# Patient Record
Sex: FEMALE | Race: WHITE | NOT HISPANIC OR LATINO | Employment: UNEMPLOYED | URBAN - METROPOLITAN AREA
[De-identification: names, ages, dates, MRNs, and addresses within clinical notes are randomized per-mention and may not be internally consistent; named-entity substitution may affect disease eponyms.]

---

## 2018-02-27 ENCOUNTER — OFFICE VISIT (OUTPATIENT)
Dept: CARDIOLOGY | Facility: CLINIC | Age: 23
End: 2018-02-27

## 2018-02-27 VITALS
SYSTOLIC BLOOD PRESSURE: 110 MMHG | HEIGHT: 66 IN | DIASTOLIC BLOOD PRESSURE: 74 MMHG | WEIGHT: 174 LBS | BODY MASS INDEX: 27.97 KG/M2 | HEART RATE: 81 BPM

## 2018-02-27 DIAGNOSIS — R07.2 PRECORDIAL PAIN: Primary | ICD-10-CM

## 2018-02-27 PROCEDURE — 99204 OFFICE O/P NEW MOD 45 MIN: CPT | Performed by: INTERNAL MEDICINE

## 2018-02-27 PROCEDURE — 93000 ELECTROCARDIOGRAM COMPLETE: CPT | Performed by: INTERNAL MEDICINE

## 2018-02-27 NOTE — PROGRESS NOTES
Date of Office Visit: 2018  Encounter Provider: Payam Rausch MD  Place of Service: [facility] CARDIOLOGY  Patient Name: Liliam Estrada  :1995    Chief complaint: Chest pain.    History of Present Illness:    Dear Dr. Borrero:    I had the pleasure of seeing your patient in cardiology office on 2018.  As you well   know, she is a very pleasant 22 year-old white female with a past medical history   significant for GERD, irritable bowel syndrome, and gastroparesis who presents for   evaluation of chest discomfort.  The patient states that she has had chest discomfort   intermittently for several years.  She has been seen in the ER previously for this, and   was told that it was esophageal spasm.  The pain typically would resolve with a GI   cocktail.  However, she states that she had 2 episodes of chest discomfort within the   last several months which were different.  The worst of these occurred around the   Thanksgiving holiday, and she described this as a grabbing and twisting pain in the   left upper chest which was severe.  She states that it did hurt if she moved or took a   deep breath.  Entire episode lasted for several hours.  She had a similar but less   intense episode while in college approximately one month prior to this.  She has only   mild shortness of breath with activity, but states that this has not changed significantly.    She has not had chest discomfort with activity.  She does have multiple GI issues as   noted above, including gastroparesis which is not related to diabetes.  Her EKG from   today shows sinus arrhythmia, but is otherwise normal.  She has no history of sudden   cardiac death in her family.  Her only other complaint is that she has had some   lightheaded episodes, and her systolic blood pressure has been low at times in the   90s.  She has had no syncope.    Past Medical History:   Diagnosis Date   • Bronchitis    • Chest pain  "   • Gastroparesis    • GERD (gastroesophageal reflux disease)    • IBS (irritable bowel syndrome)        Past Surgical History:   Procedure Laterality Date   • UPPER GASTROINTESTINAL ENDOSCOPY         Current Outpatient Prescriptions on File Prior to Visit   Medication Sig Dispense Refill   • ALOE VERA EX Apply  topically.     • Ginger, Zingiber officinalis, (GINGER ROOT PO) Take  by mouth.     • omeprazole (priLOSEC) 20 MG capsule Take 40 mg by mouth 2 (Two) Times a Day.     • ondansetron (ZOFRAN) 4 MG tablet Take 4 mg by mouth Every 8 (Eight) Hours As Needed for Nausea or Vomiting.       No current facility-administered medications on file prior to visit.      Allergies as of 02/27/2018   • (No Known Allergies)     Social History     Social History   • Marital status: Single     Spouse name: N/A   • Number of children: N/A   • Years of education: N/A     Occupational History   • Not on file.     Social History Main Topics   • Smoking status: Never Smoker   • Smokeless tobacco: Never Used   • Alcohol use No   • Drug use: No   • Sexual activity: Not on file     Other Topics Concern   • Not on file     Social History Narrative     Family History   Problem Relation Age of Onset   • Hypertension Mother    • Diabetes Mother    • Diabetes Father    • Cancer Paternal Grandmother    • Heart disease Paternal Grandfather    • Stroke Paternal Grandfather    • Diabetes Paternal Grandfather    • Cancer Paternal Grandfather    • Coronary artery disease Maternal Grandmother      MGM with 4 vessel CABG and multiple stents   • Coronary artery disease Maternal Uncle      Maternal uncle with MI       Review of Systems   Cardiovascular: Positive for chest pain.   All other systems reviewed and are negative.    Objective:     Vitals:    02/27/18 0938   BP: 110/74   BP Location: Right arm   Patient Position: Sitting   Pulse: 81   Weight: 78.9 kg (174 lb)   Height: 167.6 cm (66\")     Body mass index is 28.08 kg/(m^2).    Physical Exam "   Constitutional: She is oriented to person, place, and time. She appears well-developed and well-nourished.   HENT:   Head: Normocephalic and atraumatic.   Eyes: Conjunctivae are normal.   Neck: Neck supple.   Cardiovascular: Normal rate and regular rhythm.  Exam reveals no gallop and no friction rub.    No murmur heard.  Pulmonary/Chest: Effort normal and breath sounds normal.   Abdominal: Soft. There is no tenderness.   Musculoskeletal: She exhibits no edema.   Neurological: She is alert and oriented to person, place, and time.   Skin: Skin is warm.   Psychiatric: She has a normal mood and affect. Her behavior is normal.     Lab Review:     ECG 12 Lead  Date/Time: 2/27/2018 9:47 AM  Performed by: VICTORINA SEN  Authorized by: VICTORINA SEN   Comparison: compared with previous ECG from 9/11/2017  Similar to previous ECG  Rhythm: sinus rhythm  Rate: normal  BPM: 81  Clinical impression: normal ECG          Assessment:       Diagnosis Plan   1. Precordial pain  Adult Transthoracic Echo Complete W/ Cont if Necessary Per Protocol    Treadmill Stress Test     Plan:       As noted, the patient has had chest discomfort intermittently for several years which has   been attributed to esophageal spasm.  However, she had a different type of chest   discomfort around Thanksgiving which was a gripping and twisting sensation in her left   upper chest, which worsened with movement and inspiration.  She has not had   exertional chest discomfort.  I feel that this is very likely not cardiac in nature.  However,   she has not had a cardiac work-up in the past.  I suspect that this may be more from her   GI issues.  For now, I am going to proceed with checking an exercise treadmill stress   test to ensure that she does not have EKG changes or pain with exertion.  This will also   evaluate her exercise capacity.  I will also check an echocardiogram to ensure that she   does not have any structural heart disease such as  significant hypertrophy or right   ventricular enlargement.  With regards to her blood pressure running in the 90s, I   advised her to stay very well-hydrated and to increase her salt intake.  Again, she has   not had syncopal episodes from this.  Further plans will be made pending the results   of the above testing.

## 2018-03-14 ENCOUNTER — HOSPITAL ENCOUNTER (OUTPATIENT)
Dept: CARDIOLOGY | Facility: HOSPITAL | Age: 23
Discharge: HOME | End: 2018-03-14
Attending: INTERNAL MEDICINE | Admitting: INTERNAL MEDICINE

## 2018-03-14 ENCOUNTER — HOSPITAL ENCOUNTER (OUTPATIENT)
Dept: CARDIOLOGY | Facility: HOSPITAL | Age: 23
Discharge: HOME | End: 2018-03-14
Attending: INTERNAL MEDICINE

## 2018-03-14 VITALS — SYSTOLIC BLOOD PRESSURE: 108 MMHG | BODY MASS INDEX: 27.97 KG/M2 | HEART RATE: 73 BPM | WEIGHT: 174 LBS | HEIGHT: 66 IN

## 2018-03-14 DIAGNOSIS — R07.2 PRECORDIAL PAIN: ICD-10-CM

## 2018-03-14 LAB
ASCENDING AORTA: 2.2 CM
BH CV ECHO MEAS - ACS: 1.9 CM
BH CV ECHO MEAS - AO MAX PG (FULL): 1.9 MMHG
BH CV ECHO MEAS - AO MAX PG: 6.1 MMHG
BH CV ECHO MEAS - AO MEAN PG (FULL): 1.2 MMHG
BH CV ECHO MEAS - AO MEAN PG: 3.7 MMHG
BH CV ECHO MEAS - AO ROOT AREA (BSA CORRECTED): 1.3
BH CV ECHO MEAS - AO ROOT AREA: 4.7 CM^2
BH CV ECHO MEAS - AO ROOT DIAM: 2.4 CM
BH CV ECHO MEAS - AO V2 MAX: 123.4 CM/SEC
BH CV ECHO MEAS - AO V2 MEAN: 91.7 CM/SEC
BH CV ECHO MEAS - AO V2 VTI: 26.5 CM
BH CV ECHO MEAS - AVA(I,A): 2.1 CM^2
BH CV ECHO MEAS - AVA(I,D): 2.1 CM^2
BH CV ECHO MEAS - AVA(V,A): 2 CM^2
BH CV ECHO MEAS - AVA(V,D): 2 CM^2
BH CV ECHO MEAS - BSA(HAYCOCK): 1.9 M^2
BH CV ECHO MEAS - BSA: 1.9 M^2
BH CV ECHO MEAS - BZI_BMI: 28.1 KILOGRAMS/M^2
BH CV ECHO MEAS - BZI_METRIC_HEIGHT: 167.6 CM
BH CV ECHO MEAS - BZI_METRIC_WEIGHT: 78.9 KG
BH CV ECHO MEAS - CONTRAST EF (2CH): 62.3 ML/M^2
BH CV ECHO MEAS - CONTRAST EF 4CH: 61.7 ML/M^2
BH CV ECHO MEAS - EDV(MOD-SP2): 53 ML
BH CV ECHO MEAS - EDV(MOD-SP4): 60 ML
BH CV ECHO MEAS - EDV(TEICH): 123.3 ML
BH CV ECHO MEAS - EF(CUBED): 68.6 %
BH CV ECHO MEAS - EF(MOD-SP2): 62.3 %
BH CV ECHO MEAS - EF(MOD-SP4): 61.7 %
BH CV ECHO MEAS - EF(TEICH): 59.9 %
BH CV ECHO MEAS - ESV(MOD-SP2): 20 ML
BH CV ECHO MEAS - ESV(MOD-SP4): 23 ML
BH CV ECHO MEAS - ESV(TEICH): 49.5 ML
BH CV ECHO MEAS - FS: 32 %
BH CV ECHO MEAS - IVS/LVPW: 0.88
BH CV ECHO MEAS - IVSD: 0.77 CM
BH CV ECHO MEAS - LAT PEAK E' VEL: 21 CM/SEC
BH CV ECHO MEAS - LV DIASTOLIC VOL/BSA (35-75): 31.8 ML/M^2
BH CV ECHO MEAS - LV MASS(C)D: 143.5 GRAMS
BH CV ECHO MEAS - LV MASS(C)DI: 76.1 GRAMS/M^2
BH CV ECHO MEAS - LV MAX PG: 4.2 MMHG
BH CV ECHO MEAS - LV MEAN PG: 2.5 MMHG
BH CV ECHO MEAS - LV SYSTOLIC VOL/BSA (12-30): 12.2 ML/M^2
BH CV ECHO MEAS - LV V1 MAX: 102.3 CM/SEC
BH CV ECHO MEAS - LV V1 MEAN: 74.6 CM/SEC
BH CV ECHO MEAS - LV V1 VTI: 23.4 CM
BH CV ECHO MEAS - LVIDD: 5.1 CM
BH CV ECHO MEAS - LVIDS: 3.5 CM
BH CV ECHO MEAS - LVLD AP2: 7 CM
BH CV ECHO MEAS - LVLD AP4: 7 CM
BH CV ECHO MEAS - LVLS AP2: 6.1 CM
BH CV ECHO MEAS - LVLS AP4: 5.6 CM
BH CV ECHO MEAS - LVOT AREA (M): 2.3 CM^2
BH CV ECHO MEAS - LVOT AREA: 2.4 CM^2
BH CV ECHO MEAS - LVOT DIAM: 1.7 CM
BH CV ECHO MEAS - LVPWD: 0.87 CM
BH CV ECHO MEAS - MED PEAK E' VEL: 12 CM/SEC
BH CV ECHO MEAS - MV A DUR: 0.12 SEC
BH CV ECHO MEAS - MV A MAX VEL: 45.9 CM/SEC
BH CV ECHO MEAS - MV DEC SLOPE: 576.5 CM/SEC^2
BH CV ECHO MEAS - MV DEC TIME: 0.15 SEC
BH CV ECHO MEAS - MV E MAX VEL: 89.2 CM/SEC
BH CV ECHO MEAS - MV E/A: 1.9
BH CV ECHO MEAS - MV MAX PG: 3.9 MMHG
BH CV ECHO MEAS - MV MEAN PG: 1.4 MMHG
BH CV ECHO MEAS - MV P1/2T MAX VEL: 90.8 CM/SEC
BH CV ECHO MEAS - MV P1/2T: 46.1 MSEC
BH CV ECHO MEAS - MV V2 MAX: 99 CM/SEC
BH CV ECHO MEAS - MV V2 MEAN: 54 CM/SEC
BH CV ECHO MEAS - MV V2 VTI: 27 CM
BH CV ECHO MEAS - MVA P1/2T LCG: 2.4 CM^2
BH CV ECHO MEAS - MVA(P1/2T): 4.8 CM^2
BH CV ECHO MEAS - MVA(VTI): 2.1 CM^2
BH CV ECHO MEAS - PA MAX PG (FULL): 2.9 MMHG
BH CV ECHO MEAS - PA MAX PG: 5 MMHG
BH CV ECHO MEAS - PA V2 MAX: 112.1 CM/SEC
BH CV ECHO MEAS - PULM A REVS DUR: 0.11 SEC
BH CV ECHO MEAS - PULM A REVS VEL: 30.4 CM/SEC
BH CV ECHO MEAS - PULM DIAS VEL: 62.4 CM/SEC
BH CV ECHO MEAS - PULM S/D: 0.76
BH CV ECHO MEAS - PULM SYS VEL: 47.2 CM/SEC
BH CV ECHO MEAS - PVA(V,A): 2.5 CM^2
BH CV ECHO MEAS - PVA(V,D): 2.5 CM^2
BH CV ECHO MEAS - QP/QS: 1.1
BH CV ECHO MEAS - RV MAX PG: 2.1 MMHG
BH CV ECHO MEAS - RV MEAN PG: 1.2 MMHG
BH CV ECHO MEAS - RV V1 MAX: 72.7 CM/SEC
BH CV ECHO MEAS - RV V1 MEAN: 51.9 CM/SEC
BH CV ECHO MEAS - RV V1 VTI: 15.5 CM
BH CV ECHO MEAS - RVOT AREA: 3.8 CM^2
BH CV ECHO MEAS - RVOT DIAM: 2.2 CM
BH CV ECHO MEAS - SI(AO): 65.4 ML/M^2
BH CV ECHO MEAS - SI(CUBED): 48 ML/M^2
BH CV ECHO MEAS - SI(LVOT): 29.6 ML/M^2
BH CV ECHO MEAS - SI(MOD-SP2): 17.5 ML/M^2
BH CV ECHO MEAS - SI(MOD-SP4): 19.6 ML/M^2
BH CV ECHO MEAS - SI(TEICH): 39.1 ML/M^2
BH CV ECHO MEAS - SV(AO): 123.4 ML
BH CV ECHO MEAS - SV(CUBED): 90.5 ML
BH CV ECHO MEAS - SV(LVOT): 55.8 ML
BH CV ECHO MEAS - SV(MOD-SP2): 33 ML
BH CV ECHO MEAS - SV(MOD-SP4): 37 ML
BH CV ECHO MEAS - SV(RVOT): 59 ML
BH CV ECHO MEAS - SV(TEICH): 73.8 ML
BH CV ECHO MEAS - TAPSE (>1.6): 2.1 CM2
BH CV STRESS BP STAGE 1: NORMAL
BH CV STRESS BP STAGE 2: NORMAL
BH CV STRESS BP STAGE 3: NORMAL
BH CV STRESS DURATION MIN STAGE 1: 3
BH CV STRESS DURATION MIN STAGE 2: 3
BH CV STRESS DURATION MIN STAGE 3: 3
BH CV STRESS DURATION SEC STAGE 1: 0
BH CV STRESS DURATION SEC STAGE 2: 0
BH CV STRESS DURATION SEC STAGE 3: 0
BH CV STRESS GRADE STAGE 1: 10
BH CV STRESS GRADE STAGE 2: 12
BH CV STRESS GRADE STAGE 3: 14
BH CV STRESS HR STAGE 1: 143
BH CV STRESS HR STAGE 2: 152
BH CV STRESS HR STAGE 3: 176
BH CV STRESS METS STAGE 1: 5
BH CV STRESS METS STAGE 2: 7.5
BH CV STRESS METS STAGE 3: 10
BH CV STRESS PROTOCOL 1: NORMAL
BH CV STRESS RECOVERY BP: NORMAL MMHG
BH CV STRESS RECOVERY HR: 99 BPM
BH CV STRESS SPEED STAGE 1: 1.7
BH CV STRESS SPEED STAGE 2: 2.5
BH CV STRESS SPEED STAGE 3: 3.4
BH CV STRESS STAGE 1: 1
BH CV STRESS STAGE 2: 2
BH CV STRESS STAGE 3: 3
BH CV XLRA - RV BASE: 2.5 CM
BH CV XLRA - TDI S': 11 CM/SEC
E/E' RATIO: 6
LEFT ATRIUM VOLUME INDEX: 17 ML/M2
LEFT ATRIUM VOLUME: 32 CM3
MAXIMAL PREDICTED HEART RATE: 198 BPM
MAXIMAL PREDICTED HEART RATE: 198 BPM
PERCENT MAX PREDICTED HR: 88.89 %
SINUS: 2.3 CM
STJ: 2.4 CM
STRESS BASELINE BP: NORMAL MMHG
STRESS BASELINE HR: 88 BPM
STRESS PERCENT HR: 105 %
STRESS POST ESTIMATED WORKLOAD: 10 METS
STRESS POST EXERCISE DUR MIN: 9 MIN
STRESS POST EXERCISE DUR SEC: 0 SEC
STRESS POST PEAK BP: NORMAL MMHG
STRESS POST PEAK HR: 176 BPM
STRESS TARGET HR: 168 BPM
STRESS TARGET HR: 168 BPM

## 2018-03-14 PROCEDURE — 93018 CV STRESS TEST I&R ONLY: CPT | Performed by: INTERNAL MEDICINE

## 2018-03-14 PROCEDURE — 93016 CV STRESS TEST SUPVJ ONLY: CPT | Performed by: INTERNAL MEDICINE

## 2018-03-14 PROCEDURE — 93306 TTE W/DOPPLER COMPLETE: CPT

## 2018-03-14 PROCEDURE — 93017 CV STRESS TEST TRACING ONLY: CPT

## 2018-03-14 PROCEDURE — 93306 TTE W/DOPPLER COMPLETE: CPT | Performed by: INTERNAL MEDICINE

## 2018-08-27 RX ORDER — MIDODRINE HYDROCHLORIDE 2.5 MG/1
2.5 TABLET ORAL EVERY 12 HOURS
Qty: 60 TABLET | Refills: 11 | Status: SHIPPED | OUTPATIENT
Start: 2018-08-27 | End: 2018-10-26

## 2018-10-26 ENCOUNTER — OFFICE VISIT (OUTPATIENT)
Dept: OBSTETRICS AND GYNECOLOGY | Facility: CLINIC | Age: 23
End: 2018-10-26

## 2018-10-26 VITALS
BODY MASS INDEX: 27.37 KG/M2 | WEIGHT: 169.6 LBS | DIASTOLIC BLOOD PRESSURE: 81 MMHG | SYSTOLIC BLOOD PRESSURE: 124 MMHG | HEART RATE: 101 BPM

## 2018-10-26 DIAGNOSIS — Z71.85 COUNSELING FOR HPV (HUMAN PAPILLOMAVIRUS) VACCINATION: ICD-10-CM

## 2018-10-26 DIAGNOSIS — Z12.4 PAP SMEAR FOR CERVICAL CANCER SCREENING: ICD-10-CM

## 2018-10-26 DIAGNOSIS — Z11.3 SCREEN FOR STD (SEXUALLY TRANSMITTED DISEASE): ICD-10-CM

## 2018-10-26 DIAGNOSIS — Z01.419 ENCOUNTER FOR GYNECOLOGICAL EXAMINATION: Primary | ICD-10-CM

## 2018-10-26 DIAGNOSIS — N94.6 DYSMENORRHEA: ICD-10-CM

## 2018-10-26 PROCEDURE — 99385 PREV VISIT NEW AGE 18-39: CPT | Performed by: OBSTETRICS & GYNECOLOGY

## 2018-10-26 RX ORDER — AMITRIPTYLINE HYDROCHLORIDE 25 MG/1
25 TABLET, FILM COATED ORAL
COMMUNITY
End: 2019-11-08

## 2018-10-26 RX ORDER — MEDROXYPROGESTERONE ACETATE 150 MG/ML
150 INJECTION, SUSPENSION INTRAMUSCULAR
Qty: 1 ML | Refills: 3 | Status: SHIPPED | OUTPATIENT
Start: 2018-10-26 | End: 2022-05-08 | Stop reason: HOSPADM

## 2018-10-26 NOTE — PROGRESS NOTES
Subjective   Liliam Estrada is a 23 y.o. female here for annual exam.   Pt is wanting to talk about periods and will need pap smear    History of Present Illness   Patient is a 23-year-old that presents for annual gynecological exam.  Patient has never had a pelvic exam.  Patient also complains of very heavy and painful periods.  She has tried oral contraceptive pills in the past and states that she had severe GI upset.  Patient reports passing clots with her periods and states that she tries to take ibuprofen for her cramping but it causes more GI upset.  Patient states that she has regular cycles for most months but will occasionally skip a month.  Patient is in a same-sex relationship.    The following portions of the patient's history were reviewed and updated as appropriate: allergies, current medications, past family history, past medical history, past social history, past surgical history and problem list.    Review of Systems   Genitourinary: Positive for menstrual problem.   All other systems reviewed and are negative.      Objective   Physical Exam  Physical Exam   Constitutional: She appears well-developed and well-nourished.   Cardiovascular: Normal rate and regular rhythm.    Pulmonary/Chest: Effort normal and breath sounds normal. Right breast exhibits no inverted nipple, no mass, no nipple discharge, no skin change and no tenderness. Left breast exhibits no inverted nipple, no mass, no nipple discharge, no skin change and no tenderness.   Abdominal: Soft. She exhibits no distension. There is no tenderness.   Genitourinary: Vagina normal and uterus normal. There is no rash, tenderness, lesion or injury on the right labia. There is no rash, tenderness, lesion or injury on the left labia. Cervix exhibits no motion tenderness, no discharge and no friability. Right adnexum displays no mass, no tenderness and no fullness. Left adnexum displays no mass, no tenderness and no fullness.   Neurological: She is  alert.   Psychiatric: She has a normal mood and affect.   Vitals reviewed.      Assessment/Plan   Liliam was seen today for gynecologic exam.    Diagnoses and all orders for this visit:    Encounter for gynecological examination    Screen for STD (sexually transmitted disease)  -     RPR, Rfx Qn RPR / Confirm TP  -     Hepatitis B Surface Antigen  -     Hepatitis C Antibody  -     HIV-1 / O / 2 Ag / Antibody 4th Generation    Pap smear for cervical cancer screening  -     IGP,CtNgTv,rfx Aptima HPV ASCU    Dysmenorrhea  -     medroxyPROGESTERone (DEPO-PROVERA) 150 MG/ML injection; Inject 1 mL into the appropriate muscle as directed by prescriber Every 3 (Three) Months.    Counseling for HPV (human papillomavirus) vaccination    Patient was counseled on need for Gardasil vaccine.  She received the first injection several years ago.  She may return for the second and third injection if she desires.  Patient was also counseled on management options for her period including Depo-Provera or IUD to help possibly achieve amenorrhea.  She would like to try Depo-Provera.  She was counseled on side effects and bleeding profile.  Patient plans to return next Friday to receive Depo-Provera injection and Gardasil #2.

## 2018-10-27 LAB
HBV SURFACE AG SERPL QL IA: NEGATIVE
HCV AB S/CO SERPL IA: <0.1 S/CO RATIO (ref 0–0.9)
HIV 1+2 AB+HIV1 P24 AG SERPL QL IA: NON REACTIVE
RPR SER QL: NON REACTIVE

## 2018-10-29 ENCOUNTER — TELEPHONE (OUTPATIENT)
Dept: OBSTETRICS AND GYNECOLOGY | Facility: CLINIC | Age: 23
End: 2018-10-29

## 2018-10-29 NOTE — TELEPHONE ENCOUNTER
----- Message from Rosey Mulligan MD sent at 10/29/2018  8:49 AM EDT -----  Let patient know her STD labwork is negative

## 2018-10-30 LAB
C TRACH RRNA CVX QL NAA+PROBE: NEGATIVE
CONV .: NORMAL
CYTOLOGIST CVX/VAG CYTO: NORMAL
CYTOLOGY CVX/VAG DOC THIN PREP: NORMAL
DX ICD CODE: NORMAL
HIV 1 & 2 AB SER-IMP: NORMAL
Lab: NORMAL
N GONORRHOEA RRNA CVX QL NAA+PROBE: NEGATIVE
OTHER STN SPEC: NORMAL
PATH REPORT.FINAL DX SPEC: NORMAL
STAT OF ADQ CVX/VAG CYTO-IMP: NORMAL
T VAGINALIS RRNA SPEC QL NAA+PROBE: NEGATIVE

## 2019-05-13 ENCOUNTER — HOSPITAL ENCOUNTER (EMERGENCY)
Facility: HOSPITAL | Age: 24
Discharge: HOME | End: 2019-05-13
Attending: EMERGENCY MEDICINE | Admitting: EMERGENCY MEDICINE

## 2019-05-13 ENCOUNTER — APPOINTMENT (OUTPATIENT)
Dept: CT IMAGING | Facility: HOSPITAL | Age: 24
End: 2019-05-13

## 2019-05-13 VITALS
OXYGEN SATURATION: 98 % | HEIGHT: 64 IN | HEART RATE: 75 BPM | DIASTOLIC BLOOD PRESSURE: 71 MMHG | TEMPERATURE: 98.7 F | RESPIRATION RATE: 16 BRPM | WEIGHT: 178 LBS | SYSTOLIC BLOOD PRESSURE: 103 MMHG | BODY MASS INDEX: 30.39 KG/M2

## 2019-05-13 DIAGNOSIS — R10.30 LOWER ABDOMINAL PAIN: Primary | ICD-10-CM

## 2019-05-13 LAB
ALBUMIN SERPL-MCNC: 4.3 G/DL (ref 3.5–5.2)
ALBUMIN/GLOB SERPL: 1.4 G/DL
ALP SERPL-CCNC: 50 U/L (ref 39–117)
ALT SERPL W P-5'-P-CCNC: 17 U/L (ref 1–33)
ANION GAP SERPL CALCULATED.3IONS-SCNC: 11.3 MMOL/L
AST SERPL-CCNC: 15 U/L (ref 1–32)
BACTERIA UR QL AUTO: ABNORMAL /HPF
BASOPHILS # BLD AUTO: 0.02 10*3/MM3 (ref 0–0.2)
BASOPHILS NFR BLD AUTO: 0.4 % (ref 0–1.5)
BILIRUB SERPL-MCNC: 0.7 MG/DL (ref 0.2–1.2)
BILIRUB UR QL STRIP: NEGATIVE
BUN BLD-MCNC: 8 MG/DL (ref 6–20)
BUN/CREAT SERPL: 9.8 (ref 7–25)
CALCIUM SPEC-SCNC: 8.9 MG/DL (ref 8.6–10.5)
CHLORIDE SERPL-SCNC: 103 MMOL/L (ref 98–107)
CLARITY UR: CLEAR
CO2 SERPL-SCNC: 22.7 MMOL/L (ref 22–29)
COLOR UR: YELLOW
CREAT BLD-MCNC: 0.82 MG/DL (ref 0.57–1)
DEPRECATED RDW RBC AUTO: 43.4 FL (ref 37–54)
EOSINOPHIL # BLD AUTO: 0.02 10*3/MM3 (ref 0–0.4)
EOSINOPHIL NFR BLD AUTO: 0.4 % (ref 0.3–6.2)
ERYTHROCYTE [DISTWIDTH] IN BLOOD BY AUTOMATED COUNT: 13.4 % (ref 12.3–15.4)
GFR SERPL CREATININE-BSD FRML MDRD: 86 ML/MIN/1.73
GLOBULIN UR ELPH-MCNC: 3 GM/DL
GLUCOSE BLD-MCNC: 86 MG/DL (ref 65–99)
GLUCOSE UR STRIP-MCNC: NEGATIVE MG/DL
HCG SERPL QL: NEGATIVE
HCT VFR BLD AUTO: 44.6 % (ref 34–46.6)
HGB BLD-MCNC: 13.9 G/DL (ref 12–15.9)
HGB UR QL STRIP.AUTO: NEGATIVE
HOLD SPECIMEN: NORMAL
HOLD SPECIMEN: NORMAL
HYALINE CASTS UR QL AUTO: ABNORMAL /LPF
IMM GRANULOCYTES # BLD AUTO: 0.01 10*3/MM3 (ref 0–0.05)
IMM GRANULOCYTES NFR BLD AUTO: 0.2 % (ref 0–0.5)
KETONES UR QL STRIP: NEGATIVE
LEUKOCYTE ESTERASE UR QL STRIP.AUTO: ABNORMAL
LIPASE SERPL-CCNC: 20 U/L (ref 13–60)
LYMPHOCYTES # BLD AUTO: 2 10*3/MM3 (ref 0.7–3.1)
LYMPHOCYTES NFR BLD AUTO: 35.3 % (ref 19.6–45.3)
MCH RBC QN AUTO: 27.7 PG (ref 26.6–33)
MCHC RBC AUTO-ENTMCNC: 31.2 G/DL (ref 31.5–35.7)
MCV RBC AUTO: 89 FL (ref 79–97)
MONOCYTES # BLD AUTO: 0.38 10*3/MM3 (ref 0.1–0.9)
MONOCYTES NFR BLD AUTO: 6.7 % (ref 5–12)
NEUTROPHILS # BLD AUTO: 3.24 10*3/MM3 (ref 1.7–7)
NEUTROPHILS NFR BLD AUTO: 57 % (ref 42.7–76)
NITRITE UR QL STRIP: NEGATIVE
NRBC BLD AUTO-RTO: 0 /100 WBC (ref 0–0.2)
PH UR STRIP.AUTO: 6.5 [PH] (ref 5–8)
PLATELET # BLD AUTO: 273 10*3/MM3 (ref 140–450)
PMV BLD AUTO: 11.1 FL (ref 6–12)
POTASSIUM BLD-SCNC: 3.9 MMOL/L (ref 3.5–5.2)
PROT SERPL-MCNC: 7.3 G/DL (ref 6–8.5)
PROT UR QL STRIP: NEGATIVE
RBC # BLD AUTO: 5.01 10*6/MM3 (ref 3.77–5.28)
RBC # UR: ABNORMAL /HPF
REF LAB TEST METHOD: ABNORMAL
SODIUM BLD-SCNC: 137 MMOL/L (ref 136–145)
SP GR UR STRIP: 1.03 (ref 1–1.03)
SQUAMOUS #/AREA URNS HPF: ABNORMAL /HPF
UROBILINOGEN UR QL STRIP: ABNORMAL
WBC NRBC COR # BLD: 5.67 10*3/MM3 (ref 3.4–10.8)
WBC UR QL AUTO: ABNORMAL /HPF
WHOLE BLOOD HOLD SPECIMEN: NORMAL
WHOLE BLOOD HOLD SPECIMEN: NORMAL

## 2019-05-13 PROCEDURE — 99284 EMERGENCY DEPT VISIT MOD MDM: CPT

## 2019-05-13 PROCEDURE — 96374 THER/PROPH/DIAG INJ IV PUSH: CPT

## 2019-05-13 PROCEDURE — 84703 CHORIONIC GONADOTROPIN ASSAY: CPT | Performed by: EMERGENCY MEDICINE

## 2019-05-13 PROCEDURE — 85025 COMPLETE CBC W/AUTO DIFF WBC: CPT | Performed by: EMERGENCY MEDICINE

## 2019-05-13 PROCEDURE — 25010000002 ONDANSETRON PER 1 MG: Performed by: EMERGENCY MEDICINE

## 2019-05-13 PROCEDURE — 83690 ASSAY OF LIPASE: CPT | Performed by: EMERGENCY MEDICINE

## 2019-05-13 PROCEDURE — 96375 TX/PRO/DX INJ NEW DRUG ADDON: CPT

## 2019-05-13 PROCEDURE — 25010000002 IOPAMIDOL 61 % SOLUTION: Performed by: EMERGENCY MEDICINE

## 2019-05-13 PROCEDURE — 81001 URINALYSIS AUTO W/SCOPE: CPT | Performed by: EMERGENCY MEDICINE

## 2019-05-13 PROCEDURE — 74177 CT ABD & PELVIS W/CONTRAST: CPT

## 2019-05-13 PROCEDURE — 25010000002 MORPHINE PER 10 MG: Performed by: EMERGENCY MEDICINE

## 2019-05-13 PROCEDURE — 80053 COMPREHEN METABOLIC PANEL: CPT | Performed by: EMERGENCY MEDICINE

## 2019-05-13 RX ORDER — SODIUM CHLORIDE 0.9 % (FLUSH) 0.9 %
10 SYRINGE (ML) INJECTION AS NEEDED
Status: DISCONTINUED | OUTPATIENT
Start: 2019-05-13 | End: 2019-05-13 | Stop reason: HOSPADM

## 2019-05-13 RX ORDER — ONDANSETRON 2 MG/ML
4 INJECTION INTRAMUSCULAR; INTRAVENOUS ONCE
Status: COMPLETED | OUTPATIENT
Start: 2019-05-13 | End: 2019-05-13

## 2019-05-13 RX ORDER — DICYCLOMINE HYDROCHLORIDE 10 MG/1
10 CAPSULE ORAL 4 TIMES DAILY PRN
Qty: 20 CAPSULE | Refills: 0 | Status: SHIPPED | OUTPATIENT
Start: 2019-05-13 | End: 2019-11-08

## 2019-05-13 RX ORDER — MORPHINE SULFATE 2 MG/ML
4 INJECTION, SOLUTION INTRAMUSCULAR; INTRAVENOUS ONCE
Status: COMPLETED | OUTPATIENT
Start: 2019-05-13 | End: 2019-05-13

## 2019-05-13 RX ADMIN — IOPAMIDOL 85 ML: 612 INJECTION, SOLUTION INTRAVENOUS at 14:13

## 2019-05-13 RX ADMIN — ONDANSETRON HYDROCHLORIDE 4 MG: 2 SOLUTION INTRAMUSCULAR; INTRAVENOUS at 14:40

## 2019-05-13 RX ADMIN — MORPHINE SULFATE 4 MG: 2 INJECTION, SOLUTION INTRAMUSCULAR; INTRAVENOUS at 14:40

## 2019-05-13 NOTE — ED NOTES
Pt c/o sudden onset lower abdomen pain while she was sleeping this am. Pt also with nausea.      Zaina Silvestre, RN  05/13/19 5769

## 2019-05-13 NOTE — ED PROVIDER NOTES
" EMERGENCY DEPARTMENT ENCOUNTER    CHIEF COMPLAINT  Chief Complaint: Abdominal pain  History given by: Pt  History limited by: Nothing  Room Number: 09/09  PMD: Jason Borrero MD      HPI:  Pt is a 24 y.o. female who presents complaining of lower abdominal pain starting earlier this morning. She states her pain was initially mild, and she was able to return to sleep, but was later awoken with \"bursting\" severe abdominal pain. Pt also c/o nausea and chills. Pt denies V/D or fever. Pt reports she recently had an US which \"may have shown an ovarian cyst, but they weren't sure\" approximately 2 weeks ago. Pt notes she is currently being treated for endometriosis and is being tested for Crohn's disease. She states she has irregular menstrual periods.     Duration:  A few horus  Onset: gradual  Timing: constant  Location: lower abdomen  Radiation: none stated  Quality: \"bursting\"  Intensity/Severity: severe  Progression: worsened  Associated Symptoms: nausea, chills  Aggravating Factors: none  Alleviating Factors: none  Previous Episodes: Pt reports a hx of endometriosis  Treatment before arrival: Pt states she had an US 2 weeks ago which \"may have shown an ovarian cyst, but they weren't sure\"    PAST MEDICAL HISTORY  Active Ambulatory Problems     Diagnosis Date Noted   • No Active Ambulatory Problems     Resolved Ambulatory Problems     Diagnosis Date Noted   • No Resolved Ambulatory Problems     Past Medical History:   Diagnosis Date   • Bronchitis    • Chest pain    • Gastroparesis    • GERD (gastroesophageal reflux disease)    • IBS (irritable bowel syndrome)        PAST SURGICAL HISTORY  Past Surgical History:   Procedure Laterality Date   • COLONOSCOPY     • UPPER GASTROINTESTINAL ENDOSCOPY         FAMILY HISTORY  Family History   Problem Relation Age of Onset   • Hypertension Mother    • Diabetes Mother    • Diabetes Father    • Cancer Paternal Grandmother    • Heart disease Paternal Grandfather    • Stroke " Paternal Grandfather    • Diabetes Paternal Grandfather    • Cancer Paternal Grandfather    • Coronary artery disease Maternal Grandmother         MGM with 4 vessel CABG and multiple stents   • Coronary artery disease Maternal Uncle         Maternal uncle with MI   • Breast cancer Other        SOCIAL HISTORY  Social History     Socioeconomic History   • Marital status: Single     Spouse name: Not on file   • Number of children: Not on file   • Years of education: Not on file   • Highest education level: Not on file   Tobacco Use   • Smoking status: Never Smoker   • Smokeless tobacco: Never Used   Substance and Sexual Activity   • Alcohol use: No   • Drug use: No   • Sexual activity: Yes     Partners: Male     Birth control/protection: None       ALLERGIES  Patient has no known allergies.    REVIEW OF SYSTEMS  Review of Systems   Constitutional: Positive for chills. Negative for fever.   HENT: Negative for sore throat.    Eyes: Negative.    Respiratory: Negative for cough and shortness of breath.    Cardiovascular: Negative for chest pain.   Gastrointestinal: Positive for abdominal pain and nausea. Negative for diarrhea and vomiting.   Genitourinary: Negative for dysuria.   Musculoskeletal: Negative for neck pain.   Skin: Negative for rash.   Neurological: Negative for weakness, numbness and headaches.   Hematological: Negative.    Psychiatric/Behavioral: Negative.    All other systems reviewed and are negative.      PHYSICAL EXAM  ED Triage Vitals [05/13/19 1227]   Temp Heart Rate Resp BP SpO2   98.7 °F (37.1 °C) 108 16 130/80 98 %      Temp src Heart Rate Source Patient Position BP Location FiO2 (%)   -- Monitor -- -- --       Physical Exam   Constitutional: She is oriented to person, place, and time. She appears distressed (mild).   HENT:   Head: Normocephalic and atraumatic.   Eyes: EOM are normal. Pupils are equal, round, and reactive to light.   Neck: Normal range of motion. Neck supple.   Cardiovascular:  Normal rate, regular rhythm and normal heart sounds.   Pulmonary/Chest: Effort normal and breath sounds normal. No respiratory distress.   Abdominal: Soft. She exhibits no mass. There is tenderness (bilateral lower quadrants). There is no rebound and no guarding.   Musculoskeletal: Normal range of motion. She exhibits no edema.   Neurological: She is alert and oriented to person, place, and time. She has normal sensation and normal strength.   Skin: Skin is warm and dry. No rash noted.   Psychiatric: Mood and affect normal.   Nursing note and vitals reviewed.      LAB RESULTS  Lab Results (last 24 hours)     Procedure Component Value Units Date/Time    CBC & Differential [[id number]] Collected:  05/13/19 1236    Specimen:  Blood Updated:  05/13/19 1354    Narrative:       The following orders were created for panel order CBC & Differential.  Procedure                               Abnormality         Status                     ---------                               -----------         ------                     CBC Auto Differential[[id number]]        Abnormal            Final result                 Please view results for these tests on the individual orders.    Comprehensive Metabolic Panel [[id number]] Collected:  05/13/19 1236    Specimen:  Blood Updated:  05/13/19 1334     Glucose 86 mg/dL      BUN 8 mg/dL      Creatinine 0.82 mg/dL      Sodium 137 mmol/L      Potassium 3.9 mmol/L      Chloride 103 mmol/L      CO2 22.7 mmol/L      Calcium 8.9 mg/dL      Total Protein 7.3 g/dL      Albumin 4.30 g/dL      ALT (SGPT) 17 U/L      AST (SGOT) 15 U/L      Alkaline Phosphatase 50 U/L      Total Bilirubin 0.7 mg/dL      eGFR Non African Amer 86 mL/min/1.73      Globulin 3.0 gm/dL      A/G Ratio 1.4 g/dL      BUN/Creatinine Ratio 9.8     Anion Gap 11.3 mmol/L     Narrative:       GFR Normal >60  Chronic Kidney Disease <60  Kidney Failure <15    Lipase [[id number]]  (Normal) Collected:  05/13/19 1236    Specimen:  Blood  Updated:  05/13/19 1334     Lipase 20 U/L     hCG, Serum, Qualitative [[id number]]  (Normal) Collected:  05/13/19 1236    Specimen:  Blood Updated:  05/13/19 1327     HCG Qualitative Negative    CBC Auto Differential [[id number]]  (Abnormal) Collected:  05/13/19 1236    Specimen:  Blood Updated:  05/13/19 1354     WBC 5.67 10*3/mm3      RBC 5.01 10*6/mm3      Hemoglobin 13.9 g/dL      Hematocrit 44.6 %      MCV 89.0 fL      MCH 27.7 pg      MCHC 31.2 g/dL      RDW 13.4 %      RDW-SD 43.4 fl      MPV 11.1 fL      Platelets 273 10*3/mm3      Neutrophil % 57.0 %      Lymphocyte % 35.3 %      Monocyte % 6.7 %      Eosinophil % 0.4 %      Basophil % 0.4 %      Immature Grans % 0.2 %      Neutrophils, Absolute 3.24 10*3/mm3      Lymphocytes, Absolute 2.00 10*3/mm3      Monocytes, Absolute 0.38 10*3/mm3      Eosinophils, Absolute 0.02 10*3/mm3      Basophils, Absolute 0.02 10*3/mm3      Immature Grans, Absolute 0.01 10*3/mm3      nRBC 0.0 /100 WBC     Urinalysis With Microscopic If Indicated (No Culture) - Urine, Clean Catch [[id number]]  (Abnormal) Collected:  05/13/19 1352    Specimen:  Urine, Clean Catch Updated:  05/13/19 1411     Color, UA Yellow     Appearance, UA Clear     pH, UA 6.5     Specific Gravity, UA 1.026     Glucose, UA Negative     Ketones, UA Negative     Bilirubin, UA Negative     Blood, UA Negative     Protein, UA Negative     Leuk Esterase, UA Trace     Nitrite, UA Negative     Urobilinogen, UA 0.2 E.U./dL    Urinalysis, Microscopic Only - Urine, Clean Catch [[id number]] Collected:  05/13/19 1352    Specimen:  Urine, Clean Catch Updated:  05/13/19 1405          I ordered the above labs and reviewed the results    RADIOLOGY  CT Abdomen Pelvis With Contrast   Preliminary Result   No acute process identified.               Radiation dose reduction techniques were utilized, including automated   exposure control and exposure modulation based on body size.                   I ordered the above noted  radiological studies. Interpreted by radiologist. Discussed with radiologist (Dr. Gomez). Reviewed by me in PACS.       PROCEDURES  Procedures      PROGRESS AND CONSULTS       1317 - Lab work and CT abd/pelvis ordered for further evaluation.     1427 - Zofran and morphine ordered.     1502 - Rechecked pt. Pt is resting comfortably in NAD. Informed pt of the results of her lab work which was unremarkable, and her CT abd/pelvis which did not show signs of an ovarian cyst. D/w pt the plan to discharge home with a follow up with OB and GI. Pt understands and agrees with plan. All questions answered.     MEDICAL DECISION MAKING  Results were reviewed/discussed with the patient and they were also made aware of online access. Pt also made aware that some labs, such as cultures, will not be resulted during ER visit and follow up with PMD is necessary.     MDM  Number of Diagnoses or Management Options     Amount and/or Complexity of Data Reviewed  Clinical lab tests: ordered and reviewed (WBC - 5.67)  Tests in the radiology section of CPT®: ordered and reviewed (CT abd/pelvis - negative acute)  Discussion of test results with the performing providers: yes (Dr. Gomez)  Decide to obtain previous medical records or to obtain history from someone other than the patient: yes  Review and summarize past medical records: yes (Reviewed pt's US Ob results from 4/21/19 which showed a midline anechoic structure with echogenic ring, and they could not rule out an ovarian cyst)  Independent visualization of images, tracings, or specimens: yes           DIAGNOSIS  Final diagnoses:   Lower abdominal pain       DISPOSITION  DISCHARGE    Patient discharged in stable condition.    Reviewed implications of results, diagnosis, meds, responsibility to follow up, warning signs and symptoms of possible worsening, potential complications and reasons to return to ER.    Patient/Family voiced understanding of above instructions.    Discussed  plan for discharge, as there is no emergent indication for admission. Patient referred to primary care provider for BP management due to today's BP. Pt/family is agreeable and understands need for follow up and repeat testing.  Pt is aware that discharge does not mean that nothing is wrong but it indicates no emergency is present that requires admission and they must continue care with follow-up as given below or physician of their choice.     FOLLOW-UP  Jason Borrero MD  [address] [facility] [id number] [phone]    Schedule an appointment as soon as possible for a visit   As needed    Papo Joya MD  [address]  [place] [facility] [address]  [phone]    Schedule an appointment as soon as possible for a visit       Sarah Marcano MD  [address] [facility] [address]  [facility] [id number] [phone]    Schedule an appointment as soon as possible for a visit            Medication List      New Prescriptions    dicyclomine 10 MG capsule  Commonly known as:  BENTYL  Take 1 capsule by mouth 4 (Four) Times a Day As Needed (pain).              Latest Documented Vital Signs:  As of 3:16 PM  BP- 109/80 HR- 108 Temp- 98.7 °F (37.1 °C) O2 sat- 94%    --  Documentation assistance provided by fazal Galvez for Dr. Mderano.  Information recorded by the scribe was done at my direction and has been verified and validated by me.     Joni Galvez  05/13/19 1517       Adriano Medrano MD  05/13/19 4724

## 2019-11-04 ENCOUNTER — TELEPHONE (OUTPATIENT)
Dept: CARDIOLOGY | Facility: CLINIC | Age: 24
End: 2019-11-04

## 2019-11-04 NOTE — TELEPHONE ENCOUNTER
Patient called in requesting an apt for issues with hypotension.  Has not been seen in over a year, scheduled with Cha on Friday for further evaluation.  I have asked her to bring in her bp headings to her apt.  Thanks  Rosey Quiroga RN  Triage nurse

## 2019-11-08 ENCOUNTER — OFFICE VISIT (OUTPATIENT)
Dept: CARDIOLOGY | Facility: CLINIC | Age: 24
End: 2019-11-08

## 2019-11-08 VITALS
HEIGHT: 64 IN | BODY MASS INDEX: 30.9 KG/M2 | HEART RATE: 116 BPM | DIASTOLIC BLOOD PRESSURE: 72 MMHG | SYSTOLIC BLOOD PRESSURE: 104 MMHG | OXYGEN SATURATION: 97 % | WEIGHT: 181 LBS

## 2019-11-08 DIAGNOSIS — R00.0 TACHYCARDIA: Primary | ICD-10-CM

## 2019-11-08 DIAGNOSIS — I95.89 CHRONIC HYPOTENSION: ICD-10-CM

## 2019-11-08 DIAGNOSIS — R42 LIGHTHEADEDNESS: ICD-10-CM

## 2019-11-08 PROCEDURE — 93000 ELECTROCARDIOGRAM COMPLETE: CPT | Performed by: NURSE PRACTITIONER

## 2019-11-08 PROCEDURE — 99215 OFFICE O/P EST HI 40 MIN: CPT | Performed by: NURSE PRACTITIONER

## 2019-11-08 RX ORDER — TRIAMCINOLONE ACETONIDE 0.1 %
1 PASTE (GRAM) DENTAL 2 TIMES DAILY
COMMUNITY
Start: 2018-05-15 | End: 2020-10-15

## 2019-11-08 RX ORDER — IMIPRAMINE HCL 25 MG
TABLET ORAL
COMMUNITY
Start: 2019-10-26 | End: 2019-12-05

## 2019-11-08 RX ORDER — MIDODRINE HYDROCHLORIDE 2.5 MG/1
2.5 TABLET ORAL 3 TIMES DAILY
Qty: 90 TABLET | Refills: 11 | Status: SHIPPED | OUTPATIENT
Start: 2019-11-08 | End: 2020-02-03

## 2019-11-08 RX ORDER — PROMETHAZINE HYDROCHLORIDE 25 MG/1
25 TABLET ORAL
COMMUNITY
Start: 2019-03-28 | End: 2020-03-27

## 2019-11-08 NOTE — PROGRESS NOTES
[facility] CARDIOLOGY  [address]  [facility] [address]  Phone: [phone]      Patient Name: Liliam Lorenz  :1995  Age: 24 y.o.  Primary Cardiologist: Brian Rausch MD  Encounter Provider:  LALITO Hernandez      Chief Complaint:   Chief Complaint   Patient presents with   • Follow-up     hypotentsion         HPI  Liliam Lorenz is a 24 y.o. female who presents today for routine evaluation and evaluation of hypotension.     Pt has a  history significant for hypotension.    Patient reports that she last saw Dr. Rausch approximately 1 year ago where she was having symptomatic episodes of orthostasis.  She reports that he started her on Midodrin, but reports that she ultimately stopped this medication as it did not improve her symptoms.  She states that when her systolic blood pressure drops below 110 she becomes very symptomatic.  She reports that she has near syncopal episodes when her systolic drops to the 90s.  She also states that her heart rate has been elevated for the past few weeks.  She reports associated symptoms of chest pain, shortness of breath, lightheadedness, increased fatigue.  Patient was recently taken off of amitriptyline and placed on imipramine by her gastroenterologist.  She states that this is supposed to help with anxiety/depression as well as her gastrointestinal problems.    The following portions of the patient's history were reviewed and updated as appropriate: allergies, current medications, past family history, past medical history, past social history, past surgical history and problem list.      Review of Systems   Constitution: Positive for malaise/fatigue.   Cardiovascular: Positive for dyspnea on exertion. Negative for chest pain and leg swelling.   Respiratory: Positive for shortness of breath.    Endocrine: Positive for cold intolerance, heat intolerance and polyuria.   Musculoskeletal: Positive for joint pain  "and myalgias.   Gastrointestinal: Positive for abdominal pain, diarrhea and nausea.   Neurological: Positive for dizziness, headaches and light-headedness.   Psychiatric/Behavioral: The patient is nervous/anxious.    All other systems reviewed and are negative.      OBJECTIVE:     Vitals:    11/08/19 1047   BP: 104/72   BP Location: Right arm   Patient Position: Sitting   Pulse: 116   SpO2: 97%   Weight: 82.1 kg (181 lb)   Height: 162.6 cm (64\")     Body mass index is 31.07 kg/m².    Physical Exam   Constitutional: She is oriented to person, place, and time. Vital signs are normal. She appears well-developed and well-nourished. She is active.   Eyes: Conjunctivae are normal.   Neck: Carotid bruit is not present.   Cardiovascular: Regular rhythm and normal heart sounds. Tachycardia present.   Pulmonary/Chest: Breath sounds normal.   Abdominal: Normal appearance.   Musculoskeletal:   No cyanosis, clubbing, edema  Normal ROM   Neurological: She is alert and oriented to person, place, and time. GCS eye subscore is 4. GCS verbal subscore is 5. GCS motor subscore is 6.   Skin: Skin is warm, dry and intact.   Psychiatric: She has a normal mood and affect. Her speech is normal and behavior is normal. Judgment and thought content normal. Cognition and memory are normal.         ECG 12 Lead  Date/Time: 11/8/2019 10:59 AM  Performed by: Chloe Lauren APRN  Authorized by: Chloe Lauren APRN   Comparison: compared with previous ECG from 2/27/2018  Rhythm: sinus tachycardia  Rate: tachycardic  BPM: 131  Conduction: conduction normal  ST Segments: ST segments normal  T Waves: T waves normal  QRS axis: normal    Clinical impression: abnormal EKG            Cardiac Procedures:  1. Echocardiogram 3/14/2018.  EF 61%.  Normal LV cavity size and wall thickness.  All LV wall segments contract normally.  Diastolic function normal.  2. Treadmill stress test 3/14/2018.  No ECG evidence of myocardial ischemia.        ASSESSMENT: "      Diagnosis Plan   1. Tachycardia     2. Chronic hypotension     3. Lightheadedness           PLAN OF CARE:     Patient with symptomatic episodes of hypotension as well as tachycardia.  Patient symptomology is concerning for possible POTS.  Patient was also recently started on an antidepressant/antianxiety medication, imipramine, which is noted to have adverse reaction of hypotension as well as tachycardia.  I have advised the patient that she should discuss weaning off of this medication with the provider that prescribed it.  She states that she will discuss this with her gastroenterologist who is the prescribing physician.  She also notes that she did have all of the above-mentioned symptoms prior to starting this medication but does feel that they have been exacerbated with the addition of this medication.  I have recommended that we restart Midodrin at 2.5 mg 3 times daily.  I informed the patient that this can be uptitrated if she does not feel it is helping her symptoms, but she will need to communicate this to our office.  I will plan to follow-up with the patient in 2 weeks.      Face to face counseling was given to patient for the following topics: instructions for management, risk factor reductions, prognosis, risks and benefits of treatment options and importance of treatment compliance . Total time of the encounter was 50 minutes and at least 50% of that time was spent counseling.           Discharge Medications           Accurate as of 11/8/19  3:48 PM. If you have any questions, ask your nurse or doctor.               Continue These Medications      Instructions Start Date   ALOE VERA EX   Apply externally      SARMAD ROOT PO   Oral      imipramine 25 MG tablet  Commonly known as:  TOFRANIL   No dose, route, or frequency recorded.      medroxyPROGESTERone 150 MG/ML injection  Commonly known as:  DEPO-PROVERA   150 mg, Intramuscular, Every 3 Months      omeprazole 20 MG capsule  Commonly known as:   priLOSEC   40 mg, Oral, 2 Times Daily      ondansetron 4 MG tablet  Commonly known as:  ZOFRAN   4 mg, Oral, Every 8 Hours PRN      promethazine 25 MG tablet  Commonly known as:  PHENERGAN   25 mg, Oral      triamcinolone 0.1 % paste  Commonly known as:  KENALOG   1 application, Mouth/Throat      VITAMIN D-1000 MAX ST 25 MCG (1000 UT) tablet  Generic drug:  Cholecalciferol   1,000 Units, Oral, Daily         Stop These Medications    amitriptyline 25 MG tablet  Commonly known as:  ELAVIL  Stopped by:  LALITO Hernandez     dicyclomine 10 MG capsule  Commonly known as:  BENTYL  Stopped by:  LALITO Hernandez            Thank you for allowing me to participate in the care of your patient,         LALITO Hernandez  [facility]  11/08/19  3:48 PM    **Farrukh Disclaimer:**  Much of this encounter note is an electronic transcription/translation of spoken language to printed text. The electronic translation of spoken language may permit erroneous, or at times, nonsensical words or phrases to be inadvertently transcribed. Although I have reviewed the note for such errors, some may still exist.

## 2019-11-13 ENCOUNTER — TELEPHONE (OUTPATIENT)
Dept: CARDIOLOGY | Facility: CLINIC | Age: 24
End: 2019-11-13

## 2019-11-13 NOTE — TELEPHONE ENCOUNTER
Have her keep a blood pressure log.  She can take midodrine 2–3 times daily.  If her blood pressure is high then I would recommend holding the dose.  I am okay with a couple of high readings as long as she is feeling better.  Have her bring blood pressure diary at her follow-up appointments that we can try to determine her dosing.

## 2019-11-13 NOTE — TELEPHONE ENCOUNTER
The patient called and states she has been taking the midodrine and still laying down right after taking it and that she has had a few high BP reading last night it was 148/101.   She isn't sure what she should do.    Pt # [phone]    Thank you  Stefanie

## 2019-11-21 ENCOUNTER — OFFICE VISIT (OUTPATIENT)
Dept: CARDIOLOGY | Facility: CLINIC | Age: 24
End: 2019-11-21

## 2019-11-21 VITALS
OXYGEN SATURATION: 98 % | SYSTOLIC BLOOD PRESSURE: 122 MMHG | HEART RATE: 100 BPM | HEIGHT: 64 IN | WEIGHT: 180 LBS | DIASTOLIC BLOOD PRESSURE: 76 MMHG | BODY MASS INDEX: 30.73 KG/M2

## 2019-11-21 DIAGNOSIS — R00.0 TACHYCARDIA: ICD-10-CM

## 2019-11-21 DIAGNOSIS — I95.89 CHRONIC HYPOTENSION: Primary | ICD-10-CM

## 2019-11-21 DIAGNOSIS — R42 LIGHTHEADEDNESS: ICD-10-CM

## 2019-11-21 PROCEDURE — 99214 OFFICE O/P EST MOD 30 MIN: CPT | Performed by: NURSE PRACTITIONER

## 2019-11-21 NOTE — PROGRESS NOTES
[facility] CARDIOLOGY  [address]  [facility] [address]  Phone: [phone]      Patient Name: Liliam Lorenz  :1995  Age: 24 y.o.  Primary Cardiologist: Brian Rausch MD  Encounter Provider:  LALITO Hernandez      Chief Complaint:   Chief Complaint   Patient presents with   • Follow-up     2 week         HPI  Liliam Lorenz is a 24 y.o. female who presents today for routine evaluation and evaluation of hypotension.     Pt has a  history significant for hypotension.    Patient was last seen by me on 2019 where she was having tachycardia and orthostatic hypotension.  At that time Midodrin was restarted.  We also recommended that patient discuss stopping imipramine as it had side effects of tachycardia and orthostatic hypotension.  Patient then called the office on  stating that she was having episodes of high blood pressure.  I recommended that she keep a blood pressure diary and if her blood pressure was high to hold her Midodrin dose.    Patient reports that she is still having some symptoms.  She states that she is consistently taking Midodrine twice daily.  BP has been as high as 149/101 and as low 102/70.  She feels more symptomatic with low readings compared to higher readings.  She also still continues to complain of tachycardia.  She states that HR has been as high as 210 and averaging in the 140s.  It does elevate with any physical activity.  She has stopped Imipramine and has not noted a change in symptoms.  She still struggles with any exertion.  She has been prescribed Zoloft, but has not started this.  She has associated symptoms of chest pain, shortness of breath, lightheadedness, fatigue.    The following portions of the patient's history were reviewed and updated as appropriate: allergies, current medications, past family history, past medical history, past social history, past surgical history and problem list.      Review of  "Systems   Constitution: Positive for decreased appetite and malaise/fatigue.   Cardiovascular: Negative for chest pain and leg swelling.   Respiratory: Positive for shortness of breath.    Endocrine: Positive for cold intolerance, heat intolerance and polyuria.   Musculoskeletal: Positive for joint pain, joint swelling and myalgias.   Gastrointestinal: Positive for abdominal pain, diarrhea and nausea.   Neurological: Positive for excessive daytime sleepiness, dizziness, headaches, light-headedness and paresthesias.   Psychiatric/Behavioral: Positive for depression. The patient is nervous/anxious.    All other systems reviewed and are negative.      OBJECTIVE:     Vitals:    11/21/19 1020   BP: 122/76   BP Location: Right arm   Patient Position: Sitting   Pulse: 100   SpO2: 98%   Weight: 81.6 kg (180 lb)   Height: 162.6 cm (64\")     Body mass index is 30.9 kg/m².    Physical Exam   Constitutional: She is oriented to person, place, and time. Vital signs are normal. She appears well-developed and well-nourished. She is active.   Eyes: Conjunctivae are normal.   Neck: Carotid bruit is not present.   Cardiovascular: Regular rhythm and normal heart sounds. Tachycardia present.   Pulmonary/Chest: Breath sounds normal.   Abdominal: Normal appearance.   Musculoskeletal:   No cyanosis, clubbing, edema  Normal ROM   Neurological: She is alert and oriented to person, place, and time. GCS eye subscore is 4. GCS verbal subscore is 5. GCS motor subscore is 6.   Skin: Skin is warm, dry and intact.   Psychiatric: She has a normal mood and affect. Her speech is normal and behavior is normal. Judgment and thought content normal. Cognition and memory are normal.       Procedures    Cardiac Procedures:  1. Echocardiogram 3/14/2018.  EF 61%.  Normal LV cavity size and wall thickness.  All LV wall segments contract normally.  Diastolic function normal.  2. Treadmill stress test 3/14/2018.  No ECG evidence of myocardial ischemia.      "   ASSESSMENT:      Diagnosis Plan   1. Chronic hypotension  Tilt Table   2. Lightheadedness     3. Tachycardia  Tilt Table         PLAN OF CARE:     Patient's blood pressure has been higher since starting Midodrin.  She continues to have problems with tachycardia and as noted above has heart rate as high as 210.  Typically with exertion heart rate goes up to the 140s.  I do have high suspicion that patient has POTS.  She is most symptomatic when her heart rate is elevated and his systolic blood pressure is less than 110.  She has not had tilt table test to try to confirm diagnosis of POTS.  I discussed this with Dr. Rausch who does recommend proceeding with tilt table test.  After tilt table testing plan to add metoprolol tartrate 12.5 mg 2-3 times daily.  Also discussed with patient the possibility of maybe needing to uptitrate Midodrin secondary to potential for low blood pressure with addition of beta-blocker.  Patient verbalized understanding.  We will follow-up after tilt table testing.          Discharge Medications           Accurate as of 11/21/19 12:16 PM. If you have any questions, ask your nurse or doctor.               Continue These Medications      Instructions Start Date   ALOE VERA EX   Apply externally      SARMAD ROOT PO   Oral      imipramine 25 MG tablet  Commonly known as:  TOFRANIL   No dose, route, or frequency recorded.      medroxyPROGESTERone 150 MG/ML injection  Commonly known as:  DEPO-PROVERA   150 mg, Intramuscular, Every 3 Months      midodrine 2.5 MG tablet  Commonly known as:  PROAMATINE   2.5 mg, Oral, 3 Times Daily      omeprazole 20 MG capsule  Commonly known as:  priLOSEC   40 mg, Oral, 2 Times Daily      ondansetron 4 MG tablet  Commonly known as:  ZOFRAN   4 mg, Oral, Every 8 Hours PRN      promethazine 25 MG tablet  Commonly known as:  PHENERGAN   25 mg, Oral      sertraline 50 MG tablet  Commonly known as:  ZOLOFT   50 mg, Oral, Daily      triamcinolone 0.1 %  paste  Commonly known as:  KENALOG   1 application, Mouth/Throat      VITAMIN D-1000 MAX ST 25 MCG (1000 UT) tablet  Generic drug:  Cholecalciferol   1,000 Units, Oral, Daily             Thank you for allowing me to participate in the care of your patient,         LALITO Hernandez  [facility]  11/21/19  12:16 PM    **Farrukh Disclaimer:**  Much of this encounter note is an electronic transcription/translation of spoken language to printed text. The electronic translation of spoken language may permit erroneous, or at times, nonsensical words or phrases to be inadvertently transcribed. Although I have reviewed the note for such errors, some may still exist.

## 2019-12-05 ENCOUNTER — HOSPITAL ENCOUNTER (OUTPATIENT)
Dept: CARDIOLOGY | Facility: HOSPITAL | Age: 24
Discharge: HOME | End: 2019-12-05
Admitting: INTERNAL MEDICINE

## 2019-12-05 VITALS
HEART RATE: 88 BPM | SYSTOLIC BLOOD PRESSURE: 128 MMHG | BODY MASS INDEX: 31.24 KG/M2 | RESPIRATION RATE: 18 BRPM | DIASTOLIC BLOOD PRESSURE: 76 MMHG | TEMPERATURE: 99.1 F | HEIGHT: 64 IN | WEIGHT: 183 LBS | OXYGEN SATURATION: 100 %

## 2019-12-05 DIAGNOSIS — I95.89 CHRONIC HYPOTENSION: ICD-10-CM

## 2019-12-05 DIAGNOSIS — R00.0 TACHYCARDIA: ICD-10-CM

## 2019-12-05 PROCEDURE — 93660 TILT TABLE EVALUATION: CPT | Performed by: INTERNAL MEDICINE

## 2019-12-05 PROCEDURE — 93660 TILT TABLE EVALUATION: CPT

## 2019-12-05 RX ORDER — SODIUM CHLORIDE 0.9 % (FLUSH) 0.9 %
3 SYRINGE (ML) INJECTION EVERY 12 HOURS SCHEDULED
Status: DISCONTINUED | OUTPATIENT
Start: 2019-12-05 | End: 2019-12-05 | Stop reason: HOSPADM

## 2019-12-05 RX ORDER — PROCHLORPERAZINE MALEATE 10 MG
10 TABLET ORAL EVERY 6 HOURS PRN
COMMUNITY

## 2019-12-05 RX ORDER — SODIUM CHLORIDE 0.9 % (FLUSH) 0.9 %
10 SYRINGE (ML) INJECTION AS NEEDED
Status: DISCONTINUED | OUTPATIENT
Start: 2019-12-05 | End: 2019-12-05 | Stop reason: HOSPADM

## 2019-12-05 RX ORDER — LIDOCAINE HYDROCHLORIDE 10 MG/ML
0.1 INJECTION, SOLUTION EPIDURAL; INFILTRATION; INTRACAUDAL; PERINEURAL ONCE AS NEEDED
Status: DISCONTINUED | OUTPATIENT
Start: 2019-12-05 | End: 2019-12-05 | Stop reason: HOSPADM

## 2019-12-05 NOTE — PERIOPERATIVE NURSING NOTE
DR. ORDONEZ AT BEDSIDE AND DISCUSSED PATIENT PROCEDURE WITH HER AND HER SPOUSE.MD DID ENCOURAGE PT TO INCREASE FLUIDS AND INCREASE SALT INTAKE.

## 2019-12-06 NOTE — PROGRESS NOTES
Called and spoke with patient and informed her of tilt table test.  We will proceed with metoprolol tartrate 12.5 mg 2-3 times daily depending upon patient's heart rate.  She was made aware that she may need to increase Midodrin dosing.  She will make a follow-up appointment with Dr. Rausch in 1 month for reevaluation.

## 2020-01-10 ENCOUNTER — HOSPITAL ENCOUNTER (EMERGENCY)
Facility: HOSPITAL | Age: 25
Discharge: HOME | End: 2020-01-11
Attending: EMERGENCY MEDICINE | Admitting: EMERGENCY MEDICINE

## 2020-01-10 DIAGNOSIS — K58.9 IRRITABLE BOWEL SYNDROME, UNSPECIFIED TYPE: ICD-10-CM

## 2020-01-10 DIAGNOSIS — R10.11 RUQ ABDOMINAL PAIN: Primary | ICD-10-CM

## 2020-01-10 PROCEDURE — 99284 EMERGENCY DEPT VISIT MOD MDM: CPT

## 2020-01-10 RX ORDER — SODIUM CHLORIDE 0.9 % (FLUSH) 0.9 %
10 SYRINGE (ML) INJECTION AS NEEDED
Status: DISCONTINUED | OUTPATIENT
Start: 2020-01-10 | End: 2020-01-11 | Stop reason: HOSPADM

## 2020-01-11 ENCOUNTER — APPOINTMENT (OUTPATIENT)
Dept: ULTRASOUND IMAGING | Facility: HOSPITAL | Age: 25
End: 2020-01-11

## 2020-01-11 VITALS
OXYGEN SATURATION: 99 % | DIASTOLIC BLOOD PRESSURE: 75 MMHG | WEIGHT: 195.7 LBS | TEMPERATURE: 98.7 F | HEIGHT: 64 IN | BODY MASS INDEX: 33.41 KG/M2 | SYSTOLIC BLOOD PRESSURE: 117 MMHG | HEART RATE: 68 BPM | RESPIRATION RATE: 18 BRPM

## 2020-01-11 LAB
ALBUMIN SERPL-MCNC: 5 G/DL (ref 3.5–5.2)
ALBUMIN/GLOB SERPL: 1.5 G/DL
ALP SERPL-CCNC: 71 U/L (ref 39–117)
ALT SERPL W P-5'-P-CCNC: 29 U/L (ref 1–33)
ANION GAP SERPL CALCULATED.3IONS-SCNC: 16.6 MMOL/L (ref 5–15)
AST SERPL-CCNC: 29 U/L (ref 1–32)
BACTERIA UR QL AUTO: NORMAL /HPF
BASOPHILS # BLD AUTO: 0.05 10*3/MM3 (ref 0–0.2)
BASOPHILS NFR BLD AUTO: 0.7 % (ref 0–1.5)
BILIRUB SERPL-MCNC: 0.6 MG/DL (ref 0.2–1.2)
BILIRUB UR QL STRIP: NEGATIVE
BUN BLD-MCNC: 10 MG/DL (ref 6–20)
BUN/CREAT SERPL: 11.9 (ref 7–25)
CALCIUM SPEC-SCNC: 9.5 MG/DL (ref 8.6–10.5)
CHLORIDE SERPL-SCNC: 104 MMOL/L (ref 98–107)
CLARITY UR: CLEAR
CO2 SERPL-SCNC: 20.4 MMOL/L (ref 22–29)
COLOR UR: YELLOW
CREAT BLD-MCNC: 0.84 MG/DL (ref 0.57–1)
DEPRECATED RDW RBC AUTO: 38.1 FL (ref 37–54)
EOSINOPHIL # BLD AUTO: 0.06 10*3/MM3 (ref 0–0.4)
EOSINOPHIL NFR BLD AUTO: 0.8 % (ref 0.3–6.2)
ERYTHROCYTE [DISTWIDTH] IN BLOOD BY AUTOMATED COUNT: 12.2 % (ref 12.3–15.4)
GFR SERPL CREATININE-BSD FRML MDRD: 83 ML/MIN/1.73
GLOBULIN UR ELPH-MCNC: 3.3 GM/DL
GLUCOSE BLD-MCNC: 84 MG/DL (ref 65–99)
GLUCOSE UR STRIP-MCNC: NEGATIVE MG/DL
HCG SERPL QL: NEGATIVE
HCT VFR BLD AUTO: 44.8 % (ref 34–46.6)
HGB BLD-MCNC: 15.2 G/DL (ref 12–15.9)
HGB UR QL STRIP.AUTO: NEGATIVE
HYALINE CASTS UR QL AUTO: NORMAL /LPF
IMM GRANULOCYTES # BLD AUTO: 0.01 10*3/MM3 (ref 0–0.05)
IMM GRANULOCYTES NFR BLD AUTO: 0.1 % (ref 0–0.5)
KETONES UR QL STRIP: NEGATIVE
LEUKOCYTE ESTERASE UR QL STRIP.AUTO: ABNORMAL
LIPASE SERPL-CCNC: 44 U/L (ref 13–60)
LYMPHOCYTES # BLD AUTO: 3.43 10*3/MM3 (ref 0.7–3.1)
LYMPHOCYTES NFR BLD AUTO: 46.5 % (ref 19.6–45.3)
MCH RBC QN AUTO: 29.3 PG (ref 26.6–33)
MCHC RBC AUTO-ENTMCNC: 33.9 G/DL (ref 31.5–35.7)
MCV RBC AUTO: 86.5 FL (ref 79–97)
MONOCYTES # BLD AUTO: 0.42 10*3/MM3 (ref 0.1–0.9)
MONOCYTES NFR BLD AUTO: 5.7 % (ref 5–12)
NEUTROPHILS # BLD AUTO: 3.4 10*3/MM3 (ref 1.7–7)
NEUTROPHILS NFR BLD AUTO: 46.2 % (ref 42.7–76)
NITRITE UR QL STRIP: NEGATIVE
NRBC BLD AUTO-RTO: 0 /100 WBC (ref 0–0.2)
PH UR STRIP.AUTO: 6 [PH] (ref 5–8)
PLATELET # BLD AUTO: 238 10*3/MM3 (ref 140–450)
PMV BLD AUTO: 11.2 FL (ref 6–12)
POTASSIUM BLD-SCNC: 4.1 MMOL/L (ref 3.5–5.2)
PROT SERPL-MCNC: 8.3 G/DL (ref 6–8.5)
PROT UR QL STRIP: NEGATIVE
RBC # BLD AUTO: 5.18 10*6/MM3 (ref 3.77–5.28)
RBC # UR: NORMAL /HPF
REF LAB TEST METHOD: NORMAL
SODIUM BLD-SCNC: 141 MMOL/L (ref 136–145)
SP GR UR STRIP: >=1.03 (ref 1–1.03)
SQUAMOUS #/AREA URNS HPF: NORMAL /HPF
UROBILINOGEN UR QL STRIP: ABNORMAL
WBC NRBC COR # BLD: 7.37 10*3/MM3 (ref 3.4–10.8)
WBC UR QL AUTO: NORMAL /HPF

## 2020-01-11 PROCEDURE — 83690 ASSAY OF LIPASE: CPT | Performed by: EMERGENCY MEDICINE

## 2020-01-11 PROCEDURE — 25010000002 MORPHINE PER 10 MG: Performed by: EMERGENCY MEDICINE

## 2020-01-11 PROCEDURE — 84703 CHORIONIC GONADOTROPIN ASSAY: CPT | Performed by: EMERGENCY MEDICINE

## 2020-01-11 PROCEDURE — 96374 THER/PROPH/DIAG INJ IV PUSH: CPT

## 2020-01-11 PROCEDURE — 76705 ECHO EXAM OF ABDOMEN: CPT

## 2020-01-11 PROCEDURE — 80053 COMPREHEN METABOLIC PANEL: CPT | Performed by: EMERGENCY MEDICINE

## 2020-01-11 PROCEDURE — 25010000002 ONDANSETRON PER 1 MG: Performed by: EMERGENCY MEDICINE

## 2020-01-11 PROCEDURE — 85025 COMPLETE CBC W/AUTO DIFF WBC: CPT | Performed by: EMERGENCY MEDICINE

## 2020-01-11 PROCEDURE — 81001 URINALYSIS AUTO W/SCOPE: CPT | Performed by: EMERGENCY MEDICINE

## 2020-01-11 PROCEDURE — 96375 TX/PRO/DX INJ NEW DRUG ADDON: CPT

## 2020-01-11 RX ORDER — MORPHINE SULFATE 2 MG/ML
4 INJECTION, SOLUTION INTRAMUSCULAR; INTRAVENOUS ONCE
Status: COMPLETED | OUTPATIENT
Start: 2020-01-11 | End: 2020-01-11

## 2020-01-11 RX ORDER — ONDANSETRON 2 MG/ML
4 INJECTION INTRAMUSCULAR; INTRAVENOUS ONCE
Status: COMPLETED | OUTPATIENT
Start: 2020-01-11 | End: 2020-01-11

## 2020-01-11 RX ORDER — HYDROCODONE BITARTRATE AND ACETAMINOPHEN 5; 325 MG/1; MG/1
1 TABLET ORAL EVERY 4 HOURS PRN
Qty: 18 TABLET | Refills: 0 | Status: SHIPPED | OUTPATIENT
Start: 2020-01-11 | End: 2020-01-22 | Stop reason: ALTCHOICE

## 2020-01-11 RX ORDER — ONDANSETRON 8 MG/1
8 TABLET, ORALLY DISINTEGRATING ORAL EVERY 8 HOURS PRN
Qty: 12 TABLET | Refills: 0 | Status: ON HOLD | OUTPATIENT
Start: 2020-01-11 | End: 2023-03-02

## 2020-01-11 RX ADMIN — MORPHINE SULFATE 4 MG: 2 INJECTION, SOLUTION INTRAMUSCULAR; INTRAVENOUS at 02:24

## 2020-01-11 RX ADMIN — ONDANSETRON 4 MG: 2 INJECTION INTRAMUSCULAR; INTRAVENOUS at 02:24

## 2020-01-11 RX ADMIN — SODIUM CHLORIDE 1000 ML: 9 INJECTION, SOLUTION INTRAVENOUS at 02:24

## 2020-01-11 NOTE — ED TRIAGE NOTES
Pt complains of nausea and right upper quadrant abdominal pain that started a few hours ago. Pt reports she had a low grade fever yesterday around 100 and experienced diarrhea all day. Pt reports the pain radiates to her right lower quadrant.    Pt has a history of POTS disease.

## 2020-01-11 NOTE — ED NOTES
Attempted IV access, unsuccessful. Called NICOLE Landry to give IV with ultrasound guidance.      Coco Valente RN  01/11/20 0018

## 2020-01-11 NOTE — ED PROVIDER NOTES
EMERGENCY DEPARTMENT ENCOUNTER    Room Number:  13/13  Date of encounter:  1/11/2020  PCP: Jason Borrero MD  Historian: patient, family      HPI:  Chief Complaint: RUQ abdominal pain  A complete HPI/ROS/PMH/PSH/SH/FH are unobtainable due to: nothing    Context: Liliam Lorenz is a 24 y.o. female with a history of IBS who presents to the ED c/o RUQ abdominal pain that started about 4 hours ago after she ate chicken and fries for dinner. Patient has been having episodic non-bloody diarrhea all yesterday, none today. She is nauseous but denies vomiting. She reports a fever of 100F. She states her abdominal pain today is different from her typical IBS flares. She states her GI doctor has been 'talking about taking her gallbladder out even though her tests are normal.' Patient denies black stool, vomiting, chest pain, SOA and all other complaints at this time. Patient denies possibility of pregnancy.     Medical Record Review:  Patient was seen in the ED in May 2019 for abdominal pain.        PAST MEDICAL HISTORY  Active Ambulatory Problems     Diagnosis Date Noted   • No Active Ambulatory Problems     Resolved Ambulatory Problems     Diagnosis Date Noted   • No Resolved Ambulatory Problems     Past Medical History:   Diagnosis Date   • Bronchitis    • Chest pain    • Eczema    • Gastroparesis    • GERD (gastroesophageal reflux disease)    • IBS (irritable bowel syndrome)          PAST SURGICAL HISTORY  Past Surgical History:   Procedure Laterality Date   • COLONOSCOPY     • UPPER GASTROINTESTINAL ENDOSCOPY           FAMILY HISTORY  Family History   Problem Relation Age of Onset   • Hypertension Mother    • Diabetes Mother    • Diabetes Father    • Cancer Paternal Grandmother    • Heart disease Paternal Grandfather    • Stroke Paternal Grandfather    • Diabetes Paternal Grandfather    • Cancer Paternal Grandfather    • Coronary artery disease Maternal Grandmother         MGM with 4 vessel CABG and multiple stents   •  Coronary artery disease Maternal Uncle         Maternal uncle with MI   • Breast cancer Other          SOCIAL HISTORY  Social History     Socioeconomic History   • Marital status:      Spouse name: Not on file   • Number of children: Not on file   • Years of education: Not on file   • Highest education level: Not on file   Tobacco Use   • Smoking status: Never Smoker   • Smokeless tobacco: Never Used   Substance and Sexual Activity   • Alcohol use: No   • Drug use: No   • Sexual activity: Yes     Partners: Male     Birth control/protection: None         ALLERGIES  Patient has no known allergies.        REVIEW OF SYSTEMS  Review of Systems   Constitutional: Positive for fever.   HENT: Negative for sore throat.    Eyes: Negative.    Respiratory: Negative for cough and shortness of breath.    Cardiovascular: Negative for chest pain.   Gastrointestinal: Positive for abdominal pain (RUQ), diarrhea and nausea. Negative for vomiting.   Genitourinary: Negative for dysuria.   Musculoskeletal: Negative for neck pain.   Skin: Negative for rash.   Allergic/Immunologic: Negative.    Neurological: Negative for weakness, numbness and headaches.   Hematological: Negative.    Psychiatric/Behavioral: Negative.    All other systems reviewed and are negative.       All other ROS negative except as documented in HPI      PHYSICAL EXAM    I have reviewed the triage vital signs and nursing notes.    ED Triage Vitals [01/10/20 2352]   Temp Heart Rate Resp BP SpO2   98.7 °F (37.1 °C) (!) 124 18 -- 99 %       GENERAL: awake, alert, not distressed  HENT: nares patent, normocephalic, atraumatic  EYES: no scleral icterus, PERRL EOMI  CV: regular rhythm, regular rate, no murmur  RESPIRATORY: normal effort, CTAB, no distress  ABDOMEN: soft, RUQ abdominal tenderness without guarding or rebound  MUSCULOSKELETAL: no deformity, no edema  NEURO: alert, oriented, moves all extremities, follows commands  SKIN: warm, dry        LAB RESULTS  Recent  Results (from the past 24 hour(s))   Comprehensive Metabolic Panel    Collection Time: 01/11/20  2:13 AM   Result Value Ref Range    Glucose 84 65 - 99 mg/dL    BUN 10 6 - 20 mg/dL    Creatinine 0.84 0.57 - 1.00 mg/dL    Sodium 141 136 - 145 mmol/L    Potassium 4.1 3.5 - 5.2 mmol/L    Chloride 104 98 - 107 mmol/L    CO2 20.4 (L) 22.0 - 29.0 mmol/L    Calcium 9.5 8.6 - 10.5 mg/dL    Total Protein 8.3 6.0 - 8.5 g/dL    Albumin 5.00 3.50 - 5.20 g/dL    ALT (SGPT) 29 1 - 33 U/L    AST (SGOT) 29 1 - 32 U/L    Alkaline Phosphatase 71 39 - 117 U/L    Total Bilirubin 0.6 0.2 - 1.2 mg/dL    eGFR Non African Amer 83 >60 mL/min/1.73    Globulin 3.3 gm/dL    A/G Ratio 1.5 g/dL    BUN/Creatinine Ratio 11.9 7.0 - 25.0    Anion Gap 16.6 (H) 5.0 - 15.0 mmol/L   Lipase    Collection Time: 01/11/20  2:13 AM   Result Value Ref Range    Lipase 44 13 - 60 U/L   hCG, Serum, Qualitative    Collection Time: 01/11/20  2:13 AM   Result Value Ref Range    HCG Qualitative Negative Negative   CBC Auto Differential    Collection Time: 01/11/20  2:13 AM   Result Value Ref Range    WBC 7.37 3.40 - 10.80 10*3/mm3    RBC 5.18 3.77 - 5.28 10*6/mm3    Hemoglobin 15.2 12.0 - 15.9 g/dL    Hematocrit 44.8 34.0 - 46.6 %    MCV 86.5 79.0 - 97.0 fL    MCH 29.3 26.6 - 33.0 pg    MCHC 33.9 31.5 - 35.7 g/dL    RDW 12.2 (L) 12.3 - 15.4 %    RDW-SD 38.1 37.0 - 54.0 fl    MPV 11.2 6.0 - 12.0 fL    Platelets 238 140 - 450 10*3/mm3    Neutrophil % 46.2 42.7 - 76.0 %    Lymphocyte % 46.5 (H) 19.6 - 45.3 %    Monocyte % 5.7 5.0 - 12.0 %    Eosinophil % 0.8 0.3 - 6.2 %    Basophil % 0.7 0.0 - 1.5 %    Immature Grans % 0.1 0.0 - 0.5 %    Neutrophils, Absolute 3.40 1.70 - 7.00 10*3/mm3    Lymphocytes, Absolute 3.43 (H) 0.70 - 3.10 10*3/mm3    Monocytes, Absolute 0.42 0.10 - 0.90 10*3/mm3    Eosinophils, Absolute 0.06 0.00 - 0.40 10*3/mm3    Basophils, Absolute 0.05 0.00 - 0.20 10*3/mm3    Immature Grans, Absolute 0.01 0.00 - 0.05 10*3/mm3    nRBC 0.0 0.0 - 0.2 /100 WBC    Urinalysis With Microscopic If Indicated (No Culture) - Urine, Clean Catch    Collection Time: 01/11/20  2:24 AM   Result Value Ref Range    Color, UA Yellow Yellow, Straw    Appearance, UA Clear Clear    pH, UA 6.0 5.0 - 8.0    Specific Gravity, UA >=1.030 1.005 - 1.030    Glucose, UA Negative Negative    Ketones, UA Negative Negative    Bilirubin, UA Negative Negative    Blood, UA Negative Negative    Protein, UA Negative Negative    Leuk Esterase, UA Trace (A) Negative    Nitrite, UA Negative Negative    Urobilinogen, UA 1.0 E.U./dL 0.2 - 1.0 E.U./dL   Urinalysis, Microscopic Only - Urine, Clean Catch    Collection Time: 01/11/20  2:24 AM   Result Value Ref Range    RBC, UA None Seen None Seen, 0-2 /HPF    WBC, UA 0-2 None Seen, 0-2 /HPF    Bacteria, UA None Seen None Seen /HPF    Squamous Epithelial Cells, UA 0-2 None Seen, 0-2 /HPF    Hyaline Casts, UA None Seen None Seen /LPF    Methodology Manual Light Microscopy        Ordered the above labs and independently reviewed the results.        RADIOLOGY  Us Gallbladder    Result Date: 1/11/2020  GALLBLADDER/LIMITED ABDOMEN ULTRASOUND  CLINICAL HISTORY: ruq pain  FINDINGS: Longitudinal and transverse images of the gallbladder were obtained. There is no evidence of cholelithiasis or wall thickening. The encompassed hepatic parenchyma is unremarkable without focal mass or biliary dilatation. Limited views of the right kidney are unremarkable.        1. Unremarkable gallbladder ultrasound.  This report was finalized on 1/11/2020 3:18 AM by Ruperto Vance M.D.        I ordered the above noted radiological studies. Independently reviewed by me and discussed with radiologist.  See dictation for official radiology interpretation.      PROCEDURES    Procedures      MEDICATIONS GIVEN IN ER    Medications   sodium chloride 0.9 % flush 10 mL (has no administration in time range)   sodium chloride 0.9 % bolus 1,000 mL (0 mL Intravenous Stopped 1/11/20 3374)   morphine  injection 4 mg (4 mg Intravenous Given 1/11/20 0224)   ondansetron (ZOFRAN) injection 4 mg (4 mg Intravenous Given 1/11/20 0224)         PROGRESS, DATA ANALYSIS, CONSULTS, AND MEDICAL DECISION MAKING    All labs have been independently reviewed by me.  All radiology studies have been reviewed by me and discussed with radiologist dictating report.   EKG's independently reviewed by me.  Discussion below represents my analysis of pertinent findings related to patient's condition, differential diagnosis, treatment plan and final disposition.         --  2359. Labs ordered.     0014. Ordered morphine and Zofran for abdominal pain. Ordered US gallbladder.     0145. Per technician, US gallbladder is unremarkable.     0331. Rechecked the patient. Vitals stable. Pain has nearly resolved with morphine. Informed patient of unremarkable labs and US gallbladder. Discussed plan to discharge with medications for pain and nausea management. Pt understands and agrees with the plan, all questions answered.      --  AS OF 5:20 AM VITALS:    BP - 117/75  HR - 68  TEMP - 98.7 °F (37.1 °C) (Tympanic)  02 SATS - 99%        DIAGNOSIS  Final diagnoses:   RUQ abdominal pain   Irritable bowel syndrome, unspecified type         DISPOSITION  DISCHARGE    Patient discharged in stable condition.    Reviewed implications of results, diagnosis, meds, responsibility to follow up, warning signs and symptoms of possible worsening, potential complications and reasons to return to ER.    Patient/Family voiced understanding of above instructions.    Discussed plan for discharge, as there is no emergent indication for admission. Patient referred to primary care provider for BP management due to today's BP. Pt/family is agreeable and understands need for follow up and repeat testing.  Pt is aware that discharge does not mean that nothing is wrong but it indicates no emergency is present that requires admission and they must continue care with follow-up as  given below or physician of their choice.     FOLLOW-UP  Papo Joya MD  [address]   [address]  [facility] [id number] [phone]    Schedule an appointment as soon as possible for a visit in 1 day      Jason Borrero MD  [address] [facility] [id number] [phone]    Call   As needed, If symptoms worsen         Medication List      New Prescriptions    HYDROcodone-acetaminophen 5-325 MG per tablet  Commonly known as:  NORCO  Take 1 tablet by mouth Every 4 (Four) Hours As Needed for Moderate Pain .     ondansetron ODT 8 MG disintegrating tablet  Commonly known as:  ZOFRAN ODT  Take 1 tablet by mouth Every 8 (Eight) Hours As Needed for Nausea or   Vomiting.        --  Documentation assistance provided by fazal Robertson for Dr. Armando Borrero MD.  Information recorded by the scribe was done at my direction and has been verified and validated by me.     Farzana Robertson  01/11/20 3771       Armando Borrero MD  01/11/20 3718

## 2020-01-22 ENCOUNTER — OFFICE VISIT (OUTPATIENT)
Dept: CARDIOLOGY | Facility: CLINIC | Age: 25
End: 2020-01-22

## 2020-01-22 VITALS
SYSTOLIC BLOOD PRESSURE: 126 MMHG | DIASTOLIC BLOOD PRESSURE: 72 MMHG | BODY MASS INDEX: 32.61 KG/M2 | WEIGHT: 191 LBS | HEIGHT: 64 IN | HEART RATE: 74 BPM | OXYGEN SATURATION: 97 %

## 2020-01-22 DIAGNOSIS — G90.A POTS (POSTURAL ORTHOSTATIC TACHYCARDIA SYNDROME): Primary | ICD-10-CM

## 2020-01-22 PROCEDURE — 93000 ELECTROCARDIOGRAM COMPLETE: CPT | Performed by: INTERNAL MEDICINE

## 2020-01-22 PROCEDURE — 99214 OFFICE O/P EST MOD 30 MIN: CPT | Performed by: INTERNAL MEDICINE

## 2020-01-22 RX ORDER — METOCLOPRAMIDE 10 MG/1
10 TABLET ORAL DAILY
COMMUNITY
End: 2020-05-22

## 2020-02-03 ENCOUNTER — TELEPHONE (OUTPATIENT)
Dept: CARDIOLOGY | Facility: CLINIC | Age: 25
End: 2020-02-03

## 2020-02-03 RX ORDER — MIDODRINE HYDROCHLORIDE 10 MG/1
10 TABLET ORAL 3 TIMES DAILY
Qty: 90 TABLET | Refills: 6 | Status: SHIPPED | OUTPATIENT
Start: 2020-02-03 | End: 2020-11-16

## 2020-02-03 NOTE — TELEPHONE ENCOUNTER
Called and spoke with her.  I am going to increase her midodrine to 10 mg tid.  I sent in the prescription.  Thanks     GM

## 2020-02-03 NOTE — TELEPHONE ENCOUNTER
"Pt called with an update since you increased her Midodrine to 5 mg TID.  She states her BP is still running in the 90's and she still \"feels bad\"  She c/o SOA, palp and chest pain.    # [phone]/[id number]  "

## 2020-02-27 NOTE — PROGRESS NOTES
Date of Office Visit: 2020  Encounter Provider: Payam Rausch MD  Place of Service: [facility] CARDIOLOGY  Patient Name: Liliam Lorenz  :1995    Chief complaint: Follow-up for postural orthostatic tachycardia syndrome.    History of Present Illness:    I again had the pleasure of seeing the patient in cardiology office on 2020.  She is a very pleasant 24 year-old with a history of POTS, GERD, irritable bowel syndrome, gastroparesis who presents for follow-up.  I initially saw the patient in the office on 2018.  She had been having chest discomfort intermittently for several years.  She had been evaluated in the emergency department, and it was felt that this might represent esophageal spasm.  However, she did undergo cardiac testing with an exercise treadmill stress test and echocardiogram on 3/14/2018.  The treadmill stress test showed no EKG changes, and she was able to exercise for 9 minutes (although she did have chest discomfort).  The echocardiogram showed an ejection fraction of 62% and no significant pathology.  She later continued to have issues with near syncope and tachycardia.  A tilt table test was performed on 2019 which confirmed the diagnosis of POTS.  Typically, she met all criteria when tilted backwards (her heart rate went up significantly, and her blood pressure dropped).  She has been treated with beta-blockers and midodrine.    The patient presents today for follow-up.  She does consume a fair amount of salt, and she tries to consume at least 60 to 80 ounces of water per day.  However, her gastroparesis sometimes limits her fluid intake as it makes her nauseated when consuming significant amounts at a time.  She is still feeling lightheaded, especially when standing.  She is currently taking the midodrine at 2.5 mg 3 times per day, and the metoprolol at 12.5 mg 3 times a day.    Past Medical History:   Diagnosis Date   •  Bronchitis    • Chest pain    • Chronic hypotension    • Eczema    • Gastroparesis    • GERD (gastroesophageal reflux disease)    • IBS (irritable bowel syndrome)    • Lightheadedness    • POTS (postural orthostatic tachycardia syndrome)    • Tachycardia        Past Surgical History:   Procedure Laterality Date   • COLONOSCOPY     • UPPER GASTROINTESTINAL ENDOSCOPY         Current Outpatient Medications on File Prior to Visit   Medication Sig Dispense Refill   • ALOE VERA EX Apply  topically.     • Cholecalciferol (VITAMIN D-1000 MAX ST) 25 MCG (1000 UT) tablet Take 1,000 Units by mouth Daily.     • Ginger, Zingiber officinalis, (GINGER ROOT PO) Take  by mouth.     • medroxyPROGESTERone (DEPO-PROVERA) 150 MG/ML injection Inject 1 mL into the appropriate muscle as directed by prescriber Every 3 (Three) Months. 1 mL 3   • metoclopramide (REGLAN) 10 MG tablet Take 10 mg by mouth Daily.     • metoprolol tartrate (LOPRESSOR) 25 MG tablet Take 0.5 tablets by mouth 3 (Three) Times a Day. 60 tablet 11   • omeprazole (priLOSEC) 20 MG capsule Take 40 mg by mouth 2 (Two) Times a Day.     • ondansetron (ZOFRAN) 4 MG tablet Take 4 mg by mouth Every 8 (Eight) Hours As Needed for Nausea or Vomiting.     • ondansetron ODT (ZOFRAN ODT) 8 MG disintegrating tablet Take 1 tablet by mouth Every 8 (Eight) Hours As Needed for Nausea or Vomiting. 12 tablet 0   • prochlorperazine (COMPAZINE) 10 MG tablet Take 10 mg by mouth Every 6 (Six) Hours As Needed for Nausea or Vomiting.     • promethazine (PHENERGAN) 25 MG tablet Take 25 mg by mouth.     • sertraline (ZOLOFT) 50 MG tablet Take 50 mg by mouth Daily.     • triamcinolone (KENALOG) 0.1 % paste Apply 1 application to the mouth or throat.     • Vortioxetine HBr (TRINTELLIX) 5 MG tablet Take 5 mg by mouth Daily With Breakfast.       No current facility-administered medications on file prior to visit.      Allergies as of 01/22/2020   • (No Known Allergies)     Social History  "    Socioeconomic History   • Marital status:      Spouse name: Not on file   • Number of children: Not on file   • Years of education: Not on file   • Highest education level: Not on file   Tobacco Use   • Smoking status: Never Smoker   • Smokeless tobacco: Never Used   Substance and Sexual Activity   • Alcohol use: No   • Drug use: No   • Sexual activity: Yes     Partners: Male     Birth control/protection: None     Family History   Problem Relation Age of Onset   • Hypertension Mother    • Diabetes Mother    • Diabetes Father    • Cancer Paternal Grandmother    • Heart disease Paternal Grandfather    • Stroke Paternal Grandfather    • Diabetes Paternal Grandfather    • Cancer Paternal Grandfather    • Coronary artery disease Maternal Grandmother         MGM with 4 vessel CABG and multiple stents   • Coronary artery disease Maternal Uncle         Maternal uncle with MI   • Breast cancer Other        Review of Systems   Constitution: Positive for malaise/fatigue and weight gain.   HENT: Positive for sore throat.    Respiratory: Positive for shortness of breath.    Endocrine: Positive for cold intolerance and heat intolerance.   Musculoskeletal: Positive for joint pain and joint swelling.   Gastrointestinal: Positive for abdominal pain, diarrhea and nausea.   Neurological: Positive for excessive daytime sleepiness, dizziness, headaches, light-headedness and paresthesias.   All other systems reviewed and are negative.     Objective:     Vitals:    01/22/20 1158   BP: 126/72   BP Location: Left arm   Patient Position: Sitting   Pulse: 74   SpO2: 97%   Weight: 86.6 kg (191 lb)   Height: 162.6 cm (64\")     Body mass index is 32.79 kg/m².    Physical Exam   Constitutional: She is oriented to person, place, and time. She appears well-developed and well-nourished.   HENT:   Head: Normocephalic and atraumatic.   Eyes: Conjunctivae are normal.   Neck: Neck supple.   Cardiovascular: Normal rate and regular rhythm. Exam " reveals no gallop and no friction rub.   No murmur heard.  Pulmonary/Chest: Effort normal and breath sounds normal.   Abdominal: Soft. There is no tenderness.   Musculoskeletal: She exhibits no edema.   Neurological: She is alert and oriented to person, place, and time.   Skin: Skin is warm.   Psychiatric: She has a normal mood and affect. Her behavior is normal.     Lab Review:     ECG 12 Lead  Date/Time: 1/22/2020 12:00 PM  Performed by: Payam Rausch MD  Authorized by: Payam Rausch MD   Comparison: compared with previous ECG from 11/8/2019  Comparison to previous ECG: Compared to the previous EKG, the rate is now lower  Rhythm: sinus rhythm  Rate: normal  BPM: 85  Other findings: T wave abnormality    Clinical impression: abnormal EKG          Cardiac Procedures:  1.  Exercise treadmill stress test on 3/14/2018: The patient exercised for 9 minutes.  She did have chest pain.  There were no EKG changes.  2.  Echocardiogram on 3/14/2018: The ejection fraction was 62%.  Diastolic function was normal.  3.  Tilt table test on 12/5/2019: Positive for postural orthostasis and postural tachycardia (suggestive of POTS).    Assessment:       Diagnosis Plan   1. POTS (postural orthostatic tachycardia syndrome)       Plan:       Unfortunately, she continues to have issues with lightheadedness and near syncope.  She does have confirmed static hypotension and tachycardia on the tilt table test.  She also has a number of other issues, including gastroparesis, which is not related to diabetes.  At this point, she is going to continue with her salt intake and her water intake.  Again, her gastroparesis sometimes limits her consumption of water, although she tries to drink 60 to 80 ounces of water per day.  I am going to increase her midodrine to 5 mg 3 times a day at this point.  She is going to watch her heart rate and blood pressure for a week, and then we will decide on her beta-blocker dose.  She currently is  taking metoprolol 12.5 mg 3 days a week.  We can always try other beta-blockers if metoprolol is not working well.  I did advise her that this is difficult to treat at times, although it will be a step-by-step process.  I will see her back in the office within the next several months.

## 2020-05-20 NOTE — PROGRESS NOTES
[facility] CARDIOLOGY  [address] [place] [address]  [facility] [address]  Phone: [phone]      Patient Name: Liliam Lorenz  :1995  Age: 25 y.o.  Primary Cardiologist: Brian Rausch MD  Encounter Provider:  LALITO Hernandez      Chief Complaint:   Chief Complaint   Patient presents with   • Follow-up     video         HPI  Liliam Lorenz is a 25 y.o. female who presents today via video visit secondary to COVID pandemic. Patient presents today for reevaluation of POTS.     Pt has a  history significant for POTS.    Since last evaluation patient reports that her BP has been more controlled, but her HR is still high.  She reports that she is having significant problems with her gastroparesis and is currently on a liquid diet.  She notes that when she has been in the hospital she has noticed that her HR and BP are more controlled when she receives IVFs.  She also adds that she knows other people with POTS that get weekly IVF infusions to control HR and BP.  She states that her HR has been as high as the 190s and as low as the 50s, but averages in the 120s-130s.  She has not been scheduled at [place] for evaluation of POTS.  Her main complaints today are generalized weakness, abdominal pain from gastroparesis.  She states that every time she eats solid foods she has vomiting and can only tolerate liquids.  She has an appointment with Dr. Escobar with [facility] for evaluation of possible gastric stimulator.        The following portions of the patient's history were reviewed and updated as appropriate: allergies, current medications, past family history, past medical history, past social history, past surgical history and problem list.      Review of Systems   Constitution: Positive for decreased appetite, malaise/fatigue and weight loss.   Cardiovascular: Negative for chest pain and leg swelling.   Respiratory: Negative for shortness of breath.    Gastrointestinal:  "Positive for abdominal pain, nausea and vomiting.   Neurological: Negative for light-headedness.   All other systems reviewed and are negative.      OBJECTIVE:     Vitals:    05/22/20 1108   Weight: 83 kg (183 lb)   Height: 162.6 cm (64\")     Body mass index is 31.41 kg/m².    Physical Exam   Constitutional: She is oriented to person, place, and time. She appears well-developed.   HENT:   Head: Normocephalic and atraumatic.   Eyes: Conjunctivae are normal.   Pulmonary/Chest: No respiratory distress.   Neurological: She is alert and oriented to person, place, and time. GCS eye subscore is 4. GCS verbal subscore is 5. GCS motor subscore is 6.   Psychiatric: She has a normal mood and affect. Her speech is normal and behavior is normal. Judgment and thought content normal. Cognition and memory are normal.         Procedures       Cardiac Procedures:  1. Echocardiogram 3/14/2018.  EF 61%.  Normal LV cavity size and wall thickness.  All LV wall segments contract normally.  Diastolic function normal.  2. Treadmill stress test 3/14/2018.  No ECG evidence of myocardial ischemia.  3. Tilt table test 12/5/2019.  Positive       ASSESSMENT:      Diagnosis Plan   1. POTS (postural orthostatic tachycardia syndrome)     2. Chronic hypotension     3. Tachycardia     4. Gastroparesis           PLAN OF CARE:     Patient reports that her BP has been controlled in the 120s with current regimen of metoprolol tartrate 12.5mg TID and midodrine 10mg TID.  She does state that her HR has been high, averaging in the 130s.      Patient adds that she has had siginificant problems with her gastroparesis.  She states that she cannot tolerate solid foods and is on a liquid diet, she states that when she advances her diet she has vomiting.  She has an appointment with Dr. Escobar for evaluation of gastric stimulator.  She notes that when she has been hospitalized for gastroparesis and receives IVFs that her HR and BP stabilize, she is questioning if " weekly IVFs would be beneficial.  I do agree that if her gastroparesis flares and that if she is only on a liquid diet that she would probably benefit from IVFs, but I think her GI physician should be the one prescribing.  I will discuss with Dr. Rausch for further recommendations.      This patient has consented to a telehealth visit via video. I spent  32 minutes in total including but not limited to the direct conversation with this patient. All vitals recorded within this visit are reported by the patient.         Discharge Medications           Accurate as of May 22, 2020 11:24 AM. If you have any questions, ask your nurse or doctor.               Continue These Medications      Instructions Start Date   ALOE VERA EX   Apply externally      erythromycin base 250 MG tablet  Commonly known as:  E-MYCIN   TK 1 T PO Q 8 H TAT      SARMAD ROOT PO   Oral      medroxyPROGESTERone 150 MG/ML injection  Commonly known as:  Depo-Provera   150 mg, Intramuscular, Every 3 Months      metoclopramide 10 MG tablet  Commonly known as:  REGLAN   10 mg, Oral, Daily      metoprolol tartrate 25 MG tablet  Commonly known as:  LOPRESSOR   12.5 mg, Oral, 3 Times Daily      midodrine 10 MG tablet  Commonly known as:  PROAMATINE   10 mg, Oral, 3 Times Daily      omeprazole 20 MG capsule  Commonly known as:  priLOSEC   40 mg, Oral, 2 Times Daily      ondansetron 4 MG tablet  Commonly known as:  ZOFRAN   4 mg, Oral, Every 8 Hours PRN      ondansetron ODT 8 MG disintegrating tablet  Commonly known as:  Zofran ODT   8 mg, Oral, Every 8 Hours PRN      prochlorperazine 10 MG tablet  Commonly known as:  COMPAZINE   10 mg, Oral, Every 6 Hours PRN      sertraline 50 MG tablet  Commonly known as:  ZOLOFT   50 mg, Oral, Daily      triamcinolone 0.1 % paste  Commonly known as:  KENALOG   1 application, Mouth/Throat      Trintellix 5 MG tablet  Generic drug:  Vortioxetine HBr   5 mg, Oral, Daily With Breakfast      Vitamin D-1000 Max St 25 MCG  (1000 UT) tablet  Generic drug:  Cholecalciferol   1,000 Units, Oral, Daily             Thank you for allowing me to participate in the care of your patient,         LALITO Hernandez  [facility]  05/22/20  3:17 PM      **Farrukh Disclaimer:**  Much of this encounter note is an electronic transcription/translation of spoken language to printed text. The electronic translation of spoken language may permit erroneous, or at times, nonsensical words or phrases to be inadvertently transcribed. Although I have reviewed the note for such errors, some may still exist.

## 2020-05-22 ENCOUNTER — TELEMEDICINE (OUTPATIENT)
Dept: CARDIOLOGY | Facility: CLINIC | Age: 25
End: 2020-05-22

## 2020-05-22 VITALS — WEIGHT: 183 LBS | BODY MASS INDEX: 31.24 KG/M2 | HEIGHT: 64 IN

## 2020-05-22 DIAGNOSIS — K31.84 GASTROPARESIS: ICD-10-CM

## 2020-05-22 DIAGNOSIS — I95.89 CHRONIC HYPOTENSION: ICD-10-CM

## 2020-05-22 DIAGNOSIS — G90.A POTS (POSTURAL ORTHOSTATIC TACHYCARDIA SYNDROME): Primary | ICD-10-CM

## 2020-05-22 DIAGNOSIS — R00.0 TACHYCARDIA: ICD-10-CM

## 2020-05-22 PROCEDURE — 99214 OFFICE O/P EST MOD 30 MIN: CPT | Performed by: NURSE PRACTITIONER

## 2020-05-22 RX ORDER — ERYTHROMYCIN 250 MG/1
TABLET, COATED ORAL
COMMUNITY
Start: 2020-04-24 | End: 2021-09-21

## 2020-06-05 ENCOUNTER — HOSPITAL ENCOUNTER (OUTPATIENT)
Dept: CARDIOLOGY | Facility: HOSPITAL | Age: 25
Discharge: HOME | End: 2020-06-05
Admitting: NURSE PRACTITIONER

## 2020-06-05 VITALS
DIASTOLIC BLOOD PRESSURE: 79 MMHG | TEMPERATURE: 99 F | WEIGHT: 181 LBS | HEIGHT: 64 IN | OXYGEN SATURATION: 98 % | SYSTOLIC BLOOD PRESSURE: 118 MMHG | BODY MASS INDEX: 30.9 KG/M2 | HEART RATE: 111 BPM

## 2020-06-05 DIAGNOSIS — E86.0 DEHYDRATION: Primary | ICD-10-CM

## 2020-06-05 PROCEDURE — 94760 N-INVAS EAR/PLS OXIMETRY 1: CPT

## 2020-06-05 PROCEDURE — 36415 COLL VENOUS BLD VENIPUNCTURE: CPT

## 2020-06-05 PROCEDURE — 96374 THER/PROPH/DIAG INJ IV PUSH: CPT

## 2020-06-05 RX ORDER — SODIUM CHLORIDE 9 MG/ML
1000 INJECTION, SOLUTION INTRAVENOUS ONCE
Status: DISCONTINUED | OUTPATIENT
Start: 2020-06-05 | End: 2020-06-06 | Stop reason: HOSPADM

## 2020-06-05 NOTE — PROGRESS NOTES
1225  Patient arrive for hydration,  She was not on our schedule.  Contacted NP Cha Lauren for orders and orders given to administer 1 liter IV fluids.  No other orders at this time.  Patient will follow up with gastric MD regarding any further orders.  BECKA Odom RN.

## 2020-07-21 ENCOUNTER — TELEPHONE (OUTPATIENT)
Dept: CARDIOLOGY | Facility: CLINIC | Age: 25
End: 2020-07-21

## 2020-07-21 NOTE — TELEPHONE ENCOUNTER
I called and spoke with her on the phone.  She has really increased her salt intake already, and she has actually been getting IV fluids at home which was approved through her gastroenterologist.  However, she only has 2 more weeks of this.  She does state that after the IV fluids she feels much better her blood pressure is better.  She has had a difficult time drinking a significant amount of water in the past because of her gastroparesis.  For now, I would like to try to avoid any other medications if possible.  I am going to leave her on the midodrine 10 mg 3 times daily and the metoprolol 12.5 mg 3 times daily.  She is going to try to drink some Gatorade and really increase her fluid intake over the next several days.  I have asked her to call me on 7/23/2020 to update me as I am on vacation afterwards.  The patient was in agreement with this plan.    TED

## 2020-07-21 NOTE — TELEPHONE ENCOUNTER
Pt called c/o low BP the past 2 weeks.  She states it is averaging 90-70's/50's.  She c/o of dizziness(veritgo), faitgue, chest pain and SOA.  She is takes Midodirine 10 mg TID and   Metoprolol Tart 25 mg 1/2 tablet TID.    Can you please call to triage?  Thanks/[facility]    # [phone]

## 2020-07-29 ENCOUNTER — HOSPITAL ENCOUNTER (EMERGENCY)
Facility: HOSPITAL | Age: 25
Discharge: LEFT BEFORE DISCHARGE | End: 2020-07-29

## 2020-07-29 VITALS
DIASTOLIC BLOOD PRESSURE: 97 MMHG | BODY MASS INDEX: 31.07 KG/M2 | RESPIRATION RATE: 20 BRPM | HEART RATE: 112 BPM | SYSTOLIC BLOOD PRESSURE: 128 MMHG | HEIGHT: 64 IN | OXYGEN SATURATION: 100 % | TEMPERATURE: 98.5 F

## 2020-08-28 ENCOUNTER — OFFICE VISIT (OUTPATIENT)
Dept: CARDIOLOGY | Facility: CLINIC | Age: 25
End: 2020-08-28

## 2020-08-28 VITALS
OXYGEN SATURATION: 97 % | HEART RATE: 74 BPM | DIASTOLIC BLOOD PRESSURE: 74 MMHG | BODY MASS INDEX: 31.05 KG/M2 | SYSTOLIC BLOOD PRESSURE: 118 MMHG | HEIGHT: 64 IN

## 2020-08-28 DIAGNOSIS — G90.A POTS (POSTURAL ORTHOSTATIC TACHYCARDIA SYNDROME): Primary | ICD-10-CM

## 2020-08-28 DIAGNOSIS — I95.9 HYPOTENSION, UNSPECIFIED HYPOTENSION TYPE: ICD-10-CM

## 2020-08-28 DIAGNOSIS — G90.1 DYSAUTONOMIA (HCC): ICD-10-CM

## 2020-08-28 PROCEDURE — 99215 OFFICE O/P EST HI 40 MIN: CPT | Performed by: INTERNAL MEDICINE

## 2020-08-28 RX ORDER — TIZANIDINE 4 MG/1
TABLET ORAL
COMMUNITY
Start: 2020-06-05 | End: 2022-01-19 | Stop reason: SDDI

## 2020-08-28 RX ORDER — LOPERAMIDE HYDROCHLORIDE 2 MG/1
2 TABLET ORAL 4 TIMES DAILY PRN
COMMUNITY
End: 2022-05-08 | Stop reason: HOSPADM

## 2020-08-28 RX ORDER — MONTELUKAST SODIUM 10 MG/1
10 TABLET ORAL NIGHTLY
COMMUNITY
Start: 2020-08-24

## 2020-08-28 RX ORDER — GABAPENTIN 100 MG/1
100 CAPSULE ORAL 3 TIMES DAILY
COMMUNITY
Start: 2020-08-10

## 2020-08-28 RX ORDER — DOXEPIN HYDROCHLORIDE 25 MG/1
CAPSULE ORAL
COMMUNITY
Start: 2020-06-05 | End: 2022-01-19

## 2020-08-28 RX ORDER — MECLIZINE HYDROCHLORIDE 25 MG/1
25 TABLET ORAL 3 TIMES DAILY PRN
COMMUNITY
End: 2022-05-08 | Stop reason: HOSPADM

## 2020-08-28 RX ORDER — ALBUTEROL SULFATE 90 UG/1
2 AEROSOL, METERED RESPIRATORY (INHALATION) EVERY 4 HOURS PRN
COMMUNITY

## 2020-08-29 NOTE — PROGRESS NOTES
Date of Office Visit:  2020  Encounter Provider: Payam Rausch MD  Place of Service: [facility] CARDIOLOGY  Patient Name: Liliam Lorenz  :1995    Chief complaint: Follow-up for severe postural orthostatic tachycardia syndrome, dysautonomia.     History of Present Illness:    I again had the pleasure of seeing the patient in cardiology office on 2020.  She is a very pleasant 25 year-old with a history of severe POTS, GERD, irritable bowel syndrome, gastroparesis who presents for follow-up.  I initially saw the patient in the office on 2018.  She had been having chest discomfort intermittently for several years.  She had been evaluated in the emergency department, and it was felt that this might represent esophageal spasm.  However, she did undergo cardiac testing with an exercise treadmill stress test and echocardiogram on 3/14/2018.  The treadmill stress test showed no EKG changes, and she was able to exercise for 9 minutes (although she did have chest discomfort).  The echocardiogram showed an ejection fraction of 62% and no significant pathology.  She later continued to have issues with near syncope and tachycardia.  A tilt table test was performed on 2019 which confirmed the diagnosis of POTS.  Typically, she met all criteria when tilted backwards (her heart rate went up significantly, and her blood pressure dropped).  She has been treated with beta-blockers and midodrine.  She also has evidence of dysautonomia and severe gastroparesis.    The patient presents today for follow-up with multiple issues recently.  She has been having issues with significant hypotension, with her diastolic readings going into the 40s frequently.  She gets very lethargic and lightheaded when this occurs.  She has also noted that recently her blood pressure is actually going down when lying backwards and going up with standing at times.  In general, her blood pressure has  been running low despite the midodrine.  She is actually getting IV fluids once a week currently.  It seems like her dysautonomia is worsening in general.  She has had severe issues with her gastroparesis, and is getting a Dolan catheter at [facility] next week from her gastroenterologist (Dr. White).  She is also going to be changed to IV fluids every other day, and she is eventually going to need a gastric stimulator at some point.  She does tell me that the metoprolol helps with the tachycardia, although her heart rate still goes up at times.      Past Medical History:   Diagnosis Date   • Bronchitis    • Chest pain    • Chronic hypotension    • Eczema    • Gastroparesis    • GERD (gastroesophageal reflux disease)    • IBS (irritable bowel syndrome)    • Lightheadedness    • POTS (postural orthostatic tachycardia syndrome)    • Tachycardia        Past Surgical History:   Procedure Laterality Date   • COLONOSCOPY     • UPPER GASTROINTESTINAL ENDOSCOPY         Current Outpatient Medications on File Prior to Visit   Medication Sig Dispense Refill   • albuterol sulfate  (90 Base) MCG/ACT inhaler Inhale 2 puffs Every 4 (Four) Hours As Needed for Wheezing.     • ALOE VERA EX Apply  topically.     • Cholecalciferol (VITAMIN D-1000 MAX ST) 25 MCG (1000 UT) tablet Take 1,000 Units by mouth Daily.     • doxepin (SINEquan) 25 MG capsule TK 1 TO 2 CS PO Q NIGHT     • erythromycin base (E-MYCIN) 250 MG tablet TK 1 T PO Q 8 H TAT     • gabapentin (NEURONTIN) 100 MG capsule      • Ginger, Zingiber officinalis, (GINGER ROOT PO) Take  by mouth.     • loperamide (IMODIUM A-D) 2 MG tablet Take 2 mg by mouth.     • meclizine (ANTIVERT) 25 MG tablet Take 25 mg by mouth.     • medroxyPROGESTERone (DEPO-PROVERA) 150 MG/ML injection Inject 1 mL into the appropriate muscle as directed by prescriber Every 3 (Three) Months. 1 mL 3   • metoprolol tartrate (LOPRESSOR) 25 MG tablet Take 0.5 tablets by mouth 3 (Three) Times a Day. 60  tablet 11   • midodrine (PROAMATINE) 10 MG tablet Take 1 tablet by mouth 3 (Three) Times a Day. 90 tablet 6   • Mometasone Furoate (Asmanex HFA) 100 MCG/ACT aerosol INAHLE 2 PUFFS INTO THE LUNGS BY MOUTH 2 TIMES PER DAY IN THE MORNING AND EVENING     • montelukast (SINGULAIR) 10 MG tablet      • omeprazole (priLOSEC) 20 MG capsule Take 40 mg by mouth 2 (Two) Times a Day.     • ondansetron (ZOFRAN) 4 MG tablet Take 4 mg by mouth Every 8 (Eight) Hours As Needed for Nausea or Vomiting.     • ondansetron ODT (ZOFRAN ODT) 8 MG disintegrating tablet Take 1 tablet by mouth Every 8 (Eight) Hours As Needed for Nausea or Vomiting. 12 tablet 0   • prochlorperazine (COMPAZINE) 10 MG tablet Take 10 mg by mouth Every 6 (Six) Hours As Needed for Nausea or Vomiting.     • tiZANidine (ZANAFLEX) 4 MG tablet TAKE 1 TABLET BY MOUTH EVERY 8 HOURS FOR UP TO 14 DAYS AS NEEDED FOR MUSCLE SPASM     • triamcinolone (KENALOG) 0.1 % paste Apply 1 application to the mouth or throat.     • Vortioxetine HBr (TRINTELLIX) 5 MG tablet Take 5 mg by mouth Daily With Breakfast.       No current facility-administered medications on file prior to visit.      Allergies as of 08/28/2020   • (No Known Allergies)     Social History     Socioeconomic History   • Marital status:      Spouse name: Not on file   • Number of children: Not on file   • Years of education: Not on file   • Highest education level: Not on file   Tobacco Use   • Smoking status: Never Smoker   • Smokeless tobacco: Never Used   Substance and Sexual Activity   • Alcohol use: No   • Drug use: No   • Sexual activity: Yes     Partners: Male     Birth control/protection: None     Family History   Problem Relation Age of Onset   • Hypertension Mother    • Diabetes Mother    • Diabetes Father    • Cancer Paternal Grandmother    • Heart disease Paternal Grandfather    • Stroke Paternal Grandfather    • Diabetes Paternal Grandfather    • Cancer Paternal Grandfather    • Coronary artery  "disease Maternal Grandmother         MGM with 4 vessel CABG and multiple stents   • Coronary artery disease Maternal Uncle         Maternal uncle with MI   • Breast cancer Other        Review of Systems   Constitution: Positive for malaise/fatigue.   Cardiovascular: Positive for chest pain and dyspnea on exertion.   Respiratory: Positive for shortness of breath.    Endocrine: Positive for cold intolerance and heat intolerance.   Musculoskeletal: Positive for joint pain and joint swelling.   Gastrointestinal: Positive for abdominal pain, diarrhea and nausea.   Neurological: Positive for excessive daytime sleepiness, dizziness, headaches, light-headedness and paresthesias.   All other systems reviewed and are negative.     Objective:     Vitals:    08/28/20 1453   BP: 118/74   Pulse: 74   SpO2: 97%   Height: 162.6 cm (64.02\")     Body mass index is 31.05 kg/m².    Physical Exam   Constitutional: She is oriented to person, place, and time. She appears well-developed and well-nourished.   HENT:   Head: Normocephalic and atraumatic.   Eyes: Conjunctivae are normal.   Neck: Neck supple.   Cardiovascular: Normal rate and regular rhythm. Exam reveals no gallop and no friction rub.   No murmur heard.  Pulmonary/Chest: Effort normal and breath sounds normal.   Abdominal: Soft. There is no tenderness.   Musculoskeletal: She exhibits no edema.   Neurological: She is alert and oriented to person, place, and time.   Skin: Skin is warm.   Psychiatric: She has a normal mood and affect. Her behavior is normal.     Lab Review:   Procedures  Cardiac Procedures:  1.  Exercise treadmill stress test on 3/14/2018: The patient exercised for 9 minutes.  She did have chest pain.  There were no EKG changes.  2.  Echocardiogram on 3/14/2018: The ejection fraction was 62%.  Diastolic function was normal.  3.  Tilt table test on 12/5/2019: Positive for postural orthostasis and postural tachycardia (suggestive of POTS).    Assessment:       " Diagnosis Plan   1. POTS (postural orthostatic tachycardia syndrome)     2. Hypotension, unspecified hypotension type     3. Dysautonomia (CMS/HCC)       Plan:      She has really been having a difficult time recently.  She is frequently lightheaded with hypotension.  She is also now showing signs of her blood pressure going down when lying recumbent, and going up with standing.  This is a clear-cut indication that her dysautonomic symptoms are worsening.  She is going to be getting a Dolan catheter at [facility] next week, and her IV fluids will be changed from once a week to every other day.  I think this is going to be very beneficial for her as the IV fluids typically help for several days after getting them.  She is also going to need a gastric stimulator for her severe gastroparesis, and Dr. [facility] at [facility] is managing this.    I made several recommendations today.  I feel that she is very young to have all of the issues that she is having.  This is very complicated, and I would like to send her to the [facility] autonomic clinic.  She has agreed to this, and I am going to get this set up for her.  I really do not want to place her on fludrocortisone as she is only 25 years old and she would need this long-term with corticosteroid side effects.  She is already maxed out on midodrine 10 mg 3 times a day.  The metoprolol 12.5 mg 3 times a day does help with the tachycardic symptoms, although it also lowers her blood pressure.  I have samples of Northera, and I gave her 3 months of 100 mg 3 times a day.  I am going to have her wait until after her IV fluids increase unless her symptoms worsen in the next week or 2.  If increasing her fluids does not help, I will have her start the Northera at 100 mg 3 times a day in addition to the midodrine and the metoprolol.  I discussed all this in detail with her, and she is in agreement with the plan.     I spent 45 minutes in the care of the patient.  Greater  than 50% of this time was spent in face-to-face counseling with the patient regarding her dysautonomia, hypotension, tachycardia, and future management.

## 2020-09-22 DIAGNOSIS — G90.1 DYSAUTONOMIA (HCC): Primary | ICD-10-CM

## 2020-10-05 ENCOUNTER — TELEPHONE (OUTPATIENT)
Dept: CARDIOLOGY | Facility: CLINIC | Age: 25
End: 2020-10-05

## 2020-10-05 DIAGNOSIS — G90.A POTS (POSTURAL ORTHOSTATIC TACHYCARDIA SYNDROME): Primary | ICD-10-CM

## 2020-10-05 NOTE — TELEPHONE ENCOUNTER
----- Message from Liliam Lorenz sent at 10/4/2020  2:06 PM EDT -----  Regarding: Non-Urgent Medical Question  Contact: [phone]  Sachin Rausch. I am the closest to passing out I’ve ever been. My pre syncope got so bad my vision went black and I couldn’t hear well. I’m now so dizzy and shakey I can’t hold a fork or see well. My heart rate is normal and my bp is normal so I don’t know what’s going on and I wanted to know if you had any ideas.     Thank you   Liliam Lorenz.

## 2020-10-05 NOTE — TELEPHONE ENCOUNTER
Called and discussed with patient.  She reports that she did restart the Northera yesterday and notes improvement of her symptoms and she is not experiencing any adverse effects.  I did inform her of the recommendation of Holter monitor.  She is agreeable.Cha, can you please call and schedule patient to come in for a nonurgent Holter placement.

## 2020-10-12 LAB
BASOPHILS # BLD AUTO: 0.04 10*3/MM3 (ref 0–0.2)
BASOPHILS NFR BLD AUTO: 0.7 % (ref 0–1.5)
DEPRECATED RDW RBC AUTO: 36.8 FL (ref 37–54)
EOSINOPHIL # BLD AUTO: 0.06 10*3/MM3 (ref 0–0.4)
EOSINOPHIL NFR BLD AUTO: 1.1 % (ref 0.3–6.2)
ERYTHROCYTE [DISTWIDTH] IN BLOOD BY AUTOMATED COUNT: 12.1 % (ref 12.3–15.4)
HCT VFR BLD AUTO: 43.6 % (ref 34–46.6)
HGB BLD-MCNC: 14.3 G/DL (ref 12–15.9)
IMM GRANULOCYTES # BLD AUTO: 0.01 10*3/MM3 (ref 0–0.05)
IMM GRANULOCYTES NFR BLD AUTO: 0.2 % (ref 0–0.5)
INR PPP: 1.02 (ref 0.9–1.1)
LYMPHOCYTES # BLD AUTO: 1.38 10*3/MM3 (ref 0.7–3.1)
LYMPHOCYTES NFR BLD AUTO: 24.9 % (ref 19.6–45.3)
MCH RBC QN AUTO: 27.7 PG (ref 26.6–33)
MCHC RBC AUTO-ENTMCNC: 32.8 G/DL (ref 31.5–35.7)
MCV RBC AUTO: 84.5 FL (ref 79–97)
MONOCYTES # BLD AUTO: 0.33 10*3/MM3 (ref 0.1–0.9)
MONOCYTES NFR BLD AUTO: 5.9 % (ref 5–12)
NEUTROPHILS NFR BLD AUTO: 3.73 10*3/MM3 (ref 1.7–7)
NEUTROPHILS NFR BLD AUTO: 67.2 % (ref 42.7–76)
NRBC BLD AUTO-RTO: 0 /100 WBC (ref 0–0.2)
PLATELET # BLD AUTO: 286 10*3/MM3 (ref 140–450)
PMV BLD AUTO: 11.5 FL (ref 6–12)
PROTHROMBIN TIME: 13.3 SECONDS (ref 11.7–14.2)
RBC # BLD AUTO: 5.16 10*6/MM3 (ref 3.77–5.28)
WBC # BLD AUTO: 5.55 10*3/MM3 (ref 3.4–10.8)

## 2020-10-12 PROCEDURE — 85610 PROTHROMBIN TIME: CPT | Performed by: PHYSICIAN ASSISTANT

## 2020-10-12 PROCEDURE — 85025 COMPLETE CBC W/AUTO DIFF WBC: CPT | Performed by: PHYSICIAN ASSISTANT

## 2020-10-12 PROCEDURE — 99283 EMERGENCY DEPT VISIT LOW MDM: CPT

## 2020-10-13 ENCOUNTER — APPOINTMENT (OUTPATIENT)
Dept: GENERAL RADIOLOGY | Facility: HOSPITAL | Age: 25
End: 2020-10-13

## 2020-10-13 ENCOUNTER — HOSPITAL ENCOUNTER (EMERGENCY)
Facility: HOSPITAL | Age: 25
Discharge: HOME | End: 2020-10-13
Attending: EMERGENCY MEDICINE | Admitting: EMERGENCY MEDICINE

## 2020-10-13 VITALS
HEART RATE: 85 BPM | WEIGHT: 181 LBS | TEMPERATURE: 98 F | OXYGEN SATURATION: 96 % | HEIGHT: 64 IN | DIASTOLIC BLOOD PRESSURE: 76 MMHG | BODY MASS INDEX: 30.9 KG/M2 | RESPIRATION RATE: 16 BRPM | SYSTOLIC BLOOD PRESSURE: 118 MMHG

## 2020-10-13 DIAGNOSIS — T82.518A VASCULAR CATHETER DYSFUNCTION, INITIAL ENCOUNTER (HCC): Primary | ICD-10-CM

## 2020-10-13 PROCEDURE — 71045 X-RAY EXAM CHEST 1 VIEW: CPT

## 2020-10-13 NOTE — ED PROVIDER NOTES
EMERGENCY DEPARTMENT ENCOUNTER    Room Number:  06/06  Date seen:  10/13/2020  Time seen: 05:58 EDT  PCP: Jason Borrero MD  Historian: patient      HPI:  Chief Complaint: Dolan complication    A complete HPI/ROS/PMH/PSH/SH/FH are unobtainable due to: none    Context: Liliam Lorenz is a 25 y.o. female who presents to the ED for evaluation of bleeding from her Dolan catheter area.  It started this afternoon and bled temporarily and then stopped.  She is never had that happen before.  Nothing in particular that she knows of brought it on or made it better.  It was moderate in severity.  She has significant GI motility problems and has the Dolan catheter so she could start receiving IVIG infusions, had her first infusion today.  Also received IV fluids.  The home health nurse also removed her anchoring sutures from her catheter insertion procedure 7 to 8 weeks ago.  The bleeding started after that.  Additionally, she states there is not usually blood in the catheter tubing and today there is and is concerned her as well.  She has a little bit of mild pain around the catheter insertion site that started with this as well.  She denies any fevers chills or other complaints.        PAST MEDICAL HISTORY  Active Ambulatory Problems     Diagnosis Date Noted   • No Active Ambulatory Problems     Resolved Ambulatory Problems     Diagnosis Date Noted   • No Resolved Ambulatory Problems     Past Medical History:   Diagnosis Date   • Bronchitis    • Chest pain    • Chronic hypotension    • Eczema    • Gastroparesis    • GERD (gastroesophageal reflux disease)    • IBS (irritable bowel syndrome)    • Lightheadedness    • POTS (postural orthostatic tachycardia syndrome)    • Tachycardia          PAST SURGICAL HISTORY  Past Surgical History:   Procedure Laterality Date   • COLONOSCOPY     • UPPER GASTROINTESTINAL ENDOSCOPY           FAMILY HISTORY  Family History   Problem Relation Age of Onset   • Hypertension Mother    •  Diabetes Mother    • Diabetes Father    • Cancer Paternal Grandmother    • Heart disease Paternal Grandfather    • Stroke Paternal Grandfather    • Diabetes Paternal Grandfather    • Cancer Paternal Grandfather    • Coronary artery disease Maternal Grandmother         MGM with 4 vessel CABG and multiple stents   • Coronary artery disease Maternal Uncle         Maternal uncle with MI   • Breast cancer Other          SOCIAL HISTORY  Social History     Socioeconomic History   • Marital status:      Spouse name: Not on file   • Number of children: Not on file   • Years of education: Not on file   • Highest education level: Not on file   Tobacco Use   • Smoking status: Never Smoker   • Smokeless tobacco: Never Used   Substance and Sexual Activity   • Alcohol use: No   • Drug use: No   • Sexual activity: Yes     Partners: Male     Birth control/protection: None         ALLERGIES  Patient has no known allergies.        REVIEW OF SYSTEMS  Review of Systems     All systems reviewed and negative except for those discussed in HPI.       PHYSICAL EXAM  ED Triage Vitals   Temp Heart Rate Resp BP SpO2   10/12/20 2100 10/12/20 2100 10/12/20 2100 10/12/20 2132 10/12/20 2100   98 °F (36.7 °C) 112 17 119/79 96 %      Temp src Heart Rate Source Patient Position BP Location FiO2 (%)   10/12/20 2100 -- 10/12/20 2132 10/12/20 2132 --   Tympanic  Lying Right arm          GENERAL: not distressed  HENT: atraumatic  EYES: no scleral icterus  CV: regular rhythm, regular rate  RESPIRATORY: normal effort CTA B.  Upper chest wall has Dolan catheter insertion site.  There is some dried blood on the bandage.  There is no significant visible hematoma, no active bleeding, no erythema or discharge.  Catheter tubing appears clean dry and intact.  ABDOMEN: soft, nontender  MUSCULOSKELETAL: no deformity  NEURO: alert, moves all extremities, follows commands  SKIN: warm, dry    Vital signs and nursing notes reviewed.          LAB RESULTS  Recent  Results (from the past 24 hour(s))   Protime-INR    Collection Time: 10/12/20 11:11 PM    Specimen: Blood   Result Value Ref Range    Protime 13.3 11.7 - 14.2 Seconds    INR 1.02 0.90 - 1.10   CBC Auto Differential    Collection Time: 10/12/20 11:11 PM    Specimen: Blood   Result Value Ref Range    WBC 5.55 3.40 - 10.80 10*3/mm3    RBC 5.16 3.77 - 5.28 10*6/mm3    Hemoglobin 14.3 12.0 - 15.9 g/dL    Hematocrit 43.6 34.0 - 46.6 %    MCV 84.5 79.0 - 97.0 fL    MCH 27.7 26.6 - 33.0 pg    MCHC 32.8 31.5 - 35.7 g/dL    RDW 12.1 (L) 12.3 - 15.4 %    RDW-SD 36.8 (L) 37.0 - 54.0 fl    MPV 11.5 6.0 - 12.0 fL    Platelets 286 140 - 450 10*3/mm3    Neutrophil % 67.2 42.7 - 76.0 %    Lymphocyte % 24.9 19.6 - 45.3 %    Monocyte % 5.9 5.0 - 12.0 %    Eosinophil % 1.1 0.3 - 6.2 %    Basophil % 0.7 0.0 - 1.5 %    Immature Grans % 0.2 0.0 - 0.5 %    Neutrophils, Absolute 3.73 1.70 - 7.00 10*3/mm3    Lymphocytes, Absolute 1.38 0.70 - 3.10 10*3/mm3    Monocytes, Absolute 0.33 0.10 - 0.90 10*3/mm3    Eosinophils, Absolute 0.06 0.00 - 0.40 10*3/mm3    Basophils, Absolute 0.04 0.00 - 0.20 10*3/mm3    Immature Grans, Absolute 0.01 0.00 - 0.05 10*3/mm3    nRBC 0.0 0.0 - 0.2 /100 WBC       Ordered the above labs and independently reviewed the results.        RADIOLOGY  XR Chest 1 View   Final Result   FINDINGS AND IMPRESSION:   Right central venous catheter terminates over the brachiocephalic   confluence.       There is no pulmonary consolidation. No pneumothorax or pleural effusion   is seen. Heart is normal in size.       This report was finalized on 10/13/2020 4:33 AM by Dr. Good York M.D.              I ordered the above noted radiological studies. Reviewed by me and discussed with radiologist.  See dictation for official radiology interpretation.    PROCEDURES  Procedures        MEDICATIONS GIVEN IN ER  Medications - No data to display          PROGRESS AND CONSULTS    DDX includes but not limited to hematoma, soft tissue  injury from suture removal, catheter injury from suture removal, catheter dislodgment    ED Course as of Oct 13 0558   Tue Oct 13, 2020   0429 RN flushed Dolan catheter without difficulty.    [KA]   0500 I reassessed the patient, she is resting comfortably.  Labs are unremarkable, chest x-ray demonstrates catheter tip in good positioning.  It flushes well.  I suspect that removal of the sutures today because a small hematoma which caused the insertion site bleeding.  Though there is some blood in the tubing it flushes well.  I have encouraged her to discuss with her vascular doctor if she has additional concerns, she can call them tomorrow.  She is agreeable with the plan is stable for discharge.    [KA]   0502 WBC: 5.55 [KA]   0502 Hemoglobin: 14.3 [KA]   0502 My interpretation of the chest x-ray is no acute infiltrate, central venous catheter appears in good position   INR: 1.02 [KA]      ED Course User Index  [KA] Meena Lind PA        Reviewed pt's history and workup with Dr. Borrero who agrees with the plan of care      Patient was placed in face mask in first look. Patient was wearing facemask each time I entered the room and throughout our encounter. I wore protective equipment throughout this patient encounter including a face mask, eye shield and gloves. Hand hygiene was performed before donning protective equipment and after removal when leaving the room.        DIAGNOSIS  Final diagnoses:   Vascular catheter dysfunction, initial encounter (CMS/[facility])               Latest Documented Vital Signs:  As of 05:58 EDT  BP- 118/76 HR- 85 Temp- 98 °F (36.7 °C) (Tympanic) O2 sat- 96%       Meena Lind PA  10/13/20 0601

## 2020-10-13 NOTE — ED TRIAGE NOTES
Pt's addison began bleeding approx 40 minutes ago, had it placed approx 6 weeks ago for fluids and IVIG. No active bleeding noted at this time. Pt arrives aaox4, abc's intact, appears anxious, skin WNL, Pt placed in mask at triage. This RN also wearing a mask.

## 2020-10-13 NOTE — ED NOTES
Pt verbalized understanding to f/u with her primary and to return to the ER if symptoms are worse.      Radha Shabazz RN  10/13/20 0518

## 2020-10-14 ENCOUNTER — APPOINTMENT (OUTPATIENT)
Dept: GENERAL RADIOLOGY | Facility: HOSPITAL | Age: 25
End: 2020-10-14

## 2020-10-14 ENCOUNTER — HOSPITAL ENCOUNTER (EMERGENCY)
Facility: HOSPITAL | Age: 25
Discharge: HOME | End: 2020-10-14
Attending: EMERGENCY MEDICINE | Admitting: EMERGENCY MEDICINE

## 2020-10-14 ENCOUNTER — PREP FOR SURGERY (OUTPATIENT)
Dept: OTHER | Facility: HOSPITAL | Age: 25
End: 2020-10-14

## 2020-10-14 VITALS
DIASTOLIC BLOOD PRESSURE: 82 MMHG | HEIGHT: 64 IN | SYSTOLIC BLOOD PRESSURE: 120 MMHG | BODY MASS INDEX: 30.73 KG/M2 | OXYGEN SATURATION: 99 % | HEART RATE: 90 BPM | TEMPERATURE: 98.9 F | WEIGHT: 180 LBS | RESPIRATION RATE: 16 BRPM

## 2020-10-14 DIAGNOSIS — T82.9XXA CENTRAL LINE COMPLICATION, INITIAL ENCOUNTER: Primary | ICD-10-CM

## 2020-10-14 DIAGNOSIS — I87.8 POOR VENOUS ACCESS: Primary | ICD-10-CM

## 2020-10-14 LAB

## 2020-10-14 PROCEDURE — 71045 X-RAY EXAM CHEST 1 VIEW: CPT

## 2020-10-14 PROCEDURE — 0202U NFCT DS 22 TRGT SARS-COV-2: CPT | Performed by: EMERGENCY MEDICINE

## 2020-10-14 PROCEDURE — 99283 EMERGENCY DEPT VISIT LOW MDM: CPT

## 2020-10-14 PROCEDURE — C9803 HOPD COVID-19 SPEC COLLECT: HCPCS | Performed by: EMERGENCY MEDICINE

## 2020-10-14 RX ORDER — ACETAMINOPHEN 500 MG
1000 TABLET ORAL ONCE
Status: COMPLETED | OUTPATIENT
Start: 2020-10-14 | End: 2020-10-14

## 2020-10-14 RX ORDER — CEFAZOLIN SODIUM 2 G/100ML
2 INJECTION, SOLUTION INTRAVENOUS ONCE
Status: CANCELLED | OUTPATIENT
Start: 2020-10-16 | End: 2020-10-14

## 2020-10-14 RX ADMIN — ACETAMINOPHEN 1000 MG: 500 TABLET, FILM COATED ORAL at 15:24

## 2020-10-14 NOTE — ED TRIAGE NOTES
Patient presents to er via private vehicle from home.  Patient was infusing fluids through her right central line approximately one hour ago and noticed leakage from site.  Patient was placed in face mask during first look triage.  Patient was wearing a face mask throughout encounter.  I wore personal protective equipment throughout the encounter.  Hand hygiene was performed before and after patient encounter.

## 2020-10-14 NOTE — ED NOTES
Pt states she noticed leaking from her central line. Pt was seen on Monday in ER due to bleeding and leaking coming from site - pt states it stopped and she was able to give herself fluids yesterday. Pt states she noticed leaking today. Pt c/o migraine but denies any other pain.     IV therapy came to look at central line and confirmed leakage.     Patient in mask. This RN in appropriate PPE - including mask, goggles, and gloves during all of patient care.        Coco Valente, RN  10/14/20 3311

## 2020-10-14 NOTE — DISCHARGE INSTRUCTIONS
Call Dr. Lane's office tomorrow.  Tell them you were seen in the emergency department and that Dr. Lane is going to replace your Dolan catheter on 10/16.  Do not use your catheter.  Leave dressing in place.

## 2020-10-14 NOTE — ED PROVIDER NOTES
I have supervised the care provided by the midlevel provider.    We have discussed this patient's history, physical exam, and treatment plan.   I have reviewed the note and have personally examined the patient and agree with the plan of care.  See attached attending note.  My personal findings are below:    Patient complains of leaking from her Dolan catheter.  Patient states a catheter was placed approximately 8 weeks ago.  She gets weekly IVIG infusions through it.  She also gives herself a liter of fluids and IV Zofran daily.  She last had IVIG 2 days ago.  Today when she was infusing fluids, she noticed that the dressing and her shirt were wet.  She states that fluid was coming from the insertion site of the catheter.    On exam: Awake and alert.  Heart is regular rate and rhythm.  Lungs are clear bilaterally.  There is a catheter in place in the right upper chest with a dressing in place.  There are no signs of infection.    Plan is to obtain a chest x-ray to verify placement.    1725: Case discussed with Dr. Wells of vascular surgery.  He states that this is not a vascular surgery issue.  He recommends that I call general surgery.    1750: Case discussed with Dr. Lane.  He states he can admit the patient overnight and replace her line tomorrow afternoon.  Alternatively, the patient can be discharged and he can replace it 2 days from now.    1755: Shared decision-making was discussed with the patient.  She prefers to go home.  Respiratory panel will be obtained prior to discharge.  A new dressing will be placed over her catheter.  Patient was advised not to use her catheter.  She was instructed to call Dr. Lane's office tomorrow in order to find out what time she needs to come to the hospital on Friday.     Juarez Gallardo MD  10/16/20 [id number]

## 2020-10-14 NOTE — ED PROVIDER NOTES
EMERGENCY DEPARTMENT ENCOUNTER    Room Number:  08/08  Date of encounter:  10/14/2020  PCP: Jason Borrero MD  Historian: Patient      PPE    Patient was placed in face mask in first look. Patient was wearing facemask when I entered the room and throughout our encounter. I wore full protective equipment throughout this patient encounter including a face mask, and gloves. Hand hygiene was performed before donning protective equipment and after removal when leaving the room.        HPI:  Chief Complaint: Vascular Access problem  A complete HPI/ROS/PMH/PSH/SH/FH are unobtainable due to: Nothing    Context: Liliam Lorenz is a 25 y.o. female who arrives to the ED via private vehicle from home.  Patient presents with c/o leaking around her Dolan catheter insertion site.  Patient states that she has this for IVIG, IV fluids infusion and it was placed approximately 8 weeks ago at [facility] by their interventional radiologist.  She states today she was getting her fluid and she noticed that her shirt was wet.  She states that she stopped her infusion and flushed the line and noticed that fluid was coming out at the insertion site.  She states she did call her GI doctor which is Dr. Escobar and was instructed to go to the ER.  Patient states that she was here a day ago for bleeding around the insertion site, states that she had just had her sutures removed per her home health nurse.        PAST MEDICAL HISTORY  Active Ambulatory Problems     Diagnosis Date Noted   • No Active Ambulatory Problems     Resolved Ambulatory Problems     Diagnosis Date Noted   • No Resolved Ambulatory Problems     Past Medical History:   Diagnosis Date   • Bronchitis    • Chest pain    • Chronic hypotension    • Eczema    • Gastroparesis    • GERD (gastroesophageal reflux disease)    • IBS (irritable bowel syndrome)    • Lightheadedness    • POTS (postural orthostatic tachycardia syndrome)    • Tachycardia          PAST SURGICAL  HISTORY  Past Surgical History:   Procedure Laterality Date   • COLONOSCOPY     • UPPER GASTROINTESTINAL ENDOSCOPY           FAMILY HISTORY  Family History   Problem Relation Age of Onset   • Hypertension Mother    • Diabetes Mother    • Diabetes Father    • Cancer Paternal Grandmother    • Heart disease Paternal Grandfather    • Stroke Paternal Grandfather    • Diabetes Paternal Grandfather    • Cancer Paternal Grandfather    • Coronary artery disease Maternal Grandmother         MGM with 4 vessel CABG and multiple stents   • Coronary artery disease Maternal Uncle         Maternal uncle with MI   • Breast cancer Other          SOCIAL HISTORY  Social History     Socioeconomic History   • Marital status:      Spouse name: Not on file   • Number of children: Not on file   • Years of education: Not on file   • Highest education level: Not on file   Tobacco Use   • Smoking status: Never Smoker   • Smokeless tobacco: Never Used   Substance and Sexual Activity   • Alcohol use: No   • Drug use: No   • Sexual activity: Yes     Partners: Male     Birth control/protection: None         ALLERGIES  Patient has no known allergies.        REVIEW OF SYSTEMS  Review of Systems     All systems reviewed and negative except for those discussed in HPI.        PHYSICAL EXAM    ED Triage Vitals   Temp Heart Rate Resp BP SpO2   10/14/20 1316 10/14/20 1316 10/14/20 1316 10/14/20 1344 10/14/20 1316   98.9 °F (37.2 °C) 106 16 126/80 98 %       Physical Exam  GENERAL: Well appearing, non-toxic appearing, not distressed  HENT: normocephalic, atraumatic  EYES: no scleral icterus, PERRL  CV: regular rhythm, regular rate, no murmur  Right chest wall there is a Dolan catheter in place.  There does appear to be leaking around the insertion site.  There is no swelling, hematoma or tenderness around the insertion site.  RESPIRATORY: normal effort, CTAB  ABDOMEN: soft   MUSCULOSKELETAL: no deformity  NEURO: alert, moves all extremities,  follows commands, mental status normal/baseline  SKIN: warm, dry, no rash   Psych: Appropriate mood and affect  Nursing notes and vital signs reviewed      LAB RESULTS  No results found for this or any previous visit (from the past 24 hour(s)).    Ordered the above labs and independently reviewed the results.      RADIOLOGY  No Radiology Exams Resulted Within Past 24 Hours    I ordered the above noted radiological studies and viewed the images on the PACS system.           MEDICAL RECORD REVIEW  Patient seen here on 10/13 for bleeding around her Dolan catheter site.      PROCEDURES    Procedures        DIFFERENTIAL DIAGNOSIS  Differential diagnosis include but are not limited to the following: Dolan catheter dislodgment, leaking Dolan catheter      PROGRESS, DATA ANALYSIS, CONSULTS, AND MEDICAL DECISION MAKING        ED Course as of Oct 16 1014   Wed Oct 14, 2020   1533 I discussed with patient GI Dr. Escobar regarding my findings here today.  She would like for the catheter to be replaced if it is necessary.  She states that if patient has to wait for outpatient appointment it would be 1 to 2 weeks.  This is something that patient receives daily infusions with.    [MS]   1542 Reviewed pt's history and workup with Dr. Gallardo.  After a bedside evaluation, they agree with the plan of care.  Care transferred at this time to Dr. Gallardo.  I have notified the charge nurse regarding having someone come and evaluate patient's Dolan catheter to determine if it is a fixable problem or if the catheter needs to be removed and replaced.          [MS]      ED Course User Index  [MS] Kera Smallwood, APRN      Diagnosis Plan   1. Central line complication, initial encounter             MEDICATIONS GIVEN IN ED    Medications   acetaminophen (TYLENOL) tablet 1,000 mg (1,000 mg Oral Given 10/14/20 1524)           COURSE & MEDICAL DECISION MAKING  Any/All labs and Any/All Imaging studies that were ordered were  reviewed and are noted above.  Results were reviewed/discussed with the patient and they were also made aware of online assess.   Pt also made aware that some labs, such as cultures, will not be resulted during ER visit and follow up with PMD is necessary.        Kera Smallwood, APRN  10/16/20 1015

## 2020-10-15 ENCOUNTER — TELEPHONE (OUTPATIENT)
Dept: SURGERY | Facility: CLINIC | Age: 25
End: 2020-10-15

## 2020-10-15 PROBLEM — I87.8 POOR VENOUS ACCESS: Status: ACTIVE | Noted: 2020-10-15

## 2020-10-15 RX ORDER — PROMETHAZINE HYDROCHLORIDE 12.5 MG/1
TABLET ORAL
COMMUNITY
Start: 2020-09-02 | End: 2022-05-08 | Stop reason: HOSPADM

## 2020-10-15 RX ORDER — TRIAMCINOLONE ACETONIDE 1 MG/G
1 CREAM TOPICAL 2 TIMES DAILY
COMMUNITY
End: 2022-05-08 | Stop reason: HOSPADM

## 2020-10-15 RX ORDER — PROMETHAZINE HYDROCHLORIDE 25 MG/1
TABLET ORAL
COMMUNITY
Start: 2020-06-26 | End: 2022-05-08 | Stop reason: HOSPADM

## 2020-10-15 RX ORDER — DIAPER,BRIEF,INFANT-TODD,DISP
1 EACH MISCELLANEOUS 2 TIMES DAILY
COMMUNITY
End: 2022-01-19 | Stop reason: SINTOL

## 2020-10-15 RX ORDER — PHENAZOPYRIDINE HYDROCHLORIDE 100 MG/1
100 TABLET, FILM COATED ORAL 3 TIMES DAILY PRN
COMMUNITY
Start: 2020-09-20

## 2020-10-15 RX ORDER — DROXIDOPA 100 MG/1
100 CAPSULE ORAL 3 TIMES DAILY
COMMUNITY
End: 2020-11-13 | Stop reason: SDUPTHER

## 2020-10-15 RX ORDER — DILTIAZEM HCL
POWDER (GRAM) MISCELLANEOUS 2 TIMES DAILY PRN
COMMUNITY
End: 2022-05-08 | Stop reason: HOSPADM

## 2020-10-15 RX ORDER — METRONIDAZOLE 7.5 MG/G
1 LOTION TOPICAL EVERY 12 HOURS SCHEDULED
COMMUNITY
End: 2022-05-08 | Stop reason: HOSPADM

## 2020-10-16 ENCOUNTER — APPOINTMENT (OUTPATIENT)
Dept: GENERAL RADIOLOGY | Facility: HOSPITAL | Age: 25
End: 2020-10-16

## 2020-10-16 ENCOUNTER — ANESTHESIA (OUTPATIENT)
Dept: PERIOP | Facility: HOSPITAL | Age: 25
End: 2020-10-16

## 2020-10-16 ENCOUNTER — HOSPITAL ENCOUNTER (OUTPATIENT)
Facility: HOSPITAL | Age: 25
Setting detail: HOSPITAL OUTPATIENT SURGERY
Discharge: HOME | End: 2020-10-16
Attending: SURGERY | Admitting: SURGERY

## 2020-10-16 ENCOUNTER — ANESTHESIA EVENT (OUTPATIENT)
Dept: PERIOP | Facility: HOSPITAL | Age: 25
End: 2020-10-16

## 2020-10-16 VITALS
DIASTOLIC BLOOD PRESSURE: 74 MMHG | RESPIRATION RATE: 16 BRPM | HEART RATE: 114 BPM | BODY MASS INDEX: 31.96 KG/M2 | WEIGHT: 191.8 LBS | SYSTOLIC BLOOD PRESSURE: 112 MMHG | TEMPERATURE: 98.4 F | OXYGEN SATURATION: 96 % | HEIGHT: 65 IN

## 2020-10-16 DIAGNOSIS — I87.8 POOR VENOUS ACCESS: ICD-10-CM

## 2020-10-16 LAB
ANION GAP SERPL CALCULATED.3IONS-SCNC: 8.4 MMOL/L (ref 5–15)
B-HCG UR QL: NEGATIVE
BUN SERPL-MCNC: 9 MG/DL (ref 6–20)
BUN/CREAT SERPL: 11.5 (ref 7–25)
CALCIUM SPEC-SCNC: 9.6 MG/DL (ref 8.6–10.5)
CHLORIDE SERPL-SCNC: 106 MMOL/L (ref 98–107)
CO2 SERPL-SCNC: 23.6 MMOL/L (ref 22–29)
CREAT SERPL-MCNC: 0.78 MG/DL (ref 0.57–1)
GFR SERPL CREATININE-BSD FRML MDRD: 90 ML/MIN/1.73
GLUCOSE SERPL-MCNC: 87 MG/DL (ref 65–99)
INTERNAL NEGATIVE CONTROL: NEGATIVE
INTERNAL POSITIVE CONTROL: POSITIVE
Lab: NORMAL
POTASSIUM SERPL-SCNC: 3.7 MMOL/L (ref 3.5–5.2)
SODIUM SERPL-SCNC: 138 MMOL/L (ref 136–145)

## 2020-10-16 PROCEDURE — 25010000002 MIDAZOLAM PER 1 MG: Performed by: NURSE ANESTHETIST, CERTIFIED REGISTERED

## 2020-10-16 PROCEDURE — 77001 FLUOROGUIDE FOR VEIN DEVICE: CPT | Performed by: SURGERY

## 2020-10-16 PROCEDURE — 25010000002 ONDANSETRON PER 1 MG: Performed by: NURSE ANESTHETIST, CERTIFIED REGISTERED

## 2020-10-16 PROCEDURE — 81025 URINE PREGNANCY TEST: CPT | Performed by: ANESTHESIOLOGY

## 2020-10-16 PROCEDURE — 36558 INSERT TUNNELED CV CATH: CPT | Performed by: SURGERY

## 2020-10-16 PROCEDURE — 93005 ELECTROCARDIOGRAM TRACING: CPT | Performed by: SURGERY

## 2020-10-16 PROCEDURE — 93010 ELECTROCARDIOGRAM REPORT: CPT | Performed by: INTERNAL MEDICINE

## 2020-10-16 PROCEDURE — 25010000003 CEFAZOLIN IN DEXTROSE 2-4 GM/100ML-% SOLUTION: Performed by: SURGERY

## 2020-10-16 PROCEDURE — 25010000002 FENTANYL CITRATE (PF) 100 MCG/2ML SOLUTION: Performed by: NURSE ANESTHETIST, CERTIFIED REGISTERED

## 2020-10-16 PROCEDURE — 25010000002 DEXAMETHASONE PER 1 MG: Performed by: NURSE ANESTHETIST, CERTIFIED REGISTERED

## 2020-10-16 PROCEDURE — C1750 CATH, HEMODIALYSIS,LONG-TERM: HCPCS | Performed by: SURGERY

## 2020-10-16 PROCEDURE — 25010000002 MIDAZOLAM PER 1 MG: Performed by: ANESTHESIOLOGY

## 2020-10-16 PROCEDURE — 76000 FLUOROSCOPY <1 HR PHYS/QHP: CPT

## 2020-10-16 PROCEDURE — 25010000002 DIPHENHYDRAMINE PER 50 MG: Performed by: NURSE ANESTHETIST, CERTIFIED REGISTERED

## 2020-10-16 PROCEDURE — 25010000002 HEPARIN (PORCINE) PER 1000 UNITS: Performed by: SURGERY

## 2020-10-16 PROCEDURE — 80048 BASIC METABOLIC PNL TOTAL CA: CPT | Performed by: SURGERY

## 2020-10-16 PROCEDURE — S0260 H&P FOR SURGERY: HCPCS | Performed by: SURGERY

## 2020-10-16 PROCEDURE — 76937 US GUIDE VASCULAR ACCESS: CPT | Performed by: SURGERY

## 2020-10-16 DEVICE — LEONARD 10 FR DUAL-LUMEN CV CATHETER, WITH SURECUFF TISSUE INGROWTH CUFF,  54CM TIP-TO-CUFF LENGTH
Type: IMPLANTABLE DEVICE | Site: CHEST | Status: NON-FUNCTIONAL
Brand: LEONARD
Removed: 2023-01-13

## 2020-10-16 RX ORDER — PROMETHAZINE HYDROCHLORIDE 25 MG/1
25 SUPPOSITORY RECTAL ONCE AS NEEDED
Status: DISCONTINUED | OUTPATIENT
Start: 2020-10-16 | End: 2020-10-16 | Stop reason: HOSPADM

## 2020-10-16 RX ORDER — FLUMAZENIL 0.1 MG/ML
0.2 INJECTION INTRAVENOUS AS NEEDED
Status: DISCONTINUED | OUTPATIENT
Start: 2020-10-16 | End: 2020-10-16 | Stop reason: HOSPADM

## 2020-10-16 RX ORDER — BUPIVACAINE HYDROCHLORIDE AND EPINEPHRINE 5; 5 MG/ML; UG/ML
INJECTION, SOLUTION PERINEURAL AS NEEDED
Status: DISCONTINUED | OUTPATIENT
Start: 2020-10-16 | End: 2020-10-16 | Stop reason: HOSPADM

## 2020-10-16 RX ORDER — FAMOTIDINE 10 MG/ML
20 INJECTION, SOLUTION INTRAVENOUS ONCE
Status: DISCONTINUED | OUTPATIENT
Start: 2020-10-16 | End: 2020-10-16 | Stop reason: HOSPADM

## 2020-10-16 RX ORDER — MIDAZOLAM HYDROCHLORIDE 1 MG/ML
INJECTION INTRAMUSCULAR; INTRAVENOUS AS NEEDED
Status: DISCONTINUED | OUTPATIENT
Start: 2020-10-16 | End: 2020-10-16 | Stop reason: SURG

## 2020-10-16 RX ORDER — SODIUM CHLORIDE 0.9 % (FLUSH) 0.9 %
3-10 SYRINGE (ML) INJECTION AS NEEDED
Status: DISCONTINUED | OUTPATIENT
Start: 2020-10-16 | End: 2020-10-16 | Stop reason: HOSPADM

## 2020-10-16 RX ORDER — NALOXONE HCL 0.4 MG/ML
0.2 VIAL (ML) INJECTION AS NEEDED
Status: DISCONTINUED | OUTPATIENT
Start: 2020-10-16 | End: 2020-10-16 | Stop reason: HOSPADM

## 2020-10-16 RX ORDER — LABETALOL HYDROCHLORIDE 5 MG/ML
5 INJECTION, SOLUTION INTRAVENOUS
Status: DISCONTINUED | OUTPATIENT
Start: 2020-10-16 | End: 2020-10-16 | Stop reason: HOSPADM

## 2020-10-16 RX ORDER — OXYCODONE AND ACETAMINOPHEN 7.5; 325 MG/1; MG/1
1 TABLET ORAL ONCE AS NEEDED
Status: DISCONTINUED | OUTPATIENT
Start: 2020-10-16 | End: 2020-10-16 | Stop reason: HOSPADM

## 2020-10-16 RX ORDER — SODIUM CHLORIDE, SODIUM LACTATE, POTASSIUM CHLORIDE, CALCIUM CHLORIDE 600; 310; 30; 20 MG/100ML; MG/100ML; MG/100ML; MG/100ML
9 INJECTION, SOLUTION INTRAVENOUS CONTINUOUS
Status: DISCONTINUED | OUTPATIENT
Start: 2020-10-16 | End: 2020-10-16 | Stop reason: HOSPADM

## 2020-10-16 RX ORDER — DIPHENHYDRAMINE HYDROCHLORIDE 50 MG/ML
INJECTION INTRAMUSCULAR; INTRAVENOUS AS NEEDED
Status: DISCONTINUED | OUTPATIENT
Start: 2020-10-16 | End: 2020-10-16 | Stop reason: SURG

## 2020-10-16 RX ORDER — HYDROCODONE BITARTRATE AND ACETAMINOPHEN 7.5; 325 MG/1; MG/1
1 TABLET ORAL ONCE AS NEEDED
Status: DISCONTINUED | OUTPATIENT
Start: 2020-10-16 | End: 2020-10-16 | Stop reason: HOSPADM

## 2020-10-16 RX ORDER — FENTANYL CITRATE 50 UG/ML
INJECTION, SOLUTION INTRAMUSCULAR; INTRAVENOUS AS NEEDED
Status: DISCONTINUED | OUTPATIENT
Start: 2020-10-16 | End: 2020-10-16 | Stop reason: SURG

## 2020-10-16 RX ORDER — LABETALOL HYDROCHLORIDE 5 MG/ML
10 INJECTION, SOLUTION INTRAVENOUS EVERY 6 HOURS PRN
Status: DISCONTINUED | OUTPATIENT
Start: 2020-10-16 | End: 2020-10-16 | Stop reason: HOSPADM

## 2020-10-16 RX ORDER — ONDANSETRON 2 MG/ML
4 INJECTION INTRAMUSCULAR; INTRAVENOUS ONCE AS NEEDED
Status: DISCONTINUED | OUTPATIENT
Start: 2020-10-16 | End: 2020-10-16 | Stop reason: HOSPADM

## 2020-10-16 RX ORDER — DIPHENHYDRAMINE HCL 25 MG
25 CAPSULE ORAL
Status: DISCONTINUED | OUTPATIENT
Start: 2020-10-16 | End: 2020-10-16 | Stop reason: HOSPADM

## 2020-10-16 RX ORDER — DEXAMETHASONE SODIUM PHOSPHATE 4 MG/ML
INJECTION, SOLUTION INTRA-ARTICULAR; INTRALESIONAL; INTRAMUSCULAR; INTRAVENOUS; SOFT TISSUE AS NEEDED
Status: DISCONTINUED | OUTPATIENT
Start: 2020-10-16 | End: 2020-10-16 | Stop reason: SURG

## 2020-10-16 RX ORDER — CEFAZOLIN SODIUM 2 G/100ML
2 INJECTION, SOLUTION INTRAVENOUS ONCE
Status: COMPLETED | OUTPATIENT
Start: 2020-10-16 | End: 2020-10-16

## 2020-10-16 RX ORDER — PROMETHAZINE HYDROCHLORIDE 12.5 MG/1
25 TABLET ORAL ONCE AS NEEDED
Status: DISCONTINUED | OUTPATIENT
Start: 2020-10-16 | End: 2020-10-16 | Stop reason: HOSPADM

## 2020-10-16 RX ORDER — GLYCOPYRROLATE 0.2 MG/ML
INJECTION INTRAMUSCULAR; INTRAVENOUS AS NEEDED
Status: DISCONTINUED | OUTPATIENT
Start: 2020-10-16 | End: 2020-10-16 | Stop reason: SURG

## 2020-10-16 RX ORDER — MIDAZOLAM HYDROCHLORIDE 1 MG/ML
1 INJECTION INTRAMUSCULAR; INTRAVENOUS
Status: DISCONTINUED | OUTPATIENT
Start: 2020-10-16 | End: 2020-10-16 | Stop reason: HOSPADM

## 2020-10-16 RX ORDER — LIDOCAINE HYDROCHLORIDE 10 MG/ML
0.5 INJECTION, SOLUTION EPIDURAL; INFILTRATION; INTRACAUDAL; PERINEURAL ONCE AS NEEDED
Status: DISCONTINUED | OUTPATIENT
Start: 2020-10-16 | End: 2020-10-16 | Stop reason: HOSPADM

## 2020-10-16 RX ORDER — DIPHENHYDRAMINE HYDROCHLORIDE 50 MG/ML
12.5 INJECTION INTRAMUSCULAR; INTRAVENOUS
Status: DISCONTINUED | OUTPATIENT
Start: 2020-10-16 | End: 2020-10-16 | Stop reason: HOSPADM

## 2020-10-16 RX ORDER — ACETAMINOPHEN 325 MG/1
650 TABLET ORAL ONCE AS NEEDED
Status: COMPLETED | OUTPATIENT
Start: 2020-10-16 | End: 2020-10-16

## 2020-10-16 RX ORDER — SODIUM CHLORIDE 0.9 % (FLUSH) 0.9 %
3 SYRINGE (ML) INJECTION EVERY 12 HOURS SCHEDULED
Status: DISCONTINUED | OUTPATIENT
Start: 2020-10-16 | End: 2020-10-16 | Stop reason: HOSPADM

## 2020-10-16 RX ORDER — EPHEDRINE SULFATE 50 MG/ML
5 INJECTION, SOLUTION INTRAVENOUS ONCE AS NEEDED
Status: DISCONTINUED | OUTPATIENT
Start: 2020-10-16 | End: 2020-10-16 | Stop reason: HOSPADM

## 2020-10-16 RX ORDER — HYDROMORPHONE HYDROCHLORIDE 1 MG/ML
0.5 INJECTION, SOLUTION INTRAMUSCULAR; INTRAVENOUS; SUBCUTANEOUS
Status: DISCONTINUED | OUTPATIENT
Start: 2020-10-16 | End: 2020-10-16 | Stop reason: HOSPADM

## 2020-10-16 RX ORDER — FENTANYL CITRATE 50 UG/ML
50 INJECTION, SOLUTION INTRAMUSCULAR; INTRAVENOUS
Status: DISCONTINUED | OUTPATIENT
Start: 2020-10-16 | End: 2020-10-16 | Stop reason: HOSPADM

## 2020-10-16 RX ORDER — ONDANSETRON 2 MG/ML
INJECTION INTRAMUSCULAR; INTRAVENOUS AS NEEDED
Status: DISCONTINUED | OUTPATIENT
Start: 2020-10-16 | End: 2020-10-16 | Stop reason: SURG

## 2020-10-16 RX ADMIN — ACETAMINOPHEN 650 MG: 325 TABLET, FILM COATED ORAL at 14:04

## 2020-10-16 RX ADMIN — GLYCOPYRROLATE 0.1 MG: 0.2 INJECTION INTRAMUSCULAR; INTRAVENOUS at 12:08

## 2020-10-16 RX ADMIN — FENTANYL CITRATE 25 MCG: 50 INJECTION INTRAMUSCULAR; INTRAVENOUS at 12:37

## 2020-10-16 RX ADMIN — DIPHENHYDRAMINE HYDROCHLORIDE 25 MG: 50 INJECTION INTRAMUSCULAR; INTRAVENOUS at 12:18

## 2020-10-16 RX ADMIN — DEXAMETHASONE SODIUM PHOSPHATE 8 MG: 4 INJECTION INTRA-ARTICULAR; INTRALESIONAL; INTRAMUSCULAR; INTRAVENOUS; SOFT TISSUE at 12:16

## 2020-10-16 RX ADMIN — FENTANYL CITRATE 25 MCG: 50 INJECTION INTRAMUSCULAR; INTRAVENOUS at 12:41

## 2020-10-16 RX ADMIN — MIDAZOLAM 2 MG: 1 INJECTION INTRAMUSCULAR; INTRAVENOUS at 12:10

## 2020-10-16 RX ADMIN — MIDAZOLAM 1 MG: 1 INJECTION INTRAMUSCULAR; INTRAVENOUS at 12:38

## 2020-10-16 RX ADMIN — MIDAZOLAM 0.5 MG: 1 INJECTION INTRAMUSCULAR; INTRAVENOUS at 12:27

## 2020-10-16 RX ADMIN — SODIUM CHLORIDE, POTASSIUM CHLORIDE, SODIUM LACTATE AND CALCIUM CHLORIDE 9 ML/HR: 600; 310; 30; 20 INJECTION, SOLUTION INTRAVENOUS at 11:00

## 2020-10-16 RX ADMIN — MIDAZOLAM 0.5 MG: 1 INJECTION INTRAMUSCULAR; INTRAVENOUS at 12:34

## 2020-10-16 RX ADMIN — CEFAZOLIN SODIUM 2 G: 2 INJECTION, SOLUTION INTRAVENOUS at 12:07

## 2020-10-16 RX ADMIN — ONDANSETRON HYDROCHLORIDE 4 MG: 2 SOLUTION INTRAMUSCULAR; INTRAVENOUS at 12:37

## 2020-10-16 RX ADMIN — FENTANYL CITRATE 50 MCG: 50 INJECTION INTRAMUSCULAR; INTRAVENOUS at 12:57

## 2020-10-16 RX ADMIN — MIDAZOLAM 1 MG: 1 INJECTION INTRAMUSCULAR; INTRAVENOUS at 11:07

## 2020-10-16 NOTE — ANESTHESIA PREPROCEDURE EVALUATION
Anesthesia Evaluation     Patient summary reviewed and Nursing notes reviewed   NPO Solid Status: > 8 hours  NPO Liquid Status: > 4 hours           Airway   Mallampati: II  Neck ROM: full  No difficulty expected  Dental - normal exam     Pulmonary     breath sounds clear to auscultation  Cardiovascular     Rhythm: regular    (+) dysrhythmias Tachycardia,     ROS comment: Chronic hypotension    Neuro/Psych  GI/Hepatic/Renal/Endo    (+)  GERD,      ROS Comment: gastroparesis    Musculoskeletal     Abdominal   (+) obese,    Substance History      OB/GYN          Other          Other Comment: Desi                  Anesthesia Plan    ASA 3     MAC     intravenous induction     Anesthetic plan, all risks, benefits, and alternatives have been provided, discussed and informed consent has been obtained with: patient.

## 2020-10-16 NOTE — H&P
CHIEF COMPLAINT:    Malfunctioning central venous catheter    HISTORY OF PRESENT ILLNESS:    Liliam Lorenz is a 25 y.o. female who has a central venous access line in the right internal jugular vein placed 8 weeks ago at [facility] in the interventional radiology department.  The line is used every day for IV fluids.  It is used weekly for IVIG infusions directed by Dr. Escobar-her gastroenterologist at [facility].  She has been to the emergency department at [facility] twice this week because the catheter has not been working properly.  When injections are given the fluid leaks around the catheter entrance site at the skin.  There is no obvious crack in the device involving the exterior portion.  There is currently no bleeding.  There is mild pain at the skin insertion site.  There is no erythema.  She has not been febrile.  Her next IVIG infusion is scheduled Monday, 10/19/2020.  She is here to have the catheter replaced.    Past Medical History:   Diagnosis Date   • Bronchitis    • Chest pain    • Chronic hypotension    • Eczema    • Phoebe-Danlos syndrome    • Gastroparesis    • GERD (gastroesophageal reflux disease)    • IBS (irritable bowel syndrome)    • Lightheadedness    • POTS (postural orthostatic tachycardia syndrome)    • Rectal fissure    • Rosacea    • Tachycardia        Past Surgical History:   Procedure Laterality Date   • COLONOSCOPY     • MEDIPORT INSERTION, DOUBLE  09/01/2020    [facility]    • UPPER GASTROINTESTINAL ENDOSCOPY           Current Facility-Administered Medications:   •  bupivacaine-EPINEPHrine (MARCAINE w/EPI) injection, , , PRN, Pa Lane MD, 30 mL at 10/16/20 1111  •  ceFAZolin in dextrose (ANCEF) IVPB solution 2 g, 2 g, Intravenous, Once, Pa Lane MD  •  famotidine (PEPCID) injection 20 mg, 20 mg, Intravenous, Once, Segundo King MD  •  fentaNYL citrate (PF) (SUBLIMAZE) injection 50 mcg, 50 mcg, Intravenous, Q10 Min PRN,  Segundo King MD  •  lactated ringers infusion, 9 mL/hr, Intravenous, Continuous, Segundo King MD, Last Rate: 9 mL/hr at 10/16/20 1100, 9 mL/hr at 10/16/20 1100  •  lidocaine PF 1% (XYLOCAINE) injection 0.5 mL, 0.5 mL, Injection, Once PRN, Segundo King MD  •  midazolam (VERSED) injection 1 mg, 1 mg, Intravenous, Q10 Min PRN, Segundo King MD, 1 mg at 10/16/20 1107  •  sodium chloride 0.9 % flush 3 mL, 3 mL, Intravenous, Q12H, Segundo King MD  •  sodium chloride 0.9 % flush 3-10 mL, 3-10 mL, Intravenous, PRN, Segundo King MD  •  sodium chloride 100 mL with heparin (porcine) 1,000 Units mixture, , , PRN, Pa Lane MD, 100 mL at 10/16/20 1111    No Known Allergies    Family History   Problem Relation Age of Onset   • Hypertension Mother    • Diabetes Mother    • Diabetes Father    • Cancer Paternal Grandmother    • Heart disease Paternal Grandfather    • Stroke Paternal Grandfather    • Diabetes Paternal Grandfather    • Cancer Paternal Grandfather    • Coronary artery disease Maternal Grandmother         MGM with 4 vessel CABG and multiple stents   • Coronary artery disease Maternal Uncle         Maternal uncle with MI   • Breast cancer Other    • Malig Hyperthermia Neg Hx        Social History     Socioeconomic History   • Marital status:      Spouse name: Not on file   • Number of children: Not on file   • Years of education: Not on file   • Highest education level: Not on file   Tobacco Use   • Smoking status: Never Smoker   • Smokeless tobacco: Never Used   Substance and Sexual Activity   • Alcohol use: No   • Drug use: No   • Sexual activity: Yes     Partners: Male     Birth control/protection: None       Review of Systems   Constitutional: Negative for fever.   HENT: Negative for trouble swallowing.    Eyes: Positive for visual disturbance (She wears corrective lenses for visual acuity).   Respiratory: Negative for cough and shortness of  "breath.    Cardiovascular: Negative for chest pain.   Gastrointestinal: Positive for nausea. Negative for abdominal distention.   Genitourinary: Negative for dysuria.       Objective     /88 (BP Location: Left arm, Patient Position: Lying)   Pulse 83   Temp 98.9 °F (37.2 °C) (Oral)   Resp 16   Ht 163.8 cm (64.5\")   Wt 87 kg (191 lb 12.8 oz)   SpO2 97%   BMI 32.41 kg/m²     Physical Exam  Vitals signs reviewed.   Constitutional:       General: She is not in acute distress.     Appearance: She is well-developed. She is not diaphoretic.   HENT:      Head: Normocephalic and atraumatic.   Eyes:      General: No scleral icterus.     Conjunctiva/sclera: Conjunctivae normal.   Neck:      Musculoskeletal: Neck supple.      Trachea: No tracheal deviation.      Comments: No swelling.  No hematoma.  Cardiovascular:      Rate and Rhythm: Normal rate and regular rhythm.      Heart sounds: Normal heart sounds. No murmur.      Comments: There is a dual-lumen central venous access device in the right chest.  There is no erythema around the catheter exit site.  Pulmonary:      Effort: Pulmonary effort is normal. No respiratory distress.      Breath sounds: Normal breath sounds. No wheezing or rales.   Abdominal:      General: Bowel sounds are normal. There is no distension.      Palpations: Abdomen is soft.   Musculoskeletal:         General: No deformity.   Skin:     General: Skin is warm and dry.   Neurological:      Mental Status: She is alert and oriented to person, place, and time.   Psychiatric:         Behavior: Behavior normal.         DIAGNOSTIC DATA:    Urine pregnancy test negative  Nasopharyngeal PCR panel (including COVID-19) negative  Chest x-ray performed on 10/14/2020 shows a central venous catheter adequately placed in the right internal jugular vein with the tip located in the SVC.    ASSESSMENT:    Gastroparesis- undergoing therapy with IVIG  Requires central venous access for daily use  POTS " syndrome    PLAN:    I suspect the leakage as a consequence of a fibrous sheath that has formed around the catheter.  It is possible that there is a break in the catheter in the subcutaneous tunnel between the insertion site and the skin and the right internal jugular vein, but I suspect this to be less likely.  I recommended removal of the existing catheter and replacement with a new Dolan catheter.

## 2020-10-16 NOTE — OP NOTE
PREOPERATIVE DIAGNOSIS:   Nonfunctioning right internal jugular vein PICC line  Gastroparesis requiring IV antiemetics, supplemental IV fluids, and treatment with IVIG    POSTOPERATIVE DIAGNOSIS: Nonfunctioning right internal jugular vein PICC line  Gastroparesis requiring IV antiemetics, supplemental IV fluids, and treatment with IVIG    PROCEDURE:   Left internal jugular vein Dolan catheter placement with ultrasound guidance and fluoroscopic guidance.    SURGEON: Pa Maria M.D.  ANESTHESIA: MAC plus 30 mL 0.5% bupivacaine.  BLOOD LOSS: 20 mL.    DESCRIPTION: The patient is positioned supine on the operating table. The arms are tucked at her sides. A shoulder roll was placed beneath the scapula to hyperextend the neck and clavicle. Perioperative antibiotics were administered. Lower extremity compression garments are in place. A surgical pause was performed. The skin over both sides of the chest and neck area was prepped with ChloraPrep. Sterile drapes were arranged.   The intended puncture site over the left internal jugular vein was anesthetized with 0.5% bupivacaine. A total of 30 mL was ultimately used for the case. A spinal needle was used to percutaneously enter the left internal jugular vein vein under ultrasound guidance. It was entered easily. A guidewire was easily passed. The needle was removed.  The recorder on the ultrasound cart would not function properly, so a photo of the the ultrasound image could not be obtained. Fluoroscopy was used to position the guidewire properly.     An incision was made on the left anterior chest wall.  The catheter was tunneled from the incision to the internal jugular vein guidewire exit site.  A dilator and peel-away sheath were passed over the guidewire. The guidewire and dilator were removed. Fluoroscopy was used to accurately trim the catheter to the appropriate length. The catheter was passed through the peel-away sheath and the sheath was removed.  Fluoroscopy was used to confirm that the catheter tip was correctly positioned at the junction between superior vena cava and right atrium. The contour of the catheter is smooth. The catheter flushes easily and has good blood return.     The catheter was secured to the anterior chest wall with a 2-0 nylon stitch. The skin incision at the neck was closed with 4-0 Monocryl and Steri-Strips.  A Biopatch and sterile Tegaderm dressing were placed over the catheter exit site. A postoperative chest x-ray was ordered for the recovery room.

## 2020-10-16 NOTE — ANESTHESIA POSTPROCEDURE EVALUATION
"Patient: Liliam Lorenz    Procedure Summary     Date: 10/16/20 Room / Location: [facility] OR  / [facility] MAIN OR    Anesthesia Start: 1207 Anesthesia Stop: 1342    Procedure: INSERTION VENOUS ACCESS DEVICE UNDER FLURO (Left Chest) Diagnosis:       Poor venous access      (Poor venous access [I87.8])    Surgeon: Pa Lane MD Provider: Tin George MD    Anesthesia Type: MAC ASA Status: 3          Anesthesia Type: MAC    Vitals  Vitals Value Taken Time   BP 98/73 10/16/20 1420   Temp 36.9 °C (98.4 °F) 10/16/20 1344   Pulse 114 10/16/20 1350   Resp 16 10/16/20 1350   SpO2 94 % 10/16/20 1425   Vitals shown include unvalidated device data.        Post Anesthesia Care and Evaluation    Patient location during evaluation: bedside  Patient participation: complete - patient participated  Level of consciousness: awake and alert  Pain management: adequate  Airway patency: patent  Anesthetic complications: No anesthetic complications  PONV Status: NA  Cardiovascular status: acceptable and hemodynamically stable  Respiratory status: acceptable  Hydration status: acceptable    Comments: /87   Pulse 114   Temp 36.9 °C (98.4 °F) (Oral)   Resp 16   Ht 163.8 cm (64.5\")   Wt 87 kg (191 lb 12.8 oz)   SpO2 97%   BMI 32.41 kg/m²         "

## 2020-11-06 ENCOUNTER — OFFICE VISIT (OUTPATIENT)
Dept: SURGERY | Facility: CLINIC | Age: 25
End: 2020-11-06

## 2020-11-06 DIAGNOSIS — I87.8 POOR VENOUS ACCESS: Primary | ICD-10-CM

## 2020-11-06 PROCEDURE — 99024 POSTOP FOLLOW-UP VISIT: CPT | Performed by: SURGERY

## 2020-11-06 NOTE — PROGRESS NOTES
CHIEF COMPLAINT:    Follow-up from IV access    HISTORY OF PRESENT ILLNESS:    Liliam Lorenz is a 25 y.o. female who underwent placement of a left IJ Dolan catheter on 10/16/2020.  It has been used several times since placement, but it has been temperamental with regard to blood withdrawal for frequent laboratory assessment.  She is arranged to have a contrast study to confirm correct positioning.  She presents today to have the retention suture removed.    EXAM:    Alert. Oriented.  Lungs: Clear.  Heart: Regular.  I removed the retention suture from the Dolan catheter under sterile technique.  Extremities: Warm.  No edema    ASSESSMENT:    Gastroparesis- undergoing therapy with IVIG  Requires central venous access for daily use  POTS syndrome    PLAN:    Follow-up as needed.    She will report back to me if the catheter needs to be revised based on the upcoming fluoroscopic study.

## 2020-11-12 ENCOUNTER — PATIENT MESSAGE (OUTPATIENT)
Dept: CARDIOLOGY | Facility: CLINIC | Age: 25
End: 2020-11-12

## 2020-11-12 NOTE — TELEPHONE ENCOUNTER
Gm covering the hospital this week. Can you please address?  Thanks  Rosey Quiroga RN  Triage nurse

## 2020-11-13 ENCOUNTER — TELEPHONE (OUTPATIENT)
Dept: CARDIOLOGY | Facility: CLINIC | Age: 25
End: 2020-11-13

## 2020-11-13 DIAGNOSIS — G90.A POTS (POSTURAL ORTHOSTATIC TACHYCARDIA SYNDROME): Primary | ICD-10-CM

## 2020-11-13 DIAGNOSIS — G90.1 DYSAUTONOMIA (HCC): ICD-10-CM

## 2020-11-13 RX ORDER — DROXIDOPA 100 MG/1
100 CAPSULE ORAL 3 TIMES DAILY
Qty: 90 CAPSULE | Refills: 4 | Status: SHIPPED | OUTPATIENT
Start: 2020-11-13 | End: 2021-10-26 | Stop reason: HOSPADM

## 2020-11-13 NOTE — TELEPHONE ENCOUNTER
Spoke with Dr. Rausch regarding [facility] refill on this patient.  Okay to continue at current dose.  Refill sent.  Patient to keep March follow-up as scheduled or call sooner for issues or concerns.

## 2020-11-16 RX ORDER — MIDODRINE HYDROCHLORIDE 10 MG/1
TABLET ORAL
Qty: 90 TABLET | Refills: 6 | Status: SHIPPED | OUTPATIENT
Start: 2020-11-16 | End: 2021-10-21

## 2021-02-12 ENCOUNTER — TELEPHONE (OUTPATIENT)
Dept: CARDIOLOGY | Facility: CLINIC | Age: 26
End: 2021-02-12

## 2021-02-12 NOTE — TELEPHONE ENCOUNTER
I have sent in per pt ast papers and I have not heard back.   I have tried several Pa's and keeps getting deined.   I have copy of her asst form with me at [place]  Office I will refax them again.   Thank for getting her samples   Thanks Singh CHAPA

## 2021-02-12 NOTE — TELEPHONE ENCOUNTER
S/W pt re: needing Northera 100 mg Samples. Pt stated she's been wondering what the update is on the pt assist. I advised I will place samples in front office for . Pt verbalized appreciation.    Singh,  Do you have an update on the Northera pt assist yet??    BENJAMÍN Velez

## 2021-02-12 NOTE — TELEPHONE ENCOUNTER
----- Message from Liliam Lorenz sent at 2/8/2021 10:07 AM EST -----  Regarding: Prescription Question  Contact: [phone]  Hi Dr. Rausch! I’m messaging to see how it’s going getting [facility] set up? I’m running low on meds and don’t know what to do!

## 2021-03-25 ENCOUNTER — OFFICE VISIT (OUTPATIENT)
Dept: CARDIOLOGY | Facility: CLINIC | Age: 26
End: 2021-03-25

## 2021-03-25 VITALS
SYSTOLIC BLOOD PRESSURE: 110 MMHG | BODY MASS INDEX: 35.82 KG/M2 | OXYGEN SATURATION: 98 % | RESPIRATION RATE: 18 BRPM | WEIGHT: 215 LBS | HEART RATE: 89 BPM | HEIGHT: 65 IN | DIASTOLIC BLOOD PRESSURE: 70 MMHG

## 2021-03-25 DIAGNOSIS — G90.1 DYSAUTONOMIA (HCC): ICD-10-CM

## 2021-03-25 DIAGNOSIS — Q79.62 EHLERS-DANLOS SYNDROME TYPE III: ICD-10-CM

## 2021-03-25 DIAGNOSIS — G90.A POTS (POSTURAL ORTHOSTATIC TACHYCARDIA SYNDROME): Primary | ICD-10-CM

## 2021-03-25 PROCEDURE — 99214 OFFICE O/P EST MOD 30 MIN: CPT | Performed by: INTERNAL MEDICINE

## 2021-04-16 ENCOUNTER — BULK ORDERING (OUTPATIENT)
Dept: CASE MANAGEMENT | Facility: OTHER | Age: 26
End: 2021-04-16

## 2021-04-16 DIAGNOSIS — Z23 IMMUNIZATION DUE: ICD-10-CM

## 2021-04-20 ENCOUNTER — TELEPHONE (OUTPATIENT)
Dept: CARDIOLOGY | Facility: CLINIC | Age: 26
End: 2021-04-20

## 2021-04-20 NOTE — TELEPHONE ENCOUNTER
----- Message from Amber Johnson sent at 4/20/2021  1:45 PM EDT -----  Singh Shah.  Yue Holt RN, at [facility] called wanting a surgery clearance on this patient.  I didn't see anything in epic and also didn't notice it mentioned in the last visit note.  Yue's phone number is [phone] and her fax is [phone].  Thanks.

## 2021-04-20 NOTE — PROGRESS NOTES
Date of Office Visit:  3/25/2021  Encounter Provider: Payam Rausch MD  Place of Service: [facility] CARDIOLOGY  Patient Name: Liliam Lorenz  :1995    Chief complaint: Follow-up for severe postural orthostatic tachycardia syndrome, dysautonomia, type III Phoebe-Danlos Syndrome.    History of Present Illness:    I again had the pleasure of seeing the patient in cardiology office on 3/25/2021.  She is a very pleasant 25 year-old with a history of severe POTS, GERD, irritable bowel syndrome, gastroparesis who presents for follow-up.  I initially saw the patient in the office on 2018.  She had been having chest discomfort intermittently for several years.  She had been evaluated in the emergency department, and it was felt that this might represent esophageal spasm.  However, she did undergo cardiac testing with an exercise treadmill stress test and echocardiogram on 3/14/2018.  The treadmill stress test showed no EKG changes, and she was able to exercise for 9 minutes (although she did have chest discomfort).  The echocardiogram showed an ejection fraction of 62% and no significant pathology.  She later continued to have issues with near syncope and tachycardia.  A tilt table test was performed on 2019 which confirmed the diagnosis of POTS.  Typically, she met all criteria when tilted backwards (her heart rate went up significantly, and her blood pressure dropped).  She has been treated with beta-blockers and midodrine.  She also has evidence of severe dysautonomia and severe gastroparesis.  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    The patient presents today for follow-up.  Since I last saw her, she was diagnosed with type III Phoebe-Danlos syndrome, autoimmune atrophic gastritis, interstitial cystitis, and  fibromyalgia.  She is still obviously feeling ill from all of her multiple issues.  She does tell me that the Northera has helped with the postural symptoms, although she still gets these frequently.  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  She has also been approved for a gastric stimulator in the future.  She has gained about 23 pounds, and is obviously frustrated from this.  She still has episodes of tachycardia and hypotension, although they have been reasonably controlled.      Past Medical History:   Diagnosis Date   • Bronchitis    • Chest pain    • Chronic hypotension    • Eczema    • Phoebe-Danlos syndrome    • Gastroparesis    • GERD (gastroesophageal reflux disease)    • IBS (irritable bowel syndrome)    • Lightheadedness    • POTS (postural orthostatic tachycardia syndrome)    • Rectal fissure    • Rosacea    • Tachycardia        Past Surgical History:   Procedure Laterality Date   • COLONOSCOPY     • MEDIPORT INSERTION, DOUBLE  09/01/2020    [facility]    • UPPER GASTROINTESTINAL ENDOSCOPY     • VENOUS ACCESS DEVICE (PORT) INSERTION Left 10/16/2020    Procedure: INSERTION VENOUS ACCESS DEVICE UNDER FLURO;  Surgeon: Pa Lane MD;  Location: [facility];  Service: General;  Laterality: Left;       Current Outpatient Medications on File Prior to Visit   Medication Sig Dispense Refill   • albuterol sulfate  (90 Base) MCG/ACT inhaler Inhale 2 puffs Every 4 (Four) Hours As Needed for Wheezing.     • Cholecalciferol (VITAMIN D-1000 MAX ST) 25 MCG (1000 UT) tablet Take 1,000 Units by mouth Daily.     • dilTIAZem powder Apply  topically to the appropriate area as directed 2 (Two) Times a Day As Needed (RECTAL CREAM).     • doxepin (SINEquan) 25 MG capsule TK 1 TO 2 CS PO Q NIGHT     • Droxidopa 100 MG capsule Take 1 capsule by mouth 3 (Three) Times a Day. 90 capsule 4   • gabapentin (NEURONTIN) 100 MG capsule Take 100 mg by mouth  Daily.     • Ginger, Zingiber officinalis, (GINGER ROOT PO) Take  by mouth.     • hydrocortisone 0.5 % cream Apply  topically to the appropriate area as directed 2 (Two) Times a Day.     • immune globulin, human, (GAMMAGARD S/D) 10 g infusion Infuse  into a venous catheter 1 (One) Time Per Week.     • loperamide (IMODIUM A-D) 2 MG tablet Take 2 mg by mouth 4 (Four) Times a Day As Needed.     • meclizine (ANTIVERT) 25 MG tablet Take 25 mg by mouth.     • medroxyPROGESTERone (DEPO-PROVERA) 150 MG/ML injection Inject 1 mL into the appropriate muscle as directed by prescriber Every 3 (Three) Months. 1 mL 3   • metoprolol tartrate (LOPRESSOR) 25 MG tablet TAKE 1/2 TABLET BY MOUTH THREE TIMES DAILY 60 tablet 11   • metroNIDAZOLE (FLAGYL) 0.75 % lotion lotion Apply 1 application topically to the appropriate area as directed Every 12 (Twelve) Hours.     • midodrine (PROAMATINE) 10 MG tablet TAKE 1 TABLET BY MOUTH THREE TIMES DAILY 90 tablet 6   • Mirabegron ER (Myrbetriq) 25 MG tablet sustained-release 24 hour 24 hr tablet Take 25 mg by mouth Daily.     • Mometasone Furoate (Asmanex HFA) 100 MCG/ACT aerosol INAHLE 2 PUFFS INTO THE LUNGS BY MOUTH 2 TIMES PER DAY IN THE MORNING AND EVENING     • montelukast (SINGULAIR) 10 MG tablet      • omeprazole (priLOSEC) 20 MG capsule Take 40 mg by mouth 2 (Two) Times a Day.     • ondansetron (ZOFRAN) 4 MG tablet Take 4 mg by mouth Every 8 (Eight) Hours As Needed for Nausea or Vomiting.     • Ondansetron HCl (ZOFRAN IV) Infuse 8 mg into a venous catheter Every 8 (Eight) Hours As Needed.     • ondansetron ODT (ZOFRAN ODT) 8 MG disintegrating tablet Take 1 tablet by mouth Every 8 (Eight) Hours As Needed for Nausea or Vomiting. 12 tablet 0   • phenazopyridine (PYRIDIUM) 100 MG tablet Take 100 mg by mouth 3 (Three) Times a Day As Needed.     • prochlorperazine (COMPAZINE) 10 MG tablet Take 10 mg by mouth Every 6 (Six) Hours As Needed for Nausea or Vomiting.     • promethazine (PHENERGAN)  12.5 MG tablet TK 1 T PO Q 6 H FOR UP TO 7 DAYS PRF NAUSEA     • promethazine (PHENERGAN) 25 MG tablet TK 1 T PO  Q 6 H PRN NAUSEA     • promethazine (PHENERGAN) Infuse 25 mg into a venous catheter Every 6 (Six) Hours.     • tiZANidine (ZANAFLEX) 4 MG tablet TAKE 1 TABLET BY MOUTH EVERY 8 HOURS FOR UP TO 14 DAYS AS NEEDED FOR MUSCLE SPASM     • triamcinolone (KENALOG) 0.1 % cream Apply 1 application topically to the appropriate area as directed 2 (Two) Times a Day.     • Vortioxetine HBr (TRINTELLIX) 5 MG tablet Take 5 mg by mouth Daily With Breakfast.     • erythromycin base (E-MYCIN) 250 MG tablet TK 1 T PO Q 8 H TAT       No current facility-administered medications on file prior to visit.     Allergies as of 03/25/2021   • (No Known Allergies)     Social History     Socioeconomic History   • Marital status:      Spouse name: Not on file   • Number of children: Not on file   • Years of education: Not on file   • Highest education level: Not on file   Tobacco Use   • Smoking status: Never Smoker   • Smokeless tobacco: Never Used   Substance and Sexual Activity   • Alcohol use: No   • Drug use: No   • Sexual activity: Yes     Partners: Male     Birth control/protection: None     Family History   Problem Relation Age of Onset   • Hypertension Mother    • Diabetes Mother    • Diabetes Father    • Cancer Paternal Grandmother    • Heart disease Paternal Grandfather    • Stroke Paternal Grandfather    • Diabetes Paternal Grandfather    • Cancer Paternal Grandfather    • Coronary artery disease Maternal Grandmother         MGM with 4 vessel CABG and multiple stents   • Coronary artery disease Maternal Uncle         Maternal uncle with MI   • Breast cancer Other    • Malig Hyperthermia Neg Hx        Review of Systems   Constitutional: Positive for malaise/fatigue and weight gain.   Cardiovascular: Positive for chest pain and dyspnea on exertion.   Respiratory: Positive for shortness of breath.    Endocrine:  "Positive for cold intolerance and heat intolerance.   Musculoskeletal: Positive for joint pain and joint swelling.   Gastrointestinal: Positive for abdominal pain, diarrhea and nausea.   Neurological: Positive for excessive daytime sleepiness, dizziness, headaches, light-headedness and paresthesias.   All other systems reviewed and are negative.     Objective:     Vitals:    03/25/21 1400   BP: 110/70   Pulse: 89   Resp: 18   SpO2: 98%   Weight: 97.5 kg (215 lb)   Height: 165.1 cm (65\")     Body mass index is 35.78 kg/m².    Physical Exam  Constitutional:       Appearance: She is well-developed.   HENT:      Head: Normocephalic and atraumatic.   Eyes:      Conjunctiva/sclera: Conjunctivae normal.   Cardiovascular:      Rate and Rhythm: Normal rate and regular rhythm.      Heart sounds: No murmur heard.   No friction rub. No gallop.    Pulmonary:      Effort: Pulmonary effort is normal.      Breath sounds: Normal breath sounds.   Abdominal:      Palpations: Abdomen is soft.      Tenderness: There is no abdominal tenderness.   Musculoskeletal:      Cervical back: Neck supple.   Skin:     General: Skin is warm.   Neurological:      Mental Status: She is alert and oriented to person, place, and time.   Psychiatric:         Behavior: Behavior normal.       Lab Review:   Procedures  Cardiac Procedures:  1.  Exercise treadmill stress test on 3/14/2018: The patient exercised for 9 minutes.  She did have chest pain.  There were no EKG changes.  2.  Echocardiogram on 3/14/2018: The ejection fraction was 62%.  Diastolic function was normal.  3.  Tilt table test on 12/5/2019: Positive for postural orthostasis and postural tachycardia (suggestive of POTS).    Assessment:       Diagnosis Plan   1. POTS (postural orthostatic tachycardia syndrome)     2. Dysautonomia (CMS/HCC)     3. Phoebe-Danlos syndrome type III       Plan:      Again, she has had multiple recent diagnoses.  These include Phoebe-Danlos type III, autoimmune " atrophic gastritis, interstitial cystitis, and fibromyalgia.  She is now getting IV fluids daily, and this has helped significantly.  She is also on IVIG for autoimmune gastric dysmotility syndrome.  She has been approved for gastric stimulator placement in the future.  She has gained 23 pounds, and obviously she is concerned about this.  She is going to try to get back on a weight loss regimen, although this is difficult with her symptoms.    She is frequently lightheaded with hypotension, and she still does have intermittent tachycardia.  However, her regimen has been keeping this under reasonable control with some breakthroughs.  She is currently on Northera 100 mg 3 times a day, metoprolol 12.5 mg 3 times a day, and midodrine 10 mg 3 times a day.  She is now getting IV fluid infusions every day, which has helped her significantly.  I have tried to hold off on placing her on fludrocortisone as she would have long-term corticosteroid side effects potentially.  I am going to check on her referral to [place] to the dysautonomia clinic, which was placed at her last visit.  I will see her back in the office in 6 months unless other issues arise.     I spent 35 minutes in the care of the patient.  Greater than 50% of this time was spent in face-to-face counseling with the patient regarding her dysautonomia, hypotension, tachycardia, and future management.

## 2021-04-21 NOTE — TELEPHONE ENCOUNTER
I called spoke with Yue Holt RN.   Pt is having procedure tomorrow for a EGD/ implantation of a temporary gastric stimulator.   I let her we were unaware of procedure and I will address this with Dr Rausch and call them back or send over clearance letter.   Singh CHAPA

## 2021-04-21 NOTE — TELEPHONE ENCOUNTER
I verbally addressed with Dr Rausch. He states she is cleared low risk for her procedure and they can proceed with procedure.   I will fax clearance letter to Yue Holt RN  Copy of letter and fax confirmation will be scanned into pt's chart.   Singh CHAPA

## 2021-05-05 ENCOUNTER — HOSPITAL ENCOUNTER (INPATIENT)
Facility: HOSPITAL | Age: 26
LOS: 1 days | Discharge: HOME | End: 2021-05-07
Attending: EMERGENCY MEDICINE | Admitting: HOSPITALIST

## 2021-05-05 ENCOUNTER — APPOINTMENT (OUTPATIENT)
Dept: GENERAL RADIOLOGY | Facility: HOSPITAL | Age: 26
End: 2021-05-05

## 2021-05-05 DIAGNOSIS — R00.0 TACHYCARDIA: ICD-10-CM

## 2021-05-05 DIAGNOSIS — G90.A POTS (POSTURAL ORTHOSTATIC TACHYCARDIA SYNDROME): Primary | ICD-10-CM

## 2021-05-05 DIAGNOSIS — R55 NEAR SYNCOPE: ICD-10-CM

## 2021-05-05 DIAGNOSIS — R09.02 HYPOXEMIA: ICD-10-CM

## 2021-05-05 DIAGNOSIS — R07.9 CHEST PAIN, UNSPECIFIED TYPE: ICD-10-CM

## 2021-05-05 LAB
ALBUMIN SERPL-MCNC: 3.7 G/DL (ref 3.5–5.2)
ALBUMIN/GLOB SERPL: 0.8 G/DL
ALP SERPL-CCNC: 76 U/L (ref 39–117)
ALT SERPL W P-5'-P-CCNC: 65 U/L (ref 1–33)
ANION GAP SERPL CALCULATED.3IONS-SCNC: 11.6 MMOL/L (ref 5–15)
AST SERPL-CCNC: 45 U/L (ref 1–32)
BASOPHILS # BLD AUTO: 0.04 10*3/MM3 (ref 0–0.2)
BASOPHILS NFR BLD AUTO: 0.6 % (ref 0–1.5)
BILIRUB SERPL-MCNC: 0.3 MG/DL (ref 0–1.2)
BUN SERPL-MCNC: 7 MG/DL (ref 6–20)
BUN/CREAT SERPL: 8.2 (ref 7–25)
CALCIUM SPEC-SCNC: 7.9 MG/DL (ref 8.6–10.5)
CHLORIDE SERPL-SCNC: 106 MMOL/L (ref 98–107)
CO2 SERPL-SCNC: 19.4 MMOL/L (ref 22–29)
CREAT SERPL-MCNC: 0.85 MG/DL (ref 0.57–1)
D-LACTATE SERPL-SCNC: 1.2 MMOL/L (ref 0.5–2)
DEPRECATED RDW RBC AUTO: 42.3 FL (ref 37–54)
EOSINOPHIL # BLD AUTO: 0.11 10*3/MM3 (ref 0–0.4)
EOSINOPHIL NFR BLD AUTO: 1.7 % (ref 0.3–6.2)
ERYTHROCYTE [DISTWIDTH] IN BLOOD BY AUTOMATED COUNT: 15 % (ref 12.3–15.4)
GFR SERPL CREATININE-BSD FRML MDRD: 81 ML/MIN/1.73
GLOBULIN UR ELPH-MCNC: 4.8 GM/DL
GLUCOSE BLDC GLUCOMTR-MCNC: 86 MG/DL (ref 70–130)
GLUCOSE SERPL-MCNC: 90 MG/DL (ref 65–99)
HCT VFR BLD AUTO: 32.6 % (ref 34–46.6)
HGB BLD-MCNC: 10.6 G/DL (ref 12–15.9)
HOLD SPECIMEN: NORMAL
HOLD SPECIMEN: NORMAL
IMM GRANULOCYTES # BLD AUTO: 0.02 10*3/MM3 (ref 0–0.05)
IMM GRANULOCYTES NFR BLD AUTO: 0.3 % (ref 0–0.5)
LYMPHOCYTES # BLD AUTO: 2.52 10*3/MM3 (ref 0.7–3.1)
LYMPHOCYTES NFR BLD AUTO: 39.7 % (ref 19.6–45.3)
MAGNESIUM SERPL-MCNC: 1.8 MG/DL (ref 1.6–2.6)
MCH RBC QN AUTO: 25.5 PG (ref 26.6–33)
MCHC RBC AUTO-ENTMCNC: 32.5 G/DL (ref 31.5–35.7)
MCV RBC AUTO: 78.4 FL (ref 79–97)
MONOCYTES # BLD AUTO: 0.52 10*3/MM3 (ref 0.1–0.9)
MONOCYTES NFR BLD AUTO: 8.2 % (ref 5–12)
NEUTROPHILS NFR BLD AUTO: 3.13 10*3/MM3 (ref 1.7–7)
NEUTROPHILS NFR BLD AUTO: 49.5 % (ref 42.7–76)
NRBC BLD AUTO-RTO: 0 /100 WBC (ref 0–0.2)
PLATELET # BLD AUTO: 277 10*3/MM3 (ref 140–450)
PMV BLD AUTO: 10.1 FL (ref 6–12)
POTASSIUM SERPL-SCNC: 3.4 MMOL/L (ref 3.5–5.2)
PROCALCITONIN SERPL-MCNC: 0.07 NG/ML (ref 0–0.25)
PROT SERPL-MCNC: 8.5 G/DL (ref 6–8.5)
RBC # BLD AUTO: 4.16 10*6/MM3 (ref 3.77–5.28)
SODIUM SERPL-SCNC: 137 MMOL/L (ref 136–145)
T4 FREE SERPL-MCNC: 0.98 NG/DL (ref 0.93–1.7)
TROPONIN T SERPL-MCNC: <0.01 NG/ML (ref 0–0.03)
TSH SERPL DL<=0.05 MIU/L-ACNC: 3.31 UIU/ML (ref 0.27–4.2)
WBC # BLD AUTO: 6.34 10*3/MM3 (ref 3.4–10.8)
WHOLE BLOOD HOLD SPECIMEN: NORMAL
WHOLE BLOOD HOLD SPECIMEN: NORMAL

## 2021-05-05 PROCEDURE — 83605 ASSAY OF LACTIC ACID: CPT | Performed by: PHYSICIAN ASSISTANT

## 2021-05-05 PROCEDURE — 83735 ASSAY OF MAGNESIUM: CPT

## 2021-05-05 PROCEDURE — 80053 COMPREHEN METABOLIC PANEL: CPT

## 2021-05-05 PROCEDURE — 71045 X-RAY EXAM CHEST 1 VIEW: CPT

## 2021-05-05 PROCEDURE — 93005 ELECTROCARDIOGRAM TRACING: CPT

## 2021-05-05 PROCEDURE — 99285 EMERGENCY DEPT VISIT HI MDM: CPT

## 2021-05-05 PROCEDURE — 85025 COMPLETE CBC W/AUTO DIFF WBC: CPT

## 2021-05-05 PROCEDURE — 0202U NFCT DS 22 TRGT SARS-COV-2: CPT | Performed by: PHYSICIAN ASSISTANT

## 2021-05-05 PROCEDURE — 84443 ASSAY THYROID STIM HORMONE: CPT | Performed by: PHYSICIAN ASSISTANT

## 2021-05-05 PROCEDURE — 84439 ASSAY OF FREE THYROXINE: CPT | Performed by: PHYSICIAN ASSISTANT

## 2021-05-05 PROCEDURE — 93010 ELECTROCARDIOGRAM REPORT: CPT | Performed by: INTERNAL MEDICINE

## 2021-05-05 PROCEDURE — 82962 GLUCOSE BLOOD TEST: CPT

## 2021-05-05 PROCEDURE — 84484 ASSAY OF TROPONIN QUANT: CPT

## 2021-05-05 PROCEDURE — 84145 PROCALCITONIN (PCT): CPT | Performed by: PHYSICIAN ASSISTANT

## 2021-05-05 RX ORDER — SODIUM CHLORIDE 0.9 % (FLUSH) 0.9 %
10 SYRINGE (ML) INJECTION AS NEEDED
Status: DISCONTINUED | OUTPATIENT
Start: 2021-05-05 | End: 2021-05-07

## 2021-05-05 RX ADMIN — SODIUM CHLORIDE, POTASSIUM CHLORIDE, SODIUM LACTATE AND CALCIUM CHLORIDE 1000 ML: 600; 310; 30; 20 INJECTION, SOLUTION INTRAVENOUS at 22:50

## 2021-05-06 ENCOUNTER — APPOINTMENT (OUTPATIENT)
Dept: CT IMAGING | Facility: HOSPITAL | Age: 26
End: 2021-05-06

## 2021-05-06 ENCOUNTER — APPOINTMENT (OUTPATIENT)
Dept: GENERAL RADIOLOGY | Facility: HOSPITAL | Age: 26
End: 2021-05-06

## 2021-05-06 PROBLEM — G90.A POTS (POSTURAL ORTHOSTATIC TACHYCARDIA SYNDROME): Status: ACTIVE | Noted: 2021-05-06

## 2021-05-06 LAB
ALBUMIN SERPL-MCNC: 3.6 G/DL (ref 3.5–5.2)
ALBUMIN/GLOB SERPL: 0.8 G/DL
ALP SERPL-CCNC: 72 U/L (ref 39–117)
ALT SERPL W P-5'-P-CCNC: 57 U/L (ref 1–33)
ANION GAP SERPL CALCULATED.3IONS-SCNC: 11.2 MMOL/L (ref 5–15)
AST SERPL-CCNC: 35 U/L (ref 1–32)
B PARAPERT DNA SPEC QL NAA+PROBE: NOT DETECTED
B PERT DNA SPEC QL NAA+PROBE: NOT DETECTED
BASOPHILS # BLD AUTO: 0.03 10*3/MM3 (ref 0–0.2)
BASOPHILS NFR BLD AUTO: 0.5 % (ref 0–1.5)
BILIRUB SERPL-MCNC: 0.4 MG/DL (ref 0–1.2)
BUN SERPL-MCNC: 5 MG/DL (ref 6–20)
BUN/CREAT SERPL: 6.7 (ref 7–25)
C PNEUM DNA NPH QL NAA+NON-PROBE: NOT DETECTED
CALCIUM SPEC-SCNC: 8.3 MG/DL (ref 8.6–10.5)
CHLORIDE SERPL-SCNC: 106 MMOL/L (ref 98–107)
CO2 SERPL-SCNC: 18.8 MMOL/L (ref 22–29)
CREAT SERPL-MCNC: 0.75 MG/DL (ref 0.57–1)
DEPRECATED RDW RBC AUTO: 40.8 FL (ref 37–54)
EOSINOPHIL # BLD AUTO: 0.08 10*3/MM3 (ref 0–0.4)
EOSINOPHIL NFR BLD AUTO: 1.4 % (ref 0.3–6.2)
ERYTHROCYTE [DISTWIDTH] IN BLOOD BY AUTOMATED COUNT: 14.7 % (ref 12.3–15.4)
FERRITIN SERPL-MCNC: 10.2 NG/ML (ref 13–150)
FLUAV SUBTYP SPEC NAA+PROBE: NOT DETECTED
FLUBV RNA ISLT QL NAA+PROBE: NOT DETECTED
GFR SERPL CREATININE-BSD FRML MDRD: 93 ML/MIN/1.73
GLOBULIN UR ELPH-MCNC: 4.5 GM/DL
GLUCOSE SERPL-MCNC: 90 MG/DL (ref 65–99)
HADV DNA SPEC NAA+PROBE: NOT DETECTED
HCOV 229E RNA SPEC QL NAA+PROBE: NOT DETECTED
HCOV HKU1 RNA SPEC QL NAA+PROBE: NOT DETECTED
HCOV NL63 RNA SPEC QL NAA+PROBE: NOT DETECTED
HCOV OC43 RNA SPEC QL NAA+PROBE: NOT DETECTED
HCT VFR BLD AUTO: 30.5 % (ref 34–46.6)
HGB BLD-MCNC: 10 G/DL (ref 12–15.9)
HMPV RNA NPH QL NAA+NON-PROBE: NOT DETECTED
HPIV1 RNA SPEC QL NAA+PROBE: NOT DETECTED
HPIV2 RNA SPEC QL NAA+PROBE: NOT DETECTED
HPIV3 RNA NPH QL NAA+PROBE: NOT DETECTED
HPIV4 P GENE NPH QL NAA+PROBE: NOT DETECTED
IMM GRANULOCYTES # BLD AUTO: 0.02 10*3/MM3 (ref 0–0.05)
IMM GRANULOCYTES NFR BLD AUTO: 0.4 % (ref 0–0.5)
IRON 24H UR-MRATE: 30 MCG/DL (ref 37–145)
IRON SATN MFR SERPL: 8 % (ref 20–50)
LYMPHOCYTES # BLD AUTO: 1.91 10*3/MM3 (ref 0.7–3.1)
LYMPHOCYTES NFR BLD AUTO: 34.5 % (ref 19.6–45.3)
M PNEUMO IGG SER IA-ACNC: NOT DETECTED
MCH RBC QN AUTO: 25.4 PG (ref 26.6–33)
MCHC RBC AUTO-ENTMCNC: 32.8 G/DL (ref 31.5–35.7)
MCV RBC AUTO: 77.6 FL (ref 79–97)
MONOCYTES # BLD AUTO: 0.45 10*3/MM3 (ref 0.1–0.9)
MONOCYTES NFR BLD AUTO: 8.1 % (ref 5–12)
NEUTROPHILS NFR BLD AUTO: 3.05 10*3/MM3 (ref 1.7–7)
NEUTROPHILS NFR BLD AUTO: 55.1 % (ref 42.7–76)
NRBC BLD AUTO-RTO: 0 /100 WBC (ref 0–0.2)
PLATELET # BLD AUTO: 248 10*3/MM3 (ref 140–450)
PMV BLD AUTO: 10.5 FL (ref 6–12)
POTASSIUM SERPL-SCNC: 3.4 MMOL/L (ref 3.5–5.2)
PROT SERPL-MCNC: 8.1 G/DL (ref 6–8.5)
QT INTERVAL: 279 MS
QT INTERVAL: 342 MS
RBC # BLD AUTO: 3.93 10*6/MM3 (ref 3.77–5.28)
RHINOVIRUS RNA SPEC NAA+PROBE: NOT DETECTED
RSV RNA NPH QL NAA+NON-PROBE: NOT DETECTED
SARS-COV-2 RNA NPH QL NAA+NON-PROBE: NOT DETECTED
SODIUM SERPL-SCNC: 136 MMOL/L (ref 136–145)
TIBC SERPL-MCNC: 383 MCG/DL (ref 298–536)
TRANSFERRIN SERPL-MCNC: 257 MG/DL (ref 200–360)
WBC # BLD AUTO: 5.54 10*3/MM3 (ref 3.4–10.8)

## 2021-05-06 PROCEDURE — 94799 UNLISTED PULMONARY SVC/PX: CPT

## 2021-05-06 PROCEDURE — 85025 COMPLETE CBC W/AUTO DIFF WBC: CPT | Performed by: HOSPITALIST

## 2021-05-06 PROCEDURE — 93005 ELECTROCARDIOGRAM TRACING: CPT | Performed by: HOSPITALIST

## 2021-05-06 PROCEDURE — 99221 1ST HOSP IP/OBS SF/LOW 40: CPT | Performed by: INTERNAL MEDICINE

## 2021-05-06 PROCEDURE — 25010000002 PROMETHAZINE PER 50 MG: Performed by: HOSPITALIST

## 2021-05-06 PROCEDURE — 84466 ASSAY OF TRANSFERRIN: CPT | Performed by: INTERNAL MEDICINE

## 2021-05-06 PROCEDURE — 83540 ASSAY OF IRON: CPT | Performed by: INTERNAL MEDICINE

## 2021-05-06 PROCEDURE — 94640 AIRWAY INHALATION TREATMENT: CPT

## 2021-05-06 PROCEDURE — 25010000002 ONDANSETRON PER 1 MG: Performed by: PHYSICIAN ASSISTANT

## 2021-05-06 PROCEDURE — 71045 X-RAY EXAM CHEST 1 VIEW: CPT

## 2021-05-06 PROCEDURE — 25010000002 MAGNESIUM SULFATE 2 GM/50ML SOLUTION: Performed by: INTERNAL MEDICINE

## 2021-05-06 PROCEDURE — 80053 COMPREHEN METABOLIC PANEL: CPT | Performed by: HOSPITALIST

## 2021-05-06 PROCEDURE — 25810000003 SODIUM CHLORIDE 0.9 % WITH KCL 20 MEQ 20-0.9 MEQ/L-% SOLUTION: Performed by: HOSPITALIST

## 2021-05-06 PROCEDURE — 25010000002 PROCHLORPERAZINE 10 MG/2ML SOLUTION: Performed by: HOSPITALIST

## 2021-05-06 PROCEDURE — 71275 CT ANGIOGRAPHY CHEST: CPT

## 2021-05-06 PROCEDURE — 93010 ELECTROCARDIOGRAM REPORT: CPT | Performed by: INTERNAL MEDICINE

## 2021-05-06 PROCEDURE — 0 IOPAMIDOL PER 1 ML: Performed by: EMERGENCY MEDICINE

## 2021-05-06 PROCEDURE — 82728 ASSAY OF FERRITIN: CPT | Performed by: INTERNAL MEDICINE

## 2021-05-06 RX ORDER — PANTOPRAZOLE SODIUM 40 MG/1
40 TABLET, DELAYED RELEASE ORAL
Status: DISCONTINUED | OUTPATIENT
Start: 2021-05-06 | End: 2021-05-07 | Stop reason: HOSPADM

## 2021-05-06 RX ORDER — DOXEPIN HYDROCHLORIDE 25 MG/1
25 CAPSULE ORAL NIGHTLY
Status: DISCONTINUED | OUTPATIENT
Start: 2021-05-06 | End: 2021-05-07 | Stop reason: HOSPADM

## 2021-05-06 RX ORDER — PANTOPRAZOLE SODIUM 40 MG/1
40 TABLET, DELAYED RELEASE ORAL EVERY MORNING
Status: DISCONTINUED | OUTPATIENT
Start: 2021-05-07 | End: 2021-05-06

## 2021-05-06 RX ORDER — ONDANSETRON 2 MG/ML
4 INJECTION INTRAMUSCULAR; INTRAVENOUS EVERY 6 HOURS PRN
Status: DISCONTINUED | OUTPATIENT
Start: 2021-05-06 | End: 2021-05-07

## 2021-05-06 RX ORDER — PROCHLORPERAZINE EDISYLATE 5 MG/ML
10 INJECTION INTRAMUSCULAR; INTRAVENOUS EVERY 6 HOURS PRN
Status: DISCONTINUED | OUTPATIENT
Start: 2021-05-06 | End: 2021-05-07

## 2021-05-06 RX ORDER — MELATONIN
1000 DAILY
Status: DISCONTINUED | OUTPATIENT
Start: 2021-05-06 | End: 2021-05-07 | Stop reason: HOSPADM

## 2021-05-06 RX ORDER — MAGNESIUM SULFATE HEPTAHYDRATE 40 MG/ML
2 INJECTION, SOLUTION INTRAVENOUS ONCE
Status: DISCONTINUED | OUTPATIENT
Start: 2021-05-06 | End: 2021-05-06

## 2021-05-06 RX ORDER — MEDROXYPROGESTERONE ACETATE 150 MG/ML
150 INJECTION, SUSPENSION INTRAMUSCULAR
Status: DISCONTINUED | OUTPATIENT
Start: 2021-05-06 | End: 2021-05-06

## 2021-05-06 RX ORDER — GABAPENTIN 100 MG/1
100 CAPSULE ORAL DAILY
Status: DISCONTINUED | OUTPATIENT
Start: 2021-05-06 | End: 2021-05-07 | Stop reason: HOSPADM

## 2021-05-06 RX ORDER — POTASSIUM CHLORIDE 1.5 G/1.77G
40 POWDER, FOR SOLUTION ORAL AS NEEDED
Status: DISCONTINUED | OUTPATIENT
Start: 2021-05-06 | End: 2021-05-06

## 2021-05-06 RX ORDER — ONDANSETRON 2 MG/ML
4 INJECTION INTRAMUSCULAR; INTRAVENOUS ONCE
Status: DISCONTINUED | OUTPATIENT
Start: 2021-05-06 | End: 2021-05-06

## 2021-05-06 RX ORDER — BUDESONIDE 0.5 MG/2ML
0.25 INHALANT ORAL
Status: DISCONTINUED | OUTPATIENT
Start: 2021-05-06 | End: 2021-05-07 | Stop reason: HOSPADM

## 2021-05-06 RX ORDER — SODIUM CHLORIDE AND POTASSIUM CHLORIDE 150; 900 MG/100ML; MG/100ML
75 INJECTION, SOLUTION INTRAVENOUS CONTINUOUS
Status: DISCONTINUED | OUTPATIENT
Start: 2021-05-06 | End: 2021-05-07

## 2021-05-06 RX ORDER — ONDANSETRON 2 MG/ML
4 INJECTION INTRAMUSCULAR; INTRAVENOUS ONCE
Status: COMPLETED | OUTPATIENT
Start: 2021-05-06 | End: 2021-05-06

## 2021-05-06 RX ORDER — MIDODRINE HYDROCHLORIDE 5 MG/1
10 TABLET ORAL
Status: DISCONTINUED | OUTPATIENT
Start: 2021-05-06 | End: 2021-05-07 | Stop reason: HOSPADM

## 2021-05-06 RX ORDER — SODIUM CHLORIDE 9 MG/ML
75 INJECTION, SOLUTION INTRAVENOUS CONTINUOUS
Status: DISCONTINUED | OUTPATIENT
Start: 2021-05-06 | End: 2021-05-06

## 2021-05-06 RX ORDER — POTASSIUM CHLORIDE 750 MG/1
40 TABLET, FILM COATED, EXTENDED RELEASE ORAL AS NEEDED
Status: DISCONTINUED | OUTPATIENT
Start: 2021-05-06 | End: 2021-05-06

## 2021-05-06 RX ORDER — MONTELUKAST SODIUM 10 MG/1
10 TABLET ORAL DAILY
Status: DISCONTINUED | OUTPATIENT
Start: 2021-05-06 | End: 2021-05-07 | Stop reason: HOSPADM

## 2021-05-06 RX ORDER — OXYBUTYNIN CHLORIDE 5 MG/1
5 TABLET, EXTENDED RELEASE ORAL DAILY
Status: DISCONTINUED | OUTPATIENT
Start: 2021-05-06 | End: 2021-05-06

## 2021-05-06 RX ORDER — ALBUTEROL SULFATE 90 UG/1
2 AEROSOL, METERED RESPIRATORY (INHALATION) EVERY 4 HOURS PRN
Status: DISCONTINUED | OUTPATIENT
Start: 2021-05-06 | End: 2021-05-07

## 2021-05-06 RX ORDER — TIZANIDINE 4 MG/1
4 TABLET ORAL EVERY 6 HOURS PRN
Status: DISCONTINUED | OUTPATIENT
Start: 2021-05-06 | End: 2021-05-07

## 2021-05-06 RX ORDER — MAGNESIUM SULFATE HEPTAHYDRATE 40 MG/ML
2 INJECTION, SOLUTION INTRAVENOUS ONCE
Status: COMPLETED | OUTPATIENT
Start: 2021-05-06 | End: 2021-05-06

## 2021-05-06 RX ADMIN — PROCHLORPERAZINE EDISYLATE 10 MG: 5 INJECTION INTRAMUSCULAR; INTRAVENOUS at 05:55

## 2021-05-06 RX ADMIN — GABAPENTIN 100 MG: 100 CAPSULE ORAL at 15:04

## 2021-05-06 RX ADMIN — BUDESONIDE 0.25 MG: 0.5 INHALANT ORAL at 14:42

## 2021-05-06 RX ADMIN — IOPAMIDOL 100 ML: 755 INJECTION, SOLUTION INTRAVENOUS at 00:55

## 2021-05-06 RX ADMIN — PROMETHAZINE HYDROCHLORIDE 25 MG: 25 INJECTION INTRAMUSCULAR; INTRAVENOUS at 10:23

## 2021-05-06 RX ADMIN — POTASSIUM CHLORIDE AND SODIUM CHLORIDE 75 ML/HR: 900; 150 INJECTION, SOLUTION INTRAVENOUS at 15:03

## 2021-05-06 RX ADMIN — SODIUM CHLORIDE 75 ML/HR: 9 INJECTION, SOLUTION INTRAVENOUS at 05:56

## 2021-05-06 RX ADMIN — MAGNESIUM SULFATE HEPTAHYDRATE 2 G: 2 INJECTION, SOLUTION INTRAVENOUS at 12:13

## 2021-05-06 RX ADMIN — POTASSIUM CHLORIDE 40 MEQ: 750 TABLET, EXTENDED RELEASE ORAL at 12:12

## 2021-05-06 RX ADMIN — Medication 1000 UNITS: at 15:04

## 2021-05-06 RX ADMIN — MONTELUKAST SODIUM 10 MG: 10 TABLET, FILM COATED ORAL at 15:04

## 2021-05-06 RX ADMIN — PROMETHAZINE HYDROCHLORIDE 25 MG: 25 INJECTION INTRAMUSCULAR; INTRAVENOUS at 20:41

## 2021-05-06 RX ADMIN — METOPROLOL TARTRATE 12.5 MG: 25 TABLET, FILM COATED ORAL at 17:15

## 2021-05-06 RX ADMIN — PANTOPRAZOLE SODIUM 40 MG: 40 TABLET, DELAYED RELEASE ORAL at 17:17

## 2021-05-06 RX ADMIN — DOXEPIN HYDROCHLORIDE 25 MG: 25 CAPSULE ORAL at 22:33

## 2021-05-06 RX ADMIN — MIDODRINE HYDROCHLORIDE 10 MG: 5 TABLET ORAL at 17:15

## 2021-05-06 RX ADMIN — ONDANSETRON 4 MG: 2 INJECTION INTRAMUSCULAR; INTRAVENOUS at 00:27

## 2021-05-07 VITALS
SYSTOLIC BLOOD PRESSURE: 106 MMHG | HEIGHT: 65 IN | HEART RATE: 85 BPM | DIASTOLIC BLOOD PRESSURE: 83 MMHG | WEIGHT: 227.2 LBS | RESPIRATION RATE: 18 BRPM | OXYGEN SATURATION: 97 % | BODY MASS INDEX: 37.85 KG/M2 | TEMPERATURE: 97.7 F

## 2021-05-07 LAB
ALBUMIN SERPL-MCNC: 3.6 G/DL (ref 3.5–5.2)
ALBUMIN/GLOB SERPL: 0.8 G/DL
ALP SERPL-CCNC: 71 U/L (ref 39–117)
ALT SERPL W P-5'-P-CCNC: 42 U/L (ref 1–33)
ANION GAP SERPL CALCULATED.3IONS-SCNC: 10.8 MMOL/L (ref 5–15)
AST SERPL-CCNC: 29 U/L (ref 1–32)
BASOPHILS # BLD AUTO: 0.04 10*3/MM3 (ref 0–0.2)
BASOPHILS NFR BLD AUTO: 0.7 % (ref 0–1.5)
BILIRUB SERPL-MCNC: 0.7 MG/DL (ref 0–1.2)
BUN SERPL-MCNC: 8 MG/DL (ref 6–20)
BUN/CREAT SERPL: 9.8 (ref 7–25)
CALCIUM SPEC-SCNC: 8.7 MG/DL (ref 8.6–10.5)
CHLORIDE SERPL-SCNC: 108 MMOL/L (ref 98–107)
CHOLEST SERPL-MCNC: 202 MG/DL (ref 0–200)
CO2 SERPL-SCNC: 18.2 MMOL/L (ref 22–29)
CREAT SERPL-MCNC: 0.82 MG/DL (ref 0.57–1)
DEPRECATED RDW RBC AUTO: 44.9 FL (ref 37–54)
EOSINOPHIL # BLD AUTO: 0.08 10*3/MM3 (ref 0–0.4)
EOSINOPHIL NFR BLD AUTO: 1.4 % (ref 0.3–6.2)
ERYTHROCYTE [DISTWIDTH] IN BLOOD BY AUTOMATED COUNT: 15.3 % (ref 12.3–15.4)
GFR SERPL CREATININE-BSD FRML MDRD: 84 ML/MIN/1.73
GLOBULIN UR ELPH-MCNC: 4.6 GM/DL
GLUCOSE SERPL-MCNC: 94 MG/DL (ref 65–99)
HBA1C MFR BLD: 4.9 % (ref 4.8–5.6)
HCT VFR BLD AUTO: 33.8 % (ref 34–46.6)
HDLC SERPL-MCNC: 40 MG/DL (ref 40–60)
HGB BLD-MCNC: 10.8 G/DL (ref 12–15.9)
IMM GRANULOCYTES # BLD AUTO: 0.01 10*3/MM3 (ref 0–0.05)
IMM GRANULOCYTES NFR BLD AUTO: 0.2 % (ref 0–0.5)
LDLC SERPL CALC-MCNC: 145 MG/DL (ref 0–100)
LDLC/HDLC SERPL: 3.59 {RATIO}
LYMPHOCYTES # BLD AUTO: 2.05 10*3/MM3 (ref 0.7–3.1)
LYMPHOCYTES NFR BLD AUTO: 36.9 % (ref 19.6–45.3)
MCH RBC QN AUTO: 25.9 PG (ref 26.6–33)
MCHC RBC AUTO-ENTMCNC: 32 G/DL (ref 31.5–35.7)
MCV RBC AUTO: 81.1 FL (ref 79–97)
MONOCYTES # BLD AUTO: 0.5 10*3/MM3 (ref 0.1–0.9)
MONOCYTES NFR BLD AUTO: 9 % (ref 5–12)
NEUTROPHILS NFR BLD AUTO: 2.88 10*3/MM3 (ref 1.7–7)
NEUTROPHILS NFR BLD AUTO: 51.8 % (ref 42.7–76)
NRBC BLD AUTO-RTO: 0 /100 WBC (ref 0–0.2)
NT-PROBNP SERPL-MCNC: 13.4 PG/ML (ref 0–450)
PLATELET # BLD AUTO: 254 10*3/MM3 (ref 140–450)
PMV BLD AUTO: 10.5 FL (ref 6–12)
POTASSIUM SERPL-SCNC: 4.1 MMOL/L (ref 3.5–5.2)
PROT SERPL-MCNC: 8.2 G/DL (ref 6–8.5)
RBC # BLD AUTO: 4.17 10*6/MM3 (ref 3.77–5.28)
SODIUM SERPL-SCNC: 137 MMOL/L (ref 136–145)
TRIGL SERPL-MCNC: 92 MG/DL (ref 0–150)
TSH SERPL DL<=0.05 MIU/L-ACNC: 3.49 UIU/ML (ref 0.27–4.2)
VLDLC SERPL-MCNC: 17 MG/DL (ref 5–40)
WBC # BLD AUTO: 5.56 10*3/MM3 (ref 3.4–10.8)

## 2021-05-07 PROCEDURE — 80053 COMPREHEN METABOLIC PANEL: CPT | Performed by: HOSPITALIST

## 2021-05-07 PROCEDURE — 99232 SBSQ HOSP IP/OBS MODERATE 35: CPT | Performed by: INTERNAL MEDICINE

## 2021-05-07 PROCEDURE — 85025 COMPLETE CBC W/AUTO DIFF WBC: CPT | Performed by: HOSPITALIST

## 2021-05-07 PROCEDURE — 25010000002 IRON SUCROSE PER 1 MG: Performed by: HOSPITALIST

## 2021-05-07 PROCEDURE — 94799 UNLISTED PULMONARY SVC/PX: CPT

## 2021-05-07 PROCEDURE — 80061 LIPID PANEL: CPT | Performed by: HOSPITALIST

## 2021-05-07 PROCEDURE — 97165 OT EVAL LOW COMPLEX 30 MIN: CPT

## 2021-05-07 PROCEDURE — 83880 ASSAY OF NATRIURETIC PEPTIDE: CPT | Performed by: HOSPITALIST

## 2021-05-07 PROCEDURE — 84443 ASSAY THYROID STIM HORMONE: CPT | Performed by: HOSPITALIST

## 2021-05-07 PROCEDURE — 25010000002 PROMETHAZINE PER 50 MG: Performed by: HOSPITALIST

## 2021-05-07 PROCEDURE — 63710000001 DIPHENHYDRAMINE PER 50 MG: Performed by: HOSPITALIST

## 2021-05-07 PROCEDURE — 25810000003 SODIUM CHLORIDE 0.9 % WITH KCL 20 MEQ 20-0.9 MEQ/L-% SOLUTION: Performed by: HOSPITALIST

## 2021-05-07 PROCEDURE — 83036 HEMOGLOBIN GLYCOSYLATED A1C: CPT | Performed by: HOSPITALIST

## 2021-05-07 RX ORDER — ACETAMINOPHEN 325 MG/1
650 TABLET ORAL ONCE
Status: COMPLETED | OUTPATIENT
Start: 2021-05-07 | End: 2021-05-07

## 2021-05-07 RX ORDER — DIPHENHYDRAMINE HCL 50 MG
50 CAPSULE ORAL ONCE
Status: COMPLETED | OUTPATIENT
Start: 2021-05-07 | End: 2021-05-07

## 2021-05-07 RX ADMIN — GABAPENTIN 100 MG: 100 CAPSULE ORAL at 10:51

## 2021-05-07 RX ADMIN — PROMETHAZINE HYDROCHLORIDE 25 MG: 25 INJECTION INTRAMUSCULAR; INTRAVENOUS at 03:38

## 2021-05-07 RX ADMIN — DIPHENHYDRAMINE HYDROCHLORIDE 50 MG: 50 CAPSULE ORAL at 14:45

## 2021-05-07 RX ADMIN — METOPROLOL TARTRATE 12.5 MG: 25 TABLET, FILM COATED ORAL at 10:54

## 2021-05-07 RX ADMIN — MONTELUKAST SODIUM 10 MG: 10 TABLET, FILM COATED ORAL at 10:51

## 2021-05-07 RX ADMIN — PANTOPRAZOLE SODIUM 40 MG: 40 TABLET, DELAYED RELEASE ORAL at 06:52

## 2021-05-07 RX ADMIN — MIDODRINE HYDROCHLORIDE 10 MG: 5 TABLET ORAL at 10:54

## 2021-05-07 RX ADMIN — Medication 1000 UNITS: at 10:51

## 2021-05-07 RX ADMIN — BUDESONIDE 0.25 MG: 0.5 INHALANT ORAL at 07:38

## 2021-05-07 RX ADMIN — MIDODRINE HYDROCHLORIDE 10 MG: 5 TABLET ORAL at 06:49

## 2021-05-07 RX ADMIN — IRON SUCROSE 100 MG: 20 INJECTION, SOLUTION INTRAVENOUS at 15:30

## 2021-05-07 RX ADMIN — METOPROLOL TARTRATE 12.5 MG: 25 TABLET, FILM COATED ORAL at 17:15

## 2021-05-07 RX ADMIN — MIDODRINE HYDROCHLORIDE 10 MG: 5 TABLET ORAL at 17:15

## 2021-05-07 RX ADMIN — PANTOPRAZOLE SODIUM 40 MG: 40 TABLET, DELAYED RELEASE ORAL at 17:15

## 2021-05-07 RX ADMIN — ACETAMINOPHEN 650 MG: 325 TABLET ORAL at 14:45

## 2021-05-07 RX ADMIN — POTASSIUM CHLORIDE AND SODIUM CHLORIDE 75 ML/HR: 900; 150 INJECTION, SOLUTION INTRAVENOUS at 04:53

## 2021-05-07 RX ADMIN — METOPROLOL TARTRATE 12.5 MG: 25 TABLET, FILM COATED ORAL at 06:51

## 2021-05-10 NOTE — PAYOR COMM NOTE
"Liliam Da Silva (26 y.o. Female)     PLEASE SEE ATTACHED DC SUMMARY    REF#[id number]    THANK YOU    MARLON HAMMONDS LPN CCP    Date of Birth Social Security Number Address Home Phone MRN    [id number]  [address] [place] [id number] [phone] [id number]    Yazidi Marital Status          Congregational        Admission Date Admission Type Admitting Provider Attending Provider Department, Room/Bed    5/5/21 Emergency Caleb Naik MD  [address], [address]    Discharge Date Discharge Disposition Discharge Destination        5/7/2021 Home or Self Care              Attending Provider: (none)   Allergies: Eggs Or Egg-derived Products, Pepcid [Famotidine], Scopolamine    Isolation: None   Infection: None   Code Status: Prior    Ht: 163.8 cm (64.5\")   Wt: 103 kg (227 lb 3.2 oz)    Admission Cmt: None   Principal Problem: None                Active Insurance as of 5/5/2021     Primary Coverage     Payor Plan Insurance Group Employer/Plan Group    [employer]HE[employer] BLUE CROSS AN[employer]M BLUE CROSS BLUE SHIELD PPO [id number]     Payor Plan Address Payor Plan Phone Number Payor Plan Fax Number Effective Dates    [address] [phone]  4/1/2019 - None Entered    [place] [address]       Subscriber Name Subscriber Birth Date Member ID       AZEB DA SILVA 1995 [id number]                 Emergency Contacts      (Rel.) Home Phone Work Phone Mobile Phone    AZEB DA SILVA (Spouse) -- -- [phone]               Discharge Summary      Caleb Naik MD at 05/07/21 1401        Discharge summary    Date of admission 5/5/2021  Date of discharge 5/7/2021    Final diagnosis  Near syncope with history of postural orthostatic tachycardic syndrome  Severe gastroparesis  Mild hypoxemia resolved  Hypokalemia  Chronic hypotension  Phoebe-Danlos syndrome  Irritable bowel syndrome  Polypharmacy  Gastroesophageal reflux disease    Discharge medications    Current Facility-Administered Medications:   •  " acetaminophen (TYLENOL) tablet 650 mg, 650 mg, Oral, Once **AND** diphenhydrAMINE (BENADRYL) capsule 50 mg, 50 mg, Oral, Once **AND** iron sucrose (VENOFER) 100 mg in sodium chloride 0.9 % 100 mL IVPB, 100 mg, Intravenous, Once, Caleb Naik MD  •  budesonide (PULMICORT) nebulizer solution 0.25 mg, 0.25 mg, Nebulization, BID - RT, Caleb Naik MD, 0.25 mg at 05/07/21 0738  •  cholecalciferol (VITAMIN D3) tablet 1,000 Units, 1,000 Units, Oral, Daily, Caleb Naik MD, 1,000 Units at 05/07/21 1051  •  doxepin (SINEquan) capsule 25 mg, 25 mg, Oral, Nightly, Caleb Naik MD, 25 mg at 05/06/21 2233  •  gabapentin (NEURONTIN) capsule 100 mg, 100 mg, Oral, Daily, Caleb Naik MD, 100 mg at 05/07/21 1051  •  metoprolol tartrate (LOPRESSOR) tablet 12.5 mg, 12.5 mg, Oral, TID AC, Caleb Naik MD, 12.5 mg at 05/07/21 1054  •  midodrine (PROAMATINE) tablet 10 mg, 10 mg, Oral, TID ACHeena Aftab, MD, 10 mg at 05/07/21 1054  •  montelukast (SINGULAIR) tablet 10 mg, 10 mg, Oral, Daily, Caleb Naik MD, 10 mg at 05/07/21 1051  •  pantoprazole (PROTONIX) EC tablet 40 mg, 40 mg, Oral, BID AC, Caleb Naik MD, 40 mg at 05/07/21 0652     Consults obtained  Cardiology    Procedures  None    Hospital course  26-year old white female with history of postural orthostatic tachycardic syndrome and severe gastroparesis also have Phoebe-Danlos syndrome irritable bowel syndrome gastroesophageal disease and polypharmacy admitted to emergency room with near syncopal episode.  Patient work-up in ER revealed tachycardia and mild hypoxemia and low potassium admit for management.  Patient admitted and received fluids supplement oxygen and potassium replaced and cardiology consult obtained.  Patient home medication continued and also found to be iron deficient and received IV Venofer and start feeling better.  Patient has no dizziness lightheadedness or near syncopal episode.  Patient potassium 4.1 oxygen saturation 98% on room air tolerating  diet and clear for discharge.    Discharge diet regular    Activity as tolerated    Medication as above    Follow with primary doctor in 1 week and follow-up with cardiology per the instruction and take medication as directed    ANN GONZALEZ MD    Electronically signed by Ann Gonzalez MD at 05/07/21 6811

## 2021-05-10 NOTE — CASE MANAGEMENT/SOCIAL WORK
Case Management Discharge Note      Final Note: home via significant other with no discharge needs    Provided Post Acute Provider List?: N/A  N/A Provider List Comment: The patient was not provided with a HH/SNF list nor a print out of the HH/nursing home compare list from Medicare.gov as she currently denies any d/c needs  Provided Post Acute Provider Quality & Resource List?: N/A  N/A Quality & Resource List Comment: The patient was not provided with a HH/SNF list nor a print out of the HH/nursing home compare list from Medicare.gov as she currently denies any d/c needs    Selected Continued Care - Discharged on 5/7/2021 Admission date: 5/5/2021 - Discharge disposition: Home or Self Care    Destination    No services have been selected for the patient.              Durable Medical Equipment    No services have been selected for the patient.              Dialysis/Infusion    No services have been selected for the patient.              Home Medical Care    No services have been selected for the patient.              Therapy    No services have been selected for the patient.              Community Resources    No services have been selected for the patient.                  Transportation Services  Private: Car    Final Discharge Disposition Code: 01 - home or self-care

## 2021-05-24 DIAGNOSIS — R09.02 HYPOXIA: ICD-10-CM

## 2021-05-24 DIAGNOSIS — Q79.62 EHLERS-DANLOS SYNDROME TYPE III: Primary | ICD-10-CM

## 2021-09-08 ENCOUNTER — APPOINTMENT (OUTPATIENT)
Dept: GENERAL RADIOLOGY | Facility: HOSPITAL | Age: 26
End: 2021-09-08

## 2021-09-08 ENCOUNTER — APPOINTMENT (OUTPATIENT)
Dept: CT IMAGING | Facility: HOSPITAL | Age: 26
End: 2021-09-08

## 2021-09-08 ENCOUNTER — HOSPITAL ENCOUNTER (INPATIENT)
Facility: HOSPITAL | Age: 26
LOS: 3 days | Discharge: HOME | End: 2021-09-11
Attending: EMERGENCY MEDICINE | Admitting: HOSPITALIST

## 2021-09-08 DIAGNOSIS — G90.A POTS (POSTURAL ORTHOSTATIC TACHYCARDIA SYNDROME): ICD-10-CM

## 2021-09-08 DIAGNOSIS — I26.99 BILATERAL PULMONARY EMBOLISM (HCC): ICD-10-CM

## 2021-09-08 DIAGNOSIS — K31.84 GASTROPARESIS: ICD-10-CM

## 2021-09-08 DIAGNOSIS — R00.0 SINUS TACHYCARDIA: Primary | ICD-10-CM

## 2021-09-08 DIAGNOSIS — R00.0 TACHYCARDIA: Primary | ICD-10-CM

## 2021-09-08 LAB
ALBUMIN SERPL-MCNC: 4 G/DL (ref 3.5–5.2)
ALBUMIN/GLOB SERPL: 1.1 G/DL
ALP SERPL-CCNC: 58 U/L (ref 39–117)
ALT SERPL W P-5'-P-CCNC: 28 U/L (ref 1–33)
ANION GAP SERPL CALCULATED.3IONS-SCNC: 10.3 MMOL/L (ref 5–15)
APTT PPP: 28.5 SECONDS (ref 22.7–35.4)
AST SERPL-CCNC: 24 U/L (ref 1–32)
B PARAPERT DNA SPEC QL NAA+PROBE: NOT DETECTED
B PERT DNA SPEC QL NAA+PROBE: NOT DETECTED
BACTERIA UR QL AUTO: ABNORMAL /HPF
BASOPHILS # BLD AUTO: 0.03 10*3/MM3 (ref 0–0.2)
BASOPHILS # BLD AUTO: 0.05 10*3/MM3 (ref 0–0.2)
BASOPHILS NFR BLD AUTO: 0.4 % (ref 0–1.5)
BASOPHILS NFR BLD AUTO: 0.9 % (ref 0–1.5)
BILIRUB SERPL-MCNC: 0.4 MG/DL (ref 0–1.2)
BILIRUB UR QL STRIP: NEGATIVE
BUN SERPL-MCNC: 8 MG/DL (ref 6–20)
BUN/CREAT SERPL: 8.9 (ref 7–25)
C PNEUM DNA NPH QL NAA+NON-PROBE: NOT DETECTED
CALCIUM SPEC-SCNC: 8.7 MG/DL (ref 8.6–10.5)
CHLORIDE SERPL-SCNC: 109 MMOL/L (ref 98–107)
CLARITY UR: CLEAR
CO2 SERPL-SCNC: 18.7 MMOL/L (ref 22–29)
COLOR UR: YELLOW
CREAT SERPL-MCNC: 0.9 MG/DL (ref 0.57–1)
D DIMER PPP FEU-MCNC: 1.06 MCGFEU/ML (ref 0–0.49)
D DIMER PPP FEU-MCNC: 1.11 MCGFEU/ML (ref 0–0.49)
DEPRECATED RDW RBC AUTO: 43.7 FL (ref 37–54)
DEPRECATED RDW RBC AUTO: 46.6 FL (ref 37–54)
EOSINOPHIL # BLD AUTO: 0.13 10*3/MM3 (ref 0–0.4)
EOSINOPHIL # BLD AUTO: 0.15 10*3/MM3 (ref 0–0.4)
EOSINOPHIL NFR BLD AUTO: 1.9 % (ref 0.3–6.2)
EOSINOPHIL NFR BLD AUTO: 2.7 % (ref 0.3–6.2)
ERYTHROCYTE [DISTWIDTH] IN BLOOD BY AUTOMATED COUNT: 15.9 % (ref 12.3–15.4)
ERYTHROCYTE [DISTWIDTH] IN BLOOD BY AUTOMATED COUNT: 16.3 % (ref 12.3–15.4)
FLUAV SUBTYP SPEC NAA+PROBE: NOT DETECTED
FLUBV RNA ISLT QL NAA+PROBE: NOT DETECTED
GFR SERPL CREATININE-BSD FRML MDRD: 76 ML/MIN/1.73
GLOBULIN UR ELPH-MCNC: 3.5 GM/DL
GLUCOSE SERPL-MCNC: 112 MG/DL (ref 65–99)
GLUCOSE UR STRIP-MCNC: NEGATIVE MG/DL
HADV DNA SPEC NAA+PROBE: NOT DETECTED
HCG SERPL QL: NEGATIVE
HCOV 229E RNA SPEC QL NAA+PROBE: NOT DETECTED
HCOV HKU1 RNA SPEC QL NAA+PROBE: NOT DETECTED
HCOV NL63 RNA SPEC QL NAA+PROBE: NOT DETECTED
HCOV OC43 RNA SPEC QL NAA+PROBE: NOT DETECTED
HCT VFR BLD AUTO: 32.7 % (ref 34–46.6)
HCT VFR BLD AUTO: 35.5 % (ref 34–46.6)
HGB BLD-MCNC: 10.5 G/DL (ref 12–15.9)
HGB BLD-MCNC: 10.7 G/DL (ref 12–15.9)
HGB UR QL STRIP.AUTO: NEGATIVE
HMPV RNA NPH QL NAA+NON-PROBE: NOT DETECTED
HOLD SPECIMEN: NORMAL
HOLD SPECIMEN: NORMAL
HPIV1 RNA SPEC QL NAA+PROBE: NOT DETECTED
HPIV2 RNA SPEC QL NAA+PROBE: NOT DETECTED
HPIV3 RNA NPH QL NAA+PROBE: NOT DETECTED
HPIV4 P GENE NPH QL NAA+PROBE: NOT DETECTED
HYALINE CASTS UR QL AUTO: ABNORMAL /LPF
IMM GRANULOCYTES # BLD AUTO: 0.02 10*3/MM3 (ref 0–0.05)
IMM GRANULOCYTES # BLD AUTO: 0.02 10*3/MM3 (ref 0–0.05)
IMM GRANULOCYTES NFR BLD AUTO: 0.3 % (ref 0–0.5)
IMM GRANULOCYTES NFR BLD AUTO: 0.4 % (ref 0–0.5)
INR PPP: 1.04 (ref 0.9–1.1)
INR PPP: 1.08 (ref 0.9–1.1)
KETONES UR QL STRIP: NEGATIVE
LEUKOCYTE ESTERASE UR QL STRIP.AUTO: ABNORMAL
LYMPHOCYTES # BLD AUTO: 1.5 10*3/MM3 (ref 0.7–3.1)
LYMPHOCYTES # BLD AUTO: 2.22 10*3/MM3 (ref 0.7–3.1)
LYMPHOCYTES NFR BLD AUTO: 26.7 % (ref 19.6–45.3)
LYMPHOCYTES NFR BLD AUTO: 32 % (ref 19.6–45.3)
M PNEUMO IGG SER IA-ACNC: NOT DETECTED
MAGNESIUM SERPL-MCNC: 1.8 MG/DL (ref 1.6–2.6)
MCH RBC QN AUTO: 23.9 PG (ref 26.6–33)
MCH RBC QN AUTO: 24.5 PG (ref 26.6–33)
MCHC RBC AUTO-ENTMCNC: 30.1 G/DL (ref 31.5–35.7)
MCHC RBC AUTO-ENTMCNC: 32.1 G/DL (ref 31.5–35.7)
MCV RBC AUTO: 76.2 FL (ref 79–97)
MCV RBC AUTO: 79.2 FL (ref 79–97)
MONOCYTES # BLD AUTO: 0.38 10*3/MM3 (ref 0.1–0.9)
MONOCYTES # BLD AUTO: 0.51 10*3/MM3 (ref 0.1–0.9)
MONOCYTES NFR BLD AUTO: 6.8 % (ref 5–12)
MONOCYTES NFR BLD AUTO: 7.4 % (ref 5–12)
NEUTROPHILS NFR BLD AUTO: 3.51 10*3/MM3 (ref 1.7–7)
NEUTROPHILS NFR BLD AUTO: 4.02 10*3/MM3 (ref 1.7–7)
NEUTROPHILS NFR BLD AUTO: 58 % (ref 42.7–76)
NEUTROPHILS NFR BLD AUTO: 62.5 % (ref 42.7–76)
NITRITE UR QL STRIP: NEGATIVE
NRBC BLD AUTO-RTO: 0 /100 WBC (ref 0–0.2)
NRBC BLD AUTO-RTO: 0 /100 WBC (ref 0–0.2)
NT-PROBNP SERPL-MCNC: 8 PG/ML (ref 0–450)
PH UR STRIP.AUTO: 6.5 [PH] (ref 5–8)
PLATELET # BLD AUTO: 262 10*3/MM3 (ref 140–450)
PLATELET # BLD AUTO: 309 10*3/MM3 (ref 140–450)
PMV BLD AUTO: 10.4 FL (ref 6–12)
PMV BLD AUTO: 10.8 FL (ref 6–12)
POTASSIUM SERPL-SCNC: 3.8 MMOL/L (ref 3.5–5.2)
PROT SERPL-MCNC: 7.5 G/DL (ref 6–8.5)
PROT UR QL STRIP: NEGATIVE
PROTHROMBIN TIME: 13.4 SECONDS (ref 11.7–14.2)
PROTHROMBIN TIME: 13.8 SECONDS (ref 11.7–14.2)
QT INTERVAL: 298 MS
RBC # BLD AUTO: 4.29 10*6/MM3 (ref 3.77–5.28)
RBC # BLD AUTO: 4.48 10*6/MM3 (ref 3.77–5.28)
RBC # UR: ABNORMAL /HPF
REF LAB TEST METHOD: ABNORMAL
RHINOVIRUS RNA SPEC NAA+PROBE: NOT DETECTED
RSV RNA NPH QL NAA+NON-PROBE: NOT DETECTED
SARS-COV-2 RNA NPH QL NAA+NON-PROBE: NOT DETECTED
SODIUM SERPL-SCNC: 138 MMOL/L (ref 136–145)
SP GR UR STRIP: 1.01 (ref 1–1.03)
SQUAMOUS #/AREA URNS HPF: ABNORMAL /HPF
T4 FREE SERPL-MCNC: 1.15 NG/DL (ref 0.93–1.7)
TROPONIN T SERPL-MCNC: <0.01 NG/ML (ref 0–0.03)
TROPONIN T SERPL-MCNC: <0.01 NG/ML (ref 0–0.03)
TSH SERPL DL<=0.05 MIU/L-ACNC: 2.73 UIU/ML (ref 0.27–4.2)
UROBILINOGEN UR QL STRIP: ABNORMAL
WBC # BLD AUTO: 5.61 10*3/MM3 (ref 3.4–10.8)
WBC # BLD AUTO: 6.93 10*3/MM3 (ref 3.4–10.8)
WBC UR QL AUTO: ABNORMAL /HPF
WHOLE BLOOD HOLD SPECIMEN: NORMAL
WHOLE BLOOD HOLD SPECIMEN: NORMAL

## 2021-09-08 PROCEDURE — 84703 CHORIONIC GONADOTROPIN ASSAY: CPT | Performed by: EMERGENCY MEDICINE

## 2021-09-08 PROCEDURE — 25010000002 ONDANSETRON PER 1 MG: Performed by: HOSPITALIST

## 2021-09-08 PROCEDURE — 25010000002 ONDANSETRON PER 1 MG: Performed by: EMERGENCY MEDICINE

## 2021-09-08 PROCEDURE — 85025 COMPLETE CBC W/AUTO DIFF WBC: CPT | Performed by: EMERGENCY MEDICINE

## 2021-09-08 PROCEDURE — 25010000002 HEPARIN (PORCINE) PER 1000 UNITS: Performed by: EMERGENCY MEDICINE

## 2021-09-08 PROCEDURE — 85610 PROTHROMBIN TIME: CPT | Performed by: EMERGENCY MEDICINE

## 2021-09-08 PROCEDURE — 80053 COMPREHEN METABOLIC PANEL: CPT | Performed by: EMERGENCY MEDICINE

## 2021-09-08 PROCEDURE — 99285 EMERGENCY DEPT VISIT HI MDM: CPT

## 2021-09-08 PROCEDURE — 71275 CT ANGIOGRAPHY CHEST: CPT

## 2021-09-08 PROCEDURE — 0202U NFCT DS 22 TRGT SARS-COV-2: CPT | Performed by: EMERGENCY MEDICINE

## 2021-09-08 PROCEDURE — 93005 ELECTROCARDIOGRAM TRACING: CPT | Performed by: EMERGENCY MEDICINE

## 2021-09-08 PROCEDURE — 84439 ASSAY OF FREE THYROXINE: CPT | Performed by: EMERGENCY MEDICINE

## 2021-09-08 PROCEDURE — 85379 FIBRIN DEGRADATION QUANT: CPT | Performed by: EMERGENCY MEDICINE

## 2021-09-08 PROCEDURE — 84484 ASSAY OF TROPONIN QUANT: CPT | Performed by: EMERGENCY MEDICINE

## 2021-09-08 PROCEDURE — 83735 ASSAY OF MAGNESIUM: CPT | Performed by: EMERGENCY MEDICINE

## 2021-09-08 PROCEDURE — 93010 ELECTROCARDIOGRAM REPORT: CPT | Performed by: INTERNAL MEDICINE

## 2021-09-08 PROCEDURE — 71045 X-RAY EXAM CHEST 1 VIEW: CPT

## 2021-09-08 PROCEDURE — 81001 URINALYSIS AUTO W/SCOPE: CPT | Performed by: EMERGENCY MEDICINE

## 2021-09-08 PROCEDURE — 85730 THROMBOPLASTIN TIME PARTIAL: CPT | Performed by: EMERGENCY MEDICINE

## 2021-09-08 PROCEDURE — 0 IOPAMIDOL PER 1 ML: Performed by: EMERGENCY MEDICINE

## 2021-09-08 PROCEDURE — 83880 ASSAY OF NATRIURETIC PEPTIDE: CPT | Performed by: EMERGENCY MEDICINE

## 2021-09-08 PROCEDURE — 84443 ASSAY THYROID STIM HORMONE: CPT | Performed by: EMERGENCY MEDICINE

## 2021-09-08 RX ORDER — HEPARIN SODIUM 10000 [USP'U]/100ML
13.3 INJECTION, SOLUTION INTRAVENOUS
Status: DISCONTINUED | OUTPATIENT
Start: 2021-09-08 | End: 2021-09-10

## 2021-09-08 RX ORDER — RABEPRAZOLE SODIUM 20 MG/1
20 TABLET, DELAYED RELEASE ORAL DAILY
COMMUNITY
End: 2021-09-11 | Stop reason: HOSPADM

## 2021-09-08 RX ORDER — PANTOPRAZOLE SODIUM 40 MG/1
40 TABLET, DELAYED RELEASE ORAL ONCE
Status: COMPLETED | OUTPATIENT
Start: 2021-09-09 | End: 2021-09-09

## 2021-09-08 RX ORDER — PANTOPRAZOLE SODIUM 40 MG/1
40 TABLET, DELAYED RELEASE ORAL
Status: DISCONTINUED | OUTPATIENT
Start: 2021-09-09 | End: 2021-09-11 | Stop reason: HOSPADM

## 2021-09-08 RX ORDER — ONDANSETRON 2 MG/ML
8 INJECTION INTRAMUSCULAR; INTRAVENOUS EVERY 8 HOURS PRN
Status: DISCONTINUED | OUTPATIENT
Start: 2021-09-08 | End: 2021-09-11

## 2021-09-08 RX ORDER — HEPARIN SODIUM 5000 [USP'U]/ML
80 INJECTION, SOLUTION INTRAVENOUS; SUBCUTANEOUS ONCE
Status: COMPLETED | OUTPATIENT
Start: 2021-09-08 | End: 2021-09-08

## 2021-09-08 RX ORDER — METOPROLOL TARTRATE 50 MG/1
50 TABLET, FILM COATED ORAL ONCE
Status: COMPLETED | OUTPATIENT
Start: 2021-09-08 | End: 2021-09-08

## 2021-09-08 RX ORDER — ONDANSETRON 2 MG/ML
4 INJECTION INTRAMUSCULAR; INTRAVENOUS ONCE
Status: COMPLETED | OUTPATIENT
Start: 2021-09-08 | End: 2021-09-08

## 2021-09-08 RX ORDER — HEPARIN SODIUM 5000 [USP'U]/ML
40-80 INJECTION, SOLUTION INTRAVENOUS; SUBCUTANEOUS EVERY 6 HOURS PRN
Status: DISCONTINUED | OUTPATIENT
Start: 2021-09-08 | End: 2021-09-10

## 2021-09-08 RX ADMIN — METOROPROLOL TARTRATE 5 MG: 5 INJECTION, SOLUTION INTRAVENOUS at 10:20

## 2021-09-08 RX ADMIN — HEPARIN SODIUM 9000 UNITS: 5000 INJECTION INTRAVENOUS; SUBCUTANEOUS at 17:41

## 2021-09-08 RX ADMIN — ONDANSETRON 8 MG: 2 INJECTION INTRAMUSCULAR; INTRAVENOUS at 23:19

## 2021-09-08 RX ADMIN — METOPROLOL TARTRATE 50 MG: 50 TABLET, FILM COATED ORAL at 10:21

## 2021-09-08 RX ADMIN — ONDANSETRON 4 MG: 2 INJECTION INTRAMUSCULAR; INTRAVENOUS at 10:59

## 2021-09-08 RX ADMIN — SODIUM CHLORIDE 1000 ML: 9 INJECTION, SOLUTION INTRAVENOUS at 10:18

## 2021-09-08 RX ADMIN — IOPAMIDOL 95 ML: 755 INJECTION, SOLUTION INTRAVENOUS at 15:57

## 2021-09-08 RX ADMIN — HEPARIN SODIUM 13.3 UNITS/KG/HR: 10000 INJECTION, SOLUTION INTRAVENOUS at 17:53

## 2021-09-08 NOTE — ED NOTES
Pt to ED from home, has hx of pots, reports HR has been 130s to 160s since Sunday, has been vaccinated, no known covid exposures. Taking metoprolol at home, last taken at dinner last night, has port to L chest. Home health RN came today. Pt reports SOA with high HR. Reports pain to L anterior chest, 800 IVF pta. Denies worse n/v today, states low grade fever   at home 100.1   CP intermittent.     Pt wearing face mask for the duration while in ER today. This RN wore capr, gown and gloves while providing care.        Helen Chowdary, RN  09/08/21 8005       Helen Chowdary RN  09/08/21 3293

## 2021-09-08 NOTE — DISCHARGE INSTRUCTIONS
Continue medications as prescribed. Call Dr. Rausch for follow-up and to obtain your monitor. Return to the emergency room if worse.

## 2021-09-08 NOTE — ED PROVIDER NOTES
EMERGENCY DEPARTMENT ENCOUNTER    Room Number:  2204/1  Date of encounter:  9/10/2021  PCP: Jason Borrero MD  Historian: Patient      HPI:  Chief Complaint: Palpitations and shortness of breath    Context: Liliam Lorenz is a 26 y.o. female who presents to the ED c/o constant palpitations for the past 3 days.  Patient has a complicated past medical history that includes gastroparesis and pots syndrome.  The patient is followed by Dr. Rausch from cardiology.  The patient states that her resting heart rate has been between 120 and 140 for the past 3 days consistently and she describes some mild chest discomfort along with this along with dizziness and uneasiness.  The patient's home health nurse came today and noted her heart rate to be 140 and called 911 to have the patient transferred to the emergency room.  The patient states she has not had her oral metoprolol since last night.  Currently the patient complains of mild chest discomfort, palpitations and dizziness.  She states that she does have mild shortness of breath but that she always feels this way when her heart rate is elevated.  She states she has had both of her Covid vaccines and that she has not had any fevers, chills, cough, loss of taste, loss of smell or known COVID-19 exposures.      PAST MEDICAL HISTORY  Active Ambulatory Problems     Diagnosis Date Noted   • Poor venous access 10/15/2020   • POTS (postural orthostatic tachycardia syndrome) 05/06/2021   • Postural orthostatic tachycardia syndrome 06/12/2020     Resolved Ambulatory Problems     Diagnosis Date Noted   • No Resolved Ambulatory Problems     Past Medical History:   Diagnosis Date   • Bronchitis    • Chest pain    • Chronic hypotension    • Eczema    • Phoebe-Danlos syndrome    • Gastroparesis    • GERD (gastroesophageal reflux disease)    • IBS (irritable bowel syndrome)    • Lightheadedness    • Rectal fissure    • Rosacea    • Tachycardia          PAST SURGICAL HISTORY  Past  Surgical History:   Procedure Laterality Date   • COLONOSCOPY     • MEDIPORT INSERTION, DOUBLE  09/01/2020    [facility]    • UPPER GASTROINTESTINAL ENDOSCOPY     • VENOUS ACCESS DEVICE (PORT) INSERTION Left 10/16/2020    Procedure: INSERTION VENOUS ACCESS DEVICE UNDER FLURO;  Surgeon: Pa Lane MD;  Location: [facility];  Service: General;  Laterality: Left;         FAMILY HISTORY  Family History   Problem Relation Age of Onset   • Hypertension Mother    • Diabetes Mother    • Diabetes Father    • Heart disease Paternal Grandfather    • Stroke Paternal Grandfather    • Diabetes Paternal Grandfather    • Cancer Paternal Grandfather    • Coronary artery disease Maternal Grandmother         MGM with 4 vessel CABG and multiple stents   • Cancer Maternal Grandmother    • Coronary artery disease Maternal Uncle         Maternal uncle with MI   • Breast cancer Other    • Malig Hyperthermia Neg Hx          SOCIAL HISTORY  Social History     Socioeconomic History   • Marital status:      Spouse name: Not on file   • Number of children: Not on file   • Years of education: Not on file   • Highest education level: Not on file   Tobacco Use   • Smoking status: Never Smoker   • Smokeless tobacco: Never Used   Vaping Use   • Vaping Use: Never used   Substance and Sexual Activity   • Alcohol use: No   • Drug use: No   • Sexual activity: Yes     Partners: Female     Birth control/protection: None         ALLERGIES  Eggs or egg-derived products, Pepcid [famotidine], and Scopolamine        REVIEW OF SYSTEMS  Review of Systems     The patient denies headache, neck pain, abdominal pain, vomiting, diarrhea, lower extremity pain or focal neuro deficit  All systems reviewed and negative except for those discussed in HPI.     PHYSICAL EXAM    I have reviewed the triage vital signs and nursing notes.    ED Triage Vitals   Temp Heart Rate Resp BP SpO2   09/08/21 0918 09/08/21 0918 09/08/21 0918 09/08/21 0918  09/08/21 0918   98.4 °F (36.9 °C) (!) 148 20 (!) 161/101 97 %      Temp src Heart Rate Source Patient Position BP Location FiO2 (%)   09/08/21 0918 09/08/21 0948 -- -- --   Temporal Monitor          GENERAL: 26-year-old well developed, well nourished in mild distress  HENT: NCAT, neck supple, trachea midline  EYES: no scleral icterus, PERRL, normal conjunctiva  CV: regular rhythm but tachycardic to 130s, no murmur: No chest tenderness  RESPIRATORY: unlabored effort, CTAB  ABDOMEN: soft, non-tender, non-distended, bowel sounds present  MUSCULOSKELETAL: no gross deformity, no pedal edema, no calf tenderness  NEURO: alert,  sensory and motor function of extremities intact, speech clear, mental status normal  SKIN: warm, dry, no rash  PSYCH:  Appropriate mood and affect      PPE  Patient was placed in face mask in first look. Patient was wearing facemask when I entered the room and throughout our encounter. I wore full protective equipment throughout this patient encounter including a N95 face mask, eye shield, gown and gloves. Hand hygiene was performed before donning protective equipment and after removal when leaving the room.    Vital signs and nursing notes reviewed.      LAB RESULTS  Recent Results (from the past 24 hour(s))   CBC Auto Differential    Collection Time: 09/09/21  8:57 AM    Specimen: Blood   Result Value Ref Range    WBC 6.31 3.40 - 10.80 10*3/mm3    RBC 4.77 3.77 - 5.28 10*6/mm3    Hemoglobin 11.6 (L) 12.0 - 15.9 g/dL    Hematocrit 36.2 34.0 - 46.6 %    MCV 75.9 (L) 79.0 - 97.0 fL    MCH 24.3 (L) 26.6 - 33.0 pg    MCHC 32.0 31.5 - 35.7 g/dL    RDW 16.4 (H) 12.3 - 15.4 %    RDW-SD 44.8 37.0 - 54.0 fl    MPV 10.2 6.0 - 12.0 fL    Platelets 330 140 - 450 10*3/mm3    Neutrophil % 67.8 42.7 - 76.0 %    Lymphocyte % 23.9 19.6 - 45.3 %    Monocyte % 5.9 5.0 - 12.0 %    Eosinophil % 1.3 0.3 - 6.2 %    Basophil % 0.8 0.0 - 1.5 %    Immature Grans % 0.3 0.0 - 0.5 %    Neutrophils, Absolute 4.28 1.70 - 7.00  10*3/mm3    Lymphocytes, Absolute 1.51 0.70 - 3.10 10*3/mm3    Monocytes, Absolute 0.37 0.10 - 0.90 10*3/mm3    Eosinophils, Absolute 0.08 0.00 - 0.40 10*3/mm3    Basophils, Absolute 0.05 0.00 - 0.20 10*3/mm3    Immature Grans, Absolute 0.02 0.00 - 0.05 10*3/mm3    nRBC 0.0 0.0 - 0.2 /100 WBC   aPTT    Collection Time: 09/09/21  8:57 AM    Specimen: Blood   Result Value Ref Range    PTT 81.4 (H) 22.7 - 35.4 seconds   Duplex Venous Lower Extremity - Bilateral CAR    Collection Time: 09/09/21 11:50 AM   Result Value Ref Range    Target HR (85%) 165 bpm    Max. Pred. HR (100%) 194 bpm    Right Common Femoral Spont Y     Right Common Femoral Phasic Y     Right Common Femoral Augment Y     Right Common Femoral Competent Y     Right Common Femoral Compress C     Right Saphenofemoral Junction Compress C     Right Profunda Femoral Compress C     Right Proximal Femoral Compress C     Right Mid Femoral Spont Y     Right Mid Femoral Phasic Y     Right Mid Femoral Augment Y     Right Mid Femoral Competent Y     Right Mid Femoral Compress C     Right Distal Femoral Compress C     Right Popliteal Spont Y     Right Popliteal Phasic Y     Right Popliteal Augment Y     Right Popliteal Competent Y     Right Popliteal Compress C     Right Posterior Tibial Compress C     Right Peroneal Compress C     Right GastronemiusSoleal Compress C     Right Greater Saph AK Compress C     Right Greater Saph BK Compress C     Right Lesser Saph Compress C     Left Common Femoral Spont Y     Left Common Femoral Phasic Y     Left Common Femoral Augment Y     Left Common Femoral Competent Y     Left Common Femoral Compress C     Left Saphenofemoral Junction Compress C     Left Profunda Femoral Compress C     Left Proximal Femoral Compress C     Left Mid Femoral Spont Y     Left Mid Femoral Phasic Y     Left Mid Femoral Augment Y     Left Mid Femoral Competent Y     Left Mid Femoral Compress C     Left Distal Femoral Compress C     Left Popliteal Spont Y      Left Popliteal Phasic Y     Left Popliteal Augment Y     Left Popliteal Competent Y     Left Popliteal Compress C     Left Posterior Tibial Compress C     Left Peroneal Compress C     Left GastronemiusSoleal Compress C     Left Greater Saph AK Compress C     Left Greater Saph BK Compress C     Left Lesser Saph Compress C    CBC Auto Differential    Collection Time: 09/10/21  4:24 AM    Specimen: Blood   Result Value Ref Range    WBC 7.85 3.40 - 10.80 10*3/mm3    RBC 4.46 3.77 - 5.28 10*6/mm3    Hemoglobin 11.0 (L) 12.0 - 15.9 g/dL    Hematocrit 35.1 34.0 - 46.6 %    MCV 78.7 (L) 79.0 - 97.0 fL    MCH 24.7 (L) 26.6 - 33.0 pg    MCHC 31.3 (L) 31.5 - 35.7 g/dL    RDW 16.2 (H) 12.3 - 15.4 %    RDW-SD 46.4 37.0 - 54.0 fl    MPV 10.5 6.0 - 12.0 fL    Platelets 290 140 - 450 10*3/mm3    Neutrophil % 47.3 42.7 - 76.0 %    Lymphocyte % 40.9 19.6 - 45.3 %    Monocyte % 7.4 5.0 - 12.0 %    Eosinophil % 3.1 0.3 - 6.2 %    Basophil % 1.0 0.0 - 1.5 %    Immature Grans % 0.3 0.0 - 0.5 %    Neutrophils, Absolute 3.72 1.70 - 7.00 10*3/mm3    Lymphocytes, Absolute 3.21 (H) 0.70 - 3.10 10*3/mm3    Monocytes, Absolute 0.58 0.10 - 0.90 10*3/mm3    Eosinophils, Absolute 0.24 0.00 - 0.40 10*3/mm3    Basophils, Absolute 0.08 0.00 - 0.20 10*3/mm3    Immature Grans, Absolute 0.02 0.00 - 0.05 10*3/mm3    nRBC 0.0 0.0 - 0.2 /100 WBC   aPTT    Collection Time: 09/10/21  4:25 AM    Specimen: Blood   Result Value Ref Range    .5 (H) 22.7 - 35.4 seconds   Comprehensive Metabolic Panel    Collection Time: 09/10/21  4:25 AM    Specimen: Blood   Result Value Ref Range    Glucose 88 65 - 99 mg/dL    BUN 9 6 - 20 mg/dL    Creatinine 0.89 0.57 - 1.00 mg/dL    Sodium 140 136 - 145 mmol/L    Potassium 3.8 3.5 - 5.2 mmol/L    Chloride 107 98 - 107 mmol/L    CO2 20.6 (L) 22.0 - 29.0 mmol/L    Calcium 8.9 8.6 - 10.5 mg/dL    Total Protein 7.2 6.0 - 8.5 g/dL    Albumin 3.80 3.50 - 5.20 g/dL    ALT (SGPT) 21 1 - 33 U/L    AST (SGOT) 19 1 - 32 U/L     Alkaline Phosphatase 55 39 - 117 U/L    Total Bilirubin 0.6 0.0 - 1.2 mg/dL    eGFR Non African Amer 77 >60 mL/min/1.73    Globulin 3.4 gm/dL    A/G Ratio 1.1 g/dL    BUN/Creatinine Ratio 10.1 7.0 - 25.0    Anion Gap 12.4 5.0 - 15.0 mmol/L   TSH    Collection Time: 09/10/21  4:25 AM    Specimen: Blood   Result Value Ref Range    TSH 4.930 (H) 0.270 - 4.200 uIU/mL   Lipid Panel    Collection Time: 09/10/21  4:25 AM    Specimen: Blood   Result Value Ref Range    Total Cholesterol 212 (H) 0 - 200 mg/dL    Triglycerides 140 0 - 150 mg/dL    HDL Cholesterol 38 (L) 40 - 60 mg/dL    LDL Cholesterol  149 (H) 0 - 100 mg/dL    VLDL Cholesterol 25 5 - 40 mg/dL    LDL/HDL Ratio 3.84    Hemoglobin A1c    Collection Time: 09/10/21  4:25 AM    Specimen: Blood   Result Value Ref Range    Hemoglobin A1C 4.90 4.80 - 5.60 %       Ordered the above labs and independently reviewed the results.        RADIOLOGY  Duplex Venous Lower Extremity - Bilateral CAR    Result Date: 9/9/2021  · Normal bilateral lower extremity venous duplex scan.        I ordered the above noted radiological studies. Independently reviewed by me and discussed with radiologist.  See dictation above for official radiology interpretation.      PROCEDURES    Procedures    EKG    EKG time: 1002  Rhythm/Rate: Sinus tachycardia at 132  No Acute Ischemia  Non-Specific ST-T changes    Similar compared to prior on 5/6/2021 except now the rate has increased from 118-132    Interpreted Contemporaneously by me.  Independently viewed by me      MEDICATIONS GIVEN IN ER    Medications   heparin 68256 units/250 mL (100 units/mL) in 0.45 % NaCl infusion (11.3 Units/kg/hr × 113 kg Intravenous New Bag 9/9/21 1256)   heparin (porcine) 5000 UNIT/ML injection 4,500-9,000 Units (has no administration in time range)   ondansetron (ZOFRAN) injection 8 mg (8 mg Intravenous Given 9/9/21 0098)   pantoprazole (PROTONIX) EC tablet 40 mg (40 mg Oral Given 9/10/21 3087)   metoprolol tartrate  (LOPRESSOR) tablet 25 mg (25 mg Oral Given 9/9/21 2010)   midodrine (PROAMATINE) tablet 10 mg (10 mg Oral Given 9/10/21 0634)   albuterol sulfate HFA (PROVENTIL HFA;VENTOLIN HFA;PROAIR HFA) inhaler 2 puff (has no administration in time range)   cholecalciferol (VITAMIN D3) tablet 1,000 Units (1,000 Units Oral Given 9/9/21 1548)   gabapentin (NEURONTIN) capsule 100 mg (100 mg Oral Given 9/9/21 1548)   montelukast (SINGULAIR) tablet 10 mg (10 mg Oral Given 9/9/21 1549)   promethazine (PHENERGAN) 12.5 mg in sodium chloride 0.9 % 50 mL (12.5 mg Intravenous New Bag 9/10/21 0134)   sodium chloride 0.9 % bolus 1,000 mL (0 mL Intravenous Stopped 9/8/21 1449)   metoprolol tartrate (LOPRESSOR) tablet 50 mg (50 mg Oral Given 9/8/21 1021)   metoprolol tartrate (LOPRESSOR) injection 5 mg (5 mg Intravenous Given 9/8/21 1020)   ondansetron (ZOFRAN) injection 4 mg (4 mg Intravenous Given 9/8/21 1059)   iopamidol (ISOVUE-370) 76 % injection 100 mL (95 mL Intravenous Given by Other 9/8/21 1557)   heparin (porcine) 5000 UNIT/ML injection 9,000 Units (9,000 Units Intravenous Given 9/8/21 1741)   promethazine (PHENERGAN) 25 mg in sodium chloride 0.9 % 50 mL (25 mg Intravenous New Bag 9/9/21 1207)   pantoprazole (PROTONIX) EC tablet 40 mg (40 mg Oral Given 9/9/21 0007)   heparin (porcine) 5000 UNIT/ML injection 9,000 Units (9,000 Units Intravenous Given 9/9/21 1549)         PROGRESS, DATA ANALYSIS, CONSULTS, AND MEDICAL DECISION MAKING    All labs have been independently reviewed by me.  All radiology studies have been reviewed by me and discussed with radiologist dictating report.   EKG's independently reviewed by me.  Discussion below represents my analysis of pertinent findings related to patient's condition, differential diagnosis, treatment plan and final disposition.      ED Course as of Sep 10 0843   Wed Sep 08, 2021   0959 I performed a carotid massage on the right which briefly slow the patient's heart rate down into the 120s  and appeared she was in a sinus tachycardia.  I will treat the patient with both p.o. and IV Lopressor and IV fluids while obtaining EKG, chest x-ray, Covid swab and lab work for further evaluation.    [GP]   1226 Patient's heart rate is down to 98 and she feels better.  Her Covid test is negative.  We are awaiting the patient's CT angiogram of the chest.    [GP]   1429 We still have been unable to obtain appropriate access for CT angiogram of the chest.  Currently awaiting IV therapy to attempt with the ultrasound.  Apparently her PICC line is not a power port and cannot be used for an angiogram of the chest.    [GP]   1515 The patient finally has an IV that is compatible with CTA of the chest. Currently her heart rate is 100. She states that Dr. Rausch has messaged her back and states that he will order a heart monitor for her.    [GP]   1531 I discussed the case with my partner Dr. Mendoza who will follow up with the patient's CTA chest.  He is aware that the CTA chest is negative the patient will be stable for discharge home and outpatient follow-up with Dr. Rausch.  I have discussed the above information with the patient and she understands and agrees with the plan.    [GP]   1642 CT angiogram of the chest was independently viewed by me and discussed with Dr. Waite (radiology)-there are very small bilateral pulmonary emboli.  RV to LV ratio is less than 1.  Dr. Waite is questioning if the patient's port is thrombosed or stenotic in the area of the SVC.  Patient will need to be admitted for further evaluation and work-up.    [WC]   1651 Case discussed with Dr. Naik ([place] [facility])-he agrees to move the patient to telemetry bed.    [WC]      ED Course User Index  [GP] Luis Felipe Israel MD  [WC] Juarez Mendoza MD           The differential diagnosis includes but is not limited to pneumonia, congestive heart failure, pulmonary embolism, pleural effusion, acute coronary syndrome,  chronic obstructive pulmonary disease exacerbation, or pneumothorax.        AS OF 08:43 EDT VITALS:    BP - 108/67  HR - 95  TEMP - 97.9 °F (36.6 °C) (Oral)  02 SATS - 97%        DIAGNOSIS  Final diagnoses:   Sinus tachycardia   POTS (postural orthostatic tachycardia syndrome)   Gastroparesis   Bilateral pulmonary embolism (CMS/HCC)         DISPOSITION  ADMISSION    Discussed treatment plan and reason for admission with pt/family and admitting physician.  Pt/family voiced understanding of the plan for admission for further testing/treatment as needed.        EMR Dragon/Transcription disclaimer:   Much of this encounter note is an electronic transcription/translation of spoken language to printed text.        Luis Felipe Israel MD  09/10/21 6200

## 2021-09-08 NOTE — ED TRIAGE NOTES
"Patient to er from home with c/o fast heart rate and soa that started on Sunday that is getting worse.\" patient transported to er per ems. Patient has mask on in triage along with staff.   "

## 2021-09-09 ENCOUNTER — APPOINTMENT (OUTPATIENT)
Dept: CARDIOLOGY | Facility: HOSPITAL | Age: 26
End: 2021-09-09

## 2021-09-09 PROBLEM — Q79.60 EHLERS-DANLOS SYNDROME: Status: ACTIVE | Noted: 2021-03-12

## 2021-09-09 PROBLEM — K31.84 GASTROPARESIS: Status: ACTIVE | Noted: 2017-08-22

## 2021-09-09 PROBLEM — G90.A POSTURAL ORTHOSTATIC TACHYCARDIA SYNDROME: Status: ACTIVE | Noted: 2020-06-12

## 2021-09-09 PROBLEM — I26.94 MULTIPLE SUBSEGMENTAL PULMONARY EMBOLI WITHOUT ACUTE COR PULMONALE (HCC): Status: ACTIVE | Noted: 2021-09-09

## 2021-09-09 PROBLEM — R11.2 NAUSEA AND VOMITING: Status: ACTIVE | Noted: 2017-08-22

## 2021-09-09 PROBLEM — M35.9 AUTOIMMUNE DISEASE (HCC): Status: ACTIVE | Noted: 2020-09-06

## 2021-09-09 LAB
APTT PPP: 81.4 SECONDS (ref 22.7–35.4)
APTT PPP: >200 SECONDS (ref 22.7–35.4)
BASOPHILS # BLD AUTO: 0.05 10*3/MM3 (ref 0–0.2)
BASOPHILS # BLD AUTO: 0.06 10*3/MM3 (ref 0–0.2)
BASOPHILS NFR BLD AUTO: 0.7 % (ref 0–1.5)
BASOPHILS NFR BLD AUTO: 0.8 % (ref 0–1.5)
BH CV LOWER VASCULAR LEFT COMMON FEMORAL AUGMENT: NORMAL
BH CV LOWER VASCULAR LEFT COMMON FEMORAL COMPETENT: NORMAL
BH CV LOWER VASCULAR LEFT COMMON FEMORAL COMPRESS: NORMAL
BH CV LOWER VASCULAR LEFT COMMON FEMORAL PHASIC: NORMAL
BH CV LOWER VASCULAR LEFT COMMON FEMORAL SPONT: NORMAL
BH CV LOWER VASCULAR LEFT DISTAL FEMORAL COMPRESS: NORMAL
BH CV LOWER VASCULAR LEFT GASTRONEMIUS COMPRESS: NORMAL
BH CV LOWER VASCULAR LEFT GREATER SAPH AK COMPRESS: NORMAL
BH CV LOWER VASCULAR LEFT GREATER SAPH BK COMPRESS: NORMAL
BH CV LOWER VASCULAR LEFT LESSER SAPH COMPRESS: NORMAL
BH CV LOWER VASCULAR LEFT MID FEMORAL AUGMENT: NORMAL
BH CV LOWER VASCULAR LEFT MID FEMORAL COMPETENT: NORMAL
BH CV LOWER VASCULAR LEFT MID FEMORAL COMPRESS: NORMAL
BH CV LOWER VASCULAR LEFT MID FEMORAL PHASIC: NORMAL
BH CV LOWER VASCULAR LEFT MID FEMORAL SPONT: NORMAL
BH CV LOWER VASCULAR LEFT PERONEAL COMPRESS: NORMAL
BH CV LOWER VASCULAR LEFT POPLITEAL AUGMENT: NORMAL
BH CV LOWER VASCULAR LEFT POPLITEAL COMPETENT: NORMAL
BH CV LOWER VASCULAR LEFT POPLITEAL COMPRESS: NORMAL
BH CV LOWER VASCULAR LEFT POPLITEAL PHASIC: NORMAL
BH CV LOWER VASCULAR LEFT POPLITEAL SPONT: NORMAL
BH CV LOWER VASCULAR LEFT POSTERIOR TIBIAL COMPRESS: NORMAL
BH CV LOWER VASCULAR LEFT PROFUNDA FEMORAL COMPRESS: NORMAL
BH CV LOWER VASCULAR LEFT PROXIMAL FEMORAL COMPRESS: NORMAL
BH CV LOWER VASCULAR LEFT SAPHENOFEMORAL JUNCTION COMPRESS: NORMAL
BH CV LOWER VASCULAR RIGHT COMMON FEMORAL AUGMENT: NORMAL
BH CV LOWER VASCULAR RIGHT COMMON FEMORAL COMPETENT: NORMAL
BH CV LOWER VASCULAR RIGHT COMMON FEMORAL COMPRESS: NORMAL
BH CV LOWER VASCULAR RIGHT COMMON FEMORAL PHASIC: NORMAL
BH CV LOWER VASCULAR RIGHT COMMON FEMORAL SPONT: NORMAL
BH CV LOWER VASCULAR RIGHT DISTAL FEMORAL COMPRESS: NORMAL
BH CV LOWER VASCULAR RIGHT GASTRONEMIUS COMPRESS: NORMAL
BH CV LOWER VASCULAR RIGHT GREATER SAPH AK COMPRESS: NORMAL
BH CV LOWER VASCULAR RIGHT GREATER SAPH BK COMPRESS: NORMAL
BH CV LOWER VASCULAR RIGHT LESSER SAPH COMPRESS: NORMAL
BH CV LOWER VASCULAR RIGHT MID FEMORAL AUGMENT: NORMAL
BH CV LOWER VASCULAR RIGHT MID FEMORAL COMPETENT: NORMAL
BH CV LOWER VASCULAR RIGHT MID FEMORAL COMPRESS: NORMAL
BH CV LOWER VASCULAR RIGHT MID FEMORAL PHASIC: NORMAL
BH CV LOWER VASCULAR RIGHT MID FEMORAL SPONT: NORMAL
BH CV LOWER VASCULAR RIGHT PERONEAL COMPRESS: NORMAL
BH CV LOWER VASCULAR RIGHT POPLITEAL AUGMENT: NORMAL
BH CV LOWER VASCULAR RIGHT POPLITEAL COMPETENT: NORMAL
BH CV LOWER VASCULAR RIGHT POPLITEAL COMPRESS: NORMAL
BH CV LOWER VASCULAR RIGHT POPLITEAL PHASIC: NORMAL
BH CV LOWER VASCULAR RIGHT POPLITEAL SPONT: NORMAL
BH CV LOWER VASCULAR RIGHT POSTERIOR TIBIAL COMPRESS: NORMAL
BH CV LOWER VASCULAR RIGHT PROFUNDA FEMORAL COMPRESS: NORMAL
BH CV LOWER VASCULAR RIGHT PROXIMAL FEMORAL COMPRESS: NORMAL
BH CV LOWER VASCULAR RIGHT SAPHENOFEMORAL JUNCTION COMPRESS: NORMAL
DEPRECATED RDW RBC AUTO: 44.8 FL (ref 37–54)
DEPRECATED RDW RBC AUTO: 46.4 FL (ref 37–54)
EOSINOPHIL # BLD AUTO: 0.08 10*3/MM3 (ref 0–0.4)
EOSINOPHIL # BLD AUTO: 0.21 10*3/MM3 (ref 0–0.4)
EOSINOPHIL NFR BLD AUTO: 1.3 % (ref 0.3–6.2)
EOSINOPHIL NFR BLD AUTO: 2.6 % (ref 0.3–6.2)
ERYTHROCYTE [DISTWIDTH] IN BLOOD BY AUTOMATED COUNT: 16.3 % (ref 12.3–15.4)
ERYTHROCYTE [DISTWIDTH] IN BLOOD BY AUTOMATED COUNT: 16.4 % (ref 12.3–15.4)
HCT VFR BLD AUTO: 34.8 % (ref 34–46.6)
HCT VFR BLD AUTO: 36.2 % (ref 34–46.6)
HGB BLD-MCNC: 11 G/DL (ref 12–15.9)
HGB BLD-MCNC: 11.6 G/DL (ref 12–15.9)
IMM GRANULOCYTES # BLD AUTO: 0.02 10*3/MM3 (ref 0–0.05)
IMM GRANULOCYTES # BLD AUTO: 0.02 10*3/MM3 (ref 0–0.05)
IMM GRANULOCYTES NFR BLD AUTO: 0.2 % (ref 0–0.5)
IMM GRANULOCYTES NFR BLD AUTO: 0.3 % (ref 0–0.5)
LYMPHOCYTES # BLD AUTO: 1.51 10*3/MM3 (ref 0.7–3.1)
LYMPHOCYTES # BLD AUTO: 3.14 10*3/MM3 (ref 0.7–3.1)
LYMPHOCYTES NFR BLD AUTO: 23.9 % (ref 19.6–45.3)
LYMPHOCYTES NFR BLD AUTO: 38.2 % (ref 19.6–45.3)
MAXIMAL PREDICTED HEART RATE: 194 BPM
MCH RBC QN AUTO: 24.3 PG (ref 26.6–33)
MCH RBC QN AUTO: 24.6 PG (ref 26.6–33)
MCHC RBC AUTO-ENTMCNC: 31.6 G/DL (ref 31.5–35.7)
MCHC RBC AUTO-ENTMCNC: 32 G/DL (ref 31.5–35.7)
MCV RBC AUTO: 75.9 FL (ref 79–97)
MCV RBC AUTO: 77.9 FL (ref 79–97)
MONOCYTES # BLD AUTO: 0.37 10*3/MM3 (ref 0.1–0.9)
MONOCYTES # BLD AUTO: 0.58 10*3/MM3 (ref 0.1–0.9)
MONOCYTES NFR BLD AUTO: 5.9 % (ref 5–12)
MONOCYTES NFR BLD AUTO: 7.1 % (ref 5–12)
NEUTROPHILS NFR BLD AUTO: 4.21 10*3/MM3 (ref 1.7–7)
NEUTROPHILS NFR BLD AUTO: 4.28 10*3/MM3 (ref 1.7–7)
NEUTROPHILS NFR BLD AUTO: 51.2 % (ref 42.7–76)
NEUTROPHILS NFR BLD AUTO: 67.8 % (ref 42.7–76)
NRBC BLD AUTO-RTO: 0 /100 WBC (ref 0–0.2)
NRBC BLD AUTO-RTO: 0 /100 WBC (ref 0–0.2)
PLATELET # BLD AUTO: 276 10*3/MM3 (ref 140–450)
PLATELET # BLD AUTO: 330 10*3/MM3 (ref 140–450)
PMV BLD AUTO: 10.2 FL (ref 6–12)
PMV BLD AUTO: 10.4 FL (ref 6–12)
RBC # BLD AUTO: 4.47 10*6/MM3 (ref 3.77–5.28)
RBC # BLD AUTO: 4.77 10*6/MM3 (ref 3.77–5.28)
STRESS TARGET HR: 165 BPM
WBC # BLD AUTO: 6.31 10*3/MM3 (ref 3.4–10.8)
WBC # BLD AUTO: 8.22 10*3/MM3 (ref 3.4–10.8)

## 2021-09-09 PROCEDURE — 85025 COMPLETE CBC W/AUTO DIFF WBC: CPT | Performed by: EMERGENCY MEDICINE

## 2021-09-09 PROCEDURE — 25010000002 ONDANSETRON PER 1 MG: Performed by: HOSPITALIST

## 2021-09-09 PROCEDURE — 25010000002 HEPARIN (PORCINE) 25000-0.45 UT/250ML-% SOLUTION: Performed by: EMERGENCY MEDICINE

## 2021-09-09 PROCEDURE — 99254 IP/OBS CNSLTJ NEW/EST MOD 60: CPT | Performed by: INTERNAL MEDICINE

## 2021-09-09 PROCEDURE — 25010000002 HEPARIN (PORCINE) PER 1000 UNITS: Performed by: HOSPITALIST

## 2021-09-09 PROCEDURE — 93970 EXTREMITY STUDY: CPT

## 2021-09-09 PROCEDURE — 25010000002 PROMETHAZINE PER 50 MG: Performed by: HOSPITALIST

## 2021-09-09 PROCEDURE — 85730 THROMBOPLASTIN TIME PARTIAL: CPT | Performed by: HOSPITALIST

## 2021-09-09 RX ORDER — MIDODRINE HYDROCHLORIDE 5 MG/1
10 TABLET ORAL
Status: DISCONTINUED | OUTPATIENT
Start: 2021-09-09 | End: 2021-09-11 | Stop reason: HOSPADM

## 2021-09-09 RX ORDER — PANTOPRAZOLE SODIUM 40 MG/1
40 TABLET, DELAYED RELEASE ORAL ONCE
Status: DISCONTINUED | OUTPATIENT
Start: 2021-09-09 | End: 2021-09-09

## 2021-09-09 RX ORDER — ALBUTEROL SULFATE 90 UG/1
2 AEROSOL, METERED RESPIRATORY (INHALATION) EVERY 4 HOURS PRN
Status: DISCONTINUED | OUTPATIENT
Start: 2021-09-09 | End: 2021-09-11

## 2021-09-09 RX ORDER — MELATONIN
1000 DAILY
Status: DISCONTINUED | OUTPATIENT
Start: 2021-09-09 | End: 2021-09-11 | Stop reason: HOSPADM

## 2021-09-09 RX ORDER — GABAPENTIN 100 MG/1
100 CAPSULE ORAL DAILY
Status: DISCONTINUED | OUTPATIENT
Start: 2021-09-09 | End: 2021-09-11 | Stop reason: HOSPADM

## 2021-09-09 RX ORDER — HEPARIN SODIUM 5000 [USP'U]/ML
80 INJECTION, SOLUTION INTRAVENOUS; SUBCUTANEOUS ONCE
Status: COMPLETED | OUTPATIENT
Start: 2021-09-09 | End: 2021-09-09

## 2021-09-09 RX ORDER — MONTELUKAST SODIUM 10 MG/1
10 TABLET ORAL DAILY
Status: DISCONTINUED | OUTPATIENT
Start: 2021-09-09 | End: 2021-09-11 | Stop reason: HOSPADM

## 2021-09-09 RX ADMIN — HEPARIN SODIUM 11.3 UNITS/KG/HR: 10000 INJECTION, SOLUTION INTRAVENOUS at 12:56

## 2021-09-09 RX ADMIN — ONDANSETRON 8 MG: 2 INJECTION INTRAMUSCULAR; INTRAVENOUS at 17:18

## 2021-09-09 RX ADMIN — METOPROLOL TARTRATE 25 MG: 25 TABLET, FILM COATED ORAL at 09:05

## 2021-09-09 RX ADMIN — PROMETHAZINE HYDROCHLORIDE 25 MG: 25 INJECTION INTRAMUSCULAR; INTRAVENOUS at 12:07

## 2021-09-09 RX ADMIN — HEPARIN SODIUM 9000 UNITS: 5000 INJECTION INTRAVENOUS; SUBCUTANEOUS at 15:49

## 2021-09-09 RX ADMIN — PROMETHAZINE HYDROCHLORIDE 25 MG: 25 INJECTION INTRAMUSCULAR; INTRAVENOUS at 02:48

## 2021-09-09 RX ADMIN — PROMETHAZINE HYDROCHLORIDE 25 MG: 25 INJECTION INTRAMUSCULAR; INTRAVENOUS at 09:05

## 2021-09-09 RX ADMIN — PANTOPRAZOLE SODIUM 40 MG: 40 TABLET, DELAYED RELEASE ORAL at 06:02

## 2021-09-09 RX ADMIN — MIDODRINE HYDROCHLORIDE 10 MG: 5 TABLET ORAL at 17:18

## 2021-09-09 RX ADMIN — PROMETHAZINE HYDROCHLORIDE 25 MG: 25 INJECTION INTRAMUSCULAR; INTRAVENOUS at 00:07

## 2021-09-09 RX ADMIN — PROMETHAZINE HYDROCHLORIDE 25 MG: 25 INJECTION INTRAMUSCULAR; INTRAVENOUS at 06:02

## 2021-09-09 RX ADMIN — MIDODRINE HYDROCHLORIDE 10 MG: 5 TABLET ORAL at 12:07

## 2021-09-09 RX ADMIN — PANTOPRAZOLE SODIUM 40 MG: 40 TABLET, DELAYED RELEASE ORAL at 00:07

## 2021-09-09 RX ADMIN — MONTELUKAST SODIUM 10 MG: 10 TABLET, FILM COATED ORAL at 15:49

## 2021-09-09 RX ADMIN — GABAPENTIN 100 MG: 100 CAPSULE ORAL at 15:48

## 2021-09-09 RX ADMIN — ONDANSETRON 8 MG: 2 INJECTION INTRAMUSCULAR; INTRAVENOUS at 09:12

## 2021-09-09 RX ADMIN — METOPROLOL TARTRATE 25 MG: 25 TABLET, FILM COATED ORAL at 20:10

## 2021-09-09 RX ADMIN — Medication 1000 UNITS: at 15:48

## 2021-09-09 NOTE — CONSULTS
Patient Name: Liliam Lorenz  :1995  26 y.o.    Date of Admission: 2021  Date of Consultation:  21  Encounter Provider: Juvencio Kemp III, MD  Place of Service: [facility] CARDIOLOGY  Referring Provider: Caleb Naik MD  Patient Care Team:  Jason Borrero MD as PCP - General (Family Medicine)      Chief complaint: palpitations and shortness of breath    History of Present Illness: Liliam Lorenz is a 26 year old pt of Dr. Rausch with a history of gastroparesis and pots syndrome, GERD, IBS, type III Phoebe-Danlos syndrome, autoimmune atrophic gastritis resulting in vitamin B12 deficiency and anemia, interstitial cystitis and fibromyalgia.    Patient presented with complaint of tachycardia, shortness of breath, hypoxia, and worsening nausea.  She was found to have bilateral pulmonary emboli on chest CTA.  She has been started on heparin.    Patient has not yet had her home meds resumed.  She has not had her metoprolol tartrate since yesterday morning.  On arrival her heart rate was 110 and she was lying in bed resting.  When she sat up it increased about 120.    She is her main complaint is just been recurrent nausea lately.  She is that her POTS lately has also been getting worse.  No chest pain or chest discomfort.  She has not passed out any time.  No chills or fevers.  She has had both of her Covid vaccinations.  No loss of taste or smell.  She was tested for Covid in the emergency room and tested negative.  In 2018 pt presented with complaints of chest discomfort for several years and was referred for a treadmill stress test and an ECHO. Treadmill stress showed no EKG changes and she was able to exercise for 9 minutes. An ECHO showed  EF 62% and no significant pathology. She was finally diagnosed with POTS after a tilt table test was performed on 2019 with all criteria being met when tilted backwards her heart rate increased and blood pressure  decreased. She is maintained on beta-blockers and midodrine. She has been on Northera with improvement in symptoms and getting IV fluids daily along with IVIG for autoimmune gastric dysmotility and has been approved for a gastric stimulator in the future.     Pt presented on 5/5/21 to ER with complaints of shortness of breath, tachycardia and near syncope. Pt had an IVIG infusion earlier in the day and felt fine after, but then in the evening she bent over to  pick something up and suddenly felt short of breath, her heart rate increase and everything went black like she was going to pass out. EMS was  Called and whrn they arrived she was notes to have a HR of 180s and O2 sats in 80s. Her EKG in ER showed ST rate of 108. CXR negative, CTA negative for PE. We were consulted for elevated HR.Pt complained of nausea which improved with compazine. Pt reported some chest pain. B/P stable with systolics in the 140's. Her home metoprolol, midodrine and droxidopa were continued.       Stress Test 3/14/18    ·  The patient reported chest pain during the stress test.  No ECG evidence of myocardial ischemia.Positive clinical evidence of myocardial ischemia.        ECHO 3/14/18    · Left ventricular systolic function is normal. Calculated EF = 61.7%. Estimated EF was in disagreement with the calculated EF. Normal left ventricular cavity size and wall thickness noted. All left ventricular wall segments contract normally. Left ventricular diastolic function is normal.           Past Medical History:   Diagnosis Date   • Bronchitis    • Chest pain    • Chronic hypotension    • Eczema    • Phoebe-Danlos syndrome    • Gastroparesis    • GERD (gastroesophageal reflux disease)    • IBS (irritable bowel syndrome)    • Lightheadedness    • POTS (postural orthostatic tachycardia syndrome)    • Rectal fissure    • Rosacea    • Tachycardia        Past Surgical History:   Procedure Laterality Date   • COLONOSCOPY     • [facility]    [facility] GASTROINTESTINAL ENDOSCOPY     • VENOUS ACCESS DEVICE (PORT) INSERTION Left 10/16/2020    Procedure: INSERTION VENOUS ACCESS DEVICE UNDER FLURO;  Surgeon: Pa Lane MD;  Location: [facility] MAIN OR;  Service: General;  Laterality: Left;         Prior to Admission medications    Medication Sig Start Date End Date Taking? Authorizing Provider   Cholecalciferol (VITAMIN D-1000 MAX ST) 25 MCG (1000 UT) tablet Take 1,000 Units by mouth Daily.   Yes Jason Davis MD   dilTIAZem powder Apply  topically to the appropriate area as directed 2 (Two) Times a Day As Needed (RECTAL CREAM).   Yes Jason Davis MD   Droxidopa 100 MG capsule Take 1 capsule by mouth 3 (Three) Times a Day. 11/13/20  Yes Hilary Dumont DNP, APRN   gabapentin (NEURONTIN) 100 MG capsule Take 100 mg by mouth Daily. 8/10/20  Yes Jason Davis MD   Galcanezumab-gnlm (EMGALITY SC) Inject  under the skin into the appropriate area as directed Every 30 (Thirty) Days.   Yes Jason Davis MD   Ginger, Zingiber officinalis, (GINGER ROOT PO) Take  by mouth.   Yes Jason Davis MD   hydrocortisone 0.5 % cream Apply  topically to the appropriate area as directed 2 (Two) Times a Day.   Yes Jason Davis MD   immune globulin, human, (GAMMAGARD S/D) 10 g infusion Infuse  into a venous catheter 1 (One) Time Per Week.   Yes Jason Davis MD   loperamide (IMODIUM A-D) 2 MG tablet Take 2 mg by mouth 4 (Four) Times a Day As Needed.   Yes Jason Davis MD   meclizine (ANTIVERT) 25 MG tablet Take 25 mg by mouth.   Yes Jason Davis MD   metoprolol tartrate (LOPRESSOR) 25 MG tablet TAKE 1/2 TABLET BY MOUTH THREE TIMES DAILY 12/30/20  Yes Chloe Lauren APRN   metroNIDAZOLE (FLAGYL) 0.75 % lotion lotion Apply 1 application topically to the appropriate area as directed Every 12 (Twelve) Hours.   Yes Jason Davis MD   midodrine  (PROAMATINE) 10 MG tablet TAKE 1 TABLET BY MOUTH THREE TIMES DAILY 11/16/20  Yes Payam Rausch MD   Mirabegron ER (Myrbetriq) 25 MG tablet sustained-release 24 hour 24 hr tablet Take 25 mg by mouth Daily.   Yes Jason Davis MD   Mometasone Furoate (Asmanex HFA) 100 MCG/ACT aerosol INAHLE 2 PUFFS INTO THE LUNGS BY MOUTH 2 TIMES PER DAY IN THE MORNING AND EVENING 5/19/20  Yes Jason Davis MD   montelukast (SINGULAIR) 10 MG tablet  8/24/20  Yes Jason Davis MD   omeprazole (priLOSEC) 20 MG capsule Take 40 mg by mouth 2 (Two) Times a Day.   Yes Jason Davis MD   ondansetron (ZOFRAN) 4 MG tablet Take 4 mg by mouth Every 8 (Eight) Hours As Needed for Nausea or Vomiting.   Yes Jason Davis MD   Ondansetron HCl (ZOFRAN IV) Infuse 8 mg into a venous catheter Every 8 (Eight) Hours As Needed.   Yes Jason Davis MD   ondansetron ODT (ZOFRAN ODT) 8 MG disintegrating tablet Take 1 tablet by mouth Every 8 (Eight) Hours As Needed for Nausea or Vomiting. 1/11/20  Yes Armando Borrero MD   phenazopyridine (PYRIDIUM) 100 MG tablet Take 100 mg by mouth 3 (Three) Times a Day As Needed. 9/20/20  Yes Jason Davis MD   prochlorperazine (COMPAZINE) 10 MG tablet Take 10 mg by mouth Every 6 (Six) Hours As Needed for Nausea or Vomiting.   Yes Jason Davis MD   promethazine (PHENERGAN) 12.5 MG tablet TK 1 T PO Q 6 H FOR UP TO 7 DAYS PRF NAUSEA 9/2/20  Yes Jason Davis MD   promethazine (PHENERGAN) 25 MG tablet TK 1 T PO  Q 6 H PRN NAUSEA 6/26/20  Yes Jason Davis MD   promethazine (PHENERGAN) Infuse 25 mg into a venous catheter Every 3 (Three) Hours.   Yes Jason Davis MD   RABEprazole (ACIPHEX) 20 MG EC tablet Take 20 mg by mouth Daily.   Yes Jason Davis MD   tiZANidine (ZANAFLEX) 4 MG tablet TAKE 1 TABLET BY MOUTH EVERY 8 HOURS FOR UP TO 14 DAYS AS NEEDED FOR MUSCLE SPASM 6/5/20  Yes Provider, MD Jason   triamcinolone  (KENALOG) 0.1 % cream Apply 1 application topically to the appropriate area as directed 2 (Two) Times a Day.   Yes Jason Davis MD   Vortioxetine HBr (TRINTELLIX) 5 MG tablet Take 5 mg by mouth Daily With Breakfast.   Yes Jason Davis MD   albuterol sulfate  (90 Base) MCG/ACT inhaler Inhale 2 puffs Every 4 (Four) Hours As Needed for Wheezing.    Jason Davis MD   doxepin (SINEquan) 25 MG capsule TK 1 TO 2 CS PO Q NIGHT 6/5/20   Jason Davis MD   erythromycin base (E-MYCIN) 250 MG tablet TK 1 T PO Q 8 H TAT 4/24/20   Jason Davis MD   medroxyPROGESTERone (DEPO-PROVERA) 150 MG/ML injection Inject 1 mL into the appropriate muscle as directed by prescriber Every 3 (Three) Months. 10/26/18   Rosey Mulligan MD       Allergies   Allergen Reactions   • Eggs Or Egg-Derived Products GI Intolerance     Rash & vomiting   • Pepcid [Famotidine] Rash and GI Intolerance   • Scopolamine Rash and GI Intolerance       Social History     Socioeconomic History   • Marital status:      Spouse name: Not on file   • Number of children: Not on file   • Years of education: Not on file   • Highest education level: Not on file   Tobacco Use   • Smoking status: Never Smoker   • Smokeless tobacco: Never Used   Vaping Use   • Vaping Use: Never used   Substance and Sexual Activity   • Alcohol use: No   • Drug use: No   • Sexual activity: Yes     Partners: Female     Birth control/protection: None       Family History   Problem Relation Age of Onset   • Hypertension Mother    • Diabetes Mother    • Diabetes Father    • Heart disease Paternal Grandfather    • Stroke Paternal Grandfather    • Diabetes Paternal Grandfather    • Cancer Paternal Grandfather    • Coronary artery disease Maternal Grandmother         MGM with 4 vessel CABG and multiple stents   • Cancer Maternal Grandmother    • Coronary artery disease Maternal Uncle         Maternal uncle with MI   • Breast cancer Other    •  Malig Hyperthermia Neg Hx        REVIEW OF SYSTEMS:   All systems reviewed.  Pertinent positives identified in HPI.  All other systems are negative.      Objective:     Vitals:    09/08/21 2039 09/08/21 2300 09/09/21 0300 09/09/21 0722   BP: 125/96 102/72 132/99 125/76   BP Location: Right arm Right arm Right arm Right arm   Patient Position: Lying Lying Lying Lying   Pulse: 111 108 113 (!) 121   Resp: 18 18 18 18   Temp: 97.9 °F (36.6 °C) 97.7 °F (36.5 °C) 98 °F (36.7 °C) 98 °F (36.7 °C)   TempSrc: Oral Oral Oral Oral   SpO2: 95% 91% 91% 96%   Weight: 118 kg (260 lb 2.3 oz)        Body mass index is 43.96 kg/m².    Physical Exam:  General Appearance:    Alert, cooperative, in no acute distress   Head:    Normocephalic, without obvious abnormality, atraumatic   Eyes:            Lids and lashes normal, conjunctivae and sclerae normal, no   icterus, no pallor, corneas clear, PERRLA   Ears:    Ears appear intact with no abnormalities noted   Throat:   No oral lesions, no thrush, oral mucosa moist   Neck:   No adenopathy, supple, trachea midline, no thyromegaly, no   carotid bruit, no JVD   Back:     No kyphosis present, no scoliosis present, no skin lesions, erythema or scars, no tenderness to percussion or palpation, range of motion normal   Lungs:     Clear to auscultation,respirations regular, even and unlabored    Heart:    Regular rhythm and increased rate, normal S1 and S2, no murmur, no gallop, no rub, no click   Chest Wall:    No abnormalities observed   Abdomen:     Normal bowel sounds, no masses, no organomegaly, soft        non-tender, non-distended, no guarding, no rebound  tenderness   Extremities:   Moves all extremities well, no edema, no cyanosis, no redness   Pulses:   Pulses palpable and equal bilaterally. Normal radial, carotid, femoral, dorsalis pedis and posterior tibial pulses bilaterally. Normal abdominal aorta   Skin:  Psychiatric:   No bleeding, bruising or rash    Alert and oriented x 3,  normal mood and affect         Lab Review:     Results from last 7 days   Lab Units 09/08/21  0947   SODIUM mmol/L 138   POTASSIUM mmol/L 3.8   CHLORIDE mmol/L 109*   CO2 mmol/L 18.7*   BUN mg/dL 8   CREATININE mg/dL 0.90   CALCIUM mg/dL 8.7   BILIRUBIN mg/dL 0.4   ALK PHOS U/L 58   ALT (SGPT) U/L 28   AST (SGOT) U/L 24   GLUCOSE mg/dL 112*     Results from last 7 days   Lab Units 09/08/21  1155 09/08/21  0947   TROPONIN T ng/mL <0.010 <0.010     Results from last 7 days   Lab Units 09/09/21  0052   WBC 10*3/mm3 8.22   HEMOGLOBIN g/dL 11.0*   HEMATOCRIT % 34.8   PLATELETS 10*3/mm3 276     Results from last 7 days   Lab Units 09/09/21  0052 09/08/21  1657 09/08/21  0947   INR   --  1.08 1.04   APTT seconds >200.0* 28.5  --      Results from last 7 days   Lab Units 09/08/21  0947   MAGNESIUM mg/dL 1.8                                         I personally viewed and interpreted the patient's EKG/Telemetry data.      Current Facility-Administered Medications:   •  heparin (porcine) 5000 UNIT/ML injection 4,500-9,000 Units, 40-80 Units/kg, Intravenous, Q6H PRN, Juarez Mendoza MD  •  heparin 56001 units/250 mL (100 units/mL) in 0.45 % NaCl infusion, 13.3 Units/kg/hr, Intravenous, Titrated, Juarez Mendoza MD, Last Rate: 11.63 mL/hr at 09/09/21 0245, 10.3 Units/kg/hr at 09/09/21 0245  •  ondansetron (ZOFRAN) injection 8 mg, 8 mg, Intravenous, Q8H PRN, Caleb Naik MD, 8 mg at 09/08/21 2319  •  pantoprazole (PROTONIX) EC tablet 40 mg, 40 mg, Oral, Q AM, Caleb Naik MD, 40 mg at 09/09/21 0602  •  promethazine (PHENERGAN) 25 mg in sodium chloride 0.9 % 50 mL, 25 mg, Intravenous, Q3H, Caleb Naik MD, 25 mg at 09/09/21 0602    Assessment and Plan:       Active Hospital Problems    Diagnosis  POA   • **Multiple subsegmental pulmonary emboli without acute cor pulmonale (CMS/HCC) [I26.94]  Yes   • Sinus tachycardia [R00.0]  Yes   • POTS (postural orthostatic tachycardia syndrome) [I49.8]  Yes   • Phoebe-Danlos syndrome  [Q79.60]  Not Applicable   • Autoimmune disease (CMS/HCC) [M35.9]  Yes   • Gastroparesis [K31.84]  Yes   • Nausea and vomiting [R11.2]  Yes      Resolved Hospital Problems   No resolved problems to display.     1.  Acute pulmonary emboli, on heparin, evaluation per primary team.  I will order lower extremity venous Dopplers.  2.  POTS syndrome-I will resume her midodrine on and her metoprolol  3.  Sinus tachycardia-follow with resumption of her metoprolol, she has not had any for over 24 hours  4.  Phoebe-Danlos syndrome  5.  Nausea and vomiting-she says this is her biggest issue right now, she does have a history of gastroparesis and has been followed by GI  6.  CT scan reports larger left pericardial collaterals from the left subclavian vein along the left pericardium extending caudally into the IVC.  I am not sure the importance of this finding, will defer to primary team    Juvencio Kemp III, MD  09/09/21  08:41 EDT

## 2021-09-09 NOTE — H&P
History and physical    Primary care physician  Dr. Jason Borrero    Chief complaint  Shortness of breath    History of present illness  26-year-old white female who is well-known to our service with history of orthostatic chronic hypotension severe gastroparesis Phoebe-Danlos syndrome IBS and gastroesophageal reflux disease presented to [facility] emergency room with shortness of breath for last 4 days.  Patient denies any chest pain fever cough abdominal pain nausea vomiting diarrhea.  Patient also stated that she noticed her heart rate is in 130s to 160.  Patient did miss some of her Lopressor.  Patient evaluated in ER found to have bilateral pulmonary embolism admit for management.     PAST MEDICAL HISTORY  • Bronchitis     • Chest pain     • Chronic hypotension     • Eczema     • Phoebe-Danlos syndrome     • Gastroparesis     • GERD (gastroesophageal reflux disease)     • IBS (irritable bowel syndrome)     • Lightheadedness     • Rectal fissure     • Rosacea     • Tachycardia        PAST SURGICAL HISTORY              Procedure Laterality Date   • COLONOSCOPY       • MEDIPORT INSERTION, DOUBLE   09/01/2020     [facility]    • UPPER GASTROINTESTINAL ENDOSCOPY       • VENOUS ACCESS DEVICE (PORT) INSERTION Left 10/16/2020     Procedure: INSERTION VENOUS ACCESS DEVICE UNDER FLURO;  Surgeon: Pa Lane MD;  Location: [facility];  Service: General;  Laterality: Left;         FAMILY HISTORY           Problem Relation Age of Onset   • Hypertension Mother     • Diabetes Mother     • Diabetes Father     • Cancer Paternal Grandmother     • Heart disease Paternal Grandfather     • Stroke Paternal Grandfather     • Diabetes Paternal Grandfather     • Cancer Paternal Grandfather     • Coronary artery disease Maternal Grandmother           MGM with 4 vessel CABG and multiple stents   • Coronary artery disease Maternal Uncle           Maternal uncle with MI   • Breast cancer Other     • Malig  Hyperthermia Neg Hx        SOCIAL HISTORY                 Socioeconomic History   • Marital status:        Spouse name: Not on file   • Number of children: Not on file   • Years of education: Not on file   • Highest education level: Not on file   Tobacco Use   • Smoking status: Never Smoker   • Smokeless tobacco: Never Used   Substance and Sexual Activity   • Alcohol use: No   • Drug use: No   • Sexual activity: Yes       Partners: Male       Birth control/protection: None         ALLERGIES  Eggs or egg-derived products, Pepcid [famotidine], and Scopolamine  Home medications reviewed     REVIEW OF SYSTEMS  All systems reviewed and negative except for those discussed in HPI.      PHYSICAL EXAM   Blood pressure 125/76, pulse (!) 121, temperature 98 °F (36.7 °C), temperature source Oral, resp. rate 18, weight 118 kg (260 lb 2.3 oz), SpO2 96 %.    GENERAL: 26-year-old well developed, well nourished in mild distress  HENT: NCAT, neck supple, trachea midline  EYES: no scleral icterus, PERRL, normal conjunctiva  CV: regular rhythm but tachycardic to 130s, no murmur: No chest tenderness  RESPIRATORY: unlabored effort, CTAB  ABDOMEN: soft, non-tender, non-distended, bowel sounds present  MUSCULOSKELETAL: no gross deformity, no pedal edema, no calf tenderness  NEURO: alert,  sensory and motor function of extremities intact, speech clear, mental status normal  SKIN: warm, dry, no rash  PSYCH:  Appropriate mood and affect     LAB RESULTS  Lab Results (last 24 hours)     Procedure Component Value Units Date/Time    aPTT [[id number]]  (Abnormal) Collected: 09/09/21 0857    Specimen: Blood Updated: 09/09/21 0946     PTT 81.4 seconds     CBC & Differential [[id number]]  (Abnormal) Collected: 09/09/21 0857    Specimen: Blood Updated: 09/09/21 0935    Narrative:      The following orders were created for panel order CBC & Differential.  Procedure                               Abnormality         Status                      ---------                               -----------         ------                     CBC Auto Differential[[id number]]        Abnormal            Final result                 Please view results for these tests on the individual orders.    CBC Auto Differential [[id number]]  (Abnormal) Collected: 09/09/21 0857    Specimen: Blood Updated: 09/09/21 0935     WBC 6.31 10*3/mm3      RBC 4.77 10*6/mm3      Hemoglobin 11.6 g/dL      Hematocrit 36.2 %      MCV 75.9 fL      MCH 24.3 pg      MCHC 32.0 g/dL      RDW 16.4 %      RDW-SD 44.8 fl      MPV 10.2 fL      Platelets 330 10*3/mm3      Neutrophil % 67.8 %      Lymphocyte % 23.9 %      Monocyte % 5.9 %      Eosinophil % 1.3 %      Basophil % 0.8 %      Immature Grans % 0.3 %      Neutrophils, Absolute 4.28 10*3/mm3      Lymphocytes, Absolute 1.51 10*3/mm3      Monocytes, Absolute 0.37 10*3/mm3      Eosinophils, Absolute 0.08 10*3/mm3      Basophils, Absolute 0.05 10*3/mm3      Immature Grans, Absolute 0.02 10*3/mm3      nRBC 0.0 /100 WBC     aPTT [[id number]]  (Abnormal) Collected: 09/09/21 0052    Specimen: Blood Updated: 09/09/21 0142     PTT >200.0 seconds     CBC & Differential [[id number]]  (Abnormal) Collected: 09/09/21 0052    Specimen: Blood Updated: 09/09/21 0130    Narrative:      The following orders were created for panel order CBC & Differential.  Procedure                               Abnormality         Status                     ---------                               -----------         ------                     CBC Auto Differential[[id number]]        Abnormal            Final result                 Please view results for these tests on the individual orders.    CBC Auto Differential [[id number]]  (Abnormal) Collected: 09/09/21 0052    Specimen: Blood Updated: 09/09/21 0130     WBC 8.22 10*3/mm3      RBC 4.47 10*6/mm3      Hemoglobin 11.0 g/dL      Hematocrit 34.8 %      MCV 77.9 fL      MCH 24.6 pg      MCHC 31.6 g/dL      RDW 16.3 %      RDW-SD 46.4  fl      MPV 10.4 fL      Platelets 276 10*3/mm3      Neutrophil % 51.2 %      Lymphocyte % 38.2 %      Monocyte % 7.1 %      Eosinophil % 2.6 %      Basophil % 0.7 %      Immature Grans % 0.2 %      Neutrophils, Absolute 4.21 10*3/mm3      Lymphocytes, Absolute 3.14 10*3/mm3      Monocytes, Absolute 0.58 10*3/mm3      Eosinophils, Absolute 0.21 10*3/mm3      Basophils, Absolute 0.06 10*3/mm3      Immature Grans, Absolute 0.02 10*3/mm3      nRBC 0.0 /100 WBC     aPTT [[id number]]  (Normal) Collected: 09/08/21 1657    Specimen: Blood from Arm, Left Updated: 09/08/21 1734     PTT 28.5 seconds     Protime-INR [[id number]]  (Normal) Collected: 09/08/21 1657    Specimen: Blood from Arm, Left Updated: 09/08/21 1734     Protime 13.8 Seconds      INR 1.08    CBC & Differential [[id number]]  (Abnormal) Collected: 09/08/21 1657    Specimen: Blood from Arm, Left Updated: 09/08/21 1731    Narrative:      The following orders were created for panel order CBC & Differential.  Procedure                               Abnormality         Status                     ---------                               -----------         ------                     CBC Auto Differential[[id number]]        Abnormal            Final result                 Please view results for these tests on the individual orders.    CBC Auto Differential [[id number]]  (Abnormal) Collected: 09/08/21 1657    Specimen: Blood from Arm, Left Updated: 09/08/21 1731     WBC 6.93 10*3/mm3      RBC 4.48 10*6/mm3      Hemoglobin 10.7 g/dL      Hematocrit 35.5 %      MCV 79.2 fL      MCH 23.9 pg      MCHC 30.1 g/dL      RDW 16.3 %      RDW-SD 46.6 fl      MPV 10.4 fL      Platelets 309 10*3/mm3      Neutrophil % 58.0 %      Lymphocyte % 32.0 %      Monocyte % 7.4 %      Eosinophil % 1.9 %      Basophil % 0.4 %      Immature Grans % 0.3 %      Neutrophils, Absolute 4.02 10*3/mm3      Lymphocytes, Absolute 2.22 10*3/mm3      Monocytes, Absolute 0.51 10*3/mm3      Eosinophils,  Absolute 0.13 10*3/mm3      Basophils, Absolute 0.03 10*3/mm3      Immature Grans, Absolute 0.02 10*3/mm3      nRBC 0.0 /100 WBC         Imaging Results (Last 24 Hours)     Procedure Component Value Units Date/Time    CT Angiogram Chest [[id number]] Collected: 09/08/21 1720     Updated: 09/08/21 1720    Narrative:      CT ANGIOGRAM OF THE CHEST. MULTIPLE CORONAL, SAGITTAL, AND 3-D  RECONSTRUCTIONS.     HISTORY: 26-year-old female with chest pain and shortness of breath.  Pots syndrome. Mediport for IV fluids.     TECHNIQUE: Radiation dose reduction techniques were utilized, including  automated exposure control and exposure modulation based on body size.   CT angiogram of the chest was performed following the administration of  IV contrast. Left upper extremity injection. Multiple coronal, sagittal,  and 3-D reconstruction images were obtained. Compared with previous  chest CTA 05/06/2021.     FINDINGS: There are larger collaterals from the left subclavian vein  along the left pericardium extending caudally into the IVC. There is  partial opacification of the brachiocephalic vein and a small portion of  the SVC is opacified. There are very small bilateral pulmonary  thromboemboli. RV to LV ratio is less than 1. The lungs are clear except  for a very small peripheral airspace opacity at the posterior aspect of  the left lower lobe, image 82. There are no pleural or pericardial  effusions. There is no lymphadenopathy within the chest.       Impression:      1. Very small bilateral pulmonary thromboemboli. RV to LV ratio is less  than 1. The tiny left lower lobe airspace opacity may represent sequela  of the pulmonary thromboemboli.  2. Larger left pericardial collaterals, now draining into the IVC. There  is only partial opacification of the brachiocephalic vein and SVC.     Discussed with Dr. Mendoza.     CHECK CHECK .              ECG 12 Lead  Component   Ref Range & Units 9/8/21 1002 5/6/21 0432   QT Interval   ms  298  279         HEART RATE= 132  bpm  RR Interval= 452  ms  FL Interval= 136  ms  P Horizontal Axis= 1  deg  P Front Axis= 40  deg  QRSD Interval= 75  ms  QT Interval= 298  ms  QRS Axis= 15  deg  T Wave Axis= -12  deg  - BORDERLINE ECG -  Sinus tachycardia  Borderline T abnormalities, anterior leads  When compared with ECG of 06-May-2021 4:32:48,  No significant change             Current Facility-Administered Medications:   •  albuterol sulfate HFA (PROVENTIL HFA;VENTOLIN HFA;PROAIR HFA) inhaler 2 puff, 2 puff, Inhalation, Q4H PRN, Caleb Naik MD  •  cholecalciferol (VITAMIN D3) tablet 1,000 Units, 1,000 Units, Oral, Daily, Caleb Naik MD  •  gabapentin (NEURONTIN) capsule 100 mg, 100 mg, Oral, Daily, Caleb Naik MD  •  heparin (porcine) 5000 UNIT/ML injection 4,500-9,000 Units, 40-80 Units/kg, Intravenous, Q6H PRN, Juarez Mendoza MD  •  heparin (porcine) 5000 UNIT/ML injection 9,000 Units, 80 Units/kg, Intravenous, Once, Caleb Naik MD  •  heparin 09463 units/250 mL (100 units/mL) in 0.45 % NaCl infusion, 13.3 Units/kg/hr, Intravenous, Titrated, Juarez Mendoza MD, Last Rate: 11.63 mL/hr at 09/09/21 0245, 10.3 Units/kg/hr at 09/09/21 0245  •  metoprolol tartrate (LOPRESSOR) tablet 25 mg, 25 mg, Oral, Q12H, Juvencio Kemp III, MD, 25 mg at 09/09/21 0905  •  midodrine (PROAMATINE) tablet 10 mg, 10 mg, Oral, TID AC, Juvencio Kemp III, MD, 10 mg at 09/09/21 1207  •  montelukast (SINGULAIR) tablet 10 mg, 10 mg, Oral, Daily, Caleb Naik MD  •  ondansetron (ZOFRAN) injection 8 mg, 8 mg, Intravenous, Q8H PRN, Caleb Naik MD, 8 mg at 09/09/21 0912  •  pantoprazole (PROTONIX) EC tablet 40 mg, 40 mg, Oral, Q AM, Caleb Naik MD, 40 mg at 09/09/21 0602     ASSESSMENT  Acute bilateral small pulmonary embolism   Chronic orthostatic hypotension  POTS syndrome  Severe gastroparesis  Sinus tachycardia  Phoebe-Danlos syndrome  Irritable bowel syndrome  Gastroesophageal reflux disease    PLAN  Admit  Supplement  oxygen  Heparin  Continue home medications  Stress ulcer DVT prophylaxis  Check lower extremity Doppler study   Cardiology consult  Supportive care  Patient is full code  Discussed with nursing staff  Follow closely and further recommendation current hospital course    ANN GONZALEZ MD

## 2021-09-09 NOTE — CONSULTS
Name: Liliam Lorenz ADMIT: 2021   : 1995  PCP: Jason Borrero MD    MRN: [id number] LOS: 1 days   AGE/SEX: 26 y.o. female  ROOM: [address] [place] [address]    Patient Care Team:  Jason Borrero MD as PCP - General (Family Medicine)  Chief Complaint   Patient presents with   • Shortness of Breath   • Palpitations     CC: Abnormal CT scan of the chest    Liliam Lorenz is a 26-year-old woman with an exceptionally complex past medical history given her young age, who is seen for evaluation of abnormal brachiocephalic veins on a CT angiogram of the chest.  The patient has pots syndrome, autoimmune dysmotility, irritable bowel syndrome, Phoebe-Danlos type III and fibromyalgia.  For about 2 years she has been maintained on weekly immunoglobulin infusions, first through what must of been right subclavian Dolan catheter and subsequently through a left jugular Dolan.  She reports good function of the current catheter.  About 2 weeks ago she began to experience worsening tachycardia, even while laying flat.  She has a pulse oximeter at home, and has observed transient hypoxemia.  Over the last several days she has had resting heart rates of 130-160, even while supine, with SaO2 levels in the 80s.  No chest pain or pressure.  No lower extremity symptoms.  The patient was brought to the hospital and a CT angiogram performed, positive for small bilateral pulmonary emboli with no increase in the RV:LV ratio.  She has been initiated on heparin.  There is no previous history of venous thrombosis.  No recent lower extremity symptoms.  No history of facial or upper extremity swelling, though she feels as if she is retaining fluid everywhere.  She is morbidly obese with a BMI of 44, and estimates a 40-50 pound weight gain over the last 1 year.  Because of her POTS syndrome, she spends a fair amount of time supine in bed.  She is ambulatory with a walker.  She has undergone Depo-Provera injection and no longer has  menstrual periods.  No other history of abnormal bleeding.    Review of Systems   Constitutional: Negative.    Respiratory: Positive for shortness of breath. Negative for apnea, cough, choking, chest tightness, wheezing and stridor.         Measured hypoxemia with pulse oximeter   Cardiovascular: Positive for palpitations. Negative for chest pain and leg swelling.   Genitourinary: Negative.    Hematological: Negative.    All other systems reviewed and are negative.    Past Medical History:   Diagnosis Date   • Bronchitis    • Chest pain    • Chronic hypotension    • Eczema    • Phoebe-Danlos syndrome    • Gastroparesis    • GERD (gastroesophageal reflux disease)    • IBS (irritable bowel syndrome)    • Lightheadedness    • POTS (postural orthostatic tachycardia syndrome)    • Rectal fissure    • Rosacea    • Tachycardia      Past Surgical History:   Procedure Laterality Date   • COLONOSCOPY     • [facility]  [facility]    • UPPER GASTROINTESTINAL ENDOSCOPY     • VENOUS ACCESS DEVICE (PORT) INSERTION Left 10/16/2020    Procedure: INSERTION VENOUS ACCESS DEVICE UNDER FLURO;  Surgeon: Pa Lane MD;  Location: [facility];  Service: General;  Laterality: Left;     Family History   Problem Relation Age of Onset   • Hypertension Mother    • Diabetes Mother    • Diabetes Father    • Heart disease Paternal Grandfather    • Stroke Paternal Grandfather    • Diabetes Paternal Grandfather    • Cancer Paternal Grandfather    • Coronary artery disease Maternal Grandmother         MGM with 4 vessel CABG and multiple stents   • Cancer Maternal Grandmother    • Coronary artery disease Maternal Uncle         Maternal uncle with MI   • Breast cancer Other    • Malig Hyperthermia Neg Hx      Social History     Tobacco Use   • Smoking status: Never Smoker   • Smokeless tobacco: Never Used   Vaping Use   • Vaping Use: Never used   Substance Use Topics   • Alcohol use: No   • Drug use: No      Medications Prior to Admission   Medication Sig Dispense Refill Last Dose   • Cholecalciferol (VITAMIN D-1000 MAX ST) 25 MCG (1000 UT) tablet Take 1,000 Units by mouth Daily.   9/7/2021 at Unknown time   • dilTIAZem powder Apply  topically to the appropriate area as directed 2 (Two) Times a Day As Needed (RECTAL CREAM).      • Droxidopa 100 MG capsule Take 1 capsule by mouth 3 (Three) Times a Day. 90 capsule 4 9/8/2021 at Unknown time   • gabapentin (NEURONTIN) 100 MG capsule Take 100 mg by mouth Daily.   9/8/2021 at Unknown time   • Galcanezumab-gnlm (EMGALITY SC) Inject  under the skin into the appropriate area as directed Every 30 (Thirty) Days.   Past Month at Unknown time   • Ginger, Zingiber officinalis, (GINGER ROOT PO) Take  by mouth.   9/7/2021 at Unknown time   • hydrocortisone 0.5 % cream Apply  topically to the appropriate area as directed 2 (Two) Times a Day.   Past Month at Unknown time   • immune globulin, human, (GAMMAGARD S/D) 10 g infusion Infuse  into a venous catheter 1 (One) Time Per Week.   9/1/2021   • loperamide (IMODIUM A-D) 2 MG tablet Take 2 mg by mouth 4 (Four) Times a Day As Needed.      • meclizine (ANTIVERT) 25 MG tablet Take 25 mg by mouth.   9/7/2021 at Unknown time   • metoprolol tartrate (LOPRESSOR) 25 MG tablet TAKE 1/2 TABLET BY MOUTH THREE TIMES DAILY 60 tablet 11 9/8/2021 at Unknown time   • metroNIDAZOLE (FLAGYL) 0.75 % lotion lotion Apply 1 application topically to the appropriate area as directed Every 12 (Twelve) Hours.      • midodrine (PROAMATINE) 10 MG tablet TAKE 1 TABLET BY MOUTH THREE TIMES DAILY 90 tablet 6 9/8/2021 at Unknown time   • Mirabegron ER (Myrbetriq) 25 MG tablet sustained-release 24 hour 24 hr tablet Take 25 mg by mouth Daily.      • Mometasone Furoate (Asmanex HFA) 100 MCG/ACT aerosol INAHLE 2 PUFFS INTO THE LUNGS BY MOUTH 2 TIMES PER DAY IN THE MORNING AND EVENING      • montelukast (SINGULAIR) 10 MG tablet    9/7/2021 at Unknown time   • omeprazole  (priLOSEC) 20 MG capsule Take 40 mg by mouth 2 (Two) Times a Day.   9/8/2021 at Unknown time   • ondansetron (ZOFRAN) 4 MG tablet Take 4 mg by mouth Every 8 (Eight) Hours As Needed for Nausea or Vomiting.   Past Week at Unknown time   • Ondansetron HCl (ZOFRAN IV) Infuse 8 mg into a venous catheter Every 8 (Eight) Hours As Needed.      • ondansetron ODT (ZOFRAN ODT) 8 MG disintegrating tablet Take 1 tablet by mouth Every 8 (Eight) Hours As Needed for Nausea or Vomiting. 12 tablet 0    • phenazopyridine (PYRIDIUM) 100 MG tablet Take 100 mg by mouth 3 (Three) Times a Day As Needed.      • prochlorperazine (COMPAZINE) 10 MG tablet Take 10 mg by mouth Every 6 (Six) Hours As Needed for Nausea or Vomiting.      • promethazine (PHENERGAN) 12.5 MG tablet TK 1 T PO Q 6 H FOR UP TO 7 DAYS PRF NAUSEA      • promethazine (PHENERGAN) 25 MG tablet TK 1 T PO  Q 6 H PRN NAUSEA      • promethazine (PHENERGAN) Infuse 25 mg into a venous catheter Every 3 (Three) Hours.      • RABEprazole (ACIPHEX) 20 MG EC tablet Take 20 mg by mouth Daily.      • tiZANidine (ZANAFLEX) 4 MG tablet TAKE 1 TABLET BY MOUTH EVERY 8 HOURS FOR UP TO 14 DAYS AS NEEDED FOR MUSCLE SPASM      • triamcinolone (KENALOG) 0.1 % cream Apply 1 application topically to the appropriate area as directed 2 (Two) Times a Day.      • Vortioxetine HBr (TRINTELLIX) 5 MG tablet Take 5 mg by mouth Daily With Breakfast.   9/8/2021 at Unknown time   • albuterol sulfate  (90 Base) MCG/ACT inhaler Inhale 2 puffs Every 4 (Four) Hours As Needed for Wheezing.      • doxepin (SINEquan) 25 MG capsule TK 1 TO 2 CS PO Q NIGHT      • erythromycin base (E-MYCIN) 250 MG tablet TK 1 T PO Q 8 H TAT      • medroxyPROGESTERone (DEPO-PROVERA) 150 MG/ML injection Inject 1 mL into the appropriate muscle as directed by prescriber Every 3 (Three) Months. 1 mL 3 More than a month at Unknown time     cholecalciferol, 1,000 Units, Oral, Daily  gabapentin, 100 mg, Oral, Daily  metoprolol tartrate,  25 mg, Oral, Q12H  midodrine, 10 mg, Oral, TID AC  montelukast, 10 mg, Oral, Daily  pantoprazole, 40 mg, Oral, Q AM      heparin (porcine), 13.3 Units/kg/hr, Last Rate: 11.3 Units/kg/hr (09/09/21 1256)      •  albuterol sulfate HFA  •  heparin (porcine)  •  ondansetron  Eggs or egg-derived products, Pepcid [famotidine], and Scopolamine    Physical Exam:  Physical Exam  Constitutional:       General: She is not in acute distress.     Appearance: She is obese. She is not ill-appearing, toxic-appearing or diaphoretic.   HENT:      Head: Normocephalic and atraumatic.      Right Ear: External ear normal.      Left Ear: External ear normal.      Nose:      Comments: Patient wearing mask     Mouth/Throat:      Comments: Patient wearing mask  Eyes:      General: No scleral icterus.     Extraocular Movements: Extraocular movements intact.      Conjunctiva/sclera: Conjunctivae normal.      Pupils: Pupils are equal, round, and reactive to light.   Cardiovascular:      Rate and Rhythm: Regular rhythm. Tachycardia present.      Comments: No subcutaneous venous prominence about the head, neck and chest.  No engorgement of external jugular veins.  Has tunneled central venous catheter in left chest, palpable toward internal jugular vein.  Limbs are large, in keeping with body habitus, but there is no upper extremity edema.  Palpable brachial and radial artery pulses bilaterally.  Left antecubital IV site  Pulmonary:      Effort: Pulmonary effort is normal. No respiratory distress.      Breath sounds: Normal breath sounds. No stridor. No wheezing or rales.   Abdominal:      Comments: Obese, round   Musculoskeletal:         General: Normal range of motion.      Cervical back: Normal range of motion and neck supple. No tenderness.   Skin:     General: Skin is warm and dry.      Capillary Refill: Capillary refill takes less than 2 seconds.      Coloration: Skin is not jaundiced or pale.      Findings: No bruising, erythema, lesion or  rash.   Neurological:      General: No focal deficit present.      Mental Status: She is alert and oriented to person, place, and time.      Cranial Nerves: No cranial nerve deficit.   Psychiatric:         Mood and Affect: Mood normal.         Behavior: Behavior normal.         Thought Content: Thought content normal.         Judgment: Judgment normal.     Vital Signs and Labs:  Vital Signs Patient Vitals for the past 24 hrs:   BP Temp Temp src Pulse Resp SpO2 Weight   09/09/21 1604 110/72 98.1 °F (36.7 °C) Oral 111 18 96 % --   09/09/21 1223 123/98 98.1 °F (36.7 °C) Oral 112 18 -- --   09/09/21 0722 125/76 98 °F (36.7 °C) Oral (!) 121 18 96 % --   09/09/21 0300 132/99 98 °F (36.7 °C) Oral 113 18 91 % --   09/08/21 2300 102/72 97.7 °F (36.5 °C) Oral 108 18 91 % --   09/08/21 2039 125/96 97.9 °F (36.6 °C) Oral 111 18 95 % 118 kg (260 lb 2.3 oz)   09/08/21 1931 119/87 -- -- 98 -- 94 % --   09/08/21 1901 117/87 -- -- 97 -- 97 % --     BMI:  Body mass index is 43.96 kg/m².    CBC    Results from last 7 days   Lab Units 09/09/21  0857 09/09/21  0052 09/08/21  1657 09/08/21  0947   WBC 10*3/mm3 6.31 8.22 6.93 5.61   HEMOGLOBIN g/dL 11.6* 11.0* 10.7* 10.5*   PLATELETS 10*3/mm3 330 276 309 262     BMP   Results from last 7 days   Lab Units 09/08/21  0947   SODIUM mmol/L 138   POTASSIUM mmol/L 3.8   CHLORIDE mmol/L 109*   CO2 mmol/L 18.7*   BUN mg/dL 8   CREATININE mg/dL 0.90   GLUCOSE mg/dL 112*   MAGNESIUM mg/dL 1.8     Cr Clearance Estimated Creatinine Clearance: 120.7 mL/min (by C-G formula based on SCr of 0.9 mg/dL).  Coag   Results from last 7 days   Lab Units 09/09/21  0857 09/09/21  0052 09/08/21  1657 09/08/21  0947   INR   --   --  1.08 1.04   APTT seconds 81.4* >200.0* 28.5  --      ABG      Radiology(recent) XR Chest 1 View    Result Date: 9/8/2021  FINDINGS AND IMPRESSION: Presumed left internal jugular catheter terminates in the brachycephalic confluence, as before.  Evaluation is suboptimal due to patient body  habitus. No pulmonary consolidation, pneumothorax or pleural effusion is seen. Cardiac silhouette is within normal limits for size.  This report was finalized on 9/8/2021 11:24 AM by Dr. Good York M.D.      CT Angiogram Chest    Result Date: 9/8/2021  1. Very small bilateral pulmonary thromboemboli. RV to LV ratio is less than 1. The tiny left lower lobe airspace opacity may represent sequela of the pulmonary thromboemboli. 2. Larger left pericardial collaterals, now draining into the IVC. There is only partial opacification of the brachiocephalic vein and SVC.  Discussed with Dr. Mendoza.  CHECK CHECK .      CT angiogram of the chest with PE protocol obtained yesterday is personally reviewed, and compared to a previous exam from May of this year.  The tunneled left jugular central venous catheter remains in good position, with superior vena cava stenosis which may be worse.  Even on the original study in May, there was prominent opacification of pericardial and left hilar collaterals.    Bilateral lower extremity venous duplex scan performed earlier today was normal.    Active Hospital Problems    Diagnosis  POA   • **Multiple subsegmental pulmonary emboli without acute cor pulmonale (CMS/HCC) [I26.94]  Yes   • Sinus tachycardia [R00.0]  Yes   • POTS (postural orthostatic tachycardia syndrome) [I49.8]  Yes   • 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     Problem Points:  3:  Patient has a new problem, with no additional work-up planned (max of 1)  Total problem points:3    Data Points:  1:  I have reviewed or order clinical lab test  1:  I have reviewed or order radiology test (except heart catheterization or echo)  2:  I have personally and independently review of image, tracing, or specimen  Total data points:4 or  more    Risk Points:  High:  Chronic illness with severe exacerbation or progression    MDM requires 2/3 (Problem points, Data points and Risk)  MDM Prob point Data point Risk   SF 1 1 Minimal   Low 2 2 Low   Mod 3 3 Moderate   High 4 4 High     Code requires 3/3 (MDM, History and Exam)  Code MDM History Exam Time   42785 SF/Low Detailed Detailed 30   24534 Mod Comprehensive Comprehensive 50   58036 High Comprehensive Comprehensive 70     Detailed history:  4 elements HPI or status of 3 chronic problems; 2-9 system ROS  Comprehensive:  4 elements HPI or status of 3 chronic problems;  10 system ROS    Detailed Exam:  12 findings from any organ system  Comprehensive Exam:  2 findings from each of 9 systems.   60007    Assessment/Plan       Multiple subsegmental pulmonary emboli without acute cor pulmonale (CMS/HCC)    POTS (postural orthostatic tachycardia syndrome)    Sinus tachycardia    Autoimmune disease (CMS/HCC)    Phoebe-Danlos syndrome    Nausea and vomiting    Gastroparesis    26 y.o. female with complex past medical history admitted to the hospital with tachycardia, shortness of breath and hypoxemia, in the context of pots syndrome.  CT angiogram demonstrated bilateral pulmonary emboli.  There is no prior history of VTE and she is not presently having any lower extremity symptoms.  There is no contraindication to anticoagulation and she has been initiated on an unfractionated heparin drip, with likely plans for conversion to a DOAC.  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  She has collateralization through pericardial and left hilar collaterals, extending down to the abdomen.  She does not have any symptoms of superior vena cava syndrome, and her current left IJ tunneled catheter is performing normally.  Anticoagulation is indicated for the above PE, but not for central vein sclerosis.  I do not recommend any specific treatment, and  specifically her catheter does not need to be removed..  It appears as if she needs her central catheter for weekly immunoglobulin infusions.  She has no lower extremity symptoms and lower extremity venous duplex scan is negative for DVT.  Have nothing else to add.  Will see again this hospitalization on request.    Personal protective equipment used for this patient encounter:  Patient wearing surgical mask [x]    Provider wearing a surgical mask [x]    Gloves []    Eye protection []    Face Shield []    Gown []    N 95 respirator or CAPR/PAPR []   Duration of interaction about 10 minutes    I discussed the patients findings and my recommendations with patient and family.    Peter Lane MD  09/09/21  18:34 EDT    Please call my office with any question: [phone]

## 2021-09-09 NOTE — PROGRESS NOTES
"I reviewed the results of the CT scan with radiology.  Specifically, I reviewed their finding that \"IMPRESSION:  1. Very small bilateral pulmonary thromboemboli. RV to LV ratio is less  than 1. The tiny left lower lobe airspace opacity may represent sequela  of the pulmonary thromboemboli.  2. Larger left pericardial collaterals, now draining into the IVC. There  is only partial opacification of the brachiocephalic vein and SVC.\"  Was present.    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  Patient previously had a right sided Mediport now has a left-sided Mediport.  We have performed a lower extremity venous Doppler that does not demonstrate any evidence of a lower extremity venous thrombus that could explain her bilateral pulmonary emboli.    I will place consult for vascular surgery to see the patient to assist us in evaluating this further.       "

## 2021-09-09 NOTE — CASE MANAGEMENT/SOCIAL WORK
Discharge Planning Assessment  Psychiatric     Patient Name: Liliam Lorenz  MRN: [id number]  Today's Date: 9/9/2021    Admit Date: 9/8/2021    Discharge Needs Assessment     Row Name 09/09/21 0428       Living Environment    Lives With  spouse    Name(s) of Who Lives With Patient  wife, Deedee Lorenz    Current Living Arrangements  home/apartment/condo    Primary Care Provided by  self    Provides Primary Care For  no one    Family Caregiver if Needed  none    Quality of Family Relationships  helpful;involved;supportive    Able to Return to Prior Arrangements  yes       Resource/Environmental Concerns    Transportation Concerns  car, none       Transition Planning    Patient/Family Anticipates Transition to  home with family    Patient/Family Anticipated Services at Transition  none    Transportation Anticipated  family or friend will provide       Discharge Needs Assessment    Readmission Within the Last 30 Days  no previous admission in last 30 days    Equipment Currently Used at Home  walker, rolling;cane, straight;other (see comments) IV pole and transport chair    Concerns to be Addressed  no discharge needs identified    Anticipated Changes Related to Illness  none        Discharge Plan     Row Name 09/09/21 0954       Plan    Plan  plans to return home and continue services with [facility]- CCP will follow for needs    Patient/Family in Agreement with Plan  yes    Plan Comments  Spoke with patient at bedside.  Facesheet, PCP and pharmacy verified.  Patient lives in a house with her wife, Deedee Lorenz ( [phone]).  Patient states that she uses a cane in the house for ambulation and has a walker and transport chair for when she is out in the community.  She is able to drive, but her spouse normally takes her to apppointments because she has frequent episodes of dizziness.  She is current with [facility].  She does her own daily IV fluids and anti-emetics.  She has a nurse come out once a week  to change the dressing on her line and administer IVIG.  Patient currently on Heparin gtt.  Advised that CCP will follow up to see which anticoagulant is ordered at dc. At this time, patient plans to return home with spouse and [facility]. Meena Leroy RN        Continued Care and Services - Admitted Since 9/8/2021    Coordination has not been started for this encounter.         Demographic Summary     Row Name 09/09/21 9667       General Information    Admission Type  inpatient    Arrived From  emergency department    Referral Source  admission list    Reason for Consult  discharge planning    Preferred Language  English        Functional Status     Row Name 09/09/21 6724       Functional Status    Usual Activity Tolerance  good    Current Activity Tolerance  good       Functional Status, IADL    Medications  independent    Meal Preparation  independent    Housekeeping  independent    Laundry  independent    Shopping  independent       Mental Status    General Appearance WDL  WDL       Mental Status Summary    Recent Changes in Mental Status/Cognitive Functioning  no changes        Psychosocial    No documentation.       Abuse/Neglect    No documentation.       Legal    No documentation.       Substance Abuse    No documentation.       Patient Forms    No documentation.           Meena Leroy RN

## 2021-09-09 NOTE — PLAN OF CARE
Goal Outcome Evaluation:  Plan of Care Reviewed With: patient        Progress: no change  Outcome Summary: From ER with c/o palpitations x3 days, admitted for sinus tach, POTS, bilat PE, gatroparesis, NSR to Sinus tach with HR  - will increase into 120-140's with movement, Heparin gtt at 11.3 - next PTT 0845, IV phenergan Q3 & IV Zofran Q8 scheduled, placed on 2L @ 0600 for c/o soa, wife Fernando at bedside, unable to get blood return from [facility]

## 2021-09-10 LAB
ALBUMIN SERPL-MCNC: 3.8 G/DL (ref 3.5–5.2)
ALBUMIN/GLOB SERPL: 1.1 G/DL
ALP SERPL-CCNC: 55 U/L (ref 39–117)
ALT SERPL W P-5'-P-CCNC: 21 U/L (ref 1–33)
ANION GAP SERPL CALCULATED.3IONS-SCNC: 12.4 MMOL/L (ref 5–15)
APTT PPP: 104.5 SECONDS (ref 22.7–35.4)
APTT PPP: 131.5 SECONDS (ref 22.7–35.4)
AST SERPL-CCNC: 19 U/L (ref 1–32)
BASOPHILS # BLD AUTO: 0.08 10*3/MM3 (ref 0–0.2)
BASOPHILS NFR BLD AUTO: 1 % (ref 0–1.5)
BILIRUB SERPL-MCNC: 0.6 MG/DL (ref 0–1.2)
BUN SERPL-MCNC: 9 MG/DL (ref 6–20)
BUN/CREAT SERPL: 10.1 (ref 7–25)
CALCIUM SPEC-SCNC: 8.9 MG/DL (ref 8.6–10.5)
CHLORIDE SERPL-SCNC: 107 MMOL/L (ref 98–107)
CHOLEST SERPL-MCNC: 212 MG/DL (ref 0–200)
CO2 SERPL-SCNC: 20.6 MMOL/L (ref 22–29)
CREAT SERPL-MCNC: 0.89 MG/DL (ref 0.57–1)
DEPRECATED RDW RBC AUTO: 46.4 FL (ref 37–54)
EOSINOPHIL # BLD AUTO: 0.24 10*3/MM3 (ref 0–0.4)
EOSINOPHIL NFR BLD AUTO: 3.1 % (ref 0.3–6.2)
ERYTHROCYTE [DISTWIDTH] IN BLOOD BY AUTOMATED COUNT: 16.2 % (ref 12.3–15.4)
GFR SERPL CREATININE-BSD FRML MDRD: 77 ML/MIN/1.73
GLOBULIN UR ELPH-MCNC: 3.4 GM/DL
GLUCOSE SERPL-MCNC: 88 MG/DL (ref 65–99)
HBA1C MFR BLD: 4.9 % (ref 4.8–5.6)
HCT VFR BLD AUTO: 35.1 % (ref 34–46.6)
HDLC SERPL-MCNC: 38 MG/DL (ref 40–60)
HGB BLD-MCNC: 11 G/DL (ref 12–15.9)
IMM GRANULOCYTES # BLD AUTO: 0.02 10*3/MM3 (ref 0–0.05)
IMM GRANULOCYTES NFR BLD AUTO: 0.3 % (ref 0–0.5)
LDLC SERPL CALC-MCNC: 149 MG/DL (ref 0–100)
LDLC/HDLC SERPL: 3.84 {RATIO}
LYMPHOCYTES # BLD AUTO: 3.21 10*3/MM3 (ref 0.7–3.1)
LYMPHOCYTES NFR BLD AUTO: 40.9 % (ref 19.6–45.3)
MCH RBC QN AUTO: 24.7 PG (ref 26.6–33)
MCHC RBC AUTO-ENTMCNC: 31.3 G/DL (ref 31.5–35.7)
MCV RBC AUTO: 78.7 FL (ref 79–97)
MONOCYTES # BLD AUTO: 0.58 10*3/MM3 (ref 0.1–0.9)
MONOCYTES NFR BLD AUTO: 7.4 % (ref 5–12)
NEUTROPHILS NFR BLD AUTO: 3.72 10*3/MM3 (ref 1.7–7)
NEUTROPHILS NFR BLD AUTO: 47.3 % (ref 42.7–76)
NRBC BLD AUTO-RTO: 0 /100 WBC (ref 0–0.2)
PLATELET # BLD AUTO: 290 10*3/MM3 (ref 140–450)
PMV BLD AUTO: 10.5 FL (ref 6–12)
POTASSIUM SERPL-SCNC: 3.8 MMOL/L (ref 3.5–5.2)
PROT SERPL-MCNC: 7.2 G/DL (ref 6–8.5)
RBC # BLD AUTO: 4.46 10*6/MM3 (ref 3.77–5.28)
SODIUM SERPL-SCNC: 140 MMOL/L (ref 136–145)
TRIGL SERPL-MCNC: 140 MG/DL (ref 0–150)
TSH SERPL DL<=0.05 MIU/L-ACNC: 4.93 UIU/ML (ref 0.27–4.2)
VLDLC SERPL-MCNC: 25 MG/DL (ref 5–40)
WBC # BLD AUTO: 7.85 10*3/MM3 (ref 3.4–10.8)

## 2021-09-10 PROCEDURE — 85730 THROMBOPLASTIN TIME PARTIAL: CPT | Performed by: EMERGENCY MEDICINE

## 2021-09-10 PROCEDURE — 84443 ASSAY THYROID STIM HORMONE: CPT | Performed by: HOSPITALIST

## 2021-09-10 PROCEDURE — 25010000002 PROMETHAZINE PER 50 MG: Performed by: HOSPITALIST

## 2021-09-10 PROCEDURE — 99232 SBSQ HOSP IP/OBS MODERATE 35: CPT | Performed by: INTERNAL MEDICINE

## 2021-09-10 PROCEDURE — 80061 LIPID PANEL: CPT | Performed by: HOSPITALIST

## 2021-09-10 PROCEDURE — 36415 COLL VENOUS BLD VENIPUNCTURE: CPT | Performed by: EMERGENCY MEDICINE

## 2021-09-10 PROCEDURE — 85025 COMPLETE CBC W/AUTO DIFF WBC: CPT | Performed by: EMERGENCY MEDICINE

## 2021-09-10 PROCEDURE — 85730 THROMBOPLASTIN TIME PARTIAL: CPT | Performed by: HOSPITALIST

## 2021-09-10 PROCEDURE — 80053 COMPREHEN METABOLIC PANEL: CPT | Performed by: HOSPITALIST

## 2021-09-10 PROCEDURE — 25010000002 HEPARIN (PORCINE) 25000-0.45 UT/250ML-% SOLUTION: Performed by: EMERGENCY MEDICINE

## 2021-09-10 PROCEDURE — 83036 HEMOGLOBIN GLYCOSYLATED A1C: CPT | Performed by: HOSPITALIST

## 2021-09-10 RX ORDER — SODIUM CHLORIDE 0.9 % (FLUSH) 0.9 %
20 SYRINGE (ML) INJECTION AS NEEDED
Status: DISCONTINUED | OUTPATIENT
Start: 2021-09-10 | End: 2021-09-10

## 2021-09-10 RX ORDER — SODIUM CHLORIDE 0.9 % (FLUSH) 0.9 %
10 SYRINGE (ML) INJECTION EVERY 12 HOURS SCHEDULED
Status: DISCONTINUED | OUTPATIENT
Start: 2021-09-10 | End: 2021-09-10

## 2021-09-10 RX ORDER — HEPARIN SODIUM (PORCINE) LOCK FLUSH IV SOLN 100 UNIT/ML 100 UNIT/ML
3 SOLUTION INTRAVENOUS DAILY PRN
Status: DISCONTINUED | OUTPATIENT
Start: 2021-09-10 | End: 2021-09-10

## 2021-09-10 RX ORDER — SODIUM CHLORIDE 0.9 % (FLUSH) 0.9 %
10 SYRINGE (ML) INJECTION AS NEEDED
Status: DISCONTINUED | OUTPATIENT
Start: 2021-09-10 | End: 2021-09-10

## 2021-09-10 RX ORDER — ATORVASTATIN CALCIUM 20 MG/1
40 TABLET, FILM COATED ORAL NIGHTLY
Status: DISCONTINUED | OUTPATIENT
Start: 2021-09-10 | End: 2021-09-11 | Stop reason: HOSPADM

## 2021-09-10 RX ADMIN — PROMETHAZINE HYDROCHLORIDE 12.5 MG: 25 INJECTION INTRAMUSCULAR; INTRAVENOUS at 01:34

## 2021-09-10 RX ADMIN — PANTOPRAZOLE SODIUM 40 MG: 40 TABLET, DELAYED RELEASE ORAL at 06:35

## 2021-09-10 RX ADMIN — ATORVASTATIN CALCIUM 40 MG: 20 TABLET, FILM COATED ORAL at 21:28

## 2021-09-10 RX ADMIN — PROMETHAZINE HYDROCHLORIDE 12.5 MG: 25 INJECTION INTRAMUSCULAR; INTRAVENOUS at 15:53

## 2021-09-10 RX ADMIN — APIXABAN 10 MG: 5 TABLET, FILM COATED ORAL at 21:28

## 2021-09-10 RX ADMIN — METOPROLOL TARTRATE 25 MG: 25 TABLET, FILM COATED ORAL at 09:15

## 2021-09-10 RX ADMIN — PROMETHAZINE HYDROCHLORIDE 12.5 MG: 25 INJECTION INTRAMUSCULAR; INTRAVENOUS at 11:55

## 2021-09-10 RX ADMIN — Medication 1000 UNITS: at 09:15

## 2021-09-10 RX ADMIN — METOPROLOL TARTRATE 25 MG: 25 TABLET, FILM COATED ORAL at 21:28

## 2021-09-10 RX ADMIN — MIDODRINE HYDROCHLORIDE 10 MG: 5 TABLET ORAL at 11:55

## 2021-09-10 RX ADMIN — MIDODRINE HYDROCHLORIDE 10 MG: 5 TABLET ORAL at 06:34

## 2021-09-10 RX ADMIN — MONTELUKAST SODIUM 10 MG: 10 TABLET, FILM COATED ORAL at 09:15

## 2021-09-10 RX ADMIN — PROMETHAZINE HYDROCHLORIDE 12.5 MG: 25 INJECTION INTRAMUSCULAR; INTRAVENOUS at 21:24

## 2021-09-10 RX ADMIN — MIDODRINE HYDROCHLORIDE 10 MG: 5 TABLET ORAL at 15:53

## 2021-09-10 RX ADMIN — GABAPENTIN 100 MG: 100 CAPSULE ORAL at 09:15

## 2021-09-10 RX ADMIN — HEPARIN SODIUM 8.3 UNITS/KG/HR: 10000 INJECTION, SOLUTION INTRAVENOUS at 14:53

## 2021-09-10 NOTE — PLAN OF CARE
Goal Outcome Evaluation:  Plan of Care Reviewed With: patient        Progress: improving  Outcome Summary: Pt. remains ST on the monitor. Weaned O2, now on RA. All other VSS. c/o nausea without vomiting. Medication given per MAR. Heparin d/c and Eliquis started. No c/o pain. WCTM.

## 2021-09-10 NOTE — NURSING NOTE
"Spoke with Dr. Moe Wells seeking clarification of central line termination.   X-ray 09/08/21 demonstrates \"Presumed left internal jugular catheter terminates in the brachycephalic [place]\" while x-ray 05/06/2021 demonstrates \"left internal jugular vein tunneled catheter which is probably within the superior vena cava.\"   Upon CON assessment of line, white lumen flushes freely and with  blood return. Per Dr. Wells, may use CVC for medication administration and may also input line care orders per protocol. Discussed with primary RN.     Adonis Chi, LIZN, RN, CON   Clinical Outcomes-CVR, CVU, CVI, VTU   (c): [phone]  Office: [phone]      "

## 2021-09-10 NOTE — PROGRESS NOTES
Patient Name: Liliam Lorenz  :1995  26 y.o.      Patient Care Team:  Jason Borrero MD as PCP - General (Family Medicine)    Chief Complaint: Pulmonary emboli     Interval History: Heart rate well controlled overnight       Objective   Vital Signs  Temp:  [97.9 °F (36.6 °C)-98.2 °F (36.8 °C)] 97.9 °F (36.6 °C)  Heart Rate:  [] 103  Resp:  [18] 18  BP: (108-123)/(65-98) 113/80    Intake/Output Summary (Last 24 hours) at 9/10/2021 0947  Last data filed at 2021 1215  Gross per 24 hour   Intake 500 ml   Output --   Net 500 ml     Flowsheet Rows      First Filed Value   Admission Height  --   Admission Weight  113 kg (249 lb 9 oz) Documented at 2021 1648          Physical Exam:   General Appearance:   Comfortable, in no acute distress   Lungs:     Clear to auscultation.  Normal respiratory effort and rate.      Heart:    Regular rhythm and normal rate, normal S1 and S2, no murmurs, gallops or rubs.     Chest Wall:    No abnormalities observed   Abdomen:     Soft, nontender, positive bowel sounds.     Extremities:   no cyanosis, clubbing or edema.  No marked joint deformities.         Results Review:    Results from last 7 days   Lab Units 09/10/21  0425   SODIUM mmol/L 140   POTASSIUM mmol/L 3.8   CHLORIDE mmol/L 107   CO2 mmol/L 20.6*   BUN mg/dL 9   CREATININE mg/dL 0.89   GLUCOSE mg/dL 88   CALCIUM mg/dL 8.9     Results from last 7 days   Lab Units 21  1155 21  0947   TROPONIN T ng/mL <0.010 <0.010     Results from last 7 days   Lab Units 09/10/21  0424   WBC 10*3/mm3 7.85   HEMOGLOBIN g/dL 11.0*   HEMATOCRIT % 35.1   PLATELETS 10*3/mm3 290     Results from last 7 days   Lab Units 09/10/21  0425 21  0857 21  0052 21  1657 21  1657 21  0947   INR   --   --   --   --  1.08 1.04   APTT seconds 131.5* 81.4* >200.0*   < > 28.5  --     < > = values in this interval not displayed.     Results from last 7 days   Lab Units 21  0947   MAGNESIUM  mg/dL 1.8     Results from last 7 days   Lab Units 09/10/21  0425   CHOLESTEROL mg/dL 212*   TRIGLYCERIDES mg/dL 140   HDL CHOL mg/dL 38*   LDL CHOL mg/dL 149*               Medication Review:   cholecalciferol, 1,000 Units, Oral, Daily  gabapentin, 100 mg, Oral, Daily  metoprolol tartrate, 25 mg, Oral, Q12H  midodrine, 10 mg, Oral, TID AC  montelukast, 10 mg, Oral, Daily  pantoprazole, 40 mg, Oral, Q AM         heparin (porcine), 13.3 Units/kg/hr, Last Rate: 11.3 Units/kg/hr (09/09/21 1256)        Assessment/Plan     Active Hospital Problems    Diagnosis  POA   • **Multiple subsegmental pulmonary emboli without acute cor pulmonale (CMS/HCC) [I26.94]  Yes   • Sinus tachycardia [R00.0]  Yes   • POTS (postural orthostatic tachycardia syndrome) [I49.8]  Yes   • 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     1.  Acute pulmonary emboli, on heparin, evaluation per primary team.    Lower extremity Dopplers normal.  2.  POTS syndrome-back on midodrine and metoprolol,  3.  Sinus tachycardia-we resumed her metoprolol yesterday morning and heart rate has been back to normal since.  4.  Phoebe-Danlos syndrome  5.  Nausea and vomiting-she says this is her biggest issue right now, she does have a history of gastroparesis and has been followed by GI  6.    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    Stable cardiac status, no active cardiac issues, we will sign off, please call if we can help in any way.    Juvencio Kemp III, MD  [facility]  09/10/21  09:47 EDT

## 2021-09-10 NOTE — PROGRESS NOTES
Daily progress note    Chief complaint  Doing better this morning  No increase shortness of breath  Denies any chest pain palpitation  Still with nausea but no vomiting    History of present illness  26-year-old white female who is well-known to our service with history of orthostatic chronic hypotension severe gastroparesis Phoebe-Danlos syndrome IBS and gastroesophageal reflux disease presented to [facility] emergency room with shortness of breath for last 4 days.  Patient denies any chest pain fever cough abdominal pain nausea vomiting diarrhea.  Patient also stated that she noticed her heart rate is in 130s to 160.  Patient did miss some of her Lopressor.  Patient evaluated in ER found to have bilateral pulmonary embolism admit for management.      REVIEW OF SYSTEMS  All systems reviewed and negative except for those discussed in HPI.      PHYSICAL EXAM   Blood pressure 100/60, pulse 90, temperature 97.3 °F (36.3 °C), temperature source Oral, resp. rate 18, weight 118 kg (260 lb 2.3 oz), SpO2 94 %.    GENERAL: 26-year-old well developed, well nourished in mild distress  HENT: NCAT, neck supple, trachea midline  EYES: no scleral icterus, PERRL, normal conjunctiva  CV: regular rhythm but tachycardic to 130s, no murmur: No chest tenderness  RESPIRATORY: unlabored effort, CTAB  ABDOMEN: soft, non-tender, non-distended, bowel sounds present  MUSCULOSKELETAL: no gross deformity, no pedal edema, no calf tenderness  NEURO: alert,  sensory and motor function of extremities intact, speech clear, mental status normal  SKIN: warm, dry, no rash  PSYCH:  Appropriate mood and affect     LAB RESULTS  Lab Results (last 24 hours)     Procedure Component Value Units Date/Time    aPTT [[id number]]  (Abnormal) Collected: 09/10/21 1008    Specimen: Blood from Arm, Left Updated: 09/10/21 1039     .5 seconds     aPTT [[id number]]  (Abnormal) Collected: 09/10/21 0425    Specimen: Blood Updated: 09/10/21 0618     PTT  131.5 seconds     TSH [[id number]]  (Abnormal) Collected: 09/10/21 0425    Specimen: Blood Updated: 09/10/21 0601     TSH 4.930 uIU/mL     Comprehensive Metabolic Panel [[id number]]  (Abnormal) Collected: 09/10/21 0425    Specimen: Blood Updated: 09/10/21 0552     Glucose 88 mg/dL      BUN 9 mg/dL      Creatinine 0.89 mg/dL      Sodium 140 mmol/L      Potassium 3.8 mmol/L      Chloride 107 mmol/L      CO2 20.6 mmol/L      Calcium 8.9 mg/dL      Total Protein 7.2 g/dL      Albumin 3.80 g/dL      ALT (SGPT) 21 U/L      AST (SGOT) 19 U/L      Alkaline Phosphatase 55 U/L      Total Bilirubin 0.6 mg/dL      eGFR Non African Amer 77 mL/min/1.73      Globulin 3.4 gm/dL      A/G Ratio 1.1 g/dL      BUN/Creatinine Ratio 10.1     Anion Gap 12.4 mmol/L     Narrative:      GFR Normal >60  Chronic Kidney Disease <60  Kidney Failure <15      Lipid Panel [[id number]]  (Abnormal) Collected: 09/10/21 0425    Specimen: Blood Updated: 09/10/21 0552     Total Cholesterol 212 mg/dL      Triglycerides 140 mg/dL      HDL Cholesterol 38 mg/dL      LDL Cholesterol  149 mg/dL      VLDL Cholesterol 25 mg/dL      LDL/HDL Ratio 3.84    Narrative:      Cholesterol Reference Ranges  (U.S. Department of Health and Human Services ATP III Classifications)    Desirable          <200 mg/dL  Borderline High    200-239 mg/dL  High Risk          >240 mg/dL      Triglyceride Reference Ranges  (U.S. Department of Health and Human Services ATP III Classifications)    Normal           <150 mg/dL  Borderline High  150-199 mg/dL  High             200-499 mg/dL  Very High        >500 mg/dL    HDL Reference Ranges  (U.S. Department of Health and Human Services ATP III Classifcations)    Low     <40 mg/dl (major risk factor for CHD)  High    >60 mg/dl ('negative' risk factor for CHD)        LDL Reference Ranges  (U.S. Department of Health and Human Services ATP III Classifcations)    Optimal          <100 mg/dL  Near Optimal     100-129 mg/dL  Borderline High   130-159 mg/dL  High             160-189 mg/dL  Very High        >189 mg/dL    Hemoglobin A1c [[id number]]  (Normal) Collected: 09/10/21 0425    Specimen: Blood Updated: 09/10/21 0546     Hemoglobin A1C 4.90 %     Narrative:      Hemoglobin A1C Ranges:    Increased Risk for Diabetes  5.7% to 6.4%  Diabetes                     >= 6.5%  Diabetic Goal                < 7.0%    CBC & Differential [[id number]]  (Abnormal) Collected: 09/10/21 0424    Specimen: Blood Updated: 09/10/21 0523    Narrative:      The following orders were created for panel order CBC & Differential.  Procedure                               Abnormality         Status                     ---------                               -----------         ------                     CBC Auto Differential[[id number]]        Abnormal            Final result                 Please view results for these tests on the individual orders.    CBC Auto Differential [[id number]]  (Abnormal) Collected: 09/10/21 0424    Specimen: Blood Updated: 09/10/21 0523     WBC 7.85 10*3/mm3      RBC 4.46 10*6/mm3      Hemoglobin 11.0 g/dL      Hematocrit 35.1 %      MCV 78.7 fL      MCH 24.7 pg      MCHC 31.3 g/dL      RDW 16.2 %      RDW-SD 46.4 fl      MPV 10.5 fL      Platelets 290 10*3/mm3      Neutrophil % 47.3 %      Lymphocyte % 40.9 %      Monocyte % 7.4 %      Eosinophil % 3.1 %      Basophil % 1.0 %      Immature Grans % 0.3 %      Neutrophils, Absolute 3.72 10*3/mm3      Lymphocytes, Absolute 3.21 10*3/mm3      Monocytes, Absolute 0.58 10*3/mm3      Eosinophils, Absolute 0.24 10*3/mm3      Basophils, Absolute 0.08 10*3/mm3      Immature Grans, Absolute 0.02 10*3/mm3      nRBC 0.0 /100 WBC         Imaging Results (Last 24 Hours)     Procedure Component Value Units Date/Time    CT Angiogram Chest [[id number]] Collected: 09/08/21 1720     Updated: 09/10/21 0659    Narrative:      CT ANGIOGRAM OF THE CHEST. MULTIPLE CORONAL, SAGITTAL, AND 3-D  RECONSTRUCTIONS.     HISTORY:  26-year-old female with chest pain and shortness of breath.  Pots syndrome. Mediport for IV fluids.     TECHNIQUE: Radiation dose reduction techniques were utilized, including  automated exposure control and exposure modulation based on body size.   CT angiogram of the chest was performed following the administration of  IV contrast. Left upper extremity injection. Multiple coronal, sagittal,  and 3-D reconstruction images were obtained. Compared with previous  chest CTA 05/06/2021.     FINDINGS: There are larger collaterals from the left subclavian vein  along the left pericardium extending caudally into the IVC. There is  partial opacification of the brachiocephalic vein and a small portion of  the SVC is opacified. There are very small bilateral pulmonary  thromboemboli. RV to LV ratio is less than 1. The lungs are clear except  for a very small peripheral airspace opacity at the posterior aspect of  the left lower lobe, image 82. There are no pleural or pericardial  effusions. There is no lymphadenopathy within the chest.       Impression:      1. Very small bilateral pulmonary thromboemboli. RV to LV ratio is less  than 1. The tiny left lower lobe airspace opacity may represent sequela  of the pulmonary thromboemboli.  2. Larger left pericardial collaterals, now draining into the IVC. There  is only partial opacification of the brachiocephalic vein and SVC.     Discussed with Dr. Mendoza.     This report was finalized on 9/10/2021 6:55 AM by Dr. Mary Waite M.D.              ECG 12 Lead  Component   Ref Range & Units 9/8/21 1002 5/6/21 0432   QT Interval   ms 298  279         HEART RATE= 132  bpm  RR Interval= 452  ms  MS Interval= 136  ms  P Horizontal Axis= 1  deg  P Front Axis= 40  deg  QRSD Interval= 75  ms  QT Interval= 298  ms  QRS Axis= 15  deg  T Wave Axis= -12  deg  - BORDERLINE ECG -  Sinus tachycardia  Borderline T abnormalities, anterior leads  When compared with ECG of 06-May-2021 4:32:48,  No  significant change             Current Facility-Administered Medications:   •  albuterol sulfate HFA (PROVENTIL HFA;VENTOLIN HFA;PROAIR HFA) inhaler 2 puff, 2 puff, Inhalation, Q4H PRN, Caleb Naik MD  •  cholecalciferol (VITAMIN D3) tablet 1,000 Units, 1,000 Units, Oral, Daily, Caleb Naik MD, 1,000 Units at 09/10/21 0915  •  gabapentin (NEURONTIN) capsule 100 mg, 100 mg, Oral, Daily, Caleb Naik MD, 100 mg at 09/10/21 0915  •  heparin (porcine) 5000 UNIT/ML injection 4,500-9,000 Units, 40-80 Units/kg, Intravenous, Q6H PRN, Juarez Mendoza MD  •  heparin 59933 units/250 mL (100 units/mL) in 0.45 % NaCl infusion, 13.3 Units/kg/hr, Intravenous, Titrated, Juarez Mendoza MD, Last Rate: 9.37 mL/hr at 09/10/21 1453, 8.3 Units/kg/hr at 09/10/21 1453  •  heparin injection 300 Units, 3 mL, Intravenous, Daily PRN, Russell Rosa MD  •  metoprolol tartrate (LOPRESSOR) tablet 25 mg, 25 mg, Oral, Q12H, Juvencio Kemp III, MD, 25 mg at 09/10/21 0915  •  midodrine (PROAMATINE) tablet 10 mg, 10 mg, Oral, TID AC, Juvencio Kemp III, MD, 10 mg at 09/10/21 1155  •  montelukast (SINGULAIR) tablet 10 mg, 10 mg, Oral, Daily, Caleb Naik MD, 10 mg at 09/10/21 0915  •  ondansetron (ZOFRAN) injection 8 mg, 8 mg, Intravenous, Q8H PRN, Caleb Naik MD, 8 mg at 09/09/21 1718  •  pantoprazole (PROTONIX) EC tablet 40 mg, 40 mg, Oral, Q AM, Caleb Naik MD, 40 mg at 09/10/21 0635  •  promethazine (PHENERGAN) 12.5 mg in sodium chloride 0.9 % 50 mL, 12.5 mg, Intravenous, Q4H PRN, Caleb Naik MD, 12.5 mg at 09/10/21 1155  •  sodium chloride 0.9 % flush 10 mL, 10 mL, Intravenous, Q12H, Russell Rosa MD  •  sodium chloride 0.9 % flush 10 mL, 10 mL, Intravenous, Q12H, Russell Rosa MD  •  sodium chloride 0.9 % flush 10 mL, 10 mL, Intravenous, PRN, Russell Rosa MD  •  sodium chloride 0.9 % flush 20 mL, 20 mL, Intravenous, PRN, Russell Rosa MD     ASSESSMENT  Acute bilateral small pulmonary  embolism   Chronic orthostatic hypotension  POTS syndrome  Severe gastroparesis  Sinus tachycardia  Phoebe-Danlos syndrome  Irritable bowel syndrome  Gastroesophageal reflux disease    PLAN  CPM  Supplement oxygen  Anticoagulation  Continue home medications  Stress ulcer DVT prophylaxis  Cardiology and vascular surgery consult appreciated  Supportive care  Discussed with nursing staff  Discharge planning    ANN GONZALEZ MD

## 2021-09-11 VITALS
RESPIRATION RATE: 18 BRPM | BODY MASS INDEX: 43.96 KG/M2 | DIASTOLIC BLOOD PRESSURE: 75 MMHG | SYSTOLIC BLOOD PRESSURE: 119 MMHG | HEART RATE: 88 BPM | WEIGHT: 260.14 LBS | TEMPERATURE: 97.9 F | OXYGEN SATURATION: 94 %

## 2021-09-11 LAB
ANION GAP SERPL CALCULATED.3IONS-SCNC: 11.4 MMOL/L (ref 5–15)
BASOPHILS # BLD AUTO: 0.05 10*3/MM3 (ref 0–0.2)
BASOPHILS NFR BLD AUTO: 0.8 % (ref 0–1.5)
BUN SERPL-MCNC: 12 MG/DL (ref 6–20)
BUN/CREAT SERPL: 16.7 (ref 7–25)
CALCIUM SPEC-SCNC: 8.9 MG/DL (ref 8.6–10.5)
CHLORIDE SERPL-SCNC: 107 MMOL/L (ref 98–107)
CO2 SERPL-SCNC: 18.6 MMOL/L (ref 22–29)
CREAT SERPL-MCNC: 0.72 MG/DL (ref 0.57–1)
DEPRECATED RDW RBC AUTO: 46.1 FL (ref 37–54)
EOSINOPHIL # BLD AUTO: 0.23 10*3/MM3 (ref 0–0.4)
EOSINOPHIL NFR BLD AUTO: 3.6 % (ref 0.3–6.2)
ERYTHROCYTE [DISTWIDTH] IN BLOOD BY AUTOMATED COUNT: 16 % (ref 12.3–15.4)
GFR SERPL CREATININE-BSD FRML MDRD: 98 ML/MIN/1.73
GLUCOSE SERPL-MCNC: 91 MG/DL (ref 65–99)
HCT VFR BLD AUTO: 33.5 % (ref 34–46.6)
HGB BLD-MCNC: 10.3 G/DL (ref 12–15.9)
IMM GRANULOCYTES # BLD AUTO: 0.02 10*3/MM3 (ref 0–0.05)
IMM GRANULOCYTES NFR BLD AUTO: 0.3 % (ref 0–0.5)
LYMPHOCYTES # BLD AUTO: 2.25 10*3/MM3 (ref 0.7–3.1)
LYMPHOCYTES NFR BLD AUTO: 34.9 % (ref 19.6–45.3)
MCH RBC QN AUTO: 24.3 PG (ref 26.6–33)
MCHC RBC AUTO-ENTMCNC: 30.7 G/DL (ref 31.5–35.7)
MCV RBC AUTO: 79.2 FL (ref 79–97)
MONOCYTES # BLD AUTO: 0.49 10*3/MM3 (ref 0.1–0.9)
MONOCYTES NFR BLD AUTO: 7.6 % (ref 5–12)
NEUTROPHILS NFR BLD AUTO: 3.4 10*3/MM3 (ref 1.7–7)
NEUTROPHILS NFR BLD AUTO: 52.8 % (ref 42.7–76)
NRBC BLD AUTO-RTO: 0 /100 WBC (ref 0–0.2)
PLATELET # BLD AUTO: 281 10*3/MM3 (ref 140–450)
PMV BLD AUTO: 10.4 FL (ref 6–12)
POTASSIUM SERPL-SCNC: 3.5 MMOL/L (ref 3.5–5.2)
RBC # BLD AUTO: 4.23 10*6/MM3 (ref 3.77–5.28)
SODIUM SERPL-SCNC: 137 MMOL/L (ref 136–145)
WBC # BLD AUTO: 6.44 10*3/MM3 (ref 3.4–10.8)

## 2021-09-11 PROCEDURE — 80048 BASIC METABOLIC PNL TOTAL CA: CPT | Performed by: HOSPITALIST

## 2021-09-11 PROCEDURE — 25010000002 ONDANSETRON PER 1 MG: Performed by: HOSPITALIST

## 2021-09-11 PROCEDURE — 25010000002 PROMETHAZINE PER 50 MG: Performed by: HOSPITALIST

## 2021-09-11 PROCEDURE — 85025 COMPLETE CBC W/AUTO DIFF WBC: CPT | Performed by: EMERGENCY MEDICINE

## 2021-09-11 RX ORDER — ATORVASTATIN CALCIUM 40 MG/1
40 TABLET, FILM COATED ORAL NIGHTLY
Qty: 30 TABLET | Refills: 1 | Status: SHIPPED | OUTPATIENT
Start: 2021-09-11 | End: 2021-10-21

## 2021-09-11 RX ADMIN — METOPROLOL TARTRATE 25 MG: 25 TABLET, FILM COATED ORAL at 08:52

## 2021-09-11 RX ADMIN — APIXABAN 10 MG: 5 TABLET, FILM COATED ORAL at 08:52

## 2021-09-11 RX ADMIN — PROMETHAZINE HYDROCHLORIDE 12.5 MG: 25 INJECTION INTRAMUSCULAR; INTRAVENOUS at 04:29

## 2021-09-11 RX ADMIN — Medication 1000 UNITS: at 08:52

## 2021-09-11 RX ADMIN — ONDANSETRON 8 MG: 2 INJECTION INTRAMUSCULAR; INTRAVENOUS at 08:57

## 2021-09-11 RX ADMIN — MIDODRINE HYDROCHLORIDE 10 MG: 5 TABLET ORAL at 06:39

## 2021-09-11 RX ADMIN — GABAPENTIN 100 MG: 100 CAPSULE ORAL at 08:52

## 2021-09-11 RX ADMIN — MONTELUKAST SODIUM 10 MG: 10 TABLET, FILM COATED ORAL at 08:56

## 2021-09-11 RX ADMIN — PANTOPRAZOLE SODIUM 40 MG: 40 TABLET, DELAYED RELEASE ORAL at 06:39

## 2021-09-11 NOTE — PLAN OF CARE
Goal Outcome Evaluation:  Plan of Care Reviewed With: patient           Outcome Summary: NSR to ST on monitor, all other vss. On room air. C/o nausea, treated per MAR. No other c/o pain. WCTM.

## 2021-09-11 NOTE — DISCHARGE SUMMARY
Discharge summary    Date of admission 9/8/2021  Date of discharge 9/11/2021    Final diagnosis  Acute bilateral small pulmonary embolism   Chronic orthostatic hypotension  POTS syndrome  Severe gastroparesis  Sinus tachycardia  Phoebe-Danlos syndrome  Irritable bowel syndrome  Gastroesophageal reflux disease    Discharge medications    Current Facility-Administered Medications:   •  apixaban (ELIQUIS) tablet 10 mg, 10 mg, Oral, Q12H, 10 mg at 09/11/21 0852 **FOLLOWED BY** [START ON 9/17/2021] apixaban (ELIQUIS) tablet 5 mg, 5 mg, Oral, Q12H, Caleb Naik MD  •  atorvastatin (LIPITOR) tablet 40 mg, 40 mg, Oral, Nightly, Caleb Naik MD, 40 mg at 09/10/21 2128  •  cholecalciferol (VITAMIN D3) tablet 1,000 Units, 1,000 Units, Oral, Daily, Caleb Naik MD, 1,000 Units at 09/11/21 0852  •  gabapentin (NEURONTIN) capsule 100 mg, 100 mg, Oral, Daily, Caleb Naik MD, 100 mg at 09/11/21 0852  •  metoprolol tartrate (LOPRESSOR) tablet 25 mg, 25 mg, Oral, Q12H, Juvencio Kemp III, MD, 25 mg at 09/11/21 0852  •  midodrine (PROAMATINE) tablet 10 mg, 10 mg, Oral, TID AC, Juvencio Kemp III, MD, 10 mg at 09/11/21 0639  •  montelukast (SINGULAIR) tablet 10 mg, 10 mg, Oral, Daily, Caleb Naik MD, 10 mg at 09/11/21 0856  •  pantoprazole (PROTONIX) EC tablet 40 mg, 40 mg, Oral, Q AM, Caleb Naik MD, 40 mg at 09/11/21 0639     Consults obtained  Cardiology  Vascular surgery    Procedures  Lower extremity Doppler showed no DVT    Hospital course  26-year white female with history of POTS and severe gastroparesis admitted through emergency room shortness of breath.  Patient work-up in ER revealed acute bilateral small pulmonary embolism admit for management.  Patient admitted started on heparin and check for lower external Doppler study which showed no evidence of DVT.  Patient oxygen saturation 94 to 98% on room air.  Patient followed by cardiology and also vascular surgery consult obtained to check on her port.  Vascular  surgery recommend no further intervention as it is less likely that potential source of pulmonary Balsam.  Patient heparin changed to Eliquis and she is doing much better medically stable and cleared for discharge.  Patient will stay on Eliquis for at least 3 to 6 months.  Patient will continue all of her home medications.    Discharge diet regular    Activity as tolerated    Medication as above    Follow-up with primary doctor in 1 week and follow-up with cardiology per their instruction and take medication as directed    ANN GONZALEZ MD

## 2021-09-11 NOTE — PROGRESS NOTES
Daily progress note    Chief complaint  Doing better  No new complaints  Denies any chest pain shortness of breath  Wants to go home  Family at bedside    History of present illness  26-year-old white female who is well-known to our service with history of orthostatic chronic hypotension severe gastroparesis Phoebe-Danlos syndrome IBS and gastroesophageal reflux disease presented to [facility] [place] emergency room with shortness of breath for last 4 days.  Patient denies any chest pain fever cough abdominal pain nausea vomiting diarrhea.  Patient also stated that she noticed her heart rate is in 130s to 160.  Patient did miss some of her Lopressor.  Patient evaluated in ER found to have bilateral pulmonary embolism admit for management.      REVIEW OF SYSTEMS  Unremarkable     PHYSICAL EXAM   Blood pressure 119/75, pulse 88, temperature 97.9 °F (36.6 °C), temperature source Oral, resp. rate 18, weight 118 kg (260 lb 2.3 oz), SpO2 94 %.    GENERAL: 26-year-old well developed, well nourished in mild distress  HENT: NCAT, neck supple, trachea midline  EYES: no scleral icterus, PERRL, normal conjunctiva  CV: regular rhythm but tachycardic to 130s, no murmur: No chest tenderness  RESPIRATORY: unlabored effort, CTAB  ABDOMEN: soft, non-tender, non-distended, bowel sounds present  MUSCULOSKELETAL: no gross deformity, no pedal edema, no calf tenderness  NEURO: alert,  sensory and motor function of extremities intact, speech clear, mental status normal  SKIN: warm, dry, no rash  PSYCH:  Appropriate mood and affect     LAB RESULTS  Lab Results (last 24 hours)     Procedure Component Value Units Date/Time    Basic Metabolic Panel [[id number]]  (Abnormal) Collected: 09/11/21 0341    Specimen: Blood Updated: 09/11/21 0450     Glucose 91 mg/dL      BUN 12 mg/dL      Creatinine 0.72 mg/dL      Sodium 137 mmol/L      Potassium 3.5 mmol/L      Chloride 107 mmol/L      CO2 18.6 mmol/L      Calcium 8.9 mg/dL      eGFR Non African  Amer 98 mL/min/1.73      BUN/Creatinine Ratio 16.7     Anion Gap 11.4 mmol/L     Narrative:      GFR Normal >60  Chronic Kidney Disease <60  Kidney Failure <15      CBC & Differential [[id number]]  (Abnormal) Collected: 09/11/21 0341    Specimen: Blood Updated: 09/11/21 0429    Narrative:      The following orders were created for panel order CBC & Differential.  Procedure                               Abnormality         Status                     ---------                               -----------         ------                     CBC Auto Differential[[id number]]        Abnormal            Final result                 Please view results for these tests on the individual orders.    CBC Auto Differential [[id number]]  (Abnormal) Collected: 09/11/21 0341    Specimen: Blood Updated: 09/11/21 0429     WBC 6.44 10*3/mm3      RBC 4.23 10*6/mm3      Hemoglobin 10.3 g/dL      Hematocrit 33.5 %      MCV 79.2 fL      MCH 24.3 pg      MCHC 30.7 g/dL      RDW 16.0 %      RDW-SD 46.1 fl      MPV 10.4 fL      Platelets 281 10*3/mm3      Neutrophil % 52.8 %      Lymphocyte % 34.9 %      Monocyte % 7.6 %      Eosinophil % 3.6 %      Basophil % 0.8 %      Immature Grans % 0.3 %      Neutrophils, Absolute 3.40 10*3/mm3      Lymphocytes, Absolute 2.25 10*3/mm3      Monocytes, Absolute 0.49 10*3/mm3      Eosinophils, Absolute 0.23 10*3/mm3      Basophils, Absolute 0.05 10*3/mm3      Immature Grans, Absolute 0.02 10*3/mm3      nRBC 0.0 /100 WBC         Imaging Results (Last 24 Hours)     ** No results found for the last 24 hours. **           ECG 12 Lead  Component   Ref Range & Units 9/8/21 1002 5/6/21 0432   QT Interval   ms 298  279         HEART RATE= 132  bpm  RR Interval= 452  ms  IN Interval= 136  ms  P Horizontal Axis= 1  deg  P Front Axis= 40  deg  QRSD Interval= 75  ms  QT Interval= 298  ms  QRS Axis= 15  deg  T Wave Axis= -12  deg  - BORDERLINE ECG -  Sinus tachycardia  Borderline T abnormalities, anterior leads  When  compared with ECG of 06-May-2021 4:32:48,  No significant change             Current Facility-Administered Medications:   •  albuterol sulfate HFA (PROVENTIL HFA;VENTOLIN HFA;PROAIR HFA) inhaler 2 puff, 2 puff, Inhalation, Q4H PRN, Ann Naik MD  •  apixaban (ELIQUIS) tablet 10 mg, 10 mg, Oral, Q12H, 10 mg at 09/11/21 0852 **FOLLOWED BY** [START ON 9/17/2021] apixaban (ELIQUIS) tablet 5 mg, 5 mg, Oral, Q12H, Ann Naik MD  •  atorvastatin (LIPITOR) tablet 40 mg, 40 mg, Oral, Nightly, Ann Naik MD, 40 mg at 09/10/21 2128  •  cholecalciferol (VITAMIN D3) tablet 1,000 Units, 1,000 Units, Oral, Daily, Ann Naik MD, 1,000 Units at 09/11/21 0852  •  gabapentin (NEURONTIN) capsule 100 mg, 100 mg, Oral, Daily, Ann Naik MD, 100 mg at 09/11/21 0852  •  metoprolol tartrate (LOPRESSOR) tablet 25 mg, 25 mg, Oral, Q12H, Juvencio Kemp III, MD, 25 mg at 09/11/21 0852  •  midodrine (PROAMATINE) tablet 10 mg, 10 mg, Oral, TID AC, Juvencio Kemp III, MD, 10 mg at 09/11/21 0639  •  montelukast (SINGULAIR) tablet 10 mg, 10 mg, Oral, Daily, Ann Naik MD, 10 mg at 09/11/21 0856  •  ondansetron (ZOFRAN) injection 8 mg, 8 mg, Intravenous, Q8H PRN, Ann Naik MD, 8 mg at 09/11/21 0857  •  pantoprazole (PROTONIX) EC tablet 40 mg, 40 mg, Oral, Q AM, Ann Naik MD, 40 mg at 09/11/21 0639  •  promethazine (PHENERGAN) 12.5 mg in sodium chloride 0.9 % 50 mL, 12.5 mg, Intravenous, Q4H PRN, Ann Naik MD, 12.5 mg at 09/11/21 0429     ASSESSMENT  Acute bilateral small pulmonary embolism   Chronic orthostatic hypotension  POTS syndrome  Severe gastroparesis  Sinus tachycardia  Phoebe-Danlos syndrome  Irritable bowel syndrome  Gastroesophageal reflux disease    PLAN  Discharge home  Discharge summary dictated    ANN NAIK MD

## 2021-09-12 ENCOUNTER — READMISSION MANAGEMENT (OUTPATIENT)
Dept: CALL CENTER | Facility: HOSPITAL | Age: 26
End: 2021-09-12

## 2021-09-12 NOTE — OUTREACH NOTE
Prep Survey      Responses   Muslim facility patient discharged from?  [place]   Is LACE score < 7 ?  No   Emergency Room discharge w/ pulse ox?  No   Eligibility  Readm Mgmt   Discharge diagnosis  Multiple subsegmental pulmonary emboli without acute cor pulmonale   Does the patient have one of the following disease processes/diagnoses(primary or secondary)?  Other   Does the patient have Home health ordered?  No   Is there a DME ordered?  No   Comments regarding appointments  Appts needed   Medication alerts for this patient  Eliquis   General alerts for this patient  POTS, Phoebe-Danlos syndrome   Prep survey completed?  Yes          Irasema Briones RN

## 2021-09-13 NOTE — PAYOR COMM NOTE
"Liliam Da Silva (26 y.o. Female)     PLEASE SEE ATTACHED DC SUMMARY    REF#[id number]    THANK YOU    MARLON HAMMONDS LPN CCP    Date of Birth Social Security Number Address Home Phone MRN    1995  [address] [place] [id number] [phone] [id number]    Holiness Marital Status          Bahai        Admission Date Admission Type Admitting Provider Attending Provider Department, Room/Bed    9/8/21 Emergency Caleb Naik MD  [address], 2204/1    Discharge Date Discharge Disposition Discharge Destination        9/11/2021 Home or Self Care              Attending Provider: (none)   Allergies: Eggs Or Egg-derived Products, Pepcid [Famotidine], Scopolamine    Isolation: None   Infection: None   Code Status: Prior    Ht: 163.8 cm (64.5\")   Wt: 118 kg (260 lb 2.3 oz)    Admission Cmt: None   Principal Problem: Multiple subsegmental pulmonary emboli without acute cor pulmonale (CMS/HCC) [I26.94]                 Active Insurance as of 9/8/2021     Primary Coverage     Payor Plan Insurance Group Employer/Plan Group    [employer]HEM BLUE CROSS AN[employer]M BLUE CROSS BLUE [employer]IELD PPO [id number]     Payor Plan Address Payor Plan Phone Number Payor Plan Fax Number Effective Dates    [address] [phone]  4/1/2019 - None Entered    [facility] [address]       Subscriber Name Subscriber Birth Date Member ID       AZEB DA SILVA 1995 [id number]                 Emergency Contacts      (Rel.) Home Phone Work Phone Mobile Phone    AZEB DA SILVA (Spouse) -- -- [phone]               Discharge Summary      Caleb Naik MD at 09/11/21 1325        Discharge summary    Date of admission 9/8/2021  Date of discharge 9/11/2021    Final diagnosis  Acute bilateral small pulmonary embolism   Chronic orthostatic hypotension  POTS syndrome  Severe gastroparesis  Sinus tachycardia  Phoebe-Danlos syndrome  Irritable bowel syndrome  Gastroesophageal reflux disease    Discharge " medications    Current Facility-Administered Medications:   •  apixaban (ELIQUIS) tablet 10 mg, 10 mg, Oral, Q12H, 10 mg at 09/11/21 0852 **FOLLOWED BY** [START ON 9/17/2021] apixaban (ELIQUIS) tablet 5 mg, 5 mg, Oral, Q12H, Caleb Naik MD  •  atorvastatin (LIPITOR) tablet 40 mg, 40 mg, Oral, Nightly, Caleb Naik MD, 40 mg at 09/10/21 2128  •  cholecalciferol (VITAMIN D3) tablet 1,000 Units, 1,000 Units, Oral, Daily, Caleb Naik MD, 1,000 Units at 09/11/21 0852  •  gabapentin (NEURONTIN) capsule 100 mg, 100 mg, Oral, Daily, Caleb Naik MD, 100 mg at 09/11/21 0852  •  metoprolol tartrate (LOPRESSOR) tablet 25 mg, 25 mg, Oral, Q12H, Juvencio Kemp III, MD, 25 mg at 09/11/21 0852  •  midodrine (PROAMATINE) tablet 10 mg, 10 mg, Oral, TID AC, Juvencio Kemp III, MD, 10 mg at 09/11/21 0639  •  montelukast (SINGULAIR) tablet 10 mg, 10 mg, Oral, Daily, Caleb Naik MD, 10 mg at 09/11/21 0856  •  pantoprazole (PROTONIX) EC tablet 40 mg, 40 mg, Oral, Q AM, Caleb Naik MD, 40 mg at 09/11/21 0639     Consults obtained  Cardiology  Vascular surgery    Procedures  Lower extremity Doppler showed no DVT    Hospital course  26-year white female with history of POTS and severe gastroparesis admitted through emergency room shortness of breath.  Patient work-up in ER revealed acute bilateral small pulmonary embolism admit for management.  Patient admitted started on heparin and check for lower external Doppler study which showed no evidence of DVT.  Patient oxygen saturation 94 to 98% on room air.  Patient followed by cardiology and also vascular surgery consult obtained to check on her port.  Vascular surgery recommend no further intervention as it is less likely that potential source of pulmonary Balsam.  Patient heparin changed to Eliquis and she is doing much better medically stable and cleared for discharge.  Patient will stay on Eliquis for at least 3 to 6 months.  Patient will continue all of her home  medications.    Discharge diet regular    Activity as tolerated    Medication as above    Follow-up with primary doctor in 1 week and follow-up with cardiology per their instruction and take medication as directed    ANN GONZALEZ MD    Electronically signed by Ann Gonzalez MD at 09/11/21 9543

## 2021-09-15 ENCOUNTER — READMISSION MANAGEMENT (OUTPATIENT)
Dept: CALL CENTER | Facility: HOSPITAL | Age: 26
End: 2021-09-15

## 2021-09-15 NOTE — OUTREACH NOTE
Medical Week 1 Survey      Responses   [facility] operated by [facility] patient discharged from?  [facility]   Does the patient have one of the following disease processes/diagnoses(primary or secondary)?  Other   Week 1 attempt successful?  Yes   Call start time  1516   Call end time  1521   Discharge diagnosis  Multiple subsegmental pulmonary emboli without acute cor pulmonale   Is patient permission given to speak with other caregiver?  No   Meds reviewed with patient/caregiver?  Yes   Does the patient have all medications ordered at discharge?  Yes [patient states that she had to get the eliquis from cardiology office. Was unable to get filled at [facility] due to insurance issues. ]   Is the patient taking all medications as directed (includes completed medication regime)?  Yes   Does the patient have a primary care provider?   Yes   Does the patient have an appointment with their PCP within 7 days of discharge?  Yes   Comments regarding PCP  Follow up with Jason Borrero MD   Has the patient kept scheduled appointments due by today?  N/A   Comments  Patient reports that she has f/u in place with PCP and cardiology.    Has home health visited the patient within 72 hours of discharge?  N/A   Psychosocial issues?  No   Comments  Patient monitoring home O2 sat and HR with pulse ox. She reports readings since discharge have been good.    Did the patient receive a copy of their discharge instructions?  Yes   Nursing interventions  Reviewed instructions with patient   What is the patient's perception of their health status since discharge?  Improving   Is the patient/caregiver able to teach back signs and symptoms related to disease process for when to call PCP?  Yes   Is the patient/caregiver able to teach back signs and symptoms related to disease process for when to call 911?  Yes   Is the patient/caregiver able to teach back the hierarchy of who to call/visit for symptoms/problems? PCP, Specialist, Home health nurse, Urgent  [facility], ED, 911  Yes   If the patient is a current smoker, are they able to teach back resources for cessation?  Not a smoker   Week 1 call completed?  Yes          Tata Barber RN

## 2021-09-17 ENCOUNTER — HOSPITAL ENCOUNTER (EMERGENCY)
Facility: HOSPITAL | Age: 26
Discharge: HOME | End: 2021-09-17
Attending: EMERGENCY MEDICINE | Admitting: EMERGENCY MEDICINE

## 2021-09-17 ENCOUNTER — APPOINTMENT (OUTPATIENT)
Dept: GENERAL RADIOLOGY | Facility: HOSPITAL | Age: 26
End: 2021-09-17

## 2021-09-17 VITALS
HEART RATE: 98 BPM | DIASTOLIC BLOOD PRESSURE: 92 MMHG | BODY MASS INDEX: 40.15 KG/M2 | HEIGHT: 65 IN | RESPIRATION RATE: 16 BRPM | WEIGHT: 241 LBS | OXYGEN SATURATION: 96 % | SYSTOLIC BLOOD PRESSURE: 144 MMHG | TEMPERATURE: 98.2 F

## 2021-09-17 DIAGNOSIS — T82.9XXA COMPLICATION OF CENTRAL VENOUS CATHETER, UNSPECIFIED COMPLICATION, INITIAL ENCOUNTER: ICD-10-CM

## 2021-09-17 DIAGNOSIS — T82.7XXA INFECTION OF HEMODIALYSIS TUNNELED CATHETER, INITIAL ENCOUNTER (HCC): Primary | ICD-10-CM

## 2021-09-17 LAB
ALBUMIN SERPL-MCNC: 4.1 G/DL (ref 3.5–5.2)
ALBUMIN/GLOB SERPL: 0.9 G/DL
ALP SERPL-CCNC: 62 U/L (ref 39–117)
ALT SERPL W P-5'-P-CCNC: 23 U/L (ref 1–33)
ANION GAP SERPL CALCULATED.3IONS-SCNC: 12.5 MMOL/L (ref 5–15)
AST SERPL-CCNC: 25 U/L (ref 1–32)
BACTERIA UR QL AUTO: NORMAL /HPF
BASOPHILS # BLD AUTO: 0.07 10*3/MM3 (ref 0–0.2)
BASOPHILS NFR BLD AUTO: 1 % (ref 0–1.5)
BILIRUB SERPL-MCNC: 0.5 MG/DL (ref 0–1.2)
BILIRUB UR QL STRIP: NEGATIVE
BUN SERPL-MCNC: 6 MG/DL (ref 6–20)
BUN/CREAT SERPL: 7.3 (ref 7–25)
CALCIUM SPEC-SCNC: 9.4 MG/DL (ref 8.6–10.5)
CHLORIDE SERPL-SCNC: 103 MMOL/L (ref 98–107)
CLARITY UR: CLEAR
CO2 SERPL-SCNC: 20.5 MMOL/L (ref 22–29)
COLOR UR: YELLOW
CREAT SERPL-MCNC: 0.82 MG/DL (ref 0.57–1)
CRP SERPL-MCNC: <0.3 MG/DL (ref 0–0.5)
D-LACTATE SERPL-SCNC: 1.5 MMOL/L (ref 0.5–2)
DEPRECATED RDW RBC AUTO: 43.1 FL (ref 37–54)
EOSINOPHIL # BLD AUTO: 0.31 10*3/MM3 (ref 0–0.4)
EOSINOPHIL NFR BLD AUTO: 4.4 % (ref 0.3–6.2)
ERYTHROCYTE [DISTWIDTH] IN BLOOD BY AUTOMATED COUNT: 16 % (ref 12.3–15.4)
ERYTHROCYTE [SEDIMENTATION RATE] IN BLOOD: 76 MM/HR (ref 0–20)
GFR SERPL CREATININE-BSD FRML MDRD: 84 ML/MIN/1.73
GLOBULIN UR ELPH-MCNC: 4.4 GM/DL
GLUCOSE SERPL-MCNC: 96 MG/DL (ref 65–99)
GLUCOSE UR STRIP-MCNC: NEGATIVE MG/DL
HCT VFR BLD AUTO: 35.7 % (ref 34–46.6)
HGB BLD-MCNC: 11.4 G/DL (ref 12–15.9)
HGB UR QL STRIP.AUTO: NEGATIVE
HYALINE CASTS UR QL AUTO: NORMAL /LPF
IMM GRANULOCYTES # BLD AUTO: 0.02 10*3/MM3 (ref 0–0.05)
IMM GRANULOCYTES NFR BLD AUTO: 0.3 % (ref 0–0.5)
KETONES UR QL STRIP: NEGATIVE
LEUKOCYTE ESTERASE UR QL STRIP.AUTO: ABNORMAL
LYMPHOCYTES # BLD AUTO: 1.77 10*3/MM3 (ref 0.7–3.1)
LYMPHOCYTES NFR BLD AUTO: 25.3 % (ref 19.6–45.3)
MCH RBC QN AUTO: 24.1 PG (ref 26.6–33)
MCHC RBC AUTO-ENTMCNC: 31.9 G/DL (ref 31.5–35.7)
MCV RBC AUTO: 75.5 FL (ref 79–97)
MONOCYTES # BLD AUTO: 0.48 10*3/MM3 (ref 0.1–0.9)
MONOCYTES NFR BLD AUTO: 6.9 % (ref 5–12)
NEUTROPHILS NFR BLD AUTO: 4.34 10*3/MM3 (ref 1.7–7)
NEUTROPHILS NFR BLD AUTO: 62.1 % (ref 42.7–76)
NITRITE UR QL STRIP: NEGATIVE
NRBC BLD AUTO-RTO: 0 /100 WBC (ref 0–0.2)
PH UR STRIP.AUTO: 7.5 [PH] (ref 5–8)
PLATELET # BLD AUTO: 390 10*3/MM3 (ref 140–450)
PMV BLD AUTO: 10.8 FL (ref 6–12)
POTASSIUM SERPL-SCNC: 4.4 MMOL/L (ref 3.5–5.2)
PROT SERPL-MCNC: 8.5 G/DL (ref 6–8.5)
PROT UR QL STRIP: NEGATIVE
QT INTERVAL: 350 MS
RBC # BLD AUTO: 4.73 10*6/MM3 (ref 3.77–5.28)
RBC # UR: NORMAL /HPF
REF LAB TEST METHOD: NORMAL
SARS-COV-2 RNA PNL SPEC NAA+PROBE: NOT DETECTED
SODIUM SERPL-SCNC: 136 MMOL/L (ref 136–145)
SP GR UR STRIP: 1.01 (ref 1–1.03)
SQUAMOUS #/AREA URNS HPF: NORMAL /HPF
UROBILINOGEN UR QL STRIP: ABNORMAL
WBC # BLD AUTO: 6.99 10*3/MM3 (ref 3.4–10.8)
WBC UR QL AUTO: NORMAL /HPF

## 2021-09-17 PROCEDURE — 80053 COMPREHEN METABOLIC PANEL: CPT | Performed by: PHYSICIAN ASSISTANT

## 2021-09-17 PROCEDURE — 86140 C-REACTIVE PROTEIN: CPT | Performed by: PHYSICIAN ASSISTANT

## 2021-09-17 PROCEDURE — 99283 EMERGENCY DEPT VISIT LOW MDM: CPT

## 2021-09-17 PROCEDURE — 71045 X-RAY EXAM CHEST 1 VIEW: CPT

## 2021-09-17 PROCEDURE — 93010 ELECTROCARDIOGRAM REPORT: CPT | Performed by: INTERNAL MEDICINE

## 2021-09-17 PROCEDURE — 81001 URINALYSIS AUTO W/SCOPE: CPT | Performed by: PHYSICIAN ASSISTANT

## 2021-09-17 PROCEDURE — 83605 ASSAY OF LACTIC ACID: CPT | Performed by: PHYSICIAN ASSISTANT

## 2021-09-17 PROCEDURE — 99284 EMERGENCY DEPT VISIT MOD MDM: CPT

## 2021-09-17 PROCEDURE — 87635 SARS-COV-2 COVID-19 AMP PRB: CPT | Performed by: PHYSICIAN ASSISTANT

## 2021-09-17 PROCEDURE — 87040 BLOOD CULTURE FOR BACTERIA: CPT | Performed by: PHYSICIAN ASSISTANT

## 2021-09-17 PROCEDURE — 96374 THER/PROPH/DIAG INJ IV PUSH: CPT

## 2021-09-17 PROCEDURE — 85652 RBC SED RATE AUTOMATED: CPT | Performed by: PHYSICIAN ASSISTANT

## 2021-09-17 PROCEDURE — 85025 COMPLETE CBC W/AUTO DIFF WBC: CPT | Performed by: PHYSICIAN ASSISTANT

## 2021-09-17 PROCEDURE — 93005 ELECTROCARDIOGRAM TRACING: CPT | Performed by: PHYSICIAN ASSISTANT

## 2021-09-17 PROCEDURE — 25010000002 ONDANSETRON PER 1 MG: Performed by: PHYSICIAN ASSISTANT

## 2021-09-17 RX ORDER — ONDANSETRON 2 MG/ML
4 INJECTION INTRAMUSCULAR; INTRAVENOUS ONCE
Status: COMPLETED | OUTPATIENT
Start: 2021-09-17 | End: 2021-09-17

## 2021-09-17 RX ORDER — SULFAMETHOXAZOLE AND TRIMETHOPRIM 800; 160 MG/1; MG/1
1 TABLET ORAL 2 TIMES DAILY
Qty: 14 TABLET | Refills: 0 | Status: SHIPPED | OUTPATIENT
Start: 2021-09-17 | End: 2021-09-21 | Stop reason: SDUPTHER

## 2021-09-17 RX ADMIN — ONDANSETRON 4 MG: 2 INJECTION INTRAMUSCULAR; INTRAVENOUS at 11:54

## 2021-09-17 RX ADMIN — SODIUM CHLORIDE 1000 ML: 9 INJECTION, SOLUTION INTRAVENOUS at 11:44

## 2021-09-17 NOTE — ED PROVIDER NOTES
EMERGENCY DEPARTMENT ENCOUNTER    Room Number: 02/02  Date seen: 9/17/2021  Time seen: 10:35 EDT  PCP: Jason Borrero MD    Spoken Language:  English  Language interpretation services not needed     CHIEF COMPLAINT: concern for infected IJ tunneled catheter    HPI: Liliam Lorenz is a 26 y.o. female with PMH of autoimmune gastroparesis, Phoebe-Danlos syndrome and POTS presenting to the ED for evaluation with concern for an infected IJ tunneled catheter. The history is being obtained by the patient and by review of the medical chart.  The patient states that she gets weekly IVIG as well as IV fluids and antiemetics through her tunneled catheter.  This catheter was placed on 11/16/2020 by Dr. Lane.  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  The patient was also recently diagnosed with bilateral pulmonary emboli.  She presents to the emergency department today stating that she has noticed redness around the tunneled catheter insertion site which started 3 days ago.  She denies fever, chills, nausea or vomiting.  She states that when her home health nurse changed her dressing overlying the catheter earlier today, they noted a small amount of yellow drainage and instructed the patient to come to the emergency department for evaluation.  She denies headache, chest pain, shortness of breath, abdominal pain or additional complaints.    MEDICAL RECORD REVIEW:  Reviewed in [employer].     PAST MEDICAL HISTORY  Past Medical History:   Diagnosis Date   • Bronchitis    • Chest pain    • Chronic hypotension    • Eczema    • Phoebe-Danlos syndrome    • Gastroparesis    • GERD (gastroesophageal reflux disease)    • IBS (irritable bowel syndrome)    • Lightheadedness    • POTS (postural orthostatic tachycardia syndrome)    • Rectal fissure    • Rosacea    • Tachycardia        PAST SURGICAL HISTORY  Past Surgical History:   Procedure Laterality Date    • COLONOSCOPY     • MEDIPORT INSERTION, DOUBLE  09/01/2020    [facility] DOWNTOW    • UPPER GASTROINTESTINAL ENDOSCOPY     • VENOUS ACCESS DEVICE (PORT) INSERTION Left 10/16/2020    Procedure: INSERTION VENOUS ACCESS DEVICE UNDER FLURO;  Surgeon: Pa Lane MD;  Location: [facility];  Service: General;  Laterality: Left;       FAMILY HISTORY  Family History   Problem Relation Age of Onset   • Hypertension Mother    • Diabetes Mother    • Diabetes Father    • Heart disease Paternal Grandfather    • Stroke Paternal Grandfather    • Diabetes Paternal Grandfather    • Cancer Paternal Grandfather    • Coronary artery disease Maternal Grandmother         MGM with 4 vessel CABG and multiple stents   • Cancer Maternal Grandmother    • Coronary artery disease Maternal Uncle         Maternal uncle with MI   • Breast cancer Other    • Malig Hyperthermia Neg Hx        SOCIAL HISTORY  Social History     Socioeconomic History   • Marital status:      Spouse name: Not on file   • Number of children: Not on file   • Years of education: Not on file   • Highest education level: Not on file   Tobacco Use   • Smoking status: Never Smoker   • Smokeless tobacco: Never Used   Vaping Use   • Vaping Use: Never used   Substance and Sexual Activity   • Alcohol use: No   • Drug use: No   • Sexual activity: Yes     Partners: Female     Birth control/protection: None       CURRENT MEDICATIONS  Prior to Admission medications    Medication Sig Start Date End Date Taking? Authorizing Provider   albuterol sulfate  (90 Base) MCG/ACT inhaler Inhale 2 puffs Every 4 (Four) Hours As Needed for Wheezing.    Provider, MD Jason   apixaban (ELIQUIS) 5 MG tablet tablet Take 1 tablet by mouth Every 12 (Twelve) Hours. 9/13/21   Payam Rausch MD   atorvastatin (LIPITOR) 40 MG tablet Take 1 tablet by mouth Every Night for 40 days. 9/11/21 10/21/21  Caleb Naik MD   Cholecalciferol (VITAMIN D-1000 MAX ST) 25 MCG (1000 UT)  tablet Take 1,000 Units by mouth Daily.    Jason Davis MD   dilTIAZem powder Apply  topically to the appropriate area as directed 2 (Two) Times a Day As Needed (RECTAL CREAM).    Jason Davis MD   doxepin (SINEquan) 25 MG capsule TK 1 TO 2 CS PO Q NIGHT 6/5/20   Jason Davis MD   Droxidopa 100 MG capsule Take 1 capsule by mouth 3 (Three) Times a Day. 11/13/20   Hilary Dumont, DNP, APRN   erythromycin base (E-MYCIN) 250 MG tablet TK 1 T PO Q 8 H TAT 4/24/20   Jason Davis MD   gabapentin (NEURONTIN) 100 MG capsule Take 100 mg by mouth Daily. 8/10/20   Jason Davis MD   Galcanezumab-gnlm (EMGALITY SC) Inject  under the skin into the appropriate area as directed Every 30 (Thirty) Days.    Jason Davis MD   Ginger, Zingiber officinalis, (GINGER ROOT PO) Take  by mouth.    Jason Davis MD   hydrocortisone 0.5 % cream Apply  topically to the appropriate area as directed 2 (Two) Times a Day.    Jason Davis MD   immune globulin, human, (GAMMAGARD S/D) 10 g infusion Infuse  into a venous catheter 1 (One) Time Per Week.    Jason Davis MD   loperamide (IMODIUM A-D) 2 MG tablet Take 2 mg by mouth 4 (Four) Times a Day As Needed.    Jason Davis MD   meclizine (ANTIVERT) 25 MG tablet Take 25 mg by mouth.    Jason Davis MD   medroxyPROGESTERone (DEPO-PROVERA) 150 MG/ML injection Inject 1 mL into the appropriate muscle as directed by prescriber Every 3 (Three) Months. 10/26/18   Rosey Mulligan MD   metoprolol tartrate (LOPRESSOR) 25 MG tablet Take 1 tablet by mouth Every 12 (Twelve) Hours for 30 days. 9/11/21 10/11/21  Caleb Naik MD   metroNIDAZOLE (FLAGYL) 0.75 % lotion lotion Apply 1 application topically to the appropriate area as directed Every 12 (Twelve) Hours.    Jason Davis MD   midodrine (PROAMATINE) 10 MG tablet TAKE 1 TABLET BY MOUTH THREE TIMES DAILY 11/16/20   Payam Rausch MD Mirabegron  ER (Myrbetriq) 25 MG tablet sustained-release 24 hour 24 hr tablet Take 25 mg by mouth Daily.    Jason Davis MD   Mometasone Furoate (Asmanex HFA) 100 MCG/ACT aerosol INAHLE 2 PUFFS INTO THE LUNGS BY MOUTH 2 TIMES PER DAY IN THE MORNING AND EVENING 5/19/20   Jason Davis MD   montelukast (SINGULAIR) 10 MG tablet  8/24/20   Jason Davis MD   omeprazole (priLOSEC) 20 MG capsule Take 40 mg by mouth 2 (Two) Times a Day.    Jason Davis MD   ondansetron (ZOFRAN) 4 MG tablet Take 4 mg by mouth Every 8 (Eight) Hours As Needed for Nausea or Vomiting.    Jason Davis MD   Ondansetron HCl (ZOFRAN IV) Infuse 8 mg into a venous catheter Every 8 (Eight) Hours As Needed.    Jason Davis MD   ondansetron ODT (ZOFRAN ODT) 8 MG disintegrating tablet Take 1 tablet by mouth Every 8 (Eight) Hours As Needed for Nausea or Vomiting. 1/11/20   Armando Borrero MD   phenazopyridine (PYRIDIUM) 100 MG tablet Take 100 mg by mouth 3 (Three) Times a Day As Needed. 9/20/20   Jason Davis MD   prochlorperazine (COMPAZINE) 10 MG tablet Take 10 mg by mouth Every 6 (Six) Hours As Needed for Nausea or Vomiting.    Jason Davis MD   promethazine (PHENERGAN) 12.5 MG tablet TK 1 T PO Q 6 H FOR UP TO 7 DAYS PRF NAUSEA 9/2/20   Jason Davis MD   promethazine (PHENERGAN) 25 MG tablet TK 1 T PO  Q 6 H PRN NAUSEA 6/26/20   Jason Davis MD   promethazine (PHENERGAN) Infuse 25 mg into a venous catheter Every 3 (Three) Hours.    Jason Davis MD   tiZANidine (ZANAFLEX) 4 MG tablet TAKE 1 TABLET BY MOUTH EVERY 8 HOURS FOR UP TO 14 DAYS AS NEEDED FOR MUSCLE SPASM 6/5/20   Jason Davis MD   triamcinolone (KENALOG) 0.1 % cream Apply 1 application topically to the appropriate area as directed 2 (Two) Times a Day.    Jason Davis MD   Vortioxetine HBr (TRINTELLIX) 5 MG tablet Take 5 mg by mouth Daily With Breakfast.    Provider, MD Jason        ALLERGIES  Eggs or egg-derived products, Pepcid [famotidine], and Scopolamine    REVIEW OF SYSTEMS  All systems reviewed and negative except for those discussed in HPI.     PHYSICAL EXAM  ED Triage Vitals [09/17/21 0935]   Temp Heart Rate Resp BP SpO2   98.2 °F (36.8 °C) (!) 129 18 (!) 133/108 98 %      Temp src Heart Rate Source Patient Position BP Location FiO2 (%)   Tympanic Monitor Sitting Right arm --       Physical Exam  Constitutional:       Appearance: Normal appearance.   HENT:      Head: Normocephalic and atraumatic.      Mouth/Throat:      Mouth: Mucous membranes are moist.   Eyes:      Extraocular Movements: Extraocular movements intact.      Pupils: Pupils are equal, round, and reactive to light.   Cardiovascular:      Rate and Rhythm: Normal rate and regular rhythm.   Pulmonary:      Effort: Pulmonary effort is normal.      Breath sounds: Normal breath sounds.   Abdominal:      General: There is no distension.      Palpations: Abdomen is soft.      Tenderness: There is no abdominal tenderness.   Musculoskeletal:      Cervical back: Normal range of motion.   Skin:     General: Skin is warm and dry.      Comments: IJ tunneled catheter noted in the left upper chest.  There is no minimal surrounding erythema.  It is slightly tender to palpation.   Neurological:      General: No focal deficit present.      Mental Status: She is alert and oriented to person, place, and time.   Psychiatric:         Mood and Affect: Mood normal.         Behavior: Behavior normal.         Thought Content: Thought content normal.         Judgment: Judgment normal.         PROCEDURES  Procedures  None    LABS  Recent Results (from the past 24 hour(s))   ECG 12 Lead    Collection Time: 09/17/21 11:13 AM   Result Value Ref Range    QT Interval 350 ms   COVID-19,[facility] EJNIFER IN-HOUSE CEPHEID/BETI NP SWAB IN TRANSPORT MEDIA 8-12 HR TAT - Swab, Nasopharynx    Collection Time: 09/17/21 11:26 AM    Specimen: Nasopharynx; Swab   Result  Value Ref Range    COVID19 Not Detected Not Detected - Ref. Range   Comprehensive Metabolic Panel    Collection Time: 09/17/21 11:38 AM    Specimen: Blood   Result Value Ref Range    Glucose 96 65 - 99 mg/dL    BUN 6 6 - 20 mg/dL    Creatinine 0.82 0.57 - 1.00 mg/dL    Sodium 136 136 - 145 mmol/L    Potassium 4.4 3.5 - 5.2 mmol/L    Chloride 103 98 - 107 mmol/L    CO2 20.5 (L) 22.0 - 29.0 mmol/L    Calcium 9.4 8.6 - 10.5 mg/dL    Total Protein 8.5 6.0 - 8.5 g/dL    Albumin 4.10 3.50 - 5.20 g/dL    ALT (SGPT) 23 1 - 33 U/L    AST (SGOT) 25 1 - 32 U/L    Alkaline Phosphatase 62 39 - 117 U/L    Total Bilirubin 0.5 0.0 - 1.2 mg/dL    eGFR Non African Amer 84 >60 mL/min/1.73    Globulin 4.4 gm/dL    A/G Ratio 0.9 g/dL    BUN/Creatinine Ratio 7.3 7.0 - 25.0    Anion Gap 12.5 5.0 - 15.0 mmol/L   Lactic Acid, Plasma    Collection Time: 09/17/21 11:38 AM    Specimen: Blood   Result Value Ref Range    Lactate 1.5 0.5 - 2.0 mmol/L   C-reactive Protein    Collection Time: 09/17/21 11:38 AM    Specimen: Blood   Result Value Ref Range    C-Reactive Protein <0.30 0.00 - 0.50 mg/dL   Sedimentation Rate    Collection Time: 09/17/21 11:38 AM    Specimen: Blood   Result Value Ref Range    Sed Rate 76 (H) 0 - 20 mm/hr   CBC Auto Differential    Collection Time: 09/17/21 11:38 AM    Specimen: Blood   Result Value Ref Range    WBC 6.99 3.40 - 10.80 10*3/mm3    RBC 4.73 3.77 - 5.28 10*6/mm3    Hemoglobin 11.4 (L) 12.0 - 15.9 g/dL    Hematocrit 35.7 34.0 - 46.6 %    MCV 75.5 (L) 79.0 - 97.0 fL    MCH 24.1 (L) 26.6 - 33.0 pg    MCHC 31.9 31.5 - 35.7 g/dL    RDW 16.0 (H) 12.3 - 15.4 %    RDW-SD 43.1 37.0 - 54.0 fl    MPV 10.8 6.0 - 12.0 fL    Platelets 390 140 - 450 10*3/mm3    Neutrophil % 62.1 42.7 - 76.0 %    Lymphocyte % 25.3 19.6 - 45.3 %    Monocyte % 6.9 5.0 - 12.0 %    Eosinophil % 4.4 0.3 - 6.2 %    Basophil % 1.0 0.0 - 1.5 %    Immature Grans % 0.3 0.0 - 0.5 %    Neutrophils, Absolute 4.34 1.70 - 7.00 10*3/mm3    Lymphocytes,  Absolute 1.77 0.70 - 3.10 10*3/mm3    Monocytes, Absolute 0.48 0.10 - 0.90 10*3/mm3    Eosinophils, Absolute 0.31 0.00 - 0.40 10*3/mm3    Basophils, Absolute 0.07 0.00 - 0.20 10*3/mm3    Immature Grans, Absolute 0.02 0.00 - 0.05 10*3/mm3    nRBC 0.0 0.0 - 0.2 /100 WBC   Urinalysis With Microscopic If Indicated (No Culture) - Urine, Clean Catch    Collection Time: 09/17/21  1:15 PM    Specimen: Urine, Clean Catch   Result Value Ref Range    Color, UA Yellow Yellow, Straw    Appearance, UA Clear Clear    pH, UA 7.5 5.0 - 8.0    Specific Gravity, UA 1.011 1.005 - 1.030    Glucose, UA Negative Negative    Ketones, UA Negative Negative    Bilirubin, UA Negative Negative    Blood, UA Negative Negative    Protein, UA Negative Negative    Leuk Esterase, UA Trace (A) Negative    Nitrite, UA Negative Negative    Urobilinogen, UA 0.2 E.U./dL 0.2 - 1.0 E.U./dL   Urinalysis, Microscopic Only - Urine, Clean Catch    Collection Time: 09/17/21  1:15 PM    Specimen: Urine, Clean Catch   Result Value Ref Range    RBC, UA None Seen None Seen, 0-2 /HPF    WBC, UA None Seen None Seen, 0-2 /HPF    Bacteria, UA None Seen None Seen /HPF    Squamous Epithelial Cells, UA 0-2 None Seen, 0-2 /HPF    Hyaline Casts, UA None Seen None Seen /LPF    Methodology Manual Light Microscopy        RADIOLOGY  XR Chest 1 View   Final Result          MEDICATIONS GIVEN IN THE ER  Medications   sodium chloride 0.9 % bolus 1,000 mL (1,000 mL Intravenous New Bag 9/17/21 1144)   ondansetron (ZOFRAN) injection 4 mg (4 mg Intravenous Given 9/17/21 1154)       MEDICAL DECISION MAKING, CONSULTS AND PROGRESS NOTES  All labs and all radiology studies were have been viewed and interpreted by me.   Discussion below represents my analysis of pertinent findings related to patient's condition, differential diagnosis, treatment plan and final disposition.    Differential diagnosis includes but is not limited to:  -Infected tunneled catheter  -Contact dermatitis secondary to  dressings/tegaderm    PPE: The patient was placed in a face mask in first look. Patient was wearing facemask when I entered the room and throughout our encounter. I wore full protective equipment throughout this patient encounter including a face mask, eye protection and gloves. Hand hygiene was performed before donning protective equipment and after removal when leaving the room.    AS OF 16:03 EDT VITALS:    BP - 123/84  HR - 97  TEMP - 98.2 °F (36.8 °C) (Tympanic)  O2 SATS - 96%    ED Course as of Sep 17 1603   Fri Sep 17, 2021   1428 Received a return call from vascular surgery.  They recommend that we consult general surgery, as Dr. Lane is the surgeon that placed the patient's IJ tunneled catheter.  Consult placed.    [AR]   1529 Still awaiting return phone call from general surgery.    [AR]   1602 I discussed the patient's overall care with Dr. Ramos, on-call for general surgery.  He agrees with current work-up in the ED.  He recommends discharging the patient home with Bactrim.  He has asked that the patient call their office either today or Monday to make an appointment to be seen on Tuesday by Dr. Lane on when he is in the office.    [AR]      ED Course User Index  [AR] Estefanía Fletcher PA     The patient's history, physical exam, and lab findings were discussed with the physician, who also performed a face to face history and physical exam.  I discussed all results and noted any abnormalities with patient.  Discussed absoute need to recheck abnormalities with their family physician.  I answered any of the patient's questions.  Discussed plan for discharge, as there is no emergent indication for admission.  Pt is agreeable and understands need for follow up and repeat testing.  Pt is aware that discharge does not mean that nothing is wrong but it indicates no emergency is present and they must continue care with their family physician.  Pt is discharged with instructions to follow up with  primary care doctor to have their blood pressure rechecked.     DIAGNOSIS   Diagnosis Plan   1. Infection of hemodialysis tunneled catheter, initial encounter (CMS/[facility])     2. Complication of central venous catheter, unspecified complication, initial encounter         DISPOSITION  ED Disposition     ED Disposition Condition Comment    Discharge Stable           FOLLOW UP  Pa Lane MD  [address]  [facility] [address]  [facility] [id number] [phone]      Call to make an appointment to be seen on Tuesday      RX  Medications   sodium chloride 0.9 % bolus 1,000 mL (1,000 mL Intravenous New Bag 9/17/21 1144)   ondansetron (ZOFRAN) injection 4 mg (4 mg Intravenous Given 9/17/21 1154)          Medication List      New Prescriptions    sulfamethoxazole-trimethoprim 800-160 MG per tablet  Commonly known as: Bactrim DS  Take 1 tablet by mouth 2 (Two) Times a Day.           Where to Get Your Medications      These medications were sent to [id number] - [place], [place] - [address] AT SEC OF [facility] [address] & [facility] [phone] [facility] [phone]   [address], [facility] [address]    Phone: [phone]   · sulfamethoxazole-trimethoprim 800-160 MG per tablet       Provider Attestation:  I personally reviewed the past medical history, past surgical history, social history, family history, current medications and allergies as they appear in the chart. I reviewed the patient's history, physical, lab/imaging results and overall care with Dr. Love who is in agreement with the patient's treatment plan.    EMR Dragon/Transcription disclaimer:  Some of this encounter note is an electronic transcription/translation of spoken language to printed text. The electronic translation of spoken language may permit erroneous, or at times, nonsensical words or phrases to be inadvertently transcribed; Although I have reviewed the note for such errors, some may still exist.    Provider note  signed by:         Estefanía Fletcher PA  09/17/21 6592

## 2021-09-17 NOTE — ED PROVIDER NOTES
Pt presents to the ED c/o  possible infected central line.  Patient states she was discharged from the hospital Saturday for a pulmonary embolism.  She has a chronic central line for IVIG infusions weekly.  She has a complicated history of autoimmune gastroparesis, EDS and POTS syndrome.  She started developing discomfort to the site yesterday and home health saw her line today.  They noted a small amount of yellow discharge with erythema so they sent patient to the hospital for further evaluation and treatment.  Patient denies fever or chills.     On exam,   Her heart is regular rate and rhythm without murmur.  Lungs clear to auscultation bilaterally.  Patient has a left subclavian single-lumen central venous catheter in place.  There is a dressing in place.  There is a mild amount of erythema and it is minimally tender to palpation.  Her abdomen is normoactive bowel sounds, soft, nontender nondistended.     Plan: I agree with plan of checking blood work and consulting vascular surgery.    EKG          EKG time: 1113  Rhythm/Rate: Normal sinus rhythm, rate 91  P waves and ID: normal  QRS, axis: normal   ST and T waves: normal     Interpreted Contemporaneously by me, independently viewed  Improved compared to prior 09/08/21    Patient was placed in face mask in first look. Patient was wearing facemask when I entered the room and throughout our encounter. I wore full protective equipment throughout this patient encounter including a N95 face mask, eye shield, gown and gloves. Hand hygiene was performed before donning protective equipment and after removal when leaving the room.       Attestation:  The APRIL and I have discussed this patient's history, physical exam, and treatment plan.  I have reviewed the documentation and personally had a face to face interaction with the patient. I affirm the documentation and agree with the treatment and plan.  The attached note describes my personal findings.            Ivan  Amador LUI MD  09/17/21 7036       Amador Love MD  09/17/21 0787

## 2021-09-17 NOTE — DISCHARGE INSTRUCTIONS
Although you are being discharged from the ED today, I encourage you to return for worsening symptoms. Things can, and do, change such that treatment at home with medication may not be adequate. Specifically I recommend returning for chest pain or discomfort, difficulty breathing, persistent vomiting or difficulty holding down liquids or medications, fever > 102.0 F, increasing redness around her catheter site, nausea/vomiting or any other worsening or alarming symptoms.     Rest. Drink plenty of fluids.  Follow up with Dr. Lane for further evaluation and management.  Follow up with primary care provider for further management and to have blood pressure rechecked.  Take your medications as prescribed.

## 2021-09-17 NOTE — ED NOTES
Patient was wearing a face mask throughout our encounter.  I wore a N95 and Face Shield throughout the encounter.  Hand hygiene was performed before and after patient encounter.     Pt states that her home health nurse noted yellow drainage from Central line and now the site is red, warm, and painful to palpate.  Pt is concerned that the site is infected.     Luis Fry RN  09/17/21 1672

## 2021-09-20 ENCOUNTER — TELEPHONE (OUTPATIENT)
Dept: CARDIOLOGY | Facility: CLINIC | Age: 26
End: 2021-09-20

## 2021-09-20 NOTE — TELEPHONE ENCOUNTER
I received [facility] paper. Once they are complete I will let pt know.   Before they are faxed Pt will need to sign a medical records release that will state is OK to release her medical information to her wife's company. Pt must be ok to be willing to give her medical records for her wife's [facility] paper and company.   Singh CHAPA

## 2021-09-20 NOTE — TELEPHONE ENCOUNTER
----- Message from Payam Rausch MD sent at 9/20/2021  3:51 PM EDT -----  Regarding: FW: Non-Urgent Medical Question  Contact: [phone]  Singh,    Would you mind taking care of this?  Rhiannon Torres  ----- Message -----  From: Liliam Lorenz  Sent: 9/20/2021   9:41 AM EDT  To: Payam Rausch MD  Subject: RE: Non-Urgent Medical Question                  Hi Dr. Rausch. Would you rather fax the [facility] paper work over or have my wife and I pick it up? Whichever is easier for you is fine!

## 2021-09-21 ENCOUNTER — OFFICE VISIT (OUTPATIENT)
Dept: SURGERY | Facility: CLINIC | Age: 26
End: 2021-09-21

## 2021-09-21 VITALS — HEIGHT: 65 IN | BODY MASS INDEX: 39.65 KG/M2 | WEIGHT: 238 LBS

## 2021-09-21 DIAGNOSIS — K31.84 GASTROPARESIS: Primary | ICD-10-CM

## 2021-09-21 PROCEDURE — 99214 OFFICE O/P EST MOD 30 MIN: CPT | Performed by: SURGERY

## 2021-09-21 RX ORDER — SULFAMETHOXAZOLE AND TRIMETHOPRIM 800; 160 MG/1; MG/1
1 TABLET ORAL 2 TIMES DAILY
Qty: 14 TABLET | Refills: 0 | Status: SHIPPED | OUTPATIENT
Start: 2021-09-21 | End: 2021-10-05

## 2021-09-21 NOTE — TELEPHONE ENCOUNTER
09/21/2021  [facility]  Papers are complete.   I will contact pt to see if she would like me to fax them or  here at office.   Liliam will need to sign medical records sheet stating she is ok with her medical records released to [employer] work place.   Copy of [place] will be scanned into Liliam's chart.   Singh CHAPA

## 2021-09-21 NOTE — PROGRESS NOTES
CHIEF COMPLAINT:    Follow-up from IV access    HISTORY OF PRESENT ILLNESS:    Liliam Lorenz is a 26 y.o. female who underwent placement of a left internal jugular vein Dolan catheter on 10/16/2020. She was sent to the emergency room last week by her visiting nurse because of erythema at the access site and yellowish drainage from around the catheter. She was afebrile. Laboratory assessment was normal. Chest x-ray looks okay. Cultures were drawn. The catheter site is not tender. It flushes easily. She was started on Bactrim and scheduled for follow-up with me. She uses the catheter every 3 hours for injectable medications and IV fluids.    On 9/8/2021 she was admitted to the hospital with acute bilateral pulmonary emboli. She was started on Eliquis. A CT angiogram of the chest showed left-sided pericardial collaterals draining into the IVC.    Past Medical History:   Diagnosis Date   • Bronchitis    • Chest pain    • Chronic hypotension    • Eczema    • Phoebe-Danlos syndrome    • Gastroparesis    • GERD (gastroesophageal reflux disease)    • IBS (irritable bowel syndrome)    • Lightheadedness    • POTS (postural orthostatic tachycardia syndrome)    • Rectal fissure    • Rosacea    • Tachycardia        Past Surgical History:   Procedure Laterality Date   • COLONOSCOPY     • MEDIPORT INSERTION, DOUBLE  09/01/2020    [facility]    • UPPER GASTROINTESTINAL ENDOSCOPY     • VENOUS ACCESS DEVICE (PORT) INSERTION Left 10/16/2020    Procedure: INSERTION VENOUS ACCESS DEVICE UNDER FLURO;  Surgeon: Pa Lane MD;  Location: [facility];  Service: General;  Laterality: Left;         Current Outpatient Medications:   •  albuterol sulfate  (90 Base) MCG/ACT inhaler, Inhale 2 puffs Every 4 (Four) Hours As Needed for Wheezing., Disp: , Rfl:   •  atorvastatin (LIPITOR) 40 MG tablet, Take 1 tablet by mouth Every Night for 40 days., Disp: 30 tablet, Rfl: 1  •  Cholecalciferol (VITAMIN D-1000 MAX ST) 25 MCG  (1000 UT) tablet, Take 1,000 Units by mouth Daily., Disp: , Rfl:   •  dilTIAZem powder, Apply  topically to the appropriate area as directed 2 (Two) Times a Day As Needed (RECTAL CREAM)., Disp: , Rfl:   •  doxepin (SINEquan) 25 MG capsule, TK 1 TO 2 CS PO Q NIGHT, Disp: , Rfl:   •  Droxidopa 100 MG capsule, Take 1 capsule by mouth 3 (Three) Times a Day., Disp: 90 capsule, Rfl: 4  •  gabapentin (NEURONTIN) 100 MG capsule, Take 100 mg by mouth Daily., Disp: , Rfl:   •  Galcanezumab-gnlm (EMGALITY SC), Inject  under the skin into the appropriate area as directed Every 30 (Thirty) Days., Disp: , Rfl:   •  Ginger, Zingiber officinalis, (GINGER ROOT PO), Take 1 tablet by mouth., Disp: , Rfl:   •  hydrocortisone 0.5 % cream, Apply 1 application topically to the appropriate area as directed 2 (Two) Times a Day., Disp: , Rfl:   •  immune globulin, human, (GAMMAGARD S/D) 10 g infusion, Infuse  into a venous catheter 1 (One) Time Per Week., Disp: , Rfl:   •  loperamide (IMODIUM A-D) 2 MG tablet, Take 2 mg by mouth 4 (Four) Times a Day As Needed., Disp: , Rfl:   •  meclizine (ANTIVERT) 25 MG tablet, Take 25 mg by mouth 3 (Three) Times a Day As Needed., Disp: , Rfl:   •  medroxyPROGESTERone (DEPO-PROVERA) 150 MG/ML injection, Inject 1 mL into the appropriate muscle as directed by prescriber Every 3 (Three) Months., Disp: 1 mL, Rfl: 3  •  metoprolol tartrate (LOPRESSOR) 25 MG tablet, Take 1 tablet by mouth Every 12 (Twelve) Hours for 30 days., Disp: 60 tablet, Rfl: 0  •  metroNIDAZOLE (FLAGYL) 0.75 % lotion lotion, Apply 1 application topically to the appropriate area as directed Every 12 (Twelve) Hours., Disp: , Rfl:   •  midodrine (PROAMATINE) 10 MG tablet, TAKE 1 TABLET BY MOUTH THREE TIMES DAILY, Disp: 90 tablet, Rfl: 6  •  Mirabegron ER (Myrbetriq) 25 MG tablet sustained-release 24 hour 24 hr tablet, Take 25 mg by mouth Daily., Disp: , Rfl:   •  Mometasone Furoate (Asmanex HFA) 100 MCG/ACT aerosol, INAHLE 2 PUFFS INTO THE  LUNGS BY MOUTH 2 TIMES PER DAY IN THE MORNING AND EVENING, Disp: , Rfl:   •  montelukast (SINGULAIR) 10 MG tablet, Take 10 mg by mouth Every Night., Disp: , Rfl:   •  omeprazole (priLOSEC) 20 MG capsule, Take 40 mg by mouth 2 (Two) Times a Day., Disp: , Rfl:   •  ondansetron (ZOFRAN) 4 MG tablet, Take 4 mg by mouth Every 8 (Eight) Hours As Needed for Nausea or Vomiting., Disp: , Rfl:   •  Ondansetron HCl (ZOFRAN IV), Infuse 8 mg into a venous catheter Every 8 (Eight) Hours As Needed., Disp: , Rfl:   •  ondansetron ODT (ZOFRAN ODT) 8 MG disintegrating tablet, Take 1 tablet by mouth Every 8 (Eight) Hours As Needed for Nausea or Vomiting., Disp: 12 tablet, Rfl: 0  •  phenazopyridine (PYRIDIUM) 100 MG tablet, Take 100 mg by mouth 3 (Three) Times a Day As Needed., Disp: , Rfl:   •  prochlorperazine (COMPAZINE) 10 MG tablet, Take 10 mg by mouth Every 6 (Six) Hours As Needed for Nausea or Vomiting., Disp: , Rfl:   •  promethazine (PHENERGAN) 12.5 MG tablet, TK 1 T PO Q 6 H FOR UP TO 7 DAYS PRF NAUSEA, Disp: , Rfl:   •  promethazine (PHENERGAN) 25 MG tablet, TK 1 T PO  Q 6 H PRN NAUSEA, Disp: , Rfl:   •  promethazine (PHENERGAN), Infuse 25 mg into a venous catheter Every 3 (Three) Hours., Disp: , Rfl:   •  Rivaroxaban (XARELTO STARTER PACK PO), Take 15 mg by mouth 2 (two) times a day., Disp: , Rfl:   •  sulfamethoxazole-trimethoprim (Bactrim DS) 800-160 MG per tablet, Take 1 tablet by mouth 2 (Two) Times a Day., Disp: 14 tablet, Rfl: 0  •  tiZANidine (ZANAFLEX) 4 MG tablet, TAKE 1 TABLET BY MOUTH EVERY 8 HOURS FOR UP TO 14 DAYS AS NEEDED FOR MUSCLE SPASM, Disp: , Rfl:   •  triamcinolone (KENALOG) 0.1 % cream, Apply 1 application topically to the appropriate area as directed 2 (Two) Times a Day., Disp: , Rfl:   •  Vortioxetine HBr (TRINTELLIX) 5 MG tablet, Take 5 mg by mouth Daily With Breakfast., Disp: , Rfl:   •  apixaban (ELIQUIS) 5 MG tablet tablet, Take 1 tablet by mouth Every 12 (Twelve) Hours., Disp: 60 tablet, Rfl:  "11    Allergies   Allergen Reactions   • Eggs Or Egg-Derived Products GI Intolerance     Rash & vomiting   • Pepcid [Famotidine] Rash and GI Intolerance   • Scopolamine Rash and GI Intolerance       Family History   Problem Relation Age of Onset   • Hypertension Mother    • Diabetes Mother    • Diabetes Father    • Heart disease Paternal Grandfather    • Stroke Paternal Grandfather    • Diabetes Paternal Grandfather    • Cancer Paternal Grandfather    • Coronary artery disease Maternal Grandmother         MGM with 4 vessel CABG and multiple stents   • Cancer Maternal Grandmother    • Coronary artery disease Maternal Uncle         Maternal uncle with MI   • Breast cancer Other    • Malig Hyperthermia Neg Hx        Social History     Socioeconomic History   • Marital status:      Spouse name: Not on file   • Number of children: Not on file   • Years of education: Not on file   • Highest education level: Not on file   Tobacco Use   • Smoking status: Never Smoker   • Smokeless tobacco: Never Used   Vaping Use   • Vaping Use: Never used   Substance and Sexual Activity   • Alcohol use: No   • Drug use: No   • Sexual activity: Yes     Partners: Female     Birth control/protection: None       Review of Systems   HENT: Positive for dental problem.    Neurological: Positive for dizziness, weakness, numbness and headaches.   Psychiatric/Behavioral: The patient is nervous/anxious.    All other systems reviewed and are negative.      Objective     Ht 163.8 cm (64.5\")   Wt 108 kg (238 lb)   LMP  (LMP Unknown)   BMI 40.22 kg/m²     Physical Exam  Vitals reviewed.   Constitutional:       General: She is not in acute distress.     Appearance: She is well-developed. She is not diaphoretic.   HENT:      Head: Normocephalic and atraumatic.      Mouth/Throat:      Mouth: Mucous membranes are dry.   Eyes:      General: No scleral icterus.     Conjunctiva/sclera: Conjunctivae normal.   Neck:      Trachea: No tracheal deviation. "   Cardiovascular:      Rate and Rhythm: Normal rate and regular rhythm.      Heart sounds: Normal heart sounds. No murmur heard.        Comments: There is a left internal jugular vein Dolan catheter. I removed the dressing. The skin entry site looks okay. There is no tenderness or drainage.  Pulmonary:      Effort: Pulmonary effort is normal. No respiratory distress.      Breath sounds: Normal breath sounds. No wheezing or rales.   Abdominal:      Palpations: Abdomen is soft.   Musculoskeletal:         General: No deformity.      Cervical back: Neck supple.   Skin:     General: Skin is warm and dry.   Neurological:      Mental Status: She is alert and oriented to person, place, and time.      Comments: She was lightheaded during the exam and needed to lie down. She ambulates with a rolling walker.   Psychiatric:         Mood and Affect: Mood normal.         Behavior: Behavior normal.         DIAGNOSTIC DATA:    Lab assessment from recent emergency room visit on 9/17/2021:  WBC 6.99, Hgb 11.4, platelets 390.  Lactate 1.5.  C-reactive protein less than 0.30.  Creatinine 0.82.  AlkP 62, ALT 23, AST 25, T bili 0.5.  Blood cultures showing no growth x3 days    I reviewed the images and the report of a CT scan of the chest performed on 9/8/2021. There is a left internal jugular vein central venous catheter. There are small bilateral pulmonary thromboemboli. There are left pericardial collaterals draining into the IVC. The SVC and brachiocephalic veins are partially opacified.    ASSESSMENT:    Gastroparesis- undergoing therapy with IVIG  Requires central venous access for daily use  POTS syndrome  Possible central venous line infection    PLAN:    Cultures drawn in the emergency room are negative. CRP and white blood cell count were normal. I do not think catheter needs to be urgently replaced. I am going to continue Bactrim for 2 weeks. I am going to obtain a duplex of the bilateral neck and extremity veins to identify  alternate options for venous access if the left internal jugular Dolan catheter eventually needs to be removed.

## 2021-09-22 LAB
BACTERIA SPEC AEROBE CULT: NORMAL
BACTERIA SPEC AEROBE CULT: NORMAL

## 2021-09-23 ENCOUNTER — READMISSION MANAGEMENT (OUTPATIENT)
Dept: CALL CENTER | Facility: HOSPITAL | Age: 26
End: 2021-09-23

## 2021-09-23 NOTE — OUTREACH NOTE
Medical Week 2 Survey      Responses   [facility] patient discharged from?  [place]   Does the patient have one of the following disease processes/diagnoses(primary or secondary)?  Other   Week 2 attempt successful?  Yes   Call start time  1319   Discharge diagnosis  Multiple subsegmental pulmonary emboli without acute cor pulmonale   Call end time  1323   Meds reviewed with patient/caregiver?  Yes   Is the patient having any side effects they believe may be caused by any medication additions or changes?  No   Does the patient have all medications ordered at discharge?  Yes   Is the patient taking all medications as directed (includes completed medication regime)?  Yes   Medication comments  has Eliquis samples from cardiologist   Does the patient have a primary care provider?   Yes   Does the patient have an appointment with their PCP within 7 days of discharge?  Yes   Has the patient kept scheduled appointments due by today?  N/A [sees cardiologist today, 9/23/21]   Has home health visited the patient within 72 hours of discharge?  N/A   Psychosocial issues?  No   Did the patient receive a copy of their discharge instructions?  Yes   Nursing interventions  Reviewed instructions with patient   What is the patient's perception of their health status since discharge?  Improving   Is the patient/caregiver able to teach back signs and symptoms related to disease process for when to call PCP?  Yes   Is the patient/caregiver able to teach back signs and symptoms related to disease process for when to call 911?  Yes   Is the patient/caregiver able to teach back the hierarchy of who to call/visit for symptoms/problems? PCP, Specialist, Home health nurse, Urgent Care, ED, 911  Yes   Additional teach back comments  denies SOB, states is at nl baseline with VS   Week 2 Call Completed?  Yes          Mariya Fernandez RN

## 2021-09-27 ENCOUNTER — HOSPITAL ENCOUNTER (OUTPATIENT)
Dept: CARDIOLOGY | Facility: HOSPITAL | Age: 26
Discharge: HOME | End: 2021-09-27
Admitting: SURGERY

## 2021-09-27 DIAGNOSIS — K31.84 GASTROPARESIS: ICD-10-CM

## 2021-09-27 LAB
BH CV UPPER VENOUS LEFT AXILLARY AUGMENT: NORMAL
BH CV UPPER VENOUS LEFT AXILLARY COMPETENT: NORMAL
BH CV UPPER VENOUS LEFT AXILLARY COMPRESS: NORMAL
BH CV UPPER VENOUS LEFT AXILLARY PHASIC: NORMAL
BH CV UPPER VENOUS LEFT AXILLARY SPONT: NORMAL
BH CV UPPER VENOUS LEFT INTERNAL JUGULAR AUGMENT: NORMAL
BH CV UPPER VENOUS LEFT INTERNAL JUGULAR COMPETENT: NORMAL
BH CV UPPER VENOUS LEFT INTERNAL JUGULAR COMPRESS: NORMAL
BH CV UPPER VENOUS LEFT INTERNAL JUGULAR PHASIC: NORMAL
BH CV UPPER VENOUS LEFT INTERNAL JUGULAR SPONT: NORMAL
BH CV UPPER VENOUS LEFT SUBCLAVIAN AUGMENT: NORMAL
BH CV UPPER VENOUS LEFT SUBCLAVIAN COMPETENT: NORMAL
BH CV UPPER VENOUS LEFT SUBCLAVIAN COMPRESS: NORMAL
BH CV UPPER VENOUS LEFT SUBCLAVIAN PHASIC: NORMAL
BH CV UPPER VENOUS LEFT SUBCLAVIAN SPONT: NORMAL
BH CV UPPER VENOUS RIGHT AXILLARY AUGMENT: NORMAL
BH CV UPPER VENOUS RIGHT AXILLARY COMPETENT: NORMAL
BH CV UPPER VENOUS RIGHT AXILLARY COMPRESS: NORMAL
BH CV UPPER VENOUS RIGHT AXILLARY PHASIC: NORMAL
BH CV UPPER VENOUS RIGHT AXILLARY SPONT: NORMAL
BH CV UPPER VENOUS RIGHT INTERNAL JUGULAR AUGMENT: NORMAL
BH CV UPPER VENOUS RIGHT INTERNAL JUGULAR COMPETENT: NORMAL
BH CV UPPER VENOUS RIGHT INTERNAL JUGULAR COMPRESS: NORMAL
BH CV UPPER VENOUS RIGHT INTERNAL JUGULAR PHASIC: NORMAL
BH CV UPPER VENOUS RIGHT INTERNAL JUGULAR SPONT: NORMAL
BH CV UPPER VENOUS RIGHT SUBCLAVIAN AUGMENT: NORMAL
BH CV UPPER VENOUS RIGHT SUBCLAVIAN COMPETENT: NORMAL
BH CV UPPER VENOUS RIGHT SUBCLAVIAN COMPRESS: NORMAL
BH CV UPPER VENOUS RIGHT SUBCLAVIAN PHASIC: NORMAL
BH CV UPPER VENOUS RIGHT SUBCLAVIAN SPONT: NORMAL

## 2021-09-27 PROCEDURE — 93970 EXTREMITY STUDY: CPT

## 2021-09-29 ENCOUNTER — READMISSION MANAGEMENT (OUTPATIENT)
Dept: CALL CENTER | Facility: HOSPITAL | Age: 26
End: 2021-09-29

## 2021-09-29 NOTE — OUTREACH NOTE
Medical Week 3 Survey      Responses   [facility] patient discharged from?  [place]   Does the patient have one of the following disease processes/diagnoses(primary or secondary)?  Other   Week 3 attempt successful?  No   Unsuccessful attempts  Attempt 1          Maria A Layton RN

## 2021-09-30 ENCOUNTER — READMISSION MANAGEMENT (OUTPATIENT)
Dept: CALL CENTER | Facility: HOSPITAL | Age: 26
End: 2021-09-30

## 2021-09-30 NOTE — OUTREACH NOTE
Medical Week 3 Survey      Responses   [facility] patient discharged from?  [place]   Does the patient have one of the following disease processes/diagnoses(primary or secondary)?  Other   Week 3 attempt successful?  Yes   Call start time  1555   Call end time  1559   Discharge diagnosis  Multiple subsegmental pulmonary emboli without acute cor pulmonale   Meds reviewed with patient/caregiver?  Yes   Is the patient taking all medications as directed (includes completed medication regime)?  Yes   Has the patient kept scheduled appointments due by today?  Yes   What is the patient's perception of their health status since discharge?  Improving   Week 3 Call Completed?  Yes   Graduated  Yes   Is the patient interested in additional calls from an ambulatory ?  NOTE:  applies to high risk patients requiring additional follow-up.  No   Did the patient feel the follow up calls were helpful during their recovery period?  Yes   Was the number of calls appropriate?  Yes   Graduated/Revoked comments  Doing much better.  Denies problems or questions at this time.          Grace Ramos, RN

## 2021-10-05 ENCOUNTER — OFFICE VISIT (OUTPATIENT)
Dept: SURGERY | Facility: CLINIC | Age: 26
End: 2021-10-05

## 2021-10-05 VITALS — BODY MASS INDEX: 39.99 KG/M2 | WEIGHT: 240 LBS | HEIGHT: 65 IN

## 2021-10-05 DIAGNOSIS — I87.8 POOR VENOUS ACCESS: ICD-10-CM

## 2021-10-05 DIAGNOSIS — K31.84 GASTROPARESIS: Primary | ICD-10-CM

## 2021-10-05 PROCEDURE — 99212 OFFICE O/P EST SF 10 MIN: CPT | Performed by: SURGERY

## 2021-10-05 RX ORDER — LEVOTHYROXINE SODIUM 0.05 MG/1
50 TABLET ORAL DAILY
COMMUNITY
Start: 2021-09-23

## 2021-10-05 RX ORDER — HYDROXYZINE PAMOATE 25 MG/1
25-50 CAPSULE ORAL 3 TIMES DAILY PRN
COMMUNITY
Start: 2021-09-23 | End: 2023-03-02

## 2021-10-05 NOTE — PROGRESS NOTES
CHIEF COMPLAINT:    Follow-up from IV access    HISTORY OF PRESENT ILLNESS:    Liliam Lorenz is a 26 y.o. female who underwent placement of a left internal jugular vein Dolan catheter on 10/16/2020. She was sent to the emergency room last week by her visiting nurse because of erythema at the access site and yellowish drainage from around the catheter. She was afebrile. Laboratory assessment was normal. Chest x-ray looks okay. Cultures were drawn. The catheter site is not tender. It flushes easily. She was started on Bactrim and scheduled for follow-up with me. She uses the catheter every 3 hours for injectable medications and IV fluids.    On 9/8/2021 she was admitted to the hospital with acute bilateral pulmonary emboli. She was started on Eliquis. A CT angiogram of the chest showed left-sided pericardial collaterals draining into the IVC.    Since her last office visit I sent her for a duplex ultrasonography of the arm and neck veins.  Bilateral jugular, subclavian, and axillary veins are patent.  No DVTs are noted.    EXAM:    Chest: There is a Dolan catheter located in the left upper chest.  There are no prominent venous collaterals.  There is no erythema at the entrance site.  It is nontender.  Heart: Regular.  No murmur.  Abdomen soft not distended.  Extremities warm.  No upper extremity edema.    DIAGNOSTIC DATA:    I reviewed the report of a venous duplex scan of the bilateral central neck and arm veins performed on 9/27/2021.  The bilateral jugular, subclavian, and axillary veins are patent.  No DVTs are noted.    ASSESSMENT:    Gastroparesis- undergoing therapy with IVIG  Requires central venous access for daily use  POTS syndrome  Possible central venous line infection    PLAN:    The Dolan catheter has been functioning without further problems.  She will continue like this for now.  Fortunately duplex scan shows multiple usable target veins if she has future trouble.

## 2021-10-08 ENCOUNTER — TRANSCRIBE ORDERS (OUTPATIENT)
Dept: ADMINISTRATIVE | Facility: HOSPITAL | Age: 26
End: 2021-10-08

## 2021-10-08 DIAGNOSIS — I27.20 PULMONARY HTN (HCC): Primary | ICD-10-CM

## 2021-10-18 ENCOUNTER — HOSPITAL ENCOUNTER (OUTPATIENT)
Dept: CARDIOLOGY | Facility: HOSPITAL | Age: 26
Discharge: HOME | End: 2021-10-18
Admitting: INTERNAL MEDICINE

## 2021-10-18 VITALS
BODY MASS INDEX: 39.99 KG/M2 | HEART RATE: 101 BPM | WEIGHT: 240 LBS | DIASTOLIC BLOOD PRESSURE: 98 MMHG | HEIGHT: 65 IN | SYSTOLIC BLOOD PRESSURE: 130 MMHG

## 2021-10-18 DIAGNOSIS — I27.20 PULMONARY HTN (HCC): ICD-10-CM

## 2021-10-18 PROCEDURE — 93306 TTE W/DOPPLER COMPLETE: CPT

## 2021-10-18 PROCEDURE — 93306 TTE W/DOPPLER COMPLETE: CPT | Performed by: INTERNAL MEDICINE

## 2021-10-19 LAB
AORTIC ARCH: 2.3 CM
BH CV ECHO MEAS - ACS: 1.8 CM
BH CV ECHO MEAS - AO MAX PG (FULL): 2.4 MMHG
BH CV ECHO MEAS - AO MAX PG: 5.6 MMHG
BH CV ECHO MEAS - AO MEAN PG (FULL): 1 MMHG
BH CV ECHO MEAS - AO MEAN PG: 3 MMHG
BH CV ECHO MEAS - AO ROOT AREA (BSA CORRECTED): 1.3
BH CV ECHO MEAS - AO ROOT AREA: 5.7 CM^2
BH CV ECHO MEAS - AO ROOT DIAM: 2.7 CM
BH CV ECHO MEAS - AO V2 MAX: 118.7 CM/SEC
BH CV ECHO MEAS - AO V2 MEAN: 81.2 CM/SEC
BH CV ECHO MEAS - AO V2 VTI: 16.9 CM
BH CV ECHO MEAS - AVA(I,A): 2.4 CM^2
BH CV ECHO MEAS - AVA(I,D): 2.4 CM^2
BH CV ECHO MEAS - AVA(V,A): 2.3 CM^2
BH CV ECHO MEAS - AVA(V,D): 2.3 CM^2
BH CV ECHO MEAS - BSA(HAYCOCK): 2.3 M^2
BH CV ECHO MEAS - BSA: 2.1 M^2
BH CV ECHO MEAS - BZI_BMI: 39.9 KILOGRAMS/M^2
BH CV ECHO MEAS - BZI_METRIC_HEIGHT: 165.1 CM
BH CV ECHO MEAS - BZI_METRIC_WEIGHT: 108.9 KG
BH CV ECHO MEAS - EDV(MOD-SP2): 57 ML
BH CV ECHO MEAS - EDV(MOD-SP4): 27 ML
BH CV ECHO MEAS - EDV(TEICH): 80.9 ML
BH CV ECHO MEAS - EF(CUBED): 62.5 %
BH CV ECHO MEAS - EF(MOD-BP): 59.8 %
BH CV ECHO MEAS - EF(MOD-SP2): 64.9 %
BH CV ECHO MEAS - EF(MOD-SP4): 55.6 %
BH CV ECHO MEAS - EF(TEICH): 54.4 %
BH CV ECHO MEAS - ESV(MOD-SP2): 20 ML
BH CV ECHO MEAS - ESV(MOD-SP4): 12 ML
BH CV ECHO MEAS - ESV(TEICH): 36.9 ML
BH CV ECHO MEAS - FS: 27.9 %
BH CV ECHO MEAS - IVS/LVPW: 1.1
BH CV ECHO MEAS - IVSD: 1.1 CM
BH CV ECHO MEAS - LAT PEAK E' VEL: 13.7 CM/SEC
BH CV ECHO MEAS - LV DIASTOLIC VOL/BSA (35-75): 12.6 ML/M^2
BH CV ECHO MEAS - LV MASS(C)D: 154.8 GRAMS
BH CV ECHO MEAS - LV MASS(C)DI: 72.4 GRAMS/M^2
BH CV ECHO MEAS - LV MAX PG: 3.2 MMHG
BH CV ECHO MEAS - LV MEAN PG: 1.9 MMHG
BH CV ECHO MEAS - LV SYSTOLIC VOL/BSA (12-30): 5.6 ML/M^2
BH CV ECHO MEAS - LV V1 MAX: 89.5 CM/SEC
BH CV ECHO MEAS - LV V1 MEAN: 66.2 CM/SEC
BH CV ECHO MEAS - LV V1 VTI: 13.6 CM
BH CV ECHO MEAS - LVIDD: 4.3 CM
BH CV ECHO MEAS - LVIDS: 3.1 CM
BH CV ECHO MEAS - LVLD AP2: 7.4 CM
BH CV ECHO MEAS - LVLD AP4: 7.4 CM
BH CV ECHO MEAS - LVLS AP2: 6.1 CM
BH CV ECHO MEAS - LVLS AP4: 6.2 CM
BH CV ECHO MEAS - LVOT AREA (M): 3.1 CM^2
BH CV ECHO MEAS - LVOT AREA: 3 CM^2
BH CV ECHO MEAS - LVOT DIAM: 2 CM
BH CV ECHO MEAS - LVPWD: 1 CM
BH CV ECHO MEAS - MED PEAK E' VEL: 12.9 CM/SEC
BH CV ECHO MEAS - MV A DUR: 0.07 SEC
BH CV ECHO MEAS - MV A MAX VEL: 61.3 CM/SEC
BH CV ECHO MEAS - MV DEC SLOPE: 500.1 CM/SEC^2
BH CV ECHO MEAS - MV DEC TIME: 0.16 SEC
BH CV ECHO MEAS - MV E MAX VEL: 73.7 CM/SEC
BH CV ECHO MEAS - MV E/A: 1.2
BH CV ECHO MEAS - MV MAX PG: 1.9 MMHG
BH CV ECHO MEAS - MV MEAN PG: 1.1 MMHG
BH CV ECHO MEAS - MV P1/2T MAX VEL: 65.9 CM/SEC
BH CV ECHO MEAS - MV P1/2T: 38.6 MSEC
BH CV ECHO MEAS - MV V2 MAX: 69.8 CM/SEC
BH CV ECHO MEAS - MV V2 MEAN: 49.8 CM/SEC
BH CV ECHO MEAS - MV V2 VTI: 11.7 CM
BH CV ECHO MEAS - MVA P1/2T LCG: 3.3 CM^2
BH CV ECHO MEAS - MVA(P1/2T): 5.7 CM^2
BH CV ECHO MEAS - MVA(VTI): 3.5 CM^2
BH CV ECHO MEAS - PA ACC TIME: 0.11 SEC
BH CV ECHO MEAS - PA MAX PG (FULL): 0.25 MMHG
BH CV ECHO MEAS - PA MAX PG: 2.9 MMHG
BH CV ECHO MEAS - PA PR(ACCEL): 29.1 MMHG
BH CV ECHO MEAS - PA V2 MAX: 85.5 CM/SEC
BH CV ECHO MEAS - PULM A REVS DUR: 0.08 SEC
BH CV ECHO MEAS - PULM A REVS VEL: 24.4 CM/SEC
BH CV ECHO MEAS - PULM DIAS VEL: 49.3 CM/SEC
BH CV ECHO MEAS - PULM S/D: 1
BH CV ECHO MEAS - PULM SYS VEL: 49.3 CM/SEC
BH CV ECHO MEAS - PVA(V,A): 2.9 CM^2
BH CV ECHO MEAS - PVA(V,D): 2.9 CM^2
BH CV ECHO MEAS - QP/QS: 1.2
BH CV ECHO MEAS - RV MAX PG: 2.7 MMHG
BH CV ECHO MEAS - RV MEAN PG: 1.9 MMHG
BH CV ECHO MEAS - RV V1 MAX: 81.8 CM/SEC
BH CV ECHO MEAS - RV V1 MEAN: 67 CM/SEC
BH CV ECHO MEAS - RV V1 VTI: 15.6 CM
BH CV ECHO MEAS - RVOT AREA: 3 CM^2
BH CV ECHO MEAS - RVOT DIAM: 2 CM
BH CV ECHO MEAS - SI(AO): 45 ML/M^2
BH CV ECHO MEAS - SI(CUBED): 22.5 ML/M^2
BH CV ECHO MEAS - SI(LVOT): 19.2 ML/M^2
BH CV ECHO MEAS - SI(MOD-SP2): 17.3 ML/M^2
BH CV ECHO MEAS - SI(MOD-SP4): 7 ML/M^2
BH CV ECHO MEAS - SI(TEICH): 20.6 ML/M^2
BH CV ECHO MEAS - SUP REN AO DIAM: 1.9 CM
BH CV ECHO MEAS - SV(AO): 96.2 ML
BH CV ECHO MEAS - SV(CUBED): 48.1 ML
BH CV ECHO MEAS - SV(LVOT): 41 ML
BH CV ECHO MEAS - SV(MOD-SP2): 37 ML
BH CV ECHO MEAS - SV(MOD-SP4): 15 ML
BH CV ECHO MEAS - SV(RVOT): 47.3 ML
BH CV ECHO MEAS - SV(TEICH): 44 ML
BH CV ECHO MEAS - TAPSE (>1.6): 2 CM
BH CV ECHO MEASUREMENTS AVERAGE E/E' RATIO: 5.54
BH CV XLRA - RV BASE: 3 CM
BH CV XLRA - RV LENGTH: 6 CM
BH CV XLRA - RV MID: 2.6 CM
BH CV XLRA - TDI S': 8.9 CM/SEC
LEFT ATRIUM VOLUME INDEX: 10 ML/M2
MAXIMAL PREDICTED HEART RATE: 194 BPM
SINUS: 2.9 CM
STJ: 2.6 CM
STRESS TARGET HR: 165 BPM

## 2021-10-21 RX ORDER — MIDODRINE HYDROCHLORIDE 10 MG/1
TABLET ORAL
Qty: 90 TABLET | Refills: 6 | Status: SHIPPED | OUTPATIENT
Start: 2021-10-21 | End: 2022-08-10

## 2021-10-25 ENCOUNTER — APPOINTMENT (OUTPATIENT)
Dept: GENERAL RADIOLOGY | Facility: HOSPITAL | Age: 26
End: 2021-10-25

## 2021-10-25 ENCOUNTER — HOSPITAL ENCOUNTER (OUTPATIENT)
Facility: HOSPITAL | Age: 26
Discharge: HOME | End: 2021-10-26
Attending: EMERGENCY MEDICINE | Admitting: HOSPITALIST

## 2021-10-25 DIAGNOSIS — Z86.711 HISTORY OF PULMONARY EMBOLISM: ICD-10-CM

## 2021-10-25 DIAGNOSIS — G90.A POTS (POSTURAL ORTHOSTATIC TACHYCARDIA SYNDROME): ICD-10-CM

## 2021-10-25 DIAGNOSIS — Z79.01 CHRONIC ANTICOAGULATION: ICD-10-CM

## 2021-10-25 DIAGNOSIS — T82.514A HICKMAN CATHETER DYSFUNCTION, INITIAL ENCOUNTER (HCC): Primary | ICD-10-CM

## 2021-10-25 DIAGNOSIS — K31.84 NONDIABETIC GASTROPARESIS: ICD-10-CM

## 2021-10-25 LAB
ANION GAP SERPL CALCULATED.3IONS-SCNC: 10.2 MMOL/L (ref 5–15)
BASOPHILS # BLD AUTO: 0.06 10*3/MM3 (ref 0–0.2)
BASOPHILS NFR BLD AUTO: 1 % (ref 0–1.5)
BUN SERPL-MCNC: 10 MG/DL (ref 6–20)
BUN/CREAT SERPL: 12.2 (ref 7–25)
CALCIUM SPEC-SCNC: 8.5 MG/DL (ref 8.6–10.5)
CHLORIDE SERPL-SCNC: 105 MMOL/L (ref 98–107)
CO2 SERPL-SCNC: 19.8 MMOL/L (ref 22–29)
CREAT SERPL-MCNC: 0.82 MG/DL (ref 0.57–1)
DEPRECATED RDW RBC AUTO: 43.3 FL (ref 37–54)
EOSINOPHIL # BLD AUTO: 0.39 10*3/MM3 (ref 0–0.4)
EOSINOPHIL NFR BLD AUTO: 6.4 % (ref 0.3–6.2)
ERYTHROCYTE [DISTWIDTH] IN BLOOD BY AUTOMATED COUNT: 15.8 % (ref 12.3–15.4)
GFR SERPL CREATININE-BSD FRML MDRD: 84 ML/MIN/1.73
GLUCOSE SERPL-MCNC: 87 MG/DL (ref 65–99)
HCT VFR BLD AUTO: 33.4 % (ref 34–46.6)
HGB BLD-MCNC: 10.5 G/DL (ref 12–15.9)
IMM GRANULOCYTES # BLD AUTO: 0.03 10*3/MM3 (ref 0–0.05)
IMM GRANULOCYTES NFR BLD AUTO: 0.5 % (ref 0–0.5)
LYMPHOCYTES # BLD AUTO: 1.46 10*3/MM3 (ref 0.7–3.1)
LYMPHOCYTES NFR BLD AUTO: 23.9 % (ref 19.6–45.3)
MCH RBC QN AUTO: 23.7 PG (ref 26.6–33)
MCHC RBC AUTO-ENTMCNC: 31.4 G/DL (ref 31.5–35.7)
MCV RBC AUTO: 75.4 FL (ref 79–97)
MONOCYTES # BLD AUTO: 0.42 10*3/MM3 (ref 0.1–0.9)
MONOCYTES NFR BLD AUTO: 6.9 % (ref 5–12)
NEUTROPHILS NFR BLD AUTO: 3.75 10*3/MM3 (ref 1.7–7)
NEUTROPHILS NFR BLD AUTO: 61.3 % (ref 42.7–76)
NRBC BLD AUTO-RTO: 0.2 /100 WBC (ref 0–0.2)
PLATELET # BLD AUTO: 313 10*3/MM3 (ref 140–450)
PMV BLD AUTO: 10.2 FL (ref 6–12)
POTASSIUM SERPL-SCNC: 3.8 MMOL/L (ref 3.5–5.2)
RBC # BLD AUTO: 4.43 10*6/MM3 (ref 3.77–5.28)
SARS-COV-2 RNA PNL SPEC NAA+PROBE: NOT DETECTED
SODIUM SERPL-SCNC: 135 MMOL/L (ref 136–145)
WBC # BLD AUTO: 6.11 10*3/MM3 (ref 3.4–10.8)

## 2021-10-25 PROCEDURE — G0378 HOSPITAL OBSERVATION PER HR: HCPCS

## 2021-10-25 PROCEDURE — C9803 HOPD COVID-19 SPEC COLLECT: HCPCS

## 2021-10-25 PROCEDURE — 87635 SARS-COV-2 COVID-19 AMP PRB: CPT | Performed by: EMERGENCY MEDICINE

## 2021-10-25 PROCEDURE — 71045 X-RAY EXAM CHEST 1 VIEW: CPT

## 2021-10-25 PROCEDURE — 80048 BASIC METABOLIC PNL TOTAL CA: CPT | Performed by: EMERGENCY MEDICINE

## 2021-10-25 PROCEDURE — 99284 EMERGENCY DEPT VISIT MOD MDM: CPT

## 2021-10-25 PROCEDURE — 63710000001 ONDANSETRON ODT 4 MG TABLET DISPERSIBLE: Performed by: HOSPITALIST

## 2021-10-25 PROCEDURE — 85025 COMPLETE CBC W/AUTO DIFF WBC: CPT | Performed by: EMERGENCY MEDICINE

## 2021-10-25 PROCEDURE — 63710000001 ONDANSETRON ODT 4 MG TABLET DISPERSIBLE: Performed by: EMERGENCY MEDICINE

## 2021-10-25 RX ORDER — ONDANSETRON 4 MG/1
4 TABLET, ORALLY DISINTEGRATING ORAL EVERY 8 HOURS PRN
Status: DISCONTINUED | OUTPATIENT
Start: 2021-10-25 | End: 2021-10-26 | Stop reason: HOSPADM

## 2021-10-25 RX ORDER — LEVOTHYROXINE SODIUM 0.05 MG/1
50 TABLET ORAL
Status: DISCONTINUED | OUTPATIENT
Start: 2021-10-26 | End: 2021-10-26 | Stop reason: HOSPADM

## 2021-10-25 RX ORDER — ONDANSETRON 4 MG/1
4 TABLET, ORALLY DISINTEGRATING ORAL ONCE
Status: COMPLETED | OUTPATIENT
Start: 2021-10-25 | End: 2021-10-25

## 2021-10-25 RX ORDER — OXYBUTYNIN CHLORIDE 5 MG/1
2.5 TABLET ORAL 2 TIMES DAILY
Status: DISCONTINUED | OUTPATIENT
Start: 2021-10-25 | End: 2021-10-26 | Stop reason: HOSPADM

## 2021-10-25 RX ORDER — PANTOPRAZOLE SODIUM 40 MG/1
40 TABLET, DELAYED RELEASE ORAL EVERY MORNING
Status: DISCONTINUED | OUTPATIENT
Start: 2021-10-26 | End: 2021-10-25

## 2021-10-25 RX ORDER — MIDODRINE HYDROCHLORIDE 5 MG/1
10 TABLET ORAL
Status: DISCONTINUED | OUTPATIENT
Start: 2021-10-25 | End: 2021-10-26 | Stop reason: HOSPADM

## 2021-10-25 RX ORDER — TIZANIDINE 4 MG/1
4 TABLET ORAL EVERY 6 HOURS PRN
Status: DISCONTINUED | OUTPATIENT
Start: 2021-10-25 | End: 2021-10-26 | Stop reason: HOSPADM

## 2021-10-25 RX ORDER — MECLIZINE HYDROCHLORIDE 25 MG/1
25 TABLET ORAL 3 TIMES DAILY PRN
Status: DISCONTINUED | OUTPATIENT
Start: 2021-10-25 | End: 2021-10-26 | Stop reason: HOSPADM

## 2021-10-25 RX ORDER — MONTELUKAST SODIUM 10 MG/1
10 TABLET ORAL NIGHTLY
Status: DISCONTINUED | OUTPATIENT
Start: 2021-10-25 | End: 2021-10-26 | Stop reason: HOSPADM

## 2021-10-25 RX ORDER — MELATONIN
1000 DAILY
Status: DISCONTINUED | OUTPATIENT
Start: 2021-10-25 | End: 2021-10-26 | Stop reason: HOSPADM

## 2021-10-25 RX ORDER — PANTOPRAZOLE SODIUM 40 MG/1
40 TABLET, DELAYED RELEASE ORAL
Status: DISCONTINUED | OUTPATIENT
Start: 2021-10-25 | End: 2021-10-26 | Stop reason: HOSPADM

## 2021-10-25 RX ORDER — GABAPENTIN 100 MG/1
100 CAPSULE ORAL DAILY
Status: DISCONTINUED | OUTPATIENT
Start: 2021-10-25 | End: 2021-10-26 | Stop reason: HOSPADM

## 2021-10-25 RX ADMIN — GABAPENTIN 100 MG: 100 CAPSULE ORAL at 20:45

## 2021-10-25 RX ADMIN — MONTELUKAST SODIUM 10 MG: 10 TABLET, FILM COATED ORAL at 20:47

## 2021-10-25 RX ADMIN — OXYBUTYNIN CHLORIDE 2.5 MG: 5 TABLET ORAL at 20:47

## 2021-10-25 RX ADMIN — ONDANSETRON 4 MG: 4 TABLET, ORALLY DISINTEGRATING ORAL at 15:03

## 2021-10-25 RX ADMIN — Medication 1000 UNITS: at 20:47

## 2021-10-25 RX ADMIN — ONDANSETRON 4 MG: 4 TABLET, ORALLY DISINTEGRATING ORAL at 20:41

## 2021-10-25 RX ADMIN — MIDODRINE HYDROCHLORIDE 10 MG: 5 TABLET ORAL at 20:46

## 2021-10-25 RX ADMIN — METOPROLOL TARTRATE 12.5 MG: 25 TABLET, FILM COATED ORAL at 20:45

## 2021-10-25 RX ADMIN — PANTOPRAZOLE SODIUM 40 MG: 40 TABLET, DELAYED RELEASE ORAL at 20:45

## 2021-10-26 ENCOUNTER — ANESTHESIA (OUTPATIENT)
Dept: PERIOP | Facility: HOSPITAL | Age: 26
End: 2021-10-26

## 2021-10-26 ENCOUNTER — ANESTHESIA EVENT (OUTPATIENT)
Dept: PERIOP | Facility: HOSPITAL | Age: 26
End: 2021-10-26

## 2021-10-26 ENCOUNTER — APPOINTMENT (OUTPATIENT)
Dept: GENERAL RADIOLOGY | Facility: HOSPITAL | Age: 26
End: 2021-10-26

## 2021-10-26 VITALS
RESPIRATION RATE: 16 BRPM | SYSTOLIC BLOOD PRESSURE: 136 MMHG | TEMPERATURE: 98 F | HEIGHT: 64 IN | DIASTOLIC BLOOD PRESSURE: 87 MMHG | HEART RATE: 84 BPM | OXYGEN SATURATION: 95 % | WEIGHT: 240 LBS | BODY MASS INDEX: 40.97 KG/M2

## 2021-10-26 LAB
ALBUMIN SERPL-MCNC: 4.1 G/DL (ref 3.5–5.2)
ALBUMIN/GLOB SERPL: 1.2 G/DL
ALP SERPL-CCNC: 61 U/L (ref 39–117)
ALT SERPL W P-5'-P-CCNC: 19 U/L (ref 1–33)
ANION GAP SERPL CALCULATED.3IONS-SCNC: 13.2 MMOL/L (ref 5–15)
AST SERPL-CCNC: 20 U/L (ref 1–32)
B-HCG UR QL: NEGATIVE
BASOPHILS # BLD AUTO: 0.06 10*3/MM3 (ref 0–0.2)
BASOPHILS NFR BLD AUTO: 0.8 % (ref 0–1.5)
BILIRUB SERPL-MCNC: 0.5 MG/DL (ref 0–1.2)
BUN SERPL-MCNC: 9 MG/DL (ref 6–20)
BUN/CREAT SERPL: 12 (ref 7–25)
CALCIUM SPEC-SCNC: 8.8 MG/DL (ref 8.6–10.5)
CHLORIDE SERPL-SCNC: 104 MMOL/L (ref 98–107)
CHOLEST SERPL-MCNC: 154 MG/DL (ref 0–200)
CO2 SERPL-SCNC: 18.8 MMOL/L (ref 22–29)
CREAT SERPL-MCNC: 0.75 MG/DL (ref 0.57–1)
DEPRECATED RDW RBC AUTO: 42.5 FL (ref 37–54)
EOSINOPHIL # BLD AUTO: 0.52 10*3/MM3 (ref 0–0.4)
EOSINOPHIL NFR BLD AUTO: 7.2 % (ref 0.3–6.2)
ERYTHROCYTE [DISTWIDTH] IN BLOOD BY AUTOMATED COUNT: 15.8 % (ref 12.3–15.4)
EXPIRATION DATE: NORMAL
GFR SERPL CREATININE-BSD FRML MDRD: 93 ML/MIN/1.73
GLOBULIN UR ELPH-MCNC: 3.4 GM/DL
GLUCOSE BLDC GLUCOMTR-MCNC: 119 MG/DL (ref 70–130)
GLUCOSE SERPL-MCNC: 99 MG/DL (ref 65–99)
HBA1C MFR BLD: 5.3 % (ref 4.8–5.6)
HCT VFR BLD AUTO: 33.6 % (ref 34–46.6)
HDLC SERPL-MCNC: 34 MG/DL (ref 40–60)
HGB BLD-MCNC: 10.7 G/DL (ref 12–15.9)
INTERNAL NEGATIVE CONTROL: NORMAL
INTERNAL POSITIVE CONTROL: NORMAL
LDLC SERPL CALC-MCNC: 101 MG/DL (ref 0–100)
LDLC/HDLC SERPL: 2.94 {RATIO}
LYMPHOCYTES # BLD AUTO: 1.93 10*3/MM3 (ref 0.7–3.1)
LYMPHOCYTES NFR BLD AUTO: 26.8 % (ref 19.6–45.3)
Lab: NORMAL
MCH RBC QN AUTO: 23.6 PG (ref 26.6–33)
MCHC RBC AUTO-ENTMCNC: 31.8 G/DL (ref 31.5–35.7)
MCV RBC AUTO: 74 FL (ref 79–97)
MONOCYTES # BLD AUTO: 0.61 10*3/MM3 (ref 0.1–0.9)
MONOCYTES NFR BLD AUTO: 8.5 % (ref 5–12)
NEUTROPHILS NFR BLD AUTO: 4.04 10*3/MM3 (ref 1.7–7)
NEUTROPHILS NFR BLD AUTO: 56.3 % (ref 42.7–76)
NT-PROBNP SERPL-MCNC: 14.5 PG/ML (ref 0–450)
PLATELET # BLD AUTO: 344 10*3/MM3 (ref 140–450)
PMV BLD AUTO: 10.1 FL (ref 6–12)
POTASSIUM SERPL-SCNC: 4.1 MMOL/L (ref 3.5–5.2)
PROT SERPL-MCNC: 7.5 G/DL (ref 6–8.5)
RBC # BLD AUTO: 4.54 10*6/MM3 (ref 3.77–5.28)
SODIUM SERPL-SCNC: 136 MMOL/L (ref 136–145)
TRIGL SERPL-MCNC: 101 MG/DL (ref 0–150)
TSH SERPL DL<=0.05 MIU/L-ACNC: 2.64 UIU/ML (ref 0.27–4.2)
VLDLC SERPL-MCNC: 19 MG/DL (ref 5–40)
WBC # BLD AUTO: 7.19 10*3/MM3 (ref 3.4–10.8)

## 2021-10-26 PROCEDURE — 36589 REMOVAL TUNNELED CV CATH: CPT | Performed by: SURGERY

## 2021-10-26 PROCEDURE — 25010000002 DEXAMETHASONE PER 1 MG: Performed by: NURSE ANESTHETIST, CERTIFIED REGISTERED

## 2021-10-26 PROCEDURE — 25010000002 FENTANYL CITRATE (PF) 50 MCG/ML SOLUTION: Performed by: NURSE ANESTHETIST, CERTIFIED REGISTERED

## 2021-10-26 PROCEDURE — 77001 FLUOROGUIDE FOR VEIN DEVICE: CPT | Performed by: SURGERY

## 2021-10-26 PROCEDURE — 0 CEFAZOLIN IN DEXTROSE 2-4 GM/100ML-% SOLUTION: Performed by: SURGERY

## 2021-10-26 PROCEDURE — 25010000002 HEPARIN (PORCINE) PER 1000 UNITS: Performed by: SURGERY

## 2021-10-26 PROCEDURE — 82962 GLUCOSE BLOOD TEST: CPT

## 2021-10-26 PROCEDURE — 36558 INSERT TUNNELED CV CATH: CPT | Performed by: SURGERY

## 2021-10-26 PROCEDURE — 25010000002 ONDANSETRON PER 1 MG: Performed by: NURSE ANESTHETIST, CERTIFIED REGISTERED

## 2021-10-26 PROCEDURE — 76000 FLUOROSCOPY <1 HR PHYS/QHP: CPT

## 2021-10-26 PROCEDURE — 25010000002 MIDAZOLAM PER 1 MG: Performed by: ANESTHESIOLOGY

## 2021-10-26 PROCEDURE — 99214 OFFICE O/P EST MOD 30 MIN: CPT | Performed by: SURGERY

## 2021-10-26 PROCEDURE — 25010000002 HYDROMORPHONE PER 4 MG: Performed by: NURSE ANESTHETIST, CERTIFIED REGISTERED

## 2021-10-26 PROCEDURE — 25010000002 PROPOFOL 10 MG/ML EMULSION: Performed by: NURSE ANESTHETIST, CERTIFIED REGISTERED

## 2021-10-26 PROCEDURE — C1750 CATH, HEMODIALYSIS,LONG-TERM: HCPCS | Performed by: SURGERY

## 2021-10-26 PROCEDURE — 83036 HEMOGLOBIN GLYCOSYLATED A1C: CPT | Performed by: HOSPITALIST

## 2021-10-26 PROCEDURE — 84443 ASSAY THYROID STIM HORMONE: CPT | Performed by: HOSPITALIST

## 2021-10-26 PROCEDURE — 83880 ASSAY OF NATRIURETIC PEPTIDE: CPT | Performed by: HOSPITALIST

## 2021-10-26 PROCEDURE — 85025 COMPLETE CBC W/AUTO DIFF WBC: CPT | Performed by: HOSPITALIST

## 2021-10-26 PROCEDURE — 80053 COMPREHEN METABOLIC PANEL: CPT | Performed by: HOSPITALIST

## 2021-10-26 PROCEDURE — 63710000001 ONDANSETRON ODT 4 MG TABLET DISPERSIBLE: Performed by: HOSPITALIST

## 2021-10-26 PROCEDURE — G0378 HOSPITAL OBSERVATION PER HR: HCPCS

## 2021-10-26 PROCEDURE — 80061 LIPID PANEL: CPT | Performed by: HOSPITALIST

## 2021-10-26 PROCEDURE — 81025 URINE PREGNANCY TEST: CPT | Performed by: SURGERY

## 2021-10-26 DEVICE — LEONARD 10 FR DUAL-LUMEN CV CATHETER, WITH SURECUFF TISSUE INGROWTH CUFF,  54CM TIP-TO-CUFF LENGTH
Type: IMPLANTABLE DEVICE | Site: CHEST | Status: NON-FUNCTIONAL
Brand: LEONARD
Removed: 2023-01-13

## 2021-10-26 RX ORDER — ONDANSETRON 2 MG/ML
INJECTION INTRAMUSCULAR; INTRAVENOUS AS NEEDED
Status: DISCONTINUED | OUTPATIENT
Start: 2021-10-26 | End: 2021-10-26 | Stop reason: SURG

## 2021-10-26 RX ORDER — NALOXONE HCL 0.4 MG/ML
0.2 VIAL (ML) INJECTION AS NEEDED
Status: DISCONTINUED | OUTPATIENT
Start: 2021-10-26 | End: 2021-10-26 | Stop reason: HOSPADM

## 2021-10-26 RX ORDER — SODIUM CHLORIDE, SODIUM LACTATE, POTASSIUM CHLORIDE, CALCIUM CHLORIDE 600; 310; 30; 20 MG/100ML; MG/100ML; MG/100ML; MG/100ML
9 INJECTION, SOLUTION INTRAVENOUS CONTINUOUS
Status: DISCONTINUED | OUTPATIENT
Start: 2021-10-26 | End: 2021-10-26

## 2021-10-26 RX ORDER — BUPIVACAINE HYDROCHLORIDE AND EPINEPHRINE 5; 5 MG/ML; UG/ML
INJECTION, SOLUTION EPIDURAL; INTRACAUDAL; PERINEURAL AS NEEDED
Status: DISCONTINUED | OUTPATIENT
Start: 2021-10-26 | End: 2021-10-26 | Stop reason: HOSPADM

## 2021-10-26 RX ORDER — SODIUM CHLORIDE 0.9 % (FLUSH) 0.9 %
3 SYRINGE (ML) INJECTION EVERY 12 HOURS SCHEDULED
Status: DISCONTINUED | OUTPATIENT
Start: 2021-10-26 | End: 2021-10-26 | Stop reason: HOSPADM

## 2021-10-26 RX ORDER — FLUMAZENIL 0.1 MG/ML
0.2 INJECTION INTRAVENOUS AS NEEDED
Status: DISCONTINUED | OUTPATIENT
Start: 2021-10-26 | End: 2021-10-26 | Stop reason: HOSPADM

## 2021-10-26 RX ORDER — FENTANYL CITRATE 50 UG/ML
50 INJECTION, SOLUTION INTRAMUSCULAR; INTRAVENOUS
Status: DISCONTINUED | OUTPATIENT
Start: 2021-10-26 | End: 2021-10-26 | Stop reason: HOSPADM

## 2021-10-26 RX ORDER — HYDROMORPHONE HYDROCHLORIDE 1 MG/ML
0.5 INJECTION, SOLUTION INTRAMUSCULAR; INTRAVENOUS; SUBCUTANEOUS
Status: DISCONTINUED | OUTPATIENT
Start: 2021-10-26 | End: 2021-10-26 | Stop reason: HOSPADM

## 2021-10-26 RX ORDER — FENTANYL CITRATE 50 UG/ML
INJECTION, SOLUTION INTRAMUSCULAR; INTRAVENOUS AS NEEDED
Status: DISCONTINUED | OUTPATIENT
Start: 2021-10-26 | End: 2021-10-26 | Stop reason: SURG

## 2021-10-26 RX ORDER — MIDAZOLAM HYDROCHLORIDE 1 MG/ML
1 INJECTION INTRAMUSCULAR; INTRAVENOUS
Status: DISCONTINUED | OUTPATIENT
Start: 2021-10-26 | End: 2021-10-26 | Stop reason: HOSPADM

## 2021-10-26 RX ORDER — DEXAMETHASONE SODIUM PHOSPHATE 4 MG/ML
INJECTION, SOLUTION INTRA-ARTICULAR; INTRALESIONAL; INTRAMUSCULAR; INTRAVENOUS; SOFT TISSUE AS NEEDED
Status: DISCONTINUED | OUTPATIENT
Start: 2021-10-26 | End: 2021-10-26 | Stop reason: SURG

## 2021-10-26 RX ORDER — ONDANSETRON 2 MG/ML
4 INJECTION INTRAMUSCULAR; INTRAVENOUS ONCE AS NEEDED
Status: COMPLETED | OUTPATIENT
Start: 2021-10-26 | End: 2021-10-26

## 2021-10-26 RX ORDER — PROPOFOL 10 MG/ML
VIAL (ML) INTRAVENOUS CONTINUOUS PRN
Status: DISCONTINUED | OUTPATIENT
Start: 2021-10-26 | End: 2021-10-26 | Stop reason: SURG

## 2021-10-26 RX ORDER — LIDOCAINE HYDROCHLORIDE 20 MG/ML
INJECTION, SOLUTION INFILTRATION; PERINEURAL AS NEEDED
Status: DISCONTINUED | OUTPATIENT
Start: 2021-10-26 | End: 2021-10-26 | Stop reason: SURG

## 2021-10-26 RX ORDER — LABETALOL HYDROCHLORIDE 5 MG/ML
5 INJECTION, SOLUTION INTRAVENOUS
Status: DISCONTINUED | OUTPATIENT
Start: 2021-10-26 | End: 2021-10-26 | Stop reason: HOSPADM

## 2021-10-26 RX ORDER — DIPHENHYDRAMINE HCL 25 MG
25 CAPSULE ORAL
Status: DISCONTINUED | OUTPATIENT
Start: 2021-10-26 | End: 2021-10-26 | Stop reason: HOSPADM

## 2021-10-26 RX ORDER — DIPHENHYDRAMINE HYDROCHLORIDE 50 MG/ML
12.5 INJECTION INTRAMUSCULAR; INTRAVENOUS
Status: DISCONTINUED | OUTPATIENT
Start: 2021-10-26 | End: 2021-10-26 | Stop reason: HOSPADM

## 2021-10-26 RX ORDER — HYDROCODONE BITARTRATE AND ACETAMINOPHEN 5; 325 MG/1; MG/1
1 TABLET ORAL EVERY 6 HOURS PRN
Status: DISCONTINUED | OUTPATIENT
Start: 2021-10-26 | End: 2021-10-26 | Stop reason: HOSPADM

## 2021-10-26 RX ORDER — PROMETHAZINE HYDROCHLORIDE 25 MG/1
25 SUPPOSITORY RECTAL ONCE AS NEEDED
Status: DISCONTINUED | OUTPATIENT
Start: 2021-10-26 | End: 2021-10-26 | Stop reason: HOSPADM

## 2021-10-26 RX ORDER — CEFAZOLIN SODIUM 2 G/100ML
2 INJECTION, SOLUTION INTRAVENOUS ONCE
Status: COMPLETED | OUTPATIENT
Start: 2021-10-26 | End: 2021-10-26

## 2021-10-26 RX ORDER — PROPOFOL 10 MG/ML
VIAL (ML) INTRAVENOUS AS NEEDED
Status: DISCONTINUED | OUTPATIENT
Start: 2021-10-26 | End: 2021-10-26 | Stop reason: SURG

## 2021-10-26 RX ORDER — SODIUM CHLORIDE 0.9 % (FLUSH) 0.9 %
3-10 SYRINGE (ML) INJECTION AS NEEDED
Status: DISCONTINUED | OUTPATIENT
Start: 2021-10-26 | End: 2021-10-26 | Stop reason: HOSPADM

## 2021-10-26 RX ORDER — EPHEDRINE SULFATE 50 MG/ML
5 INJECTION, SOLUTION INTRAVENOUS ONCE AS NEEDED
Status: DISCONTINUED | OUTPATIENT
Start: 2021-10-26 | End: 2021-10-26 | Stop reason: HOSPADM

## 2021-10-26 RX ORDER — HYDROCODONE BITARTRATE AND ACETAMINOPHEN 7.5; 325 MG/1; MG/1
1 TABLET ORAL ONCE AS NEEDED
Status: DISCONTINUED | OUTPATIENT
Start: 2021-10-26 | End: 2021-10-26 | Stop reason: HOSPADM

## 2021-10-26 RX ORDER — OXYCODONE AND ACETAMINOPHEN 10; 325 MG/1; MG/1
1 TABLET ORAL EVERY 4 HOURS PRN
Status: DISCONTINUED | OUTPATIENT
Start: 2021-10-26 | End: 2021-10-26 | Stop reason: HOSPADM

## 2021-10-26 RX ORDER — PROMETHAZINE HYDROCHLORIDE 25 MG/1
25 TABLET ORAL ONCE AS NEEDED
Status: DISCONTINUED | OUTPATIENT
Start: 2021-10-26 | End: 2021-10-26 | Stop reason: HOSPADM

## 2021-10-26 RX ORDER — HYDRALAZINE HYDROCHLORIDE 20 MG/ML
5 INJECTION INTRAMUSCULAR; INTRAVENOUS
Status: DISCONTINUED | OUTPATIENT
Start: 2021-10-26 | End: 2021-10-26 | Stop reason: HOSPADM

## 2021-10-26 RX ORDER — LIDOCAINE HYDROCHLORIDE 10 MG/ML
0.5 INJECTION, SOLUTION EPIDURAL; INFILTRATION; INTRACAUDAL; PERINEURAL ONCE AS NEEDED
Status: DISCONTINUED | OUTPATIENT
Start: 2021-10-26 | End: 2021-10-26 | Stop reason: HOSPADM

## 2021-10-26 RX ORDER — SODIUM CHLORIDE, SODIUM LACTATE, POTASSIUM CHLORIDE, CALCIUM CHLORIDE 600; 310; 30; 20 MG/100ML; MG/100ML; MG/100ML; MG/100ML
INJECTION, SOLUTION INTRAVENOUS CONTINUOUS PRN
Status: DISCONTINUED | OUTPATIENT
Start: 2021-10-26 | End: 2021-10-26 | Stop reason: SURG

## 2021-10-26 RX ORDER — IBUPROFEN 600 MG/1
600 TABLET ORAL ONCE AS NEEDED
Status: DISCONTINUED | OUTPATIENT
Start: 2021-10-26 | End: 2021-10-26 | Stop reason: HOSPADM

## 2021-10-26 RX ORDER — MAGNESIUM HYDROXIDE 1200 MG/15ML
LIQUID ORAL AS NEEDED
Status: DISCONTINUED | OUTPATIENT
Start: 2021-10-26 | End: 2021-10-26 | Stop reason: HOSPADM

## 2021-10-26 RX ADMIN — Medication 1000 UNITS: at 08:55

## 2021-10-26 RX ADMIN — PROPOFOL 50 MG: 10 INJECTION, EMULSION INTRAVENOUS at 13:45

## 2021-10-26 RX ADMIN — Medication 3 ML: at 15:08

## 2021-10-26 RX ADMIN — OXYBUTYNIN CHLORIDE 2.5 MG: 5 TABLET ORAL at 08:55

## 2021-10-26 RX ADMIN — HYDROMORPHONE HYDROCHLORIDE 0.5 MG: 1 INJECTION, SOLUTION INTRAMUSCULAR; INTRAVENOUS; SUBCUTANEOUS at 15:18

## 2021-10-26 RX ADMIN — LIDOCAINE HYDROCHLORIDE 60 MG: 20 INJECTION, SOLUTION INFILTRATION; PERINEURAL at 13:45

## 2021-10-26 RX ADMIN — LEVOTHYROXINE SODIUM 50 MCG: 0.05 TABLET ORAL at 06:43

## 2021-10-26 RX ADMIN — MIDAZOLAM 1 MG: 1 INJECTION INTRAMUSCULAR; INTRAVENOUS at 13:28

## 2021-10-26 RX ADMIN — SODIUM CHLORIDE, POTASSIUM CHLORIDE, SODIUM LACTATE AND CALCIUM CHLORIDE: 600; 310; 30; 20 INJECTION, SOLUTION INTRAVENOUS at 13:35

## 2021-10-26 RX ADMIN — CEFAZOLIN SODIUM 2 G: 2 INJECTION, SOLUTION INTRAVENOUS at 13:26

## 2021-10-26 RX ADMIN — GABAPENTIN 100 MG: 100 CAPSULE ORAL at 08:54

## 2021-10-26 RX ADMIN — METOPROLOL TARTRATE 12.5 MG: 25 TABLET, FILM COATED ORAL at 16:00

## 2021-10-26 RX ADMIN — Medication 140 MCG/KG/MIN: at 13:45

## 2021-10-26 RX ADMIN — MIDODRINE HYDROCHLORIDE 10 MG: 5 TABLET ORAL at 11:41

## 2021-10-26 RX ADMIN — FENTANYL CITRATE 50 MCG: 0.05 INJECTION, SOLUTION INTRAMUSCULAR; INTRAVENOUS at 13:53

## 2021-10-26 RX ADMIN — MIDODRINE HYDROCHLORIDE 10 MG: 5 TABLET ORAL at 08:55

## 2021-10-26 RX ADMIN — ONDANSETRON 4 MG: 4 TABLET, ORALLY DISINTEGRATING ORAL at 11:44

## 2021-10-26 RX ADMIN — FENTANYL CITRATE 50 MCG: 0.05 INJECTION, SOLUTION INTRAMUSCULAR; INTRAVENOUS at 14:20

## 2021-10-26 RX ADMIN — ONDANSETRON 4 MG: 2 INJECTION INTRAMUSCULAR; INTRAVENOUS at 15:17

## 2021-10-26 RX ADMIN — PANTOPRAZOLE SODIUM 40 MG: 40 TABLET, DELAYED RELEASE ORAL at 06:41

## 2021-10-26 RX ADMIN — DEXAMETHASONE SODIUM PHOSPHATE 8 MG: 4 INJECTION, SOLUTION INTRAMUSCULAR; INTRAVENOUS at 13:45

## 2021-10-26 RX ADMIN — METOPROLOL TARTRATE 12.5 MG: 25 TABLET, FILM COATED ORAL at 08:56

## 2021-10-26 RX ADMIN — ONDANSETRON 4 MG: 2 INJECTION INTRAMUSCULAR; INTRAVENOUS at 14:15

## 2021-10-26 NOTE — ANESTHESIA PREPROCEDURE EVALUATION
Anesthesia Evaluation     Patient summary reviewed and Nursing notes reviewed                Airway   Mallampati: II  TM distance: >3 FB  Neck ROM: full  Dental      Pulmonary - negative pulmonary ROS   Cardiovascular - negative cardio ROS    ECG reviewed  Rhythm: regular  Rate: normal        Neuro/Psych- negative ROS  GI/Hepatic/Renal/Endo    (+)  GERD,      Musculoskeletal (-) negative ROS    Abdominal    Substance History - negative use     OB/GYN negative ob/gyn ROS         Other                        Anesthesia Plan    ASA 3     MAC   (NPO  I have reviewed the patient's history with the patient and the chart, including all pertinent laboratory results and imaging. I have explained the risks of anesthesia including but not limited to dental damage, corneal abrasion, nerve injury, MI, stroke, and death. Questions asked and answered. Anesthetic plan discussed with patient and team as indicated. Patient expressed understanding of the above.  Patient concerned about infection of in situ non functioning addison catheter and wants to discuss with Dr Lane)  intravenous induction     Anesthetic plan, all risks, benefits, and alternatives have been provided, discussed and informed consent has been obtained with: patient.

## 2021-10-26 NOTE — ANESTHESIA POSTPROCEDURE EVALUATION
"Patient: Liliam Lorenz    Procedure Summary     Date: 10/26/21 Room / Location: [facility] OR 02 / [facility] MAIN OR    Anesthesia Start: 1335 Anesthesia Stop: 1450    Procedure: REMOVAL AND INSERTION OF DOLAN CATHETER (Right ) Diagnosis:       Dolan catheter dysfunction, initial encounter ([facility])      (Dolan catheter dysfunction, initial encounter ([facility]) [T82.514A])    Surgeons: Pa Lane MD Provider: Segundo King MD    Anesthesia Type: MAC ASA Status: 3          Anesthesia Type: MAC    Vitals  Vitals Value Taken Time   /106 10/26/21 1531   Temp 36.7 °C (98 °F) 10/26/21 1515   Pulse 95 10/26/21 1534   Resp 16 10/26/21 1530   SpO2 94 % 10/26/21 1534   Vitals shown include unvalidated device data.        Post Anesthesia Care and Evaluation    Pain management: adequate  Airway patency: patent  Anesthetic complications: No anesthetic complications    Cardiovascular status: acceptable  Respiratory status: acceptable  Hydration status: acceptable    Comments: /87   Pulse 84   Temp 36.7 °C (98 °F) (Oral)   Resp 16   Ht 162.6 cm (64\")   Wt 109 kg (240 lb)   LMP  (LMP Unknown)   SpO2 95%   Breastfeeding No   BMI 41.20 kg/m²         "

## 2021-10-27 ENCOUNTER — PATIENT MESSAGE (OUTPATIENT)
Dept: SURGERY | Facility: CLINIC | Age: 26
End: 2021-10-27

## 2021-10-29 RX ORDER — DOXYCYCLINE HYCLATE 100 MG/1
100 CAPSULE ORAL 2 TIMES DAILY
Qty: 20 CAPSULE | Refills: 0 | Status: SHIPPED | OUTPATIENT
Start: 2021-10-29 | End: 2021-11-11

## 2021-10-29 NOTE — PROGRESS NOTES
She sent a picture of the incision from which the Dolan catheter was removed.  It is a little erythematous.  To me it has the appearance of cellulitis.  I am sending a prescription for doxycycline to her pharmacy.

## 2021-11-11 ENCOUNTER — OFFICE VISIT (OUTPATIENT)
Dept: SURGERY | Facility: CLINIC | Age: 26
End: 2021-11-11

## 2021-11-11 DIAGNOSIS — I87.8 POOR VENOUS ACCESS: Primary | ICD-10-CM

## 2021-11-11 PROCEDURE — 99024 POSTOP FOLLOW-UP VISIT: CPT | Performed by: SURGERY

## 2021-11-11 RX ORDER — DOXYCYCLINE HYCLATE 100 MG/1
100 CAPSULE ORAL 2 TIMES DAILY
Qty: 20 CAPSULE | Refills: 0 | Status: SHIPPED | OUTPATIENT
Start: 2021-11-11 | End: 2022-01-19

## 2021-11-11 NOTE — PROGRESS NOTES
CHIEF COMPLAINT:   Chief Complaint   Patient presents with   • Post-op     Right internal jugular vein Dolan catheter placement with ultrasound guidance and fluoroscopic guidance & Removal of Left Internal Jugula Vein Dolan Catheter by Dr. Pa Lane on 10/26/2021       HISTORY OF PRESENT ILLNESS:  This is a 26 y.o. female who presents for a post-operative visit after undergoing has had a right internal jugular Dolan catheter and removal of a broken left internal jugular Dolan catheter on 10/26/2021. The area where her left internal jugular Dolan catheter had some cellulitis has completely resolved with doxycycline. She comes in today for suture removal from the right catheter.    PHYSICAL EXAM:  General: No acute distress, oriented x3  Chest: Left chest wall incision healing well with no cellulitis, right chest wall Dolan catheter with sterile dressings in place and Biopatch visualized beneath the Tegaderm, no cellulitis or bleeding    A/P:  This is a 26 y.o. female patient who is S/P placement of tunneled right internal jugular Dolan catheter on 10/26/2021    I removed the 2 sutures and replaced with a Tegaderm with Biopatch using sterile technique. She can follow-up as needed.    Kasey Moncada MD  General, Robotic, and Endoscopic Surgery  [facility]    [address], [address]  [place] [address]  P: [phone]  F: [phone]

## 2021-11-11 NOTE — TELEPHONE ENCOUNTER
I called and talked her on the phone today.  I reviewed the images that she sent.  I do not think there is bleeding per se.  I think there is tissue fluid seeping around the access site.  I expect we will stop.  Meanwhile, I will provide doxycycline to reduce the chance of a catheter exit site infection.

## 2021-11-18 ENCOUNTER — TRANSCRIBE ORDERS (OUTPATIENT)
Dept: SLEEP MEDICINE | Facility: HOSPITAL | Age: 26
End: 2021-11-18

## 2021-11-18 DIAGNOSIS — Z01.818 OTHER SPECIFIED PRE-OPERATIVE EXAMINATION: Primary | ICD-10-CM

## 2021-11-22 ENCOUNTER — TRANSCRIBE ORDERS (OUTPATIENT)
Dept: SLEEP MEDICINE | Facility: HOSPITAL | Age: 26
End: 2021-11-22

## 2021-11-22 DIAGNOSIS — G47.33 OSA (OBSTRUCTIVE SLEEP APNEA): Primary | ICD-10-CM

## 2021-11-30 ENCOUNTER — LAB (OUTPATIENT)
Dept: LAB | Facility: HOSPITAL | Age: 26
End: 2021-11-30

## 2021-11-30 DIAGNOSIS — Z01.818 OTHER SPECIFIED PRE-OPERATIVE EXAMINATION: ICD-10-CM

## 2021-11-30 LAB — SARS-COV-2 ORF1AB RESP QL NAA+PROBE: NOT DETECTED

## 2021-11-30 PROCEDURE — C9803 HOPD COVID-19 SPEC COLLECT: HCPCS

## 2021-11-30 PROCEDURE — U0004 COV-19 TEST NON-CDC HGH THRU: HCPCS

## 2021-12-02 ENCOUNTER — HOSPITAL ENCOUNTER (OUTPATIENT)
Dept: SLEEP MEDICINE | Facility: HOSPITAL | Age: 26
Discharge: HOME | End: 2021-12-02
Admitting: INTERNAL MEDICINE

## 2021-12-02 DIAGNOSIS — G47.33 OSA (OBSTRUCTIVE SLEEP APNEA): ICD-10-CM

## 2021-12-02 PROCEDURE — 95810 POLYSOM 6/> YRS 4/> PARAM: CPT

## 2021-12-09 ENCOUNTER — OFFICE VISIT (OUTPATIENT)
Dept: CARDIOLOGY | Facility: CLINIC | Age: 26
End: 2021-12-09

## 2021-12-09 VITALS
DIASTOLIC BLOOD PRESSURE: 74 MMHG | SYSTOLIC BLOOD PRESSURE: 124 MMHG | HEART RATE: 115 BPM | WEIGHT: 248.2 LBS | BODY MASS INDEX: 42.37 KG/M2 | HEIGHT: 64 IN | OXYGEN SATURATION: 98 %

## 2021-12-09 DIAGNOSIS — G90.A POTS (POSTURAL ORTHOSTATIC TACHYCARDIA SYNDROME): Primary | ICD-10-CM

## 2021-12-09 DIAGNOSIS — Q79.62 EHLERS-DANLOS SYNDROME TYPE III: ICD-10-CM

## 2021-12-09 DIAGNOSIS — Z86.711 HISTORY OF PULMONARY EMBOLISM: ICD-10-CM

## 2021-12-09 DIAGNOSIS — G90.1 DYSAUTONOMIA (HCC): ICD-10-CM

## 2021-12-09 PROCEDURE — 99215 OFFICE O/P EST HI 40 MIN: CPT | Performed by: INTERNAL MEDICINE

## 2021-12-15 ENCOUNTER — TELEPHONE (OUTPATIENT)
Dept: SLEEP MEDICINE | Facility: HOSPITAL | Age: 26
End: 2021-12-15

## 2021-12-15 NOTE — TELEPHONE ENCOUNTER
Spoke with patient about sleep study results , sending orders to [facility] per patient request, will follow up @ [facility]

## 2021-12-16 RX ORDER — PYRIDOSTIGMINE BROMIDE 60 MG/1
30 TABLET ORAL 2 TIMES DAILY
Qty: 30 TABLET | Refills: 3 | Status: SHIPPED | OUTPATIENT
Start: 2021-12-16 | End: 2022-04-19

## 2021-12-20 NOTE — PROGRESS NOTES
Date of Office Visit: 2021  Encounter Provider: Payam Rausch MD  Place of Service: [facility]  Patient Name: Liliam Lorenz  :1995    Chief complaint: Follow-up for severe postural orthostatic tachycardia syndrome, dysautonomia, type III Phoebe-Danlos Syndrome, history of pulmonary embolism.    History of Present Illness:    I again had the pleasure of seeing the patient in cardiology office on 2021.  She is a very pleasant 26 year-old with a history of severe POTS, GERD, irritable bowel syndrome, gastroparesis who presents for follow-up.  I initially saw the patient in the office on 2018.  She had been having chest discomfort intermittently for several years.  She had been evaluated in the emergency department, and it was felt that this might represent esophageal spasm.  However, she did undergo cardiac testing with an exercise treadmill stress test and echocardiogram on 3/14/2018.  The treadmill stress test showed no EKG changes, and she was able to exercise for 9 minutes (although she did have chest discomfort).  The echocardiogram showed an ejection fraction of 62% and no significant pathology.  She later continued to have issues with near syncope and tachycardia.  A tilt table test was performed on 2019 which confirmed the diagnosis of POTS.  Typically, she met all criteria when tilted backwards (her heart rate went up significantly, and her blood pressure dropped).  She has been treated with beta-blockers and midodrine.  She also has evidence of severe dysautonomia and severe gastroparesis.  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    The patient was admitted in 2021 with shortness of breath and tachycardia.  She was found to have very small bilateral pulmonary emboli on CT  angiogram of the chest on 9/8/2021.  She was initiated on Eliquis at that time.  She was also readmitted on 10/25/2021 with bleeding from her left IJ Valverde catheter.  This was removed, and a right IJ Valverde was placed.    The patient presents today for follow-up.  She has been having a rough time since my last visit with her.  Her dysautonomia and POTS has been substantial recently.  When lying down, her blood pressure actually dropped, and with standing, her blood pressure spikes.  Overall, her blood pressure and heart rate have still been very labile in general.  If she exerts herself even minimally, her heart rate will significantly go up many times.  She recently had a heart rate as high as the 170s.  She is still getting IVIG infusions once a week, and she gets IV fluids every day at this point.  She is going to have to wean off of the Northera as she can no longer get assistance from the company.  She also was diagnosed with hypothyroidism and hemiplegic migraines since her last visit.      Past Medical History:   Diagnosis Date   • Bronchitis    • Chest pain    • Chronic hypotension    • Eczema    • Phoebe-Danlos syndrome    • Gastroparesis    • GERD (gastroesophageal reflux disease)    • IBS (irritable bowel syndrome)    • Lightheadedness    • POTS (postural orthostatic tachycardia syndrome)    • Rectal fissure    • Rosacea    • Tachycardia        Past Surgical History:   Procedure Laterality Date   • COLONOSCOPY     • MEDIPORT INSERTION, DOUBLE  09/01/2020    [facility]    • UPPER GASTROINTESTINAL ENDOSCOPY     • VENOUS ACCESS DEVICE (PORT) INSERTION Left 10/16/2020    Procedure: INSERTION VENOUS ACCESS DEVICE UNDER FLURO;  Surgeon: Pa Lane MD;  Location: [facility];  Service: General;  Laterality: Left;   • VENOUS ACCESS DEVICE (PORT) INSERTION Right 10/26/2021    Procedure: REMOVAL AND INSERTION OF VALVERDE CATHETER;  Surgeon: Pa Lane MD;  Location: [facility];   Service: General;  Laterality: Right;       Current Outpatient Medications on File Prior to Visit   Medication Sig Dispense Refill   • albuterol sulfate  (90 Base) MCG/ACT inhaler Inhale 2 puffs Every 4 (Four) Hours As Needed for Wheezing.     • apixaban (ELIQUIS) 5 MG tablet tablet Take 1 tablet by mouth Every 12 (Twelve) Hours. 60 tablet 11   • Cholecalciferol (VITAMIN D-1000 MAX ST) 25 MCG (1000 UT) tablet Take 1,000 Units by mouth Daily.     • dilTIAZem powder Apply  topically to the appropriate area as directed 2 (Two) Times a Day As Needed (RECTAL CREAM).     • doxepin (SINEquan) 25 MG capsule TK 1 TO 2 CS PO Q NIGHT     • doxycycline (VIBRAMYCIN) 100 MG capsule Take 1 capsule by mouth 2 (Two) Times a Day. 20 capsule 0   • gabapentin (NEURONTIN) 100 MG capsule Take 100 mg by mouth Daily.     • Galcanezumab-gnlm (EMGALITY SC) Inject  under the skin into the appropriate area as directed Every 30 (Thirty) Days.     • Ginger, Zingiber officinalis, (GINGER ROOT PO) Take 1 tablet by mouth.     • hydrocortisone 0.5 % cream Apply 1 application topically to the appropriate area as directed 2 (Two) Times a Day.     • immune globulin, human, (GAMMAGARD S/D) 10 g infusion Infuse  into a venous catheter 1 (One) Time Per Week.     • levothyroxine (SYNTHROID, LEVOTHROID) 50 MCG tablet Take 50 mcg by mouth Daily.     • loperamide (IMODIUM A-D) 2 MG tablet Take 2 mg by mouth 4 (Four) Times a Day As Needed.     • meclizine (ANTIVERT) 25 MG tablet Take 25 mg by mouth 3 (Three) Times a Day As Needed.     • medroxyPROGESTERone (DEPO-PROVERA) 150 MG/ML injection Inject 1 mL into the appropriate muscle as directed by prescriber Every 3 (Three) Months. 1 mL 3   • metoprolol tartrate (LOPRESSOR) 25 MG tablet Take 12.5 mg by mouth 3 (Three) Times a Day.     • metroNIDAZOLE (FLAGYL) 0.75 % lotion lotion Apply 1 application topically to the appropriate area as directed Every 12 (Twelve) Hours.     • midodrine (PROAMATINE) 10 MG  tablet TAKE 1 TABLET BY MOUTH THREE TIMES DAILY 90 tablet 6   • Mirabegron ER (Myrbetriq) 25 MG tablet sustained-release 24 hour 24 hr tablet Take 25 mg by mouth Daily.     • Mometasone Furoate (Asmanex HFA) 100 MCG/ACT aerosol INAHLE 2 PUFFS INTO THE LUNGS BY MOUTH 2 TIMES PER DAY IN THE MORNING AND EVENING     • montelukast (SINGULAIR) 10 MG tablet Take 10 mg by mouth Every Night.     • omeprazole (priLOSEC) 20 MG capsule Take 40 mg by mouth 2 (Two) Times a Day.     • ondansetron (ZOFRAN) 4 MG tablet Take 4 mg by mouth Every 8 (Eight) Hours As Needed for Nausea or Vomiting.     • Ondansetron HCl (ZOFRAN IV) Infuse 8 mg into a venous catheter Every 8 (Eight) Hours As Needed.     • ondansetron ODT (ZOFRAN ODT) 8 MG disintegrating tablet Take 1 tablet by mouth Every 8 (Eight) Hours As Needed for Nausea or Vomiting. 12 tablet 0   • phenazopyridine (PYRIDIUM) 100 MG tablet Take 100 mg by mouth 3 (Three) Times a Day As Needed.     • prochlorperazine (COMPAZINE) 10 MG tablet Take 10 mg by mouth Every 6 (Six) Hours As Needed for Nausea or Vomiting.     • promethazine (PHENERGAN) 12.5 MG tablet TK 1 T PO Q 6 H FOR UP TO 7 DAYS PRF NAUSEA     • promethazine (PHENERGAN) 25 MG tablet TK 1 T PO  Q 6 H PRN NAUSEA     • promethazine (PHENERGAN) Infuse 25 mg into a venous catheter Every 3 (Three) Hours.     • tiZANidine (ZANAFLEX) 4 MG tablet TAKE 1 TABLET BY MOUTH EVERY 8 HOURS FOR UP TO 14 DAYS AS NEEDED FOR MUSCLE SPASM     • triamcinolone (KENALOG) 0.1 % cream Apply 1 application topically to the appropriate area as directed 2 (Two) Times a Day.     • Vortioxetine HBr (TRINTELLIX) 5 MG tablet Take 5 mg by mouth Daily With Breakfast.     • hydrOXYzine pamoate (VISTARIL) 25 MG capsule Take 25-50 mg by mouth 3 (Three) Times a Day As Needed.       No current facility-administered medications on file prior to visit.     Allergies as of 12/09/2021 - Reviewed 12/09/2021   Allergen Reaction Noted   • Eggs or egg-derived products GI  "Intolerance 05/05/2021   • Pepcid [famotidine] Rash and GI Intolerance 05/05/2021   • Scopolamine Rash and GI Intolerance 05/05/2021     Social History     Socioeconomic History   • Marital status:    Tobacco Use   • Smoking status: Never Smoker   • Smokeless tobacco: Never Used   Vaping Use   • Vaping Use: Never used   Substance and Sexual Activity   • Alcohol use: No   • Drug use: No   • Sexual activity: Yes     Partners: Female     Birth control/protection: None     Family History   Problem Relation Age of Onset   • Hypertension Mother    • Diabetes Mother    • Diabetes Father    • Heart disease Paternal Grandfather    • Stroke Paternal Grandfather    • Diabetes Paternal Grandfather    • Cancer Paternal Grandfather    • Coronary artery disease Maternal Grandmother         MGM with 4 vessel CABG and multiple stents   • Cancer Maternal Grandmother    • Coronary artery disease Maternal Uncle         Maternal uncle with MI   • Breast cancer Other    • Malig Hyperthermia Neg Hx        Review of Systems   Constitutional: Positive for malaise/fatigue.   Cardiovascular: Positive for dyspnea on exertion and near-syncope.   Respiratory: Positive for shortness of breath.    Endocrine: Positive for cold intolerance and heat intolerance.   Musculoskeletal: Positive for joint pain and joint swelling.   Gastrointestinal: Positive for abdominal pain, diarrhea and nausea.   Neurological: Positive for excessive daytime sleepiness, dizziness, headaches, light-headedness and paresthesias.   All other systems reviewed and are negative.     Objective:     Vitals:    12/09/21 1138   BP: 124/74   Pulse: 115   SpO2: 98%   Weight: 113 kg (248 lb 3.2 oz)   Height: 162.6 cm (64.02\")     Body mass index is 42.58 kg/m².    Physical Exam  Constitutional:       Appearance: She is well-developed. She is ill-appearing.   HENT:      Head: Normocephalic and atraumatic.   Eyes:      Conjunctiva/sclera: Conjunctivae normal.   Cardiovascular: "      Rate and Rhythm: Normal rate and regular rhythm.      Heart sounds: No murmur heard.  No friction rub. No gallop.    Pulmonary:      Effort: Pulmonary effort is normal.      Breath sounds: Normal breath sounds.   Abdominal:      Palpations: Abdomen is soft.      Tenderness: There is no abdominal tenderness.   Musculoskeletal:      Cervical back: Neck supple.   Skin:     General: Skin is warm.   Neurological:      Mental Status: She is alert and oriented to person, place, and time.   Psychiatric:         Behavior: Behavior normal.       Lab Review:   Procedures     Cardiac Procedures:  1.  Exercise treadmill stress test on 3/14/2018: The patient exercised for 9 minutes.  She did have chest pain.  There were no EKG changes.  2.  Echocardiogram on 3/14/2018: The ejection fraction was 62%.  Diastolic function was normal.  3.  Tilt table test on 12/5/2019: Positive for postural orthostasis and postural tachycardia (suggestive of POTS).  4.  CT angiogram of the chest on 9/8/2021: Very small bilateral pulmonary emboli.  5.  Echocardiogram on 10/18/2021: The ejection fraction was 60%.  Diastolic function was normal.  There was no significant valvular disease.    Assessment:       Diagnosis Plan   1. POTS (postural orthostatic tachycardia syndrome)     2. Phoebe-Danlos syndrome type III     3. Dysautonomia (HCC)     4. History of pulmonary embolism       Plan:      As noted, she has had multiple issues since I last saw her.  She was diagnosed with pulmonary emboli in September 2021.  She also was diagnosed recently with hypothyroidism and hemiplegic migraines.  She has had multiple issues with her blood pressure and heart rate.  Her blood pressure spikes when standing and drops when lying down.  This is obviously counterintuitive to normal hemodynamics.  Additionally, she is going to have to get off of the Nujira because the company will no longer supply samples and she cannot afford the medication.  Her heart rate  has also been a significant issue, often going into the 160s to 170s with minimal exertion.  In addition to all of these things, she also has Phoebe-Danlos type III, autoimmune atrophic gastritis, interstitial cystitis, and fibromyalgia.  She is now also ambulating with a walker.    From a cardiac standpoint, this is a real nightmare to try to manage.  She is getting daily IV fluids for the POTS, and IV immunoglobulin infusions once a week for the autoimmune gastric dysmotility syndrome.  I am going to look into potentially adding Mestinon after she starts weaning the Northera.  We discussed how to wean the Northera slowly, which is also challenging because it is a capsule.  She is going to continue on the midodrine at 10 mg 3 times a day.  I have been hesitant to add Florinef in a 26-year-old because of the long-term corticosteroid effects, although it would be an option if needed in the future.  Because her heart rate has been such an issue, I am going to have her continue on the metoprolol, but increase the middle dose to 25 mg/day and keep the other 2 doses at 12.5 mg.  Obviously, we need to consider that her blood pressure could drop with increasing the metoprolol as well.  Another option would be potentially adding Corlanor, although it is difficult to get this medication for conditions other than tachycardia with congestive heart failure.  She will continue on the Eliquis for the recent pulmonary embolism.  I will have her follow-up with me in 6 months, although she will call in the meantime with any medication or hemodynamic issues.    I spent 45 minutes in the care of the patient, including reviewing her recent hospitalization records thoroughly.  I also spent a substantial amount of time with the patient in the room discussing the management of POTS in general, management of her tachycardia and hypotension, anticoagulation, and potential future medication changes.

## 2022-01-18 ENCOUNTER — APPOINTMENT (OUTPATIENT)
Dept: GENERAL RADIOLOGY | Facility: HOSPITAL | Age: 27
End: 2022-01-18

## 2022-01-18 ENCOUNTER — APPOINTMENT (OUTPATIENT)
Dept: CT IMAGING | Facility: HOSPITAL | Age: 27
End: 2022-01-18

## 2022-01-18 ENCOUNTER — HOSPITAL ENCOUNTER (INPATIENT)
Facility: HOSPITAL | Age: 27
LOS: 4 days | Discharge: HOME HEALTH | End: 2022-01-22
Attending: EMERGENCY MEDICINE | Admitting: HOSPITALIST

## 2022-01-18 DIAGNOSIS — K76.9 LIVER LESION: ICD-10-CM

## 2022-01-18 DIAGNOSIS — R00.0 TACHYCARDIA: ICD-10-CM

## 2022-01-18 DIAGNOSIS — R50.9 FEBRILE ILLNESS, ACUTE: Primary | ICD-10-CM

## 2022-01-18 DIAGNOSIS — T82.514A HICKMAN CATHETER DYSFUNCTION, INITIAL ENCOUNTER: ICD-10-CM

## 2022-01-18 LAB
ALBUMIN SERPL-MCNC: 4.1 G/DL (ref 3.5–5.2)
ALBUMIN/GLOB SERPL: 1.4 G/DL
ALP SERPL-CCNC: 79 U/L (ref 39–117)
ALT SERPL W P-5'-P-CCNC: 88 U/L (ref 1–33)
ANION GAP SERPL CALCULATED.3IONS-SCNC: 14.3 MMOL/L (ref 5–15)
APTT PPP: 44.2 SECONDS (ref 22.7–35.4)
ARTERIAL PATENCY WRIST A: POSITIVE
AST SERPL-CCNC: 91 U/L (ref 1–32)
ATMOSPHERIC PRESS: 751 MMHG
B PARAPERT DNA SPEC QL NAA+PROBE: NOT DETECTED
B PERT DNA SPEC QL NAA+PROBE: NOT DETECTED
BACTERIA UR QL AUTO: ABNORMAL /HPF
BASE EXCESS BLDA CALC-SCNC: -3.4 MMOL/L (ref 0–2)
BASOPHILS # BLD AUTO: 0.03 10*3/MM3 (ref 0–0.2)
BASOPHILS NFR BLD AUTO: 0.3 % (ref 0–1.5)
BDY SITE: ABNORMAL
BILIRUB SERPL-MCNC: 0.9 MG/DL (ref 0–1.2)
BILIRUB UR QL STRIP: NEGATIVE
BUN SERPL-MCNC: 7 MG/DL (ref 6–20)
BUN/CREAT SERPL: 7 (ref 7–25)
C PNEUM DNA NPH QL NAA+NON-PROBE: NOT DETECTED
CALCIUM SPEC-SCNC: 8.7 MG/DL (ref 8.6–10.5)
CHLORIDE SERPL-SCNC: 104 MMOL/L (ref 98–107)
CLARITY UR: CLEAR
CO2 SERPL-SCNC: 15.7 MMOL/L (ref 22–29)
COLOR UR: ABNORMAL
CREAT SERPL-MCNC: 1 MG/DL (ref 0.57–1)
D-LACTATE SERPL-SCNC: 1.9 MMOL/L (ref 0.5–2)
DEPRECATED RDW RBC AUTO: 43 FL (ref 37–54)
EOSINOPHIL # BLD AUTO: 0 10*3/MM3 (ref 0–0.4)
EOSINOPHIL NFR BLD AUTO: 0 % (ref 0.3–6.2)
ERYTHROCYTE [DISTWIDTH] IN BLOOD BY AUTOMATED COUNT: 17.2 % (ref 12.3–15.4)
FLUAV SUBTYP SPEC NAA+PROBE: NOT DETECTED
FLUBV RNA ISLT QL NAA+PROBE: NOT DETECTED
GFR SERPL CREATININE-BSD FRML MDRD: 67 ML/MIN/1.73
GLOBULIN UR ELPH-MCNC: 3 GM/DL
GLUCOSE SERPL-MCNC: 109 MG/DL (ref 65–99)
GLUCOSE UR STRIP-MCNC: NEGATIVE MG/DL
HADV DNA SPEC NAA+PROBE: NOT DETECTED
HCG SERPL QL: NEGATIVE
HCO3 BLDA-SCNC: 18.3 MMOL/L (ref 22–28)
HCOV 229E RNA SPEC QL NAA+PROBE: NOT DETECTED
HCOV HKU1 RNA SPEC QL NAA+PROBE: NOT DETECTED
HCOV NL63 RNA SPEC QL NAA+PROBE: NOT DETECTED
HCOV OC43 RNA SPEC QL NAA+PROBE: NOT DETECTED
HCT VFR BLD AUTO: 35.5 % (ref 34–46.6)
HGB BLD-MCNC: 11.2 G/DL (ref 12–15.9)
HGB UR QL STRIP.AUTO: ABNORMAL
HMPV RNA NPH QL NAA+NON-PROBE: NOT DETECTED
HOLD SPECIMEN: NORMAL
HPIV1 RNA ISLT QL NAA+PROBE: NOT DETECTED
HPIV2 RNA SPEC QL NAA+PROBE: NOT DETECTED
HPIV3 RNA NPH QL NAA+PROBE: NOT DETECTED
HPIV4 P GENE NPH QL NAA+PROBE: NOT DETECTED
HYALINE CASTS UR QL AUTO: ABNORMAL /LPF
INR PPP: 1.36 (ref 0.9–1.1)
KETONES UR QL STRIP: NEGATIVE
LEUKOCYTE ESTERASE UR QL STRIP.AUTO: ABNORMAL
LYMPHOCYTES # BLD AUTO: 0.58 10*3/MM3 (ref 0.7–3.1)
LYMPHOCYTES NFR BLD AUTO: 4.9 % (ref 19.6–45.3)
M PNEUMO IGG SER IA-ACNC: NOT DETECTED
MAGNESIUM SERPL-MCNC: 1.9 MG/DL (ref 1.6–2.6)
MCH RBC QN AUTO: 22.6 PG (ref 26.6–33)
MCHC RBC AUTO-ENTMCNC: 31.5 G/DL (ref 31.5–35.7)
MCV RBC AUTO: 71.6 FL (ref 79–97)
MODALITY: ABNORMAL
MONOCYTES # BLD AUTO: 0.39 10*3/MM3 (ref 0.1–0.9)
MONOCYTES NFR BLD AUTO: 3.3 % (ref 5–12)
MUCOUS THREADS URNS QL MICRO: ABNORMAL /HPF
NEUTROPHILS NFR BLD AUTO: 10.7 10*3/MM3 (ref 1.7–7)
NEUTROPHILS NFR BLD AUTO: 90.9 % (ref 42.7–76)
NITRITE UR QL STRIP: POSITIVE
NT-PROBNP SERPL-MCNC: 97.7 PG/ML (ref 0–450)
PCO2 BLDA: 23.5 MM HG (ref 35–45)
PH BLDA: 7.5 PH UNITS (ref 7.35–7.45)
PH UR STRIP.AUTO: <=5 [PH] (ref 5–8)
PLATELET # BLD AUTO: 276 10*3/MM3 (ref 140–450)
PMV BLD AUTO: 9.8 FL (ref 6–12)
PO2 BLDA: 95.9 MM HG (ref 80–100)
POTASSIUM SERPL-SCNC: 4.2 MMOL/L (ref 3.5–5.2)
PROCALCITONIN SERPL-MCNC: 2.22 NG/ML (ref 0–0.25)
PROT SERPL-MCNC: 7.1 G/DL (ref 6–8.5)
PROT UR QL STRIP: ABNORMAL
PROTHROMBIN TIME: 16.6 SECONDS (ref 11.7–14.2)
RBC # BLD AUTO: 4.96 10*6/MM3 (ref 3.77–5.28)
RBC # UR STRIP: ABNORMAL /HPF
REF LAB TEST METHOD: ABNORMAL
RHINOVIRUS RNA SPEC NAA+PROBE: NOT DETECTED
RSV RNA NPH QL NAA+NON-PROBE: NOT DETECTED
SAO2 % BLDCOA: 98.3 % (ref 92–99)
SARS-COV-2 RNA NPH QL NAA+NON-PROBE: NOT DETECTED
SODIUM SERPL-SCNC: 134 MMOL/L (ref 136–145)
SP GR UR STRIP: >=1.03 (ref 1–1.03)
SQUAMOUS #/AREA URNS HPF: ABNORMAL /HPF
TOTAL RATE: 20 BREATHS/MINUTE
TROPONIN T SERPL-MCNC: <0.01 NG/ML (ref 0–0.03)
TSH SERPL DL<=0.05 MIU/L-ACNC: 1.02 UIU/ML (ref 0.27–4.2)
UROBILINOGEN UR QL STRIP: ABNORMAL
WBC # UR STRIP: ABNORMAL /HPF
WBC NRBC COR # BLD: 11.77 10*3/MM3 (ref 3.4–10.8)
WHOLE BLOOD HOLD SPECIMEN: NORMAL
WHOLE BLOOD HOLD SPECIMEN: NORMAL

## 2022-01-18 PROCEDURE — P9612 CATHETERIZE FOR URINE SPEC: HCPCS

## 2022-01-18 PROCEDURE — 25010000002 CEFEPIME PER 500 MG: Performed by: EMERGENCY MEDICINE

## 2022-01-18 PROCEDURE — 93005 ELECTROCARDIOGRAM TRACING: CPT | Performed by: EMERGENCY MEDICINE

## 2022-01-18 PROCEDURE — 0202U NFCT DS 22 TRGT SARS-COV-2: CPT | Performed by: EMERGENCY MEDICINE

## 2022-01-18 PROCEDURE — 84703 CHORIONIC GONADOTROPIN ASSAY: CPT | Performed by: EMERGENCY MEDICINE

## 2022-01-18 PROCEDURE — 87040 BLOOD CULTURE FOR BACTERIA: CPT | Performed by: EMERGENCY MEDICINE

## 2022-01-18 PROCEDURE — 99285 EMERGENCY DEPT VISIT HI MDM: CPT

## 2022-01-18 PROCEDURE — 85610 PROTHROMBIN TIME: CPT | Performed by: EMERGENCY MEDICINE

## 2022-01-18 PROCEDURE — 83605 ASSAY OF LACTIC ACID: CPT | Performed by: EMERGENCY MEDICINE

## 2022-01-18 PROCEDURE — 82803 BLOOD GASES ANY COMBINATION: CPT

## 2022-01-18 PROCEDURE — 87186 SC STD MICRODIL/AGAR DIL: CPT | Performed by: EMERGENCY MEDICINE

## 2022-01-18 PROCEDURE — 84145 PROCALCITONIN (PCT): CPT | Performed by: EMERGENCY MEDICINE

## 2022-01-18 PROCEDURE — 83880 ASSAY OF NATRIURETIC PEPTIDE: CPT | Performed by: EMERGENCY MEDICINE

## 2022-01-18 PROCEDURE — 71045 X-RAY EXAM CHEST 1 VIEW: CPT

## 2022-01-18 PROCEDURE — 36600 WITHDRAWAL OF ARTERIAL BLOOD: CPT

## 2022-01-18 PROCEDURE — 0 IOPAMIDOL PER 1 ML: Performed by: HOSPITALIST

## 2022-01-18 PROCEDURE — 83735 ASSAY OF MAGNESIUM: CPT | Performed by: EMERGENCY MEDICINE

## 2022-01-18 PROCEDURE — 85730 THROMBOPLASTIN TIME PARTIAL: CPT | Performed by: EMERGENCY MEDICINE

## 2022-01-18 PROCEDURE — 87147 CULTURE TYPE IMMUNOLOGIC: CPT | Performed by: EMERGENCY MEDICINE

## 2022-01-18 PROCEDURE — 84484 ASSAY OF TROPONIN QUANT: CPT | Performed by: EMERGENCY MEDICINE

## 2022-01-18 PROCEDURE — 93010 ELECTROCARDIOGRAM REPORT: CPT | Performed by: INTERNAL MEDICINE

## 2022-01-18 PROCEDURE — 80050 GENERAL HEALTH PANEL: CPT | Performed by: EMERGENCY MEDICINE

## 2022-01-18 PROCEDURE — 71275 CT ANGIOGRAPHY CHEST: CPT

## 2022-01-18 PROCEDURE — 81001 URINALYSIS AUTO W/SCOPE: CPT | Performed by: EMERGENCY MEDICINE

## 2022-01-18 PROCEDURE — 74177 CT ABD & PELVIS W/CONTRAST: CPT

## 2022-01-18 PROCEDURE — 87150 DNA/RNA AMPLIFIED PROBE: CPT | Performed by: EMERGENCY MEDICINE

## 2022-01-18 RX ORDER — ACETAMINOPHEN 500 MG
1000 TABLET ORAL ONCE
Status: COMPLETED | OUTPATIENT
Start: 2022-01-18 | End: 2022-01-18

## 2022-01-18 RX ORDER — SODIUM CHLORIDE 0.9 % (FLUSH) 0.9 %
10 SYRINGE (ML) INJECTION AS NEEDED
Status: DISCONTINUED | OUTPATIENT
Start: 2022-01-18 | End: 2022-01-22 | Stop reason: HOSPADM

## 2022-01-18 RX ADMIN — IOPAMIDOL 95 ML: 755 INJECTION, SOLUTION INTRAVENOUS at 23:41

## 2022-01-18 RX ADMIN — CEFEPIME HYDROCHLORIDE 2 G: 2 INJECTION, POWDER, FOR SOLUTION INTRAVENOUS at 22:24

## 2022-01-18 RX ADMIN — SODIUM CHLORIDE 1000 ML: 9 INJECTION, SOLUTION INTRAVENOUS at 20:45

## 2022-01-18 RX ADMIN — ACETAMINOPHEN 1000 MG: 500 TABLET, FILM COATED ORAL at 20:45

## 2022-01-19 LAB
ANION GAP SERPL CALCULATED.3IONS-SCNC: 10.1 MMOL/L (ref 5–15)
BASOPHILS # BLD AUTO: 0.01 10*3/MM3 (ref 0–0.2)
BASOPHILS NFR BLD AUTO: 0.1 % (ref 0–1.5)
BUN SERPL-MCNC: 6 MG/DL (ref 6–20)
BUN/CREAT SERPL: 8 (ref 7–25)
CALCIUM SPEC-SCNC: 8.3 MG/DL (ref 8.6–10.5)
CHLORIDE SERPL-SCNC: 108 MMOL/L (ref 98–107)
CO2 SERPL-SCNC: 16.9 MMOL/L (ref 22–29)
CREAT SERPL-MCNC: 0.75 MG/DL (ref 0.57–1)
DEPRECATED RDW RBC AUTO: 44.5 FL (ref 37–54)
EOSINOPHIL # BLD AUTO: 0 10*3/MM3 (ref 0–0.4)
EOSINOPHIL NFR BLD AUTO: 0 % (ref 0.3–6.2)
ERYTHROCYTE [DISTWIDTH] IN BLOOD BY AUTOMATED COUNT: 17.3 % (ref 12.3–15.4)
GFR SERPL CREATININE-BSD FRML MDRD: 93 ML/MIN/1.73
GLUCOSE SERPL-MCNC: 136 MG/DL (ref 65–99)
HCT VFR BLD AUTO: 31.6 % (ref 34–46.6)
HGB BLD-MCNC: 9.9 G/DL (ref 12–15.9)
LYMPHOCYTES # BLD AUTO: 0.65 10*3/MM3 (ref 0.7–3.1)
LYMPHOCYTES NFR BLD AUTO: 8.3 % (ref 19.6–45.3)
MCH RBC QN AUTO: 22.6 PG (ref 26.6–33)
MCHC RBC AUTO-ENTMCNC: 31.3 G/DL (ref 31.5–35.7)
MCV RBC AUTO: 72 FL (ref 79–97)
MONOCYTES # BLD AUTO: 0.33 10*3/MM3 (ref 0.1–0.9)
MONOCYTES NFR BLD AUTO: 4.2 % (ref 5–12)
NEUTROPHILS NFR BLD AUTO: 6.83 10*3/MM3 (ref 1.7–7)
NEUTROPHILS NFR BLD AUTO: 87.1 % (ref 42.7–76)
PLATELET # BLD AUTO: 244 10*3/MM3 (ref 140–450)
PMV BLD AUTO: 10 FL (ref 6–12)
POTASSIUM SERPL-SCNC: 3.6 MMOL/L (ref 3.5–5.2)
QT INTERVAL: 334 MS
RBC # BLD AUTO: 4.39 10*6/MM3 (ref 3.77–5.28)
SODIUM SERPL-SCNC: 135 MMOL/L (ref 136–145)
WBC NRBC COR # BLD: 7.84 10*3/MM3 (ref 3.4–10.8)

## 2022-01-19 PROCEDURE — 25010000002 CEFEPIME PER 500 MG: Performed by: HOSPITALIST

## 2022-01-19 PROCEDURE — 85025 COMPLETE CBC W/AUTO DIFF WBC: CPT | Performed by: HOSPITALIST

## 2022-01-19 PROCEDURE — 87070 CULTURE OTHR SPECIMN AEROBIC: CPT | Performed by: SURGERY

## 2022-01-19 PROCEDURE — 25010000002 MORPHINE PER 10 MG: Performed by: SURGERY

## 2022-01-19 PROCEDURE — 25010000002 ONDANSETRON PER 1 MG: Performed by: HOSPITALIST

## 2022-01-19 PROCEDURE — 63710000001 DRONABINOL PER 2.5 MG: Performed by: EMERGENCY MEDICINE

## 2022-01-19 PROCEDURE — 25010000002 VANCOMYCIN PER 500 MG: Performed by: HOSPITALIST

## 2022-01-19 PROCEDURE — 99221 1ST HOSP IP/OBS SF/LOW 40: CPT | Performed by: SURGERY

## 2022-01-19 PROCEDURE — 80048 BASIC METABOLIC PNL TOTAL CA: CPT | Performed by: HOSPITALIST

## 2022-01-19 PROCEDURE — 05PYX3Z REMOVAL OF INFUSION DEVICE FROM UPPER VEIN, EXTERNAL APPROACH: ICD-10-PCS | Performed by: SURGERY

## 2022-01-19 PROCEDURE — 25010000002 VANCOMYCIN 10 G RECONSTITUTED SOLUTION: Performed by: EMERGENCY MEDICINE

## 2022-01-19 PROCEDURE — 87186 SC STD MICRODIL/AGAR DIL: CPT | Performed by: SURGERY

## 2022-01-19 PROCEDURE — 87147 CULTURE TYPE IMMUNOLOGIC: CPT | Performed by: SURGERY

## 2022-01-19 PROCEDURE — 87205 SMEAR GRAM STAIN: CPT | Performed by: SURGERY

## 2022-01-19 RX ORDER — VANCOMYCIN HYDROCHLORIDE 1 G/200ML
1000 INJECTION, SOLUTION INTRAVENOUS EVERY 12 HOURS
Status: DISCONTINUED | OUTPATIENT
Start: 2022-01-19 | End: 2022-01-20

## 2022-01-19 RX ORDER — ACETAMINOPHEN 500 MG
1000 TABLET ORAL ONCE
Status: DISCONTINUED | OUTPATIENT
Start: 2022-01-19 | End: 2022-01-19

## 2022-01-19 RX ORDER — PANTOPRAZOLE SODIUM 40 MG/1
40 TABLET, DELAYED RELEASE ORAL EVERY MORNING
Status: DISCONTINUED | OUTPATIENT
Start: 2022-01-19 | End: 2022-01-19

## 2022-01-19 RX ORDER — DRONABINOL 2.5 MG/1
2.5 CAPSULE ORAL ONCE
Status: DISCONTINUED | OUTPATIENT
Start: 2022-01-19 | End: 2022-01-19

## 2022-01-19 RX ORDER — PROCHLORPERAZINE EDISYLATE 5 MG/ML
10 INJECTION INTRAMUSCULAR; INTRAVENOUS EVERY 6 HOURS PRN
Status: DISCONTINUED | OUTPATIENT
Start: 2022-01-19 | End: 2022-01-20

## 2022-01-19 RX ORDER — ACETAMINOPHEN 325 MG/1
650 TABLET ORAL EVERY 6 HOURS PRN
Status: DISCONTINUED | OUTPATIENT
Start: 2022-01-19 | End: 2022-01-22

## 2022-01-19 RX ORDER — SODIUM CHLORIDE 9 MG/ML
75 INJECTION, SOLUTION INTRAVENOUS CONTINUOUS
Status: DISCONTINUED | OUTPATIENT
Start: 2022-01-19 | End: 2022-01-21

## 2022-01-19 RX ORDER — ALBUTEROL SULFATE 2.5 MG/3ML
2.5 SOLUTION RESPIRATORY (INHALATION) EVERY 4 HOURS PRN
Status: DISCONTINUED | OUTPATIENT
Start: 2022-01-19 | End: 2022-01-22

## 2022-01-19 RX ORDER — ONDANSETRON 2 MG/ML
8 INJECTION INTRAMUSCULAR; INTRAVENOUS EVERY 6 HOURS PRN
Status: DISCONTINUED | OUTPATIENT
Start: 2022-01-19 | End: 2022-01-20

## 2022-01-19 RX ORDER — MONTELUKAST SODIUM 10 MG/1
10 TABLET ORAL NIGHTLY
Status: DISCONTINUED | OUTPATIENT
Start: 2022-01-19 | End: 2022-01-22 | Stop reason: HOSPADM

## 2022-01-19 RX ORDER — GABAPENTIN 100 MG/1
100 CAPSULE ORAL 3 TIMES DAILY
Status: DISCONTINUED | OUTPATIENT
Start: 2022-01-19 | End: 2022-01-22 | Stop reason: HOSPADM

## 2022-01-19 RX ORDER — LEVOTHYROXINE SODIUM 0.05 MG/1
50 TABLET ORAL
Status: DISCONTINUED | OUTPATIENT
Start: 2022-01-19 | End: 2022-01-22 | Stop reason: HOSPADM

## 2022-01-19 RX ORDER — RABEPRAZOLE SODIUM 20 MG/1
20 TABLET, DELAYED RELEASE ORAL EVERY MORNING
COMMUNITY
Start: 2021-12-10 | End: 2022-05-08 | Stop reason: HOSPADM

## 2022-01-19 RX ORDER — PANTOPRAZOLE SODIUM 40 MG/1
40 TABLET, DELAYED RELEASE ORAL
Status: DISCONTINUED | OUTPATIENT
Start: 2022-01-19 | End: 2022-01-22 | Stop reason: HOSPADM

## 2022-01-19 RX ORDER — PYRIDOSTIGMINE BROMIDE 60 MG/1
30 TABLET ORAL 2 TIMES DAILY
Status: DISCONTINUED | OUTPATIENT
Start: 2022-01-19 | End: 2022-01-22 | Stop reason: HOSPADM

## 2022-01-19 RX ORDER — ACETAMINOPHEN 500 MG
1000 TABLET ORAL ONCE
Status: COMPLETED | OUTPATIENT
Start: 2022-01-19 | End: 2022-01-19

## 2022-01-19 RX ORDER — MORPHINE SULFATE 2 MG/ML
2 INJECTION, SOLUTION INTRAMUSCULAR; INTRAVENOUS ONCE
Status: COMPLETED | OUTPATIENT
Start: 2022-01-19 | End: 2022-01-19

## 2022-01-19 RX ORDER — DRONABINOL 2.5 MG/1
2.5 CAPSULE ORAL ONCE
Status: COMPLETED | OUTPATIENT
Start: 2022-01-19 | End: 2022-01-19

## 2022-01-19 RX ORDER — ALBUTEROL SULFATE 90 UG/1
2 AEROSOL, METERED RESPIRATORY (INHALATION) EVERY 4 HOURS PRN
Status: DISCONTINUED | OUTPATIENT
Start: 2022-01-19 | End: 2022-01-19 | Stop reason: CLARIF

## 2022-01-19 RX ORDER — ONDANSETRON 2 MG/ML
8 INJECTION INTRAMUSCULAR; INTRAVENOUS ONCE
Status: COMPLETED | OUTPATIENT
Start: 2022-01-19 | End: 2022-01-19

## 2022-01-19 RX ORDER — LIDOCAINE HYDROCHLORIDE AND EPINEPHRINE 10; 10 MG/ML; UG/ML
10 INJECTION, SOLUTION INFILTRATION; PERINEURAL ONCE
Status: COMPLETED | OUTPATIENT
Start: 2022-01-19 | End: 2022-01-19

## 2022-01-19 RX ADMIN — VANCOMYCIN HYDROCHLORIDE 2250 MG: 10 INJECTION, POWDER, LYOPHILIZED, FOR SOLUTION INTRAVENOUS at 00:30

## 2022-01-19 RX ADMIN — DRONABINOL 2.5 MG: 2.5 CAPSULE ORAL at 02:45

## 2022-01-19 RX ADMIN — GABAPENTIN 100 MG: 100 CAPSULE ORAL at 12:29

## 2022-01-19 RX ADMIN — ONDANSETRON 8 MG: 2 INJECTION INTRAMUSCULAR; INTRAVENOUS at 06:33

## 2022-01-19 RX ADMIN — MONTELUKAST SODIUM 10 MG: 10 TABLET, FILM COATED ORAL at 21:36

## 2022-01-19 RX ADMIN — PYRIDOSTIGMINE BROMIDE 30 MG: 60 TABLET ORAL at 13:38

## 2022-01-19 RX ADMIN — SODIUM CHLORIDE 75 ML/HR: 9 INJECTION, SOLUTION INTRAVENOUS at 12:03

## 2022-01-19 RX ADMIN — VANCOMYCIN HYDROCHLORIDE 1000 MG: 1 INJECTION, SOLUTION INTRAVENOUS at 15:30

## 2022-01-19 RX ADMIN — ACETAMINOPHEN 650 MG: 325 TABLET ORAL at 19:40

## 2022-01-19 RX ADMIN — LIDOCAINE HYDROCHLORIDE,EPINEPHRINE BITARTRATE 10 ML: 10; .01 INJECTION, SOLUTION INFILTRATION; PERINEURAL at 15:47

## 2022-01-19 RX ADMIN — CEFEPIME HYDROCHLORIDE 2 G: 2 INJECTION, POWDER, FOR SOLUTION INTRAVENOUS at 12:01

## 2022-01-19 RX ADMIN — LEVOTHYROXINE SODIUM 50 MCG: 0.05 TABLET ORAL at 12:29

## 2022-01-19 RX ADMIN — METOPROLOL TARTRATE 25 MG: 25 TABLET, FILM COATED ORAL at 21:36

## 2022-01-19 RX ADMIN — APIXABAN 5 MG: 5 TABLET, FILM COATED ORAL at 21:41

## 2022-01-19 RX ADMIN — GABAPENTIN 100 MG: 100 CAPSULE ORAL at 21:36

## 2022-01-19 RX ADMIN — ONDANSETRON 8 MG: 2 INJECTION INTRAMUSCULAR; INTRAVENOUS at 11:52

## 2022-01-19 RX ADMIN — MORPHINE SULFATE 2 MG: 2 INJECTION, SOLUTION INTRAMUSCULAR; INTRAVENOUS at 15:47

## 2022-01-19 RX ADMIN — CEFEPIME HYDROCHLORIDE 2 G: 2 INJECTION, POWDER, FOR SOLUTION INTRAVENOUS at 18:51

## 2022-01-19 RX ADMIN — ONDANSETRON 8 MG: 2 INJECTION INTRAMUSCULAR; INTRAVENOUS at 19:41

## 2022-01-19 RX ADMIN — APIXABAN 5 MG: 5 TABLET, FILM COATED ORAL at 12:29

## 2022-01-19 RX ADMIN — GABAPENTIN 100 MG: 100 CAPSULE ORAL at 16:38

## 2022-01-19 RX ADMIN — METOPROLOL TARTRATE 12.5 MG: 25 TABLET, FILM COATED ORAL at 11:54

## 2022-01-19 RX ADMIN — ACETAMINOPHEN 1000 MG: 500 TABLET, FILM COATED ORAL at 06:32

## 2022-01-19 RX ADMIN — METOPROLOL TARTRATE 25 MG: 25 TABLET, FILM COATED ORAL at 16:36

## 2022-01-19 RX ADMIN — PANTOPRAZOLE SODIUM 40 MG: 40 TABLET, DELAYED RELEASE ORAL at 12:32

## 2022-01-19 NOTE — ED PROVIDER NOTES
EMERGENCY DEPARTMENT ENCOUNTER    CHIEF COMPLAINT  Chief Complaint: Fever, elevated heart rate  History given by: Patient  History limited by: Nothing  Room Number: 21/21  PMD: Jason Borrero MD  Cardiologist: Dr. Brian Rausch  Gastroenterologist: Dr. Escobar      HPI:  Pt is a 26 y.o. female presents complaining of fever up to 103 for the past 2 days, nausea with poor p.o. intake for 2 days and vomiting x1 in the past 24 hours.  Patient reports urinary frequency, urgency, dysuria without hematuria.  Patient denies cough, shortness of air, reports she has some left anterior chest discomfort intermittent for over a year that is unchanged, denies abdominal pain, changes in bowel habits, swelling of extremities, wounds or sores.  Patient denies redness or drainage from her right IJ Dolan catheter.  Patient reports she uses IV Zofran and Phenergan at home, reports her last IVIG infusion was 3 weeks ago, reports her periods are irregular, she has no chance of being pregnant, but reports that she does not remember the last time she got Depo-Provera.  Reports she was diagnosed with COVID-19 12/31/2021 and was feeling improved until yesterday.  Patient reports a history of POTS and reports she becomes very lightheaded when she stands up over the past several days similar to previous episodes.  Patient reports last Tylenol dose was approximately 5 hours prior to arrival.    Duration: 2 days  Associated Symptoms: Urinary frequency, dysuria, vomiting,  Aggravating Factors: Standing up, exertion  Alleviating Factors: Rest  Treatment before arrival: Tylenol at 3 PM, IV Zofran    Upon review the patient's chart is noted:  Patient with history of POTS diagnosed with tilt table test 12/5/2019, IBS, gastroparesis, ongoing intermittent chest pain, history of hemiplegic migraines  Patient with small bilateral PEs found in September 21, initiated on Eliquis at that time  A nose with type III Phoebe-Danlos, autoimmune atrophic  gastritis, getting weekly IVIG infusions and daily IV fluids when assessed in early December    PAST MEDICAL HISTORY  Active Ambulatory Problems     Diagnosis Date Noted   • Poor venous access 10/15/2020   • POTS (postural orthostatic tachycardia syndrome) 05/06/2021   • Sinus tachycardia 09/08/2021   • Multiple subsegmental pulmonary emboli without acute cor pulmonale ([facility]) 09/09/2021   • Autoimmune disease ([facility]) 09/06/2020   • 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   • Nausea and vomiting 08/22/2017   • Gastroparesis 08/22/2017   • Postural orthostatic tachycardia syndrome 06/12/2020   • Valverde catheter dysfunction ([facility]) 10/25/2021     Resolved Ambulatory Problems     Diagnosis Date Noted   • No Resolved Ambulatory Problems     Past Medical History:   Diagnosis Date   • Bronchitis    • Chest pain    • Chronic hypotension    • Eczema    • GERD (gastroesophageal reflux disease)    • IBS (irritable bowel syndrome)    • Lightheadedness    • Rectal fissure    • Rosacea    • Tachycardia        PAST SURGICAL HISTORY  Past Surgical History:   Procedure Laterality Date   • COLONOSCOPY     • MEDIPORT INSERTION, DOUBLE  09/01/2020    [facility] D[facility]NTO    • UPPER GASTROINTESTINAL ENDOSCOPY     • VENOUS ACCESS DEVICE (PORT) INSERTION Left 10/16/2020    Procedure: INSERTION VENOUS ACCESS DEVICE UNDER FLURO;  Surgeon: Pa Lane MD;  Location: [facility];  Service: General;  Laterality: Left;   • VENOUS ACCESS DEVICE (PORT) INSERTION Right 10/26/2021    Procedure: REMOVAL AND INSERTION OF VALVERDE CATHETER;  Surgeon: Pa Lane MD;  Location: [facility];  Service: General;  Laterality: Right;       FAMILY HISTORY  Family History   Problem Relation Age of Onset   • Hypertension Mother    • Diabetes Mother    • Diabetes Father    • Heart disease Paternal Grandfather    • Stroke Paternal Grandfather    • Diabetes Paternal Grandfather    • Cancer Paternal Grandfather    • Coronary artery disease Maternal  Grandmother         MGM with 4 vessel CABG and multiple stents   • Cancer Maternal Grandmother    • Coronary artery disease Maternal Uncle         Maternal uncle with MI   • Breast cancer Other    • Malig Hyperthermia Neg Hx        SOCIAL HISTORY  Social History     Socioeconomic History   • Marital status:    Tobacco Use   • Smoking status: Never Smoker   • Smokeless tobacco: Never Used   Vaping Use   • Vaping Use: Never used   Substance and Sexual Activity   • Alcohol use: No   • Drug use: No   • Sexual activity: Yes     Partners: Female     Birth control/protection: None       ALLERGIES  Eggs or egg-derived products, Pepcid [famotidine], and Scopolamine    REVIEW OF SYSTEMS  Review of Systems   Constitutional: Positive for appetite change, fatigue and fever. Negative for chills.   HENT: Negative for rhinorrhea and sore throat.    Eyes: Negative for visual disturbance.   Respiratory: Negative for cough and shortness of breath.    Cardiovascular: Positive for chest pain ( Left anterior, intermittent for over a year, not changed from previous) and palpitations ( Patient has noted high heart rate within the past 24 hours). Negative for leg swelling.   Gastrointestinal: Positive for nausea and vomiting. Negative for abdominal pain and diarrhea.   Endocrine: Negative.    Genitourinary: Positive for dysuria, frequency and urgency. Negative for decreased urine volume.   Musculoskeletal: Negative for neck pain.   Skin: Negative for rash.   Neurological: Positive for weakness ( Generalized) and light-headedness. Negative for syncope and headaches.   Psychiatric/Behavioral: Negative.    All other systems reviewed and are negative.      PHYSICAL EXAM  ED Triage Vitals [01/18/22 1929]   Temp Heart Rate Resp BP SpO2   (!) 101.2 °F (38.4 °C) (!) 157 18 152/99 96 %      Temp src Heart Rate Source Patient Position BP Location FiO2 (%)   Tympanic Monitor -- -- --       Physical Exam  Vitals and nursing note reviewed.    Constitutional:       General: She is in acute distress (mild).      Appearance: She is not toxic-appearing.   HENT:      Head: Normocephalic and atraumatic.   Eyes:      Pupils: Pupils are equal, round, and reactive to light.   Cardiovascular:      Rate and Rhythm: Regular rhythm. Tachycardia present.      Pulses: Normal pulses.           Posterior tibial pulses are 2+ on the right side and 2+ on the left side.      Heart sounds: Normal heart sounds. No murmur heard.      Pulmonary:      Effort: Pulmonary effort is normal. No respiratory distress.      Breath sounds: Normal breath sounds.   Abdominal:      General: Bowel sounds are normal.      Palpations: Abdomen is soft.      Tenderness: There is no abdominal tenderness. There is no guarding or rebound.   Musculoskeletal:         General: Normal range of motion.      Cervical back: Normal range of motion and neck supple.   Skin:     General: Skin is warm and dry.      Comments: Right IJ venous catheter site clean, dry, intact, nontender, no erythema   Neurological:      Mental Status: She is alert and oriented to person, place, and time.   Psychiatric:         Mood and Affect: Affect normal.         LAB RESULTS  Lab Results (last 24 hours)     Procedure Component Value Units Date/Time    CBC & Differential [[id number]]  (Abnormal) Collected: 01/18/22 2014    Specimen: Blood from Arm, Right Updated: 01/18/22 2047    Narrative:      The following orders were created for panel order CBC & Differential.  Procedure                               Abnormality         Status                     ---------                               -----------         ------                     CBC Auto Differential[[id number]]        Abnormal            Final result                 Please view results for these tests on the individual orders.    Comprehensive Metabolic Panel [[id number]]  (Abnormal) Collected: 01/18/22 2014    Specimen: Blood from Arm, Right Updated: 01/18/22 2118      Glucose 109 mg/dL      BUN 7 mg/dL      Creatinine 1.00 mg/dL      Sodium 134 mmol/L      Potassium 4.2 mmol/L      Chloride 104 mmol/L      CO2 15.7 mmol/L      Calcium 8.7 mg/dL      Total Protein 7.1 g/dL      Albumin 4.10 g/dL      ALT (SGPT) 88 U/L      AST (SGOT) 91 U/L      Alkaline Phosphatase 79 U/L      Total Bilirubin 0.9 mg/dL      eGFR Non African Amer 67 mL/min/1.73      Globulin 3.0 gm/dL      A/G Ratio 1.4 g/dL      BUN/Creatinine Ratio 7.0     Anion Gap 14.3 mmol/L     Narrative:      GFR Normal >60  Chronic Kidney Disease <60  Kidney Failure <15      Protime-INR [[id number]]  (Abnormal) Collected: 01/18/22 2014    Specimen: Blood from Arm, Right Updated: 01/18/22 2058     Protime 16.6 Seconds      INR 1.36    aPTT [[id number]]  (Abnormal) Collected: 01/18/22 2014    Specimen: Blood from Arm, Right Updated: 01/18/22 2058     PTT 44.2 seconds     Troponin [[id number]]  (Normal) Collected: 01/18/22 2014    Specimen: Blood from Arm, Right Updated: 01/18/22 2115     Troponin T <0.010 ng/mL     Narrative:      Troponin T Reference Range:  <= 0.03 ng/mL-   Negative for AMI  >0.03 ng/mL-     Abnormal for myocardial necrosis.  Clinicians would have to utilize clinical acumen, EKG, Troponin and serial changes to determine if it is an Acute Myocardial Infarction or myocardial injury due to an underlying chronic condition.       Results may be falsely decreased if patient taking Biotin.      BNP [[id number]]  (Normal) Collected: 01/18/22 2014    Specimen: Blood from Arm, Right Updated: 01/18/22 2131     proBNP 97.7 pg/mL     Narrative:      Among patients with dyspnea, NT-proBNP is highly sensitive for the detection of acute congestive heart failure. In addition NT-proBNP of <300 pg/ml effectively rules out acute congestive heart failure with 99% negative predictive value.    Results may be falsely decreased if patient taking Biotin.      TSH [[id number]]  (Normal) Collected: 01/18/22 2014    Specimen: Blood from  "Arm, Right Updated: 01/18/22 2106     TSH 1.020 uIU/mL     Magnesium [[id number]]  (Normal) Collected: 01/18/22 2014    Specimen: Blood from Arm, Right Updated: 01/18/22 2118     Magnesium 1.9 mg/dL     hCG, Serum, Qualitative [[id number]]  (Normal) Collected: 01/18/22 2014    Specimen: Blood from Arm, Right Updated: 01/18/22 2059     HCG Qualitative Negative    CBC Auto Differential [[id number]]  (Abnormal) Collected: 01/18/22 2014    Specimen: Blood from Arm, Right Updated: 01/18/22 2047     WBC 11.77 10*3/mm3      RBC 4.96 10*6/mm3      Hemoglobin 11.2 g/dL      Hematocrit 35.5 %      MCV 71.6 fL      MCH 22.6 pg      MCHC 31.5 g/dL      RDW 17.2 %      RDW-SD 43.0 fl      MPV 9.8 fL      Platelets 276 10*3/mm3      Neutrophil % 90.9 %      Lymphocyte % 4.9 %      Monocyte % 3.3 %      Eosinophil % 0.0 %      Basophil % 0.3 %      Neutrophils, Absolute 10.70 10*3/mm3      Lymphocytes, Absolute 0.58 10*3/mm3      Monocytes, Absolute 0.39 10*3/mm3      Eosinophils, Absolute 0.00 10*3/mm3      Basophils, Absolute 0.03 10*3/mm3     Procalcitonin [[id number]]  (Abnormal) Collected: 01/18/22 2014    Specimen: Blood from Arm, Right Updated: 01/18/22 2114     Procalcitonin 2.22 ng/mL     Narrative:      As a Marker for Sepsis (Non-Neonates):     1. <0.5 ng/mL represents a low risk of severe sepsis and/or septic shock.  2. >2 ng/mL represents a high risk of severe sepsis and/or septic shock.    As a Marker for Lower Respiratory Tract Infections that require antibiotic therapy:  PCT on Admission     Antibiotic Therapy             6-12 Hrs later  >0.5                          Strongly Recommended            >0.25 - <0.5             Recommended  0.1 - 0.25                  Discouraged                       Remeasure/reassess PCT  <0.1                         Strongly Discouraged         Remeasure/reassess PCT      As 28 day mortality risk marker: \"Change in Procalcitonin Result\" (>80% or <=80%) if Day 0 (or Day 1) and Day 4 " values are available. Refer to http://www.[facility]-pct-calculator.com/    Change in PCT <=80 %   A decrease of PCT levels below or equal to 80% defines a positive change in PCT test result representing a higher risk for 28-day all-cause mortality of patients diagnosed with severe sepsis or septic shock.    Change in PCT >80 %   A decrease of PCT levels of more than 80% defines a negative change in PCT result representing a lower risk for 28-day all-cause mortality of patients diagnosed with severe sepsis or septic shock.                Lactic Acid, Plasma [[id number]]  (Normal) Collected: 01/18/22 2014    Specimen: Blood from Arm, Right Updated: 01/18/22 2057     Lactate 1.9 mmol/L     Blood Culture - Blood, Arm, Right [[id number]] Collected: 01/18/22 2015    Specimen: Blood from Arm, Right Updated: 01/18/22 2038    Blood Culture - Blood, Blood, Central Line [[id number]] Collected: 01/18/22 2018    Specimen: Blood, Central Line Updated: 01/18/22 2038    Respiratory Panel PCR w/COVID-19(SARS-CoV-2) JENIFER/LUIS/DAT/PAD/COR/MAD/NBA In-House, NP Swab in UTM/VTM, 3-4 HR TAT - Swab, Nasopharynx [[id number]]  (Normal) Collected: 01/18/22 2028    Specimen: Swab from Nasopharynx Updated: 01/18/22 2128     ADENOVIRUS, PCR Not Detected     Coronavirus 229E Not Detected     Coronavirus HKU1 Not Detected     Coronavirus NL63 Not Detected     Coronavirus OC43 Not Detected     COVID19 Not Detected     Human Metapneumovirus Not Detected     Human Rhinovirus/Enterovirus Not Detected     Influenza A PCR Not Detected     Influenza B PCR Not Detected     Parainfluenza Virus 1 Not Detected     Parainfluenza Virus 2 Not Detected     Parainfluenza Virus 3 Not Detected     Parainfluenza Virus 4 Not Detected     RSV, PCR Not Detected     Bordetella pertussis pcr Not Detected     Bordetella parapertussis PCR Not Detected     Chlamydophila pneumoniae PCR Not Detected     Mycoplasma pneumo by PCR Not Detected    Narrative:      In the setting of a  positive respiratory panel with a viral infection PLUS a negative procalcitonin without other underlying concern for bacterial infection, consider observing off antibiotics or discontinuation of antibiotics and continue supportive care. If the respiratory panel is positive for atypical bacterial infection (Bordetella pertussis, Chlamydophila pneumoniae, or Mycoplasma pneumoniae), consider antibiotic de-escalation to target atypical bacterial infection.    Urinalysis With Microscopic If Indicated (No Culture) - Urine, Catheter [[id number]]  (Abnormal) Collected: 01/18/22 2028    Specimen: Urine, Catheter Updated: 01/18/22 2049     Color, UA Orange     Comment: Any Substance that causes an abnormal urine color can alter the accuracy of the chemical reactions.        Appearance, UA Clear     pH, UA <=5.0     Specific Gravity, UA >=1.030     Glucose, UA Negative     Ketones, UA Negative     Bilirubin, UA Negative     Blood, UA Trace     Protein,  mg/dL (2+)     Leuk Esterase, UA Moderate (2+)     Nitrite, UA Positive     Urobilinogen, UA 1.0 E.U./dL    Urinalysis, Microscopic Only - Urine, Catheter [[id number]]  (Abnormal) Collected: 01/18/22 2028    Specimen: Urine, Catheter Updated: 01/18/22 2100     RBC, UA 0-2 /HPF      WBC, UA 3-5 /HPF      Bacteria, UA None Seen /HPF      Squamous Epithelial Cells, UA None Seen /HPF      Hyaline Casts, UA None Seen /LPF      Mucus, UA Moderate/2+ /HPF      Methodology Manual Light Microscopy    Blood Gas, Arterial - [[id number]]  (Abnormal) Collected: 01/18/22 2156    Specimen: Arterial Blood Updated: 01/18/22 2159     Site Arterial: right radial     Vipin's Test Positive     pH, Arterial 7.499 pH units      pCO2, Arterial 23.5 mm Hg      pO2, Arterial 95.9 mm Hg      HCO3, Arterial 18.3 mmol/L      Base Excess, Arterial -3.4 mmol/L      O2 Saturation Calculated 98.3 %      Barometric Pressure for Blood Gas 751.0 mmHg      Comment: 96% Meter: [id number] : [id number]  Leigha Georgina        Modality Room Air     Rate 20 Breaths/minute           I ordered the above labs and reviewed the results    RADIOLOGY  XR Chest 1 View   Final Result   No acute process identified.       This report was finalized on 1/18/2022 8:48 PM by Dr. Ramesh Patrick M.D.          CT Abdomen Pelvis With Contrast    (Results Pending)   CT Angiogram Chest    (Results Pending)        I ordered the above noted radiological studies. Interpreted by radiologist. Viewed by me in PACS.       PROCEDURES  Procedures      PROGRESS AND CONSULTS  ED Course as of 01/18/22 2354 Tue Jan 18, 2022 2137 Discussed with Dr. Caleb Naik who is on-call for lips, aware of patient's presentation, febrile illness, CTA chest and CT abdomen pending, concern for potential line infection, worse blood cultures pending and agrees to admit for further testing, treatment as needed. [TO]   2353 Discussed with Grant patrick, radiology who is reviewed patient's CTA chest negative for pneumonia or PE, CT abdomen with liver lesion and recommend interval follow-up for stability of this area, fatty liver but no acute surgical abnormality [TO]      ED Course User Index  [TO] Dena Richardson MD     EKG          EKG time: 2052  Rhythm/Rate: This tachycardia, rate in the 140s  P waves and IL: Normal P waves, normal ROCÍO's  QRS, axis: Unremarkable  ST and T waves: Nonspecific ST/T wave findings, QT long    Interpreted Contemporaneously by me, independently viewed  changed compared to prior 9/17/2021, now with tachycardia and long QT        MEDICAL DECISION MAKING  Results were reviewed/discussed with the patient and they were also made aware of online access. Pt also made aware that some labs, such as cultures, will not be resulted during ER visit and followup with PMD is necessary.       MDM       DIAGNOSIS  Final diagnoses:   Febrile illness, acute   Tachycardia   Liver lesion       DISPOSITION  ADMISSION    Discussed treatment plan and reason  for admission with pt/family and admitting physician.  Pt/family voiced understanding of the plan for admission for further testing/treatment as needed.         Latest Documented Vital Signs:  As of 23:54 EST  BP- 118/80 HR- (!) 130 Temp- 99.1 °F (37.3 °C) O2 sat- 95%    --  Patient was wearing facemask when I entered the room and throughout our encounter. Full protective equipment was worn throughout this patient encounter including a face mask, eye protection and gloves. Hand hygiene was performed before donning protective equipment and after removal when leaving the room.      Dena Richardson MD  01/18/22 4939

## 2022-01-19 NOTE — ED NOTES
Holding a.m. meds until IV restarted - she needs zofran before PO meds     Bridger Tello RN  01/19/22 6555

## 2022-01-19 NOTE — H&P
History and physical    Primary care physician  Dr. Borrero    Chief complaint   Fever chills  Multiple complaints    History of present illness  26-year-old white female with history of pulm embolism chronic orthostatic hypotension POTS syndrome severe gastroparesis Phoebe-Danlos syndrome irritable bowel syndrome gastroesophageal reflux disease who is well-known to our service and was recently admitted with Valverde catheter malfunctioning and was removed and new Valverde catheter placed presented to [facility] emergency room with high fever for last 3 days with nausea vomiting and not eating drinking fatigue tired and multiple other complaints with generalized weakness.  Patient denies any cough shortness of breath chest pain diarrhea.  Patient work-up in ER revealed sepsis probably line related admit for management.    PAST MEDICAL HISTORY  • Bronchitis     • Chest pain     • Chronic hypotension     • Eczema     • GERD (gastroesophageal reflux disease)     • IBS (irritable bowel syndrome)     • Lightheadedness     • Rectal fissure     • Rosacea     • Tachycardia        PAST SURGICAL HISTORY        Procedure Laterality Date   • COLONOSCOPY       • MEDIPORT INSERTION, DOUBLE   09/01/2020     [facility]    • UPPER GASTROINTESTINAL ENDOSCOPY       • VENOUS ACCESS DEVICE (PORT) INSERTION Left 10/16/2020     Procedure: INSERTION VENOUS ACCESS DEVICE UNDER FLURO;  Surgeon: Pa Lane MD;  Location: [facility];  Service: General;  Laterality: Left;   • VENOUS ACCESS DEVICE (PORT) INSERTION Right 10/26/2021     Procedure: REMOVAL AND INSERTION OF VALVERDE CATHETER;  Surgeon: Pa Lane MD;  Location: [facility] OR;  Service: General;  Laterality: Right;         FAMILY HISTORY           Problem Relation Age of Onset   • Hypertension Mother     • Diabetes Mother     • Diabetes Father     • Heart disease Paternal Grandfather     • Stroke Paternal Grandfather     • Diabetes Paternal Grandfather  "    • Cancer Paternal Grandfather     • Coronary artery disease Maternal Grandmother           MGM with 4 vessel CABG and multiple stents   • Cancer Maternal Grandmother     • Coronary artery disease Maternal Uncle           Maternal uncle with MI   • Breast cancer Other     • Malig Hyperthermia Neg Hx        SOCIAL HISTORY                 Socioeconomic History   • Marital status:    Tobacco Use   • Smoking status: Never Smoker   • Smokeless tobacco: Never Used   Vaping Use   • Vaping Use: Never used   Substance and Sexual Activity   • Alcohol use: No   • Drug use: No   • Sexual activity: Yes       Partners: Female       Birth control/protection: None         ALLERGIES  Eggs or egg-derived products, Pepcid [famotidine], and Scopolamine  Home medications reviewed     REVIEW OF SYSTEMS  Constitutional: Positive for appetite change, fatigue and fever. Negative for chills.   HENT: Negative for rhinorrhea and sore throat.    Eyes: Negative for visual disturbance.   Respiratory: Negative for cough and shortness of breath.    Cardiovascular: Positive for chest pain ( Left anterior, intermittent for over a year, not changed from previous) and palpitations ( Patient has noted high heart rate within the past 24 hours). Negative for leg swelling.   Gastrointestinal: Positive for nausea and vomiting. Negative for abdominal pain and diarrhea.   Endocrine: Negative.    Genitourinary: Positive for dysuria, frequency and urgency. Negative for decreased urine volume.   Musculoskeletal: Negative for neck pain.   Skin: Negative for rash.   Neurological: Positive for weakness ( Generalized) and light-headedness. Negative for syncope and headaches.   Psychiatric/Behavioral: Negative.    All other systems reviewed and are negative.     PHYSICAL EXAM  Blood pressure 144/95, pulse (!) 132, temperature 98.6 °F (37 °C), temperature source Oral, resp. rate 18, height 163.8 cm (64.5\"), weight 113 kg (250 lb), SpO2 96 %, not currently " breastfeeding.    Constitutional:       General: She is in acute distress (mild).      Appearance: She is not toxic-appearing.   HENT:      Head: Normocephalic and atraumatic.   Eyes:      Pupils: Pupils are equal, round, and reactive to light.   Cardiovascular:      Rate and Rhythm: Regular rhythm. Tachycardia present.      Pulses: Normal pulses.           Posterior tibial pulses are 2+ on the right side and 2+ on the left side.      Heart sounds: Normal heart sounds. No murmur heard.  Pulmonary:      Effort: Pulmonary effort is normal. No respiratory distress.      Breath sounds: Normal breath sounds.   Abdominal:      General: Bowel sounds are normal.      Palpations: Abdomen is soft.      Tenderness: There is no abdominal tenderness. There is no guarding or rebound.   Musculoskeletal:         General: Normal range of motion.      Cervical back: Normal range of motion and neck supple.   Skin:     General: Skin is warm and dry.      Comments: Right IJ venous catheter site clean, dry, intact, nontender, no erythema   Neurological:      Mental Status: She is alert and oriented to person, place, and time.   Psychiatric:         Mood and Affect: Affect normal.      LAB RESULTS  Lab Results (last 24 hours)     Procedure Component Value Units Date/Time    CBC & Differential [[id number]]  (Abnormal) Collected: 01/19/22 1144    Specimen: Blood Updated: 01/19/22 1223    Narrative:      The following orders were created for panel order CBC & Differential.  Procedure                               Abnormality         Status                     ---------                               -----------         ------                     CBC Auto Differential[[id number]]        Abnormal            Final result                 Please view results for these tests on the individual orders.    CBC Auto Differential [[id number]]  (Abnormal) Collected: 01/19/22 1144    Specimen: Blood Updated: 01/19/22 1223     WBC 7.84 10*3/mm3      RBC 4.39  10*6/mm3      Hemoglobin 9.9 g/dL      Hematocrit 31.6 %      MCV 72.0 fL      MCH 22.6 pg      MCHC 31.3 g/dL      RDW 17.3 %      RDW-SD 44.5 fl      MPV 10.0 fL      Platelets 244 10*3/mm3      Neutrophil % 87.1 %      Lymphocyte % 8.3 %      Monocyte % 4.2 %      Eosinophil % 0.0 %      Basophil % 0.1 %      Neutrophils, Absolute 6.83 10*3/mm3      Lymphocytes, Absolute 0.65 10*3/mm3      Monocytes, Absolute 0.33 10*3/mm3      Eosinophils, Absolute 0.00 10*3/mm3      Basophils, Absolute 0.01 10*3/mm3     Basic Metabolic Panel [[id number]]  (Abnormal) Collected: 01/19/22 1144    Specimen: Blood Updated: 01/19/22 1219     Glucose 136 mg/dL      BUN 6 mg/dL      Creatinine 0.75 mg/dL      Sodium 135 mmol/L      Potassium 3.6 mmol/L      Chloride 108 mmol/L      CO2 16.9 mmol/L      Calcium 8.3 mg/dL      eGFR Non African Amer 93 mL/min/1.73      BUN/Creatinine Ratio 8.0     Anion Gap 10.1 mmol/L     Narrative:      GFR Normal >60  Chronic Kidney Disease <60  Kidney Failure <15      Blood Culture - Blood, Blood, Central Line [[id number]]  (Abnormal) Collected: 01/18/22 2018    Specimen: Blood, Central Line Updated: 01/19/22 1215     Blood Culture Abnormal Stain     Gram Stain Anaerobic Bottle Gram positive cocci in clusters    Blood Gas, Arterial - [[id number]]  (Abnormal) Collected: 01/18/22 2156    Specimen: Arterial Blood Updated: 01/18/22 2159     Site Arterial: right radial     Vipin's Test Positive     pH, Arterial 7.499 pH units      pCO2, Arterial 23.5 mm Hg      pO2, Arterial 95.9 mm Hg      HCO3, Arterial 18.3 mmol/L      Base Excess, Arterial -3.4 mmol/L      O2 Saturation Calculated 98.3 %      Barometric Pressure for Blood Gas 751.0 mmHg      Comment: 96% Meter: [id number] : [id number] Leigha Erickson        Modality Room Air     Rate 20 Breaths/minute     BNP [[id number]]  (Normal) Collected: 01/18/22 2014    Specimen: Blood from Arm, Right Updated: 01/18/22 2131     proBNP 97.7 pg/mL      Narrative:      Among patients with dyspnea, NT-proBNP is highly sensitive for the detection of acute congestive heart failure. In addition NT-proBNP of <300 pg/ml effectively rules out acute congestive heart failure with 99% negative predictive value.    Results may be falsely decreased if patient taking Biotin.      Respiratory Panel PCR w/COVID-19(SARS-CoV-2) JENIFER/LUIS/DAT/PAD/COR/MAD/NBA In-House, NP Swab in UTM/VTM, 3-4 HR TAT - Swab, Nasopharynx [[id number]]  (Normal) Collected: 01/18/22 2028    Specimen: Swab from Nasopharynx Updated: 01/18/22 2128     ADENOVIRUS, PCR Not Detected     Coronavirus 229E Not Detected     Coronavirus HKU1 Not Detected     Coronavirus NL63 Not Detected     Coronavirus OC43 Not Detected     COVID19 Not Detected     Human Metapneumovirus Not Detected     Human Rhinovirus/Enterovirus Not Detected     Influenza A PCR Not Detected     Influenza B PCR Not Detected     Parainfluenza Virus 1 Not Detected     Parainfluenza Virus 2 Not Detected     Parainfluenza Virus 3 Not Detected     Parainfluenza Virus 4 Not Detected     RSV, PCR Not Detected     Bordetella pertussis pcr Not Detected     Bordetella parapertussis PCR Not Detected     Chlamydophila pneumoniae PCR Not Detected     Mycoplasma pneumo by PCR Not Detected    Narrative:      In the setting of a positive respiratory panel with a viral infection PLUS a negative procalcitonin without other underlying concern for bacterial infection, consider observing off antibiotics or discontinuation of antibiotics and continue supportive care. If the respiratory panel is positive for atypical bacterial infection (Bordetella pertussis, Chlamydophila pneumoniae, or Mycoplasma pneumoniae), consider antibiotic de-escalation to target atypical bacterial infection.    Comprehensive Metabolic Panel [[id number]]  (Abnormal) Collected: 01/18/22 2014    Specimen: Blood from Arm, Right Updated: 01/18/22 2118     Glucose 109 mg/dL      BUN 7 mg/dL       Creatinine 1.00 mg/dL      Sodium 134 mmol/L      Potassium 4.2 mmol/L      Chloride 104 mmol/L      CO2 15.7 mmol/L      Calcium 8.7 mg/dL      Total Protein 7.1 g/dL      Albumin 4.10 g/dL      ALT (SGPT) 88 U/L      AST (SGOT) 91 U/L      Alkaline Phosphatase 79 U/L      Total Bilirubin 0.9 mg/dL      eGFR Non African Amer 67 mL/min/1.73      Globulin 3.0 gm/dL      A/G Ratio 1.4 g/dL      BUN/Creatinine Ratio 7.0     Anion Gap 14.3 mmol/L     Narrative:      GFR Normal >60  Chronic Kidney Disease <60  Kidney Failure <15      Magnesium [[id number]]  (Normal) Collected: 01/18/22 2014    Specimen: Blood from Arm, Right Updated: 01/18/22 2118     Magnesium 1.9 mg/dL     Homer Draw [[id number]] Collected: 01/18/22 2014    Specimen: Blood from Arm, Right Updated: 01/18/22 2116    Narrative:      The following orders were created for panel order Homer Draw.  Procedure                               Abnormality         Status                     ---------                               -----------         ------                     Green Top (Gel)[[id number]]                                  Final result               Lavender Top[[id number]]                                     Final result               Gold Top - SST[[id number]]                                                              Light Blue Top[[id number]]                                   Final result                 Please view results for these tests on the individual orders.    Lavender Top [[id number]] Collected: 01/18/22 2014    Specimen: Blood from Arm, Right Updated: 01/18/22 2116     Extra Tube hold for add-on     Comment: Auto resulted       Green Top (Gel) [[id number]] Collected: 01/18/22 2014    Specimen: Blood from Arm, Right Updated: 01/18/22 2116     Extra Tube Hold for add-ons.     Comment: Auto resulted.       Light Blue Top [[id number]] Collected: 01/18/22 2014    Specimen: Blood from Arm, Right Updated: 01/18/22 2116     Extra Tube hold for add-on  "    Comment: Auto resulted       Troponin [[id number]]  (Normal) Collected: 01/18/22 2014    Specimen: Blood from Arm, Right Updated: 01/18/22 2115     Troponin T <0.010 ng/mL     Narrative:      Troponin T Reference Range:  <= 0.03 ng/mL-   Negative for AMI  >0.03 ng/mL-     Abnormal for myocardial necrosis.  Clinicians would have to utilize clinical acumen, EKG, Troponin and serial changes to determine if it is an Acute Myocardial Infarction or myocardial injury due to an underlying chronic condition.       Results may be falsely decreased if patient taking Biotin.      Procalcitonin [[id number]]  (Abnormal) Collected: 01/18/22 2014    Specimen: Blood from Arm, Right Updated: 01/18/22 2114     Procalcitonin 2.22 ng/mL     Narrative:      As a Marker for Sepsis (Non-Neonates):     1. <0.5 ng/mL represents a low risk of severe sepsis and/or septic shock.  2. >2 ng/mL represents a high risk of severe sepsis and/or septic shock.    As a Marker for Lower Respiratory Tract Infections that require antibiotic therapy:  PCT on Admission     Antibiotic Therapy             6-12 Hrs later  >0.5                          Strongly Recommended            >0.25 - <0.5             Recommended  0.1 - 0.25                  Discouraged                       Remeasure/reassess PCT  <0.1                         Strongly Discouraged         Remeasure/reassess PCT      As 28 day mortality risk marker: \"Change in Procalcitonin Result\" (>80% or <=80%) if Day 0 (or Day 1) and Day 4 values are available. Refer to http://www.[facility]-pct-calculator.com/    Change in PCT <=80 %   A decrease of PCT levels below or equal to 80% defines a positive change in PCT test result representing a higher risk for 28-day all-cause mortality of patients diagnosed with severe sepsis or septic shock.    Change in PCT >80 %   A decrease of PCT levels of more than 80% defines a negative change in PCT result representing a lower risk for 28-day all-cause mortality of " patients diagnosed with severe sepsis or septic shock.                TSH [[id number]]  (Normal) Collected: 01/18/22 2014    Specimen: Blood from Arm, Right Updated: 01/18/22 2106     TSH 1.020 uIU/mL     Urinalysis, Microscopic Only - Urine, Catheter [[id number]]  (Abnormal) Collected: 01/18/22 2028    Specimen: Urine, Catheter Updated: 01/18/22 2100     RBC, UA 0-2 /HPF      WBC, UA 3-5 /HPF      Bacteria, UA None Seen /HPF      Squamous Epithelial Cells, UA None Seen /HPF      Hyaline Casts, UA None Seen /LPF      Mucus, UA Moderate/2+ /HPF      Methodology Manual Light Microscopy    hCG, Serum, Qualitative [[id number]]  (Normal) Collected: 01/18/22 2014    Specimen: Blood from Arm, Right Updated: 01/18/22 2059     HCG Qualitative Negative    Protime-INR [[id number]]  (Abnormal) Collected: 01/18/22 2014    Specimen: Blood from Arm, Right Updated: 01/18/22 2058     Protime 16.6 Seconds      INR 1.36    aPTT [[id number]]  (Abnormal) Collected: 01/18/22 2014    Specimen: Blood from Arm, Right Updated: 01/18/22 2058     PTT 44.2 seconds     Lactic Acid, Plasma [[id number]]  (Normal) Collected: 01/18/22 2014    Specimen: Blood from Arm, Right Updated: 01/18/22 2057     Lactate 1.9 mmol/L     Urinalysis With Microscopic If Indicated (No Culture) - Urine, Catheter [[id number]]  (Abnormal) Collected: 01/18/22 2028    Specimen: Urine, Catheter Updated: 01/18/22 2049     Color, UA Orange     Comment: Any Substance that causes an abnormal urine color can alter the accuracy of the chemical reactions.        Appearance, UA Clear     pH, UA <=5.0     Specific Gravity, UA >=1.030     Glucose, UA Negative     Ketones, UA Negative     Bilirubin, UA Negative     Blood, UA Trace     Protein,  mg/dL (2+)     Leuk Esterase, UA Moderate (2+)     Nitrite, UA Positive     Urobilinogen, UA 1.0 E.U./dL    CBC & Differential [[id number]]  (Abnormal) Collected: 01/18/22 2014    Specimen: Blood from Arm, Right Updated: 01/18/22 2047     Narrative:      The following orders were created for panel order CBC & Differential.  Procedure                               Abnormality         Status                     ---------                               -----------         ------                     CBC Auto Differential[[id number]]        Abnormal            Final result                 Please view results for these tests on the individual orders.    CBC Auto Differential [[id number]]  (Abnormal) Collected: 01/18/22 2014    Specimen: Blood from Arm, Right Updated: 01/18/22 2047     WBC 11.77 10*3/mm3      RBC 4.96 10*6/mm3      Hemoglobin 11.2 g/dL      Hematocrit 35.5 %      MCV 71.6 fL      MCH 22.6 pg      MCHC 31.5 g/dL      RDW 17.2 %      RDW-SD 43.0 fl      MPV 9.8 fL      Platelets 276 10*3/mm3      Neutrophil % 90.9 %      Lymphocyte % 4.9 %      Monocyte % 3.3 %      Eosinophil % 0.0 %      Basophil % 0.3 %      Neutrophils, Absolute 10.70 10*3/mm3      Lymphocytes, Absolute 0.58 10*3/mm3      Monocytes, Absolute 0.39 10*3/mm3      Eosinophils, Absolute 0.00 10*3/mm3      Basophils, Absolute 0.03 10*3/mm3     Blood Culture - Blood, Arm, Right [[id number]] Collected: 01/18/22 2015    Specimen: Blood from Arm, Right Updated: 01/18/22 2038        Imaging Results (Last 24 Hours)     Procedure Component Value Units Date/Time    CT Abdomen Pelvis With Contrast [[id number]] Collected: 01/19/22 0641     Updated: 01/19/22 0641    Narrative:            CT OF THE ABDOMEN AND PELVIS WITH CONTRAST AND CT ANGIOGRAM OF THE CHEST  WITH CONTRAST INCLUDING RECONSTRUCTION IMAGES 01/18/2022     HISTORY: Abdominal pain. Possible pulmonary embolus.     TECHNIQUE: Following the intravenous contrast injection, CT angiography  was performed through the chest. Sagittal, coronal and 3-D  reconstruction images were reviewed. This was followed by CT of the  abdomen and pelvis with contrast.     FINDINGS: The pulmonary arterial system is well opacified with no  evidence of  pulmonary embolus.     No pathologically enlarged lymph nodes are seen.     No lung masses or significant pulmonary infiltrates are seen.     There is mild fatty infiltration of the liver. There is an approximately  8 mm low-density lesion in the dome of the liver which is not clearly  seen on the 05/13/2019 study.     The gallbladder, spleen, pancreas, adrenals and kidneys appear  unremarkable except for a tiny left renal cyst.           Uterus, adnexa and urinary bladder appear unremarkable.     No bowel wall thickening or bowel dilatation is seen.       Impression:      1. No evidence of pulmonary embolus.  2. Mild fatty infiltration of the liver.  3. Tiny low-density lesion in the liver. This is not clearly seen on the  previous study of 05/13/2019. Appears slightly higher in density than a  typical cyst but could represent a cyst with volume averaging or  possibly small hemangioma. Benign etiology is favored. Consider a  short-term follow-up CT of the abdomen in 4 months to 6 months to assess  stability.  4. No other significant findings are noted.           Radiation dose reduction techniques were utilized, including automated  exposure control and exposure modulation based on body size.          CT Angiogram Chest [[id number]] Collected: 01/19/22 0641     Updated: 01/19/22 0641    Narrative:            CT OF THE ABDOMEN AND PELVIS WITH CONTRAST AND CT ANGIOGRAM OF THE CHEST  WITH CONTRAST INCLUDING RECONSTRUCTION IMAGES 01/18/2022     HISTORY: Abdominal pain. Possible pulmonary embolus.     TECHNIQUE: Following the intravenous contrast injection, CT angiography  was performed through the chest. Sagittal, coronal and 3-D  reconstruction images were reviewed. This was followed by CT of the  abdomen and pelvis with contrast.     FINDINGS: The pulmonary arterial system is well opacified with no  evidence of pulmonary embolus.     No pathologically enlarged lymph nodes are seen.     No lung masses or significant  pulmonary infiltrates are seen.     There is mild fatty infiltration of the liver. There is an approximately  8 mm low-density lesion in the dome of the liver which is not clearly  seen on the 05/13/2019 study.     The gallbladder, spleen, pancreas, adrenals and kidneys appear  unremarkable except for a tiny left renal cyst.           Uterus, adnexa and urinary bladder appear unremarkable.     No bowel wall thickening or bowel dilatation is seen.       Impression:      1. No evidence of pulmonary embolus.  2. Mild fatty infiltration of the liver.  3. Tiny low-density lesion in the liver. This is not clearly seen on the  previous study of 05/13/2019. Appears slightly higher in density than a  typical cyst but could represent a cyst with volume averaging or  possibly small hemangioma. Benign etiology is favored. Consider a  short-term follow-up CT of the abdomen in 4 months to 6 months to assess  stability.  4. No other significant findings are noted.           Radiation dose reduction techniques were utilized, including automated  exposure control and exposure modulation based on body size.          XR Chest 1 View [[id number]] Collected: 01/18/22 2046     Updated: 01/18/22 2051    Narrative:      XR CHEST 1 VW-  01/18/2022     HISTORY: Rapid heart rate. Possible pneumonia.     Heart size is within normal limits. Lungs appear free of acute  infiltrates. Mediport catheter seen in good position.     There are some minimally prominent soft tissue in the medial aspect of  the right hemithorax just below the medial aspect of the clavicle. This  could be related to prominent mediastinal fat and/or ectatic vascular  structures. This finding is also seen on the previous studies of  10/25/2021 and 10/13/2020.           Bony structures appear unremarkable.       Impression:      No acute process identified.     This report was finalized on 1/18/2022 8:48 PM by Dr. Ramesh Gomez M.D.                  ECG 12 Lead  Component    Ref Range & Units 1/18/22 2052 9/17/21 1113 9/8/21 1002 5/6/21 0432 5/5/21 2240   QT Interval   ms 334  350  298  279  342              HEART RATE= 145  bpm  RR Interval= 416  ms  [place] Interval= 85  ms  P Horizontal Axis= 13  deg  P Front Axis= 35  deg  QRSD Interval= 81  ms  QT Interval= 334  ms  QRS Axis= 5  deg  T Wave Axis= 241  deg  - ABNORMAL ECG -  Sinus tachycardia  Nonspecific repol abnormality, diffuse leads  Prolonged QT interval  When compared with ECG of 17-Sep-2021 11:13:59,  Significant rate increase with new nonspecific repolarization abnormalities in diffuse leads             Current Facility-Administered Medications:   •  albuterol (PROVENTIL) nebulizer solution 0.083% 2.5 mg/3mL, 2.5 mg, Nebulization, Q4H PRN, Caleb Naik MD  •  apixaban (ELIQUIS) tablet 5 mg, 5 mg, Oral, Q12H, Caleb Naik MD, 5 mg at 01/19/22 1229  •  cefepime 2 gm IVPB in 100 ml NS (VTB), 2 g, Intravenous, Q8H, Caleb Niak MD, 2 g at 01/19/22 1201  •  gabapentin (NEURONTIN) capsule 100 mg, 100 mg, Oral, TID, Caleb Naik MD, 100 mg at 01/19/22 1229  •  levothyroxine (SYNTHROID, LEVOTHROID) tablet 50 mcg, 50 mcg, Oral, Q AM, Caleb Naik MD, 50 mcg at 01/19/22 1229  •  metoprolol tartrate (LOPRESSOR) tablet 12.5 mg, 12.5 mg, Oral, TID, Caleb Naik MD, 12.5 mg at 01/19/22 1154  •  montelukast (SINGULAIR) tablet 10 mg, 10 mg, Oral, Nightly, Caleb Naik MD  •  ondansetron (ZOFRAN) injection 8 mg, 8 mg, Intravenous, Q6H PRN, Caleb Naik MD, 8 mg at 01/19/22 1152  •  pantoprazole (PROTONIX) EC tablet 40 mg, 40 mg, Oral, BID AC, Caleb Naik MD, 40 mg at 01/19/22 1232  •  Pharmacy to dose vancomycin, , Does not apply, Continuous PRN, Caleb Naik MD  •  prochlorperazine (COMPAZINE) injection 10 mg, 10 mg, Intravenous, Q6H PRN, Caleb Naik MD  •  pyridostigmine (MESTINON) tablet 30 mg, 30 mg, Oral, BID, Caleb Naik MD  •  [COMPLETED] Insert peripheral IV, , , Once **AND** sodium chloride 0.9 % flush 10 mL, 10 mL,  Intravenous, PRN, DebraDena padilla MD  •  sodium chloride 0.9 % infusion, 75 mL/hr, Intravenous, Continuous, Caleb Naik MD, Last Rate: 75 mL/hr at 01/19/22 1203, 75 mL/hr at 01/19/22 1203  •  vancomycin (VANCOCIN) in iso-osmotic dextrose IVPB 1 g (premix) 200 mL, 1,000 mg, Intravenous, Q12H, Caleb Naik MD    Current Outpatient Medications:   •  albuterol sulfate  (90 Base) MCG/ACT inhaler, Inhale 2 puffs Every 4 (Four) Hours As Needed for Wheezing., Disp: , Rfl:   •  apixaban (ELIQUIS) 5 MG tablet tablet, Take 1 tablet by mouth Every 12 (Twelve) Hours., Disp: 60 tablet, Rfl: 11  •  Cholecalciferol (VITAMIN D-1000 MAX ST) 25 MCG (1000 UT) tablet, Take 1,000 Units by mouth Daily., Disp: , Rfl:   •  dilTIAZem powder, Apply  topically to the appropriate area as directed 2 (Two) Times a Day As Needed (RECTAL CREAM)., Disp: , Rfl:   •  gabapentin (NEURONTIN) 100 MG capsule, Take 100 mg by mouth 3 (Three) Times a Day., Disp: , Rfl:   •  Galcanezumab-gnlm (EMGALITY SC), Inject  under the skin into the appropriate area as directed Every 30 (Thirty) Days., Disp: , Rfl:   •  Ginger, Zingiber officinalis, (GINGER ROOT PO), Take 1 tablet by mouth., Disp: , Rfl:   •  hydrOXYzine pamoate (VISTARIL) 25 MG capsule, Take 25-50 mg by mouth 3 (Three) Times a Day As Needed., Disp: , Rfl:   •  immune globulin, human, (GAMMAGARD S/D) 10 g infusion, Infuse  into a venous catheter 1 (One) Time Per Week., Disp: , Rfl:   •  levothyroxine (SYNTHROID, LEVOTHROID) 50 MCG tablet, Take 50 mcg by mouth Daily., Disp: , Rfl:   •  loperamide (IMODIUM A-D) 2 MG tablet, Take 2 mg by mouth 4 (Four) Times a Day As Needed., Disp: , Rfl:   •  meclizine (ANTIVERT) 25 MG tablet, Take 25 mg by mouth 3 (Three) Times a Day As Needed., Disp: , Rfl:   •  medroxyPROGESTERone (DEPO-PROVERA) 150 MG/ML injection, Inject 1 mL into the appropriate muscle as directed by prescriber Every 3 (Three) Months., Disp: 1 mL, Rfl: 3  •  metoprolol tartrate (LOPRESSOR)  25 MG tablet, Take 12.5 mg by mouth 3 (Three) Times a Day., Disp: , Rfl:   •  metroNIDAZOLE (FLAGYL) 0.75 % lotion lotion, Apply 1 application topically to the appropriate area as directed Every 12 (Twelve) Hours., Disp: , Rfl:   •  midodrine (PROAMATINE) 10 MG tablet, TAKE 1 TABLET BY MOUTH THREE TIMES DAILY, Disp: 90 tablet, Rfl: 6  •  Mirabegron ER (Myrbetriq) 25 MG tablet sustained-release 24 hour 24 hr tablet, Take 25 mg by mouth Daily., Disp: , Rfl:   •  montelukast (SINGULAIR) 10 MG tablet, Take 10 mg by mouth Every Night., Disp: , Rfl:   •  omeprazole (priLOSEC) 20 MG capsule, Take 40 mg by mouth Every Night., Disp: , Rfl:   •  ondansetron (ZOFRAN) 4 MG tablet, Take 4 mg by mouth Every 8 (Eight) Hours As Needed for Nausea or Vomiting., Disp: , Rfl:   •  Ondansetron HCl (ZOFRAN IV), Infuse 8 mg into a venous catheter Every 8 (Eight) Hours As Needed., Disp: , Rfl:   •  ondansetron ODT (ZOFRAN ODT) 8 MG disintegrating tablet, Take 1 tablet by mouth Every 8 (Eight) Hours As Needed for Nausea or Vomiting., Disp: 12 tablet, Rfl: 0  •  phenazopyridine (PYRIDIUM) 100 MG tablet, Take 100 mg by mouth 3 (Three) Times a Day As Needed., Disp: , Rfl:   •  prochlorperazine (COMPAZINE) 10 MG tablet, Take 10 mg by mouth Every 6 (Six) Hours As Needed for Nausea or Vomiting., Disp: , Rfl:   •  promethazine (PHENERGAN) 12.5 MG tablet, TK 1 T PO Q 6 H FOR UP TO 7 DAYS PRF NAUSEA, Disp: , Rfl:   •  promethazine (PHENERGAN) 25 MG tablet, TK 1 T PO  Q 6 H PRN NAUSEA, Disp: , Rfl:   •  promethazine (PHENERGAN), Infuse 25 mg into a venous catheter Every 3 (Three) Hours., Disp: , Rfl:   •  pyridostigmine (Mestinon) 60 MG tablet, Take 0.5 tablets by mouth 2 (Two) Times a Day., Disp: 30 tablet, Rfl: 3  •  triamcinolone (KENALOG) 0.1 % cream, Apply 1 application topically to the appropriate area as directed 2 (Two) Times a Day., Disp: , Rfl:   •  Vortioxetine HBr (TRINTELLIX) 5 MG tablet, Take 5 mg by mouth Daily With Breakfast., Disp: ,  Rfl:   •  RABEprazole (ACIPHEX) 20 MG EC tablet, Take 20 mg by mouth Every Morning., Disp: , Rfl:      ASSESSMENT  Gram-positive cocci bacteremia with sepsis  Probable line sepsis   Acute UTI  Pulm embolism on anticoagulation   Chronic orthostatic hypotension  Hypothyroidism  POTS syndrome  Phoebe-Danlos syndrome  Irritable bowel syndrome  Severe gastroparesis  Gastroesophageal reflux disease    PLAN  Admit  IVF  IV antibiotics after obtaining the cultures  Infectious disease consult  General surgery consult for possible line removal  Adjust home medications  Stress ulcer DVT prophylaxis  Supportive care  Patient is full code  Discussed with family nursing staff  Follow closely and further recommendation current hospital course    ANN GONZALEZ MD

## 2022-01-19 NOTE — ED TRIAGE NOTES
Pt arrives via EMS from home with complaint of fever and rapid heart rate. Pt reports she had Covid 2 weeks ago. Pt has no other complaints at this time. Pt reports taking tylenol today for fever with no relief.       Pt masked on arrival, staff masked    Run # [id number]

## 2022-01-19 NOTE — PROGRESS NOTES
"[facility] Clinical Pharmacy Services: Vancomycin Pharmacokinetic Initial Consult Note    Liliam Lorenz is a 26 y.o. female who is on day 1 of pharmacy to dose vancomycin.    Indication: Sepsis  Consulting Provider: Dr. Naik  Planned Duration of Therapy: 5 days  Loading Dose Ordered or Given: 2250 mg on 1/19 at 0030  Target: -600 mg/L.hr   Other Antimicrobials: Cefepime    Vitals/Labs  Ht: 163.8 cm (64.5\"); Wt: 113 kg (250 lb)  Temp Readings from Last 1 Encounters:   01/19/22 98.6 °F (37 °C) (Oral)    Estimated Creatinine Clearance: 105.9 mL/min (by C-G formula based on SCr of 1 mg/dL).     Results from last 7 days   Lab Units 01/18/22 2014   CREATININE mg/dL 1.00   WBC 10*3/mm3 11.77*     Assessment/Plan:    Vancomycin Dose:   1000 mg IV every  12  hours  Predictive AUC level for the dose ordered is 505 mg/L.hr, which is within the target of 400-600 mg/L.hr  Vanc Trough has been ordered for 1/20 at 1200    Pharmacy will follow patient's kidney function and will adjust doses and obtain levels as necessary. Thank you for involving pharmacy in this patient's care. Please contact pharmacy with any questions or concerns.           Christine Holm, PharmD, [facility]  Clinical Pharmacy Specialist, Emergency Medicine   Phone: [phone]      "

## 2022-01-19 NOTE — CONSULTS
Consultation note    Referring physician: Caleb Naik MD    Consulting Physician: Bari Castañeda MD    Reason for consultation: Sepsis, central line infection    Chief Complaint   Patient presents with   • Fever   • Rapid Heart Rate       HPI:   The patient is a very pleasant 26 y.o. years old female that presented to the hospital with fever and flulike symptoms.  Patient has history of gastroparesis and pots syndrome.  She requires continuous IV access for weekly infusions of IVIG as well as daily Phenergan.  She has been feeling poor for the past 2 days with worsening nausea and vomiting.  She has been able to tolerate lunch today.  She started having high-grade fever so she presented to the emergency room for further evaluation.  Patient history of Dolan catheter placement on 10/26/2021 by Dr. Lane after his left-sided Dolan catheter was broken.  She reports no abdominal pain constipation or diarrhea.  She denies dysuria hematuria.  She reports pain over the right neck where the line is located.  Her gastroparesis is being managed by motility GI physician at [facility].   She underwent blood cultures from central line that came positive from gram-positive cocci in clusters in 2 different bottles.    Past Medical History:   Diagnosis Date   • Bronchitis    • Chest pain    • Chronic hypotension    • Eczema    • Phoebe-Danlos syndrome    • Gastroparesis    • GERD (gastroesophageal reflux disease)    • IBS (irritable bowel syndrome)    • Lightheadedness    • POTS (postural orthostatic tachycardia syndrome)    • Rectal fissure    • Rosacea    • Tachycardia        Past Surgical History:   Procedure Laterality Date   • COLONOSCOPY     • [facility]  [facility]    • [facility]PPER GASTROINTESTINAL ENDOSCOPY     • VENOUS ACCESS DEVICE (PORT) INSERTION Left 10/16/2020    Procedure: INSERTION VENOUS ACCESS DEVICE UNDER FLURO;  Surgeon: Pa Lane MD;  Location[facility] OR;   Service: General;  Laterality: Left;   • VENOUS ACCESS DEVICE (PORT) INSERTION Right 10/26/2021    Procedure: REMOVAL AND INSERTION OF VALVERDE CATHETER;  Surgeon: Pa Lane MD;  Location: [facility] MAIN OR;  Service: General;  Laterality: Right;         Current Facility-Administered Medications:   •  albuterol (PROVENTIL) nebulizer solution 0.083% 2.5 mg/3mL, 2.5 mg, Nebulization, Q4H PRN, Caleb Naik MD  •  apixaban (ELIQUIS) tablet 5 mg, 5 mg, Oral, Q12H, Caleb Naik MD, 5 mg at 01/19/22 1229  •  cefepime 2 gm IVPB in 100 ml NS (VTB), 2 g, Intravenous, Q8H, Caleb Naik MD, 2 g at 01/19/22 1201  •  gabapentin (NEURONTIN) capsule 100 mg, 100 mg, Oral, TID, Caleb Naik MD, 100 mg at 01/19/22 1229  •  levothyroxine (SYNTHROID, LEVOTHROID) tablet 50 mcg, 50 mcg, Oral, Q AM, Caleb Naik MD, 50 mcg at 01/19/22 1229  •  metoprolol tartrate (LOPRESSOR) tablet 25 mg, 25 mg, Oral, TID, Caleb Naik MD  •  montelukast (SINGULAIR) tablet 10 mg, 10 mg, Oral, Nightly, Caleb Naik MD  •  ondansetron (ZOFRAN) injection 8 mg, 8 mg, Intravenous, Q6H PRN, Caleb Naik MD, 8 mg at 01/19/22 1152  •  pantoprazole (PROTONIX) EC tablet 40 mg, 40 mg, Oral, BID AC, Caleb Naik MD, 40 mg at 01/19/22 1232  •  Pharmacy to dose vancomycin, , Does not apply, Continuous PRN, Caleb Naik MD  •  prochlorperazine (COMPAZINE) injection 10 mg, 10 mg, Intravenous, Q6H PRN, Caleb Naik MD  •  pyridostigmine (MESTINON) tablet 30 mg, 30 mg, Oral, BID, Caleb Naik MD, 30 mg at 01/19/22 1338  •  [COMPLETED] Insert peripheral IV, , , Once **AND** sodium chloride 0.9 % flush 10 mL, 10 mL, Intravenous, PRN, Dena Richardson MD  •  sodium chloride 0.9 % infusion, 75 mL/hr, Intravenous, Continuous, Caleb Naik MD, Last Rate: 75 mL/hr at 01/19/22 1203, 75 mL/hr at 01/19/22 1203  •  vancomycin (VANCOCIN) in iso-osmotic dextrose IVPB 1 g (premix) 200 mL, 1,000 mg, Intravenous, Q12H, Caleb Naik MD    Current Outpatient Medications:    •  albuterol sulfate  (90 Base) MCG/ACT inhaler, Inhale 2 puffs Every 4 (Four) Hours As Needed for Wheezing., Disp: , Rfl:   •  apixaban (ELIQUIS) 5 MG tablet tablet, Take 1 tablet by mouth Every 12 (Twelve) Hours., Disp: 60 tablet, Rfl: 11  •  Cholecalciferol (VITAMIN D-1000 MAX ST) 25 MCG (1000 UT) tablet, Take 1,000 Units by mouth Daily., Disp: , Rfl:   •  dilTIAZem powder, Apply  topically to the appropriate area as directed 2 (Two) Times a Day As Needed (RECTAL CREAM)., Disp: , Rfl:   •  gabapentin (NEURONTIN) 100 MG capsule, Take 100 mg by mouth 3 (Three) Times a Day., Disp: , Rfl:   •  Galcanezumab-gnlm (EMGALITY SC), Inject  under the skin into the appropriate area as directed Every 30 (Thirty) Days., Disp: , Rfl:   •  Ginger, Zingiber officinalis, (GINGER ROOT PO), Take 1 tablet by mouth., Disp: , Rfl:   •  hydrOXYzine pamoate (VISTARIL) 25 MG capsule, Take 25-50 mg by mouth 3 (Three) Times a Day As Needed., Disp: , Rfl:   •  immune globulin, human, (GAMMAGARD S/D) 10 g infusion, Infuse  into a venous catheter 1 (One) Time Per Week., Disp: , Rfl:   •  levothyroxine (SYNTHROID, LEVOTHROID) 50 MCG tablet, Take 50 mcg by mouth Daily., Disp: , Rfl:   •  loperamide (IMODIUM A-D) 2 MG tablet, Take 2 mg by mouth 4 (Four) Times a Day As Needed., Disp: , Rfl:   •  meclizine (ANTIVERT) 25 MG tablet, Take 25 mg by mouth 3 (Three) Times a Day As Needed., Disp: , Rfl:   •  medroxyPROGESTERone (DEPO-PROVERA) 150 MG/ML injection, Inject 1 mL into the appropriate muscle as directed by prescriber Every 3 (Three) Months., Disp: 1 mL, Rfl: 3  •  metoprolol tartrate (LOPRESSOR) 25 MG tablet, Take 12.5 mg by mouth 3 (Three) Times a Day., Disp: , Rfl:   •  metroNIDAZOLE (FLAGYL) 0.75 % lotion lotion, Apply 1 application topically to the appropriate area as directed Every 12 (Twelve) Hours., Disp: , Rfl:   •  midodrine (PROAMATINE) 10 MG tablet, TAKE 1 TABLET BY MOUTH THREE TIMES DAILY, Disp: 90 tablet, Rfl: 6  •   Mirabegron ER (Myrbetriq) 25 MG tablet sustained-release 24 hour 24 hr tablet, Take 25 mg by mouth Daily., Disp: , Rfl:   •  montelukast (SINGULAIR) 10 MG tablet, Take 10 mg by mouth Every Night., Disp: , Rfl:   •  omeprazole (priLOSEC) 20 MG capsule, Take 40 mg by mouth Every Night., Disp: , Rfl:   •  ondansetron (ZOFRAN) 4 MG tablet, Take 4 mg by mouth Every 8 (Eight) Hours As Needed for Nausea or Vomiting., Disp: , Rfl:   •  Ondansetron HCl (ZOFRAN IV), Infuse 8 mg into a venous catheter Every 8 (Eight) Hours As Needed., Disp: , Rfl:   •  ondansetron ODT (ZOFRAN ODT) 8 MG disintegrating tablet, Take 1 tablet by mouth Every 8 (Eight) Hours As Needed for Nausea or Vomiting., Disp: 12 tablet, Rfl: 0  •  phenazopyridine (PYRIDIUM) 100 MG tablet, Take 100 mg by mouth 3 (Three) Times a Day As Needed., Disp: , Rfl:   •  prochlorperazine (COMPAZINE) 10 MG tablet, Take 10 mg by mouth Every 6 (Six) Hours As Needed for Nausea or Vomiting., Disp: , Rfl:   •  promethazine (PHENERGAN) 12.5 MG tablet, TK 1 T PO Q 6 H FOR UP TO 7 DAYS PRF NAUSEA, Disp: , Rfl:   •  promethazine (PHENERGAN) 25 MG tablet, TK 1 T PO  Q 6 H PRN NAUSEA, Disp: , Rfl:   •  promethazine (PHENERGAN), Infuse 25 mg into a venous catheter Every 3 (Three) Hours., Disp: , Rfl:   •  pyridostigmine (Mestinon) 60 MG tablet, Take 0.5 tablets by mouth 2 (Two) Times a Day., Disp: 30 tablet, Rfl: 3  •  triamcinolone (KENALOG) 0.1 % cream, Apply 1 application topically to the appropriate area as directed 2 (Two) Times a Day., Disp: , Rfl:   •  Vortioxetine HBr (TRINTELLIX) 5 MG tablet, Take 5 mg by mouth Daily With Breakfast., Disp: , Rfl:   •  RABEprazole (ACIPHEX) 20 MG EC tablet, Take 20 mg by mouth Every Morning., Disp: , Rfl:     Allergies   Allergen Reactions   • Eggs Or Egg-Derived Products GI Intolerance     Rash & vomiting   • Pepcid [Famotidine] Rash and GI Intolerance   • Scopolamine Rash and GI Intolerance       Social History     Socioeconomic History   •  Marital status:    Tobacco Use   • Smoking status: Never Smoker   • Smokeless tobacco: Never Used   Vaping Use   • Vaping Use: Never used   Substance and Sexual Activity   • Alcohol use: No   • Drug use: No   • Sexual activity: Yes     Partners: Female     Birth control/protection: None       Family History   Problem Relation Age of Onset   • Hypertension Mother    • Diabetes Mother    • Diabetes Father    • Heart disease Paternal Grandfather    • Stroke Paternal Grandfather    • Diabetes Paternal Grandfather    • Cancer Paternal Grandfather    • Coronary artery disease Maternal Grandmother         MGM with 4 vessel CABG and multiple stents   • Cancer Maternal Grandmother    • Coronary artery disease Maternal Uncle         Maternal uncle with MI   • Breast cancer Other    • Malig Hyperthermia Neg Hx        ROS:   Constitutional: denies any weight changes, reports fatigue and weakness  Eyes: : denies blurred or double vision  Cardiovascular: denies chest pain, palpitations, edemas.  Respiratory: denies cough, sputum, SOB.  Gastrointestinal: Reports nausea and vomiting.  Denies abdominal pain  Genitourinary: denies dysuria, frequency.  Endocrine: denies cold intolerance, lethargy and flushing.  Hem: denies excessive bruising and postop bleeding.  Musculoskeletal: denies weakness, joint swelling, pain or stiffness.  Neuro: denies seizures, CVA, paresthesia, or peripheral neuropathy.   Skin: denies change in nevi, rashes, masses.    Physical Exam:   Vitals:    01/19/22 1436   BP: (!) 168/106   Pulse: 106   Resp:    Temp:    SpO2: 93%     GENERAL:alert, well appearing, and in no distress and oriented to person, place, and time  HEENT: normochephalic, atraumatic, no scleral icterus moist mucous membranes.  NECK: Supple there is no thyromegaly or lymphadenopathy.  Dolan catheter in place in the right chest.  There is tenderness over the right jugular area.  There is no erythema  CHEST: clear to auscultation, no  wheezes, rales or rhonchi, symmetric air entry  CARDIAC: regular rate and rhythm    ABDOMEN: soft, nontender, nondistended, no masses or organomegaly  EXTREMITIES: no cyanosis, clubbing or edema    NEURO: alert and oriented, normal speech, cranial nerves 2-12 grossly intact, no focal deficits   SKIN: Moist, warm, no rashes.    Diagnostic workup:   Respiratory panel negative    Blood culture from central line gram-positive cocci in clusters    Assessment and plan:   The patient is a very pleasant 26 y.o. years old female with multiple medical problems, gastroparesis, history of PE, Phoebe-Danlos with central line infection causing bacteremia.  Discussed with the patient about further step.  I recommend that she undergoes removal of Dolan catheter under local anesthesia.  Skin benefits of procedure including bleeding, infection were discussed in detail with the patient that verbalized understanding and agree with the plan.  Informed consent was obtained from the patient    Procedure:   The right chest was prepped and draped in usual sterile fashion with iodine.  The subcutaneous tissue was infiltrated with lidocaine 1% with epinephrine.  A small incision was performed around the catheter with scalpel.  Dissection of the subcutaneous tissue was performed bluntly with Autumn clamp.  I pulled from the catheter and the catheter came out easily.  There was evidence of purulent fluid that came from the wound and around the catheter cuff.  This was clean and packed with quarter inch iodoform gauze.  Dressings were applied.  The patient tolerated procedure well.  Catheter tip was sent for culture    Recommendations:   Dressings to be removed tomorrow, dry dressing after  We will order Phenergan 12.5 g IM daily for nausea, she has been taking an IV but wants to try IM  Diet as tolerated  Antibiotics as per ID recommendation  Timing for central line in the future to be discussed with infectious disease    Case was discussed  with patient.    Risk and benefits of the current recommended treatment were explained to the patient that had time to ask questions that where answered, verbalized understanding and agreed with the plan.     Bari Castañeda MD  General, Minimally Invasive and Endoscopic Surgery  [facility], [place], operated by [facility]    [address], [address]  [place], [place], [address]  P: [phone]  F: [phone]

## 2022-01-20 LAB
ALBUMIN SERPL-MCNC: 3.2 G/DL (ref 3.5–5.2)
ALBUMIN/GLOB SERPL: 0.9 G/DL
ALP SERPL-CCNC: 71 U/L (ref 39–117)
ALT SERPL W P-5'-P-CCNC: 82 U/L (ref 1–33)
ANION GAP SERPL CALCULATED.3IONS-SCNC: 10.3 MMOL/L (ref 5–15)
AST SERPL-CCNC: 53 U/L (ref 1–32)
BACTERIA BLD CULT: ABNORMAL
BILIRUB SERPL-MCNC: 0.4 MG/DL (ref 0–1.2)
BOTTLE TYPE: ABNORMAL
BUN SERPL-MCNC: 6 MG/DL (ref 6–20)
BUN/CREAT SERPL: 8.3 (ref 7–25)
CALCIUM SPEC-SCNC: 8.4 MG/DL (ref 8.6–10.5)
CHLORIDE SERPL-SCNC: 107 MMOL/L (ref 98–107)
CHOLEST SERPL-MCNC: 169 MG/DL (ref 0–200)
CO2 SERPL-SCNC: 17.7 MMOL/L (ref 22–29)
CREAT SERPL-MCNC: 0.72 MG/DL (ref 0.57–1)
DEPRECATED RDW RBC AUTO: 45.4 FL (ref 37–54)
EOSINOPHIL # BLD MANUAL: 0.18 10*3/MM3 (ref 0–0.4)
EOSINOPHIL NFR BLD MANUAL: 3 % (ref 0.3–6.2)
ERYTHROCYTE [DISTWIDTH] IN BLOOD BY AUTOMATED COUNT: 17.3 % (ref 12.3–15.4)
GFR SERPL CREATININE-BSD FRML MDRD: 98 ML/MIN/1.73
GLOBULIN UR ELPH-MCNC: 3.5 GM/DL
GLUCOSE SERPL-MCNC: 107 MG/DL (ref 65–99)
HBA1C MFR BLD: 5.7 % (ref 4.8–5.6)
HCT VFR BLD AUTO: 30.9 % (ref 34–46.6)
HDLC SERPL-MCNC: 28 MG/DL (ref 40–60)
HGB BLD-MCNC: 9.7 G/DL (ref 12–15.9)
LDLC SERPL CALC-MCNC: 112 MG/DL (ref 0–100)
LDLC/HDLC SERPL: 3.86 {RATIO}
LYMPHOCYTES # BLD MANUAL: 0.93 10*3/MM3 (ref 0.7–3.1)
LYMPHOCYTES NFR BLD MANUAL: 4 % (ref 5–12)
MCH RBC QN AUTO: 22.6 PG (ref 26.6–33)
MCHC RBC AUTO-ENTMCNC: 31.4 G/DL (ref 31.5–35.7)
MCV RBC AUTO: 72 FL (ref 79–97)
MONOCYTES # BLD: 0.24 10*3/MM3 (ref 0.1–0.9)
NEUTROPHILS # BLD AUTO: 4.75 10*3/MM3 (ref 1.7–7)
NEUTROPHILS NFR BLD MANUAL: 77.8 % (ref 42.7–76)
PLAT MORPH BLD: NORMAL
PLATELET # BLD AUTO: 230 10*3/MM3 (ref 140–450)
PMV BLD AUTO: 10.6 FL (ref 6–12)
POTASSIUM SERPL-SCNC: 4 MMOL/L (ref 3.5–5.2)
PROT SERPL-MCNC: 6.7 G/DL (ref 6–8.5)
RBC # BLD AUTO: 4.29 10*6/MM3 (ref 3.77–5.28)
RBC MORPH BLD: NORMAL
SODIUM SERPL-SCNC: 135 MMOL/L (ref 136–145)
TRIGL SERPL-MCNC: 165 MG/DL (ref 0–150)
TSH SERPL DL<=0.05 MIU/L-ACNC: 2.72 UIU/ML (ref 0.27–4.2)
VANCOMYCIN TROUGH SERPL-MCNC: 6.5 MCG/ML (ref 5–20)
VARIANT LYMPHS NFR BLD MANUAL: 15.2 % (ref 19.6–45.3)
VLDLC SERPL-MCNC: 29 MG/DL (ref 5–40)
WBC MORPH BLD: NORMAL
WBC NRBC COR # BLD: 6.1 10*3/MM3 (ref 3.4–10.8)

## 2022-01-20 PROCEDURE — 25010000002 ONDANSETRON PER 1 MG: Performed by: HOSPITALIST

## 2022-01-20 PROCEDURE — 83036 HEMOGLOBIN GLYCOSYLATED A1C: CPT | Performed by: HOSPITALIST

## 2022-01-20 PROCEDURE — 36415 COLL VENOUS BLD VENIPUNCTURE: CPT | Performed by: HOSPITALIST

## 2022-01-20 PROCEDURE — 25010000002 CEFEPIME PER 500 MG: Performed by: HOSPITALIST

## 2022-01-20 PROCEDURE — 36410 VNPNXR 3YR/> PHY/QHP DX/THER: CPT

## 2022-01-20 PROCEDURE — 85007 BL SMEAR W/DIFF WBC COUNT: CPT | Performed by: HOSPITALIST

## 2022-01-20 PROCEDURE — C1751 CATH, INF, PER/CENT/MIDLINE: HCPCS

## 2022-01-20 PROCEDURE — 25010000002 PROCHLORPERAZINE 10 MG/2ML SOLUTION: Performed by: HOSPITALIST

## 2022-01-20 PROCEDURE — 80050 GENERAL HEALTH PANEL: CPT | Performed by: HOSPITALIST

## 2022-01-20 PROCEDURE — 80061 LIPID PANEL: CPT | Performed by: HOSPITALIST

## 2022-01-20 PROCEDURE — 25010000002 VANCOMYCIN PER 500 MG: Performed by: HOSPITALIST

## 2022-01-20 PROCEDURE — 25010000002 CEFTRIAXONE PER 250 MG: Performed by: INTERNAL MEDICINE

## 2022-01-20 PROCEDURE — 80202 ASSAY OF VANCOMYCIN: CPT | Performed by: HOSPITALIST

## 2022-01-20 PROCEDURE — 99231 SBSQ HOSP IP/OBS SF/LOW 25: CPT | Performed by: SURGERY

## 2022-01-20 RX ORDER — SODIUM CHLORIDE 0.9 % (FLUSH) 0.9 %
10 SYRINGE (ML) INJECTION AS NEEDED
Status: DISCONTINUED | OUTPATIENT
Start: 2022-01-20 | End: 2022-01-22

## 2022-01-20 RX ORDER — HYDROXYZINE PAMOATE 50 MG/1
50 CAPSULE ORAL 3 TIMES DAILY PRN
Status: DISCONTINUED | OUTPATIENT
Start: 2022-01-20 | End: 2022-01-22

## 2022-01-20 RX ORDER — ONDANSETRON 2 MG/ML
4 INJECTION INTRAMUSCULAR; INTRAVENOUS EVERY 4 HOURS PRN
Status: DISCONTINUED | OUTPATIENT
Start: 2022-01-20 | End: 2022-01-22

## 2022-01-20 RX ORDER — PROCHLORPERAZINE EDISYLATE 5 MG/ML
10 INJECTION INTRAMUSCULAR; INTRAVENOUS EVERY 4 HOURS PRN
Status: DISCONTINUED | OUTPATIENT
Start: 2022-01-20 | End: 2022-01-22

## 2022-01-20 RX ORDER — HYDROXYZINE PAMOATE 25 MG/1
25 CAPSULE ORAL 3 TIMES DAILY PRN
Status: DISCONTINUED | OUTPATIENT
Start: 2022-01-20 | End: 2022-01-22

## 2022-01-20 RX ORDER — SODIUM CHLORIDE 0.9 % (FLUSH) 0.9 %
10 SYRINGE (ML) INJECTION EVERY 12 HOURS SCHEDULED
Status: DISCONTINUED | OUTPATIENT
Start: 2022-01-20 | End: 2022-01-21

## 2022-01-20 RX ADMIN — PYRIDOSTIGMINE BROMIDE 30 MG: 60 TABLET ORAL at 20:51

## 2022-01-20 RX ADMIN — PROCHLORPERAZINE EDISYLATE 10 MG: 5 INJECTION INTRAMUSCULAR; INTRAVENOUS at 15:13

## 2022-01-20 RX ADMIN — PROCHLORPERAZINE EDISYLATE 10 MG: 5 INJECTION INTRAMUSCULAR; INTRAVENOUS at 20:52

## 2022-01-20 RX ADMIN — PROCHLORPERAZINE EDISYLATE 10 MG: 5 INJECTION INTRAMUSCULAR; INTRAVENOUS at 08:45

## 2022-01-20 RX ADMIN — APIXABAN 5 MG: 5 TABLET, FILM COATED ORAL at 08:44

## 2022-01-20 RX ADMIN — HYDROXYZINE PAMOATE 50 MG: 25 CAPSULE ORAL at 18:02

## 2022-01-20 RX ADMIN — METOPROLOL TARTRATE 25 MG: 25 TABLET, FILM COATED ORAL at 08:44

## 2022-01-20 RX ADMIN — CEFEPIME HYDROCHLORIDE 2 G: 2 INJECTION, POWDER, FOR SOLUTION INTRAVENOUS at 02:38

## 2022-01-20 RX ADMIN — PANTOPRAZOLE SODIUM 40 MG: 40 TABLET, DELAYED RELEASE ORAL at 18:04

## 2022-01-20 RX ADMIN — METOPROLOL TARTRATE 25 MG: 25 TABLET, FILM COATED ORAL at 20:52

## 2022-01-20 RX ADMIN — PYRIDOSTIGMINE BROMIDE 30 MG: 60 TABLET ORAL at 08:44

## 2022-01-20 RX ADMIN — ONDANSETRON 4 MG: 2 INJECTION INTRAMUSCULAR; INTRAVENOUS at 18:04

## 2022-01-20 RX ADMIN — MONTELUKAST SODIUM 10 MG: 10 TABLET, FILM COATED ORAL at 20:51

## 2022-01-20 RX ADMIN — GABAPENTIN 100 MG: 100 CAPSULE ORAL at 08:44

## 2022-01-20 RX ADMIN — SODIUM CHLORIDE 75 ML/HR: 9 INJECTION, SOLUTION INTRAVENOUS at 10:07

## 2022-01-20 RX ADMIN — METOPROLOL TARTRATE 25 MG: 25 TABLET, FILM COATED ORAL at 15:13

## 2022-01-20 RX ADMIN — ONDANSETRON 8 MG: 2 INJECTION INTRAMUSCULAR; INTRAVENOUS at 06:02

## 2022-01-20 RX ADMIN — GABAPENTIN 100 MG: 100 CAPSULE ORAL at 20:51

## 2022-01-20 RX ADMIN — GABAPENTIN 100 MG: 100 CAPSULE ORAL at 15:13

## 2022-01-20 RX ADMIN — VANCOMYCIN HYDROCHLORIDE 1000 MG: 1 INJECTION, SOLUTION INTRAVENOUS at 00:46

## 2022-01-20 RX ADMIN — CEFEPIME HYDROCHLORIDE 2 G: 2 INJECTION, POWDER, FOR SOLUTION INTRAVENOUS at 10:14

## 2022-01-20 RX ADMIN — LEVOTHYROXINE SODIUM 50 MCG: 0.05 TABLET ORAL at 05:56

## 2022-01-20 RX ADMIN — SODIUM CHLORIDE 2 G: 9 INJECTION, SOLUTION INTRAVENOUS at 13:38

## 2022-01-20 RX ADMIN — ONDANSETRON 4 MG: 2 INJECTION INTRAMUSCULAR; INTRAVENOUS at 13:38

## 2022-01-20 RX ADMIN — APIXABAN 5 MG: 5 TABLET, FILM COATED ORAL at 20:51

## 2022-01-20 RX ADMIN — PANTOPRAZOLE SODIUM 40 MG: 40 TABLET, DELAYED RELEASE ORAL at 08:44

## 2022-01-20 NOTE — PROGRESS NOTES
CC: Bacteremia and sepsis due to Dolan catheter infection    S: No events overnight.  Patient is feeling much better.  Low-grade temp yesterday night but has been afebrile since then    O:   Vitals:    01/20/22 0300 01/20/22 0500 01/20/22 0547 01/20/22 1037   BP:   129/75 115/69   BP Location:   Right arm Right arm   Patient Position:   Lying Lying   Pulse: 106 101 109 91   Resp:   18 18   Temp:   98.5 °F (36.9 °C) 98.1 °F (36.7 °C)   TempSrc:   Oral Oral   SpO2: 98% 97% 98% 97%   Weight:       Height:         Alert, no acute distress  Right chest with open wound, dressing removed, no erythema  Breathing comfortable  Slightly tachycardic at times  Regular rhythm    White blood cell count 6, hemoglobin 9.7, stable, all other labs stable, slightly decreased CO2 that is 17.7    All blood cultures positive for staph aureus  Culture from central line tip pending    Assessment and plan    26-year-old female with severe gastroparesis that has been dependent on IV fluids through a central line.  Central line has been removed and sepsis source control.  We discussed about future central access and she is not interested in having more central access if any more and she is going to get IVIG weekly with a new stick every week  She will try to keep with her p.o. intake  Wound packing was removed and dry dressings were placed over    Will need to have dry dressing changes twice a day  Will sign off, call with questions

## 2022-01-20 NOTE — PROGRESS NOTES
"Daily progress note    Chief complaint   Status post Dolan catheter removal  Doing much better  No specific complaints except nausea  Family at bedside    History of present illness  26-year-old white female with history of pulm embolism chronic orthostatic hypotension POTS syndrome severe gastroparesis Phoebe-Danlos syndrome irritable bowel syndrome gastroesophageal reflux disease who is well-known to our service and was recently admitted with Dolan catheter malfunctioning and was removed and new Dolan catheter placed presented to [facility] emergency room with high fever for last 3 days with nausea vomiting and not eating drinking fatigue tired and multiple other complaints with generalized weakness.  Patient denies any cough shortness of breath chest pain diarrhea.  Patient work-up in ER revealed sepsis probably line related admit for management.     REVIEW OF SYSTEMS  Constitutional:Negative for chills.   HENT: Negative for rhinorrhea and sore throat.    Eyes: Negative for visual disturbance.   Respiratory: Negative for cough and shortness of breath.    Cardiovascular: Negative for chest pain  Gastrointestinal: Positive for nausea Negative for abdominal pain  vomiting and diarrhea.   Endocrine: Negative.    Genitourinary: Negative for decreased urine volume.   Musculoskeletal: Negative for neck pain.   Skin: Negative for rash.   Neurological: Positive for weakness   Psychiatric/Behavioral: Negative.    All other systems reviewed and are negative.     PHYSICAL EXAM  Blood pressure 129/88, pulse 107, temperature 97.5 °F (36.4 °C), temperature source Oral, resp. rate 18, height 163.8 cm (64.5\"), weight 113 kg (250 lb), SpO2 98 %, not currently breastfeeding.    Constitutional:       General: Awake and alert     Appearance: She is not toxic-appearing.   HENT:      Head: Normocephalic and atraumatic.   Eyes:      Pupils: Pupils are equal, round, and reactive to light.   Cardiovascular:      Rate and " Rhythm: Regular rhythm.      Pulses: Normal pulses.           Posterior tibial pulses are 2+ on the right side and 2+ on the left side.      Heart sounds: Normal heart sounds. No murmur heard.  Pulmonary:      Effort: Pulmonary effort is normal. No respiratory distress.      Breath sounds: Normal breath sounds.   Abdominal:      General: Bowel sounds are normal.      Palpations: Abdomen is soft.      Tenderness: There is no abdominal tenderness. There is no guarding or rebound.   Musculoskeletal:         General: Normal range of motion.      Cervical back: Normal range of motion and neck supple.   Skin:     General: Skin is warm and dry.      Comments: Right IJ venous catheter site clean, dry, intact, nontender, no erythema   Neurological:      Mental Status: She is alert and oriented to person, place, and time.   Psychiatric:         Mood and Affect: Affect normal.      LAB RESULTS  Lab Results (last 24 hours)     Procedure Component Value Units Date/Time    Vancomycin, Trough Please draw vancomycin trough level 30 minutes prior to scheduled 1230 dose on 1/20, thanks! [[id number]] Collected: 01/20/22 1230    Specimen: Blood Updated: 01/20/22 1312    Body Fluid Culture - Body Fluid, Neck [[id number]] Collected: 01/19/22 1657    Specimen: Body Fluid from Neck Updated: 01/20/22 1304     Body Fluid Culture Growth present, too young to evaluate     Gram Stain Few (2+) WBCs seen      No organisms seen    Manual Differential [[id number]]  (Abnormal) Collected: 01/20/22 0801    Specimen: Blood Updated: 01/20/22 1041     Neutrophil % 77.8 %      Lymphocyte % 15.2 %      Monocyte % 4.0 %      Eosinophil % 3.0 %      Neutrophils Absolute 4.75 10*3/mm3      Lymphocytes Absolute 0.93 10*3/mm3      Monocytes Absolute 0.24 10*3/mm3      Eosinophils Absolute 0.18 10*3/mm3      RBC Morphology Normal     WBC Morphology Normal     Platelet Morphology Normal    CBC & Differential [[id number]]  (Abnormal) Collected: 01/20/22 0801     Specimen: Blood Updated: 01/20/22 1016    Narrative:      The following orders were created for panel order CBC & Differential.  Procedure                               Abnormality         Status                     ---------                               -----------         ------                     CBC Auto Differential[[id number]]        Abnormal            Final result                 Please view results for these tests on the individual orders.    CBC Auto Differential [[id number]]  (Abnormal) Collected: 01/20/22 0801    Specimen: Blood Updated: 01/20/22 1016     WBC 6.10 10*3/mm3      RBC 4.29 10*6/mm3      Hemoglobin 9.7 g/dL      Hematocrit 30.9 %      MCV 72.0 fL      MCH 22.6 pg      MCHC 31.4 g/dL      RDW 17.3 %      RDW-SD 45.4 fl      MPV 10.6 fL      Platelets 230 10*3/mm3     TSH [[id number]]  (Normal) Collected: 01/20/22 0801    Specimen: Blood Updated: 01/20/22 0959     TSH 2.720 uIU/mL     Comprehensive Metabolic Panel [[id number]]  (Abnormal) Collected: 01/20/22 0801    Specimen: Blood Updated: 01/20/22 0952     Glucose 107 mg/dL      BUN 6 mg/dL      Creatinine 0.72 mg/dL      Sodium 135 mmol/L      Potassium 4.0 mmol/L      Chloride 107 mmol/L      CO2 17.7 mmol/L      Calcium 8.4 mg/dL      Total Protein 6.7 g/dL      Albumin 3.20 g/dL      ALT (SGPT) 82 U/L      AST (SGOT) 53 U/L      Alkaline Phosphatase 71 U/L      Total Bilirubin 0.4 mg/dL      eGFR Non African Amer 98 mL/min/1.73      Globulin 3.5 gm/dL      A/G Ratio 0.9 g/dL      BUN/Creatinine Ratio 8.3     Anion Gap 10.3 mmol/L     Narrative:      GFR Normal >60  Chronic Kidney Disease <60  Kidney Failure <15      Lipid Panel [[id number]]  (Abnormal) Collected: 01/20/22 0801    Specimen: Blood Updated: 01/20/22 0952     Total Cholesterol 169 mg/dL      Triglycerides 165 mg/dL      HDL Cholesterol 28 mg/dL      LDL Cholesterol  112 mg/dL      VLDL Cholesterol 29 mg/dL      LDL/HDL Ratio 3.86    Narrative:      Cholesterol Reference  Ranges  (U.[place] Department of Health and Human Services ATP III Classifications)    Desirable          <200 mg/dL  Borderline High    200-239 mg/dL  High Risk          >240 mg/dL      Triglyceride Reference Ranges  (U.S. Department of Health and Human Services ATP III Classifications)    Normal           <150 mg/dL  Borderline High  150-199 mg/dL  High             200-499 mg/dL  Very High        >500 mg/dL    HDL Reference Ranges  (U.S. Department of Health and Human Services ATP III Classifications)    Low     <40 mg/dl (major risk factor for CHD)  High    >60 mg/dl ('negative' risk factor for CHD)        LDL Reference Ranges  (U.S. Department of Health and Human Services ATP III Classifications)    Optimal          <100 mg/dL  Near Optimal     100-129 mg/dL  Borderline High  130-159 mg/dL  High             160-189 mg/dL  Very High        >189 mg/dL    Hemoglobin A1c [[id number]]  (Abnormal) Collected: 01/20/22 0801    Specimen: Blood Updated: 01/20/22 0951     Hemoglobin A1C 5.70 %     Narrative:      Hemoglobin A1C Ranges:    Increased Risk for Diabetes  5.7% to 6.4%  Diabetes                     >= 6.5%  Diabetic Goal                < 7.0%    Blood Culture - Blood, Blood, Central Line [[id number]]  (Abnormal) Collected: 01/18/22 2018    Specimen: Blood, Central Line Updated: 01/20/22 0753     Blood Culture Staphylococcus aureus     Comment: Infectious disease consultation is highly recommended to rule out distant foci of infection.        Isolated from Aerobic and Anaerobic Bottles     Gram Stain Anaerobic Bottle Gram positive cocci in clusters      Aerobic Bottle Gram positive cocci in clusters    Blood Culture - Blood, Arm, Right [[id number]]  (Abnormal) Collected: 01/18/22 2015    Specimen: Blood from Arm, Right Updated: 01/20/22 0753     Blood Culture Staphylococcus aureus     Comment: Infectious disease consultation is highly recommended to rule out distant foci of infection.        Isolated from Aerobic Bottle      Gram Stain Aerobic Bottle Gram positive cocci    Blood Culture ID, PCR - Blood, Arm, Right [[id number]]  (Abnormal) Collected: 01/18/22 2015    Specimen: Blood from Arm, Right Updated: 01/20/22 0224     BCID, PCR Staph aureus. mecA/C and MREJ (methicillin resistance gene) NOT detected. Identification by BCID2 PCR     BOTTLE TYPE Aerobic Bottle    Narrative:      Infectious disease consultation is highly recommended to rule out distant foci of infection.        Imaging Results (Last 24 Hours)     Procedure Component Value Units Date/Time    CT Abdomen Pelvis With Contrast [[id number]] Collected: 01/19/22 0641     Updated: 01/19/22 1648    Narrative:         CT OF THE ABDOMEN AND PELVIS WITH CONTRAST AND CT ANGIOGRAM OF THE CHEST  WITH CONTRAST INCLUDING RECONSTRUCTION IMAGES 01/18/2022     HISTORY: Abdominal pain. Possible pulmonary embolus.     TECHNIQUE: Following the intravenous contrast injection, CT angiography  was performed through the chest. Sagittal, coronal and 3-D  reconstruction images were reviewed. This was followed by CT of the  abdomen and pelvis with contrast.     FINDINGS: The pulmonary arterial system is well opacified with no  evidence of pulmonary embolus.     No pathologically enlarged lymph nodes are seen.     No lung masses or significant pulmonary infiltrates are seen.     There is mild fatty infiltration of the liver. There is an approximately  8 mm low-density lesion in the dome of the liver which is not clearly  seen on the 05/13/2019 study.     The gallbladder, spleen, pancreas, adrenals and kidneys appear  unremarkable except for a tiny left renal cyst.           Uterus, adnexa and urinary bladder appear unremarkable.     No bowel wall thickening or bowel dilatation is seen.       Impression:      1. No evidence of pulmonary embolus.  2. Mild fatty infiltration of the liver.  3. Tiny low-density lesion in the liver. This is not clearly seen on the  previous study of 05/13/2019. Appears  slightly higher in density than a  typical cyst but could represent a cyst with volume averaging or  possibly small hemangioma. Benign etiology is favored. Consider a  short-term follow-up CT of the abdomen in 4 months to 6 months to assess  stability. Also correlation to any prior outside CT scans of the abdomen  may be helpful.  4. No other significant findings are noted.           Radiation dose reduction techniques were utilized, including automated  exposure control and exposure modulation based on body size.     This report was finalized on 1/19/2022 4:45 PM by Dr. Ramesh Gmoez M.D.       CT Angiogram Chest [[id number]] Collected: 01/19/22 0641     Updated: 01/19/22 1648    Narrative:         CT OF THE ABDOMEN AND PELVIS WITH CONTRAST AND CT ANGIOGRAM OF THE CHEST  WITH CONTRAST INCLUDING RECONSTRUCTION IMAGES 01/18/2022     HISTORY: Abdominal pain. Possible pulmonary embolus.     TECHNIQUE: Following the intravenous contrast injection, CT angiography  was performed through the chest. Sagittal, coronal and 3-D  reconstruction images were reviewed. This was followed by CT of the  abdomen and pelvis with contrast.     FINDINGS: The pulmonary arterial system is well opacified with no  evidence of pulmonary embolus.     No pathologically enlarged lymph nodes are seen.     No lung masses or significant pulmonary infiltrates are seen.     There is mild fatty infiltration of the liver. There is an approximately  8 mm low-density lesion in the dome of the liver which is not clearly  seen on the 05/13/2019 study.     The gallbladder, spleen, pancreas, adrenals and kidneys appear  unremarkable except for a tiny left renal cyst.           Uterus, adnexa and urinary bladder appear unremarkable.     No bowel wall thickening or bowel dilatation is seen.       Impression:      1. No evidence of pulmonary embolus.  2. Mild fatty infiltration of the liver.  3. Tiny low-density lesion in the liver. This is not clearly seen  on the  previous study of 05/13/2019. Appears slightly higher in density than a  typical cyst but could represent a cyst with volume averaging or  possibly small hemangioma. Benign etiology is favored. Consider a  short-term follow-up CT of the abdomen in 4 months to 6 months to assess  stability. Also correlation to any prior outside CT scans of the abdomen  may be helpful.  4. No other significant findings are noted.           Radiation dose reduction techniques were utilized, including automated  exposure control and exposure modulation based on body size.     This report was finalized on 1/19/2022 4:45 PM by Dr. Ramesh Gomez M.D.                  ECG 12 Lead  Component   Ref Range & Units 1/18/22 2052 9/17/21 1113 9/8/21 1002 5/6/21 0432 5/5/21 2240   QT Interval   ms 334  350  298  279  342              HEART RATE= 145  bpm  RR Interval= 416  ms  DC Interval= 85  ms  P Horizontal Axis= 13  deg  P Front Axis= 35  deg  QRSD Interval= 81  ms  QT Interval= 334  ms  QRS Axis= 5  deg  T Wave Axis= 241  deg  - ABNORMAL ECG -  Sinus tachycardia  Nonspecific repol abnormality, diffuse leads  Prolonged QT interval  When compared with ECG of 17-Sep-2021 11:13:59,  Significant rate increase with new nonspecific repolarization abnormalities in diffuse leads             Current Facility-Administered Medications:   •  acetaminophen (TYLENOL) tablet 650 mg, 650 mg, Oral, Q6H PRN, Caleb Naik MD, 650 mg at 01/19/22 1940  •  albuterol (PROVENTIL) nebulizer solution 0.083% 2.5 mg/3mL, 2.5 mg, Nebulization, Q4H PRN, Caleb Naik MD  •  apixaban (ELIQUIS) tablet 5 mg, 5 mg, Oral, Q12H, Caleb Naik MD, 5 mg at 01/20/22 0844  •  cefTRIAXone (ROCEPHIN) 2 g in sodium chloride 0.9 % 100 mL IVPB-VTB, 2 g, Intravenous, Q24H, Nancy Diaz MD, Last Rate: 200 mL/hr at 01/20/22 1338, 2 g at 01/20/22 1338  •  gabapentin (NEURONTIN) capsule 100 mg, 100 mg, Oral, TID, Caleb Naik MD, 100 mg at 01/20/22 0844  •  hydrOXYzine pamoate  (VISTARIL) capsule 25 mg, 25 mg, Oral, TID PRN **OR** hydrOXYzine pamoate (VISTARIL) capsule 50 mg, 50 mg, Oral, TID PRN, Ann Naik MD  •  levothyroxine (SYNTHROID, LEVOTHROID) tablet 50 mcg, 50 mcg, Oral, Q AM, Ann Naik MD, 50 mcg at 01/20/22 0556  •  metoprolol tartrate (LOPRESSOR) tablet 25 mg, 25 mg, Oral, TID, Ann Naik MD, 25 mg at 01/20/22 0844  •  montelukast (SINGULAIR) tablet 10 mg, 10 mg, Oral, Nightly, Ann Naik MD, 10 mg at 01/19/22 2136  •  ondansetron (ZOFRAN) injection 4 mg, 4 mg, Intravenous, Q4H PRN, Ann Naik MD, 4 mg at 01/20/22 1338  •  pantoprazole (PROTONIX) EC tablet 40 mg, 40 mg, Oral, BID AC, Ann Naik MD, 40 mg at 01/20/22 0844  •  prochlorperazine (COMPAZINE) injection 10 mg, 10 mg, Intravenous, Q4H PRN, Ann Naik MD  •  promethazine (PHENERGAN) 12.5 mg in sodium chloride 0.9 % 50 mL, 12.5 mg, Intravenous, Q6H PRN, Bari Castañeda MD  •  pyridostigmine (MESTINON) tablet 30 mg, 30 mg, Oral, BID, Ann Naik MD, 30 mg at 01/20/22 0844  •  [COMPLETED] Insert peripheral IV, , , Once **AND** sodium chloride 0.9 % flush 10 mL, 10 mL, Intravenous, PRN, Dena Richardson MD  •  sodium chloride 0.9 % infusion, 75 mL/hr, Intravenous, Continuous, Ann Naik MD, Last Rate: 75 mL/hr at 01/20/22 1007, 75 mL/hr at 01/20/22 1007     ASSESSMENT  MSSA bacteremia with sepsis  Line sepsis status post removal  Acute UTI  Pulm embolism on anticoagulation   Chronic orthostatic hypotension  Hypothyroidism  POTS syndrome  Phoebe-Danlos syndrome  Irritable bowel syndrome  Severe gastroparesis  Gastroesophageal reflux disease    PLAN  CPM  IVF  IV antibiotics for 2 weeks  Infectious disease consult appreciated  General surgery to follow patient  Adjust home medications  Stress ulcer DVT prophylaxis  Supportive care  Activity as tolerated  Discussed with family nursing staff  Follow closely and further recommendation current hospital course    ANN NAIK MD

## 2022-01-20 NOTE — SIGNIFICANT NOTE
01/20/22 1427   Midline Catheter - Single Lumen 01/20/22 Left Basilic   Placement Date/Time: 01/20/22 1426   Hand Hygiene Completed: Yes  Site Prep: Chlorhexidine isopropyl alcohol  All 5 Sterile Barriers Used (Gloves, Gown, Cap, Mask, Large Sterile Drape): Yes  Orientation: Left  Location: Basilic  Size (Fr): 3  Initial ...   Site Assessment Clean; Dry; Intact   Line Status Blood return noted; Capped; Flushed; Saline locked   Length sepideh (cm) 20 cm   Extremity Circumference (cm) 35 cm   Dressing Type Border Dressing; Antimicrobial dressing/disc; Securing device   Dressing Status Clean; Dry; Intact   Dressing Intervention New dressing   Liquid Adhesive Applied   Dressing Change Due 01/27/22   Indication/Daily Review of Necessity blood sampling; intravenous medication therapy

## 2022-01-20 NOTE — CONSULTS
LOS: 2 days   Patient Care Team:  Jason Borrero MD as PCP - General (Family Medicine)        Subjective       Attending MD : Caleb Naik MD    Patient Complaints: fever         History of Present Illness  :26-year-old white female with history of pulm embolism chronic orthostatic hypotension POTS syndrome severe gastroparesis Phoebe-Danlos syndrome irritable bowel syndrome gastroesophageal reflux disease who is well-known to our service and was recently admitted with Dolan catheter malfunctioning and was removed and new Dolan catheter placed presented to [facility] emergency room with high fever for last 3 days with nausea vomiting and not eating drinking fatigue tired and multiple other complaints with generalized weakness.  Patient denies any cough shortness of breath chest pain diarrhea.  Patient work-up in ER revealed sepsis probably line related admit for management.    [facility]     Patient Denies:  NV    PMH :   Febrile illness, acute      Review of Systems:    weak    Objective     Vital Signs  Temp:  [98.1 °F (36.7 °C)-99.9 °F (37.7 °C)] 98.1 °F (36.7 °C)  Heart Rate:  [] 91  Resp:  [17-18] 18  BP: (115-168)/() 115/69    Physical Exam:     General Appearance:    Alert, cooperative, in no acute distress   Head:    Normocephalic, without obvious abnormality, atraumatic   Eyes:            Lids and lashes normal, conjunctivae and sclerae normal, no   icterus, no pallor, corneas clear, PERRLA   Ears:    Ears appear intact with no abnormalities noted   Throat:   No oral lesions, no thrush, oral mucosa moist   Neck:   No adenopathy, supple, trachea midline, no thyromegaly, no     carotid bruit, no JVD   Back:     No kyphosis present, no scoliosis present, no skin lesions,       erythema or scars, no tenderness to percussion or                   palpation,   range of motion normal   Lungs:     Clear to auscultation,respirations regular, even and                   unlabored    Heart:     Regular rhythm and normal rate, normal S1 and S2, no            murmur, no gallop, no rub, no click   Breast Exam:    Deferred   Abdomen:     Normal bowel sounds, no masses, no organomegaly, soft        non-tender, non-distended, no guarding, no rebound                 tenderness   Genitalia:    Deferred   Extremities:   Moves all extremities well, no edema, no cyanosis, no              redness   Pulses:   Pulses palpable and equal bilaterally   Skin:   No bleeding, bruising or rash   Lymph nodes:   No palpable adenopathy   Neurologic:   Cranial nerves 2 - 12 grossly intact, sensation intact, DTR        present and equal bilaterally          Results Review:    Lab Results (last 72 hours)     Procedure Component Value Units Date/Time    Manual Differential [[id number]]  (Abnormal) Collected: 01/20/22 0801    Specimen: Blood Updated: 01/20/22 1041     Neutrophil % 77.8 %      Lymphocyte % 15.2 %      Monocyte % 4.0 %      Eosinophil % 3.0 %      Neutrophils Absolute 4.75 10*3/mm3      Lymphocytes Absolute 0.93 10*3/mm3      Monocytes Absolute 0.24 10*3/mm3      Eosinophils Absolute 0.18 10*3/mm3      RBC Morphology Normal     WBC Morphology Normal     Platelet Morphology Normal    CBC & Differential [[id number]]  (Abnormal) Collected: 01/20/22 0801    Specimen: Blood Updated: 01/20/22 1016    Narrative:      The following orders were created for panel order CBC & Differential.  Procedure                               Abnormality         Status                     ---------                               -----------         ------                     CBC Auto Differential[[id number]]        Abnormal            Final result                 Please view results for these tests on the individual orders.    CBC Auto Differential [[id number]]  (Abnormal) Collected: 01/20/22 0801    Specimen: Blood Updated: 01/20/22 1016     WBC 6.10 10*3/mm3      RBC 4.29 10*6/mm3      Hemoglobin 9.7 g/dL      Hematocrit 30.9 %      MCV 72.0 fL       MCH 22.6 pg      MCHC 31.4 g/dL      RDW 17.3 %      RDW-SD 45.4 fl      MPV 10.6 fL      Platelets 230 10*3/mm3     TSH [[id number]]  (Normal) Collected: 01/20/22 0801    Specimen: Blood Updated: 01/20/22 0959     TSH 2.720 uIU/mL     Comprehensive Metabolic Panel [[id number]]  (Abnormal) Collected: 01/20/22 0801    Specimen: Blood Updated: 01/20/22 0952     Glucose 107 mg/dL      BUN 6 mg/dL      Creatinine 0.72 mg/dL      Sodium 135 mmol/L      Potassium 4.0 mmol/L      Chloride 107 mmol/L      CO2 17.7 mmol/L      Calcium 8.4 mg/dL      Total Protein 6.7 g/dL      Albumin 3.20 g/dL      ALT (SGPT) 82 U/L      AST (SGOT) 53 U/L      Alkaline Phosphatase 71 U/L      Total Bilirubin 0.4 mg/dL      eGFR Non African Amer 98 mL/min/1.73      Globulin 3.5 gm/dL      A/G Ratio 0.9 g/dL      BUN/Creatinine Ratio 8.3     Anion Gap 10.3 mmol/L     Narrative:      GFR Normal >60  Chronic Kidney Disease <60  Kidney Failure <15      Lipid Panel [[id number]]  (Abnormal) Collected: 01/20/22 0801    Specimen: Blood Updated: 01/20/22 0952     Total Cholesterol 169 mg/dL      Triglycerides 165 mg/dL      HDL Cholesterol 28 mg/dL      LDL Cholesterol  112 mg/dL      VLDL Cholesterol 29 mg/dL      LDL/HDL Ratio 3.86    Narrative:      Cholesterol Reference Ranges  (U.S. Department of Health and Human Services ATP III Classifications)    Desirable          <200 mg/dL  Borderline High    200-239 mg/dL  High Risk          >240 mg/dL      Triglyceride Reference Ranges  (U.S. Department of Health and Human Services ATP III Classifications)    Normal           <150 mg/dL  Borderline High  150-199 mg/dL  High             200-499 mg/dL  Very High        >500 mg/dL    HDL Reference Ranges  (U.S. Department of Health and Human Services ATP III Classifications)    Low     <40 mg/dl (major risk factor for CHD)  High    >60 mg/dl ('negative' risk factor for CHD)        LDL Reference Ranges  (U.S. Department of Health and Human Services ATP III  Classifications)    Optimal          <100 mg/dL  Near Optimal     100-129 mg/dL  Borderline High  130-159 mg/dL  High             160-189 mg/dL  Very High        >189 mg/dL    Hemoglobin A1c [[id number]]  (Abnormal) Collected: 01/20/22 0801    Specimen: Blood Updated: 01/20/22 0951     Hemoglobin A1C 5.70 %     Narrative:      Hemoglobin A1C Ranges:    Increased Risk for Diabetes  5.7% to 6.4%  Diabetes                     >= 6.5%  Diabetic Goal                < 7.0%    Blood Culture - Blood, Blood, Central Line [[id number]]  (Abnormal) Collected: 01/18/22 2018    Specimen: Blood, Central Line Updated: 01/20/22 0753     Blood Culture Staphylococcus aureus     Comment: Infectious disease consultation is highly recommended to rule out distant foci of infection.        Isolated from Aerobic and Anaerobic Bottles     Gram Stain Anaerobic Bottle Gram positive cocci in clusters      Aerobic Bottle Gram positive cocci in clusters    Blood Culture - Blood, Arm, Right [[id number]]  (Abnormal) Collected: 01/18/22 2015    Specimen: Blood from Arm, Right Updated: 01/20/22 0753     Blood Culture Staphylococcus aureus     Comment: Infectious disease consultation is highly recommended to rule out distant foci of infection.        Isolated from Aerobic Bottle     Gram Stain Aerobic Bottle Gram positive cocci    Body Fluid Culture - Body Fluid, Neck [[id number]] Collected: 01/19/22 1657    Specimen: Body Fluid from Neck Updated: 01/20/22 0657     Body Fluid Culture No growth at less than 24 hours     Gram Stain Few (2+) WBCs seen      No organisms seen    Blood Culture ID, PCR - Blood, Arm, Right [[id number]]  (Abnormal) Collected: 01/18/22 2015    Specimen: Blood from Arm, Right Updated: 01/20/22 0224     BCID, PCR Staph aureus. mecA/C and MREJ (methicillin resistance gene) NOT detected. Identification by BCID2 PCR     BOTTLE TYPE Aerobic Bottle    Narrative:      Infectious disease consultation is highly recommended to rule out distant  foci of infection.    CBC & Differential [[id number]]  (Abnormal) Collected: 01/19/22 1144    Specimen: Blood Updated: 01/19/22 1223    Narrative:      The following orders were created for panel order CBC & Differential.  Procedure                               Abnormality         Status                     ---------                               -----------         ------                     CBC Auto Differential[[id number]]        Abnormal            Final result                 Please view results for these tests on the individual orders.    CBC Auto Differential [[id number]]  (Abnormal) Collected: 01/19/22 1144    Specimen: Blood Updated: 01/19/22 1223     WBC 7.84 10*3/mm3      RBC 4.39 10*6/mm3      Hemoglobin 9.9 g/dL      Hematocrit 31.6 %      MCV 72.0 fL      MCH 22.6 pg      MCHC 31.3 g/dL      RDW 17.3 %      RDW-SD 44.5 fl      MPV 10.0 fL      Platelets 244 10*3/mm3      Neutrophil % 87.1 %      Lymphocyte % 8.3 %      Monocyte % 4.2 %      Eosinophil % 0.0 %      Basophil % 0.1 %      Neutrophils, Absolute 6.83 10*3/mm3      Lymphocytes, Absolute 0.65 10*3/mm3      Monocytes, Absolute 0.33 10*3/mm3      Eosinophils, Absolute 0.00 10*3/mm3      Basophils, Absolute 0.01 10*3/mm3     Basic Metabolic Panel [[id number]]  (Abnormal) Collected: 01/19/22 1144    Specimen: Blood Updated: 01/19/22 1219     Glucose 136 mg/dL      BUN 6 mg/dL      Creatinine 0.75 mg/dL      Sodium 135 mmol/L      Potassium 3.6 mmol/L      Chloride 108 mmol/L      CO2 16.9 mmol/L      Calcium 8.3 mg/dL      eGFR Non African Amer 93 mL/min/1.73      BUN/Creatinine Ratio 8.0     Anion Gap 10.1 mmol/L     Narrative:      GFR Normal >60  Chronic Kidney Disease <60  Kidney Failure <15      Blood Gas, Arterial - [[id number]]  (Abnormal) Collected: 01/18/22 2156    Specimen: Arterial Blood Updated: 01/18/22 2159     Site Arterial: right radial     Vipin's Test Positive     pH, Arterial 7.499 pH units      pCO2, Arterial 23.5 mm Hg      pO2,  Arterial 95.9 mm Hg      HCO3, Arterial 18.3 mmol/L      Base Excess, Arterial -3.4 mmol/L      O2 Saturation Calculated 98.3 %      Barometric Pressure for Blood Gas 751.0 mmHg      Comment: 96% Meter: [id number] : [id number] Leigha Erickson        Modality Room Air     Rate 20 Breaths/minute     BNP [[id number]]  (Normal) Collected: 01/18/22 2014    Specimen: Blood from Arm, Right Updated: 01/18/22 2131     proBNP 97.7 pg/mL     Narrative:      Among patients with dyspnea, NT-proBNP is highly sensitive for the detection of acute congestive heart failure. In addition NT-proBNP of <300 pg/ml effectively rules out acute congestive heart failure with 99% negative predictive value.    Results may be falsely decreased if patient taking Biotin.      Respiratory Panel PCR w/COVID-19(SARS-CoV-2) JENIFER/LUIS/DAT/PAD/COR/MAD/NBA In-House, NP Swab in UTM/VTM, 3-4 HR TAT - Swab, Nasopharynx [[id number]]  (Normal) Collected: 01/18/22 2028    Specimen: Swab from Nasopharynx Updated: 01/18/22 2128     ADENOVIRUS, PCR Not Detected     Coronavirus 229E Not Detected     Coronavirus HKU1 Not Detected     Coronavirus NL63 Not Detected     Coronavirus OC43 Not Detected     COVID19 Not Detected     Human Metapneumovirus Not Detected     Human Rhinovirus/Enterovirus Not Detected     Influenza A PCR Not Detected     Influenza B PCR Not Detected     Parainfluenza Virus 1 Not Detected     Parainfluenza Virus 2 Not Detected     Parainfluenza Virus 3 Not Detected     Parainfluenza Virus 4 Not Detected     RSV, PCR Not Detected     Bordetella pertussis pcr Not Detected     Bordetella parapertussis PCR Not Detected     Chlamydophila pneumoniae PCR Not Detected     Mycoplasma pneumo by PCR Not Detected    Narrative:      In the setting of a positive respiratory panel with a viral infection PLUS a negative procalcitonin without other underlying concern for bacterial infection, consider observing off antibiotics or discontinuation of antibiotics  and continue supportive care. If the respiratory panel is positive for atypical bacterial infection (Bordetella pertussis, Chlamydophila pneumoniae, or Mycoplasma pneumoniae), consider antibiotic de-escalation to target atypical bacterial infection.    Comprehensive Metabolic Panel [[id number]]  (Abnormal) Collected: 01/18/22 2014    Specimen: Blood from Arm, Right Updated: 01/18/22 2118     Glucose 109 mg/dL      BUN 7 mg/dL      Creatinine 1.00 mg/dL      Sodium 134 mmol/L      Potassium 4.2 mmol/L      Chloride 104 mmol/L      CO2 15.7 mmol/L      Calcium 8.7 mg/dL      Total Protein 7.1 g/dL      Albumin 4.10 g/dL      ALT (SGPT) 88 U/L      AST (SGOT) 91 U/L      Alkaline Phosphatase 79 U/L      Total Bilirubin 0.9 mg/dL      eGFR Non African Amer 67 mL/min/1.73      Globulin 3.0 gm/dL      A/G Ratio 1.4 g/dL      BUN/Creatinine Ratio 7.0     Anion Gap 14.3 mmol/L     Narrative:      GFR Normal >60  Chronic Kidney Disease <60  Kidney Failure <15      Magnesium [[id number]]  (Normal) Collected: 01/18/22 2014    Specimen: Blood from Arm, Right Updated: 01/18/22 2118     Magnesium 1.9 mg/dL     [place] Draw [[id number]] Collected: 01/18/22 2014    Specimen: Blood from Arm, Right Updated: 01/18/22 2116    Narrative:      The following orders were created for panel order [place] Draw.  Procedure                               Abnormality         Status                     ---------                               -----------         ------                     Green Top (Gel)[[id number]]                                  Final result               Lavender Top[[id number]]                                     Final result               Gold Top - SST[[id number]]                                                              Light Blue Top[[id number]]                                   Final result                 Please view results for these tests on the individual orders.    Lavender Top [[id number]] Collected: 01/18/22 2014     "Specimen: Blood from Arm, Right Updated: 01/18/22 2116     Extra Tube hold for add-on     Comment: Auto resulted       Green Top (Gel) [[id number]] Collected: 01/18/22 2014    Specimen: Blood from Arm, Right Updated: 01/18/22 2116     Extra Tube Hold for add-ons.     Comment: Auto resulted.       Light Blue Top [[id number]] Collected: 01/18/22 2014    Specimen: Blood from Arm, Right Updated: 01/18/22 2116     Extra Tube hold for add-on     Comment: Auto resulted       Troponin [[id number]]  (Normal) Collected: 01/18/22 2014    Specimen: Blood from Arm, Right Updated: 01/18/22 2115     Troponin T <0.010 ng/mL     Narrative:      Troponin T Reference Range:  <= 0.03 ng/mL-   Negative for AMI  >0.03 ng/mL-     Abnormal for myocardial necrosis.  Clinicians would have to utilize clinical acumen, EKG, Troponin and serial changes to determine if it is an Acute Myocardial Infarction or myocardial injury due to an underlying chronic condition.       Results may be falsely decreased if patient taking Biotin.      Procalcitonin [[id number]]  (Abnormal) Collected: 01/18/22 2014    Specimen: Blood from Arm, Right Updated: 01/18/22 2114     Procalcitonin 2.22 ng/mL     Narrative:      As a Marker for Sepsis (Non-Neonates):     1. <0.5 ng/mL represents a low risk of severe sepsis and/or septic shock.  2. >2 ng/mL represents a high risk of severe sepsis and/or septic shock.    As a Marker for Lower Respiratory Tract Infections that require antibiotic therapy:  PCT on Admission     Antibiotic Therapy             6-12 Hrs later  >0.5                          Strongly Recommended            >0.25 - <0.5             Recommended  0.1 - 0.25                  Discouraged                       Remeasure/reassess PCT  <0.1                         Strongly Discouraged         Remeasure/reassess PCT      As 28 day mortality risk marker: \"Change in Procalcitonin Result\" (>80% or <=80%) if Day 0 (or Day 1) and Day 4 values are available. Refer to " http://[email or web address]-pct-calculator.com/    Change in PCT <=80 %   A decrease of PCT levels below or equal to 80% defines a positive change in PCT test result representing a higher risk for 28-day all-cause mortality of patients diagnosed with severe sepsis or septic shock.    Change in PCT >80 %   A decrease of PCT levels of more than 80% defines a negative change in PCT result representing a lower risk for 28-day all-cause mortality of patients diagnosed with severe sepsis or septic shock.                TSH [[id number]]  (Normal) Collected: 01/18/22 2014    Specimen: Blood from Arm, Right Updated: 01/18/22 2106     TSH 1.020 uIU/mL     Urinalysis, Microscopic Only - Urine, Catheter [[id number]]  (Abnormal) Collected: 01/18/22 2028    Specimen: Urine, Catheter Updated: 01/18/22 2100     RBC, UA 0-2 /HPF      WBC, UA 3-5 /HPF      Bacteria, UA None Seen /HPF      Squamous Epithelial Cells, UA None Seen /HPF      Hyaline Casts, UA None Seen /LPF      Mucus, UA Moderate/2+ /HPF      Methodology Manual Light Microscopy    hCG, Serum, Qualitative [[id number]]  (Normal) Collected: 01/18/22 2014    Specimen: Blood from Arm, Right Updated: 01/18/22 2059     HCG Qualitative Negative    Protime-INR [[id number]]  (Abnormal) Collected: 01/18/22 2014    Specimen: Blood from Arm, Right Updated: 01/18/22 2058     Protime 16.6 Seconds      INR 1.36    aPTT [[id number]]  (Abnormal) Collected: 01/18/22 2014    Specimen: Blood from Arm, Right Updated: 01/18/22 2058     PTT 44.2 seconds     Lactic Acid, Plasma [[id number]]  (Normal) Collected: 01/18/22 2014    Specimen: Blood from Arm, Right Updated: 01/18/22 2057     Lactate 1.9 mmol/L     Urinalysis With Microscopic If Indicated (No Culture) - Urine, Catheter [[id number]]  (Abnormal) Collected: 01/18/22 2028    Specimen: Urine, Catheter Updated: 01/18/22 2049     Color, UA Orange     Comment: Any Substance that causes an abnormal urine color can alter the accuracy of the chemical reactions.         Appearance, UA Clear     pH, UA <=5.0     Specific Gravity, UA >=1.030     Glucose, UA Negative     Ketones, UA Negative     Bilirubin, UA Negative     Blood, UA Trace     Protein,  mg/dL (2+)     Leuk Esterase, UA Moderate (2+)     Nitrite, UA Positive     Urobilinogen, UA 1.0 E.U./dL    CBC & Differential [[id number]]  (Abnormal) Collected: 01/18/22 2014    Specimen: Blood from Arm, Right Updated: 01/18/22 2047    Narrative:      The following orders were created for panel order CBC & Differential.  Procedure                               Abnormality         Status                     ---------                               -----------         ------                     CBC Auto Differential[[id number]]        Abnormal            Final result                 Please view results for these tests on the individual orders.    CBC Auto Differential [[id number]]  (Abnormal) Collected: 01/18/22 2014    Specimen: Blood from Arm, Right Updated: 01/18/22 2047     WBC 11.77 10*3/mm3      RBC 4.96 10*6/mm3      Hemoglobin 11.2 g/dL      Hematocrit 35.5 %      MCV 71.6 fL      MCH 22.6 pg      MCHC 31.5 g/dL      RDW 17.2 %      RDW-SD 43.0 fl      MPV 9.8 fL      Platelets 276 10*3/mm3      Neutrophil % 90.9 %      Lymphocyte % 4.9 %      Monocyte % 3.3 %      Eosinophil % 0.0 %      Basophil % 0.3 %      Neutrophils, Absolute 10.70 10*3/mm3      Lymphocytes, Absolute 0.58 10*3/mm3      Monocytes, Absolute 0.39 10*3/mm3      Eosinophils, Absolute 0.00 10*3/mm3      Basophils, Absolute 0.03 10*3/mm3               Imaging Results (Last 72 Hours)     Procedure Component Value Units Date/Time    CT Abdomen Pelvis With Contrast [[id number]] Collected: 01/19/22 0641     Updated: 01/19/22 1648    Narrative:         CT OF THE ABDOMEN AND PELVIS WITH CONTRAST AND CT ANGIOGRAM OF THE CHEST  WITH CONTRAST INCLUDING RECONSTRUCTION IMAGES 01/18/2022     HISTORY: Abdominal pain. Possible pulmonary embolus.     TECHNIQUE: Following  the intravenous contrast injection, CT angiography  was performed through the chest. Sagittal, coronal and 3-D  reconstruction images were reviewed. This was followed by CT of the  abdomen and pelvis with contrast.     FINDINGS: The pulmonary arterial system is well opacified with no  evidence of pulmonary embolus.     No pathologically enlarged lymph nodes are seen.     No lung masses or significant pulmonary infiltrates are seen.     There is mild fatty infiltration of the liver. There is an approximately  8 mm low-density lesion in the dome of the liver which is not clearly  seen on the 05/13/2019 study.     The gallbladder, spleen, pancreas, adrenals and kidneys appear  unremarkable except for a tiny left renal cyst.           Uterus, adnexa and urinary bladder appear unremarkable.     No bowel wall thickening or bowel dilatation is seen.       Impression:      1. No evidence of pulmonary embolus.  2. Mild fatty infiltration of the liver.  3. Tiny low-density lesion in the liver. This is not clearly seen on the  previous study of 05/13/2019. Appears slightly higher in density than a  typical cyst but could represent a cyst with volume averaging or  possibly small hemangioma. Benign etiology is favored. Consider a  short-term follow-up CT of the abdomen in 4 months to 6 months to assess  stability. Also correlation to any prior outside CT scans of the abdomen  may be helpful.  4. No other significant findings are noted.           Radiation dose reduction techniques were utilized, including automated  exposure control and exposure modulation based on body size.     This report was finalized on 1/19/2022 4:45 PM by Dr. Ramesh Gomez M.D.       CT Angiogram Chest [[id number]] Collected: 01/19/22 0641     Updated: 01/19/22 1648    Narrative:         CT OF THE ABDOMEN AND PELVIS WITH CONTRAST AND CT ANGIOGRAM OF THE CHEST  WITH CONTRAST INCLUDING RECONSTRUCTION IMAGES 01/18/2022     HISTORY: Abdominal pain.  Possible pulmonary embolus.     TECHNIQUE: Following the intravenous contrast injection, CT angiography  was performed through the chest. Sagittal, coronal and 3-D  reconstruction images were reviewed. This was followed by CT of the  abdomen and pelvis with contrast.     FINDINGS: The pulmonary arterial system is well opacified with no  evidence of pulmonary embolus.     No pathologically enlarged lymph nodes are seen.     No lung masses or significant pulmonary infiltrates are seen.     There is mild fatty infiltration of the liver. There is an approximately  8 mm low-density lesion in the dome of the liver which is not clearly  seen on the 05/13/2019 study.     The gallbladder, spleen, pancreas, adrenals and kidneys appear  unremarkable except for a tiny left renal cyst.           Uterus, adnexa and urinary bladder appear unremarkable.     No bowel wall thickening or bowel dilatation is seen.       Impression:      1. No evidence of pulmonary embolus.  2. Mild fatty infiltration of the liver.  3. Tiny low-density lesion in the liver. This is not clearly seen on the  previous study of 05/13/2019. Appears slightly higher in density than a  typical cyst but could represent a cyst with volume averaging or  possibly small hemangioma. Benign etiology is favored. Consider a  short-term follow-up CT of the abdomen in 4 months to 6 months to assess  stability. Also correlation to any prior outside CT scans of the abdomen  may be helpful.  4. No other significant findings are noted.           Radiation dose reduction techniques were utilized, including automated  exposure control and exposure modulation based on body size.     This report was finalized on 1/19/2022 4:45 PM by Dr. Ramesh Gomez M.D.       XR Chest 1 View [[id number]] Collected: 01/18/22 2046     Updated: 01/18/22 2051    Narrative:      XR CHEST 1 VW-  01/18/2022     HISTORY: Rapid heart rate. Possible pneumonia.     Heart size is within normal limits.  Lungs appear free of acute  infiltrates. Mediport catheter seen in good position.     There are some minimally prominent soft tissue in the medial aspect of  the right hemithorax just below the medial aspect of the clavicle. This  could be related to prominent mediastinal fat and/or ectatic vascular  structures. This finding is also seen on the previous studies of  10/25/2021 and 10/13/2020.           Bony structures appear unremarkable.       Impression:      No acute process identified.     This report was finalized on 1/18/2022 8:48 PM by Dr. Ramesh Gomez M.D.               Medication Review:      Hospital Medications (active)       Dose Frequency Start End    acetaminophen (TYLENOL) tablet 650 mg 650 mg Every 6 Hours PRN 1/19/2022     Admin Instructions: Do not exceed 4 grams of acetaminophen in a 24 hr period. Max dose of 2gm for AST/ALT greater than 120 units/L      If given for pain, use the following pain scale:   Mild Pain = Pain Score of 1-3, CPOT 1-2  Moderate Pain = Pain Score of 4-6, CPOT 3-4  Severe Pain = Pain Score of 7-10, CPOT 5-8    Route: Oral    albuterol (PROVENTIL) nebulizer solution 0.083% 2.5 mg/3mL 2.5 mg Every 4 Hours PRN 1/19/2022     Route: Nebulization    apixaban (ELIQUIS) tablet 5 mg 5 mg Every 12 Hours Scheduled 1/19/2022     Admin Instructions: Tablet may be crushed and suspended in 60 mL of water or D5W and immediately delivered via NG tube.    Route: Oral    cefepime 2 gm IVPB in 100 ml NS (VTB) 2 g Every 8 Hours 1/19/2022 1/24/2022    Route: Intravenous    gabapentin (NEURONTIN) capsule 100 mg 100 mg 3 Times Daily 1/19/2022     Admin Instructions:     Route: Oral    levothyroxine (SYNTHROID, LEVOTHROID) tablet 50 mcg 50 mcg Every Early Morning 1/19/2022     Admin Instructions: Take on empty stomach.    Route: Oral    metoprolol tartrate (LOPRESSOR) tablet 25 mg 25 mg 3 Times Daily 1/19/2022     Route: Oral    montelukast (SINGULAIR) tablet 10 mg 10 mg Nightly 1/19/2022      "Route: Oral    ondansetron (ZOFRAN) injection 8 mg 8 mg Every 6 Hours PRN 1/19/2022     Route: Intravenous    pantoprazole (PROTONIX) EC tablet 40 mg 40 mg 2 Times Daily Before Meals 1/19/2022     Admin Instructions: Swallow whole; do not crush, split, or chew.    Route: Oral    Pharmacy to dose vancomycin  Continuous PRN 1/19/2022 1/24/2022    Route: Does not apply    prochlorperazine (COMPAZINE) injection 10 mg 10 mg Every 6 Hours PRN 1/19/2022     Route: Intravenous    Cosign for Ordering: Accepted by Caleb Naik MD on 1/19/2022  9:21 AM    promethazine (PHENERGAN) 12.5 mg in sodium chloride 0.9 % 50 mL 12.5 mg Every 6 Hours PRN 1/19/2022     Admin Instructions: If multiple antiemetics ordered, give antiemetics in this order: ondansetron, prochlorperazine, promethazine.    Route: Intravenous    pyridostigmine (MESTINON) tablet 30 mg 30 mg 2 Times Daily 1/19/2022     Route: Oral    sodium chloride 0.9 % flush 10 mL 10 mL As Needed 1/18/2022     Route: Intravenous    Linked Group 1: \"And\" Linked Group Details        sodium chloride 0.9 % infusion 75 mL/hr Continuous 1/19/2022     Route: Intravenous    vancomycin (VANCOCIN) in iso-osmotic dextrose IVPB 1 g (premix) 200 mL 1,000 mg Every 12 Hours 1/19/2022 1/24/2022    Route: Intravenous          Assessment/Plan         Febrile illness, acute       central line infection causing bacteremia  Line removed    BC  MSSA    Plan :    IV rocephin for 2 weeks till 2/5/22  Will follow  Thank you    Nancy Diaz MD  01/20/22  10:56 EST        "

## 2022-01-20 NOTE — CASE MANAGEMENT/SOCIAL WORK
Discharge Planning Assessment  [facility]     Patient Name: Liliam Lorenz  MRN: [id number]  Today's Date: 1/20/2022    Admit Date: 1/18/2022     Discharge Needs Assessment     Row Name 01/20/22 1501       Living Environment    Lives With spouse; other relative(s)    Current Living Arrangements home/apartment/condo    Primary Care Provided by self    Provides Primary Care For no one, unable/limited ability to care for self; no one    Family Caregiver if Needed spouse; other relative(s)    Quality of Family Relationships helpful; involved; supportive    Able to Return to Prior Arrangements yes       Resource/Environmental Concerns    Resource/Environmental Concerns none    Transportation Concerns car, none       Transition Planning    Patient/Family Anticipates Transition to home with family; home with help/services    Patient/Family Anticipated Services at Transition home health care    Transportation Anticipated family or friend will provide       Discharge Needs Assessment    Readmission Within the Last 30 Days no previous admission in last 30 days    Current Outpatient/Agency/Support Group infusion therapy, home    Equipment Currently Used at Home cane, quad; rollator; other (see comments)  IV POLE    Concerns to be Addressed discharge planning    Anticipated Changes Related to Illness none    Equipment Needed After Discharge none    Outpatient/Agency/Support Group Needs infusion therapy, home               Discharge Plan     Row Name 01/20/22 1506       Plan    Plan home with family and continued services through [facility]    Patient/Family in Agreement with Plan yes    Plan Comments CCP met with the patient and her spouse at the bedside. CCP role explained and facesheet verified. Patient's PCP is Dr. Borrero and she uses [facility] on [facility] for her meds. She has a rollator/WC seat at home along with a cane. She does not drive, her family assists with transportation. She has used [facility] in  the past and has not been to SNF. Her spouse can provide transport at discharge. She is current with [facility] and has an RN visit weekly for IVIG infusions. She provided CCP with the number for central intake, [phone], to inquire about IV antibiotics through their services. CCP spoke with Debra/[facility] who confirms the patient is current and requested faxed ID clinicals to [phone]. CCP faxed appropriate clinicals. CCP to follow. Anuj Bo RN CCP              Continued Care and Services - Admitted Since 1/18/2022    Coordination has not been started for this encounter.       Expected Discharge Date and Time     Expected Discharge Date Expected Discharge Time    Jan 24, 2022          Demographic Summary     Row Name 01/20/22 1450       General Information    Admission Type inpatient    Arrived From emergency department    Referral Source admission list    Reason for Consult discharge planning    Preferred Language English       Contact Information    Permission Granted to Share Info With family/designee; other (see comments)  [facility]    Contact Information Obtained for other (see comments)  [facility]               Functional Status     Row Name 01/20/22 1501       Functional Status    Usual Activity Tolerance good    Current Activity Tolerance good       Functional Status, IADL    Medications independent    Meal Preparation independent    Housekeeping independent    Laundry independent    Shopping completely dependent       Mental Status    General Appearance WDL WDL       Mental Status Summary    Recent Changes in Mental Status/Cognitive Functioning no changes       Employment/    Employment Status unemployed               Psychosocial    No documentation.                Abuse/Neglect    No documentation.                Legal    No documentation.                Substance Abuse    No documentation.                Patient Forms    No documentation.                    Anuj Bo RN

## 2022-01-21 LAB
ANION GAP SERPL CALCULATED.3IONS-SCNC: 11.8 MMOL/L (ref 5–15)
BACTERIA SPEC AEROBE CULT: ABNORMAL
BACTERIA SPEC AEROBE CULT: ABNORMAL
BASOPHILS # BLD MANUAL: 0.1 10*3/MM3 (ref 0–0.2)
BASOPHILS NFR BLD MANUAL: 2.1 % (ref 0–1.5)
BUN SERPL-MCNC: 8 MG/DL (ref 6–20)
BUN/CREAT SERPL: 12.7 (ref 7–25)
CALCIUM SPEC-SCNC: 8.3 MG/DL (ref 8.6–10.5)
CHLORIDE SERPL-SCNC: 107 MMOL/L (ref 98–107)
CO2 SERPL-SCNC: 19.2 MMOL/L (ref 22–29)
CREAT SERPL-MCNC: 0.63 MG/DL (ref 0.57–1)
DACRYOCYTES BLD QL SMEAR: ABNORMAL
DEPRECATED RDW RBC AUTO: 44.9 FL (ref 37–54)
EOSINOPHIL # BLD MANUAL: 0.1 10*3/MM3 (ref 0–0.4)
EOSINOPHIL NFR BLD MANUAL: 2.1 % (ref 0.3–6.2)
ERYTHROCYTE [DISTWIDTH] IN BLOOD BY AUTOMATED COUNT: 17.5 % (ref 12.3–15.4)
GFR SERPL CREATININE-BSD FRML MDRD: 114 ML/MIN/1.73
GLUCOSE SERPL-MCNC: 106 MG/DL (ref 65–99)
GRAM STN SPEC: ABNORMAL
HCT VFR BLD AUTO: 29.9 % (ref 34–46.6)
HGB BLD-MCNC: 9.3 G/DL (ref 12–15.9)
HYPOCHROMIA BLD QL: ABNORMAL
ISOLATED FROM: ABNORMAL
ISOLATED FROM: ABNORMAL
LYMPHOCYTES # BLD MANUAL: 1.51 10*3/MM3 (ref 0.7–3.1)
LYMPHOCYTES NFR BLD MANUAL: 10.5 % (ref 5–12)
MCH RBC QN AUTO: 22.1 PG (ref 26.6–33)
MCHC RBC AUTO-ENTMCNC: 31.1 G/DL (ref 31.5–35.7)
MCV RBC AUTO: 71.2 FL (ref 79–97)
MONOCYTES # BLD: 0.52 10*3/MM3 (ref 0.1–0.9)
NEUTROPHILS # BLD AUTO: 2.71 10*3/MM3 (ref 1.7–7)
NEUTROPHILS NFR BLD MANUAL: 54.7 % (ref 42.7–76)
PLAT MORPH BLD: NORMAL
PLATELET # BLD AUTO: 227 10*3/MM3 (ref 140–450)
PMV BLD AUTO: 10.4 FL (ref 6–12)
POTASSIUM SERPL-SCNC: 3.5 MMOL/L (ref 3.5–5.2)
RBC # BLD AUTO: 4.2 10*6/MM3 (ref 3.77–5.28)
SODIUM SERPL-SCNC: 138 MMOL/L (ref 136–145)
VARIANT LYMPHS NFR BLD MANUAL: 30.5 % (ref 19.6–45.3)
WBC MORPH BLD: NORMAL
WBC NRBC COR # BLD: 4.95 10*3/MM3 (ref 3.4–10.8)

## 2022-01-21 PROCEDURE — 25010000002 ONDANSETRON PER 1 MG: Performed by: HOSPITALIST

## 2022-01-21 PROCEDURE — 99231 SBSQ HOSP IP/OBS SF/LOW 25: CPT | Performed by: SURGERY

## 2022-01-21 PROCEDURE — 25010000002 PROCHLORPERAZINE 10 MG/2ML SOLUTION: Performed by: HOSPITALIST

## 2022-01-21 PROCEDURE — 80048 BASIC METABOLIC PNL TOTAL CA: CPT | Performed by: HOSPITALIST

## 2022-01-21 PROCEDURE — 85007 BL SMEAR W/DIFF WBC COUNT: CPT | Performed by: HOSPITALIST

## 2022-01-21 PROCEDURE — 85025 COMPLETE CBC W/AUTO DIFF WBC: CPT | Performed by: HOSPITALIST

## 2022-01-21 PROCEDURE — 25010000002 CEFTRIAXONE PER 250 MG: Performed by: INTERNAL MEDICINE

## 2022-01-21 RX ADMIN — MONTELUKAST SODIUM 10 MG: 10 TABLET, FILM COATED ORAL at 20:47

## 2022-01-21 RX ADMIN — PROCHLORPERAZINE EDISYLATE 10 MG: 5 INJECTION INTRAMUSCULAR; INTRAVENOUS at 17:38

## 2022-01-21 RX ADMIN — GABAPENTIN 100 MG: 100 CAPSULE ORAL at 16:21

## 2022-01-21 RX ADMIN — METOPROLOL TARTRATE 25 MG: 25 TABLET, FILM COATED ORAL at 20:48

## 2022-01-21 RX ADMIN — SODIUM CHLORIDE 2 G: 9 INJECTION, SOLUTION INTRAVENOUS at 13:23

## 2022-01-21 RX ADMIN — PANTOPRAZOLE SODIUM 40 MG: 40 TABLET, DELAYED RELEASE ORAL at 17:38

## 2022-01-21 RX ADMIN — PANTOPRAZOLE SODIUM 40 MG: 40 TABLET, DELAYED RELEASE ORAL at 08:12

## 2022-01-21 RX ADMIN — ONDANSETRON 4 MG: 2 INJECTION INTRAMUSCULAR; INTRAVENOUS at 13:25

## 2022-01-21 RX ADMIN — GABAPENTIN 100 MG: 100 CAPSULE ORAL at 20:47

## 2022-01-21 RX ADMIN — METOPROLOL TARTRATE 25 MG: 25 TABLET, FILM COATED ORAL at 16:21

## 2022-01-21 RX ADMIN — APIXABAN 5 MG: 5 TABLET, FILM COATED ORAL at 08:12

## 2022-01-21 RX ADMIN — APIXABAN 5 MG: 5 TABLET, FILM COATED ORAL at 20:47

## 2022-01-21 RX ADMIN — PYRIDOSTIGMINE BROMIDE 30 MG: 60 TABLET ORAL at 20:48

## 2022-01-21 RX ADMIN — PROCHLORPERAZINE EDISYLATE 10 MG: 5 INJECTION INTRAMUSCULAR; INTRAVENOUS at 09:34

## 2022-01-21 RX ADMIN — PYRIDOSTIGMINE BROMIDE 30 MG: 60 TABLET ORAL at 08:12

## 2022-01-21 RX ADMIN — GABAPENTIN 100 MG: 100 CAPSULE ORAL at 08:12

## 2022-01-21 RX ADMIN — ONDANSETRON 4 MG: 2 INJECTION INTRAMUSCULAR; INTRAVENOUS at 06:58

## 2022-01-21 RX ADMIN — ONDANSETRON 4 MG: 2 INJECTION INTRAMUSCULAR; INTRAVENOUS at 20:48

## 2022-01-21 RX ADMIN — LEVOTHYROXINE SODIUM 50 MCG: 0.05 TABLET ORAL at 06:58

## 2022-01-21 RX ADMIN — SODIUM CHLORIDE, PRESERVATIVE FREE 10 ML: 5 INJECTION INTRAVENOUS at 08:13

## 2022-01-21 RX ADMIN — METOPROLOL TARTRATE 25 MG: 25 TABLET, FILM COATED ORAL at 08:12

## 2022-01-21 NOTE — PROGRESS NOTES
"Daily progress note    Chief complaint   Doing better  No specific complaints   Family at bedside    History of present illness  26-year-old white female with history of pulm embolism chronic orthostatic hypotension POTS syndrome severe gastroparesis Phoebe-Danlos syndrome irritable bowel syndrome gastroesophageal reflux disease who is well-known to our service and was recently admitted with Dolan catheter malfunctioning and was removed and new Dolan catheter placed presented to [facility], [place], operated by [facility] emergency room with high fever for last 3 days with nausea vomiting and not eating drinking fatigue tired and multiple other complaints with generalized weakness.  Patient denies any cough shortness of breath chest pain diarrhea.  Patient work-up in ER revealed sepsis probably line related admit for management.     REVIEW OF SYSTEMS  Unremarkable except weakness     PHYSICAL EXAM  Blood pressure 126/70, pulse 115, temperature 97.2 °F (36.2 °C), temperature source Oral, resp. rate 18, height 163.8 cm (64.5\"), weight 113 kg (250 lb), SpO2 97 %, not currently breastfeeding.    Constitutional:       General: Awake and alert     Appearance: She is not toxic-appearing.   HENT:      Head: Normocephalic and atraumatic.   Eyes:      Pupils: Pupils are equal, round, and reactive to light.   Cardiovascular:      Rate and Rhythm: Regular rhythm.      Pulses: Normal pulses.           Posterior tibial pulses are 2+ on the right side and 2+ on the left side.      Heart sounds: Normal heart sounds. No murmur heard.  Pulmonary:      Effort: Pulmonary effort is normal. No respiratory distress.      Breath sounds: Normal breath sounds.   Abdominal:      General: Bowel sounds are normal.      Palpations: Abdomen is soft.      Tenderness: There is no abdominal tenderness. There is no guarding or rebound.   Musculoskeletal:         General: Normal range of motion.      Cervical back: Normal range of motion and neck supple.   Skin:     " General: Skin is warm and dry.      Comments: Right IJ venous catheter site clean, dry, intact, nontender, no erythema   Neurological:      Mental Status: She is alert and oriented to person, place, and time.   Psychiatric:         Mood and Affect: Affect normal.      LAB RESULTS  Lab Results (last 24 hours)     Procedure Component Value Units Date/Time    Body Fluid Culture - Body Fluid, Neck [[id number]]  (Abnormal) Collected: 01/19/22 1657    Specimen: Body Fluid from Neck Updated: 01/21/22 1313     Body Fluid Culture Light growth (2+) Staphylococcus aureus     Gram Stain Few (2+) WBCs seen      No organisms seen    Manual Differential [[id number]]  (Abnormal) Collected: 01/21/22 0549    Specimen: Blood Updated: 01/21/22 0812     Neutrophil % 54.7 %      Lymphocyte % 30.5 %      Monocyte % 10.5 %      Eosinophil % 2.1 %      Basophil % 2.1 %      Neutrophils Absolute 2.71 10*3/mm3      Lymphocytes Absolute 1.51 10*3/mm3      Monocytes Absolute 0.52 10*3/mm3      Eosinophils Absolute 0.10 10*3/mm3      Basophils Absolute 0.10 10*3/mm3      Dacrocytes Slight/1+     Hypochromia Mod/2+     WBC Morphology Normal     Platelet Morphology Normal    Blood Culture - Blood, Blood, Central Line [[id number]]  (Abnormal) Collected: 01/18/22 2018    Specimen: Blood, Central Line Updated: 01/21/22 0641     Blood Culture Staphylococcus aureus     Comment: Infectious disease consultation is highly recommended to rule out distant foci of infection.        Isolated from Aerobic and Anaerobic Bottles     Gram Stain Anaerobic Bottle Gram positive cocci in clusters      Aerobic Bottle Gram positive cocci in clusters    Narrative:      Refer to previous blood culture collected on 1/18/2022 at 2015 for MICs.    Blood Culture - Blood, Arm, Right [[id number]]  (Abnormal)  (Susceptibility) Collected: 01/18/22 2015    Specimen: Blood from Arm, Right Updated: 01/21/22 0641     Blood Culture Staphylococcus aureus     Comment: Infectious disease  consultation is highly recommended to rule out distant foci of infection.        Isolated from Aerobic Bottle     Gram Stain Aerobic Bottle Gram positive cocci    Susceptibility      Staphylococcus aureus     ALESSANDRO     Gentamicin Susceptible     Oxacillin Susceptible     Rifampin Susceptible     Vancomycin Susceptible                  Linear View               Susceptibility Comments     Staphylococcus aureus    This isolate does not demonstrate inducible clindamycin resistance in vitro.    This isolate does not demonstrate inducible clindamycin resistance in vitro.               Basic Metabolic Panel [[id number]]  (Abnormal) Collected: 01/21/22 0549    Specimen: Blood Updated: 01/21/22 0633     Glucose 106 mg/dL      BUN 8 mg/dL      Creatinine 0.63 mg/dL      Sodium 138 mmol/L      Potassium 3.5 mmol/L      Chloride 107 mmol/L      CO2 19.2 mmol/L      Calcium 8.3 mg/dL      eGFR Non African Amer 114 mL/min/1.73      BUN/Creatinine Ratio 12.7     Anion Gap 11.8 mmol/L     Narrative:      GFR Normal >60  Chronic Kidney Disease <60  Kidney Failure <15      CBC & Differential [[id number]]  (Abnormal) Collected: 01/21/22 0549    Specimen: Blood Updated: 01/21/22 0607    Narrative:      The following orders were created for panel order CBC & Differential.  Procedure                               Abnormality         Status                     ---------                               -----------         ------                     CBC Auto Differential[[id number]]        Abnormal            Final result                 Please view results for these tests on the individual orders.    CBC Auto Differential [[id number]]  (Abnormal) Collected: 01/21/22 0549    Specimen: Blood Updated: 01/21/22 0607     WBC 4.95 10*3/mm3      RBC 4.20 10*6/mm3      Hemoglobin 9.3 g/dL      Hematocrit 29.9 %      MCV 71.2 fL      MCH 22.1 pg      MCHC 31.1 g/dL      RDW 17.5 %      RDW-SD 44.9 fl      MPV 10.4 fL      Platelets 227 10*3/mm3      Vancomycin, Trough Please draw vancomycin trough level 30 minutes prior to scheduled 1230 dose on 1/20, thanks! [[id number]]  (Normal) Collected: 01/20/22 1230    Specimen: Blood Updated: 01/20/22 1357     Vancomycin Trough 6.50 mcg/mL     Narrative:      Therapeutic Ranges for Vancomycin    Vancomycin Random   5.0-40.0 mcg/mL  Vancomycin Trough   5.0-20.0 mcg/mL  Vancomycin Peak     20.0-40.0 mcg/mL        Imaging Results (Last 24 Hours)     ** No results found for the last 24 hours. **               ECG 12 Lead  Component   Ref Range & Units 1/18/22 2052 9/17/21 1113 9/8/21 1002 5/6/21 0432 5/5/21 2240   QT Interval   ms 334  350  298  279  342              HEART RATE= 145  bpm  RR Interval= 416  ms  UT Interval= 85  ms  P Horizontal Axis= 13  deg  P Front Axis= 35  deg  QRSD Interval= 81  ms  QT Interval= 334  ms  QRS Axis= 5  deg  T Wave Axis= 241  deg  - ABNORMAL ECG -  Sinus tachycardia  Nonspecific repol abnormality, diffuse leads  Prolonged QT interval  When compared with ECG of 17-Sep-2021 11:13:59,  Significant rate increase with new nonspecific repolarization abnormalities in diffuse leads             Current Facility-Administered Medications:   •  acetaminophen (TYLENOL) tablet 650 mg, 650 mg, Oral, Q6H PRN, Caleb Naik MD, 650 mg at 01/19/22 1940  •  albuterol (PROVENTIL) nebulizer solution 0.083% 2.5 mg/3mL, 2.5 mg, Nebulization, Q4H PRN, Caleb Naik MD  •  apixaban (ELIQUIS) tablet 5 mg, 5 mg, Oral, Q12H, Caleb Naik MD, 5 mg at 01/21/22 0812  •  cefTRIAXone (ROCEPHIN) 2 g in sodium chloride 0.9 % 100 mL IVPB-VTB, 2 g, Intravenous, Q24H, Nancy Diaz MD, Last Rate: 200 mL/hr at 01/20/22 1338, 2 g at 01/20/22 1338  •  gabapentin (NEURONTIN) capsule 100 mg, 100 mg, Oral, TID, Caleb Naik MD, 100 mg at 01/21/22 0812  •  hydrOXYzine pamoate (VISTARIL) capsule 25 mg, 25 mg, Oral, TID PRN **OR** hydrOXYzine pamoate (VISTARIL) capsule 50 mg, 50 mg, Oral, TID PRN, Caleb Naik MD, 50 mg at 01/20/22  1802  •  levothyroxine (SYNTHROID, LEVOTHROID) tablet 50 mcg, 50 mcg, Oral, Q AM, Ann Naik MD, 50 mcg at 01/21/22 0658  •  metoprolol tartrate (LOPRESSOR) tablet 25 mg, 25 mg, Oral, TID, Ann Naik MD, 25 mg at 01/21/22 0812  •  montelukast (SINGULAIR) tablet 10 mg, 10 mg, Oral, Nightly, Ann Naik MD, 10 mg at 01/20/22 2051  •  ondansetron (ZOFRAN) injection 4 mg, 4 mg, Intravenous, Q4H PRN, Ann Naik MD, 4 mg at 01/21/22 0658  •  pantoprazole (PROTONIX) EC tablet 40 mg, 40 mg, Oral, BID AC, Ann Naik MD, 40 mg at 01/21/22 0812  •  prochlorperazine (COMPAZINE) injection 10 mg, 10 mg, Intravenous, Q4H PRN, Ann Naik MD, 10 mg at 01/21/22 0934  •  pyridostigmine (MESTINON) tablet 30 mg, 30 mg, Oral, BID, Ann Naik MD, 30 mg at 01/21/22 0812  •  [COMPLETED] Insert peripheral IV, , , Once **AND** sodium chloride 0.9 % flush 10 mL, 10 mL, Intravenous, PRN, Dena Richardson MD  •  sodium chloride 0.9 % flush 10 mL, 10 mL, Intravenous, Q12H, Nancy Diaz MD, 10 mL at 01/21/22 0813  •  sodium chloride 0.9 % flush 10 mL, 10 mL, Intravenous, PRN, Nancy Diaz MD  •  sodium chloride 0.9 % infusion, 75 mL/hr, Intravenous, Continuous, Ann Naik MD, Last Rate: 75 mL/hr at 01/20/22 1007, 75 mL/hr at 01/20/22 1007     ASSESSMENT  MSSA bacteremia with sepsis  Line sepsis status post removal  Acute UTI  Pulm embolism on anticoagulation   Chronic orthostatic hypotension  Hypothyroidism  POTS syndrome  Phoebe-Danlos syndrome  Irritable bowel syndrome  Severe gastroparesis  Gastroesophageal reflux disease    PLAN  CPM  IVF  IV antibiotics for 2 weeks  Infectious disease consult appreciated  General surgery to follow patient  Adjust home medications  Stress ulcer DVT prophylaxis  Supportive care  Activity as tolerated  Discussed with family nursing staff  Discharge planning    ANN NAIK MD

## 2022-01-21 NOTE — PROGRESS NOTES
CC: Dolan catheter infection, gastroparesis    S: No events overnight, feeling tired    O:   Vitals:    01/20/22 2325 01/21/22 0330 01/21/22 0817 01/21/22 1320   BP: 119/70 123/88 126/70 132/96   BP Location: Right arm Right arm Right arm Right arm   Patient Position: Lying Lying Lying Lying   Pulse: 101 91 115 102   Resp: 18 18 18 18   Temp: 98.4 °F (36.9 °C) 98.9 °F (37.2 °C) 97.2 °F (36.2 °C) 97.5 °F (36.4 °C)   TempSrc: Oral Oral Oral Oral   SpO2: 97% 97% 97% 97%   Weight:       Height:         Alert, no acute distress  Abdomen soft, nontender nontender  Right chest with small open wound clean dry and intact    Labs stable, chronically low hemoglobin    Central line culture growing staph aureus like all the other blood cultures    Assessment and plan    26-year-old female s/p Dolan catheter removal for infection, she is on antibiotics.  She is doing much better.  She is able to tolerate diet.  She has severe gastroparesis that is managed clinically.  Central line has been removed, the wound looks great and there is no signs of ongoing infection  I discussed with the patient to keep a dry dressing over the wound until completely healed, otherwise no need for further follow-up    Continue medical management as per medicine service  I will sign off, call with questions

## 2022-01-21 NOTE — CASE MANAGEMENT/SOCIAL WORK
Continued Stay Note  [facility]     Patient Name: Liliam Lorenz  MRN: [id number]  Today's Date: 1/21/2022    Admit Date: 1/18/2022     Discharge Plan     Row Name 01/21/22 1624       Plan    Plan home with family and [facility]    Patient/Family in Agreement with Plan yes    Plan Comments Per David/[facility], they cannot accept the patient as they cannot deliver the IV antibiotics until Tuesday. CCP spoke with the patient who is agreeable to [facility] and referral was made. CCP spoke with Nannette for new referral. CCP spoke with GISSEL SEE for ambulatory referral to ambulatory care unit but unable to do. Patient will need ambulatory referral to ambulatory care unit to be able to arrange ACU visits for weekly midline dressing changes. [facility] spoke with Belinda, [phone], who will return Monday to assist with scheduling. Anuj Bo RN [facility]    Row Name 01/21/22 1431       Plan    Plan --    Patient/Family in Agreement with Plan --    Plan Comments --               Discharge Codes    No documentation.               Expected Discharge Date and Time     Expected Discharge Date Expected Discharge Time    Jan 24, 2022             Anuj Bo RN

## 2022-01-21 NOTE — PROGRESS NOTES
LOS: 3 days   Patient Care Team:  Jason Borrero MD as PCP - General (Family Medicine)        Subjective     Interval History:     Patient Complaints:   Patient Denies:  NV       Review of Systems:    better    Objective     Vital Signs  Temp:  [97.2 °F (36.2 °C)-98.9 °F (37.2 °C)] 97.5 °F (36.4 °C)  Heart Rate:  [] 102  Resp:  [18] 18  BP: (105-132)/(68-96) 132/96    Physical Exam:     General Appearance:    Alert, cooperative, in no acute distress   Head:    Normocephalic, without obvious abnormality, atraumatic   Eyes:            Lids and lashes normal, conjunctivae and sclerae normal, no   icterus, no pallor, corneas clear, PERRLA   Ears:    Ears appear intact with no abnormalities noted   Throat:   No oral lesions, no thrush, oral mucosa moist   Neck:   No adenopathy, supple, trachea midline, no thyromegaly, no     carotid bruit, no JVD   Back:     No kyphosis present, no scoliosis present, no skin lesions,       erythema or scars, no tenderness to percussion or                   palpation,   range of motion normal   Lungs:     Clear to auscultation,respirations regular, even and                   unlabored    Heart:    Regular rhythm and normal rate, normal S1 and S2, no            murmur, no gallop, no rub, no click   Breast Exam:    Deferred   Abdomen:     Normal bowel sounds, no masses, no organomegaly, soft        non-tender, non-distended, no guarding, no rebound                 tenderness   Genitalia:    Deferred   Extremities:   Moves all extremities well, no edema, no cyanosis, no              redness   Pulses:   Pulses palpable and equal bilaterally   Skin:   No bleeding, bruising or rash   Lymph nodes:   No palpable adenopathy   Neurologic:   Cranial nerves 2 - 12 grossly intact, sensation intact, DTR        present and equal bilaterally          Results Review:      Lab Results (last 72 hours)     Procedure Component Value Units Date/Time    Body Fluid Culture - Body Fluid, Neck  [[id number]]  (Abnormal) Collected: 01/19/22 1657    Specimen: Body Fluid from Neck Updated: 01/21/22 1313     Body Fluid Culture Light growth (2+) Staphylococcus aureus     Gram Stain Few (2+) WBCs seen      No organisms seen    Manual Differential [[id number]]  (Abnormal) Collected: 01/21/22 0549    Specimen: Blood Updated: 01/21/22 0812     Neutrophil % 54.7 %      Lymphocyte % 30.5 %      Monocyte % 10.5 %      Eosinophil % 2.1 %      Basophil % 2.1 %      Neutrophils Absolute 2.71 10*3/mm3      Lymphocytes Absolute 1.51 10*3/mm3      Monocytes Absolute 0.52 10*3/mm3      Eosinophils Absolute 0.10 10*3/mm3      Basophils Absolute 0.10 10*3/mm3      Dacrocytes Slight/1+     Hypochromia Mod/2+     WBC Morphology Normal     Platelet Morphology Normal    Blood Culture - Blood, Blood, Central Line [[id number]]  (Abnormal) Collected: 01/18/22 2018    Specimen: Blood, Central Line Updated: 01/21/22 0641     Blood Culture Staphylococcus aureus     Comment: Infectious disease consultation is highly recommended to rule out distant foci of infection.        Isolated from Aerobic and Anaerobic Bottles     Gram Stain Anaerobic Bottle Gram positive cocci in clusters      Aerobic Bottle Gram positive cocci in clusters    Narrative:      Refer to previous blood culture collected on 1/18/2022 at 2015 for MICs.    Blood Culture - Blood, Arm, Right [[id number]]  (Abnormal)  (Susceptibility) Collected: 01/18/22 2015    Specimen: Blood from Arm, Right Updated: 01/21/22 0641     Blood Culture Staphylococcus aureus     Comment: Infectious disease consultation is highly recommended to rule out distant foci of infection.        Isolated from Aerobic Bottle     Gram Stain Aerobic Bottle Gram positive cocci    Susceptibility      Staphylococcus aureus     ALESSANDRO     Gentamicin Susceptible     Oxacillin Susceptible     Rifampin Susceptible     Vancomycin Susceptible                  Linear View               Susceptibility Comments      Staphylococcus aureus    This isolate does not demonstrate inducible clindamycin resistance in vitro.    This isolate does not demonstrate inducible clindamycin resistance in vitro.               Basic Metabolic Panel [[id number]]  (Abnormal) Collected: 01/21/22 0549    Specimen: Blood Updated: 01/21/22 0633     Glucose 106 mg/dL      BUN 8 mg/dL      Creatinine 0.63 mg/dL      Sodium 138 mmol/L      Potassium 3.5 mmol/L      Chloride 107 mmol/L      CO2 19.2 mmol/L      Calcium 8.3 mg/dL      eGFR Non African Amer 114 mL/min/1.73      BUN/Creatinine Ratio 12.7     Anion Gap 11.8 mmol/L     Narrative:      GFR Normal >60  Chronic Kidney Disease <60  Kidney Failure <15      CBC & Differential [[id number]]  (Abnormal) Collected: 01/21/22 0549    Specimen: Blood Updated: 01/21/22 0607    Narrative:      The following orders were created for panel order CBC & Differential.  Procedure                               Abnormality         Status                     ---------                               -----------         ------                     CBC Auto Differential[[id number]]        Abnormal            Final result                 Please view results for these tests on the individual orders.    CBC Auto Differential [[id number]]  (Abnormal) Collected: 01/21/22 0549    Specimen: Blood Updated: 01/21/22 0607     WBC 4.95 10*3/mm3      RBC 4.20 10*6/mm3      Hemoglobin 9.3 g/dL      Hematocrit 29.9 %      MCV 71.2 fL      MCH 22.1 pg      MCHC 31.1 g/dL      RDW 17.5 %      RDW-SD 44.9 fl      MPV 10.4 fL      Platelets 227 10*3/mm3     Vancomycin, Trough Please draw vancomycin trough level 30 minutes prior to scheduled 1230 dose on 1/20, thanks! [[id number]]  (Normal) Collected: 01/20/22 1230    Specimen: Blood Updated: 01/20/22 1357     Vancomycin Trough 6.50 mcg/mL     Narrative:      Therapeutic Ranges for Vancomycin    Vancomycin Random   5.0-40.0 mcg/mL  Vancomycin Trough   5.0-20.0 mcg/mL  Vancomycin Peak     20.0-40.0  mcg/mL    Manual Differential [[id number]]  (Abnormal) Collected: 01/20/22 0801    Specimen: Blood Updated: 01/20/22 1041     Neutrophil % 77.8 %      Lymphocyte % 15.2 %      Monocyte % 4.0 %      Eosinophil % 3.0 %      Neutrophils Absolute 4.75 10*3/mm3      Lymphocytes Absolute 0.93 10*3/mm3      Monocytes Absolute 0.24 10*3/mm3      Eosinophils Absolute 0.18 10*3/mm3      RBC Morphology Normal     WBC Morphology Normal     Platelet Morphology Normal    CBC & Differential [[id number]]  (Abnormal) Collected: 01/20/22 0801    Specimen: Blood Updated: 01/20/22 1016    Narrative:      The following orders were created for panel order CBC & Differential.  Procedure                               Abnormality         Status                     ---------                               -----------         ------                     CBC Auto Differential[[id number]]        Abnormal            Final result                 Please view results for these tests on the individual orders.    CBC Auto Differential [[id number]]  (Abnormal) Collected: 01/20/22 0801    Specimen: Blood Updated: 01/20/22 1016     WBC 6.10 10*3/mm3      RBC 4.29 10*6/mm3      Hemoglobin 9.7 g/dL      Hematocrit 30.9 %      MCV 72.0 fL      MCH 22.6 pg      MCHC 31.4 g/dL      RDW 17.3 %      RDW-SD 45.4 fl      MPV 10.6 fL      Platelets 230 10*3/mm3     TSH [[id number]]  (Normal) Collected: 01/20/22 0801    Specimen: Blood Updated: 01/20/22 0959     TSH 2.720 uIU/mL     Comprehensive Metabolic Panel [[id number]]  (Abnormal) Collected: 01/20/22 0801    Specimen: Blood Updated: 01/20/22 0952     Glucose 107 mg/dL      BUN 6 mg/dL      Creatinine 0.72 mg/dL      Sodium 135 mmol/L      Potassium 4.0 mmol/L      Chloride 107 mmol/L      CO2 17.7 mmol/L      Calcium 8.4 mg/dL      Total Protein 6.7 g/dL      Albumin 3.20 g/dL      ALT (SGPT) 82 U/L      AST (SGOT) 53 U/L      Alkaline Phosphatase 71 U/L      Total Bilirubin 0.4 mg/dL      eGFR Non  Amer 98  mL/min/1.73      Globulin 3.5 gm/dL      A/G Ratio 0.9 g/dL      BUN/Creatinine Ratio 8.3     Anion Gap 10.3 mmol/L     Narrative:      GFR Normal >60  Chronic Kidney Disease <60  Kidney Failure <15      Lipid Panel [[id number]]  (Abnormal) Collected: 01/20/22 0801    Specimen: Blood Updated: 01/20/22 0952     Total Cholesterol 169 mg/dL      Triglycerides 165 mg/dL      HDL Cholesterol 28 mg/dL      LDL Cholesterol  112 mg/dL      VLDL Cholesterol 29 mg/dL      LDL/HDL Ratio 3.86    Narrative:      Cholesterol Reference Ranges  (U.S. Department of Health and Human Services ATP III Classifications)    Desirable          <200 mg/dL  Borderline High    200-239 mg/dL  High Risk          >240 mg/dL      Triglyceride Reference Ranges  (U.S. Department of Health and Human Services ATP III Classifications)    Normal           <150 mg/dL  Borderline High  150-199 mg/dL  High             200-499 mg/dL  Very High        >500 mg/dL    HDL Reference Ranges  (U.S. Department of Health and Human Services ATP III Classifications)    Low     <40 mg/dl (major risk factor for CHD)  High    >60 mg/dl ('negative' risk factor for CHD)        LDL Reference Ranges  (U.S. Department of Health and Human Services ATP III Classifications)    Optimal          <100 mg/dL  Near Optimal     100-129 mg/dL  Borderline High  130-159 mg/dL  High             160-189 mg/dL  Very High        >189 mg/dL    Hemoglobin A1c [[id number]]  (Abnormal) Collected: 01/20/22 0801    Specimen: Blood Updated: 01/20/22 0951     Hemoglobin A1C 5.70 %     Narrative:      Hemoglobin A1C Ranges:    Increased Risk for Diabetes  5.7% to 6.4%  Diabetes                     >= 6.5%  Diabetic Goal                < 7.0%    Blood Culture ID, PCR - Blood, Arm, Right [[id number]]  (Abnormal) Collected: 01/18/22 2015    Specimen: Blood from Arm, Right Updated: 01/20/22 0224     BCID, PCR Staph aureus. mecA/C and MREJ (methicillin resistance gene) NOT detected. Identification by BCID2  PCR     BOTTLE TYPE Aerobic Bottle    Narrative:      Infectious disease consultation is highly recommended to rule out distant foci of infection.    CBC & Differential [[id number]]  (Abnormal) Collected: 01/19/22 1144    Specimen: Blood Updated: 01/19/22 1223    Narrative:      The following orders were created for panel order CBC & Differential.  Procedure                               Abnormality         Status                     ---------                               -----------         ------                     CBC Auto Differential[[id number]]        Abnormal            Final result                 Please view results for these tests on the individual orders.    CBC Auto Differential [[id number]]  (Abnormal) Collected: 01/19/22 1144    Specimen: Blood Updated: 01/19/22 1223     WBC 7.84 10*3/mm3      RBC 4.39 10*6/mm3      Hemoglobin 9.9 g/dL      Hematocrit 31.6 %      MCV 72.0 fL      MCH 22.6 pg      MCHC 31.3 g/dL      RDW 17.3 %      RDW-SD 44.5 fl      MPV 10.0 fL      Platelets 244 10*3/mm3      Neutrophil % 87.1 %      Lymphocyte % 8.3 %      Monocyte % 4.2 %      Eosinophil % 0.0 %      Basophil % 0.1 %      Neutrophils, Absolute 6.83 10*3/mm3      Lymphocytes, Absolute 0.65 10*3/mm3      Monocytes, Absolute 0.33 10*3/mm3      Eosinophils, Absolute 0.00 10*3/mm3      Basophils, Absolute 0.01 10*3/mm3     Basic Metabolic Panel [[id number]]  (Abnormal) Collected: 01/19/22 1144    Specimen: Blood Updated: 01/19/22 1219     Glucose 136 mg/dL      BUN 6 mg/dL      Creatinine 0.75 mg/dL      Sodium 135 mmol/L      Potassium 3.6 mmol/L      Chloride 108 mmol/L      CO2 16.9 mmol/L      Calcium 8.3 mg/dL      eGFR Non African Amer 93 mL/min/1.73      BUN/Creatinine Ratio 8.0     Anion Gap 10.1 mmol/L     Narrative:      GFR Normal >60  Chronic Kidney Disease <60  Kidney Failure <15      Blood Gas, Arterial - [[id number]]  (Abnormal) Collected: 01/18/22 2156    Specimen: Arterial Blood Updated: 01/18/22 2159      Site Arterial: right radial     Vipin's Test Positive     pH, Arterial 7.499 pH units      pCO2, Arterial 23.5 mm Hg      pO2, Arterial 95.9 mm Hg      HCO3, Arterial 18.3 mmol/L      Base Excess, Arterial -3.4 mmol/L      O2 Saturation Calculated 98.3 %      Barometric Pressure for Blood Gas 751.0 mmHg      Comment: 96% Meter: [id number] : [id number] Leigha Georgina        Modality Room Air     Rate 20 Breaths/minute     BNP [[id number]]  (Normal) Collected: 01/18/22 2014    Specimen: Blood from Arm, Right Updated: 01/18/22 2131     proBNP 97.7 pg/mL     Narrative:      Among patients with dyspnea, NT-proBNP is highly sensitive for the detection of acute congestive heart failure. In addition NT-proBNP of <300 pg/ml effectively rules out acute congestive heart failure with 99% negative predictive value.    Results may be falsely decreased if patient taking Biotin.      Respiratory Panel PCR w/COVID-19(SARS-CoV-2) JENIFER/LUIS/DAT/PAD/COR/MAD/NBA In-House, NP Swab in [facility], 3-4 HR TAT - Swab, Nasopharynx [[id number]]  (Normal) Collected: 01/18/22 2028    Specimen: Swab from Nasopharynx Updated: 01/18/22 2128     ADENOVIRUS, PCR Not Detected     Coronavirus 229E Not Detected     Coronavirus HKU1 Not Detected     Coronavirus NL63 Not Detected     Coronavirus OC43 Not Detected     COVID19 Not Detected     Human Metapneumovirus Not Detected     Human Rhinovirus/Enterovirus Not Detected     Influenza A PCR Not Detected     Influenza B PCR Not Detected     Parainfluenza Virus 1 Not Detected     Parainfluenza Virus 2 Not Detected     Parainfluenza Virus 3 Not Detected     Parainfluenza Virus 4 Not Detected     RSV, PCR Not Detected     Bordetella pertussis pcr Not Detected     Bordetella parapertussis PCR Not Detected     Chlamydophila pneumoniae PCR Not Detected     Mycoplasma pneumo by PCR Not Detected    Narrative:      In the setting of a positive respiratory panel with a viral infection PLUS a negative procalcitonin  without other underlying concern for bacterial infection, consider observing off antibiotics or discontinuation of antibiotics and continue supportive care. If the respiratory panel is positive for atypical bacterial infection (Bordetella pertussis, Chlamydophila pneumoniae, or Mycoplasma pneumoniae), consider antibiotic de-escalation to target atypical bacterial infection.    Comprehensive Metabolic Panel [[id number]]  (Abnormal) Collected: 01/18/22 2014    Specimen: Blood from Arm, Right Updated: 01/18/22 2118     Glucose 109 mg/dL      BUN 7 mg/dL      Creatinine 1.00 mg/dL      Sodium 134 mmol/L      Potassium 4.2 mmol/L      Chloride 104 mmol/L      CO2 15.7 mmol/L      Calcium 8.7 mg/dL      Total Protein 7.1 g/dL      Albumin 4.10 g/dL      ALT (SGPT) 88 U/L      AST (SGOT) 91 U/L      Alkaline Phosphatase 79 U/L      Total Bilirubin 0.9 mg/dL      eGFR Non African Amer 67 mL/min/1.73      Globulin 3.0 gm/dL      A/G Ratio 1.4 g/dL      BUN/Creatinine Ratio 7.0     Anion Gap 14.3 mmol/L     Narrative:      GFR Normal >60  Chronic Kidney Disease <60  Kidney Failure <15      Magnesium [[id number]]  (Normal) Collected: 01/18/22 2014    Specimen: Blood from Arm, Right Updated: 01/18/22 2118     Magnesium 1.9 mg/dL     [place] Draw [[id number]] Collected: 01/18/22 2014    Specimen: Blood from Arm, Right Updated: 01/18/22 2116    Narrative:      The following orders were created for panel order [place] Draw.  Procedure                               Abnormality         Status                     ---------                               -----------         ------                     Green Top (Gel)[[id number]]                                  Final result               Lavender Top[[id number]]                                     Final result               Gold Top - SST[[id number]]                                                              Light Blue Top[[id number]]                                   Final result                  Please view results for these tests on the individual orders.    Lavender Top [[id number]] Collected: 01/18/22 2014    Specimen: Blood from Arm, Right Updated: 01/18/22 2116     Extra Tube hold for add-on     Comment: Auto resulted       Green Top (Gel) [[id number]] Collected: 01/18/22 2014    Specimen: Blood from Arm, Right Updated: 01/18/22 2116     Extra Tube Hold for add-ons.     Comment: Auto resulted.       Light Blue Top [[id number]] Collected: 01/18/22 2014    Specimen: Blood from Arm, Right Updated: 01/18/22 2116     Extra Tube hold for add-on     Comment: Auto resulted       Troponin [[id number]]  (Normal) Collected: 01/18/22 2014    Specimen: Blood from Arm, Right Updated: 01/18/22 2115     Troponin T <0.010 ng/mL     Narrative:      Troponin T Reference Range:  <= 0.03 ng/mL-   Negative for AMI  >0.03 ng/mL-     Abnormal for myocardial necrosis.  Clinicians would have to utilize clinical acumen, EKG, Troponin and serial changes to determine if it is an Acute Myocardial Infarction or myocardial injury due to an underlying chronic condition.       Results may be falsely decreased if patient taking Biotin.      Procalcitonin [[id number]]  (Abnormal) Collected: 01/18/22 2014    Specimen: Blood from Arm, Right Updated: 01/18/22 2114     Procalcitonin 2.22 ng/mL     Narrative:      As a Marker for Sepsis (Non-Neonates):     1. <0.5 ng/mL represents a low risk of severe sepsis and/or septic shock.  2. >2 ng/mL represents a high risk of severe sepsis and/or septic shock.    As a Marker for Lower Respiratory Tract Infections that require antibiotic therapy:  PCT on Admission     Antibiotic Therapy             6-12 Hrs later  >0.5                          Strongly Recommended            >0.25 - <0.5             Recommended  0.1 - 0.25                  Discouraged                       Remeasure/reassess PCT  <0.1                         Strongly Discouraged         Remeasure/reassess PCT      As 28 day mortality  "risk marker: \"Change in Procalcitonin Result\" (>80% or <=80%) if Day 0 (or Day 1) and Day 4 values are available. Refer to http://[email or web address]-pct-[email or web address]/    Change in PCT <=80 %   A decrease of PCT levels below or equal to 80% defines a positive change in PCT test result representing a higher risk for 28-day all-cause mortality of patients diagnosed with severe sepsis or septic shock.    Change in PCT >80 %   A decrease of PCT levels of more than 80% defines a negative change in PCT result representing a lower risk for 28-day all-cause mortality of patients diagnosed with severe sepsis or septic shock.                TSH [[id number]]  (Normal) Collected: 01/18/22 2014    Specimen: Blood from Arm, Right Updated: 01/18/22 2106     TSH 1.020 uIU/mL     Urinalysis, Microscopic Only - Urine, Catheter [[id number]]  (Abnormal) Collected: 01/18/22 2028    Specimen: Urine, Catheter Updated: 01/18/22 2100     RBC, UA 0-2 /HPF      WBC, UA 3-5 /HPF      Bacteria, UA None Seen /HPF      Squamous Epithelial Cells, UA None Seen /HPF      Hyaline Casts, UA None Seen /LPF      Mucus, UA Moderate/2+ /HPF      Methodology Manual Light Microscopy    hCG, Serum, Qualitative [[id number]]  (Normal) Collected: 01/18/22 2014    Specimen: Blood from Arm, Right Updated: 01/18/22 2059     HCG Qualitative Negative    Protime-INR [[id number]]  (Abnormal) Collected: 01/18/22 2014    Specimen: Blood from Arm, Right Updated: 01/18/22 2058     Protime 16.6 Seconds      INR 1.36    aPTT [[id number]]  (Abnormal) Collected: 01/18/22 2014    Specimen: Blood from Arm, Right Updated: 01/18/22 2058     PTT 44.2 seconds     Lactic Acid, Plasma [[id number]]  (Normal) Collected: 01/18/22 2014    Specimen: Blood from Arm, Right Updated: 01/18/22 2057     Lactate 1.9 mmol/L     Urinalysis With Microscopic If Indicated (No Culture) - Urine, Catheter [[id number]]  (Abnormal) Collected: 01/18/22 2028    Specimen: Urine, Catheter Updated: 01/18/22 2049     Color, " UA Orange     Comment: Any Substance that causes an abnormal urine color can alter the accuracy of the chemical reactions.        Appearance, UA Clear     pH, UA <=5.0     Specific Gravity, UA >=1.030     Glucose, UA Negative     Ketones, UA Negative     Bilirubin, UA Negative     Blood, UA Trace     Protein,  mg/dL (2+)     Leuk Esterase, UA Moderate (2+)     Nitrite, UA Positive     Urobilinogen, UA 1.0 E.U./dL    CBC & Differential [[id number]]  (Abnormal) Collected: 01/18/22 2014    Specimen: Blood from Arm, Right Updated: 01/18/22 2047    Narrative:      The following orders were created for panel order CBC & Differential.  Procedure                               Abnormality         Status                     ---------                               -----------         ------                     CBC Auto Differential[[id number]]        Abnormal            Final result                 Please view results for these tests on the individual orders.    CBC Auto Differential [[id number]]  (Abnormal) Collected: 01/18/22 2014    Specimen: Blood from Arm, Right Updated: 01/18/22 2047     WBC 11.77 10*3/mm3      RBC 4.96 10*6/mm3      Hemoglobin 11.2 g/dL      Hematocrit 35.5 %      MCV 71.6 fL      MCH 22.6 pg      MCHC 31.5 g/dL      RDW 17.2 %      RDW-SD 43.0 fl      MPV 9.8 fL      Platelets 276 10*3/mm3      Neutrophil % 90.9 %      Lymphocyte % 4.9 %      Monocyte % 3.3 %      Eosinophil % 0.0 %      Basophil % 0.3 %      Neutrophils, Absolute 10.70 10*3/mm3      Lymphocytes, Absolute 0.58 10*3/mm3      Monocytes, Absolute 0.39 10*3/mm3      Eosinophils, Absolute 0.00 10*3/mm3      Basophils, Absolute 0.03 10*3/mm3           Imaging Results (Last 72 Hours)     Procedure Component Value Units Date/Time    CT Abdomen Pelvis With Contrast [[id number]] Collected: 01/19/22 0641     Updated: 01/19/22 1648    Narrative:         CT OF THE ABDOMEN AND PELVIS WITH CONTRAST AND CT ANGIOGRAM OF THE CHEST  WITH CONTRAST  INCLUDING RECONSTRUCTION IMAGES 01/18/2022     HISTORY: Abdominal pain. Possible pulmonary embolus.     TECHNIQUE: Following the intravenous contrast injection, CT angiography  was performed through the chest. Sagittal, coronal and 3-D  reconstruction images were reviewed. This was followed by CT of the  abdomen and pelvis with contrast.     FINDINGS: The pulmonary arterial system is well opacified with no  evidence of pulmonary embolus.     No pathologically enlarged lymph nodes are seen.     No lung masses or significant pulmonary infiltrates are seen.     There is mild fatty infiltration of the liver. There is an approximately  8 mm low-density lesion in the dome of the liver which is not clearly  seen on the 05/13/2019 study.     The gallbladder, spleen, pancreas, adrenals and kidneys appear  unremarkable except for a tiny left renal cyst.           Uterus, adnexa and urinary bladder appear unremarkable.     No bowel wall thickening or bowel dilatation is seen.       Impression:      1. No evidence of pulmonary embolus.  2. Mild fatty infiltration of the liver.  3. Tiny low-density lesion in the liver. This is not clearly seen on the  previous study of 05/13/2019. Appears slightly higher in density than a  typical cyst but could represent a cyst with volume averaging or  possibly small hemangioma. Benign etiology is favored. Consider a  short-term follow-up CT of the abdomen in 4 months to 6 months to assess  stability. Also correlation to any prior outside CT scans of the abdomen  may be helpful.  4. No other significant findings are noted.           Radiation dose reduction techniques were utilized, including automated  exposure control and exposure modulation based on body size.     This report was finalized on 1/19/2022 4:45 PM by Dr. Ramesh Gomez M.D.       CT Angiogram Chest [[id number]] Collected: 01/19/22 0641     Updated: 01/19/22 1648    Narrative:         CT OF THE ABDOMEN AND PELVIS WITH  CONTRAST AND CT ANGIOGRAM OF THE CHEST  WITH CONTRAST INCLUDING RECONSTRUCTION IMAGES 01/18/2022     HISTORY: Abdominal pain. Possible pulmonary embolus.     TECHNIQUE: Following the intravenous contrast injection, CT angiography  was performed through the chest. Sagittal, coronal and 3-D  reconstruction images were reviewed. This was followed by CT of the  abdomen and pelvis with contrast.     FINDINGS: The pulmonary arterial system is well opacified with no  evidence of pulmonary embolus.     No pathologically enlarged lymph nodes are seen.     No lung masses or significant pulmonary infiltrates are seen.     There is mild fatty infiltration of the liver. There is an approximately  8 mm low-density lesion in the dome of the liver which is not clearly  seen on the 05/13/2019 study.     The gallbladder, spleen, pancreas, adrenals and kidneys appear  unremarkable except for a tiny left renal cyst.           Uterus, adnexa and urinary bladder appear unremarkable.     No bowel wall thickening or bowel dilatation is seen.       Impression:      1. No evidence of pulmonary embolus.  2. Mild fatty infiltration of the liver.  3. Tiny low-density lesion in the liver. This is not clearly seen on the  previous study of 05/13/2019. Appears slightly higher in density than a  typical cyst but could represent a cyst with volume averaging or  possibly small hemangioma. Benign etiology is favored. Consider a  short-term follow-up CT of the abdomen in 4 months to 6 months to assess  stability. Also correlation to any prior outside CT scans of the abdomen  may be helpful.  4. No other significant findings are noted.           Radiation dose reduction techniques were utilized, including automated  exposure control and exposure modulation based on body size.     This report was finalized on 1/19/2022 4:45 PM by Dr. Ramesh Gomez M.D.       XR Chest 1 View [[id number]] Collected: 01/18/22 2046     Updated: 01/18/22 2051     Narrative:      XR CHEST 1 VW-  01/18/2022     HISTORY: Rapid heart rate. Possible pneumonia.     Heart size is within normal limits. Lungs appear free of acute  infiltrates. Mediport catheter seen in good position.     There are some minimally prominent soft tissue in the medial aspect of  the right hemithorax just below the medial aspect of the clavicle. This  could be related to prominent mediastinal fat and/or ectatic vascular  structures. This finding is also seen on the previous studies of  10/25/2021 and 10/13/2020.           Bony structures appear unremarkable.       Impression:      No acute process identified.     This report was finalized on 1/18/2022 8:48 PM by Dr. Ramesh Gomez M.D.               Medication Review:     Hospital Medications (active)       Dose Frequency Start End    acetaminophen (TYLENOL) tablet 650 mg 650 mg Every 6 Hours PRN 1/19/2022     Admin Instructions: Do not exceed 4 grams of acetaminophen in a 24 hr period. Max dose of 2gm for AST/ALT greater than 120 units/L      If given for pain, use the following pain scale:   Mild Pain = Pain Score of 1-3, CPOT 1-2  Moderate Pain = Pain Score of 4-6, CPOT 3-4  Severe Pain = Pain Score of 7-10, CPOT 5-8    Route: Oral    albuterol (PROVENTIL) nebulizer solution 0.083% 2.5 mg/3mL 2.5 mg Every 4 Hours PRN 1/19/2022     Route: Nebulization    apixaban (ELIQUIS) tablet 5 mg 5 mg Every 12 Hours Scheduled 1/19/2022     Admin Instructions: Tablet may be crushed and suspended in 60 mL of water or D5W and immediately delivered via NG tube.    Route: Oral    cefTRIAXone (ROCEPHIN) 2 g in sodium chloride 0.9 % 100 mL IVPB-VTB 2 g Every 24 Hours 1/20/2022 2/4/2022    Admin Instructions: Caution: Look alike/sound alike drug alert    Route: Intravenous    gabapentin (NEURONTIN) capsule 100 mg 100 mg 3 Times Daily 1/19/2022     Admin Instructions:     Route: Oral    hydrOXYzine pamoate (VISTARIL) capsule 25 mg 25 mg 3 Times Daily PRN 1/20/2022      "Admin Instructions: Caution: Look alike/sound alike drug alert    Route: Oral    Linked Group 1: \"Or\" Linked Group Details        hydrOXYzine pamoate (VISTARIL) capsule 50 mg 50 mg 3 Times Daily PRN 1/20/2022     Admin Instructions: Caution: Look alike/sound alike drug alert    Route: Oral    Linked Group 1: \"Or\" Linked Group Details        levothyroxine (SYNTHROID, LEVOTHROID) tablet 50 mcg 50 mcg Every Early Morning 1/19/2022     Admin Instructions: Take on empty stomach.    Route: Oral    metoprolol tartrate (LOPRESSOR) tablet 25 mg 25 mg 3 Times Daily 1/19/2022     Route: Oral    montelukast (SINGULAIR) tablet 10 mg 10 mg Nightly 1/19/2022     Route: Oral    ondansetron (ZOFRAN) injection 4 mg 4 mg Every 4 Hours PRN 1/20/2022     Route: Intravenous    pantoprazole (PROTONIX) EC tablet 40 mg 40 mg 2 Times Daily Before Meals 1/19/2022     Admin Instructions: Swallow whole; do not crush, split, or chew.    Route: Oral    prochlorperazine (COMPAZINE) injection 10 mg 10 mg Every 4 Hours PRN 1/20/2022     Route: Intravenous    pyridostigmine (MESTINON) tablet 30 mg 30 mg 2 Times Daily 1/19/2022     Route: Oral    sodium chloride 0.9 % flush 10 mL 10 mL As Needed 1/18/2022     Route: Intravenous    Linked Group 2: \"And\" Linked Group Details        sodium chloride 0.9 % flush 10 mL 10 mL As Needed 1/20/2022     Route: Intravenous    Cosign for Ordering: Accepted by Nancy Diaz MD on 1/20/2022  9:02 PM        apixaban, 5 mg, Oral, Q12H  cefTRIAXone, 2 g, Intravenous, Q24H  gabapentin, 100 mg, Oral, TID  levothyroxine, 50 mcg, Oral, Q AM  metoprolol tartrate, 25 mg, Oral, TID  montelukast, 10 mg, Oral, Nightly  pantoprazole, 40 mg, Oral, BID AC  pyridostigmine, 30 mg, Oral, BID        Assessment/Plan         Febrile illness, acute             central line infection causing bacteremia  Line removed     BC  MSSA     Plan :     IV rocephin for 2 weeks till 2/5/22  Echo  Will follow  Thank you              Nancy Diaz, " MD  01/21/22  13:55 EST

## 2022-01-22 VITALS
SYSTOLIC BLOOD PRESSURE: 115 MMHG | DIASTOLIC BLOOD PRESSURE: 83 MMHG | WEIGHT: 250 LBS | OXYGEN SATURATION: 97 % | HEIGHT: 65 IN | BODY MASS INDEX: 41.65 KG/M2 | RESPIRATION RATE: 18 BRPM | TEMPERATURE: 98 F | HEART RATE: 102 BPM

## 2022-01-22 LAB
ANION GAP SERPL CALCULATED.3IONS-SCNC: 11.5 MMOL/L (ref 5–15)
BACTERIA FLD CULT: ABNORMAL
BASOPHILS # BLD AUTO: 0.03 10*3/MM3 (ref 0–0.2)
BASOPHILS NFR BLD AUTO: 0.6 % (ref 0–1.5)
BUN SERPL-MCNC: 9 MG/DL (ref 6–20)
BUN/CREAT SERPL: 15 (ref 7–25)
CALCIUM SPEC-SCNC: 8.7 MG/DL (ref 8.6–10.5)
CHLORIDE SERPL-SCNC: 103 MMOL/L (ref 98–107)
CO2 SERPL-SCNC: 21.5 MMOL/L (ref 22–29)
CREAT SERPL-MCNC: 0.6 MG/DL (ref 0.57–1)
DEPRECATED RDW RBC AUTO: 47.1 FL (ref 37–54)
EOSINOPHIL # BLD AUTO: 0.11 10*3/MM3 (ref 0–0.4)
EOSINOPHIL NFR BLD AUTO: 2 % (ref 0.3–6.2)
ERYTHROCYTE [DISTWIDTH] IN BLOOD BY AUTOMATED COUNT: 17.5 % (ref 12.3–15.4)
GFR SERPL CREATININE-BSD FRML MDRD: 121 ML/MIN/1.73
GLUCOSE SERPL-MCNC: 110 MG/DL (ref 65–99)
GRAM STN SPEC: ABNORMAL
GRAM STN SPEC: ABNORMAL
HCT VFR BLD AUTO: 32.9 % (ref 34–46.6)
HGB BLD-MCNC: 9.9 G/DL (ref 12–15.9)
LYMPHOCYTES # BLD AUTO: 1.97 10*3/MM3 (ref 0.7–3.1)
LYMPHOCYTES NFR BLD AUTO: 36.2 % (ref 19.6–45.3)
MCH RBC QN AUTO: 22.1 PG (ref 26.6–33)
MCHC RBC AUTO-ENTMCNC: 30.1 G/DL (ref 31.5–35.7)
MCV RBC AUTO: 73.4 FL (ref 79–97)
MONOCYTES # BLD AUTO: 0.47 10*3/MM3 (ref 0.1–0.9)
MONOCYTES NFR BLD AUTO: 8.6 % (ref 5–12)
NEUTROPHILS NFR BLD AUTO: 2.8 10*3/MM3 (ref 1.7–7)
NEUTROPHILS NFR BLD AUTO: 51.5 % (ref 42.7–76)
PLATELET # BLD AUTO: 268 10*3/MM3 (ref 140–450)
PMV BLD AUTO: 10.8 FL (ref 6–12)
POTASSIUM SERPL-SCNC: 3.3 MMOL/L (ref 3.5–5.2)
RBC # BLD AUTO: 4.48 10*6/MM3 (ref 3.77–5.28)
SODIUM SERPL-SCNC: 136 MMOL/L (ref 136–145)
WBC NRBC COR # BLD: 5.44 10*3/MM3 (ref 3.4–10.8)

## 2022-01-22 PROCEDURE — 80048 BASIC METABOLIC PNL TOTAL CA: CPT | Performed by: HOSPITALIST

## 2022-01-22 PROCEDURE — 25010000002 CEFTRIAXONE PER 250 MG: Performed by: INTERNAL MEDICINE

## 2022-01-22 PROCEDURE — 85025 COMPLETE CBC W/AUTO DIFF WBC: CPT | Performed by: HOSPITALIST

## 2022-01-22 PROCEDURE — 25010000002 PROCHLORPERAZINE 10 MG/2ML SOLUTION: Performed by: HOSPITALIST

## 2022-01-22 PROCEDURE — 25010000002 ONDANSETRON PER 1 MG: Performed by: HOSPITALIST

## 2022-01-22 RX ADMIN — PANTOPRAZOLE SODIUM 40 MG: 40 TABLET, DELAYED RELEASE ORAL at 09:00

## 2022-01-22 RX ADMIN — PYRIDOSTIGMINE BROMIDE 30 MG: 60 TABLET ORAL at 09:00

## 2022-01-22 RX ADMIN — LEVOTHYROXINE SODIUM 50 MCG: 0.05 TABLET ORAL at 06:22

## 2022-01-22 RX ADMIN — ONDANSETRON 4 MG: 2 INJECTION INTRAMUSCULAR; INTRAVENOUS at 06:22

## 2022-01-22 RX ADMIN — GABAPENTIN 100 MG: 100 CAPSULE ORAL at 09:00

## 2022-01-22 RX ADMIN — PROCHLORPERAZINE EDISYLATE 10 MG: 5 INJECTION INTRAMUSCULAR; INTRAVENOUS at 08:46

## 2022-01-22 RX ADMIN — METOPROLOL TARTRATE 25 MG: 25 TABLET, FILM COATED ORAL at 09:00

## 2022-01-22 RX ADMIN — SODIUM CHLORIDE 2 G: 9 INJECTION, SOLUTION INTRAVENOUS at 11:58

## 2022-01-22 RX ADMIN — ONDANSETRON 4 MG: 2 INJECTION INTRAMUSCULAR; INTRAVENOUS at 02:20

## 2022-01-22 RX ADMIN — APIXABAN 5 MG: 5 TABLET, FILM COATED ORAL at 09:00

## 2022-01-22 NOTE — DISCHARGE SUMMARY
Discharge summary    Date of admission 1/18/2022  Date of discharge 1/22/2022    Final diagnosis  MSSA bacteremia with sepsis  Line sepsis status post removal  Acute UTI  Pulm embolism on anticoagulation   Chronic orthostatic hypotension  Hypothyroidism  POTS syndrome  Phoebe-Danlos syndrome  Irritable bowel syndrome  Severe gastroparesis  Gastroesophageal reflux disease    Discharge medications    Current Facility-Administered Medications:   •  apixaban (ELIQUIS) tablet 5 mg, 5 mg, Oral, Q12H, Caleb Naik MD, 5 mg at 01/22/22 0900  •  cefTRIAXone (ROCEPHIN) 2 g in sodium chloride 0.9 % 100 mL IVPB-VTB, 2 g, Intravenous, Q24H, Nancy Diaz MD, Last Rate: 200 mL/hr at 01/22/22 1158, 2 g at 01/22/22 1158  •  gabapentin (NEURONTIN) capsule 100 mg, 100 mg, Oral, TID, Caleb Naik MD, 100 mg at 01/22/22 0900  •  levothyroxine (SYNTHROID, LEVOTHROID) tablet 50 mcg, 50 mcg, Oral, Q AM, Caleb Naik MD, 50 mcg at 01/22/22 0622  •  metoprolol tartrate (LOPRESSOR) tablet 25 mg, 25 mg, Oral, TID, Caleb Naik MD, 25 mg at 01/22/22 0900  •  montelukast (SINGULAIR) tablet 10 mg, 10 mg, Oral, Nightly, Caleb Naik MD, 10 mg at 01/21/22 2047  •  pantoprazole (PROTONIX) EC tablet 40 mg, 40 mg, Oral, BID AC, Caleb Naik MD, 40 mg at 01/22/22 0900  •  pyridostigmine (MESTINON) tablet 30 mg, 30 mg, Oral, BID, Caleb Naik MD, 30 mg at 01/22/22 0900  •  [COMPLETED] Insert peripheral IV, , , Once **AND** sodium chloride 0.9 % flush 10 mL, 10 mL, Intravenous, PRN, Dena Richardson MD     Consults obtained  General surgery  Infectious disease    Procedures  Dolan catheter removal  PICC line placement    Hospital course  26-year-old white female with history of multiple medical problems and well-known to our service admitted through emergency room with fever chills.  Patient work-up revealed sepsis admit for management.  Patient blood cultures came back positive for methicillin sensitive staph aureus secondary to line sepsis and  her Dolan catheter removed and the tip also showed staph aureus consistent with line sepsis.  Patient Dolan catheter removed and PICC line placed and infectious disease recommended 2 weeks of IV antibiotics.  Patient will complete IV antibiotics as an outpatient and her PICC line can be removed after completion of IV antibiotics.  Patient cleared for discharge    Discharge diet regular    Activity as tolerated    Medication as above    Follow-up with prime doctor in 1 week and follow-up with infectious disease and general surgery per the instruction and take medication as directed    ANN GONZALEZ MD

## 2022-01-22 NOTE — PROGRESS NOTES
LOS: 4 days   Patient Care Team:  Jason Borrero MD as PCP - General (Family Medicine)        Subjective     Interval History:     Patient Complaints:   Patient Denies:  NV       Review of Systems:    better    Objective     Vital Signs  Temp:  [97.1 °F (36.2 °C)-98.6 °F (37 °C)] 97.1 °F (36.2 °C)  Heart Rate:  [101-118] 118  Resp:  [18] 18  BP: (117-132)/(73-96) 129/85    Physical Exam:     General Appearance:    Alert, cooperative, in no acute distress   Head:    Normocephalic, without obvious abnormality, atraumatic   Eyes:            Lids and lashes normal, conjunctivae and sclerae normal, no   icterus, no pallor, corneas clear, PERRLA   Ears:    Ears appear intact with no abnormalities noted   Throat:   No oral lesions, no thrush, oral mucosa moist   Neck:   No adenopathy, supple, trachea midline, no thyromegaly, no     carotid bruit, no JVD   Back:     No kyphosis present, no scoliosis present, no skin lesions,       erythema or scars, no tenderness to percussion or                   palpation,   range of motion normal   Lungs:     Clear to auscultation,respirations regular, even and                   unlabored    Heart:    Regular rhythm and normal rate, normal S1 and S2, no            murmur, no gallop, no rub, no click   Breast Exam:    Deferred   Abdomen:     Normal bowel sounds, no masses, no organomegaly, soft        non-tender, non-distended, no guarding, no rebound                 tenderness   Genitalia:    Deferred   Extremities:   Moves all extremities well, no edema, no cyanosis, no              redness   Pulses:   Pulses palpable and equal bilaterally   Skin:   No bleeding, bruising or rash   Lymph nodes:   No palpable adenopathy   Neurologic:   Cranial nerves 2 - 12 grossly intact, sensation intact, DTR        present and equal bilaterally          Results Review:      Lab Results (last 72 hours)     Procedure Component Value Units Date/Time    Body Fluid Culture - Body Fluid, Neck  [[id number]]  (Abnormal) Collected: 01/19/22 1657    Specimen: Body Fluid from Neck Updated: 01/21/22 1313     Body Fluid Culture Light growth (2+) Staphylococcus aureus     Gram Stain Few (2+) WBCs seen      No organisms seen    Manual Differential [[id number]]  (Abnormal) Collected: 01/21/22 0549    Specimen: Blood Updated: 01/21/22 0812     Neutrophil % 54.7 %      Lymphocyte % 30.5 %      Monocyte % 10.5 %      Eosinophil % 2.1 %      Basophil % 2.1 %      Neutrophils Absolute 2.71 10*3/mm3      Lymphocytes Absolute 1.51 10*3/mm3      Monocytes Absolute 0.52 10*3/mm3      Eosinophils Absolute 0.10 10*3/mm3      Basophils Absolute 0.10 10*3/mm3      Dacrocytes Slight/1+     Hypochromia Mod/2+     WBC Morphology Normal     Platelet Morphology Normal    Blood Culture - Blood, Blood, Central Line [[id number]]  (Abnormal) Collected: 01/18/22 2018    Specimen: Blood, Central Line Updated: 01/21/22 0641     Blood Culture Staphylococcus aureus     Comment: Infectious disease consultation is highly recommended to rule out distant foci of infection.        Isolated from Aerobic and Anaerobic Bottles     Gram Stain Anaerobic Bottle Gram positive cocci in clusters      Aerobic Bottle Gram positive cocci in clusters    Narrative:      Refer to previous blood culture collected on 1/18/2022 at 2015 for MICs.    Blood Culture - Blood, Arm, Right [[id number]]  (Abnormal)  (Susceptibility) Collected: 01/18/22 2015    Specimen: Blood from Arm, Right Updated: 01/21/22 0641     Blood Culture Staphylococcus aureus     Comment: Infectious disease consultation is highly recommended to rule out distant foci of infection.        Isolated from Aerobic Bottle     Gram Stain Aerobic Bottle Gram positive cocci    Susceptibility      Staphylococcus aureus     ALESSANDRO     Gentamicin Susceptible     Oxacillin Susceptible     Rifampin Susceptible     Vancomycin Susceptible                  Linear View               Susceptibility Comments      Staphylococcus aureus    This isolate does not demonstrate inducible clindamycin resistance in vitro.    This isolate does not demonstrate inducible clindamycin resistance in vitro.               Basic Metabolic Panel [[id number]]  (Abnormal) Collected: 01/21/22 0549    Specimen: Blood Updated: 01/21/22 0633     Glucose 106 mg/dL      BUN 8 mg/dL      Creatinine 0.63 mg/dL      Sodium 138 mmol/L      Potassium 3.5 mmol/L      Chloride 107 mmol/L      CO2 19.2 mmol/L      Calcium 8.3 mg/dL      eGFR Non African Amer 114 mL/min/1.73      BUN/Creatinine Ratio 12.7     Anion Gap 11.8 mmol/L     Narrative:      GFR Normal >60  Chronic Kidney Disease <60  Kidney Failure <15      CBC & Differential [[id number]]  (Abnormal) Collected: 01/21/22 0549    Specimen: Blood Updated: 01/21/22 0607    Narrative:      The following orders were created for panel order CBC & Differential.  Procedure                               Abnormality         Status                     ---------                               -----------         ------                     CBC Auto Differential[[id number]]        Abnormal            Final result                 Please view results for these tests on the individual orders.    CBC Auto Differential [[id number]]  (Abnormal) Collected: 01/21/22 0549    Specimen: Blood Updated: 01/21/22 0607     WBC 4.95 10*3/mm3      RBC 4.20 10*6/mm3      Hemoglobin 9.3 g/dL      Hematocrit 29.9 %      MCV 71.2 fL      MCH 22.1 pg      MCHC 31.1 g/dL      RDW 17.5 %      RDW-SD 44.9 fl      MPV 10.4 fL      Platelets 227 10*3/mm3     Vancomycin, Trough Please draw vancomycin trough level 30 minutes prior to scheduled 1230 dose on 1/20, thanks! [[id number]]  (Normal) Collected: 01/20/22 1230    Specimen: Blood Updated: 01/20/22 1357     Vancomycin Trough 6.50 mcg/mL     Narrative:      Therapeutic Ranges for Vancomycin    Vancomycin Random   5.0-40.0 mcg/mL  Vancomycin Trough   5.0-20.0 mcg/mL  Vancomycin Peak     20.0-40.0  mcg/mL    Manual Differential [[id number]]  (Abnormal) Collected: 01/20/22 0801    Specimen: Blood Updated: 01/20/22 1041     Neutrophil % 77.8 %      Lymphocyte % 15.2 %      Monocyte % 4.0 %      Eosinophil % 3.0 %      Neutrophils Absolute 4.75 10*3/mm3      Lymphocytes Absolute 0.93 10*3/mm3      Monocytes Absolute 0.24 10*3/mm3      Eosinophils Absolute 0.18 10*3/mm3      RBC Morphology Normal     WBC Morphology Normal     Platelet Morphology Normal    CBC & Differential [[id number]]  (Abnormal) Collected: 01/20/22 0801    Specimen: Blood Updated: 01/20/22 1016    Narrative:      The following orders were created for panel order CBC & Differential.  Procedure                               Abnormality         Status                     ---------                               -----------         ------                     CBC Auto Differential[[id number]]        Abnormal            Final result                 Please view results for these tests on the individual orders.    CBC Auto Differential [[id number]]  (Abnormal) Collected: 01/20/22 0801    Specimen: Blood Updated: 01/20/22 1016     WBC 6.10 10*3/mm3      RBC 4.29 10*6/mm3      Hemoglobin 9.7 g/dL      Hematocrit 30.9 %      MCV 72.0 fL      MCH 22.6 pg      MCHC 31.4 g/dL      RDW 17.3 %      RDW-SD 45.4 fl      MPV 10.6 fL      Platelets 230 10*3/mm3     TSH [[id number]]  (Normal) Collected: 01/20/22 0801    Specimen: Blood Updated: 01/20/22 0959     TSH 2.720 uIU/mL     Comprehensive Metabolic Panel [[id number]]  (Abnormal) Collected: 01/20/22 0801    Specimen: Blood Updated: 01/20/22 0952     Glucose 107 mg/dL      BUN 6 mg/dL      Creatinine 0.72 mg/dL      Sodium 135 mmol/L      Potassium 4.0 mmol/L      Chloride 107 mmol/L      CO2 17.7 mmol/L      Calcium 8.4 mg/dL      Total Protein 6.7 g/dL      Albumin 3.20 g/dL      ALT (SGPT) 82 U/L      AST (SGOT) 53 U/L      Alkaline Phosphatase 71 U/L      Total Bilirubin 0.4 mg/dL      eGFR Non  Amer 98  mL/min/1.73      Globulin 3.5 gm/dL      A/G Ratio 0.9 g/dL      BUN/Creatinine Ratio 8.3     Anion Gap 10.3 mmol/L     Narrative:      GFR Normal >60  Chronic Kidney Disease <60  Kidney Failure <15      Lipid Panel [[id number]]  (Abnormal) Collected: 01/20/22 0801    Specimen: Blood Updated: 01/20/22 0952     Total Cholesterol 169 mg/dL      Triglycerides 165 mg/dL      HDL Cholesterol 28 mg/dL      LDL Cholesterol  112 mg/dL      VLDL Cholesterol 29 mg/dL      LDL/HDL Ratio 3.86    Narrative:      Cholesterol Reference Ranges  (U.S. Department of Health and Human Services ATP III Classifications)    Desirable          <200 mg/dL  Borderline High    200-239 mg/dL  High Risk          >240 mg/dL      Triglyceride Reference Ranges  (U.S. Department of Health and Human Services ATP III Classifications)    Normal           <150 mg/dL  Borderline High  150-199 mg/dL  High             200-499 mg/dL  Very High        >500 mg/dL    HDL Reference Ranges  (U.S. Department of Health and Human Services ATP III Classifications)    Low     <40 mg/dl (major risk factor for CHD)  High    >60 mg/dl ('negative' risk factor for CHD)        LDL Reference Ranges  (U.S. Department of Health and Human Services ATP III Classifications)    Optimal          <100 mg/dL  Near Optimal     100-129 mg/dL  Borderline High  130-159 mg/dL  High             160-189 mg/dL  Very High        >189 mg/dL    Hemoglobin A1c [[id number]]  (Abnormal) Collected: 01/20/22 0801    Specimen: Blood Updated: 01/20/22 0951     Hemoglobin A1C 5.70 %     Narrative:      Hemoglobin A1C Ranges:    Increased Risk for Diabetes  5.7% to 6.4%  Diabetes                     >= 6.5%  Diabetic Goal                < 7.0%    Blood Culture ID, PCR - Blood, Arm, Right [[id number]]  (Abnormal) Collected: 01/18/22 2015    Specimen: Blood from Arm, Right Updated: 01/20/22 0224     BCID, PCR Staph aureus. mecA/C and MREJ (methicillin resistance gene) NOT detected. Identification by BCID2  PCR     BOTTLE TYPE Aerobic Bottle    Narrative:      Infectious disease consultation is highly recommended to rule out distant foci of infection.    CBC & Differential [[id number]]  (Abnormal) Collected: 01/19/22 1144    Specimen: Blood Updated: 01/19/22 1223    Narrative:      The following orders were created for panel order CBC & Differential.  Procedure                               Abnormality         Status                     ---------                               -----------         ------                     CBC Auto Differential[[id number]]        Abnormal            Final result                 Please view results for these tests on the individual orders.    CBC Auto Differential [[id number]]  (Abnormal) Collected: 01/19/22 1144    Specimen: Blood Updated: 01/19/22 1223     WBC 7.84 10*3/mm3      RBC 4.39 10*6/mm3      Hemoglobin 9.9 g/dL      Hematocrit 31.6 %      MCV 72.0 fL      MCH 22.6 pg      MCHC 31.3 g/dL      RDW 17.3 %      RDW-SD 44.5 fl      MPV 10.0 fL      Platelets 244 10*3/mm3      Neutrophil % 87.1 %      Lymphocyte % 8.3 %      Monocyte % 4.2 %      Eosinophil % 0.0 %      Basophil % 0.1 %      Neutrophils, Absolute 6.83 10*3/mm3      Lymphocytes, Absolute 0.65 10*3/mm3      Monocytes, Absolute 0.33 10*3/mm3      Eosinophils, Absolute 0.00 10*3/mm3      Basophils, Absolute 0.01 10*3/mm3     Basic Metabolic Panel [[id number]]  (Abnormal) Collected: 01/19/22 1144    Specimen: Blood Updated: 01/19/22 1219     Glucose 136 mg/dL      BUN 6 mg/dL      Creatinine 0.75 mg/dL      Sodium 135 mmol/L      Potassium 3.6 mmol/L      Chloride 108 mmol/L      CO2 16.9 mmol/L      Calcium 8.3 mg/dL      eGFR Non African Amer 93 mL/min/1.73      BUN/Creatinine Ratio 8.0     Anion Gap 10.1 mmol/L     Narrative:      GFR Normal >60  Chronic Kidney Disease <60  Kidney Failure <15      Blood Gas, Arterial - [[id number]]  (Abnormal) Collected: 01/18/22 2156    Specimen: Arterial Blood Updated: 01/18/22 2159      Site Arterial: right radial     Vipin's Test Positive     pH, Arterial 7.499 pH units      pCO2, Arterial 23.5 mm Hg      pO2, Arterial 95.9 mm Hg      HCO3, Arterial 18.3 mmol/L      Base Excess, Arterial -3.4 mmol/L      O2 Saturation Calculated 98.3 %      Barometric Pressure for Blood Gas 751.0 mmHg      Comment: 96% Meter: [id number] : [id number] Leigha Georgina        Modality Room Air     Rate 20 Breaths/minute     BNP [[id number]]  (Normal) Collected: 01/18/22 2014    Specimen: Blood from Arm, Right Updated: 01/18/22 2131     proBNP 97.7 pg/mL     Narrative:      Among patients with dyspnea, NT-proBNP is highly sensitive for the detection of acute congestive heart failure. In addition NT-proBNP of <300 pg/ml effectively rules out acute congestive heart failure with 99% negative predictive value.    Results may be falsely decreased if patient taking Biotin.      Respiratory Panel PCR w/COVID-19(SARS-CoV-2) JENIFER/LUIS/DAT/PAD/COR/MAD/NBA In-House, NP Swab in [facility], 3-4 HR TAT - Swab, Nasopharynx [[id number]]  (Normal) Collected: 01/18/22 2028    Specimen: Swab from Nasopharynx Updated: 01/18/22 2128     ADENOVIRUS, PCR Not Detected     Coronavirus 229E Not Detected     Coronavirus HKU1 Not Detected     Coronavirus NL63 Not Detected     Coronavirus OC43 Not Detected     COVID19 Not Detected     Human Metapneumovirus Not Detected     Human Rhinovirus/Enterovirus Not Detected     Influenza A PCR Not Detected     Influenza B PCR Not Detected     Parainfluenza Virus 1 Not Detected     Parainfluenza Virus 2 Not Detected     Parainfluenza Virus 3 Not Detected     Parainfluenza Virus 4 Not Detected     RSV, PCR Not Detected     Bordetella pertussis pcr Not Detected     Bordetella parapertussis PCR Not Detected     Chlamydophila pneumoniae PCR Not Detected     Mycoplasma pneumo by PCR Not Detected    Narrative:      In the setting of a positive respiratory panel with a viral infection PLUS a negative procalcitonin  without other underlying concern for bacterial infection, consider observing off antibiotics or discontinuation of antibiotics and continue supportive care. If the respiratory panel is positive for atypical bacterial infection (Bordetella pertussis, Chlamydophila pneumoniae, or Mycoplasma pneumoniae), consider antibiotic de-escalation to target atypical bacterial infection.    Comprehensive Metabolic Panel [[id number]]  (Abnormal) Collected: 01/18/22 2014    Specimen: Blood from Arm, Right Updated: 01/18/22 2118     Glucose 109 mg/dL      BUN 7 mg/dL      Creatinine 1.00 mg/dL      Sodium 134 mmol/L      Potassium 4.2 mmol/L      Chloride 104 mmol/L      CO2 15.7 mmol/L      Calcium 8.7 mg/dL      Total Protein 7.1 g/dL      Albumin 4.10 g/dL      ALT (SGPT) 88 U/L      AST (SGOT) 91 U/L      Alkaline Phosphatase 79 U/L      Total Bilirubin 0.9 mg/dL      eGFR Non African Amer 67 mL/min/1.73      Globulin 3.0 gm/dL      A/G Ratio 1.4 g/dL      BUN/Creatinine Ratio 7.0     Anion Gap 14.3 mmol/L     Narrative:      GFR Normal >60  Chronic Kidney Disease <60  Kidney Failure <15      Magnesium [[id number]]  (Normal) Collected: 01/18/22 2014    Specimen: Blood from Arm, Right Updated: 01/18/22 2118     Magnesium 1.9 mg/dL     [facility] Draw [[id number]] Collected: 01/18/22 2014    Specimen: Blood from Arm, Right Updated: 01/18/22 2116    Narrative:      The following orders were created for panel order [facility] Draw.  Procedure                               Abnormality         Status                     ---------                               -----------         ------                     Green Top (Gel)[[id number]]                                  Final result               Lavender Top[[id number]]                                     Final result               Gold Top - SST[[id number]]                                                              Light Blue Top[[id number]]                                   Final result                  Please view results for these tests on the individual orders.    Lavender Top [[id number]] Collected: 01/18/22 2014    Specimen: Blood from Arm, Right Updated: 01/18/22 2116     Extra Tube hold for add-on     Comment: Auto resulted       Green Top (Gel) [[id number]] Collected: 01/18/22 2014    Specimen: Blood from Arm, Right Updated: 01/18/22 2116     Extra Tube Hold for add-ons.     Comment: Auto resulted.       Light Blue Top [[id number]] Collected: 01/18/22 2014    Specimen: Blood from Arm, Right Updated: 01/18/22 2116     Extra Tube hold for add-on     Comment: Auto resulted       Troponin [[id number]]  (Normal) Collected: 01/18/22 2014    Specimen: Blood from Arm, Right Updated: 01/18/22 2115     Troponin T <0.010 ng/mL     Narrative:      Troponin T Reference Range:  <= 0.03 ng/mL-   Negative for AMI  >0.03 ng/mL-     Abnormal for myocardial necrosis.  Clinicians would have to utilize clinical acumen, EKG, Troponin and serial changes to determine if it is an Acute Myocardial Infarction or myocardial injury due to an underlying chronic condition.       Results may be falsely decreased if patient taking Biotin.      Procalcitonin [[id number]]  (Abnormal) Collected: 01/18/22 2014    Specimen: Blood from Arm, Right Updated: 01/18/22 2114     Procalcitonin 2.22 ng/mL     Narrative:      As a Marker for Sepsis (Non-Neonates):     1. <0.5 ng/mL represents a low risk of severe sepsis and/or septic shock.  2. >2 ng/mL represents a high risk of severe sepsis and/or septic shock.    As a Marker for Lower Respiratory Tract Infections that require antibiotic therapy:  PCT on Admission     Antibiotic Therapy             6-12 Hrs later  >0.5                          Strongly Recommended            >0.25 - <0.5             Recommended  0.1 - 0.25                  Discouraged                       Remeasure/reassess PCT  <0.1                         Strongly Discouraged         Remeasure/reassess PCT      As 28 day mortality  "risk marker: \"Change in Procalcitonin Result\" (>80% or <=80%) if Day 0 (or Day 1) and Day 4 values are available. Refer to http://[email or web address]-pct-[email or web address]/    Change in PCT <=80 %   A decrease of PCT levels below or equal to 80% defines a positive change in PCT test result representing a higher risk for 28-day all-cause mortality of patients diagnosed with severe sepsis or septic shock.    Change in PCT >80 %   A decrease of PCT levels of more than 80% defines a negative change in PCT result representing a lower risk for 28-day all-cause mortality of patients diagnosed with severe sepsis or septic shock.                TSH [[id number]]  (Normal) Collected: 01/18/22 2014    Specimen: Blood from Arm, Right Updated: 01/18/22 2106     TSH 1.020 uIU/mL     Urinalysis, Microscopic Only - Urine, Catheter [[id number]]  (Abnormal) Collected: 01/18/22 2028    Specimen: Urine, Catheter Updated: 01/18/22 2100     RBC, UA 0-2 /HPF      WBC, UA 3-5 /HPF      Bacteria, UA None Seen /HPF      Squamous Epithelial Cells, UA None Seen /HPF      Hyaline Casts, UA None Seen /LPF      Mucus, UA Moderate/2+ /HPF      Methodology Manual Light Microscopy    hCG, Serum, Qualitative [[id number]]  (Normal) Collected: 01/18/22 2014    Specimen: Blood from Arm, Right Updated: 01/18/22 2059     HCG Qualitative Negative    Protime-INR [[id number]]  (Abnormal) Collected: 01/18/22 2014    Specimen: Blood from Arm, Right Updated: 01/18/22 2058     Protime 16.6 Seconds      INR 1.36    aPTT [[id number]]  (Abnormal) Collected: 01/18/22 2014    Specimen: Blood from Arm, Right Updated: 01/18/22 2058     PTT 44.2 seconds     Lactic Acid, Plasma [[id number]]  (Normal) Collected: 01/18/22 2014    Specimen: Blood from Arm, Right Updated: 01/18/22 2057     Lactate 1.9 mmol/L     Urinalysis With Microscopic If Indicated (No Culture) - Urine, Catheter [[id number]]  (Abnormal) Collected: 01/18/22 2028    Specimen: Urine, Catheter Updated: 01/18/22 2049     Color, " UA Orange     Comment: Any Substance that causes an abnormal urine color can alter the accuracy of the chemical reactions.        Appearance, UA Clear     pH, UA <=5.0     Specific Gravity, UA >=1.030     Glucose, UA Negative     Ketones, UA Negative     Bilirubin, UA Negative     Blood, UA Trace     Protein,  mg/dL (2+)     Leuk Esterase, UA Moderate (2+)     Nitrite, UA Positive     Urobilinogen, UA 1.0 E.U./dL    CBC & Differential [[id number]]  (Abnormal) Collected: 01/18/22 2014    Specimen: Blood from Arm, Right Updated: 01/18/22 2047    Narrative:      The following orders were created for panel order CBC & Differential.  Procedure                               Abnormality         Status                     ---------                               -----------         ------                     CBC Auto Differential[[id number]]        Abnormal            Final result                 Please view results for these tests on the individual orders.    CBC Auto Differential [[id number]]  (Abnormal) Collected: 01/18/22 2014    Specimen: Blood from Arm, Right Updated: 01/18/22 2047     WBC 11.77 10*3/mm3      RBC 4.96 10*6/mm3      Hemoglobin 11.2 g/dL      Hematocrit 35.5 %      MCV 71.6 fL      MCH 22.6 pg      MCHC 31.5 g/dL      RDW 17.2 %      RDW-SD 43.0 fl      MPV 9.8 fL      Platelets 276 10*3/mm3      Neutrophil % 90.9 %      Lymphocyte % 4.9 %      Monocyte % 3.3 %      Eosinophil % 0.0 %      Basophil % 0.3 %      Neutrophils, Absolute 10.70 10*3/mm3      Lymphocytes, Absolute 0.58 10*3/mm3      Monocytes, Absolute 0.39 10*3/mm3      Eosinophils, Absolute 0.00 10*3/mm3      Basophils, Absolute 0.03 10*3/mm3           Imaging Results (Last 72 Hours)     Procedure Component Value Units Date/Time    CT Abdomen Pelvis With Contrast [[id number]] Collected: 01/19/22 0641     Updated: 01/19/22 1648    Narrative:         CT OF THE ABDOMEN AND PELVIS WITH CONTRAST AND CT ANGIOGRAM OF THE CHEST  WITH CONTRAST  INCLUDING RECONSTRUCTION IMAGES 01/18/2022     HISTORY: Abdominal pain. Possible pulmonary embolus.     TECHNIQUE: Following the intravenous contrast injection, CT angiography  was performed through the chest. Sagittal, coronal and 3-D  reconstruction images were reviewed. This was followed by CT of the  abdomen and pelvis with contrast.     FINDINGS: The pulmonary arterial system is well opacified with no  evidence of pulmonary embolus.     No pathologically enlarged lymph nodes are seen.     No lung masses or significant pulmonary infiltrates are seen.     There is mild fatty infiltration of the liver. There is an approximately  8 mm low-density lesion in the dome of the liver which is not clearly  seen on the 05/13/2019 study.     The gallbladder, spleen, pancreas, adrenals and kidneys appear  unremarkable except for a tiny left renal cyst.           Uterus, adnexa and urinary bladder appear unremarkable.     No bowel wall thickening or bowel dilatation is seen.       Impression:      1. No evidence of pulmonary embolus.  2. Mild fatty infiltration of the liver.  3. Tiny low-density lesion in the liver. This is not clearly seen on the  previous study of 05/13/2019. Appears slightly higher in density than a  typical cyst but could represent a cyst with volume averaging or  possibly small hemangioma. Benign etiology is favored. Consider a  short-term follow-up CT of the abdomen in 4 months to 6 months to assess  stability. Also correlation to any prior outside CT scans of the abdomen  may be helpful.  4. No other significant findings are noted.           Radiation dose reduction techniques were utilized, including automated  exposure control and exposure modulation based on body size.     This report was finalized on 1/19/2022 4:45 PM by Dr. Ramesh Gomez M.D.       CT Angiogram Chest [[id number]] Collected: 01/19/22 0641     Updated: 01/19/22 1648    Narrative:         CT OF THE ABDOMEN AND PELVIS WITH  CONTRAST AND CT ANGIOGRAM OF THE CHEST  WITH CONTRAST INCLUDING RECONSTRUCTION IMAGES 01/18/2022     HISTORY: Abdominal pain. Possible pulmonary embolus.     TECHNIQUE: Following the intravenous contrast injection, CT angiography  was performed through the chest. Sagittal, coronal and 3-D  reconstruction images were reviewed. This was followed by CT of the  abdomen and pelvis with contrast.     FINDINGS: The pulmonary arterial system is well opacified with no  evidence of pulmonary embolus.     No pathologically enlarged lymph nodes are seen.     No lung masses or significant pulmonary infiltrates are seen.     There is mild fatty infiltration of the liver. There is an approximately  8 mm low-density lesion in the dome of the liver which is not clearly  seen on the 05/13/2019 study.     The gallbladder, spleen, pancreas, adrenals and kidneys appear  unremarkable except for a tiny left renal cyst.           Uterus, adnexa and urinary bladder appear unremarkable.     No bowel wall thickening or bowel dilatation is seen.       Impression:      1. No evidence of pulmonary embolus.  2. Mild fatty infiltration of the liver.  3. Tiny low-density lesion in the liver. This is not clearly seen on the  previous study of 05/13/2019. Appears slightly higher in density than a  typical cyst but could represent a cyst with volume averaging or  possibly small hemangioma. Benign etiology is favored. Consider a  short-term follow-up CT of the abdomen in 4 months to 6 months to assess  stability. Also correlation to any prior outside CT scans of the abdomen  may be helpful.  4. No other significant findings are noted.           Radiation dose reduction techniques were utilized, including automated  exposure control and exposure modulation based on body size.     This report was finalized on 1/19/2022 4:45 PM by Dr. Ramesh Gomez M.D.               Medication Review:     Hospital Medications (active)       Dose Frequency Start End  "   acetaminophen (TYLENOL) tablet 650 mg 650 mg Every 6 Hours PRN 1/19/2022     Admin Instructions: Do not exceed 4 grams of acetaminophen in a 24 hr period. Max dose of 2gm for AST/ALT greater than 120 units/L      If given for pain, use the following pain scale:   Mild Pain = Pain Score of 1-3, CPOT 1-2  Moderate Pain = Pain Score of 4-6, CPOT 3-4  Severe Pain = Pain Score of 7-10, CPOT 5-8    Route: Oral    albuterol (PROVENTIL) nebulizer solution 0.083% 2.5 mg/3mL 2.5 mg Every 4 Hours PRN 1/19/2022     Route: Nebulization    apixaban (ELIQUIS) tablet 5 mg 5 mg Every 12 Hours Scheduled 1/19/2022     Admin Instructions: Tablet may be crushed and suspended in 60 mL of water or D5W and immediately delivered via NG tube.    Route: Oral    cefTRIAXone (ROCEPHIN) 2 g in sodium chloride 0.9 % 100 mL IVPB-VTB 2 g Every 24 Hours 1/20/2022 2/4/2022    Admin Instructions: Caution: Look alike/sound alike drug alert    Route: Intravenous    gabapentin (NEURONTIN) capsule 100 mg 100 mg 3 Times Daily 1/19/2022     Admin Instructions:     Route: Oral    hydrOXYzine pamoate (VISTARIL) capsule 25 mg 25 mg 3 Times Daily PRN 1/20/2022     Admin Instructions: Caution: Look alike/sound alike drug alert    Route: Oral    Linked Group 1: \"Or\" Linked Group Details        hydrOXYzine pamoate (VISTARIL) capsule 50 mg 50 mg 3 Times Daily PRN 1/20/2022     Admin Instructions: Caution: Look alike/sound alike drug alert    Route: Oral    Linked Group 1: \"Or\" Linked Group Details        levothyroxine (SYNTHROID, LEVOTHROID) tablet 50 mcg 50 mcg Every Early Morning 1/19/2022     Admin Instructions: Take on empty stomach.    Route: Oral    metoprolol tartrate (LOPRESSOR) tablet 25 mg 25 mg 3 Times Daily 1/19/2022     Route: Oral    montelukast (SINGULAIR) tablet 10 mg 10 mg Nightly 1/19/2022     Route: Oral    ondansetron (ZOFRAN) injection 4 mg 4 mg Every 4 Hours PRN 1/20/2022     Route: Intravenous    pantoprazole (PROTONIX) EC tablet 40 mg 40 " "mg 2 Times Daily Before Meals 1/19/2022     Admin Instructions: Swallow whole; do not crush, split, or chew.    Route: Oral    prochlorperazine (COMPAZINE) injection 10 mg 10 mg Every 4 Hours PRN 1/20/2022     Route: Intravenous    pyridostigmine (MESTINON) tablet 30 mg 30 mg 2 Times Daily 1/19/2022     Route: Oral    sodium chloride 0.9 % flush 10 mL 10 mL As Needed 1/18/2022     Route: Intravenous    Linked Group 2: \"And\" Linked Group Details        sodium chloride 0.9 % flush 10 mL 10 mL As Needed 1/20/2022     Route: Intravenous    Cosign for Ordering: Accepted by Nancy Diaz MD on 1/20/2022  9:02 PM        apixaban, 5 mg, Oral, Q12H  cefTRIAXone, 2 g, Intravenous, Q24H  gabapentin, 100 mg, Oral, TID  levothyroxine, 50 mcg, Oral, Q AM  metoprolol tartrate, 25 mg, Oral, TID  montelukast, 10 mg, Oral, Nightly  pantoprazole, 40 mg, Oral, BID AC  pyridostigmine, 30 mg, Oral, BID        Assessment/Plan         Febrile illness, acute             central line infection causing bacteremia  Line removed     BC  MSSA     Plan :     IV rocephin for 2 weeks till 2/5/22  Echo  Will follow  Thank you              Nancy Diaz MD  01/22/22  12:01 EST        "

## 2022-01-22 NOTE — PROGRESS NOTES
"Daily progress note    Chief complaint   Doing better  No specific complaints   Wants to go home  Family at bedside    History of present illness  26-year-old white female with history of pulm embolism chronic orthostatic hypotension POTS syndrome severe gastroparesis Phoebe-Danlos syndrome irritable bowel syndrome gastroesophageal reflux disease who is well-known to our service and was recently admitted with Dolan catheter malfunctioning and was removed and new Dolan catheter placed presented to [facility] emergency room with high fever for last 3 days with nausea vomiting and not eating drinking fatigue tired and multiple other complaints with generalized weakness.  Patient denies any cough shortness of breath chest pain diarrhea.  Patient work-up in ER revealed sepsis probably line related admit for management.     REVIEW OF SYSTEMS  Unremarkable      PHYSICAL EXAM  Blood pressure 115/83, pulse 102, temperature 98 °F (36.7 °C), temperature source Oral, resp. rate 18, height 163.8 cm (64.5\"), weight 113 kg (250 lb), SpO2 97 %, not currently breastfeeding.    Constitutional:       General: Awake and alert     Appearance: She is not toxic-appearing.   HENT:      Head: Normocephalic and atraumatic.   Eyes:      Pupils: Pupils are equal, round, and reactive to light.   Cardiovascular:      Rate and Rhythm: Regular rhythm.      Pulses: Normal pulses.           Posterior tibial pulses are 2+ on the right side and 2+ on the left side.      Heart sounds: Normal heart sounds. No murmur heard.  Pulmonary:      Effort: Pulmonary effort is normal. No respiratory distress.      Breath sounds: Normal breath sounds.   Abdominal:      General: Bowel sounds are normal.      Palpations: Abdomen is soft.      Tenderness: There is no abdominal tenderness. There is no guarding or rebound.   Musculoskeletal:         General: Normal range of motion.      Cervical back: Normal range of motion and neck supple.   Skin:     " General: Skin is warm and dry.      Comments: Right IJ venous catheter site clean, dry, intact, nontender, no erythema   Neurological:      Mental Status: She is alert and oriented to person, place, and time.   Psychiatric:         Mood and Affect: Affect normal.      LAB RESULTS  Lab Results (last 24 hours)     Procedure Component Value Units Date/Time    Body Fluid Culture - Body Fluid, Neck [[id number]]  (Abnormal)  (Susceptibility) Collected: 01/19/22 1657    Specimen: Body Fluid from Neck Updated: 01/22/22 1048     Body Fluid Culture Light growth (2+) Staphylococcus aureus     Gram Stain Few (2+) WBCs seen      No organisms seen    Susceptibility      Staphylococcus aureus     ALESSANDRO     Gentamicin Susceptible     Oxacillin Susceptible     Rifampin Susceptible     Vancomycin Susceptible                  Linear View               Susceptibility Comments     Staphylococcus aureus    This isolate does not demonstrate inducible clindamycin resistance in vitro.               CBC & Differential [[id number]]  (Abnormal) Collected: 01/22/22 0723    Specimen: Blood Updated: 01/22/22 0844    Narrative:      The following orders were created for panel order CBC & Differential.  Procedure                               Abnormality         Status                     ---------                               -----------         ------                     CBC Auto Differential[[id number]]        Abnormal            Final result                 Please view results for these tests on the individual orders.    CBC Auto Differential [[id number]]  (Abnormal) Collected: 01/22/22 0723    Specimen: Blood Updated: 01/22/22 0844     WBC 5.44 10*3/mm3      RBC 4.48 10*6/mm3      Hemoglobin 9.9 g/dL      Hematocrit 32.9 %      MCV 73.4 fL      MCH 22.1 pg      MCHC 30.1 g/dL      RDW 17.5 %      RDW-SD 47.1 fl      MPV 10.8 fL      Platelets 268 10*3/mm3      Neutrophil % 51.5 %      Lymphocyte % 36.2 %      Monocyte % 8.6 %      Eosinophil % 2.0  %      Basophil % 0.6 %      Neutrophils, Absolute 2.80 10*3/mm3      Lymphocytes, Absolute 1.97 10*3/mm3      Monocytes, Absolute 0.47 10*3/mm3      Eosinophils, Absolute 0.11 10*3/mm3      Basophils, Absolute 0.03 10*3/mm3     Basic Metabolic Panel [[id number]]  (Abnormal) Collected: 01/22/22 0723    Specimen: Blood Updated: 01/22/22 0840     Glucose 110 mg/dL      BUN 9 mg/dL      Creatinine 0.60 mg/dL      Sodium 136 mmol/L      Potassium 3.3 mmol/L      Chloride 103 mmol/L      CO2 21.5 mmol/L      Calcium 8.7 mg/dL      eGFR Non African Amer 121 mL/min/1.73      BUN/Creatinine Ratio 15.0     Anion Gap 11.5 mmol/L     Narrative:      GFR Normal >60  Chronic Kidney Disease <60  Kidney Failure <15          Imaging Results (Last 24 Hours)     ** No results found for the last 24 hours. **               ECG 12 Lead  Component   Ref Range & Units 1/18/22 2052 9/17/21 1113 9/8/21 1002 5/6/21 0432 5/5/21 2240   QT Interval   ms 334  350  298  279  342              HEART RATE= 145  bpm  RR Interval= 416  ms  AK Interval= 85  ms  P Horizontal Axis= 13  deg  P Front Axis= 35  deg  QRSD Interval= 81  ms  QT Interval= 334  ms  QRS Axis= 5  deg  T Wave Axis= 241  deg  - ABNORMAL ECG -  Sinus tachycardia  Nonspecific repol abnormality, diffuse leads  Prolonged QT interval  When compared with ECG of 17-Sep-2021 11:13:59,  Significant rate increase with new nonspecific repolarization abnormalities in diffuse leads             Current Facility-Administered Medications:   •  acetaminophen (TYLENOL) tablet 650 mg, 650 mg, Oral, Q6H PRN, Caleb Naik MD, 650 mg at 01/19/22 1940  •  albuterol (PROVENTIL) nebulizer solution 0.083% 2.5 mg/3mL, 2.5 mg, Nebulization, Q4H PRN, Caleb Naik MD  •  apixaban (ELIQUIS) tablet 5 mg, 5 mg, Oral, Q12H, Caleb Naik MD, 5 mg at 01/22/22 0900  •  cefTRIAXone (ROCEPHIN) 2 g in sodium chloride 0.9 % 100 mL IVPB-VTB, 2 g, Intravenous, Q24H, Nancy Diaz MD, Last Rate: 200 mL/hr at 01/22/22 1158,  2 g at 01/22/22 1158  •  gabapentin (NEURONTIN) capsule 100 mg, 100 mg, Oral, TID, Ann Naik MD, 100 mg at 01/22/22 0900  •  hydrOXYzine pamoate (VISTARIL) capsule 25 mg, 25 mg, Oral, TID PRN **OR** hydrOXYzine pamoate (VISTARIL) capsule 50 mg, 50 mg, Oral, TID PRN, Ann Naik MD, 50 mg at 01/20/22 1802  •  levothyroxine (SYNTHROID, LEVOTHROID) tablet 50 mcg, 50 mcg, Oral, Q AM, Ann Naik MD, 50 mcg at 01/22/22 0622  •  metoprolol tartrate (LOPRESSOR) tablet 25 mg, 25 mg, Oral, TID, Ann Naik MD, 25 mg at 01/22/22 0900  •  montelukast (SINGULAIR) tablet 10 mg, 10 mg, Oral, Nightly, Ann Naik MD, 10 mg at 01/21/22 2047  •  ondansetron (ZOFRAN) injection 4 mg, 4 mg, Intravenous, Q4H PRN, Ann Naik MD, 4 mg at 01/22/22 0622  •  pantoprazole (PROTONIX) EC tablet 40 mg, 40 mg, Oral, BID AC, Ann Naik MD, 40 mg at 01/22/22 0900  •  prochlorperazine (COMPAZINE) injection 10 mg, 10 mg, Intravenous, Q4H PRN, Ann Naik MD, 10 mg at 01/22/22 0846  •  pyridostigmine (MESTINON) tablet 30 mg, 30 mg, Oral, BID, Ann Naik MD, 30 mg at 01/22/22 0900  •  [COMPLETED] Insert peripheral IV, , , Once **AND** sodium chloride 0.9 % flush 10 mL, 10 mL, Intravenous, PRN, Dena Richardson MD  •  sodium chloride 0.9 % flush 10 mL, 10 mL, Intravenous, PRN, Nancy Diaz MD     ASSESSMENT  MSSA bacteremia with sepsis  Line sepsis status post removal  Acute UTI  Pulm embolism on anticoagulation   Chronic orthostatic hypotension  Hypothyroidism  POTS syndrome  Phoebe-Danlos syndrome  Irritable bowel syndrome  Severe gastroparesis  Gastroesophageal reflux disease    PLAN  Discharge home on IV antibiotics for 2 weeks   Discharge summary dictated    ANN NAIK MD

## 2022-01-23 ENCOUNTER — READMISSION MANAGEMENT (OUTPATIENT)
Dept: CALL CENTER | Facility: HOSPITAL | Age: 27
End: 2022-01-23

## 2022-01-23 NOTE — OUTREACH NOTE
Prep Survey      Responses   [facility] discharged from? [facility]   Is LACE score < 7 ? No   Emergency Room discharge w/ pulse ox? No   Eligibility Readm Mgmt   Discharge diagnosis MSSA bacteremia with sepsis, Line sepsis status post removal   Does the patient have one of the following disease processes/diagnoses(primary or secondary)? Sepsis   Does the patient have Home health ordered? No   Is there a DME ordered? Yes   What DME was ordered? IV abx-  [facility]   Prep survey completed? Yes          Linda Borrero RN

## 2022-01-24 NOTE — CASE MANAGEMENT/SOCIAL WORK
Case Management Discharge Note      Final Note: home with spouse and with [facility] and ambulatory referral to [facility] for midline dressing changes. [facility] spoke with Dr. Diaz for verbal order to ACU. CCP spoke with Davina/sindy to arrange visits for the patient. [facility] contacted the patient [phone]) to provide the appointments. The patient is agreeable. Anuj Bo RN SARA         Selected Continued Care - Discharged on 1/22/2022 Admission date: 1/18/2022 - Discharge disposition: Home or Self Care    Destination    No services have been selected for the patient.              Durable Medical Equipment    No services have been selected for the patient.              Dialysis/Infusion    No services have been selected for the patient.              Home Medical Care    No services have been selected for the patient.              Therapy    No services have been selected for the patient.              Community Resources    No services have been selected for the patient.              Community & DME    No services have been selected for the patient.                  Transportation Services  Private: Car    Final Discharge Disposition Code: 06 - home with home health care

## 2022-01-24 NOTE — PAYOR COMM NOTE
"DISCHARGED - AWAITING RESPONSE FROM INITIAL FAX  ID #[id number]  F:  [phone]        Liliam Da Silva (26 y.o. Female)             Date of Birth Social Security Number Address Home Phone MRN    1995  [facility] [address] [phone] [id number]    Gnosticist Marital Status             Mormonism        Admission Date Admission Type Admitting Provider Attending Provider Department, Room/Bed    1/18/22 Emergency Caleb Naik MD  [address], P599/1    Discharge Date Discharge Disposition Discharge Destination          1/22/2022 Home or Self Care              Attending Provider: (none)   Allergies: Eggs Or Egg-derived Products, Pepcid [Famotidine], Scopolamine    Isolation: None   Infection: None   Code Status: Prior   Advance Care Planning Activity    Ht: 163.8 cm (64.5\")   Wt: 113 kg (250 lb)    Admission Cmt: None   Principal Problem: None                Active Insurance as of 1/18/2022     Primary Coverage     Payor Plan Insurance Group Employer/Plan Group    [employer] BLUE [employer]OSS [employer] BLUE CROSS BLUE SHIELD PPO [id number]     Payor Plan Address Payor Plan Phone Number Payor Plan Fax Number Effective Dates    [address] [phone]  4/1/2019 - None Entered    [facility] [id number]       Subscriber Name Subscriber Birth Date Member ID       AZEB DA SILVA 1995 [id number]                 Emergency Contacts      (Rel.) Home Phone Work Phone Mobile Phone    AZEB DA SILVA (Spouse) -- -- [phone]            Vital Signs (last day) before discharge     Date/Time Temp Temp src Pulse Resp BP Patient Position SpO2    01/22/22 1239 98 (36.7) Oral 102 18 115/83 Lying 97    01/22/22 0836 97.1 (36.2) Oral 118 18 129/85 Lying 92    01/22/22 0409 97.9 (36.6) Oral 109 18 129/93 Lying 96    01/21/22 2002 98.6 (37) Oral 101 18 123/73 Lying 96    01/21/22 1637 98 (36.7) Oral 107 18 117/78 Lying 98    01/21/22 1320 97.5 (36.4) Oral 102 18 132/96 Lying 97    " 01/21/22 0817 97.2 (36.2) Oral 115 18 126/70 Lying 97    01/21/22 0330 98.9 (37.2) Oral 91 18 123/88 Lying 97          Oxygen Therapy (last day) before discharge     Date/Time SpO2 Device (Oxygen Therapy) Flow (L/min) Oxygen Concentration (%) ETCO2 (mmHg)    01/22/22 1239 97 room air -- -- --    01/22/22 0836 92 room air -- -- --    01/22/22 0409 96 room air -- -- --    01/21/22 2021 -- room air -- -- --    01/21/22 2002 96 room air -- -- --    01/21/22 1637 98 room air -- -- --    01/21/22 1320 97 room air -- -- --    01/21/22 0817 97 nasal cannula 2 -- --    01/21/22 0800 -- room air -- -- --    01/21/22 0330 97 nasal cannula 2 -- --          Lines, Drains & Airways     Active LDAs     Name Placement date Placement time Site Days    Midline Catheter - Single Lumen 01/20/22 Left Basilic 01/20/22  1426   3                  Medication Administration Report for Liliam Lorenz as of 01/24/22 0806   Legend:    Given Hold Not Given Due Canceled Entry Other Actions    Time Time (Time) Time  Time-Action       Discontinued     Completed     Future     MAR Hold     Linked           Medications 01/16/22 01/17/22 01/18/22 01/19/22 01/20/22 01/21/22 01/22/22   Completed Medications    acetaminophen (TYLENOL) tablet 1,000 mg  Dose: 1,000 mg  Freq: Once Route: PO  Start: 01/19/22 0627   End: 01/19/22 0632   Admin Instructions:   Do not exceed 4 grams of acetaminophen in a 24 hr period. Max dose of 2gm for AST/ALT greater than 120 units/L      If given for pain, use the following pain scale:   Mild Pain = Pain Score of 1-3, CPOT 1-2  Moderate Pain = Pain Score of 4-6, CPOT 3-4  Severe Pain = Pain Score of 7-10, CPOT 5-8          0632-Given              acetaminophen (TYLENOL) tablet 1,000 mg  Dose: 1,000 mg  Freq: Once Route: PO  Start: 01/18/22 1953   End: 01/18/22 2045   Admin Instructions:   Do not exceed 4 grams of acetaminophen in a 24 hr period. Max dose of 2gm for AST/ALT greater than 120 units/L      If given for pain,  use the following pain scale:   Mild Pain = Pain Score of 1-3, CPOT 1-2  Moderate Pain = Pain Score of 4-6, CPOT 3-4  Severe Pain = Pain Score of 7-10, CPOT 5-8         2045-Given               cefepime 2 gm IVPB in 100 ml NS (VTB)  Dose: 2 g  Freq: Once Route: IV  Indications of Use: SEPSIS  Start: 01/18/22 2134   End: 01/18/22 2330 2224-New Bag       2330-Stopped               dronabinol (MARINOL) capsule 2.5 mg  Dose: 2.5 mg  Freq: Once Route: PO  Start: 01/19/22 0055   End: 01/19/22 0245   Admin Instructions:   As needed nausea/vomiting            0245-Given              iopamidol (ISOVUE-370) 76 % injection 100 mL  Dose: 100 mL  Freq: Once in Imaging Route: IV  Start: 01/18/22 2339   End: 01/18/22 2341         2341-Given               lidocaine 1% - EPINEPHrine 1:[id number] (XYLOCAINE W/EPI) 1 %-1:[id number] injection 10 mL  Dose: 10 mL  Freq: Once Route: IJ  Start: 01/19/22 1531   End: 01/19/22 1547   Admin Instructions:   Vial to bedside - Document actual dose at time of admin by provider.          1547-Given by Other [C]              morphine injection 2 mg  Dose: 2 mg  Freq: Once Route: IV  Start: 01/19/22 1541   End: 01/19/22 1547   Admin Instructions:   If given for pain, use the following pain scale:  Mild Pain = Pain Score of 1-3, CPOT 1-2  Moderate Pain = Pain Score of 4-6, CPOT 3-4  Severe Pain = Pain Score of 7-10, CPOT 5-8          1547-Given              ondansetron (ZOFRAN) injection 8 mg  Dose: 8 mg  Freq: Once Route: IV  Start: 01/19/22 0624   End: 01/19/22 0633          0633-Given              sodium chloride 0.9 % bolus 1,000 mL  Dose: 1,000 mL  Freq: Once Route: IV  Start: 01/18/22 1953   End: 01/19/22 0309 2045-New Bag           0309-Stopped              vancomycin 2250 mg/500 mL 0.9% NS IVPB ([facility])  Dose: 20 mg/kg  Weight Dosing Info: 113 kg  Freq: Once Route: IV  Indications of Use: BACTEREMIA,SEPSIS  Start: 01/18/22 2134   End: 01/19/22 0311          0030-New Bag [C]        0311-Stopped             Discontinued Medications  Medications 01/16/22 01/17/22 01/18/22 01/19/22 01/20/22 01/21/22 01/22/22       acetaminophen (TYLENOL) tablet 1,000 mg  Dose: 1,000 mg  Freq: Once Route: PO  Start: 01/19/22 0948   End: 01/19/22 0947   Admin Instructions:   Do not exceed 4 grams of acetaminophen in a 24 hr period. Max dose of 2gm for AST/ALT greater than 120 units/L      If given for pain, use the following pain scale:   Mild Pain = Pain Score of 1-3, CPOT 1-2  Moderate Pain = Pain Score of 4-6, CPOT 3-4  Severe Pain = Pain Score of 7-10, CPOT 5-8              acetaminophen (TYLENOL) tablet 1,000 mg  Dose: 1,000 mg  Freq: Once Route: PO  Start: 01/19/22 0948   End: 01/19/22 0947   Admin Instructions:   Do not exceed 4 grams of acetaminophen in a 24 hr period. Max dose of 2gm for AST/ALT greater than 120 units/L      If given for pain, use the following pain scale:   Mild Pain = Pain Score of 1-3, CPOT 1-2  Moderate Pain = Pain Score of 4-6, CPOT 3-4  Severe Pain = Pain Score of 7-10, CPOT 5-8              apixaban (ELIQUIS) tablet 5 mg  Dose: 5 mg  Freq: Every 12 Hours Scheduled Route: PO  Indications of Use: DVT/PE (active thrombosis)  Start: 01/19/22 1200   End: 01/22/22 1651   Admin Instructions:   Tablet may be crushed and suspended in 60 mL of water or D5W and immediately delivered via NG tube.          1229-Given       2141-Given           0844-Given       2051-Given           0812-Given       2047-Given           0900-Given           cefepime (MAXIPIME) 1 g/100 mL 0.9% NS IVPB (mbp)  Dose: 1 g  Freq: Every 8 Hours Route: IV  Indications of Use: SEPSIS  Start: 01/19/22 0948   End: 01/19/22 1005   Admin Instructions:   Break seal and mix to activate vial before use          1206-Canceled Entry              cefepime 2 gm IVPB in 100 ml NS (VTB)  Dose: 2 g  Freq: Every 8 Hours Route: IV  Indications of Use: SEPSIS  Start: 01/19/22 1030   End: 01/20/22 1057          1201-New Bag       1851-New  Bag           0238-New Bag       1014-New Bag             cefTRIAXone (ROCEPHIN) 2 g in sodium chloride 0.9 % 100 mL IVPB-VTB  Dose: 2 g  Freq: Every 24 Hours Route: IV  Indications of Use: BACTEREMIA  Start: 01/20/22 1245   End: 01/22/22 1651   Admin Instructions:   Caution: Look alike/sound alike drug alert           1338-New Bag           1323-New Bag           1158-New Bag           dronabinol (MARINOL) capsule 2.5 mg  Dose: 2.5 mg  Freq: Once Route: PO  Start: 01/19/22 0948   End: 01/19/22 0947   Admin Instructions:   As needed nausea/vomiting                gabapentin (NEURONTIN) capsule 100 mg  Dose: 100 mg  Freq: 3 Times Daily Route: PO  Start: 01/19/22 0951   End: 01/22/22 1651   Admin Instructions:             1229-Given       1638-Given       2136-Given           0844-Given       1513-Given       2051-Given           0812-Given       1621-Given       2047-Given           0900-Given           iopamidol (ISOVUE-370) 76 % injection 100 mL  Dose: 100 mL  Freq: Once in Imaging Route: IV  Start: 01/19/22 0948   End: 01/19/22 0947              levothyroxine (SYNTHROID, LEVOTHROID) tablet 50 mcg  Dose: 50 mcg  Freq: Every Early Morning Route: PO  Start: 01/19/22 1057   End: 01/22/22 1651   Admin Instructions:   Take on empty stomach.          1229-Given       (1230)-Not Given           0556-Given           0658-Given           0622-Given           metoprolol tartrate (LOPRESSOR) tablet 12.5 mg  Dose: 12.5 mg  Freq: 3 Times Daily Route: PO  Start: 01/19/22 0951   End: 01/19/22 1330          1154-Given              metoprolol tartrate (LOPRESSOR) tablet 25 mg  Dose: 25 mg  Freq: 3 Times Daily Route: PO  Start: 01/19/22 1600   End: 01/22/22 1651          1636-Given       2136-Given           0844-Given       1513-Given       2052-Given           0812-Given       1621-Given       2048-Given           0900-Given           montelukast (SINGULAIR) tablet 10 mg  Dose: 10 mg  Freq: Nightly Route: PO  Start: 01/19/22 2100    End: 01/22/22 1651          2136-Given           2051-Given           2047-Given            pantoprazole (PROTONIX) EC tablet 40 mg  Dose: 40 mg  Freq: 2 Times Daily Before Meals Route: PO  Start: 01/19/22 0951   End: 01/22/22 1651   Admin Instructions:   Swallow whole; do not crush, split, or chew.          1232-Given       (1857)-Not Given           0844-Given       1804-Given           0812-Given       1738-Given           0900-Given           pantoprazole (PROTONIX) EC tablet 40 mg  Dose: 40 mg  Freq: Every Morning Route: PO  Start: 01/19/22 0951   End: 01/19/22 0949   Admin Instructions:   Swallow whole; do not crush, split, or chew.              pyridostigmine (MESTINON) tablet 30 mg  Dose: 30 mg  Freq: 2 Times Daily Route: PO  Start: 01/19/22 0951   End: 01/22/22 1651          1338-Given           (0000)-Not Given       0844-Given       2051-Given           0812-Given       2048-Given           0900-Given           sodium chloride 0.9 % bolus 1,000 mL  Dose: 1,000 mL  Freq: Once Route: IV  Start: 01/19/22 0949   End: 01/19/22 0947              sodium chloride 0.9 % flush 10 mL  Dose: 10 mL  Freq: Every 12 Hours Scheduled Route: IV  Start: 01/20/22 2100   End: 01/21/22 1315           (2043)-Not Given [C]           0813-Given            vancomycin (VANCOCIN) in iso-osmotic dextrose IVPB 1 g (premix) 200 mL  Dose: 1,000 mg  Freq: Every 12 Hours Route: IV  Indications of Use: SEPSIS  Start: 01/19/22 1230   End: 01/20/22 1145          1530-New Bag           0046-New Bag             vancomycin 2250 mg/500 mL 0.9% NS IVPB ([facility])  Dose: 20 mg/kg  Weight Dosing Info: 113 kg  Freq: Once Route: IV  Indications of Use: BACTEREMIA,SEPSIS  Start: 01/19/22 0949   End: 01/19/22 0947                   ,   Medication Administration Report for LorenzLiliam as of 01/24/22 0806   Legend:    Given Hold Not Given Due Canceled Entry Other Actions    Time Time (Time) Time  Time-Action       Discontinued     Completed     Future      MAR Hold     Linked           Medications 01/16/22 01/17/22 01/18/22 01/19/22 01/20/22 01/21/22 01/22/22   Discontinued Medications    Pharmacy to dose vancomycin  Freq: Continuous PRN Route: XX  PRN Reason: Consult  Indications of Use: SEPSIS  Start: 01/19/22 0945   End: 01/20/22 1302              sodium chloride 0.9 % infusion  Rate: 75 mL/hr Dose: 75 mL/hr  Freq: Continuous Route: IV  Start: 01/19/22 0947   End: 01/21/22 1315          1203-New Bag           1007-New Bag           1322-Stopped                  and   Medication Administration Report for Liliam Lorenz as of 01/24/22 0806   Legend:    Given Hold Not Given Due Canceled Entry Other Actions    Time Time (Time) Time  Time-Action       Discontinued     Completed     Future     MAR Hold     Linked           Medications 01/16/22 01/17/22 01/18/22 01/19/22 01/20/22 01/21/22 01/22/22   Discontinued Medications    acetaminophen (TYLENOL) tablet 650 mg  Dose: 650 mg  Freq: Every 6 Hours PRN Route: PO  PRN Reason: Mild Pain   Start: 01/19/22 1902   End: 01/22/22 1353   Admin Instructions:   Do not exceed 4 grams of acetaminophen in a 24 hr period. Max dose of 2gm for AST/ALT greater than 120 units/L      If given for pain, use the following pain scale:   Mild Pain = Pain Score of 1-3, CPOT 1-2  Moderate Pain = Pain Score of 4-6, CPOT 3-4  Severe Pain = Pain Score of 7-10, CPOT 5-8          1940-Given              albuterol (PROVENTIL) nebulizer solution 0.083% 2.5 mg/3mL  Dose: 2.5 mg  Freq: Every 4 Hours PRN Route: NEBULIZATION  PRN Reason: Wheezing  Start: 01/19/22 1053   End: 01/22/22 1353              albuterol sulfate HFA (PROVENTIL HFA;VENTOLIN HFA;PROAIR HFA) inhaler 2 puff  Dose: 2 puff  Freq: Every 4 Hours PRN Route: IN  PRN Reason: Wheezing  Start: 01/19/22 0947   End: 01/19/22 1053   Admin Instructions:    Shake well.              hydrOXYzine pamoate (VISTARIL) capsule 25 mg  Dose: 25 mg  Freq: 3 Times Daily PRN Route: PO  PRN Reason:  Itching  Start: 01/20/22 1236   End: 01/22/22 1353   Admin Instructions:   Caution: Look alike/sound alike drug alert           1802-Not Given:  See Alt            Or  hydrOXYzine pamoate (VISTARIL) capsule 50 mg  Dose: 50 mg  Freq: 3 Times Daily PRN Route: PO  PRN Reason: Itching  Start: 01/20/22 1236   End: 01/22/22 1353   Admin Instructions:   Caution: Look alike/sound alike drug alert           1802-Given             ondansetron (ZOFRAN) injection 4 mg  Dose: 4 mg  Freq: Every 4 Hours PRN Route: IV  PRN Reasons: Nausea,Vomiting  Start: 01/20/22 1245   End: 01/22/22 1353           1338-Given       1804-Given           0658-Given       1325-Given       2048-Given           0220-Given       0622-Given           ondansetron (ZOFRAN) injection 8 mg  Dose: 8 mg  Freq: Every 6 Hours PRN Route: IV  PRN Reasons: Nausea,Vomiting  Start: 01/19/22 0946   End: 01/20/22 1237          1152-Given       1941-Given           0602-Given             Pharmacy to dose vancomycin  Freq: Continuous PRN Route: XX  PRN Reason: Consult  Indications of Use: SEPSIS  Start: 01/19/22 0945   End: 01/20/22 1302              prochlorperazine (COMPAZINE) injection 10 mg  Dose: 10 mg  Freq: Every 4 Hours PRN Route: IV  PRN Reasons: Nausea,Vomiting  Start: 01/20/22 1245   End: 01/22/22 1353           1513-Given       2052-Given           0934-Given       1738-Given           0846-Given           prochlorperazine (COMPAZINE) injection 10 mg  Dose: 10 mg  Freq: Every 6 Hours PRN Route: IV  PRN Reasons: Nausea,Vomiting  Start: 01/19/22 0903   End: 01/20/22 1237           0845-Given by Other [C]             promethazine (PHENERGAN) 12.5 mg in sodium chloride 0.9 % 50 mL  Dose: 12.5 mg  Freq: Every 6 Hours PRN Route: IV  PRN Reasons: Nausea,Vomiting  Start: 01/19/22 1630   End: 01/20/22 1402   Admin Instructions:   If multiple antiemetics ordered, give antiemetics in this order: ondansetron, prochlorperazine, promethazine.   Order specific questions:  "  [facility] PT requires failure of two alternative therapies prior to ordering promethazine IVPB. Has this patient failed two alternative therapies? Yes  Please choose prior alternative therapies that have failed: Other (See Comments) (Patient on chronic Phenergan due to gastroparesis)                sodium chloride 0.9 % flush 10 mL  Dose: 10 mL  Freq: As Needed Route: IV  PRN Reason: Line Care  PRN Comment: After Medication Administration  Start: 01/20/22 1425   End: 01/22/22 1353              sodium chloride 0.9 % flush 10 mL  Dose: 10 mL  Freq: As Needed Route: IV  PRN Reason: Line Care  Start: 01/18/22 1934   End: 01/22/22 1651                          Physician Progress Notes      Caleb Naik MD at 01/22/22 1352          Daily progress note    Chief complaint   Doing better  No specific complaints   Wants to go home  Family at bedside    History of present illness  26-year-old white female with history of pulm embolism chronic orthostatic hypotension POTS syndrome severe gastroparesis Phoebe-Danlos syndrome irritable bowel syndrome gastroesophageal reflux disease who is well-known to our service and was recently admitted with Dolan catheter malfunctioning and was removed and new Dolan catheter placed presented to [facility] [place] emergency room with high fever for last 3 days with nausea vomiting and not eating drinking fatigue tired and multiple other complaints with generalized weakness.  Patient denies any cough shortness of breath chest pain diarrhea.  Patient work-up in ER revealed sepsis probably line related admit for management.     REVIEW OF SYSTEMS  Unremarkable      PHYSICAL EXAM  Blood pressure 115/83, pulse 102, temperature 98 °F (36.7 °C), temperature source Oral, resp. rate 18, height 163.8 cm (64.5\"), weight 113 kg (250 lb), SpO2 97 %, not currently breastfeeding.    Constitutional:       General: Awake and alert     Appearance: She is not toxic-appearing.   HENT:      Head: " Normocephalic and atraumatic.   Eyes:      Pupils: Pupils are equal, round, and reactive to light.   Cardiovascular:      Rate and Rhythm: Regular rhythm.      Pulses: Normal pulses.           Posterior tibial pulses are 2+ on the right side and 2+ on the left side.      Heart sounds: Normal heart sounds. No murmur heard.  Pulmonary:      Effort: Pulmonary effort is normal. No respiratory distress.      Breath sounds: Normal breath sounds.   Abdominal:      General: Bowel sounds are normal.      Palpations: Abdomen is soft.      Tenderness: There is no abdominal tenderness. There is no guarding or rebound.   Musculoskeletal:         General: Normal range of motion.      Cervical back: Normal range of motion and neck supple.   Skin:     General: Skin is warm and dry.      Comments: Right IJ venous catheter site clean, dry, intact, nontender, no erythema   Neurological:      Mental Status: She is alert and oriented to person, place, and time.   Psychiatric:         Mood and Affect: Affect normal.      LAB RESULTS  Lab Results (last 24 hours)     Procedure Component Value Units Date/Time    Body Fluid Culture - Body Fluid, Neck [[id number]]  (Abnormal)  (Susceptibility) Collected: 01/19/22 1657    Specimen: Body Fluid from Neck Updated: 01/22/22 1048     Body Fluid Culture Light growth (2+) Staphylococcus aureus     Gram Stain Few (2+) WBCs seen      No organisms seen    Susceptibility      Staphylococcus aureus     ALESSANDRO     Gentamicin Susceptible     Oxacillin Susceptible     Rifampin Susceptible     Vancomycin Susceptible                  Linear View               Susceptibility Comments     Staphylococcus aureus    This isolate does not demonstrate inducible clindamycin resistance in vitro.               CBC & Differential [[id number]]  (Abnormal) Collected: 01/22/22 0723    Specimen: Blood Updated: 01/22/22 0804    Narrative:      The following orders were created for panel order CBC & Differential.  Procedure                                Abnormality         Status                     ---------                               -----------         ------                     CBC Auto Differential[[id number]]        Abnormal            Final result                 Please view results for these tests on the individual orders.    CBC Auto Differential [[id number]]  (Abnormal) Collected: 01/22/22 0723    Specimen: Blood Updated: 01/22/22 0844     WBC 5.44 10*3/mm3      RBC 4.48 10*6/mm3      Hemoglobin 9.9 g/dL      Hematocrit 32.9 %      MCV 73.4 fL      MCH 22.1 pg      MCHC 30.1 g/dL      RDW 17.5 %      RDW-SD 47.1 fl      MPV 10.8 fL      Platelets 268 10*3/mm3      Neutrophil % 51.5 %      Lymphocyte % 36.2 %      Monocyte % 8.6 %      Eosinophil % 2.0 %      Basophil % 0.6 %      Neutrophils, Absolute 2.80 10*3/mm3      Lymphocytes, Absolute 1.97 10*3/mm3      Monocytes, Absolute 0.47 10*3/mm3      Eosinophils, Absolute 0.11 10*3/mm3      Basophils, Absolute 0.03 10*3/mm3     Basic Metabolic Panel [[id number]]  (Abnormal) Collected: 01/22/22 0723    Specimen: Blood Updated: 01/22/22 0840     Glucose 110 mg/dL      BUN 9 mg/dL      Creatinine 0.60 mg/dL      Sodium 136 mmol/L      Potassium 3.3 mmol/L      Chloride 103 mmol/L      CO2 21.5 mmol/L      Calcium 8.7 mg/dL      eGFR Non African Amer 121 mL/min/1.73      BUN/Creatinine Ratio 15.0     Anion Gap 11.5 mmol/L     Narrative:      GFR Normal >60  Chronic Kidney Disease <60  Kidney Failure <15          Imaging Results (Last 24 Hours)     ** No results found for the last 24 hours. **               ECG 12 Lead  Component   Ref Range & Units 1/18/22 2052 9/17/21 1113 9/8/21 1002 5/6/21 0432 5/5/21 2240   QT Interval   ms 334  350  298  279  342              HEART RATE= 145  bpm  RR Interval= 416  ms  AK Interval= 85  ms  P Horizontal Axis= 13  deg  P Front Axis= 35  deg  QRSD Interval= 81  ms  QT Interval= 334  ms  QRS Axis= 5  deg  T Wave Axis= 241  deg  - ABNORMAL ECG -  Sinus  tachycardia  Nonspecific repol abnormality, diffuse leads  Prolonged QT interval  When compared with ECG of 17-Sep-2021 11:13:59,  Significant rate increase with new nonspecific repolarization abnormalities in diffuse leads           ASSESSMENT  MSSA bacteremia with sepsis  Line sepsis status post removal  Acute UTI  Pulm embolism on anticoagulation   Chronic orthostatic hypotension  Hypothyroidism  POTS syndrome  Phoebe-Danlos syndrome  Irritable bowel syndrome  Severe gastroparesis  Gastroesophageal reflux disease    PLAN  Discharge home on IV antibiotics for 2 weeks   Discharge summary dictated    ANN GONZALEZ MD        Electronically signed by Ann Gonzalez MD at 01/22/22 1353     Nancy Diaz MD at 01/22/22 1201               LOS: 4 days   Patient Care Team:  Jason Borrero MD as PCP - General (Family Medicine)        Subjective     Interval History:     Patient Complaints:   Patient Denies:  NV       Review of Systems:    better    Objective     Vital Signs  Temp:  [97.1 °F (36.2 °C)-98.6 °F (37 °C)] 97.1 °F (36.2 °C)  Heart Rate:  [101-118] 118  Resp:  [18] 18  BP: (117-132)/(73-96) 129/85    Physical Exam:     General Appearance:    Alert, cooperative, in no acute distress   Head:    Normocephalic, without obvious abnormality, atraumatic   Eyes:            Lids and lashes normal, conjunctivae and sclerae normal, no   icterus, no pallor, corneas clear, PERRLA   Ears:    Ears appear intact with no abnormalities noted   Throat:   No oral lesions, no thrush, oral mucosa moist   Neck:   No adenopathy, supple, trachea midline, no thyromegaly, no     carotid bruit, no JVD   Back:     No kyphosis present, no scoliosis present, no skin lesions,       erythema or scars, no tenderness to percussion or                   palpation,   range of motion normal   Lungs:     Clear to auscultation,respirations regular, even and                   unlabored    Heart:    Regular rhythm and normal rate, normal S1 and S2, no             murmur, no gallop, no rub, no click   Breast Exam:    Deferred   Abdomen:     Normal bowel sounds, no masses, no organomegaly, soft        non-tender, non-distended, no guarding, no rebound                 tenderness   Genitalia:    Deferred   Extremities:   Moves all extremities well, no edema, no cyanosis, no              redness   Pulses:   Pulses palpable and equal bilaterally   Skin:   No bleeding, bruising or rash   Lymph nodes:   No palpable adenopathy   Neurologic:   Cranial nerves 2 - 12 grossly intact, sensation intact, DTR        present and equal bilaterally            Assessment/Plan       Febrile illness, acute         central line infection causing bacteremia  Line removed     BC  MSSA     Plan :     IV rocephin for 2 weeks till 2/5/22  Echo  Will follow  Thank you      Nancy Diaz MD  01/22/22  12:01 EST      Electronically signed by Nancy Diaz MD at 01/22/22 1228     Bari Castañeda MD at 01/21/22 1518          CC: Dolan catheter infection, gastroparesis    S: No events overnight, feeling tired    O:   Vitals:    01/20/22 2325 01/21/22 0330 01/21/22 0817 01/21/22 1320   BP: 119/70 123/88 126/70 132/96   BP Location: Right arm Right arm Right arm Right arm   Patient Position: Lying Lying Lying Lying   Pulse: 101 91 115 102   Resp: 18 18 18 18   Temp: 98.4 °F (36.9 °C) 98.9 °F (37.2 °C) 97.2 °F (36.2 °C) 97.5 °F (36.4 °C)   TempSrc: Oral Oral Oral Oral   SpO2: 97% 97% 97% 97%   Weight:       Height:         Alert, no acute distress  Abdomen soft, nontender nontender  Right chest with small open wound clean dry and intact    Labs stable, chronically low hemoglobin    Central line culture growing staph aureus like all the other blood cultures    Assessment and plan    26-year-old female s/p Dolan catheter removal for infection, she is on antibiotics.  She is doing much better.  She is able to tolerate diet.  She has severe gastroparesis that is managed clinically.  Central  line has been removed, the wound looks great and there is no signs of ongoing infection  I discussed with the patient to keep a dry dressing over the wound until completely healed, otherwise no need for further follow-up    Continue medical management as per medicine service  I will sign off, call with questions        Electronically signed by Bari Castañeda MD at 01/21/22 1520     Nancy Diaz MD at 01/21/22 1357               LOS: 3 days   Patient Care Team:  Jason Borrero MD as PCP - General (Family Medicine)        Subjective     Interval History:     Patient Complaints:   Patient Denies:  NV       Review of Systems:    better    Objective     Vital Signs  Temp:  [97.2 °F (36.2 °C)-98.9 °F (37.2 °C)] 97.5 °F (36.4 °C)  Heart Rate:  [] 102  Resp:  [18] 18  BP: (105-132)/(68-96) 132/96    Physical Exam:     General Appearance:    Alert, cooperative, in no acute distress   Head:    Normocephalic, without obvious abnormality, atraumatic   Eyes:            Lids and lashes normal, conjunctivae and sclerae normal, no   icterus, no pallor, corneas clear, PERRLA   Ears:    Ears appear intact with no abnormalities noted   Throat:   No oral lesions, no thrush, oral mucosa moist   Neck:   No adenopathy, supple, trachea midline, no thyromegaly, no     carotid bruit, no JVD   Back:     No kyphosis present, no scoliosis present, no skin lesions,       erythema or scars, no tenderness to percussion or                   palpation,   range of motion normal   Lungs:     Clear to auscultation,respirations regular, even and                   unlabored    Heart:    Regular rhythm and normal rate, normal S1 and S2, no            murmur, no gallop, no rub, no click   Breast Exam:    Deferred   Abdomen:     Normal bowel sounds, no masses, no organomegaly, soft        non-tender, non-distended, no guarding, no rebound                 tenderness   Genitalia:    Deferred   Extremities:   Moves all extremities well,  "no edema, no cyanosis, no              redness   Pulses:   Pulses palpable and equal bilaterally   Skin:   No bleeding, bruising or rash   Lymph nodes:   No palpable adenopathy   Neurologic:   Cranial nerves 2 - 12 grossly intact, sensation intact, DTR        present and equal bilaterally            Assessment/Plan         Febrile illness, acute             central line infection causing bacteremia  Line removed     BC  MSSA     Plan :     IV rocephin for 2 weeks till 2/5/22  Echo  Will follow  Thank you      Nancy Diaz MD  01/21/22  13:55 EST      Electronically signed by Nancy Diaz MD at 01/21/22 1650     Caleb Naik MD at 01/21/22 1314          Daily progress note    Chief complaint   Doing better  No specific complaints   Family at bedside    History of present illness  26-year-old white female with history of pulm embolism chronic orthostatic hypotension POTS syndrome severe gastroparesis Phoebe-Danlos syndrome irritable bowel syndrome gastroesophageal reflux disease who is well-known to our service and was recently admitted with Dolan catheter malfunctioning and was removed and new Dolan catheter placed presented to [facility] emergency room with high fever for last 3 days with nausea vomiting and not eating drinking fatigue tired and multiple other complaints with generalized weakness.  Patient denies any cough shortness of breath chest pain diarrhea.  Patient work-up in ER revealed sepsis probably line related admit for management.     REVIEW OF SYSTEMS  Unremarkable except weakness     PHYSICAL EXAM  Blood pressure 126/70, pulse 115, temperature 97.2 °F (36.2 °C), temperature source Oral, resp. rate 18, height 163.8 cm (64.5\"), weight 113 kg (250 lb), SpO2 97 %, not currently breastfeeding.    Constitutional:       General: Awake and alert     Appearance: She is not toxic-appearing.   HENT:      Head: Normocephalic and atraumatic.   Eyes:      Pupils: Pupils are equal, round, and reactive " to light.   Cardiovascular:      Rate and Rhythm: Regular rhythm.      Pulses: Normal pulses.           Posterior tibial pulses are 2+ on the right side and 2+ on the left side.      Heart sounds: Normal heart sounds. No murmur heard.  Pulmonary:      Effort: Pulmonary effort is normal. No respiratory distress.      Breath sounds: Normal breath sounds.   Abdominal:      General: Bowel sounds are normal.      Palpations: Abdomen is soft.      Tenderness: There is no abdominal tenderness. There is no guarding or rebound.   Musculoskeletal:         General: Normal range of motion.      Cervical back: Normal range of motion and neck supple.   Skin:     General: Skin is warm and dry.      Comments: Right IJ venous catheter site clean, dry, intact, nontender, no erythema   Neurological:      Mental Status: She is alert and oriented to person, place, and time.   Psychiatric:         Mood and Affect: Affect normal.        ASSESSMENT  MSSA bacteremia with sepsis  Line sepsis status post removal  Acute UTI  Pulm embolism on anticoagulation   Chronic orthostatic hypotension  Hypothyroidism  POTS syndrome  Phoebe-Danlos syndrome  Irritable bowel syndrome  Severe gastroparesis  Gastroesophageal reflux disease    PLAN  CPM  IVF  IV antibiotics for 2 weeks  Infectious disease consult appreciated  General surgery to follow patient  Adjust home medications  Stress ulcer DVT prophylaxis  Supportive care  Activity as tolerated  Discussed with family nursing staff  Discharge planning    ANN GONZALEZ MD        Electronically signed by Ann Gonzalez MD at 01/21/22 1315     Ann Gonzalez MD at 01/20/22 3295          Daily progress note    Chief complaint   Status post Dolan catheter removal  Doing much better  No specific complaints except nausea  Family at bedside    History of present illness  26-year-old white female with history of pulm embolism chronic orthostatic hypotension POTS syndrome severe gastroparesis Phoebe-Danlos syndrome  "irritable bowel syndrome gastroesophageal reflux disease who is well-known to our service and was recently admitted with Dolan catheter malfunctioning and was removed and new Dolan catheter placed presented to [facility] emergency room with high fever for last 3 days with nausea vomiting and not eating drinking fatigue tired and multiple other complaints with generalized weakness.  Patient denies any cough shortness of breath chest pain diarrhea.  Patient work-up in ER revealed sepsis probably line related admit for management.     REVIEW OF SYSTEMS  Constitutional:Negative for chills.   HENT: Negative for rhinorrhea and sore throat.    Eyes: Negative for visual disturbance.   Respiratory: Negative for cough and shortness of breath.    Cardiovascular: Negative for chest pain  Gastrointestinal: Positive for nausea Negative for abdominal pain  vomiting and diarrhea.   Endocrine: Negative.    Genitourinary: Negative for decreased urine volume.   Musculoskeletal: Negative for neck pain.   Skin: Negative for rash.   Neurological: Positive for weakness   Psychiatric/Behavioral: Negative.    All other systems reviewed and are negative.     PHYSICAL EXAM  Blood pressure 129/88, pulse 107, temperature 97.5 °F (36.4 °C), temperature source Oral, resp. rate 18, height 163.8 cm (64.5\"), weight 113 kg (250 lb), SpO2 98 %, not currently breastfeeding.    Constitutional:       General: Awake and alert     Appearance: She is not toxic-appearing.   HENT:      Head: Normocephalic and atraumatic.   Eyes:      Pupils: Pupils are equal, round, and reactive to light.   Cardiovascular:      Rate and Rhythm: Regular rhythm.      Pulses: Normal pulses.           Posterior tibial pulses are 2+ on the right side and 2+ on the left side.      Heart sounds: Normal heart sounds. No murmur heard.  Pulmonary:      Effort: Pulmonary effort is normal. No respiratory distress.      Breath sounds: Normal breath sounds.   Abdominal:      " General: Bowel sounds are normal.      Palpations: Abdomen is soft.      Tenderness: There is no abdominal tenderness. There is no guarding or rebound.   Musculoskeletal:         General: Normal range of motion.      Cervical back: Normal range of motion and neck supple.   Skin:     General: Skin is warm and dry.      Comments: Right IJ venous catheter site clean, dry, intact, nontender, no erythema   Neurological:      Mental Status: She is alert and oriented to person, place, and time.   Psychiatric:         Mood and Affect: Affect normal.      LAB RESULTS  Lab Results (last 24 hours)     Procedure Component Value Units Date/Time    Vancomycin, Trough Please draw vancomycin trough level 30 minutes prior to scheduled 1230 dose on 1/20, thanks! [[id number]] Collected: 01/20/22 1230    Specimen: Blood Updated: 01/20/22 1312    Body Fluid Culture - Body Fluid, Neck [[id number]] Collected: 01/19/22 1657    Specimen: Body Fluid from Neck Updated: 01/20/22 1304     Body Fluid Culture Growth present, too young to evaluate     Gram Stain Few (2+) WBCs seen      No organisms seen    Manual Differential [[id number]]  (Abnormal) Collected: 01/20/22 0801    Specimen: Blood Updated: 01/20/22 1041     Neutrophil % 77.8 %      Lymphocyte % 15.2 %      Monocyte % 4.0 %      Eosinophil % 3.0 %      Neutrophils Absolute 4.75 10*3/mm3      Lymphocytes Absolute 0.93 10*3/mm3      Monocytes Absolute 0.24 10*3/mm3      Eosinophils Absolute 0.18 10*3/mm3      RBC Morphology Normal     WBC Morphology Normal     Platelet Morphology Normal    CBC & Differential [[id number]]  (Abnormal) Collected: 01/20/22 0801    Specimen: Blood Updated: 01/20/22 1016    Narrative:      The following orders were created for panel order CBC & Differential.  Procedure                               Abnormality         Status                     ---------                               -----------         ------                     CBC Auto Differential[[id number]]         Abnormal            Final result                 Please view results for these tests on the individual orders.    CBC Auto Differential [[id number]]  (Abnormal) Collected: 01/20/22 0801    Specimen: Blood Updated: 01/20/22 1016     WBC 6.10 10*3/mm3      RBC 4.29 10*6/mm3      Hemoglobin 9.7 g/dL      Hematocrit 30.9 %      MCV 72.0 fL      MCH 22.6 pg      MCHC 31.4 g/dL      RDW 17.3 %      RDW-SD 45.4 fl      MPV 10.6 fL      Platelets 230 10*3/mm3     TSH [[id number]]  (Normal) Collected: 01/20/22 0801    Specimen: Blood Updated: 01/20/22 0959     TSH 2.720 uIU/mL     Comprehensive Metabolic Panel [[id number]]  (Abnormal) Collected: 01/20/22 0801    Specimen: Blood Updated: 01/20/22 0952     Glucose 107 mg/dL      BUN 6 mg/dL      Creatinine 0.72 mg/dL      Sodium 135 mmol/L      Potassium 4.0 mmol/L      Chloride 107 mmol/L      CO2 17.7 mmol/L      Calcium 8.4 mg/dL      Total Protein 6.7 g/dL      Albumin 3.20 g/dL      ALT (SGPT) 82 U/L      AST (SGOT) 53 U/L      Alkaline Phosphatase 71 U/L      Total Bilirubin 0.4 mg/dL      eGFR Non African Amer 98 mL/min/1.73      Globulin 3.5 gm/dL      A/G Ratio 0.9 g/dL      BUN/Creatinine Ratio 8.3     Anion Gap 10.3 mmol/L     Narrative:      GFR Normal >60  Chronic Kidney Disease <60  Kidney Failure <15      Lipid Panel [[id number]]  (Abnormal) Collected: 01/20/22 0801    Specimen: Blood Updated: 01/20/22 0952     Total Cholesterol 169 mg/dL      Triglycerides 165 mg/dL      HDL Cholesterol 28 mg/dL      LDL Cholesterol  112 mg/dL      VLDL Cholesterol 29 mg/dL      LDL/HDL Ratio 3.86    Narrative:      Cholesterol Reference Ranges  (U.S. Department of Health and Human Services ATP III Classifications)    Desirable          <200 mg/dL  Borderline High    200-239 mg/dL  High Risk          >240 mg/dL      Triglyceride Reference Ranges  (U.S. Department of Health and Human Services ATP III Classifications)    Normal           <150 mg/dL  Borderline High  150-199  mg/dL  High             200-499 mg/dL  Very High        >500 mg/dL    HDL Reference Ranges  (U.S. Department of Health and Human Services ATP III Classifications)    Low     <40 mg/dl (major risk factor for CHD)  High    >60 mg/dl ('negative' risk factor for CHD)        LDL Reference Ranges  (U.S. Department of Health and Human Services ATP III Classifications)    Optimal          <100 mg/dL  Near Optimal     100-129 mg/dL  Borderline High  130-159 mg/dL  High             160-189 mg/dL  Very High        >189 mg/dL    Hemoglobin A1c [[id number]]  (Abnormal) Collected: 01/20/22 0801    Specimen: Blood Updated: 01/20/22 0951     Hemoglobin A1C 5.70 %     Narrative:      Hemoglobin A1C Ranges:    Increased Risk for Diabetes  5.7% to 6.4%  Diabetes                     >= 6.5%  Diabetic Goal                < 7.0%     ASSESSMENT  MSSA bacteremia with sepsis  Line sepsis status post removal  Acute UTI  Pulm embolism on anticoagulation   Chronic orthostatic hypotension  Hypothyroidism  POTS syndrome  Phoebe-Danlos syndrome  Irritable bowel syndrome  Severe gastroparesis  Gastroesophageal reflux disease    PLAN  CPM  IVF  IV antibiotics for 2 weeks  Infectious disease consult appreciated  General surgery to follow patient  Adjust home medications  Stress ulcer DVT prophylaxis  Supportive care  Activity as tolerated  Discussed with family nursing staff  Follow closely and further recommendation current hospital course    ANN GONZALEZ MD        Electronically signed by Ann Gonzalez MD at 01/20/22 1401     Bari Castañeda MD at 01/20/22 1204          CC: Bacteremia and sepsis due to Dolan catheter infection    S: No events overnight.  Patient is feeling much better.  Low-grade temp yesterday night but has been afebrile since then    O:   Vitals:    01/20/22 0300 01/20/22 0500 01/20/22 0547 01/20/22 1037   BP:   129/75 115/69   BP Location:   Right arm Right arm   Patient Position:   Lying Lying   Pulse: 106 101 109 91    Resp:   18 18   Temp:   98.5 °F (36.9 °C) 98.1 °F (36.7 °C)   TempSrc:   Oral Oral   SpO2: 98% 97% 98% 97%   Weight:       Height:         Alert, no acute distress  Right chest with open wound, dressing removed, no erythema  Breathing comfortable  Slightly tachycardic at times  Regular rhythm    White blood cell count 6, hemoglobin 9.7, stable, all other labs stable, slightly decreased CO2 that is 17.7    All blood cultures positive for staph aureus  Culture from central line tip pending    Assessment and plan    26-year-old female with severe gastroparesis that has been dependent on IV fluids through a central line.  Central line has been removed and sepsis source control.  We discussed about future central access and she is not interested in having more central access if any more and she is going to get IVIG weekly with a new stick every week  She will try to keep with her p.o. intake  Wound packing was removed and dry dressings were placed over    Will need to have dry dressing changes twice a day  Will sign off, call with questions     Electronically signed by Bari Castañeda MD at 01/20/22 1207          Consult Notes      Bari Castañeda MD at 01/19/22 1617      Consult Orders    1. Inpatient General Surgery Consult [[id number]] ordered by Caleb Naik MD at 01/19/22 1320               Consultation note    Referring physician: Caleb Naik MD    Consulting Physician: Bari Castañeda MD    Reason for consultation: Sepsis, central line infection    Chief Complaint   Patient presents with   • Fever   • Rapid Heart Rate       HPI:   The patient is a very pleasant 26 y.o. years old female that presented to the hospital with fever and flulike symptoms.  Patient has history of gastroparesis and pots syndrome.  She requires continuous IV access for weekly infusions of IVIG as well as daily Phenergan.  She has been feeling poor for the past 2 days with worsening nausea and vomiting.  She has  been able to tolerate lunch today.  She started having high-grade fever so she presented to the emergency room for further evaluation.  Patient history of Dolan catheter placement on 10/26/2021 by Dr. Lane after his left-sided Dolan catheter was broken.  She reports no abdominal pain constipation or diarrhea.  She denies dysuria hematuria.  She reports pain over the right neck where the line is located.  Her gastroparesis is being managed by motility GI physician at [facility].   She underwent blood cultures from central line that came positive from gram-positive cocci in clusters in 2 different bottles.        Allergies   Allergen Reactions   • Eggs Or Egg-Derived Products GI Intolerance     Rash & vomiting   • Pepcid [Famotidine] Rash and GI Intolerance   • Scopolamine Rash and GI Intolerance         ROS:   Constitutional: denies any weight changes, reports fatigue and weakness  Eyes: : denies blurred or double vision  Cardiovascular: denies chest pain, palpitations, edemas.  Respiratory: denies cough, sputum, SOB.  Gastrointestinal: Reports nausea and vomiting.  Denies abdominal pain  Genitourinary: denies dysuria, frequency.  Endocrine: denies cold intolerance, lethargy and flushing.  Hem: denies excessive bruising and postop bleeding.  Musculoskeletal: denies weakness, joint swelling, pain or stiffness.  Neuro: denies seizures, CVA, paresthesia, or peripheral neuropathy.   Skin: denies change in nevi, rashes, masses.    Physical Exam:   Vitals:    01/19/22 1436   BP: (!) 168/106   Pulse: 106   Resp:    Temp:    SpO2: 93%     GENERAL:alert, well appearing, and in no distress and oriented to person, place, and time  HEENT: normochephalic, atraumatic, no scleral icterus moist mucous membranes.  NECK: Supple there is no thyromegaly or lymphadenopathy.  Dolan catheter in place in the right chest.  There is tenderness over the right jugular area.  There is no erythema  CHEST: clear to auscultation, no wheezes,  rales or rhonchi, symmetric air entry  CARDIAC: regular rate and rhythm    ABDOMEN: soft, nontender, nondistended, no masses or organomegaly  EXTREMITIES: no cyanosis, clubbing or edema    NEURO: alert and oriented, normal speech, cranial nerves 2-12 grossly intact, no focal deficits   SKIN: Moist, warm, no rashes.    Diagnostic workup:   Respiratory panel negative    Blood culture from central line gram-positive cocci in clusters    Assessment and plan:   The patient is a very pleasant 26 y.o. years old female with multiple medical problems, gastroparesis, history of PE, Phoebe-Danlos with central line infection causing bacteremia.  Discussed with the patient about further step.  I recommend that she undergoes removal of Dolan catheter under local anesthesia.  Skin benefits of procedure including bleeding, infection were discussed in detail with the patient that verbalized understanding and agree with the plan.  Informed consent was obtained from the patient    Procedure:   The right chest was prepped and draped in usual sterile fashion with iodine.  The subcutaneous tissue was infiltrated with lidocaine 1% with epinephrine.  A small incision was performed around the catheter with scalpel.  Dissection of the subcutaneous tissue was performed bluntly with Autumn clamp.  I pulled from the catheter and the catheter came out easily.  There was evidence of purulent fluid that came from the wound and around the catheter cuff.  This was clean and packed with quarter inch iodoform gauze.  Dressings were applied.  The patient tolerated procedure well.  Catheter tip was sent for culture    Recommendations:   Dressings to be removed tomorrow, dry dressing after  We will order Phenergan 12.5 g IM daily for nausea, she has been taking an IV but wants to try IM  Diet as tolerated  Antibiotics as per ID recommendation  Timing for central line in the future to be discussed with infectious disease    Case was discussed with  patient.    Risk and benefits of the current recommended treatment were explained to the patient that had time to ask questions that where answered, verbalized understanding and agreed with the plan.     Bari Castañeda MD  General, Minimally Invasive and Endoscopic Surgery  [facility]    [address], [address]  [place], [place], [address]  P: [phone]  F: [phone]     Electronically signed by Bari Castañeda MD at 01/19/22 4987     Nancy Diaz MD at 01/20/22 1055      Consult Orders    1. Inpatient Infectious Diseases Consult [[id number]] ordered by Caleb Naik MD at 01/19/22 1320                    LOS: 2 days   Patient Care Team:  Jason Borrero MD as PCP - General (Family Medicine)        Subjective       Attending MD : Caleb Naik MD    Patient Complaints: fever         History of Present Illness  :26-year-old white female with history of pulm embolism chronic orthostatic hypotension POTS syndrome severe gastroparesis Phoebe-Danlos syndrome irritable bowel syndrome gastroesophageal reflux disease who is well-known to our service and was recently admitted with Dolan catheter malfunctioning and was removed and new Dolan catheter placed presented to [facility] emergency room with high fever for last 3 days with nausea vomiting and not eating drinking fatigue tired and multiple other complaints with generalized weakness.  Patient denies any cough shortness of breath chest pain diarrhea.  Patient work-up in ER revealed sepsis probably line related admit for management.    [facility]     Patient Denies:  NV    PMH :   Febrile illness, acute      Review of Systems:    weak    Objective     Vital Signs  Temp:  [98.1 °F (36.7 °C)-99.9 °F (37.7 °C)] 98.1 °F (36.7 °C)  Heart Rate:  [] 91  Resp:  [17-18] 18  BP: (115-168)/() 115/69    Physical Exam:     General Appearance:    Alert, cooperative, in no acute distress   Head:    Normocephalic, without obvious  abnormality, atraumatic   Eyes:            Lids and lashes normal, conjunctivae and sclerae normal, no   icterus, no pallor, corneas clear, PERRLA   Ears:    Ears appear intact with no abnormalities noted   Throat:   No oral lesions, no thrush, oral mucosa moist   Neck:   No adenopathy, supple, trachea midline, no thyromegaly, no     carotid bruit, no JVD   Back:     No kyphosis present, no scoliosis present, no skin lesions,       erythema or scars, no tenderness to percussion or                   palpation,   range of motion normal   Lungs:     Clear to auscultation,respirations regular, even and                   unlabored    Heart:    Regular rhythm and normal rate, normal S1 and S2, no            murmur, no gallop, no rub, no click   Breast Exam:    Deferred   Abdomen:     Normal bowel sounds, no masses, no organomegaly, soft        non-tender, non-distended, no guarding, no rebound                 tenderness   Genitalia:    Deferred   Extremities:   Moves all extremities well, no edema, no cyanosis, no              redness   Pulses:   Pulses palpable and equal bilaterally   Skin:   No bleeding, bruising or rash   Lymph nodes:   No palpable adenopathy   Neurologic:   Cranial nerves 2 - 12 grossly intact, sensation intact, DTR        present and equal bilaterally          Assessment/Plan         Febrile illness, acute       central line infection causing bacteremia  Line removed    BC  MSSA    Plan :    IV rocephin for 2 weeks till 2/5/22  Will follow  Thank you    Nancy Diaz MD  01/20/22  10:56 EST          Electronically signed by Nancy Diaz MD at 01/20/22 1145          Discharge Summary      Caleb Naik MD at 01/22/22 1356        Discharge summary    Date of admission 1/18/2022  Date of discharge 1/22/2022    Final diagnosis  MSSA bacteremia with sepsis  Line sepsis status post removal  Acute UTI  Pulm embolism on anticoagulation   Chronic orthostatic hypotension  Hypothyroidism  POTS  syndrome  Phoebe-Danlos syndrome  Irritable bowel syndrome  Severe gastroparesis  Gastroesophageal reflux disease    Discharge medications    Current Facility-Administered Medications:   •  apixaban (ELIQUIS) tablet 5 mg, 5 mg, Oral, Q12H, Caleb Naik MD, 5 mg at 01/22/22 0900  •  cefTRIAXone (ROCEPHIN) 2 g in sodium chloride 0.9 % 100 mL IVPB-VTB, 2 g, Intravenous, Q24H, Nancy Diaz MD, Last Rate: 200 mL/hr at 01/22/22 1158, 2 g at 01/22/22 1158  •  gabapentin (NEURONTIN) capsule 100 mg, 100 mg, Oral, TID, Caleb Naik MD, 100 mg at 01/22/22 0900  •  levothyroxine (SYNTHROID, LEVOTHROID) tablet 50 mcg, 50 mcg, Oral, Q AM, Caleb Naik MD, 50 mcg at 01/22/22 0622  •  metoprolol tartrate (LOPRESSOR) tablet 25 mg, 25 mg, Oral, TID, Caleb Naik MD, 25 mg at 01/22/22 0900  •  montelukast (SINGULAIR) tablet 10 mg, 10 mg, Oral, Nightly, Caleb Naik MD, 10 mg at 01/21/22 2047  •  pantoprazole (PROTONIX) EC tablet 40 mg, 40 mg, Oral, BID AC, Caleb Naik MD, 40 mg at 01/22/22 0900  •  pyridostigmine (MESTINON) tablet 30 mg, 30 mg, Oral, BID, Caleb Naik MD, 30 mg at 01/22/22 0900  •  [COMPLETED] Insert peripheral IV, , , Once **AND** sodium chloride 0.9 % flush 10 mL, 10 mL, Intravenous, PRN, Dena Richardson MD     Consults obtained  General surgery  Infectious disease    Procedures  Dolan catheter removal  PICC line placement    Hospital course  26-year-old white female with history of multiple medical problems and well-known to our service admitted through emergency room with fever chills.  Patient work-up revealed sepsis admit for management.  Patient blood cultures came back positive for methicillin sensitive staph aureus secondary to line sepsis and her Dolan catheter removed and the tip also showed staph aureus consistent with line sepsis.  Patient Dolan catheter removed and PICC line placed and infectious disease recommended 2 weeks of IV antibiotics.  Patient will complete IV antibiotics as an  outpatient and her PICC line can be removed after completion of IV antibiotics.  Patient cleared for discharge    Discharge diet regular    Activity as tolerated    Medication as above    Follow-up with prime doctor in 1 week and follow-up with infectious disease and general surgery per the instruction and take medication as directed    ANN GONZALEZ MD      Electronically signed by Ann Gonzalez MD at 01/22/22 4157       Discharge Order (From admission, onward)     Start     Ordered    01/22/22 1358  Discharge patient  Once        Expected Discharge Date: 01/22/22    Discharge Disposition: Home or Self Care    Physician of Record for Attribution - Please select from Treatment Team: ANN GONZALEZ [[id number]]    Review needed by CMO to determine Physician of Record: No       Question Answer Comment   Physician of Record for Attribution - Please select from Treatment Team ANN GONZALEZ    Review needed by CMO to determine Physician of Record No        01/22/22 3127

## 2022-01-25 ENCOUNTER — READMISSION MANAGEMENT (OUTPATIENT)
Dept: CALL CENTER | Facility: HOSPITAL | Age: 27
End: 2022-01-25

## 2022-01-25 NOTE — OUTREACH NOTE
Sepsis Week 1 Survey      Responses   [facility] patient discharged from? [place]   Does the patient have one of the following disease processes/diagnoses(primary or secondary)? Sepsis   Week 1 attempt successful? Yes   Call start time 1840   Call end time 1844   Discharge diagnosis MSSA bacteremia with sepsis, Line sepsis status post removal   Meds reviewed with patient/caregiver? Yes   Is the patient having any side effects they believe may be caused by any medication additions or changes? No   Does the patient have all medications related to this admission filled (includes all antibiotics, inhalers, nebulizers,steroids,etc.) Yes   Is the patient taking all medications as directed (includes completed medication regime)? Yes   Medication comments States she is doing the IV antibiotics   Does the patient have a primary care provider?  Yes   Does the patient have an appointment with their PCP within 7 days of discharge? Yes   Has the patient kept scheduled appointments due by today? N/A   Comments Has appt with PCP on Thurs but needs to make appt with ID dr   What DME was ordered? IV abx-  [facility]   Has all DME been delivered? Yes   Did the patient receive a copy of their discharge instructions? Yes   Nursing interventions Reviewed instructions with patient   What is the patient's perception of their health status since discharge? Improving   Is the patient/caregiver able to teach back Sepsis? S - Shivering,fever or very cold,  E - Extreme pain or generalized discomfort (worst ever,especially abdomen),  P - Pale or discolored skin,  S - Sleepy, difficult to arouse,confused,  I -   I feel like I might die-a feeling of hopelessness,  S - Short of breath   Nursing interventions Nurse provided reassurance to patient   Is patient/caregiver able to teach back steps to recovery at home? Set small, achievable goals for return to baseline health,  Rest and regain strength,  Eat a balanced diet,  Make a list  "of questions for PCP appoinment   Is the patient/caregiver able to teach back signs and symptoms of worsening condition: Rapid heart rate (>90),  Shortness of breath/rapid respiratory rate,  Fever   If the patient is a current smoker, are they able to teach back resources for cessation? Not a smoker   Is the patient/caregiver able to teach back the hierarchy of who to call/visit for symptoms/problems? PCP, Specialist, Home health nurse, Urgent Care, ED, 911 Yes   Additional teach back comments States \"I'm tremendously better\".  Doing her IV antibiotics and goes to ambulatory to have dressing changes to PICC line.     Week 1 call completed? Yes   Wrap up additional comments Denies questions or needs at this time          Latoya Gonsalez LPN  "

## 2022-01-27 ENCOUNTER — HOSPITAL ENCOUNTER (OUTPATIENT)
Dept: INFUSION THERAPY | Facility: HOSPITAL | Age: 27
Discharge: HOME | End: 2022-01-27
Admitting: INTERNAL MEDICINE

## 2022-01-27 VITALS
HEART RATE: 91 BPM | OXYGEN SATURATION: 99 % | TEMPERATURE: 96.5 F | DIASTOLIC BLOOD PRESSURE: 89 MMHG | SYSTOLIC BLOOD PRESSURE: 137 MMHG | RESPIRATION RATE: 20 BRPM

## 2022-01-27 DIAGNOSIS — T82.514A HICKMAN CATHETER DYSFUNCTION, INITIAL ENCOUNTER: ICD-10-CM

## 2022-01-27 LAB
ANION GAP SERPL CALCULATED.3IONS-SCNC: 10 MMOL/L (ref 5–15)
BUN SERPL-MCNC: 8 MG/DL (ref 6–20)
BUN/CREAT SERPL: 9.5 (ref 7–25)
CALCIUM SPEC-SCNC: 9.1 MG/DL (ref 8.6–10.5)
CHLORIDE SERPL-SCNC: 105 MMOL/L (ref 98–107)
CK SERPL-CCNC: 73 U/L (ref 20–180)
CO2 SERPL-SCNC: 23 MMOL/L (ref 22–29)
CREAT SERPL-MCNC: 0.84 MG/DL (ref 0.57–1)
CRP SERPL-MCNC: <0.3 MG/DL (ref 0–0.5)
DEPRECATED RDW RBC AUTO: 43.4 FL (ref 37–54)
ERYTHROCYTE [DISTWIDTH] IN BLOOD BY AUTOMATED COUNT: 17.4 % (ref 12.3–15.4)
GFR SERPL CREATININE-BSD FRML MDRD: 82 ML/MIN/1.73
GLUCOSE SERPL-MCNC: 143 MG/DL (ref 65–99)
HCT VFR BLD AUTO: 33.4 % (ref 34–46.6)
HGB BLD-MCNC: 10.7 G/DL (ref 12–15.9)
MCH RBC QN AUTO: 22.8 PG (ref 26.6–33)
MCHC RBC AUTO-ENTMCNC: 32 G/DL (ref 31.5–35.7)
MCV RBC AUTO: 71.1 FL (ref 79–97)
PLATELET # BLD AUTO: 433 10*3/MM3 (ref 140–450)
PMV BLD AUTO: 10.1 FL (ref 6–12)
POTASSIUM SERPL-SCNC: 3.4 MMOL/L (ref 3.5–5.2)
RBC # BLD AUTO: 4.7 10*6/MM3 (ref 3.77–5.28)
SODIUM SERPL-SCNC: 138 MMOL/L (ref 136–145)
WBC NRBC COR # BLD: 7.44 10*3/MM3 (ref 3.4–10.8)

## 2022-01-27 PROCEDURE — 36592 COLLECT BLOOD FROM PICC: CPT

## 2022-01-27 PROCEDURE — 82550 ASSAY OF CK (CPK): CPT | Performed by: INTERNAL MEDICINE

## 2022-01-27 PROCEDURE — 85027 COMPLETE CBC AUTOMATED: CPT | Performed by: INTERNAL MEDICINE

## 2022-01-27 PROCEDURE — 80048 BASIC METABOLIC PNL TOTAL CA: CPT | Performed by: INTERNAL MEDICINE

## 2022-01-27 PROCEDURE — 86140 C-REACTIVE PROTEIN: CPT | Performed by: INTERNAL MEDICINE

## 2022-01-27 PROCEDURE — G0463 HOSPITAL OUTPT CLINIC VISIT: HCPCS

## 2022-01-27 NOTE — PROGRESS NOTES
Left midline dsg changed under sterile technique. Ultra site valve, biopatch, and stat lock changed. Patient dc via wc per spouse.

## 2022-02-02 ENCOUNTER — HOSPITAL ENCOUNTER (OUTPATIENT)
Dept: INFUSION THERAPY | Facility: HOSPITAL | Age: 27
Discharge: HOME | End: 2022-02-02
Admitting: INTERNAL MEDICINE

## 2022-02-02 ENCOUNTER — READMISSION MANAGEMENT (OUTPATIENT)
Dept: CALL CENTER | Facility: HOSPITAL | Age: 27
End: 2022-02-02

## 2022-02-02 VITALS
TEMPERATURE: 96.9 F | RESPIRATION RATE: 20 BRPM | SYSTOLIC BLOOD PRESSURE: 139 MMHG | DIASTOLIC BLOOD PRESSURE: 90 MMHG | HEART RATE: 93 BPM | OXYGEN SATURATION: 97 %

## 2022-02-02 DIAGNOSIS — T82.514A HICKMAN CATHETER DYSFUNCTION, INITIAL ENCOUNTER: ICD-10-CM

## 2022-02-02 LAB
ANION GAP SERPL CALCULATED.3IONS-SCNC: 12 MMOL/L (ref 5–15)
BUN SERPL-MCNC: 7 MG/DL (ref 6–20)
BUN/CREAT SERPL: 8.6 (ref 7–25)
CALCIUM SPEC-SCNC: 9.3 MG/DL (ref 8.6–10.5)
CHLORIDE SERPL-SCNC: 102 MMOL/L (ref 98–107)
CK SERPL-CCNC: 85 U/L (ref 20–180)
CO2 SERPL-SCNC: 23 MMOL/L (ref 22–29)
CREAT SERPL-MCNC: 0.81 MG/DL (ref 0.57–1)
CRP SERPL-MCNC: <0.3 MG/DL (ref 0–0.5)
DEPRECATED RDW RBC AUTO: 46.9 FL (ref 37–54)
ERYTHROCYTE [DISTWIDTH] IN BLOOD BY AUTOMATED COUNT: 17.8 % (ref 12.3–15.4)
GFR SERPL CREATININE-BSD FRML MDRD: 85 ML/MIN/1.73
GLUCOSE SERPL-MCNC: 112 MG/DL (ref 65–99)
HCT VFR BLD AUTO: 37.6 % (ref 34–46.6)
HGB BLD-MCNC: 11.4 G/DL (ref 12–15.9)
MCH RBC QN AUTO: 22.5 PG (ref 26.6–33)
MCHC RBC AUTO-ENTMCNC: 30.3 G/DL (ref 31.5–35.7)
MCV RBC AUTO: 74.2 FL (ref 79–97)
PLATELET # BLD AUTO: 520 10*3/MM3 (ref 140–450)
PMV BLD AUTO: 10.6 FL (ref 6–12)
POTASSIUM SERPL-SCNC: 4.5 MMOL/L (ref 3.5–5.2)
RBC # BLD AUTO: 5.07 10*6/MM3 (ref 3.77–5.28)
SODIUM SERPL-SCNC: 137 MMOL/L (ref 136–145)
WBC NRBC COR # BLD: 7.57 10*3/MM3 (ref 3.4–10.8)

## 2022-02-02 PROCEDURE — 36592 COLLECT BLOOD FROM PICC: CPT

## 2022-02-02 PROCEDURE — 80048 BASIC METABOLIC PNL TOTAL CA: CPT | Performed by: INTERNAL MEDICINE

## 2022-02-02 PROCEDURE — G0463 HOSPITAL OUTPT CLINIC VISIT: HCPCS

## 2022-02-02 PROCEDURE — 85027 COMPLETE CBC AUTOMATED: CPT | Performed by: INTERNAL MEDICINE

## 2022-02-02 PROCEDURE — 86140 C-REACTIVE PROTEIN: CPT | Performed by: INTERNAL MEDICINE

## 2022-02-02 PROCEDURE — 82550 ASSAY OF CK (CPK): CPT | Performed by: INTERNAL MEDICINE

## 2022-02-02 NOTE — OUTREACH NOTE
Sepsis Week 2 Survey      Responses   [facility] patient discharged from? [place]   Does the patient have one of the following disease processes/diagnoses(primary or secondary)? Sepsis   Week 2 attempt successful? No   Unsuccessful attempts Attempt 1          Latoya Gonsalez LPN

## 2022-02-02 NOTE — PROGRESS NOTES
Left midline dsg changed under sterile technique. Ultra site valve, biopatch, and stat lock changed. Patient dc via wc per spouse.  Dr. Diaz paged as patient states Saturday February 5th is last day of antiboitics and she was told midline could be removed when antibiotics complete.

## 2022-02-03 ENCOUNTER — APPOINTMENT (OUTPATIENT)
Dept: INFUSION THERAPY | Facility: HOSPITAL | Age: 27
End: 2022-02-03

## 2022-02-04 ENCOUNTER — HOSPITAL ENCOUNTER (EMERGENCY)
Facility: HOSPITAL | Age: 27
Discharge: HOME | End: 2022-02-04
Attending: EMERGENCY MEDICINE | Admitting: EMERGENCY MEDICINE

## 2022-02-04 VITALS
HEART RATE: 94 BPM | TEMPERATURE: 97.5 F | RESPIRATION RATE: 16 BRPM | SYSTOLIC BLOOD PRESSURE: 107 MMHG | OXYGEN SATURATION: 99 % | DIASTOLIC BLOOD PRESSURE: 78 MMHG

## 2022-02-04 DIAGNOSIS — T82.898A OCCLUSION OF PERIPHERALLY INSERTED CENTRAL CATHETER (PICC) LINE, INITIAL ENCOUNTER: Primary | ICD-10-CM

## 2022-02-04 PROCEDURE — 99282 EMERGENCY DEPT VISIT SF MDM: CPT

## 2022-02-04 NOTE — ED PROVIDER NOTES
EMERGENCY DEPARTMENT ENCOUNTER    Room Number:  B02/02  Date of encounter:  2/4/2022  PCP: Jason Borrero MD  Historian: Patient      I used full protective equipment while examining this patient.  This includes face mask, gloves and protective eyewear.  I washed my hands before entering the room and immediately upon leaving the room      HPI:  Chief Complaint: Clogged IV line  A complete HPI/ROS/PMH/PSH/SH/FH are unobtainable due to: Nothing    Context: Liliam Lorenz is a 26 y.o. female who presents to the ED c/o 1 day history of clogged midline to left upper arm.  Patient currently being treated for MSSA bacteremia.  Patient is on 2 g of ceftriaxone every day.  She states she is doing well and has 1 more day of antibiotics.  She is due to have midline removed Monday.  She denies any fevers, chills.  She has no further complaints.    Review of Medical Records  I reviewed admission from 1/18/2022.  Patient admitted for MSSA bacteremia secondary to central line placement.    PAST MEDICAL HISTORY  Active Ambulatory Problems     Diagnosis Date Noted   • Poor venous access 10/15/2020   • POTS (postural orthostatic tachycardia syndrome) 05/06/2021   • Sinus tachycardia 09/08/2021   • Multiple subsegmental pulmonary emboli without acute cor pulmonale (HCC) 09/09/2021   • Autoimmune disease ([facility]) 09/06/2020   • 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   • Nausea and vomiting 08/22/2017   • Gastroparesis 08/22/2017   • Postural orthostatic tachycardia syndrome 06/12/2020   • Dolan catheter dysfunction ([facility]) 10/25/2021   • Febrile illness, acute 01/18/2022     Resolved Ambulatory Problems     Diagnosis Date Noted   • No Resolved Ambulatory Problems     Past Medical History:   Diagnosis Date   • Bronchitis    • Chest pain    • Chronic hypotension    • Eczema    • GERD (gastroesophageal reflux disease)    • IBS (irritable bowel syndrome)    • Lightheadedness    • Rectal fissure    • Rosacea    • Tachycardia          PAST  SURGICAL HISTORY  Past Surgical History:   Procedure Laterality Date   • COLONOSCOPY     • MEDIPORT INSERTION, DOUBLE  09/01/2020    [facility]    • UPPER GASTROINTESTINAL ENDOSCOPY     • VENOUS ACCESS DEVICE (PORT) INSERTION Left 10/16/2020    Procedure: INSERTION VENOUS ACCESS DEVICE UNDER FLURO;  Surgeon: Pa Lane MD;  Location: [facility];  Service: General;  Laterality: Left;   • VENOUS ACCESS DEVICE (PORT) INSERTION Right 10/26/2021    Procedure: REMOVAL AND INSERTION OF VALVERDE CATHETER;  Surgeon: Pa Lane MD;  Location: [facility];  Service: General;  Laterality: Right;         FAMILY HISTORY  Family History   Problem Relation Age of Onset   • Hypertension Mother    • Diabetes Mother    • Diabetes Father    • Heart disease Paternal Grandfather    • Stroke Paternal Grandfather    • Diabetes Paternal Grandfather    • Cancer Paternal Grandfather    • Coronary artery disease Maternal Grandmother         MGM with 4 vessel CABG and multiple stents   • Cancer Maternal Grandmother    • Coronary artery disease Maternal Uncle         Maternal uncle with MI   • Breast cancer Other    • Malig Hyperthermia Neg Hx          SOCIAL HISTORY  Social History     Socioeconomic History   • Marital status:    Tobacco Use   • Smoking status: Never Smoker   • Smokeless tobacco: Never Used   Vaping Use   • Vaping Use: Never used   Substance and Sexual Activity   • Alcohol use: No   • Drug use: No   • Sexual activity: Yes     Partners: Female     Birth control/protection: None         ALLERGIES  Eggs or egg-derived products, Pepcid [famotidine], and Scopolamine        REVIEW OF SYSTEMS  All systems reviewed and negative except for those discussed in HPI.       PHYSICAL EXAM    I have reviewed the triage vital signs and nursing notes.    ED Triage Vitals [02/04/22 1346]   Temp Heart Rate Resp BP SpO2   97.5 °F (36.4 °C) 116 18 -- 99 %      Temp src Heart Rate Source Patient Position BP  Location FiO2 (%)   Tympanic Monitor -- -- --       Physical Exam  GENERAL: Alert, oriented, nontoxic-appearing, not distressed  HENT: head atraumatic, no nuchal rigidity  EYES: no scleral icterus, EOMI  CV: regular rhythm, regular rate, no murmur  RESPIRATORY: normal effort, CTA  ABDOMEN: soft, nontender  MUSCULOSKELETAL: Midline in left upper arm.  No surrounding erythema.  NEURO: alert, moves all extremities, follows commands  SKIN: warm, dry    PROGRESS, DATA ANALYSIS, CONSULTS, AND MEDICAL DECISION MAKING    All labs have been independently reviewed by me.  All radiology studies have been reviewed by me and discussed with radiologist dictating the report.   EKG's independently viewed and interpreted by me.  Discussion below represents my analysis of pertinent findings related to patient's condition, differential diagnosis, treatment plan and final disposition.    I have discussed case with Dr. Davies, emergency room physician.  He has performed his own bedside examination and agrees with treatment plan.    ED Course as of 02/04/22 1940 Fri Feb 04, 2022   1429 Patient presents with a clogged midline to the left upper extremity.  IV team has flushed the line and is now working properly.  Patient has 1 more day of IV ceftriaxone.  She has no complaints of fever, pain.  We will discharge. [EE]      ED Course User Index  [EE] Anibal Koehler PA       AS OF 19:40 EST VITALS:    BP - 107/78  HR - 94  TEMP - 97.5 °F (36.4 °C) (Tympanic)  O2 SATS - 99%        DIAGNOSIS  Final diagnoses:   Occlusion of peripherally inserted central catheter (PICC) line, initial encounter ([facility])         DISPOSITION  Discharged      Dictated utilizing Dragon dictation     Anibal Koehler PA  02/04/22 1940

## 2022-02-04 NOTE — ED TRIAGE NOTES
Pt states has midline cath in left arm supposed to get IV antibiotics for sepsis, states today will not flush, states would normally go to ACU however they are closed today.     Pt arrives in triage with mask on. Triage staff wearing N95 masks and goggles.

## 2022-02-07 ENCOUNTER — HOSPITAL ENCOUNTER (OUTPATIENT)
Dept: INFUSION THERAPY | Facility: HOSPITAL | Age: 27
Discharge: HOME | End: 2022-02-07
Admitting: INTERNAL MEDICINE

## 2022-02-07 ENCOUNTER — READMISSION MANAGEMENT (OUTPATIENT)
Dept: CALL CENTER | Facility: HOSPITAL | Age: 27
End: 2022-02-07

## 2022-02-07 VITALS
HEART RATE: 85 BPM | DIASTOLIC BLOOD PRESSURE: 82 MMHG | RESPIRATION RATE: 18 BRPM | OXYGEN SATURATION: 97 % | TEMPERATURE: 96.8 F | SYSTOLIC BLOOD PRESSURE: 114 MMHG

## 2022-02-07 DIAGNOSIS — T82.514A HICKMAN CATHETER DYSFUNCTION, INITIAL ENCOUNTER: ICD-10-CM

## 2022-02-07 PROCEDURE — G0463 HOSPITAL OUTPT CLINIC VISIT: HCPCS

## 2022-02-07 NOTE — OUTREACH NOTE
Sepsis Week 2 Survey      Responses   [facility] [place] patient discharged from? [place]   Does the patient have one of the following disease processes/diagnoses(primary or secondary)? Sepsis   Week 2 attempt successful? Yes   Call start time 0936   Call end time 0939   Discharge diagnosis MSSA bacteremia with sepsis, Line sepsis status post removal   Is patient permission given to speak with other caregiver? Yes   List who call center can speak with spouse, Deedee Lorenz   Person spoke with today (if not patient) and relationship spouse   Medication alerts for this patient IV antibiotics completed   Meds reviewed with patient/caregiver? Yes   Does the patient have all medications related to this admission filled (includes all antibiotics, inhalers, nebulizers,steroids,etc.) Yes   Is the patient taking all medications as directed (includes completed medication regime)? Yes   Does the patient have a primary care provider?  Yes   Comments regarding PCP Reports that patient has had f/u with PCP   Has the patient kept scheduled appointments due by today? Yes   Comments Patient going to ACU today to have PICC line pulled. Advised to ask about arranging f/u with infectious disease .    Has home health visited the patient within 72 hours of discharge? N/A   Psychosocial issues? No   Did the patient receive a copy of their discharge instructions? Yes   What is the patient's perception of their health status since discharge? Improving   Is the patient/caregiver able to teach back Sepsis? S - Shivering,fever or very cold,  E - Extreme pain or generalized discomfort (worst ever,especially abdomen)   Is patient/caregiver able to teach back steps to recovery at home? Set small, achievable goals for return to baseline health   Is the patient/caregiver able to teach back signs and symptoms of worsening condition: Fever   If the patient is a current smoker, are they able to teach back resources for cessation? Not a smoker   Is  the patient/caregiver able to teach back the hierarchy of who to call/visit for symptoms/problems? PCP, Specialist, Home health nurse, Urgent Care, ED, 911 Yes   Week 2 call completed? Yes          Tata Barber RN

## 2022-02-07 NOTE — PROGRESS NOTES
Patient tolerated removal of left midline without complaint. Patient discharged via wc per spouse.

## 2022-02-15 ENCOUNTER — READMISSION MANAGEMENT (OUTPATIENT)
Dept: CALL CENTER | Facility: HOSPITAL | Age: 27
End: 2022-02-15

## 2022-02-15 NOTE — OUTREACH NOTE
Sepsis Week 3 Survey      Responses   [facility] patient discharged from? [place]   Does the patient have one of the following disease processes/diagnoses(primary or secondary)? Sepsis   Week 3 attempt successful? Yes   Call start time 1241   Call end time 1243   Discharge diagnosis MSSA bacteremia with sepsis, Line sepsis status post removal   Is the patient taking all medications as directed (includes completed medication regime)? Yes   Medication comments Finished IV aBO's last week   Has the patient kept scheduled appointments due by today? Yes   Has home health visited the patient within 72 hours of discharge? N/A   Did the patient receive a copy of their discharge instructions? Yes   What is the patient's perception of their health status since discharge? Improving   Nursing interventions Nurse provided patient education   Is the patient/caregiver able to teach back Sepsis? S - Shivering,fever or very cold,  E - Extreme pain or generalized discomfort (worst ever,especially abdomen),  P - Pale or discolored skin   Nursing interventions Nurse provided reassurance to patient   Is patient/caregiver able to teach back steps to recovery at home? Set small, achievable goals for return to baseline health   Is the patient/caregiver able to teach back signs and symptoms of worsening condition: Fever   If the patient is a current smoker, are they able to teach back resources for cessation? Not a smoker   Is the patient/caregiver able to teach back the hierarchy of who to call/visit for symptoms/problems? PCP, Specialist, Home health nurse, Urgent Care, ED, 911 Yes   Week 3 call completed? Yes   Wrap up additional comments States she is doing well. No needs identified.           Tata Veronica RN

## 2022-02-23 ENCOUNTER — HOSPITAL ENCOUNTER (EMERGENCY)
Facility: HOSPITAL | Age: 27
Discharge: LEFT BEFORE DISCHARGE | End: 2022-02-23

## 2022-02-23 VITALS
DIASTOLIC BLOOD PRESSURE: 90 MMHG | HEART RATE: 138 BPM | TEMPERATURE: 98.7 F | SYSTOLIC BLOOD PRESSURE: 145 MMHG | OXYGEN SATURATION: 99 % | RESPIRATION RATE: 16 BRPM

## 2022-02-23 PROCEDURE — 99211 OFF/OP EST MAY X REQ PHY/QHP: CPT

## 2022-02-23 NOTE — ED NOTES
Patient from home via EMS; call was for nausea, vomiting x 6 days. Hx of autoimmune disorders and POTS.      Avis Catalan RN  02/23/22 [id number]

## 2022-02-25 ENCOUNTER — READMISSION MANAGEMENT (OUTPATIENT)
Dept: CALL CENTER | Facility: HOSPITAL | Age: 27
End: 2022-02-25

## 2022-02-25 NOTE — OUTREACH NOTE
Sepsis Week 4 Survey      Responses   [facility] patient discharged from? [place]   Does the patient have one of the following disease processes/diagnoses(primary or secondary)? Sepsis   Week 4 attempt successful? Yes   Call start time 1512   Call end time 1515   Discharge diagnosis MSSA bacteremia with sepsis, Line sepsis status post removal   Medication comments Finished IV aBO's last week now on oral Cefdinir   Has the patient kept scheduled appointments due by today? Yes   Comments Having flare up of her gastroparesis. Has a current UTI she reports. Just went to ER for fluids and IV abx 2/23   What is the patient's perception of their health status since discharge? New symptoms unrelated to diagnosis   Is patient/caregiver able to teach back steps to recovery at home? Set small, achievable goals for return to baseline health,  Rest and regain strength   Week 4 call completed? Yes   Would the patient like one additional call? No   Graduated Yes   Is the patient interested in additional calls from an ambulatory ?  NOTE:  applies to high risk patients requiring additional follow-up. No          Ynes Camp RN

## 2022-04-19 RX ORDER — PYRIDOSTIGMINE BROMIDE 60 MG/1
TABLET ORAL
Qty: 30 TABLET | Refills: 3 | Status: SHIPPED | OUTPATIENT
Start: 2022-04-19 | End: 2022-11-11

## 2022-04-29 ENCOUNTER — APPOINTMENT (OUTPATIENT)
Dept: GENERAL RADIOLOGY | Facility: HOSPITAL | Age: 27
End: 2022-04-29

## 2022-04-29 ENCOUNTER — HOSPITAL ENCOUNTER (INPATIENT)
Facility: HOSPITAL | Age: 27
LOS: 8 days | Discharge: HOME | End: 2022-05-08
Attending: EMERGENCY MEDICINE | Admitting: HOSPITALIST

## 2022-04-29 DIAGNOSIS — I26.93 SINGLE SUBSEGMENTAL PULMONARY EMBOLISM WITHOUT ACUTE COR PULMONALE: Primary | ICD-10-CM

## 2022-04-29 PROCEDURE — 99285 EMERGENCY DEPT VISIT HI MDM: CPT

## 2022-04-29 PROCEDURE — 71045 X-RAY EXAM CHEST 1 VIEW: CPT

## 2022-04-29 PROCEDURE — 93005 ELECTROCARDIOGRAM TRACING: CPT | Performed by: EMERGENCY MEDICINE

## 2022-04-29 PROCEDURE — 93010 ELECTROCARDIOGRAM REPORT: CPT | Performed by: INTERNAL MEDICINE

## 2022-04-29 RX ORDER — METOCLOPRAMIDE HYDROCHLORIDE 5 MG/ML
10 INJECTION INTRAMUSCULAR; INTRAVENOUS ONCE
Status: DISCONTINUED | OUTPATIENT
Start: 2022-04-29 | End: 2022-04-30

## 2022-04-29 RX ORDER — SODIUM CHLORIDE 0.9 % (FLUSH) 0.9 %
10 SYRINGE (ML) INJECTION AS NEEDED
Status: DISCONTINUED | OUTPATIENT
Start: 2022-04-29 | End: 2022-05-08 | Stop reason: HOSPADM

## 2022-04-29 RX ORDER — DIPHENHYDRAMINE HYDROCHLORIDE 50 MG/ML
25 INJECTION INTRAMUSCULAR; INTRAVENOUS ONCE
Status: DISCONTINUED | OUTPATIENT
Start: 2022-04-29 | End: 2022-04-30

## 2022-04-30 ENCOUNTER — APPOINTMENT (OUTPATIENT)
Dept: CT IMAGING | Facility: HOSPITAL | Age: 27
End: 2022-04-30

## 2022-04-30 PROBLEM — I26.93 SINGLE SUBSEGMENTAL PULMONARY EMBOLISM WITHOUT ACUTE COR PULMONALE (HCC): Status: ACTIVE | Noted: 2022-04-30

## 2022-04-30 LAB
ALBUMIN SERPL-MCNC: 3.7 G/DL (ref 3.5–5.2)
ALBUMIN/GLOB SERPL: 0.9 G/DL
ALP SERPL-CCNC: 68 U/L (ref 39–117)
ALT SERPL W P-5'-P-CCNC: 41 U/L (ref 1–33)
ANION GAP SERPL CALCULATED.3IONS-SCNC: 14.2 MMOL/L (ref 5–15)
APTT PPP: 101.7 SECONDS (ref 22.7–35.4)
APTT PPP: 29.8 SECONDS (ref 22.7–35.4)
APTT PPP: 33.4 SECONDS (ref 22.7–35.4)
APTT PPP: 72.4 SECONDS (ref 22.7–35.4)
AST SERPL-CCNC: 41 U/L (ref 1–32)
BASOPHILS # BLD AUTO: 0.04 10*3/MM3 (ref 0–0.2)
BASOPHILS # BLD AUTO: 0.05 10*3/MM3 (ref 0–0.2)
BASOPHILS NFR BLD AUTO: 0.5 % (ref 0–1.5)
BASOPHILS NFR BLD AUTO: 0.6 % (ref 0–1.5)
BILIRUB SERPL-MCNC: 0.2 MG/DL (ref 0–1.2)
BUN SERPL-MCNC: 6 MG/DL (ref 6–20)
BUN/CREAT SERPL: 8.2 (ref 7–25)
CALCIUM SPEC-SCNC: 8.4 MG/DL (ref 8.6–10.5)
CHLORIDE SERPL-SCNC: 104 MMOL/L (ref 98–107)
CO2 SERPL-SCNC: 19.8 MMOL/L (ref 22–29)
CREAT SERPL-MCNC: 0.73 MG/DL (ref 0.57–1)
D-LACTATE SERPL-SCNC: 2.2 MMOL/L (ref 0.5–2)
D-LACTATE SERPL-SCNC: 2.3 MMOL/L (ref 0.5–2)
DEPRECATED RDW RBC AUTO: 42.8 FL (ref 37–54)
DEPRECATED RDW RBC AUTO: 45.2 FL (ref 37–54)
EGFRCR SERPLBLD CKD-EPI 2021: 115.8 ML/MIN/1.73
EOSINOPHIL # BLD AUTO: 0.02 10*3/MM3 (ref 0–0.4)
EOSINOPHIL # BLD AUTO: 0.03 10*3/MM3 (ref 0–0.4)
EOSINOPHIL NFR BLD AUTO: 0.2 % (ref 0.3–6.2)
EOSINOPHIL NFR BLD AUTO: 0.4 % (ref 0.3–6.2)
ERYTHROCYTE [DISTWIDTH] IN BLOOD BY AUTOMATED COUNT: 16.9 % (ref 12.3–15.4)
ERYTHROCYTE [DISTWIDTH] IN BLOOD BY AUTOMATED COUNT: 17.2 % (ref 12.3–15.4)
FERRITIN SERPL-MCNC: 6.76 NG/ML (ref 13–150)
FOLATE SERPL-MCNC: 5.08 NG/ML (ref 4.78–24.2)
GLOBULIN UR ELPH-MCNC: 4 GM/DL
GLUCOSE SERPL-MCNC: 107 MG/DL (ref 65–99)
HCG SERPL QL: NEGATIVE
HCT VFR BLD AUTO: 29.5 % (ref 34–46.6)
HCT VFR BLD AUTO: 31.5 % (ref 34–46.6)
HGB BLD-MCNC: 10 G/DL (ref 12–15.9)
HGB BLD-MCNC: 9 G/DL (ref 12–15.9)
INR PPP: 0.96 (ref 0.9–1.1)
INR PPP: 0.98 (ref 0.9–1.1)
IRON 24H UR-MRATE: 23 MCG/DL (ref 37–145)
IRON SATN MFR SERPL: 6 % (ref 20–50)
LDH SERPL-CCNC: 250 U/L (ref 135–214)
LIPASE SERPL-CCNC: 20 U/L (ref 13–60)
LYMPHOCYTES # BLD AUTO: 1.78 10*3/MM3 (ref 0.7–3.1)
LYMPHOCYTES # BLD AUTO: 3.11 10*3/MM3 (ref 0.7–3.1)
LYMPHOCYTES NFR BLD AUTO: 21.9 % (ref 19.6–45.3)
LYMPHOCYTES NFR BLD AUTO: 36.8 % (ref 19.6–45.3)
MCH RBC QN AUTO: 22.3 PG (ref 26.6–33)
MCH RBC QN AUTO: 22.8 PG (ref 26.6–33)
MCHC RBC AUTO-ENTMCNC: 30.5 G/DL (ref 31.5–35.7)
MCHC RBC AUTO-ENTMCNC: 31.7 G/DL (ref 31.5–35.7)
MCV RBC AUTO: 71.9 FL (ref 79–97)
MCV RBC AUTO: 73 FL (ref 79–97)
MONOCYTES # BLD AUTO: 0.4 10*3/MM3 (ref 0.1–0.9)
MONOCYTES # BLD AUTO: 0.5 10*3/MM3 (ref 0.1–0.9)
MONOCYTES NFR BLD AUTO: 4.7 % (ref 5–12)
MONOCYTES NFR BLD AUTO: 6.2 % (ref 5–12)
NEUTROPHILS NFR BLD AUTO: 4.83 10*3/MM3 (ref 1.7–7)
NEUTROPHILS NFR BLD AUTO: 5.74 10*3/MM3 (ref 1.7–7)
NEUTROPHILS NFR BLD AUTO: 57.3 % (ref 42.7–76)
NEUTROPHILS NFR BLD AUTO: 70.6 % (ref 42.7–76)
NT-PROBNP SERPL-MCNC: 126 PG/ML (ref 0–450)
PLATELET # BLD AUTO: 299 10*3/MM3 (ref 140–450)
PLATELET # BLD AUTO: 328 10*3/MM3 (ref 140–450)
PMV BLD AUTO: 10.2 FL (ref 6–12)
PMV BLD AUTO: 10.3 FL (ref 6–12)
POTASSIUM SERPL-SCNC: 3.8 MMOL/L (ref 3.5–5.2)
PROCALCITONIN SERPL-MCNC: 0.04 NG/ML (ref 0–0.25)
PROT SERPL-MCNC: 7.7 G/DL (ref 6–8.5)
PROTHROMBIN TIME: 12.7 SECONDS (ref 11.7–14.2)
PROTHROMBIN TIME: 12.9 SECONDS (ref 11.7–14.2)
RBC # BLD AUTO: 4.04 10*6/MM3 (ref 3.77–5.28)
RBC # BLD AUTO: 4.38 10*6/MM3 (ref 3.77–5.28)
SARS-COV-2 RNA RESP QL NAA+PROBE: NOT DETECTED
SODIUM SERPL-SCNC: 138 MMOL/L (ref 136–145)
TIBC SERPL-MCNC: 359 MCG/DL (ref 298–536)
TRANSFERRIN SERPL-MCNC: 241 MG/DL (ref 200–360)
TROPONIN T SERPL-MCNC: <0.01 NG/ML (ref 0–0.03)
TROPONIN T SERPL-MCNC: <0.01 NG/ML (ref 0–0.03)
VIT B12 BLD-MCNC: <150 PG/ML (ref 211–946)
WBC NRBC COR # BLD: 8.12 10*3/MM3 (ref 3.4–10.8)
WBC NRBC COR # BLD: 8.44 10*3/MM3 (ref 3.4–10.8)

## 2022-04-30 PROCEDURE — 63710000001 DRONABINOL PER 2.5 MG: Performed by: HOSPITALIST

## 2022-04-30 PROCEDURE — 83605 ASSAY OF LACTIC ACID: CPT | Performed by: EMERGENCY MEDICINE

## 2022-04-30 PROCEDURE — 86146 BETA-2 GLYCOPROTEIN ANTIBODY: CPT | Performed by: INTERNAL MEDICINE

## 2022-04-30 PROCEDURE — 81241 F5 GENE: CPT | Performed by: INTERNAL MEDICINE

## 2022-04-30 PROCEDURE — 84484 ASSAY OF TROPONIN QUANT: CPT | Performed by: EMERGENCY MEDICINE

## 2022-04-30 PROCEDURE — 82728 ASSAY OF FERRITIN: CPT | Performed by: INTERNAL MEDICINE

## 2022-04-30 PROCEDURE — 82746 ASSAY OF FOLIC ACID SERUM: CPT | Performed by: INTERNAL MEDICINE

## 2022-04-30 PROCEDURE — G0378 HOSPITAL OBSERVATION PER HR: HCPCS

## 2022-04-30 PROCEDURE — 85730 THROMBOPLASTIN TIME PARTIAL: CPT | Performed by: HOSPITALIST

## 2022-04-30 PROCEDURE — 83615 LACTATE (LD) (LDH) ENZYME: CPT | Performed by: INTERNAL MEDICINE

## 2022-04-30 PROCEDURE — 86147 CARDIOLIPIN ANTIBODY EA IG: CPT | Performed by: INTERNAL MEDICINE

## 2022-04-30 PROCEDURE — 85730 THROMBOPLASTIN TIME PARTIAL: CPT | Performed by: EMERGENCY MEDICINE

## 2022-04-30 PROCEDURE — 85306 CLOT INHIBIT PROT S FREE: CPT | Performed by: INTERNAL MEDICINE

## 2022-04-30 PROCEDURE — 83880 ASSAY OF NATRIURETIC PEPTIDE: CPT | Performed by: HOSPITALIST

## 2022-04-30 PROCEDURE — 85610 PROTHROMBIN TIME: CPT | Performed by: EMERGENCY MEDICINE

## 2022-04-30 PROCEDURE — 81240 F2 GENE: CPT | Performed by: INTERNAL MEDICINE

## 2022-04-30 PROCEDURE — 0 IOPAMIDOL PER 1 ML: Performed by: EMERGENCY MEDICINE

## 2022-04-30 PROCEDURE — 63710000001 PROCHLORPERAZINE MALEATE PER 10 MG: Performed by: HOSPITALIST

## 2022-04-30 PROCEDURE — 84145 PROCALCITONIN (PCT): CPT | Performed by: EMERGENCY MEDICINE

## 2022-04-30 PROCEDURE — 85300 ANTITHROMBIN III ACTIVITY: CPT | Performed by: INTERNAL MEDICINE

## 2022-04-30 PROCEDURE — 25010000002 HEPARIN (PORCINE) PER 1000 UNITS: Performed by: EMERGENCY MEDICINE

## 2022-04-30 PROCEDURE — 84703 CHORIONIC GONADOTROPIN ASSAY: CPT | Performed by: EMERGENCY MEDICINE

## 2022-04-30 PROCEDURE — 25010000002 ONDANSETRON PER 1 MG: Performed by: HOSPITALIST

## 2022-04-30 PROCEDURE — 84466 ASSAY OF TRANSFERRIN: CPT | Performed by: INTERNAL MEDICINE

## 2022-04-30 PROCEDURE — 80053 COMPREHEN METABOLIC PANEL: CPT | Performed by: EMERGENCY MEDICINE

## 2022-04-30 PROCEDURE — 36415 COLL VENOUS BLD VENIPUNCTURE: CPT | Performed by: EMERGENCY MEDICINE

## 2022-04-30 PROCEDURE — 83540 ASSAY OF IRON: CPT | Performed by: INTERNAL MEDICINE

## 2022-04-30 PROCEDURE — 85025 COMPLETE CBC W/AUTO DIFF WBC: CPT | Performed by: EMERGENCY MEDICINE

## 2022-04-30 PROCEDURE — U0003 INFECTIOUS AGENT DETECTION BY NUCLEIC ACID (DNA OR RNA); SEVERE ACUTE RESPIRATORY SYNDROME CORONAVIRUS 2 (SARS-COV-2) (CORONAVIRUS DISEASE [COVID-19]), AMPLIFIED PROBE TECHNIQUE, MAKING USE OF HIGH THROUGHPUT TECHNOLOGIES AS DESCRIBED BY CMS-2020-01-R: HCPCS | Performed by: EMERGENCY MEDICINE

## 2022-04-30 PROCEDURE — 82607 VITAMIN B-12: CPT | Performed by: INTERNAL MEDICINE

## 2022-04-30 PROCEDURE — 71275 CT ANGIOGRAPHY CHEST: CPT

## 2022-04-30 PROCEDURE — 99222 1ST HOSP IP/OBS MODERATE 55: CPT | Performed by: INTERNAL MEDICINE

## 2022-04-30 PROCEDURE — 83690 ASSAY OF LIPASE: CPT | Performed by: EMERGENCY MEDICINE

## 2022-04-30 RX ORDER — GABAPENTIN 100 MG/1
100 CAPSULE ORAL 3 TIMES DAILY
Status: DISCONTINUED | OUTPATIENT
Start: 2022-04-30 | End: 2022-05-08 | Stop reason: HOSPADM

## 2022-04-30 RX ORDER — MONTELUKAST SODIUM 10 MG/1
10 TABLET ORAL NIGHTLY
Status: DISCONTINUED | OUTPATIENT
Start: 2022-04-30 | End: 2022-05-08 | Stop reason: HOSPADM

## 2022-04-30 RX ORDER — HEPARIN SODIUM 5000 [USP'U]/ML
40-80 INJECTION, SOLUTION INTRAVENOUS; SUBCUTANEOUS EVERY 6 HOURS PRN
Status: DISCONTINUED | OUTPATIENT
Start: 2022-04-30 | End: 2022-05-08 | Stop reason: HOSPADM

## 2022-04-30 RX ORDER — MELATONIN
1000 DAILY
Status: DISCONTINUED | OUTPATIENT
Start: 2022-04-30 | End: 2022-05-08 | Stop reason: HOSPADM

## 2022-04-30 RX ORDER — PHENAZOPYRIDINE HYDROCHLORIDE 100 MG/1
100 TABLET, FILM COATED ORAL
Status: DISCONTINUED | OUTPATIENT
Start: 2022-04-30 | End: 2022-05-08 | Stop reason: HOSPADM

## 2022-04-30 RX ORDER — ALBUTEROL SULFATE 2.5 MG/3ML
2.5 SOLUTION RESPIRATORY (INHALATION) EVERY 6 HOURS PRN
Status: DISCONTINUED | OUTPATIENT
Start: 2022-04-30 | End: 2022-05-08 | Stop reason: HOSPADM

## 2022-04-30 RX ORDER — HEPARIN SODIUM 5000 [USP'U]/ML
80 INJECTION, SOLUTION INTRAVENOUS; SUBCUTANEOUS ONCE
Status: DISCONTINUED | OUTPATIENT
Start: 2022-04-30 | End: 2022-04-30

## 2022-04-30 RX ORDER — DRONABINOL 5 MG/1
5 CAPSULE ORAL 3 TIMES DAILY PRN
COMMUNITY

## 2022-04-30 RX ORDER — HYDROXYZINE PAMOATE 50 MG/1
50 CAPSULE ORAL 3 TIMES DAILY PRN
Status: DISCONTINUED | OUTPATIENT
Start: 2022-04-30 | End: 2022-05-08 | Stop reason: HOSPADM

## 2022-04-30 RX ORDER — LOPERAMIDE HYDROCHLORIDE 2 MG/1
2 CAPSULE ORAL 4 TIMES DAILY PRN
Status: DISCONTINUED | OUTPATIENT
Start: 2022-04-30 | End: 2022-04-30

## 2022-04-30 RX ORDER — PROCHLORPERAZINE MALEATE 10 MG
10 TABLET ORAL EVERY 6 HOURS PRN
Status: DISCONTINUED | OUTPATIENT
Start: 2022-04-30 | End: 2022-05-06

## 2022-04-30 RX ORDER — HEPARIN SODIUM 5000 [USP'U]/ML
80 INJECTION, SOLUTION INTRAVENOUS; SUBCUTANEOUS ONCE
Status: COMPLETED | OUTPATIENT
Start: 2022-04-30 | End: 2022-04-30

## 2022-04-30 RX ORDER — PYRIDOSTIGMINE BROMIDE 60 MG/1
30 TABLET ORAL DAILY
Status: DISCONTINUED | OUTPATIENT
Start: 2022-04-30 | End: 2022-05-08 | Stop reason: HOSPADM

## 2022-04-30 RX ORDER — HEPARIN SOD,PORCINE/0.9 % NACL 25000/250
13.3 INTRAVENOUS SOLUTION INTRAVENOUS
Status: DISCONTINUED | OUTPATIENT
Start: 2022-04-30 | End: 2022-05-08

## 2022-04-30 RX ORDER — MIDODRINE HYDROCHLORIDE 5 MG/1
10 TABLET ORAL
Status: DISCONTINUED | OUTPATIENT
Start: 2022-04-30 | End: 2022-05-08 | Stop reason: HOSPADM

## 2022-04-30 RX ORDER — ONDANSETRON 2 MG/ML
8 INJECTION INTRAMUSCULAR; INTRAVENOUS EVERY 8 HOURS PRN
Status: DISCONTINUED | OUTPATIENT
Start: 2022-04-30 | End: 2022-04-30

## 2022-04-30 RX ORDER — ONDANSETRON 2 MG/ML
4 INJECTION INTRAMUSCULAR; INTRAVENOUS EVERY 8 HOURS PRN
Status: DISCONTINUED | OUTPATIENT
Start: 2022-04-30 | End: 2022-05-06

## 2022-04-30 RX ORDER — ONDANSETRON 4 MG/1
8 TABLET, FILM COATED ORAL EVERY 8 HOURS PRN
Status: DISCONTINUED | OUTPATIENT
Start: 2022-04-30 | End: 2022-04-30

## 2022-04-30 RX ORDER — DRONABINOL 2.5 MG/1
5 CAPSULE ORAL 3 TIMES DAILY
Status: DISCONTINUED | OUTPATIENT
Start: 2022-04-30 | End: 2022-05-08 | Stop reason: HOSPADM

## 2022-04-30 RX ORDER — LEVOTHYROXINE SODIUM 0.05 MG/1
50 TABLET ORAL
Status: DISCONTINUED | OUTPATIENT
Start: 2022-04-30 | End: 2022-05-08 | Stop reason: HOSPADM

## 2022-04-30 RX ORDER — PANTOPRAZOLE SODIUM 40 MG/1
40 TABLET, DELAYED RELEASE ORAL
Status: DISCONTINUED | OUTPATIENT
Start: 2022-05-01 | End: 2022-05-08 | Stop reason: HOSPADM

## 2022-04-30 RX ORDER — PROMETHAZINE HYDROCHLORIDE 25 MG/1
25 TABLET ORAL
Status: DISCONTINUED | OUTPATIENT
Start: 2022-04-30 | End: 2022-04-30

## 2022-04-30 RX ADMIN — ONDANSETRON 8 MG: 2 INJECTION INTRAMUSCULAR; INTRAVENOUS at 08:51

## 2022-04-30 RX ADMIN — GABAPENTIN 100 MG: 100 CAPSULE ORAL at 15:58

## 2022-04-30 RX ADMIN — MONTELUKAST SODIUM 10 MG: 10 TABLET, FILM COATED ORAL at 19:52

## 2022-04-30 RX ADMIN — PROCHLORPERAZINE MALEATE 10 MG: 10 TABLET ORAL at 19:52

## 2022-04-30 RX ADMIN — PHENAZOPYRIDINE HYDROCHLORIDE 100 MG: 100 TABLET ORAL at 11:44

## 2022-04-30 RX ADMIN — LEVOTHYROXINE SODIUM 50 MCG: 0.05 TABLET ORAL at 09:43

## 2022-04-30 RX ADMIN — PHENAZOPYRIDINE HYDROCHLORIDE 100 MG: 100 TABLET ORAL at 09:43

## 2022-04-30 RX ADMIN — MIDODRINE HYDROCHLORIDE 10 MG: 5 TABLET ORAL at 09:43

## 2022-04-30 RX ADMIN — HEPARIN SODIUM 13.3 UNITS/KG/HR: 5000 INJECTION INTRAVENOUS; SUBCUTANEOUS at 03:20

## 2022-04-30 RX ADMIN — SODIUM CHLORIDE 500 ML: 9 INJECTION, SOLUTION INTRAVENOUS at 00:23

## 2022-04-30 RX ADMIN — GABAPENTIN 100 MG: 100 CAPSULE ORAL at 19:52

## 2022-04-30 RX ADMIN — ONDANSETRON 4 MG: 2 INJECTION INTRAMUSCULAR; INTRAVENOUS at 15:59

## 2022-04-30 RX ADMIN — METOPROLOL TARTRATE 25 MG: 25 TABLET, FILM COATED ORAL at 19:52

## 2022-04-30 RX ADMIN — HEPARIN SODIUM 11.3 UNITS/KG/HR: 5000 INJECTION INTRAVENOUS; SUBCUTANEOUS at 20:57

## 2022-04-30 RX ADMIN — HEPARIN SODIUM 9000 UNITS: 5000 INJECTION INTRAVENOUS; SUBCUTANEOUS at 03:20

## 2022-04-30 RX ADMIN — MIDODRINE HYDROCHLORIDE 10 MG: 5 TABLET ORAL at 11:41

## 2022-04-30 RX ADMIN — PHENAZOPYRIDINE HYDROCHLORIDE 100 MG: 100 TABLET ORAL at 17:54

## 2022-04-30 RX ADMIN — PYRIDOSTIGMINE BROMIDE 30 MG: 60 TABLET ORAL at 11:41

## 2022-04-30 RX ADMIN — DRONABINOL 5 MG: 2.5 CAPSULE ORAL at 15:58

## 2022-04-30 RX ADMIN — MIDODRINE HYDROCHLORIDE 10 MG: 5 TABLET ORAL at 17:54

## 2022-04-30 RX ADMIN — DRONABINOL 5 MG: 2.5 CAPSULE ORAL at 19:52

## 2022-04-30 RX ADMIN — GABAPENTIN 100 MG: 100 CAPSULE ORAL at 09:43

## 2022-04-30 RX ADMIN — IOPAMIDOL 95 ML: 755 INJECTION, SOLUTION INTRAVENOUS at 01:52

## 2022-04-30 RX ADMIN — PROCHLORPERAZINE MALEATE 10 MG: 10 TABLET ORAL at 11:41

## 2022-04-30 RX ADMIN — Medication 1000 UNITS: at 09:43

## 2022-04-30 RX ADMIN — METOPROLOL TARTRATE 25 MG: 25 TABLET, FILM COATED ORAL at 09:43

## 2022-04-30 RX ADMIN — DRONABINOL 5 MG: 2.5 CAPSULE ORAL at 11:44

## 2022-04-30 RX ADMIN — METOPROLOL TARTRATE 25 MG: 25 TABLET, FILM COATED ORAL at 15:59

## 2022-05-01 LAB
ALBUMIN SERPL-MCNC: 3.4 G/DL (ref 3.5–5.2)
ALBUMIN/GLOB SERPL: 0.9 G/DL
ALP SERPL-CCNC: 58 U/L (ref 39–117)
ALT SERPL W P-5'-P-CCNC: 31 U/L (ref 1–33)
ANION GAP SERPL CALCULATED.3IONS-SCNC: 12 MMOL/L (ref 5–15)
APTT PPP: 154 SECONDS (ref 22.7–35.4)
APTT PPP: 179.6 SECONDS (ref 22.7–35.4)
APTT PPP: 55.8 SECONDS (ref 22.7–35.4)
AST SERPL-CCNC: 27 U/L (ref 1–32)
BASOPHILS # BLD AUTO: 0.06 10*3/MM3 (ref 0–0.2)
BASOPHILS NFR BLD AUTO: 0.5 % (ref 0–1.5)
BILIRUB SERPL-MCNC: 0.3 MG/DL (ref 0–1.2)
BUN SERPL-MCNC: 5 MG/DL (ref 6–20)
BUN/CREAT SERPL: 7.9 (ref 7–25)
CALCIUM SPEC-SCNC: 8.5 MG/DL (ref 8.6–10.5)
CHLORIDE SERPL-SCNC: 103 MMOL/L (ref 98–107)
CHOLEST SERPL-MCNC: 222 MG/DL (ref 0–200)
CO2 SERPL-SCNC: 20 MMOL/L (ref 22–29)
CREAT SERPL-MCNC: 0.63 MG/DL (ref 0.57–1)
DEPRECATED RDW RBC AUTO: 45.6 FL (ref 37–54)
EGFRCR SERPLBLD CKD-EPI 2021: 124.9 ML/MIN/1.73
EOSINOPHIL # BLD AUTO: 0.1 10*3/MM3 (ref 0–0.4)
EOSINOPHIL NFR BLD AUTO: 0.9 % (ref 0.3–6.2)
ERYTHROCYTE [DISTWIDTH] IN BLOOD BY AUTOMATED COUNT: 17 % (ref 12.3–15.4)
GLOBULIN UR ELPH-MCNC: 3.9 GM/DL
GLUCOSE SERPL-MCNC: 88 MG/DL (ref 65–99)
HBA1C MFR BLD: 5.2 % (ref 4.8–5.6)
HCT VFR BLD AUTO: 33.2 % (ref 34–46.6)
HDLC SERPL-MCNC: 40 MG/DL (ref 40–60)
HGB BLD-MCNC: 9.9 G/DL (ref 12–15.9)
IMM GRANULOCYTES # BLD AUTO: 0.04 10*3/MM3 (ref 0–0.05)
IMM GRANULOCYTES NFR BLD AUTO: 0.4 % (ref 0–0.5)
INR PPP: 1 (ref 0.9–1.1)
LDLC SERPL CALC-MCNC: 129 MG/DL (ref 0–100)
LDLC/HDLC SERPL: 3.08 {RATIO}
LYMPHOCYTES # BLD AUTO: 3.03 10*3/MM3 (ref 0.7–3.1)
LYMPHOCYTES NFR BLD AUTO: 27.2 % (ref 19.6–45.3)
MCH RBC QN AUTO: 22.4 PG (ref 26.6–33)
MCHC RBC AUTO-ENTMCNC: 29.8 G/DL (ref 31.5–35.7)
MCV RBC AUTO: 75.1 FL (ref 79–97)
MONOCYTES # BLD AUTO: 0.65 10*3/MM3 (ref 0.1–0.9)
MONOCYTES NFR BLD AUTO: 5.8 % (ref 5–12)
NEUTROPHILS NFR BLD AUTO: 65.2 % (ref 42.7–76)
NEUTROPHILS NFR BLD AUTO: 7.27 10*3/MM3 (ref 1.7–7)
NRBC BLD AUTO-RTO: 0 /100 WBC (ref 0–0.2)
PLATELET # BLD AUTO: 318 10*3/MM3 (ref 140–450)
PMV BLD AUTO: 10.2 FL (ref 6–12)
POTASSIUM SERPL-SCNC: 3.9 MMOL/L (ref 3.5–5.2)
PROT SERPL-MCNC: 7.3 G/DL (ref 6–8.5)
PROTHROMBIN TIME: 13.1 SECONDS (ref 11.7–14.2)
RBC # BLD AUTO: 4.42 10*6/MM3 (ref 3.77–5.28)
SODIUM SERPL-SCNC: 135 MMOL/L (ref 136–145)
TRIGL SERPL-MCNC: 295 MG/DL (ref 0–150)
TSH SERPL DL<=0.05 MIU/L-ACNC: 2.67 UIU/ML (ref 0.27–4.2)
VLDLC SERPL-MCNC: 53 MG/DL (ref 5–40)
WBC NRBC COR # BLD: 11.15 10*3/MM3 (ref 3.4–10.8)

## 2022-05-01 PROCEDURE — 36415 COLL VENOUS BLD VENIPUNCTURE: CPT | Performed by: EMERGENCY MEDICINE

## 2022-05-01 PROCEDURE — 85730 THROMBOPLASTIN TIME PARTIAL: CPT | Performed by: EMERGENCY MEDICINE

## 2022-05-01 PROCEDURE — 99232 SBSQ HOSP IP/OBS MODERATE 35: CPT | Performed by: INTERNAL MEDICINE

## 2022-05-01 PROCEDURE — 25010000002 CYANOCOBALAMIN PER 1000 MCG: Performed by: INTERNAL MEDICINE

## 2022-05-01 PROCEDURE — 80050 GENERAL HEALTH PANEL: CPT | Performed by: EMERGENCY MEDICINE

## 2022-05-01 PROCEDURE — 25010000002 ONDANSETRON PER 1 MG: Performed by: HOSPITALIST

## 2022-05-01 PROCEDURE — 85730 THROMBOPLASTIN TIME PARTIAL: CPT | Performed by: HOSPITALIST

## 2022-05-01 PROCEDURE — 63710000001 PROCHLORPERAZINE MALEATE PER 10 MG: Performed by: HOSPITALIST

## 2022-05-01 PROCEDURE — 25010000002 NA FERRIC GLUC CPLX PER 12.5 MG: Performed by: INTERNAL MEDICINE

## 2022-05-01 PROCEDURE — 83036 HEMOGLOBIN GLYCOSYLATED A1C: CPT | Performed by: HOSPITALIST

## 2022-05-01 PROCEDURE — 63710000001 DRONABINOL PER 2.5 MG: Performed by: HOSPITALIST

## 2022-05-01 PROCEDURE — 80061 LIPID PANEL: CPT | Performed by: HOSPITALIST

## 2022-05-01 PROCEDURE — 25010000002 HEPARIN (PORCINE) PER 1000 UNITS: Performed by: EMERGENCY MEDICINE

## 2022-05-01 PROCEDURE — G0378 HOSPITAL OBSERVATION PER HR: HCPCS

## 2022-05-01 PROCEDURE — 85610 PROTHROMBIN TIME: CPT | Performed by: INTERNAL MEDICINE

## 2022-05-01 RX ORDER — WARFARIN SODIUM 7.5 MG/1
7.5 TABLET ORAL
Status: DISCONTINUED | OUTPATIENT
Start: 2022-05-01 | End: 2022-05-02

## 2022-05-01 RX ORDER — CYANOCOBALAMIN 1000 UG/ML
1000 INJECTION, SOLUTION INTRAMUSCULAR; SUBCUTANEOUS
Status: DISCONTINUED | OUTPATIENT
Start: 2022-05-01 | End: 2022-05-08 | Stop reason: HOSPADM

## 2022-05-01 RX ADMIN — PROCHLORPERAZINE MALEATE 10 MG: 10 TABLET ORAL at 22:03

## 2022-05-01 RX ADMIN — SODIUM CHLORIDE 250 MG: 9 INJECTION, SOLUTION INTRAVENOUS at 16:09

## 2022-05-01 RX ADMIN — PYRIDOSTIGMINE BROMIDE 30 MG: 60 TABLET ORAL at 08:24

## 2022-05-01 RX ADMIN — GABAPENTIN 100 MG: 100 CAPSULE ORAL at 16:10

## 2022-05-01 RX ADMIN — PHENAZOPYRIDINE HYDROCHLORIDE 100 MG: 100 TABLET ORAL at 12:50

## 2022-05-01 RX ADMIN — MIDODRINE HYDROCHLORIDE 10 MG: 5 TABLET ORAL at 17:01

## 2022-05-01 RX ADMIN — DRONABINOL 5 MG: 2.5 CAPSULE ORAL at 16:10

## 2022-05-01 RX ADMIN — GABAPENTIN 100 MG: 100 CAPSULE ORAL at 08:24

## 2022-05-01 RX ADMIN — MIDODRINE HYDROCHLORIDE 10 MG: 5 TABLET ORAL at 12:50

## 2022-05-01 RX ADMIN — ONDANSETRON 4 MG: 2 INJECTION INTRAMUSCULAR; INTRAVENOUS at 08:59

## 2022-05-01 RX ADMIN — ONDANSETRON 4 MG: 2 INJECTION INTRAMUSCULAR; INTRAVENOUS at 17:04

## 2022-05-01 RX ADMIN — METOPROLOL TARTRATE 25 MG: 25 TABLET, FILM COATED ORAL at 08:24

## 2022-05-01 RX ADMIN — MONTELUKAST SODIUM 10 MG: 10 TABLET, FILM COATED ORAL at 20:35

## 2022-05-01 RX ADMIN — DRONABINOL 5 MG: 2.5 CAPSULE ORAL at 20:35

## 2022-05-01 RX ADMIN — MIDODRINE HYDROCHLORIDE 10 MG: 5 TABLET ORAL at 08:24

## 2022-05-01 RX ADMIN — PHENAZOPYRIDINE HYDROCHLORIDE 100 MG: 100 TABLET ORAL at 08:24

## 2022-05-01 RX ADMIN — CYANOCOBALAMIN 1000 MCG: 1000 INJECTION, SOLUTION INTRAMUSCULAR; SUBCUTANEOUS at 12:50

## 2022-05-01 RX ADMIN — HEPARIN SODIUM 15.3 UNITS/KG/HR: 5000 INJECTION INTRAVENOUS; SUBCUTANEOUS at 14:39

## 2022-05-01 RX ADMIN — LEVOTHYROXINE SODIUM 50 MCG: 0.05 TABLET ORAL at 06:36

## 2022-05-01 RX ADMIN — Medication 1000 UNITS: at 08:24

## 2022-05-01 RX ADMIN — METOPROLOL TARTRATE 25 MG: 25 TABLET, FILM COATED ORAL at 20:34

## 2022-05-01 RX ADMIN — METOPROLOL TARTRATE 25 MG: 25 TABLET, FILM COATED ORAL at 16:10

## 2022-05-01 RX ADMIN — GABAPENTIN 100 MG: 100 CAPSULE ORAL at 20:34

## 2022-05-01 RX ADMIN — WARFARIN 7.5 MG: 7.5 TABLET ORAL at 17:01

## 2022-05-01 RX ADMIN — PANTOPRAZOLE SODIUM 40 MG: 40 TABLET, DELAYED RELEASE ORAL at 06:36

## 2022-05-01 RX ADMIN — PHENAZOPYRIDINE HYDROCHLORIDE 100 MG: 100 TABLET ORAL at 17:01

## 2022-05-01 RX ADMIN — HEPARIN SODIUM 9000 UNITS: 5000 INJECTION INTRAVENOUS; SUBCUTANEOUS at 08:24

## 2022-05-01 RX ADMIN — DRONABINOL 5 MG: 2.5 CAPSULE ORAL at 08:24

## 2022-05-01 NOTE — PROGRESS NOTES
REASON FOR FOLLOWUP/CHIEF COMPLAINT:   Recurrent PE   Anticoagulation recommendation    HISTORY OF PRESENT ILLNESS:   Continues to experience nausea which is chronic and unchanged.  She is on Compazine for the same.  No other complaints at this time    Past Medical History, Past Surgical History, Social History, Family History have been reviewed and are without significant changes except as mentioned.    Review of Systems   Review of Systems   Constitutional: Negative.    HENT: Negative.    Respiratory: Negative.    Cardiovascular: Negative.    Gastrointestinal: Positive for nausea.   Genitourinary: Negative.    Musculoskeletal: Negative.    Skin: Negative.    Neurological: Positive for dizziness.   Hematological: Negative.    Psychiatric/Behavioral: Negative.        Medications:  The current medication list was reviewed in the EMR    ALLERGIES:    Allergies   Allergen Reactions   • Eggs Or Egg-Derived Products GI Intolerance     Rash & vomiting   • Pepcid [Famotidine] Rash and GI Intolerance   • Scopolamine Rash and GI Intolerance              Vitals:    04/30/22 1100 04/30/22 1500 04/30/22 1945 04/30/22 2246   BP: 111/69 112/56 120/66    BP Location: Right arm Right arm     Patient Position: Lying Lying Lying    Pulse: 94 97     Resp: 16 16 17 16   Temp: 98.2 °F (36.8 °C) 98.4 °F (36.9 °C) 97.9 °F (36.6 °C) 97.8 °F (36.6 °C)   TempSrc: Oral Oral Oral Oral   SpO2: 97% 96%     Weight:       Height:         Physical Exam    CONSTITUTIONAL:  Vital signs reviewed.  No distress, looks comfortable.  EYES:  Conjunctivae and lids unremarkable.  PERRLA  EARS,NOSE,MOUTH,THROAT:  Ears and nose appear unremarkable.  Lips, teeth, gums appear unremarkable.  RESPIRATORY:  Normal respiratory effort.  Lungs clear to auscultation bilaterally.  CARDIOVASCULAR:  Normal S1, S2.  No murmurs rubs or gallops.  No significant lower extremity edema.  GASTROINTESTINAL: Abdomen appears unremarkable.  Nontender.  No hepatomegaly.  No  splenomegaly.  NEURO: cranial nerves 2-12 grossly intact.  No focal deficits.  Appears to have equal strength all 4 extremities.  MUSCULOSKELETAL:  Unremarkable digits/nails.  No cyanosis or clubbing.  SKIN:  Warm.  No rashes.  PSYCHIATRIC:  Normal judgment and insight.  Normal mood and affect.       RECENT LABS:  WBC   Date Value Ref Range Status   05/01/2022 11.15 (H) 3.40 - 10.80 10*3/mm3 Final   04/30/2022 8.44 3.40 - 10.80 10*3/mm3 Final   04/30/2022 8.12 3.40 - 10.80 10*3/mm3 Final     Hemoglobin   Date Value Ref Range Status   05/01/2022 9.9 (L) 12.0 - 15.9 g/dL Final   04/30/2022 9.0 (L) 12.0 - 15.9 g/dL Final   04/30/2022 10.0 (L) 12.0 - 15.9 g/dL Final     Platelets   Date Value Ref Range Status   05/01/2022 318 140 - 450 10*3/mm3 Final   04/30/2022 299 140 - 450 10*3/mm3 Final   04/30/2022 328 140 - 450 10*3/mm3 Final       ASSESSMENT/PLAN:  Liliam Lorenz E558/1     *Recurrent PE while on Eliquis  · She does have several comorbidities including irritable bowel syndrome, EDS, autoimmune gastritis, gastroparesis  · She remains sedentary due to the POTS syndrome  · Previous unprovoked PE in September 2021  · Has been on Eliquis since  · She has missed only 1 dose of Eliquis  ·  hypercoagulability work-up pending  · Continue heparin for now  · Since she has failed DOAC's, would have to be treated with Coumadin in the future  · Hypercoagulability work-up will be useful in determining the INR, will need a higher INR goal of 2.5-3.5 if APS testing positive  · She does have a history of gastroparesis which may make the absorption of any of the medications somewhat iffy.  · Regardless Coumadin might be a better choice since we are at least able to monitor the INR to make sure that she is therapeutic.  · Pharmacy consulted for Coumadin management     *Anemia  · Microcytic  · Hemoglobin has been low over the past 6 to 8 months  · Obtain anemia work-up  · Most likely secondary to iron deficiency as patient reports  significant menorrhagia.  · She has been told before that she needs iron infusions.  · If work-up consistent with iron deficiency we will schedule her for IV iron here  · Vitamin B12 low at less than 150, administered a dose of B12 today  · Iron studies consistent with iron deficiency with a low ferritin of 6.76  · Given that she has gastroparesis we will proceed with IV iron     *POTS syndrome-reports being symptomatic with chronic dizziness when she stands     *Autoimmune gastritis  · Also has gastroparesis  · Treated with IVIG  · Sees Dr. Lani Escobar at the [facility] and Dr. Joya     *EDS     *Obesity-this is a risk factor for PE     *B12 deficiency  · B12 low at less than 150  · Administer B12 parenteral        Plan  · Start Coumadin  · Continue heparin till INR is therapeutic  · Follow-up on hypercoagulable work-up  · If APS testing is positive then INR goal would be 2.5-3.5 if not it would be 2-3  · She sees Dr. Adonis Eddy at [facility].  Follow-up with her hematologist on discharge    We will sign off at this time.  Please contact with any additional questions or concerns.

## 2022-05-01 NOTE — PLAN OF CARE
Goal Outcome Evaluation:  Plan of Care Reviewed With: patient, spouse        Progress: improving  Outcome Evaluation: Patient alert, oriented x 4; VSS. Heparin drip being held for 1 hour due to critical PTT. PRN zofran given for nausea. Starting warfarin tonight. Will continue to monitor.

## 2022-05-01 NOTE — PROGRESS NOTES
[facility] Clinical Pharmacy Services: Warfarin Dosing/Monitoring Consult    Liliam Lorenz is a 27 y.o. female, estimated creatinine clearance is 166.4 mL/min (by C-G formula based on SCr of 0.63 mg/dL). weighing 111 kg (244 lb 11.4 oz).    Results from last 7 days   Lab Units 05/01/22  0644 04/30/22  1405 04/30/22  0936 04/30/22  0502 04/30/22  0311 04/30/22  0024   INR  1.00  --   --   --  0.98 0.96   APTT seconds 55.8* 72.4* 101.7*  --  29.8 33.4   HEMOGLOBIN g/dL 9.9*  --   --  9.0*  --  10.0*   HEMATOCRIT % 33.2*  --   --  29.5*  --  31.5*   PLATELETS 10*3/mm3 318  --   --  299  --  328     Prior to admission anticoagulation: [facility] Anticoagulation:  Consulting provider: Dr. Medina  Start date: 5/1/22  Indication: recurrent PE-failed eliquis; (hypercoag workup per hematology)    Target INR: 2 - 3 initially-may need higher goal depending on lab results  Expected duration: lifetime   Bridge Therapy: Yes  with unfractionated heparin    Potential food or drug interactions:    Dronabinol-moderate-may increase INR and risk of bleeding  Levothyroxine-moderate-may increase INR/bleeding risk  Pantoprazole-moderate-may increase INR    Education complete?/Date: No; plan for follow up TBD    Assessment/Plan:  Dose: 7.5 mg daily  Monitor for any signs or symptoms of bleeding  Follow up daily INRs and dose adjustments    Pharmacy will continue to follow until discharge or discontinuation of warfarin.     Wil Cason [facility]  Clinical Pharmacist

## 2022-05-01 NOTE — PLAN OF CARE
Goal Outcome Evaluation:  Plan of Care Reviewed With: patient        Progress: improving  Outcome Evaluation: VSS; Hep gtt remains at 11.3u/kg/hr; PTT to be rechecked in the AM; PRN compazine given x1; Will conitnue to monitor

## 2022-05-02 LAB
ANION GAP SERPL CALCULATED.3IONS-SCNC: 11.9 MMOL/L (ref 5–15)
APTT PPP: 107.9 SECONDS (ref 22.7–35.4)
APTT PPP: 50.8 SECONDS (ref 22.7–35.4)
APTT PPP: 94.1 SECONDS (ref 22.7–35.4)
BUN SERPL-MCNC: 5 MG/DL (ref 6–20)
BUN/CREAT SERPL: 7.7 (ref 7–25)
CALCIUM SPEC-SCNC: 8.9 MG/DL (ref 8.6–10.5)
CHLORIDE SERPL-SCNC: 105 MMOL/L (ref 98–107)
CO2 SERPL-SCNC: 21.1 MMOL/L (ref 22–29)
CREAT SERPL-MCNC: 0.65 MG/DL (ref 0.57–1)
DEPRECATED RDW RBC AUTO: 42 FL (ref 37–54)
EGFRCR SERPLBLD CKD-EPI 2021: 123.9 ML/MIN/1.73
ERYTHROCYTE [DISTWIDTH] IN BLOOD BY AUTOMATED COUNT: 16.8 % (ref 12.3–15.4)
F5 GENE MUT ANL BLD/T: NORMAL
FACTOR II, DNA ANALYSIS: NORMAL
GLUCOSE SERPL-MCNC: 98 MG/DL (ref 65–99)
HCT VFR BLD AUTO: 30.5 % (ref 34–46.6)
HGB BLD-MCNC: 9.6 G/DL (ref 12–15.9)
INR PPP: 1.01 (ref 0.9–1.1)
LYMPHOCYTES # BLD MANUAL: 2.17 10*3/MM3 (ref 0.7–3.1)
LYMPHOCYTES NFR BLD MANUAL: 5.1 % (ref 5–12)
MCH RBC QN AUTO: 22.6 PG (ref 26.6–33)
MCHC RBC AUTO-ENTMCNC: 31.5 G/DL (ref 31.5–35.7)
MCV RBC AUTO: 71.9 FL (ref 79–97)
MONOCYTES # BLD: 0.39 10*3/MM3 (ref 0.1–0.9)
NEUTROPHILS # BLD AUTO: 5.02 10*3/MM3 (ref 1.7–7)
NEUTROPHILS NFR BLD MANUAL: 66.3 % (ref 42.7–76)
PLAT MORPH BLD: NORMAL
PLATELET # BLD AUTO: 340 10*3/MM3 (ref 140–450)
PMV BLD AUTO: 10 FL (ref 6–12)
POTASSIUM SERPL-SCNC: 4.1 MMOL/L (ref 3.5–5.2)
PROTHROMBIN TIME: 13.2 SECONDS (ref 11.7–14.2)
RBC # BLD AUTO: 4.24 10*6/MM3 (ref 3.77–5.28)
RBC MORPH BLD: NORMAL
SODIUM SERPL-SCNC: 138 MMOL/L (ref 136–145)
VARIANT LYMPHS NFR BLD MANUAL: 28.6 % (ref 19.6–45.3)
WBC MORPH BLD: NORMAL
WBC NRBC COR # BLD: 7.57 10*3/MM3 (ref 3.4–10.8)

## 2022-05-02 PROCEDURE — 85730 THROMBOPLASTIN TIME PARTIAL: CPT | Performed by: INTERNAL MEDICINE

## 2022-05-02 PROCEDURE — 85610 PROTHROMBIN TIME: CPT | Performed by: INTERNAL MEDICINE

## 2022-05-02 PROCEDURE — 25010000002 ONDANSETRON PER 1 MG: Performed by: HOSPITALIST

## 2022-05-02 PROCEDURE — 80048 BASIC METABOLIC PNL TOTAL CA: CPT | Performed by: HOSPITALIST

## 2022-05-02 PROCEDURE — 85613 RUSSELL VIPER VENOM DILUTED: CPT | Performed by: INTERNAL MEDICINE

## 2022-05-02 PROCEDURE — 85025 COMPLETE CBC W/AUTO DIFF WBC: CPT | Performed by: EMERGENCY MEDICINE

## 2022-05-02 PROCEDURE — 85732 THROMBOPLASTIN TIME PARTIAL: CPT | Performed by: INTERNAL MEDICINE

## 2022-05-02 PROCEDURE — 63710000001 DRONABINOL PER 2.5 MG: Performed by: HOSPITALIST

## 2022-05-02 PROCEDURE — 85007 BL SMEAR W/DIFF WBC COUNT: CPT | Performed by: EMERGENCY MEDICINE

## 2022-05-02 PROCEDURE — 85670 THROMBIN TIME PLASMA: CPT | Performed by: INTERNAL MEDICINE

## 2022-05-02 PROCEDURE — 85705 THROMBOPLASTIN INHIBITION: CPT | Performed by: INTERNAL MEDICINE

## 2022-05-02 PROCEDURE — 85730 THROMBOPLASTIN TIME PARTIAL: CPT | Performed by: HOSPITALIST

## 2022-05-02 PROCEDURE — G0378 HOSPITAL OBSERVATION PER HR: HCPCS

## 2022-05-02 PROCEDURE — 25010000002 HEPARIN (PORCINE) PER 1000 UNITS: Performed by: EMERGENCY MEDICINE

## 2022-05-02 RX ORDER — WARFARIN SODIUM 10 MG/1
10 TABLET ORAL
Status: COMPLETED | OUTPATIENT
Start: 2022-05-02 | End: 2022-05-02

## 2022-05-02 RX ADMIN — METOPROLOL TARTRATE 25 MG: 25 TABLET, FILM COATED ORAL at 20:17

## 2022-05-02 RX ADMIN — DRONABINOL 5 MG: 2.5 CAPSULE ORAL at 16:56

## 2022-05-02 RX ADMIN — ONDANSETRON 4 MG: 2 INJECTION INTRAMUSCULAR; INTRAVENOUS at 18:21

## 2022-05-02 RX ADMIN — Medication 10 ML: at 08:24

## 2022-05-02 RX ADMIN — LEVOTHYROXINE SODIUM 50 MCG: 0.05 TABLET ORAL at 06:08

## 2022-05-02 RX ADMIN — GABAPENTIN 100 MG: 100 CAPSULE ORAL at 16:56

## 2022-05-02 RX ADMIN — Medication 1000 UNITS: at 08:23

## 2022-05-02 RX ADMIN — MIDODRINE HYDROCHLORIDE 10 MG: 5 TABLET ORAL at 06:07

## 2022-05-02 RX ADMIN — METOPROLOL TARTRATE 25 MG: 25 TABLET, FILM COATED ORAL at 08:23

## 2022-05-02 RX ADMIN — GABAPENTIN 100 MG: 100 CAPSULE ORAL at 20:17

## 2022-05-02 RX ADMIN — MIDODRINE HYDROCHLORIDE 10 MG: 5 TABLET ORAL at 11:38

## 2022-05-02 RX ADMIN — HYDROXYZINE PAMOATE 50 MG: 50 CAPSULE ORAL at 21:30

## 2022-05-02 RX ADMIN — PYRIDOSTIGMINE BROMIDE 30 MG: 60 TABLET ORAL at 08:23

## 2022-05-02 RX ADMIN — DRONABINOL 5 MG: 2.5 CAPSULE ORAL at 20:18

## 2022-05-02 RX ADMIN — METOPROLOL TARTRATE 25 MG: 25 TABLET, FILM COATED ORAL at 16:56

## 2022-05-02 RX ADMIN — GABAPENTIN 100 MG: 100 CAPSULE ORAL at 08:24

## 2022-05-02 RX ADMIN — HEPARIN SODIUM 13.3 UNITS/KG/HR: 5000 INJECTION INTRAVENOUS; SUBCUTANEOUS at 08:28

## 2022-05-02 RX ADMIN — HEPARIN SODIUM 13.3 UNITS/KG/HR: 5000 INJECTION INTRAVENOUS; SUBCUTANEOUS at 14:44

## 2022-05-02 RX ADMIN — MIDODRINE HYDROCHLORIDE 10 MG: 5 TABLET ORAL at 16:56

## 2022-05-02 RX ADMIN — WARFARIN 10 MG: 10 TABLET ORAL at 16:56

## 2022-05-02 RX ADMIN — PHENAZOPYRIDINE HYDROCHLORIDE 100 MG: 100 TABLET ORAL at 16:56

## 2022-05-02 RX ADMIN — MONTELUKAST SODIUM 10 MG: 10 TABLET, FILM COATED ORAL at 20:17

## 2022-05-02 RX ADMIN — DRONABINOL 5 MG: 2.5 CAPSULE ORAL at 08:24

## 2022-05-02 RX ADMIN — PANTOPRAZOLE SODIUM 40 MG: 40 TABLET, DELAYED RELEASE ORAL at 06:08

## 2022-05-02 RX ADMIN — PHENAZOPYRIDINE HYDROCHLORIDE 100 MG: 100 TABLET ORAL at 11:38

## 2022-05-02 RX ADMIN — ONDANSETRON 4 MG: 2 INJECTION INTRAMUSCULAR; INTRAVENOUS at 06:08

## 2022-05-02 RX ADMIN — PHENAZOPYRIDINE HYDROCHLORIDE 100 MG: 100 TABLET ORAL at 08:24

## 2022-05-02 RX ADMIN — HEPARIN SODIUM 11.3 UNITS/KG/HR: 5000 INJECTION INTRAVENOUS; SUBCUTANEOUS at 16:55

## 2022-05-02 RX ADMIN — HEPARIN SODIUM 9.3 UNITS/KG/HR: 5000 INJECTION INTRAVENOUS; SUBCUTANEOUS at 01:03

## 2022-05-02 NOTE — PLAN OF CARE
Problem: Adult Inpatient Plan of Care  Goal: Plan of Care Review  5/2/2022 0538 by Manasa Beltran RN  Outcome: Ongoing, Progressing  Flowsheets (Taken 5/2/2022 0538)  Progress: --  Outcome Evaluation: pt resting on and off w eyes closed, heparin drip adjusted per protocol.  medicated for co nausea.  see mar.  will cont to monitor   Goal Outcome Evaluation:  Plan of Care Reviewed With: patient        Progress: improving

## 2022-05-02 NOTE — PROGRESS NOTES
[facility] Clinical Pharmacy Services: Warfarin Dosing/Monitoring Consult    Liliam Lorenz is a 27 y.o. female, estimated creatinine clearance is 161.3 mL/min (by C-G formula based on SCr of 0.65 mg/dL). weighing 111 kg (244 lb 11.4 oz).    Results from last 7 days   Lab Units 05/02/22  0654 05/01/22  2303 05/01/22  1449 05/01/22  0644 04/30/22  1405 04/30/22  0936 04/30/22  0502 04/30/22  0311 04/30/22  0024   INR  1.01  --   --  1.00  --   --   --  0.98 0.96   APTT seconds 50.8* 179.6* 154.0* 55.8* 72.4*   < >  --  29.8 33.4   HEMOGLOBIN g/dL 9.6*  --   --  9.9*  --   --  9.0*  --  10.0*   HEMATOCRIT % 30.5*  --   --  33.2*  --   --  29.5*  --  31.5*   PLATELETS 10*3/mm3 340  --   --  318  --   --  299  --  328    < > = values in this interval not displayed.     Prior to admission anticoagulation: [facility] Anticoagulation:  Consulting provider: Dr. Medina  Start date: 5/1/22  Indication: recurrent PE-failed eliquis; (hypercoag workup per hematology)    Target INR: 2 - 3 initially-may need higher goal depending on lab results  Expected duration: lifetime   Bridge Therapy: Yes  with unfractionated heparin    Potential food or drug interactions:    Dronabinol-moderate-may increase INR and risk of bleeding  Levothyroxine-moderate-may increase INR/bleeding risk  Pantoprazole-moderate-may increase INR    Education complete?/Date: No; plan for follow up TBD    Assessment/Plan:  Day 2 of new start warfarin. INR 1.01. Expect to see INR trending up on around day 3-5. Will give 10 mg today.   Monitor for any signs or symptoms of bleeding  Follow up daily INRs and dose adjustments    Pharmacy will continue to follow until discharge or discontinuation of warfarin.     Kumar Tracy [facility]  Clinical Pharmacist

## 2022-05-02 NOTE — PROGRESS NOTES
"Daily progress note    Chief complaint  Doing better  No new complaints  Family at bedside   Denies chest pain shortness of breath palpitation    History of present illness  27-year-old white female with very complex past medical history including pulmonary embolism on Eliquis chronic orthostatic hypotension POTS syndrome hypothyroidism Phoebe-Danlos syndrome presented to [facility] emergency room with shortness of breath and she checked her oxygen saturation which was low and also feel palpitations and not feeling well.  Patient did miss her 1 dose of Eliquis.  Patient work-up in ER revealed recurrent pulm embolism despite on therapeutic dose of Eliquis admit for management.     REVIEW OF SYSTEMS  Unremarkable     PHYSICAL EXAM   Blood pressure 100/70, pulse 101, temperature 97.9 °F (36.6 °C), temperature source Oral, resp. rate 18, height 165.1 cm (65\"), weight 111 kg (244 lb 11.4 oz), SpO2 94 %, not currently breastfeeding.    GENERAL:  Awake and alert in no respiratory distress  HENT: nares patent  EYES: no scleral icterus  CV: regular rhythm, tachycardia  RESPIRATORY: normal effort, clear to auscultation bilaterally  ABDOMEN: soft, nontender nondistended bowel sounds positive  MUSCULOSKELETAL: no deformity  NEURO: alert, moves all extremities, follows commands  SKIN: warm, dry     LAB RESULTS  Lab Results (last 24 hours)     Procedure Component Value Units Date/Time    Lupus Anticoagulant [[id number]] Collected: 05/02/22 1038    Specimen: Blood Updated: 05/02/22 1220    Manual Differential [[id number]]  (Normal) Collected: 05/02/22 0654    Specimen: Blood Updated: 05/02/22 0822     Neutrophil % 66.3 %      Lymphocyte % 28.6 %      Monocyte % 5.1 %      Neutrophils Absolute 5.02 10*3/mm3      Lymphocytes Absolute 2.17 10*3/mm3      Monocytes Absolute 0.39 10*3/mm3      RBC Morphology Normal     WBC Morphology Normal     Platelet Morphology Normal    CBC & Differential [[id number]]  (Abnormal) Collected: " 05/02/22 0654    Specimen: Blood Updated: 05/02/22 0822    Narrative:      The following orders were created for panel order CBC & Differential.  Procedure                               Abnormality         Status                     ---------                               -----------         ------                     CBC Auto Differential[[id number]]        Abnormal            Final result                 Please view results for these tests on the individual orders.    CBC Auto Differential [[id number]]  (Abnormal) Collected: 05/02/22 0654    Specimen: Blood Updated: 05/02/22 0822     WBC 7.57 10*3/mm3      RBC 4.24 10*6/mm3      Hemoglobin 9.6 g/dL      Hematocrit 30.5 %      MCV 71.9 fL      MCH 22.6 pg      MCHC 31.5 g/dL      RDW 16.8 %      RDW-SD 42.0 fl      MPV 10.0 fL      Platelets 340 10*3/mm3     Factor II, DNA Analysis [[id number]]  (Normal) Collected: 04/30/22 1405    Specimen: Blood Updated: 05/02/22 0819     Factor II, DNA Analysis Normal    Narrative:      A point mutation ([id number]) in the factor II (prothrombin) gene is the second most common cause of inherited thrombophilia.  The Factor II or Prothrombin mutation refers to the G to A transition at nucleotide in the untranslated region of the gene and is associated with increased plasma levels of prothrombin.    The incidence of this mutation in the [place]  population is about 2% and in the  population it is approximately 0.5%. This mutation is rare in the  and  population. Being heterozygous for a prothrombin mutation increases the risk for developing venous thrombosis about 2 to 3 times above the general population risk. Being homozygous for the prothrombin gene mutation increases the relative risk for venous thrombosis further, although it is not yet known how much further the risk is increased. In women heterozygous for the prothrombin gene mutation, the use of estrogen containing oral contraceptives  increases the relative risk of venous thrombosis about 16 times and the risk of developing cerebral thrombosis is also significantly increased. In pregnancy, the prothrombin gene mutation increases risk for venous thrombosis and may increase risk for stillbirth, placental abruption, pre-eclampsia and fetal growth restriction.     The expression of Factor II thrombophilia is impacted by coexisting genetic thrombophilic disorders, acquired thrombophilic disorders (eg, malignancy, hyperhomocysteinemia, high factor VIII levels), and circumstances including: pregnancy, oral contraceptive use, hormone replacement therapy, selective estrogen receptor modulators, travel, central venous catheters, surgery, and organ transplantation.    In order to derive the most meaningful benefit from this testing, it may be necessary that the results and subsequent options from these complex genetic tests be discussed with patients by a trained genetic professional.    Factor 5 Leiden [[id number]]  (Normal) Collected: 04/30/22 1405    Specimen: Blood Updated: 05/02/22 0818     Factor V Leiden Normal    Narrative:      Factor V Leiden is a specific mutation (R506Q) in the factor V gene that is associated with an increased risk of venous thrombosis.  Factor V Leiden is more resistant to inactivation by activated protein C.  Factor V Leiden refers to the G to A transition at nucleotide position 1691 of the Factor V gene, resulting in the substitution of the amino acid arginine by glutamine in the Factor V protein, causing resistance to cleavage by Activated Protein C (APC).    As a result, factor V persists in the circulation leading to a mild hyper-coagulable state.  The Leiden mutation accounts for 90% - 95% of APC resistance.  Factor V Leiden has been reported in patients with deep vein thrombosis, pulmonary embolus, central retinal vein occlusion, cerebral sinus thrombosis and hepatic vein thrombosis.  Other risk factors to be considered  in the workup for venous thrombosis include the [id number] mutation in the factor II (prothrombin) gene, protein S and C deficiency, and antithrombin deficiencies. Anticardiolipin antibody and lupus anticoagulant analysis may be appropriate for certain patients, as well as homocysteine levels.    The expression of Factor V Leiden thrombophilia is impacted by coexisting genetic thrombophilic disorders, acquired thrombophilic disorders (malignancy, hyperhomocysteinemia, high factor VIII levels), and circumstances including: pregnancy, oral contraceptive use, hormone replacement therapy, selective estrogen receptor modulators, travel, central venous catheters, surgery, transplantation and advanced age.    In order to derive the most meaningful benefit from this testing, it may be necessary that the results and subsequent options from these complex genetic tests be discussed with patients by a trained genetic professional.    aPTT [[id number]]  (Abnormal) Collected: 05/02/22 0654    Specimen: Blood Updated: 05/02/22 0740     PTT 50.8 seconds     Protime-INR [[id number]]  (Normal) Collected: 05/02/22 0654    Specimen: Blood Updated: 05/02/22 0739     Protime 13.2 Seconds      INR 1.01    Basic Metabolic Panel [[id number]]  (Abnormal) Collected: 05/02/22 0654    Specimen: Blood Updated: 05/02/22 0732     Glucose 98 mg/dL      BUN 5 mg/dL      Creatinine 0.65 mg/dL      Sodium 138 mmol/L      Potassium 4.1 mmol/L      Chloride 105 mmol/L      CO2 21.1 mmol/L      Calcium 8.9 mg/dL      BUN/Creatinine Ratio 7.7     Anion Gap 11.9 mmol/L      eGFR 123.9 mL/min/1.73      Comment: National Kidney Foundation and American Society of Nephrology (ASN) Task Force recommended calculation based on the Chronic Kidney Disease Epidemiology Collaboration (CKD-EPI) equation refit without adjustment for race.       Narrative:      GFR Normal >60  Chronic Kidney Disease <60  Kidney Failure <15      aPTT [[id number]]  (Abnormal) Collected: 05/01/22  2303    Specimen: Blood Updated: 05/01/22 2349     .6 seconds     aPTT [[id number]]  (Abnormal) Collected: 05/01/22 1449    Specimen: Blood from Arm, Right Updated: 05/01/22 1536     .0 seconds         Imaging Results (Last 24 Hours)     ** No results found for the last 24 hours. **        ECG 12 Lead       HEART RATE= 125  bpm  RR Interval= 480  ms  DE Interval= 141  ms  P Horizontal Axis= 35  deg  P Front Axis= 60  deg  QRSD Interval= 79  ms  QT Interval=   ms  QRS Axis= 47  deg  T Wave Axis=   deg  - ABNORMAL ECG -  Sinus tachycardia  Borderline Q waves in inferior leads  Nonspecific T abnrm, anterolateral leads             Current Facility-Administered Medications:   •  albuterol (PROVENTIL) nebulizer solution 0.083% 2.5 mg/3mL, 2.5 mg, Nebulization, Q6H PRN, Caleb Naik MD  •  cholecalciferol (VITAMIN D3) tablet 1,000 Units, 1,000 Units, Oral, Daily, Caleb Naik MD, 1,000 Units at 05/02/22 0823  •  cyanocobalamin injection 1,000 mcg, 1,000 mcg, Intramuscular, Q28 Days, Amanda Medina MD, 1,000 mcg at 05/01/22 1250  •  dronabinol (MARINOL) capsule 5 mg, 5 mg, Oral, TID, Caleb Naik MD, 5 mg at 05/02/22 0824  •  gabapentin (NEURONTIN) capsule 100 mg, 100 mg, Oral, TID, Caleb Naik MD, 100 mg at 05/02/22 0824  •  heparin (porcine) 5000 UNIT/ML injection 4,500-9,000 Units, 40-80 Units/kg (Order-Specific), Intravenous, Q6H PRN, Moe Guzman MD, 9,000 Units at 05/01/22 0824  •  heparin 44223 units/250 mL (100 units/mL) in 0.9% NaCl infusion, 13.3 Units/kg/hr (Order-Specific), Intravenous, Titrated, Moe Guzman MD, Last Rate: 15.02 mL/hr at 05/02/22 0828, 13.3 Units/kg/hr at 05/02/22 0828  •  hydrOXYzine pamoate (VISTARIL) capsule 50 mg, 50 mg, Oral, TID PRN, Caleb Naik MD  •  levothyroxine (SYNTHROID, LEVOTHROID) tablet 50 mcg, 50 mcg, Oral, Q AM, Caleb Naik MD, 50 mcg at 05/02/22 0608  •  metoprolol tartrate (LOPRESSOR) tablet 25 mg, 25 mg, Oral, TID, Caleb Naik,  MD, 25 mg at 05/02/22 0823  •  midodrine (PROAMATINE) tablet 10 mg, 10 mg, Oral, TID AC, Ann Naik MD, 10 mg at 05/02/22 1138  •  montelukast (SINGULAIR) tablet 10 mg, 10 mg, Oral, Nightly, Ann Naik MD, 10 mg at 05/01/22 2035  •  [DISCONTINUED] ondansetron (ZOFRAN) tablet 8 mg, 8 mg, Oral, Q8H PRN **OR** ondansetron (ZOFRAN) injection 4 mg, 4 mg, Intravenous, Q8H PRN, Ann Naik MD, 4 mg at 05/02/22 0608  •  pantoprazole (PROTONIX) EC tablet 40 mg, 40 mg, Oral, Q AM, Ann Naik MD, 40 mg at 05/02/22 0608  •  Pharmacy to dose warfarin, , Does not apply, Continuous PRN, Amanda Medina MD  •  phenazopyridine (PYRIDIUM) tablet 100 mg, 100 mg, Oral, TID With Meals, Ann Naik MD, 100 mg at 05/02/22 1138  •  prochlorperazine (COMPAZINE) tablet 10 mg, 10 mg, Oral, Q6H PRN, Ann Naik MD, 10 mg at 05/01/22 2203  •  pyridostigmine (MESTINON) tablet 30 mg, 30 mg, Oral, Daily, Ann Naik MD, 30 mg at 05/02/22 0823  •  [COMPLETED] Insert peripheral IV, , , Once **AND** sodium chloride 0.9 % flush 10 mL, 10 mL, Intravenous, PRN, Moe Guzman MD, 10 mL at 05/02/22 0824  •  Vortioxetine HBr 5 MG 5 mg, 5 mg, Oral, Daily, Ann Naik MD  •  warfarin (COUMADIN) tablet 10 mg, 10 mg, Oral, Once, Ann Naik MD     ASSESSMENT  Recurrent pulm embolism involving the right lower lobe with Eliquis.  Chronic orthostatic hypotension  POTS syndrome  Phoebe-Danlos syndrome  Hypothyroidism  Irritable bowel syndrome  Severe gastroparesis  Gastroesophageal reflux disease    PLAN  CPM  Heparin and bridge with Coumadin  Adjust home medications  Stress ulcer DVT prophylaxis  Pulmonary consult appreciated  Hematology follow-up with  Supportive care  Discussed with family nursing staff  Discharge home once INR more than 2.5    ANN NAIK MD

## 2022-05-02 NOTE — PLAN OF CARE
Problem: Adult Inpatient Plan of Care  Goal: Plan of Care Review  Outcome: Ongoing, Progressing  Flowsheets (Taken 5/2/2022 1854)  Progress: improving  Plan of Care Reviewed With: patient  Outcome Evaluation: vitals stable, hep gtt infusing per protocol, awaiting inr therapeutic, continue to monitor  Goal: Patient-Specific Goal (Individualized)  Outcome: Ongoing, Progressing  Goal: Absence of Hospital-Acquired Illness or Injury  Outcome: Ongoing, Progressing  Intervention: Identify and Manage Fall Risk  Recent Flowsheet Documentation  Taken 5/2/2022 1821 by Renea Howell RN  Safety Promotion/Fall Prevention:   safety round/check completed   room organization consistent   nonskid shoes/slippers when out of bed   lighting adjusted   fall prevention program maintained   clutter free environment maintained   assistive device/personal items within reach  Taken 5/2/2022 1655 by Renea Howell RN  Safety Promotion/Fall Prevention:   safety round/check completed   room organization consistent   nonskid shoes/slippers when out of bed   lighting adjusted   fall prevention program maintained   clutter free environment maintained   assistive device/personal items within reach  Taken 5/2/2022 1444 by Renea Howell RN  Safety Promotion/Fall Prevention:   safety round/check completed   room organization consistent   nonskid shoes/slippers when out of bed   lighting adjusted   fall prevention program maintained   clutter free environment maintained   assistive device/personal items within reach  Taken 5/2/2022 1200 by Renea Howell RN  Safety Promotion/Fall Prevention:   safety round/check completed   room organization consistent   nonskid shoes/slippers when out of bed   lighting adjusted   fall prevention program maintained   clutter free environment maintained   assistive device/personal items within reach  Taken 5/2/2022 1025 by Renea Howell RN  Safety Promotion/Fall Prevention:   safety round/check  completed   room organization consistent   nonskid shoes/slippers when out of bed   lighting adjusted   fall prevention program maintained   clutter free environment maintained   assistive device/personal items within reach  Taken 5/2/2022 0823 by Renea Howell RN  Safety Promotion/Fall Prevention:   safety round/check completed   room organization consistent   nonskid shoes/slippers when out of bed   lighting adjusted   fall prevention program maintained   clutter free environment maintained   assistive device/personal items within reach  Intervention: Prevent Skin Injury  Recent Flowsheet Documentation  Taken 5/2/2022 0823 by Renea Howell RN  Body Position: position changed independently  Intervention: Prevent and Manage VTE (Venous Thromboembolism) Risk  Recent Flowsheet Documentation  Taken 5/2/2022 0823 by Renea Howell RN  Activity Management: up ad michael  VTE Prevention/Management: other (see comments)  Goal: Optimal Comfort and Wellbeing  Outcome: Ongoing, Progressing  Intervention: Provide Person-Centered Care  Recent Flowsheet Documentation  Taken 5/2/2022 0823 by Renea Howell RN  Trust Relationship/Rapport:   care explained   reassurance provided   thoughts/feelings acknowledged  Goal: Readiness for Transition of Care  Outcome: Ongoing, Progressing   Goal Outcome Evaluation:  Plan of Care Reviewed With: patient        Progress: improving  Outcome Evaluation: vitals stable, hep gtt infusing per protocol, awaiting inr therapeutic, continue to monitor

## 2022-05-02 NOTE — CASE MANAGEMENT/SOCIAL WORK
Discharge Planning Assessment  [facility]     Patient Name: Liliam Lorenz  MRN: [id number]  Today's Date: 5/2/2022    Admit Date: 4/29/2022     Discharge Needs Assessment     Row Name 05/02/22 1110       Living Environment    People in Home spouse    Current Living Arrangements home    Primary Care Provided by self    Provides Primary Care For no one    Family Caregiver if Needed spouse    Quality of Family Relationships supportive       Transition Planning    Patient/Family Anticipates Transition to home with family    Patient/Family Anticipated Services at Transition none    Transportation Anticipated family or friend will provide       Discharge Needs Assessment    Equipment Currently Used at Home wheelchair;walker, rolling;cane, straight               Discharge Plan     Row Name 05/02/22 1111       Plan    Plan Home with spouse    Patient/Family in Agreement with Plan yes    Plan Comments Met with pt at bedside. Introduced self, explained CCP role, facesheet verified. Pt states she lives with spouse and uses walker, wheelchair, cane, and IV pole.  Pt gets daily fluid infusions and IVIG weekly, uses [facility].  Pt to start warfarin and will need frequent PT/INR checks at discharge.  Call placed to pt's PCP to determine wether they can check PT/INR at office.  Await return call.  No further needs identified at this time.  CCP continues to follow.  EDWIN Sethi RN              Continued Care and Services - Admitted Since 4/29/2022    Coordination has not been started for this encounter.       Expected Discharge Date and Time     Expected Discharge Date Expected Discharge Time    May 4, 2022          Demographic Summary     Row Name 05/02/22 1110       General Information    Admission Type inpatient    Arrived From home    Referral Source admission list    Reason for Consult discharge planning    Preferred Language English       Contact Information    Permission Granted to Share Info With family/designee                Functional Status    No documentation.                Psychosocial    No documentation.                Abuse/Neglect    No documentation.                Legal    No documentation.                Substance Abuse    No documentation.                Patient Forms    No documentation.                   Dayanara Sethi RN

## 2022-05-02 NOTE — CASE MANAGEMENT/SOCIAL WORK
Continued Stay Note  [facility]     Patient Name: Liliam Lorenz  MRN: [id number]  Today's Date: 5/2/2022    Admit Date: 4/29/2022     Discharge Plan     Row Name 05/02/22 1333       Plan    Plan Comments Received call from Sheyla/pts PCP office who states they can check PT/INR in office.  Need to clarify wether it will need to be dosed by oncologist or PCP.  Once pt discharnged, need to call Sheyla [phone] and arrange time for pt to have PT/INR checked.  EDWIN Sethi RN    Row Name 05/02/22 1111       Plan                              Discharge Codes    No documentation.               Expected Discharge Date and Time     Expected Discharge Date Expected Discharge Time    May 4, 2022             Dayanara Sethi, RN

## 2022-05-03 LAB
APTT PPP: 62.2 SECONDS (ref 22.7–35.4)
APTT PPP: 67.7 SECONDS (ref 22.7–35.4)
AT III PPP CHRO-ACNC: 88 % (ref 90–134)
B2 GLYCOPROT1 IGA SER-ACNC: <9 GPI IGA UNITS (ref 0–25)
B2 GLYCOPROT1 IGG SER-ACNC: <9 GPI IGG UNITS (ref 0–20)
B2 GLYCOPROT1 IGM SER-ACNC: <9 GPI IGM UNITS (ref 0–32)
BASOPHILS # BLD AUTO: 0.07 10*3/MM3 (ref 0–0.2)
BASOPHILS NFR BLD AUTO: 0.9 % (ref 0–1.5)
CARDIOLIPIN IGG SER IA-ACNC: <9 GPL U/ML (ref 0–14)
CARDIOLIPIN IGM SER IA-ACNC: 10 MPL U/ML (ref 0–12)
DEPRECATED RDW RBC AUTO: 42.2 FL (ref 37–54)
EOSINOPHIL # BLD AUTO: 0.13 10*3/MM3 (ref 0–0.4)
EOSINOPHIL NFR BLD AUTO: 1.7 % (ref 0.3–6.2)
ERYTHROCYTE [DISTWIDTH] IN BLOOD BY AUTOMATED COUNT: 16.9 % (ref 12.3–15.4)
HCT VFR BLD AUTO: 30.1 % (ref 34–46.6)
HGB BLD-MCNC: 9.5 G/DL (ref 12–15.9)
INR PPP: 1.14 (ref 0.9–1.1)
LYMPHOCYTES # BLD AUTO: 2.85 10*3/MM3 (ref 0.7–3.1)
LYMPHOCYTES NFR BLD AUTO: 36.3 % (ref 19.6–45.3)
MCH RBC QN AUTO: 22.4 PG (ref 26.6–33)
MCHC RBC AUTO-ENTMCNC: 31.6 G/DL (ref 31.5–35.7)
MCV RBC AUTO: 70.8 FL (ref 79–97)
MONOCYTES # BLD AUTO: 0.55 10*3/MM3 (ref 0.1–0.9)
MONOCYTES NFR BLD AUTO: 7 % (ref 5–12)
NEUTROPHILS NFR BLD AUTO: 4.12 10*3/MM3 (ref 1.7–7)
NEUTROPHILS NFR BLD AUTO: 52.3 % (ref 42.7–76)
PLATELET # BLD AUTO: 307 10*3/MM3 (ref 140–450)
PMV BLD AUTO: 10.5 FL (ref 6–12)
PROT S ACT/NOR PPP: 122 % (ref 70–127)
PROT S FREE PPP-ACNC: 145 % (ref 49–138)
PROTHROMBIN TIME: 14.5 SECONDS (ref 11.7–14.2)
RBC # BLD AUTO: 4.25 10*6/MM3 (ref 3.77–5.28)
WBC NRBC COR # BLD: 7.86 10*3/MM3 (ref 3.4–10.8)

## 2022-05-03 PROCEDURE — 85730 THROMBOPLASTIN TIME PARTIAL: CPT | Performed by: EMERGENCY MEDICINE

## 2022-05-03 PROCEDURE — 36415 COLL VENOUS BLD VENIPUNCTURE: CPT | Performed by: EMERGENCY MEDICINE

## 2022-05-03 PROCEDURE — 63710000001 DRONABINOL PER 2.5 MG: Performed by: HOSPITALIST

## 2022-05-03 PROCEDURE — 85025 COMPLETE CBC W/AUTO DIFF WBC: CPT | Performed by: EMERGENCY MEDICINE

## 2022-05-03 PROCEDURE — G0378 HOSPITAL OBSERVATION PER HR: HCPCS

## 2022-05-03 PROCEDURE — 25010000002 ONDANSETRON PER 1 MG: Performed by: HOSPITALIST

## 2022-05-03 PROCEDURE — 85610 PROTHROMBIN TIME: CPT | Performed by: INTERNAL MEDICINE

## 2022-05-03 PROCEDURE — 25010000002 HEPARIN (PORCINE) PER 1000 UNITS: Performed by: EMERGENCY MEDICINE

## 2022-05-03 PROCEDURE — 63710000001 PROCHLORPERAZINE MALEATE PER 10 MG: Performed by: HOSPITALIST

## 2022-05-03 RX ORDER — WARFARIN SODIUM 10 MG/1
10 TABLET ORAL
Status: COMPLETED | OUTPATIENT
Start: 2022-05-03 | End: 2022-05-03

## 2022-05-03 RX ADMIN — WARFARIN 10 MG: 10 TABLET ORAL at 17:48

## 2022-05-03 RX ADMIN — METOPROLOL TARTRATE 25 MG: 25 TABLET, FILM COATED ORAL at 08:54

## 2022-05-03 RX ADMIN — GABAPENTIN 100 MG: 100 CAPSULE ORAL at 17:48

## 2022-05-03 RX ADMIN — HEPARIN SODIUM 11.3 UNITS/KG/HR: 5000 INJECTION INTRAVENOUS; SUBCUTANEOUS at 14:46

## 2022-05-03 RX ADMIN — ONDANSETRON 4 MG: 2 INJECTION INTRAMUSCULAR; INTRAVENOUS at 09:20

## 2022-05-03 RX ADMIN — PROCHLORPERAZINE MALEATE 10 MG: 10 TABLET ORAL at 15:22

## 2022-05-03 RX ADMIN — ONDANSETRON 4 MG: 2 INJECTION INTRAMUSCULAR; INTRAVENOUS at 17:47

## 2022-05-03 RX ADMIN — LEVOTHYROXINE SODIUM 50 MCG: 0.05 TABLET ORAL at 06:30

## 2022-05-03 RX ADMIN — MIDODRINE HYDROCHLORIDE 10 MG: 5 TABLET ORAL at 06:30

## 2022-05-03 RX ADMIN — PROCHLORPERAZINE MALEATE 10 MG: 10 TABLET ORAL at 21:38

## 2022-05-03 RX ADMIN — MONTELUKAST SODIUM 10 MG: 10 TABLET, FILM COATED ORAL at 21:38

## 2022-05-03 RX ADMIN — GABAPENTIN 100 MG: 100 CAPSULE ORAL at 08:54

## 2022-05-03 RX ADMIN — METOPROLOL TARTRATE 25 MG: 25 TABLET, FILM COATED ORAL at 17:48

## 2022-05-03 RX ADMIN — MIDODRINE HYDROCHLORIDE 10 MG: 5 TABLET ORAL at 17:47

## 2022-05-03 RX ADMIN — PYRIDOSTIGMINE BROMIDE 30 MG: 60 TABLET ORAL at 08:54

## 2022-05-03 RX ADMIN — METOPROLOL TARTRATE 25 MG: 25 TABLET, FILM COATED ORAL at 21:38

## 2022-05-03 RX ADMIN — GABAPENTIN 100 MG: 100 CAPSULE ORAL at 21:38

## 2022-05-03 RX ADMIN — Medication 1000 UNITS: at 08:54

## 2022-05-03 RX ADMIN — PHENAZOPYRIDINE HYDROCHLORIDE 100 MG: 100 TABLET ORAL at 11:50

## 2022-05-03 RX ADMIN — PANTOPRAZOLE SODIUM 40 MG: 40 TABLET, DELAYED RELEASE ORAL at 06:30

## 2022-05-03 RX ADMIN — PHENAZOPYRIDINE HYDROCHLORIDE 100 MG: 100 TABLET ORAL at 17:47

## 2022-05-03 RX ADMIN — DRONABINOL 5 MG: 2.5 CAPSULE ORAL at 17:47

## 2022-05-03 RX ADMIN — PHENAZOPYRIDINE HYDROCHLORIDE 100 MG: 100 TABLET ORAL at 08:54

## 2022-05-03 RX ADMIN — MIDODRINE HYDROCHLORIDE 10 MG: 5 TABLET ORAL at 11:50

## 2022-05-03 RX ADMIN — DRONABINOL 5 MG: 2.5 CAPSULE ORAL at 21:38

## 2022-05-03 RX ADMIN — DRONABINOL 5 MG: 2.5 CAPSULE ORAL at 11:50

## 2022-05-03 NOTE — PLAN OF CARE
Goal Outcome Evaluation:      VSS, A&O, RA, NSR patient with c/o nausea a couple times this shift and was treated with prn nausea medication. Patient up ad michael in room with wife. Patient without c/o pain. Patient remains on Heparin gtt until INR is within range (2-3).

## 2022-05-03 NOTE — PROGRESS NOTES
"Daily progress note    Chief complaint  Doing better  No new complaints  Family at bedside   Denies chest pain shortness of breath palpitation    History of present illness  27-year-old white female with very complex past medical history including pulmonary embolism on Eliquis chronic orthostatic hypotension POTS syndrome hypothyroidism Phoebe-Danlos syndrome presented to [facility], [place], operated by [facility] emergency room with shortness of breath and she checked her oxygen saturation which was low and also feel palpitations and not feeling well.  Patient did miss her 1 dose of Eliquis.  Patient work-up in ER revealed recurrent pulm embolism despite on therapeutic dose of Eliquis admit for management.     REVIEW OF SYSTEMS  Unremarkable     PHYSICAL EXAM   Blood pressure 107/68, pulse 82, temperature 97.9 °F (36.6 °C), temperature source Oral, resp. rate 18, height 165.1 cm (65\"), weight 111 kg (244 lb 11.4 oz), SpO2 93 %, not currently breastfeeding.    GENERAL:  Awake and alert in no respiratory distress  HENT: nares patent  EYES: no scleral icterus  CV: regular rhythm, tachycardia  RESPIRATORY: normal effort, clear to auscultation bilaterally  ABDOMEN: soft, nontender nondistended bowel sounds positive  MUSCULOSKELETAL: no deformity  NEURO: alert, moves all extremities, follows commands  SKIN: warm, dry     LAB RESULTS  Lab Results (last 24 hours)     Procedure Component Value Units Date/Time    Protein S Antigen, Free [[id number]]  (Abnormal) Collected: 04/30/22 1514    Specimen: Blood Updated: 05/03/22 1137     Protein S Ag, Free 145.0 %     Narrative:      Deficiency of Protein S is associated with a high risk of developing venous thromboembolism, especially in young adults.  Acquired deficiencies of Protein S are associated with pregnancy, hepatic disorder, oral anticoagulant therapy, disseminated intravascular coagulation (DIC), newborns, and other clinical conditions.      Protein S Functional [[id number]]  (Normal) " Collected: 04/30/22 1427    Specimen: Blood Updated: 05/03/22 1130     Protein S Functional 122 %     Narrative:      Lupus anticoagulants and/or anti-phospholipid antibodies (APA) have been noted to interfere in the assay. Highly elevated levels of Factor VIII (greater than 250%) may lead to an under-estimation of the functional protein S level. Thrombin inhibitors and heparin may lead to an over-estimation of the functional protein S level.    Antithrombin III [[id number]]  (Abnormal) Collected: 04/30/22 1405    Specimen: Blood Updated: 05/03/22 0959     Antithrombin Activity 88 %     Protime-INR [[id number]]  (Abnormal) Collected: 05/03/22 0541    Specimen: Blood Updated: 05/03/22 0703     Protime 14.5 Seconds      INR 1.14    aPTT [[id number]]  (Abnormal) Collected: 05/03/22 0541    Specimen: Blood Updated: 05/03/22 0703     PTT 67.7 seconds     CBC & Differential [[id number]]  (Abnormal) Collected: 05/03/22 0541    Specimen: Blood Updated: 05/03/22 0702    Narrative:      The following orders were created for panel order CBC & Differential.  Procedure                               Abnormality         Status                     ---------                               -----------         ------                     CBC Auto Differential[[id number]]        Abnormal            Final result                 Please view results for these tests on the individual orders.    CBC Auto Differential [[id number]]  (Abnormal) Collected: 05/03/22 0541    Specimen: Blood Updated: 05/03/22 0702     WBC 7.86 10*3/mm3      RBC 4.25 10*6/mm3      Hemoglobin 9.5 g/dL      Hematocrit 30.1 %      MCV 70.8 fL      MCH 22.4 pg      MCHC 31.6 g/dL      RDW 16.9 %      RDW-SD 42.2 fl      MPV 10.5 fL      Platelets 307 10*3/mm3      Neutrophil % 52.3 %      Lymphocyte % 36.3 %      Monocyte % 7.0 %      Eosinophil % 1.7 %      Basophil % 0.9 %      Neutrophils, Absolute 4.12 10*3/mm3      Lymphocytes, Absolute 2.85 10*3/mm3      Monocytes,  Absolute 0.55 10*3/mm3      Eosinophils, Absolute 0.13 10*3/mm3      Basophils, Absolute 0.07 10*3/mm3     aPTT [[id number]]  (Abnormal) Collected: 05/02/22 2256    Specimen: Blood Updated: 05/02/22 2324     PTT 94.1 seconds     aPTT [[id number]]  (Abnormal) Collected: 05/02/22 1535    Specimen: Blood Updated: 05/02/22 1635     .9 seconds         Imaging Results (Last 24 Hours)     ** No results found for the last 24 hours. **        ECG 12 Lead       HEART RATE= 125  bpm  RR Interval= 480  ms  MN Interval= 141  ms  P Horizontal Axis= 35  deg  P Front Axis= 60  deg  QRSD Interval= 79  ms  QT Interval=   ms  QRS Axis= 47  deg  T Wave Axis=   deg  - ABNORMAL ECG -  Sinus tachycardia  Borderline Q waves in inferior leads  Nonspecific T abnrm, anterolateral leads             Current Facility-Administered Medications:   •  albuterol (PROVENTIL) nebulizer solution 0.083% 2.5 mg/3mL, 2.5 mg, Nebulization, Q6H PRN, Caleb Naik MD  •  cholecalciferol (VITAMIN D3) tablet 1,000 Units, 1,000 Units, Oral, Daily, Caleb Naik MD, 1,000 Units at 05/03/22 0854  •  cyanocobalamin injection 1,000 mcg, 1,000 mcg, Intramuscular, Q28 Days, Amanda Medina MD, 1,000 mcg at 05/01/22 1250  •  dronabinol (MARINOL) capsule 5 mg, 5 mg, Oral, TID, aCleb Naik MD, 5 mg at 05/03/22 1150  •  gabapentin (NEURONTIN) capsule 100 mg, 100 mg, Oral, TID, Caleb Naik MD, 100 mg at 05/03/22 0854  •  heparin (porcine) 5000 UNIT/ML injection 4,500-9,000 Units, 40-80 Units/kg (Order-Specific), Intravenous, Q6H PRN, Moe Guzman MD, 9,000 Units at 05/01/22 0824  •  heparin 67881 units/250 mL (100 units/mL) in 0.9% NaCl infusion, 13.3 Units/kg/hr (Order-Specific), Intravenous, Titrated, Moe Guzman MD, Last Rate: 12.76 mL/hr at 05/03/22 0631, 11.3 Units/kg/hr at 05/03/22 0631  •  hydrOXYzine pamoate (VISTARIL) capsule 50 mg, 50 mg, Oral, TID PRN, aCleb Naik MD, 50 mg at 05/02/22 2130  •  levothyroxine (SYNTHROID,  LEVOTHROID) tablet 50 mcg, 50 mcg, Oral, Q AM, Ann Naik MD, 50 mcg at 05/03/22 0630  •  metoprolol tartrate (LOPRESSOR) tablet 25 mg, 25 mg, Oral, TID, Ann Naik MD, 25 mg at 05/03/22 0854  •  midodrine (PROAMATINE) tablet 10 mg, 10 mg, Oral, TID AC, Ann Naik MD, 10 mg at 05/03/22 1150  •  montelukast (SINGULAIR) tablet 10 mg, 10 mg, Oral, Nightly, Ann Naik MD, 10 mg at 05/02/22 2017  •  [DISCONTINUED] ondansetron (ZOFRAN) tablet 8 mg, 8 mg, Oral, Q8H PRN **OR** ondansetron (ZOFRAN) injection 4 mg, 4 mg, Intravenous, Q8H PRN, Ann Naik MD, 4 mg at 05/03/22 0920  •  pantoprazole (PROTONIX) EC tablet 40 mg, 40 mg, Oral, Q AM, Ann Naik MD, 40 mg at 05/03/22 0630  •  Pharmacy to dose warfarin, , Does not apply, Continuous PRN, Amanda Medina MD  •  phenazopyridine (PYRIDIUM) tablet 100 mg, 100 mg, Oral, TID With Meals, Ann Naik MD, 100 mg at 05/03/22 1150  •  prochlorperazine (COMPAZINE) tablet 10 mg, 10 mg, Oral, Q6H PRN, Ann Naik MD, 10 mg at 05/01/22 2203  •  pyridostigmine (MESTINON) tablet 30 mg, 30 mg, Oral, Daily, Ann Naik MD, 30 mg at 05/03/22 0854  •  [COMPLETED] Insert peripheral IV, , , Once **AND** sodium chloride 0.9 % flush 10 mL, 10 mL, Intravenous, PRN, Moe Guzman MD, 10 mL at 05/02/22 0824  •  Vortioxetine HBr 5 MG 5 mg, 5 mg, Oral, Daily, Ann Naik MD  •  warfarin (COUMADIN) tablet 10 mg, 10 mg, Oral, Once, Amanda Medina MD     ASSESSMENT  Recurrent pulm embolism involving the right lower lobe with Eliquis.  Chronic orthostatic hypotension  POTS syndrome  Phoebe-Danlos syndrome  Hypothyroidism  Irritable bowel syndrome  Severe gastroparesis  Gastroesophageal reflux disease    PLAN  CPM  Heparin and bridge with Coumadin  Adjust home medications  Stress ulcer DVT prophylaxis  Pulmonary consult appreciated  Hematology follow-up with  Supportive care  Discussed with family nursing staff  Discharge home once INR more than 2.5    ANN NAIK  MD

## 2022-05-03 NOTE — PLAN OF CARE
Problem: Adult Inpatient Plan of Care  Goal: Plan of Care Review  Outcome: Ongoing, Progressing  Flowsheets  Taken 5/3/2022 0427 by Manasa Beltran, RN  Plan of Care Reviewed With: patient  Outcome Evaluation: RESTINGon and off w eyes closed, no distress noted. heparin drip theraputic, check ptt in am.  will cont to monitor  Taken 5/2/2022 1854 by Renea Howell RN  Progress: improving   Goal Outcome Evaluation:  Plan of Care Reviewed With: patient           Outcome Evaluation: RESTINGon and off w eyes closed, no distress noted. heparin drip theraputic, check ptt in am.  will cont to monitor

## 2022-05-03 NOTE — PROGRESS NOTES
[facility] Clinical Pharmacy Services: Warfarin Dosing/Monitoring Consult    Liliam Lorenz is a 27 y.o. female, estimated creatinine clearance is 161.3 mL/min (by C-G formula based on SCr of 0.65 mg/dL). weighing 111 kg (244 lb 11.4 oz).    Results from last 7 days   Lab Units 05/03/22  0541 05/02/22  2256 05/02/22  1535 05/02/22  0654 05/01/22  2303 05/01/22  1449 05/01/22  0644 04/30/22  0936 04/30/22  0502 04/30/22  0311 04/30/22  0024   INR  1.14*  --   --  1.01  --   --  1.00  --   --  0.98 0.96   APTT seconds 67.7* 94.1* 107.9* 50.8* 179.6*   < > 55.8*   < >  --  29.8 33.4   HEMOGLOBIN g/dL 9.5*  --   --  9.6*  --   --  9.9*  --  9.0*  --  10.0*   HEMATOCRIT % 30.1*  --   --  30.5*  --   --  33.2*  --  29.5*  --  31.5*   PLATELETS 10*3/mm3 307  --   --  340  --   --  318  --  299  --  328    < > = values in this interval not displayed.     Prior to admission anticoagulation: [facility] Anticoagulation:  Consulting provider: Dr. Medina  Start date: 5/1/22  Indication: recurrent PE-failed eliquis; (hypercoag workup per hematology)    Target INR: 2 - 3 initially-may need higher goal depending on lab results  Expected duration: lifetime   Bridge Therapy: Yes  with unfractionated heparin    Potential food or drug interactions:    Dronabinol-moderate-may increase INR and risk of bleeding  Levothyroxine-moderate-may increase INR/bleeding risk  Pantoprazole-moderate-may increase INR  Vortioxetine-moderate-may increase bleed risk d/t mild antiplatelet effects of SSRIs    Education complete?/Date: No; plan for follow up TBD    Assessment/Plan:  Day 3 of new start warfarin. INR has trended upwards slightly today. Anticipate further increase tomorrow as it is expected to see INR begin to trend up around days 3-5 for patients newly starting warfarin. Increase in INR for any given day is also usually a reflection of the 2-days-prior warfarin dose (in this case, the 7.5 mg dose), therefore, we may not be  seeing the full effects of yesterday's 10 mg dose yet.   Will continue same dose today with plans for further increase tomorrow if INR does not begin to trend upwards significantly.    Continue warfarin 10 mg PO x1 dose again today.   Monitor for any signs or symptoms of bleeding.  Follow up daily INRs and dose adjustments. INR ordered daily with AM labs while inpatient.  Recommend continuing bridge therapy with heparin IV until INR is within goal range for at least 24 hours.    Pharmacy will continue to follow until discharge or discontinuation of warfarin.     Luis Lew, PharmD  Clinical Pharmacist

## 2022-05-04 LAB
APTT PPP: 101.9 SECONDS (ref 22.7–35.4)
APTT PPP: 131.9 SECONDS (ref 22.7–35.4)
APTT PPP: 47 SECONDS (ref 22.7–35.4)
APTT SCREEN TO CONFIRM RATIO: 1.03 RATIO (ref 0–1.34)
BASOPHILS # BLD AUTO: 0.07 10*3/MM3 (ref 0–0.2)
BASOPHILS NFR BLD AUTO: 0.8 % (ref 0–1.5)
CONFIRM APTT/NORMAL: 38 SEC (ref 0–47.6)
DEPRECATED RDW RBC AUTO: 45 FL (ref 37–54)
EOSINOPHIL # BLD AUTO: 0.15 10*3/MM3 (ref 0–0.4)
EOSINOPHIL NFR BLD AUTO: 1.7 % (ref 0.3–6.2)
ERYTHROCYTE [DISTWIDTH] IN BLOOD BY AUTOMATED COUNT: 17.4 % (ref 12.3–15.4)
GLUCOSE BLDC GLUCOMTR-MCNC: 92 MG/DL (ref 70–130)
HCT VFR BLD AUTO: 31.4 % (ref 34–46.6)
HGB BLD-MCNC: 9.4 G/DL (ref 12–15.9)
INR PPP: 1.41 (ref 0.9–1.1)
LA 2 SCREEN W REFLEX-IMP: ABNORMAL
LYMPHOCYTES # BLD AUTO: 3.35 10*3/MM3 (ref 0.7–3.1)
LYMPHOCYTES NFR BLD AUTO: 36.9 % (ref 19.6–45.3)
MCH RBC QN AUTO: 22.2 PG (ref 26.6–33)
MCHC RBC AUTO-ENTMCNC: 29.9 G/DL (ref 31.5–35.7)
MCV RBC AUTO: 74.1 FL (ref 79–97)
MONOCYTES # BLD AUTO: 0.57 10*3/MM3 (ref 0.1–0.9)
MONOCYTES NFR BLD AUTO: 6.3 % (ref 5–12)
NEUTROPHILS NFR BLD AUTO: 4.79 10*3/MM3 (ref 1.7–7)
NEUTROPHILS NFR BLD AUTO: 52.5 % (ref 42.7–76)
PLATELET # BLD AUTO: 359 10*3/MM3 (ref 140–450)
PMV BLD AUTO: 10.2 FL (ref 6–12)
PROTHROMBIN TIME: 17.2 SECONDS (ref 11.7–14.2)
RBC # BLD AUTO: 4.24 10*6/MM3 (ref 3.77–5.28)
SCREEN APTT: 47.7 SEC (ref 0–51.9)
SCREEN DRVVT: 36.4 SEC (ref 0–47)
THROMBIN TIME: 48.4 SEC (ref 0–23)
TT IMM NP PPP: 37.5 SEC (ref 0–23)
TT P HPASE PPP: 21.7 SEC (ref 0–23)
WBC NRBC COR # BLD: 9.09 10*3/MM3 (ref 3.4–10.8)

## 2022-05-04 PROCEDURE — 25010000002 ONDANSETRON PER 1 MG: Performed by: HOSPITALIST

## 2022-05-04 PROCEDURE — 63710000001 PROCHLORPERAZINE MALEATE PER 10 MG: Performed by: HOSPITALIST

## 2022-05-04 PROCEDURE — 82962 GLUCOSE BLOOD TEST: CPT

## 2022-05-04 PROCEDURE — 85730 THROMBOPLASTIN TIME PARTIAL: CPT | Performed by: HOSPITALIST

## 2022-05-04 PROCEDURE — G0378 HOSPITAL OBSERVATION PER HR: HCPCS

## 2022-05-04 PROCEDURE — 85025 COMPLETE CBC W/AUTO DIFF WBC: CPT | Performed by: EMERGENCY MEDICINE

## 2022-05-04 PROCEDURE — 85730 THROMBOPLASTIN TIME PARTIAL: CPT | Performed by: EMERGENCY MEDICINE

## 2022-05-04 PROCEDURE — 85610 PROTHROMBIN TIME: CPT | Performed by: INTERNAL MEDICINE

## 2022-05-04 PROCEDURE — 25010000002 HEPARIN (PORCINE) PER 1000 UNITS: Performed by: EMERGENCY MEDICINE

## 2022-05-04 PROCEDURE — 63710000001 DRONABINOL PER 2.5 MG: Performed by: HOSPITALIST

## 2022-05-04 RX ORDER — WARFARIN SODIUM 6 MG/1
6 TABLET ORAL
Status: DISCONTINUED | OUTPATIENT
Start: 2022-05-04 | End: 2022-05-06

## 2022-05-04 RX ADMIN — DRONABINOL 5 MG: 2.5 CAPSULE ORAL at 21:24

## 2022-05-04 RX ADMIN — DRONABINOL 5 MG: 2.5 CAPSULE ORAL at 08:58

## 2022-05-04 RX ADMIN — PHENAZOPYRIDINE HYDROCHLORIDE 100 MG: 100 TABLET ORAL at 13:38

## 2022-05-04 RX ADMIN — MIDODRINE HYDROCHLORIDE 10 MG: 5 TABLET ORAL at 17:38

## 2022-05-04 RX ADMIN — MONTELUKAST SODIUM 10 MG: 10 TABLET, FILM COATED ORAL at 21:24

## 2022-05-04 RX ADMIN — GABAPENTIN 100 MG: 100 CAPSULE ORAL at 15:53

## 2022-05-04 RX ADMIN — DRONABINOL 5 MG: 2.5 CAPSULE ORAL at 15:53

## 2022-05-04 RX ADMIN — METOPROLOL TARTRATE 25 MG: 25 TABLET, FILM COATED ORAL at 08:58

## 2022-05-04 RX ADMIN — GABAPENTIN 100 MG: 100 CAPSULE ORAL at 08:57

## 2022-05-04 RX ADMIN — HEPARIN SODIUM 8880 UNITS: 5000 INJECTION INTRAVENOUS; SUBCUTANEOUS at 17:38

## 2022-05-04 RX ADMIN — PHENAZOPYRIDINE HYDROCHLORIDE 100 MG: 100 TABLET ORAL at 17:38

## 2022-05-04 RX ADMIN — WARFARIN 6 MG: 6 TABLET ORAL at 17:38

## 2022-05-04 RX ADMIN — MIDODRINE HYDROCHLORIDE 10 MG: 5 TABLET ORAL at 13:37

## 2022-05-04 RX ADMIN — MIDODRINE HYDROCHLORIDE 10 MG: 5 TABLET ORAL at 06:13

## 2022-05-04 RX ADMIN — PROCHLORPERAZINE MALEATE 10 MG: 10 TABLET ORAL at 15:53

## 2022-05-04 RX ADMIN — PHENAZOPYRIDINE HYDROCHLORIDE 100 MG: 100 TABLET ORAL at 08:58

## 2022-05-04 RX ADMIN — PYRIDOSTIGMINE BROMIDE 30 MG: 60 TABLET ORAL at 08:58

## 2022-05-04 RX ADMIN — ONDANSETRON 4 MG: 2 INJECTION INTRAMUSCULAR; INTRAVENOUS at 09:08

## 2022-05-04 RX ADMIN — METOPROLOL TARTRATE 25 MG: 25 TABLET, FILM COATED ORAL at 15:53

## 2022-05-04 RX ADMIN — GABAPENTIN 100 MG: 100 CAPSULE ORAL at 21:24

## 2022-05-04 RX ADMIN — HEPARIN SODIUM 11.3 UNITS/KG/HR: 5000 INJECTION INTRAVENOUS; SUBCUTANEOUS at 08:57

## 2022-05-04 RX ADMIN — PANTOPRAZOLE SODIUM 40 MG: 40 TABLET, DELAYED RELEASE ORAL at 06:13

## 2022-05-04 RX ADMIN — ONDANSETRON 4 MG: 2 INJECTION INTRAMUSCULAR; INTRAVENOUS at 17:38

## 2022-05-04 RX ADMIN — LEVOTHYROXINE SODIUM 50 MCG: 0.05 TABLET ORAL at 06:13

## 2022-05-04 RX ADMIN — Medication 1000 UNITS: at 08:58

## 2022-05-04 RX ADMIN — VORTIOXETINE 5 MG: 5 TABLET, FILM COATED ORAL at 09:08

## 2022-05-04 NOTE — PLAN OF CARE
Goal Outcome Evaluation:      VSS, A&O, NSR with intermittent ST, RA while awake and 2 LPM via NC while sleeping. Patient has had adjustments to Heparin gtt, Last aPTT was 47 at 1551. Patient received 80 U/KG bolus and hourly rate increased to 15.3. Patient next aPTT ordered for 2330.

## 2022-05-04 NOTE — PLAN OF CARE
Goal Outcome Evaluation:   VSS. A&OX4. No c/o pain or discomfort. Continues on heparin gtt. Lab having difficulty collecting ptt, delayed. Up ad michael. No c/o pain or discomfort. Compazine given x1. Can stop heparin gtt once INR therapeutic. Call light in reach.

## 2022-05-04 NOTE — PROGRESS NOTES
[facility] Clinical Pharmacy Services: Warfarin Dosing/Monitoring Consult    Liliam Lorenz is a 27 y.o. female, estimated creatinine clearance is 161.3 mL/min (by C-G formula based on SCr of 0.65 mg/dL). weighing 111 kg (244 lb 11.4 oz).    Results from last 7 days   Lab Units 05/04/22  0450 05/03/22  1659 05/03/22  0541 05/02/22  2256 05/02/22  1535 05/02/22  0654 05/01/22  1449 05/01/22  0644 04/30/22  0936 04/30/22  0502 04/30/22  0311   INR  1.41*  --  1.14*  --   --  1.01  --  1.00  --   --  0.98   APTT seconds 101.9* 62.2* 67.7* 94.1* 107.9* 50.8*   < > 55.8*   < >  --  29.8   HEMOGLOBIN g/dL 9.4*  --  9.5*  --   --  9.6*  --  9.9*  --  9.0*  --    HEMATOCRIT % 31.4*  --  30.1*  --   --  30.5*  --  33.2*  --  29.5*  --    PLATELETS 10*3/mm3 359  --  307  --   --  340  --  318  --  299  --     < > = values in this interval not displayed.     Prior to admission anticoagulation: [facility] Anticoagulation:  Consulting provider: Dr. Medina  Start date: 5/1/22  Indication: recurrent PE-failed eliquis; (hypercoag workup per hematology)    Target INR: 2 - 3 initially-may need higher goal depending on lab results  Expected duration: lifetime   Bridge Therapy: Yes  with unfractionated heparin    Potential food or drug interactions:    Dronabinol-moderate-may increase INR and risk of bleeding  Levothyroxine-moderate-may increase INR/bleeding risk  Pantoprazole-moderate-may increase INR  Vortioxetine-moderate-may increase bleed risk d/t mild antiplatelet effects of SSRIs    Education complete?/Date: No; plan for follow up TBD    Assessment/Plan:    INR remains subtherapeutic at 1.41 (Goal 2-3) but has trended up quite a bit over the last 24 hours. Today will be day 4 of new start warfarin therapy, which means that the effects of warfarin are now reliably being observed (typically takes 3-5 days). I will tentatively schedule a regimen of warfarin 6 mg daily for now.   Monitor for any signs or symptoms of  bleeding. CBC stable.  Follow up daily INRs and dose adjustments. INR ordered daily with AM labs while inpatient.  Recommend continuing bridge therapy with heparin IV until INR is within goal range for at least 24 hours.    Pharmacy will continue to follow until discharge or discontinuation of warfarin.     Michelle Connors, [facility]  Clinical Pharmacist

## 2022-05-04 NOTE — PROGRESS NOTES
"Daily progress note    Chief complaint  Doing same  No new complaints  Family at bedside   Denies chest pain shortness of breath palpitation    History of present illness  27-year-old white female with very complex past medical history including pulmonary embolism on Eliquis chronic orthostatic hypotension POTS syndrome hypothyroidism Phoebe-Danlos syndrome presented to [facility] emergency room with shortness of breath and she checked her oxygen saturation which was low and also feel palpitations and not feeling well.  Patient did miss her 1 dose of Eliquis.  Patient work-up in ER revealed recurrent pulm embolism despite on therapeutic dose of Eliquis admit for management.     REVIEW OF SYSTEMS  Unremarkable     PHYSICAL EXAM   Blood pressure 99/62, pulse 86, temperature 97.4 °F (36.3 °C), temperature source Oral, resp. rate 16, height 165.1 cm (65\"), weight 111 kg (244 lb 11.4 oz), SpO2 97 %, not currently breastfeeding.    GENERAL:  Awake and alert in no respiratory distress  HENT: nares patent  EYES: no scleral icterus  CV: regular rhythm, tachycardia  RESPIRATORY: normal effort, clear to auscultation bilaterally  ABDOMEN: soft, nontender nondistended bowel sounds positive  MUSCULOSKELETAL: no deformity  NEURO: alert, moves all extremities, follows commands  SKIN: warm, dry     LAB RESULTS  Lab Results (last 24 hours)     Procedure Component Value Units Date/Time    aPTT [[id number]]  (Abnormal) Collected: 05/04/22 1211    Specimen: Blood from Hand, Left Updated: 05/04/22 1311     .9 seconds     aPTT [[id number]]  (Abnormal) Collected: 05/04/22 0450    Specimen: Blood Updated: 05/04/22 0526     .9 seconds     Protime-INR [[id number]]  (Abnormal) Collected: 05/04/22 0450    Specimen: Blood Updated: 05/04/22 0525     Protime 17.2 Seconds      INR 1.41    CBC & Differential [[id number]]  (Abnormal) Collected: 05/04/22 0450    Specimen: Blood Updated: 05/04/22 0510    Narrative:      The " following orders were created for panel order CBC & Differential.  Procedure                               Abnormality         Status                     ---------                               -----------         ------                     CBC Auto Differential[[id number]]        Abnormal            Final result                 Please view results for these tests on the individual orders.    CBC Auto Differential [[id number]]  (Abnormal) Collected: 05/04/22 0450    Specimen: Blood Updated: 05/04/22 0510     WBC 9.09 10*3/mm3      RBC 4.24 10*6/mm3      Hemoglobin 9.4 g/dL      Hematocrit 31.4 %      MCV 74.1 fL      MCH 22.2 pg      MCHC 29.9 g/dL      RDW 17.4 %      RDW-SD 45.0 fl      MPV 10.2 fL      Platelets 359 10*3/mm3      Neutrophil % 52.5 %      Lymphocyte % 36.9 %      Monocyte % 6.3 %      Eosinophil % 1.7 %      Basophil % 0.8 %      Neutrophils, Absolute 4.79 10*3/mm3      Lymphocytes, Absolute 3.35 10*3/mm3      Monocytes, Absolute 0.57 10*3/mm3      Eosinophils, Absolute 0.15 10*3/mm3      Basophils, Absolute 0.07 10*3/mm3     aPTT [[id number]]  (Abnormal) Collected: 05/03/22 1659    Specimen: Blood from Hand, Right Updated: 05/03/22 1752     PTT 62.2 seconds     Beta-2 Glycoprotein Antibodies [[id number]] Collected: 04/30/22 1405    Specimen: Blood Updated: 05/03/22 1712     Beta-2 Glyco 1 IgG <9 GPI IgG units      Comment: The reference interval reflects a 3SD or 99th percentile interval,  which is thought to represent a potentially clinically significant  result in accordance with the International Consensus Statement on  the classification criteria for definitive antiphospholipid syndrome  (APS). J Thromb Haem 2006;4:295-306.        Beta-2 Glyco 1 IgA <9 GPI IgA units      Comment: The reference interval reflects a 3SD or 99th percentile interval,  which is thought to represent a potentially clinically significant  result in accordance with the International Consensus Statement on  the  classification criteria for definitive antiphospholipid syndrome  (APS). J Thromb Haem 2006;4:295-306.        Beta-2 Glyco 1 IgM <9 GPI IgM units      Comment: The reference interval reflects a 3SD or 99th percentile interval,  which is thought to represent a potentially clinically significant  result in accordance with the International Consensus Statement on  the classification criteria for definitive antiphospholipid syndrome  (APS). J Thromb Haem 2006;4:295-306.       Narrative:      Performed at:  [address]  [id number]  : Boaz Evans MD, Phone:  [phone]        Imaging Results (Last 24 Hours)     ** No results found for the last 24 hours. **        ECG 12 Lead       HEART RATE= 125  bpm  RR Interval= 480  ms  VA Interval= 141  ms  P Horizontal Axis= 35  deg  P Front Axis= 60  deg  QRSD Interval= 79  ms  QT Interval=   ms  QRS Axis= 47  deg  T Wave Axis=   deg  - ABNORMAL ECG -  Sinus tachycardia  Borderline Q waves in inferior leads  Nonspecific T abnrm, anterolateral leads             Current Facility-Administered Medications:   •  albuterol (PROVENTIL) nebulizer solution 0.083% 2.5 mg/3mL, 2.5 mg, Nebulization, Q6H PRN, Caleb Naik MD  •  cholecalciferol (VITAMIN D3) tablet 1,000 Units, 1,000 Units, Oral, Daily, Caleb Naik MD, 1,000 Units at 05/04/22 0858  •  cyanocobalamin injection 1,000 mcg, 1,000 mcg, Intramuscular, Q28 Days, Amanda Medina MD, 1,000 mcg at 05/01/22 1250  •  dronabinol (MARINOL) capsule 5 mg, 5 mg, Oral, TID, Caleb Naik MD, 5 mg at 05/04/22 1553  •  gabapentin (NEURONTIN) capsule 100 mg, 100 mg, Oral, TID, Caleb Naik MD, 100 mg at 05/04/22 1553  •  heparin (porcine) 5000 UNIT/ML injection 4,500-9,000 Units, 40-80 Units/kg (Order-Specific), Intravenous, Q6H PRN, Moe Guzman MD, 9,000 Units at 05/01/22 0824  •  heparin 62577 units/250 mL (100 units/mL) in 0.9% NaCl infusion, 13.3 Units/kg/hr  (Order-Specific), Intravenous, Titrated, Moe Guzman MD, Stopped at 05/04/22 1526  •  hydrOXYzine pamoate (VISTARIL) capsule 50 mg, 50 mg, Oral, TID PRN, Caleb Naik MD, 50 mg at 05/02/22 2130  •  levothyroxine (SYNTHROID, LEVOTHROID) tablet 50 mcg, 50 mcg, Oral, Q AM, Caleb Naik MD, 50 mcg at 05/04/22 0613  •  metoprolol tartrate (LOPRESSOR) tablet 25 mg, 25 mg, Oral, TID, Caleb Naik MD, 25 mg at 05/04/22 1553  •  midodrine (PROAMATINE) tablet 10 mg, 10 mg, Oral, TID AC, Caleb Naik MD, 10 mg at 05/04/22 1337  •  montelukast (SINGULAIR) tablet 10 mg, 10 mg, Oral, Nightly, Caleb Naik MD, 10 mg at 05/03/22 2138  •  [DISCONTINUED] ondansetron (ZOFRAN) tablet 8 mg, 8 mg, Oral, Q8H PRN **OR** ondansetron (ZOFRAN) injection 4 mg, 4 mg, Intravenous, Q8H PRN, Caleb Naik MD, 4 mg at 05/04/22 0908  •  pantoprazole (PROTONIX) EC tablet 40 mg, 40 mg, Oral, Q AM, Caleb Naik MD, 40 mg at 05/04/22 0613  •  Pharmacy to dose warfarin, , Does not apply, Continuous PRN, Amanda Medina MD  •  phenazopyridine (PYRIDIUM) tablet 100 mg, 100 mg, Oral, TID With Meals, Caleb Naik MD, 100 mg at 05/04/22 1338  •  prochlorperazine (COMPAZINE) tablet 10 mg, 10 mg, Oral, Q6H PRN, Caleb Naik MD, 10 mg at 05/04/22 1553  •  pyridostigmine (MESTINON) tablet 30 mg, 30 mg, Oral, Daily, Caleb Naik MD, 30 mg at 05/04/22 0858  •  [COMPLETED] Insert peripheral IV, , , Once **AND** sodium chloride 0.9 % flush 10 mL, 10 mL, Intravenous, PRN, Moe Guzman MD, 10 mL at 05/02/22 0824  •  Vortioxetine HBr 5 MG 5 mg, 5 mg, Oral, Daily, Caleb Naik MD, 5 mg at 05/04/22 0908  •  warfarin (COUMADIN) tablet 6 mg, 6 mg, Oral, Daily, Amanda Medina MD     ASSESSMENT  Recurrent pulm embolism involving the right lower lobe with Eliquis.  Chronic orthostatic hypotension  POTS syndrome  Phoebe-Danlos syndrome  Hypothyroidism  Irritable bowel syndrome  Severe gastroparesis  Gastroesophageal reflux  disease    PLAN  CPM  Heparin and bridge with Coumadin  Adjust home medications  Stress ulcer DVT prophylaxis  Pulmonary consult appreciated  Hematology follow-up with  Supportive care  Discussed with family nursing staff  Discharge home once INR more than 2.5    ANN GONZALEZ MD

## 2022-05-05 LAB
APTT PPP: 104.5 SECONDS (ref 22.7–35.4)
APTT PPP: 174.3 SECONDS (ref 22.7–35.4)
APTT PPP: >200 SECONDS (ref 22.7–35.4)
BASOPHILS # BLD AUTO: 0.05 10*3/MM3 (ref 0–0.2)
BASOPHILS NFR BLD AUTO: 0.7 % (ref 0–1.5)
DEPRECATED RDW RBC AUTO: 49 FL (ref 37–54)
EOSINOPHIL # BLD AUTO: 0.11 10*3/MM3 (ref 0–0.4)
EOSINOPHIL NFR BLD AUTO: 1.6 % (ref 0.3–6.2)
ERYTHROCYTE [DISTWIDTH] IN BLOOD BY AUTOMATED COUNT: 18.4 % (ref 12.3–15.4)
HCT VFR BLD AUTO: 36.3 % (ref 34–46.6)
HGB BLD-MCNC: 10.2 G/DL (ref 12–15.9)
IMM GRANULOCYTES # BLD AUTO: 0.08 10*3/MM3 (ref 0–0.05)
IMM GRANULOCYTES NFR BLD AUTO: 1.2 % (ref 0–0.5)
INR PPP: 1.68 (ref 0.9–1.1)
LYMPHOCYTES # BLD AUTO: 2.17 10*3/MM3 (ref 0.7–3.1)
LYMPHOCYTES NFR BLD AUTO: 32.2 % (ref 19.6–45.3)
MCH RBC QN AUTO: 22.5 PG (ref 26.6–33)
MCHC RBC AUTO-ENTMCNC: 28.1 G/DL (ref 31.5–35.7)
MCV RBC AUTO: 80 FL (ref 79–97)
MONOCYTES # BLD AUTO: 0.45 10*3/MM3 (ref 0.1–0.9)
MONOCYTES NFR BLD AUTO: 6.7 % (ref 5–12)
NEUTROPHILS NFR BLD AUTO: 3.87 10*3/MM3 (ref 1.7–7)
NEUTROPHILS NFR BLD AUTO: 57.6 % (ref 42.7–76)
NRBC BLD AUTO-RTO: 0.1 /100 WBC (ref 0–0.2)
PLATELET # BLD AUTO: 312 10*3/MM3 (ref 140–450)
PMV BLD AUTO: 10 FL (ref 6–12)
PROTHROMBIN TIME: 19.8 SECONDS (ref 11.7–14.2)
RBC # BLD AUTO: 4.54 10*6/MM3 (ref 3.77–5.28)
WBC NRBC COR # BLD: 6.73 10*3/MM3 (ref 3.4–10.8)

## 2022-05-05 PROCEDURE — 85730 THROMBOPLASTIN TIME PARTIAL: CPT | Performed by: HOSPITALIST

## 2022-05-05 PROCEDURE — 85025 COMPLETE CBC W/AUTO DIFF WBC: CPT | Performed by: EMERGENCY MEDICINE

## 2022-05-05 PROCEDURE — 63710000001 DRONABINOL PER 2.5 MG: Performed by: HOSPITALIST

## 2022-05-05 PROCEDURE — 25010000002 HEPARIN (PORCINE) PER 1000 UNITS: Performed by: EMERGENCY MEDICINE

## 2022-05-05 PROCEDURE — 63710000001 PROCHLORPERAZINE MALEATE PER 10 MG: Performed by: HOSPITALIST

## 2022-05-05 PROCEDURE — 25010000002 ONDANSETRON PER 1 MG: Performed by: HOSPITALIST

## 2022-05-05 PROCEDURE — 85610 PROTHROMBIN TIME: CPT | Performed by: INTERNAL MEDICINE

## 2022-05-05 RX ADMIN — METOPROLOL TARTRATE 25 MG: 25 TABLET, FILM COATED ORAL at 20:13

## 2022-05-05 RX ADMIN — DRONABINOL 5 MG: 2.5 CAPSULE ORAL at 08:49

## 2022-05-05 RX ADMIN — PANTOPRAZOLE SODIUM 40 MG: 40 TABLET, DELAYED RELEASE ORAL at 06:21

## 2022-05-05 RX ADMIN — MONTELUKAST SODIUM 10 MG: 10 TABLET, FILM COATED ORAL at 20:13

## 2022-05-05 RX ADMIN — METOPROLOL TARTRATE 25 MG: 25 TABLET, FILM COATED ORAL at 15:10

## 2022-05-05 RX ADMIN — ONDANSETRON 4 MG: 2 INJECTION INTRAMUSCULAR; INTRAVENOUS at 21:39

## 2022-05-05 RX ADMIN — VORTIOXETINE 5 MG: 5 TABLET, FILM COATED ORAL at 08:51

## 2022-05-05 RX ADMIN — PHENAZOPYRIDINE HYDROCHLORIDE 100 MG: 100 TABLET ORAL at 17:25

## 2022-05-05 RX ADMIN — MIDODRINE HYDROCHLORIDE 10 MG: 5 TABLET ORAL at 06:21

## 2022-05-05 RX ADMIN — Medication 1000 UNITS: at 08:49

## 2022-05-05 RX ADMIN — PYRIDOSTIGMINE BROMIDE 30 MG: 60 TABLET ORAL at 08:49

## 2022-05-05 RX ADMIN — LEVOTHYROXINE SODIUM 50 MCG: 0.05 TABLET ORAL at 06:21

## 2022-05-05 RX ADMIN — GABAPENTIN 100 MG: 100 CAPSULE ORAL at 20:13

## 2022-05-05 RX ADMIN — GABAPENTIN 100 MG: 100 CAPSULE ORAL at 15:10

## 2022-05-05 RX ADMIN — PROCHLORPERAZINE MALEATE 10 MG: 10 TABLET ORAL at 17:25

## 2022-05-05 RX ADMIN — MIDODRINE HYDROCHLORIDE 10 MG: 5 TABLET ORAL at 17:25

## 2022-05-05 RX ADMIN — WARFARIN 6 MG: 6 TABLET ORAL at 17:25

## 2022-05-05 RX ADMIN — DRONABINOL 5 MG: 2.5 CAPSULE ORAL at 20:13

## 2022-05-05 RX ADMIN — DRONABINOL 5 MG: 2.5 CAPSULE ORAL at 15:10

## 2022-05-05 RX ADMIN — HYDROXYZINE PAMOATE 50 MG: 50 CAPSULE ORAL at 21:38

## 2022-05-05 RX ADMIN — ONDANSETRON 4 MG: 2 INJECTION INTRAMUSCULAR; INTRAVENOUS at 12:48

## 2022-05-05 RX ADMIN — PHENAZOPYRIDINE HYDROCHLORIDE 100 MG: 100 TABLET ORAL at 08:49

## 2022-05-05 RX ADMIN — GABAPENTIN 100 MG: 100 CAPSULE ORAL at 08:49

## 2022-05-05 RX ADMIN — HEPARIN SODIUM 12.3 UNITS/KG/HR: 5000 INJECTION INTRAVENOUS; SUBCUTANEOUS at 07:38

## 2022-05-05 RX ADMIN — MIDODRINE HYDROCHLORIDE 10 MG: 5 TABLET ORAL at 10:59

## 2022-05-05 NOTE — PROGRESS NOTES
[facility] Clinical Pharmacy Services: Warfarin Dosing/Monitoring Consult    Liliam Lorenz is a 27 y.o. female, estimated creatinine clearance is 161.3 mL/min (by C-G formula based on SCr of 0.65 mg/dL). weighing 111 kg (244 lb 11.4 oz).    Results from last 7 days   Lab Units 05/05/22  1110 05/05/22  0924 05/05/22  0101 05/04/22  1551 05/04/22  1211 05/04/22  0450 05/03/22  1659 05/03/22  0541 05/02/22  1535 05/02/22  0654 05/01/22  1449 05/01/22  0644   INR  1.68*  --   --   --   --  1.41*  --  1.14*  --  1.01  --  1.00   APTT seconds 174.3*  --  >200.0* 47.0* 131.9* 101.9*   < > 67.7*   < > 50.8*   < > 55.8*   HEMOGLOBIN g/dL  --  10.2*  --   --   --  9.4*  --  9.5*  --  9.6*  --  9.9*   HEMATOCRIT %  --  36.3  --   --   --  31.4*  --  30.1*  --  30.5*  --  33.2*   PLATELETS 10*3/mm3  --  312  --   --   --  359  --  307  --  340  --  318    < > = values in this interval not displayed.     Prior to admission anticoagulation: [facility] Anticoagulation:  Consulting provider: Dr. Medina  Start date: 5/1/22  Indication: recurrent PE-failed eliquis; (hypercoag workup per hematology)    Target INR: 2 - 3 initially-may need higher goal depending on lab results-->updated to 2.5-3.5  Expected duration: lifetime   Bridge Therapy: Yes  with unfractionated heparin    Potential food or drug interactions:    • Dronabinol-moderate-may increase INR and risk of bleeding  • Levothyroxine-moderate-may increase INR/bleeding risk  • Pantoprazole-moderate-may increase INR  • Vortioxetine-moderate-may increase bleed risk d/t mild antiplatelet effects of SSRIs    Education complete?/Date: No; plan for follow up TBD    Assessment/Plan:    1. INR remains subtherapeutic at 1.68 but continues to trend up nicely. Warfarin 6 mg daily will be continued.   2. Monitor for any signs or symptoms of bleeding. CBC stable.  3. Follow up daily INRs and dose adjustments. INR ordered daily with AM labs while inpatient.  4. Recommend  continuing bridge therapy with heparin IV until INR is within goal range for at least 24 hours.    Pharmacy will continue to follow until discharge or discontinuation of warfarin.     Michelle Connors, [facility]  Clinical Pharmacist

## 2022-05-05 NOTE — PAYOR COMM NOTE
"INPATIENT AUTH REQUEST  OBSERVATION TO INPATIENT    ID #[id number]  P:  [phone]  F:  [phone]        Liliam Da Silva (27 y.o. Female)             Date of Birth   1995    Social Security Number       Address   [address] [place] [address]    Home Phone   [phone]    MRN   [id number]       [employer]   [employer]    Marital Status                               Admission Date   4/29/22    Admission Type   Emergency    Admitting Provider   Caleb Naik MD    Attending Provider   Caleb Naik MD    Department, Room/Bed   [address], E558/1       Discharge Date       Discharge Disposition       Discharge Destination                               Attending Provider: Caleb Naik MD    Allergies: Eggs Or Egg-derived Products, Pepcid [Famotidine], Scopolamine    Isolation: None   Infection: None   Code Status: CPR   Advance Care Planning Activity    Ht: 165.1 cm (65\")   Wt: 111 kg (244 lb 11.4 oz)    Admission Cmt: None   Principal Problem: None                Active Insurance as of 4/29/2022     Primary Coverage     Payor Plan Insurance Group Employer/Plan Group    [employer] BLUE [employer]OSS [employer] BLUE CROSS BLUE [employer]IELD PPO [id number]     Payor Plan Address Payor Plan Phone Number Payor Plan Fax Number Effective Dates    [address] [phone]  4/1/2019 - None Entered    [place] [address]       Subscriber Name Subscriber Birth Date Member ID       AZEB DA SILVA 1995 [id number]                 Emergency Contacts      (Rel.) Home Phone Work Phone Mobile Phone    AZEB DA SILVA (Spouse) [phone] -- [phone]               History & Physical      Caleb Naik MD at 04/30/22 0936          History and physical    Primary care physician  Dr. Jason Borrero    Chief complaint  Shortness of breath  Hypoxia  Palpitation  Not feeling well    History of present illness  27-year-old white female with very complex past medical history including pulmonary " embolism on Eliquis chronic orthostatic hypotension POTS syndrome hypothyroidism Phoebe-Danlos syndrome presented to [facility] emergency room with shortness of breath and she checked her oxygen saturation which was low and also feel palpitations and not feeling well.  Patient did miss her 1 dose of Eliquis.  Patient work-up in ER revealed recurrent pulm embolism despite on therapeutic dose of Eliquis admit for management.    PAST MEDICAL HISTORY  • Bronchitis     • Chest pain     • Chronic hypotension     • Eczema     • GERD (gastroesophageal reflux disease)     • IBS (irritable bowel syndrome)     • Lightheadedness     • Rectal fissure     • Rosacea     • Tachycardia        PAST SURGICAL HISTORY              Procedure Laterality Date   • COLONOSCOPY       • MEDIPORT INSERTION, DOUBLE   09/01/2020     [facility]    • UPPER GASTROINTESTINAL ENDOSCOPY       • VENOUS ACCESS DEVICE (PORT) INSERTION Left 10/16/2020     Procedure: INSERTION VENOUS ACCESS DEVICE UNDER FLURO;  Surgeon: Pa Lane MD;  [facility];  Service: General;  Laterality: Left;   • VENOUS ACCESS DEVICE (PORT) INSERTION Right 10/26/2021     Procedure: REMOVAL AND INSERTION OF VALVERDE CATHETER;  Surgeon: Pa Lane MD;  [facility];  Service: General;  Laterality: Right;         FAMILY HISTORY           Problem Relation Age of Onset   • Hypertension Mother     • Diabetes Mother     • Diabetes Father     • Heart disease Paternal Grandfather     • Stroke Paternal Grandfather     • Diabetes Paternal Grandfather     • Cancer Paternal Grandfather     • Coronary artery disease Maternal Grandmother           MGM with 4 vessel CABG and multiple stents   • Cancer Maternal Grandmother     • Coronary artery disease Maternal Uncle           Maternal uncle with MI   • Breast cancer Other     • Malig Hyperthermia Neg Hx        SOCIAL HISTORY                 Socioeconomic History   • Marital status:  "   Tobacco Use   • Smoking status: Never Smoker   • Smokeless tobacco: Never Used   Vaping Use   • Vaping Use: Never used   Substance and Sexual Activity   • Alcohol use: No   • Drug use: No   • Sexual activity: Yes       Partners: Female       Birth control/protection: None         ALLERGIES  Eggs or egg-derived products, Pepcid [famotidine], and Scopolamine  Home medications reviewed     REVIEW OF SYSTEMS  All systems reviewed and negative except for those discussed in HPI.      PHYSICAL EXAM   Blood pressure 111/69, pulse 94, temperature 98.2 °F (36.8 °C), temperature source Oral, resp. rate 16, height 165.1 cm (65\"), weight 111 kg (244 lb 11.4 oz), SpO2 97 %, not currently breastfeeding.    GENERAL:  Awake and alert in no respiratory distress  HENT: nares patent  EYES: no scleral icterus  CV: regular rhythm, tachycardia  RESPIRATORY: normal effort, clear to auscultation bilaterally  ABDOMEN: soft, nontender nondistended bowel sounds positive  MUSCULOSKELETAL: no deformity  NEURO: alert, moves all extremities, follows commands  SKIN: warm, dry     LAB RESULTS  Lab Results (last 24 hours)     Procedure Component Value Units Date/Time    aPTT [[id number]]  (Abnormal) Collected: 04/30/22 0936    Specimen: Blood Updated: 04/30/22 1030     .7 seconds     CBC & Differential [[id number]]  (Abnormal) Collected: 04/30/22 0502    Specimen: Blood Updated: 04/30/22 0517    Narrative:      The following orders were created for panel order CBC & Differential.  Procedure                               Abnormality         Status                     ---------                               -----------         ------                     CBC Auto Differential[[id number]]        Abnormal            Final result                 Please view results for these tests on the individual orders.    CBC Auto Differential [[id number]]  (Abnormal) Collected: 04/30/22 0502    Specimen: Blood Updated: 04/30/22 0517     WBC 8.44 10*3/mm3  "     RBC 4.04 10*6/mm3      Hemoglobin 9.0 g/dL      Hematocrit 29.5 %      MCV 73.0 fL      MCH 22.3 pg      MCHC 30.5 g/dL      RDW 17.2 %      RDW-SD 45.2 fl      MPV 10.2 fL      Platelets 299 10*3/mm3      Neutrophil % 57.3 %      Lymphocyte % 36.8 %      Monocyte % 4.7 %      Eosinophil % 0.2 %      Basophil % 0.6 %      Neutrophils, Absolute 4.83 10*3/mm3      Lymphocytes, Absolute 3.11 10*3/mm3      Monocytes, Absolute 0.40 10*3/mm3      Eosinophils, Absolute 0.02 10*3/mm3      Basophils, Absolute 0.05 10*3/mm3     COVID PRE-OP / PRE-PROCEDURE SCREENING ORDER (NO ISOLATION) - Swab, Nasopharynx [[id number]]  (Normal) Collected: 04/30/22 0327    Specimen: Swab from Nasopharynx Updated: 04/30/22 0427    Narrative:      The following orders were created for panel order COVID PRE-OP / PRE-PROCEDURE SCREENING ORDER (NO ISOLATION) - Swab, Nasopharynx.  Procedure                               Abnormality         Status                     ---------                               -----------         ------                     COVID-19,[facility]...[[id number]]  Normal              Final result                 Please view results for these tests on the individual orders.    COVID-19,[facility] CEPHEID/BETI NP SWAB IN TRANSPORT MEDIA 8-12 HR TAT - Swab, Nasopharynx [[id number]]  (Normal) Collected: 04/30/22 0327    Specimen: Swab from Nasopharynx Updated: 04/30/22 0427     COVID19 Not Detected    Narrative:      Fact sheet for providers: https://www.fda.gov/media/907212/download     Fact sheet for patients: https://www.fda.gov/media/265493/download    STAT Lactic Acid, Reflex [[id number]]  (Abnormal) Collected: 04/30/22 0311    Specimen: Blood Updated: 04/30/22 0357     Lactate 2.3 mmol/L     Protime-INR [[id number]]  (Normal) Collected: 04/30/22 0311    Specimen: Blood Updated: 04/30/22 0347     Protime 12.9 Seconds      INR 0.98    aPTT [[id number]]  (Normal) Collected: 04/30/22 0311    Specimen: Blood Updated:  04/30/22 0347     PTT 29.8 seconds     Troponin [[id number]]  (Normal) Collected: 04/30/22 0209    Specimen: Blood Updated: 04/30/22 0234     Troponin T <0.010 ng/mL     Narrative:      Troponin T Reference Range:  <= 0.03 ng/mL-   Negative for AMI  >0.03 ng/mL-     Abnormal for myocardial necrosis.  Clinicians would have to utilize clinical acumen, EKG, Troponin and serial changes to determine if it is an Acute Myocardial Infarction or myocardial injury due to an underlying chronic condition.       Results may be falsely decreased if patient taking Biotin.      Lipase [[id number]]  (Normal) Collected: 04/30/22 0024    Specimen: Blood Updated: 04/30/22 0216     Lipase 20 U/L     Protime-INR [[id number]]  (Normal) Collected: 04/30/22 0024    Specimen: Blood Updated: 04/30/22 0135     Protime 12.7 Seconds      INR 0.96    aPTT [[id number]]  (Normal) Collected: 04/30/22 0024    Specimen: Blood Updated: 04/30/22 0135     PTT 33.4 seconds     Comprehensive Metabolic Panel [[id number]]  (Abnormal) Collected: 04/30/22 0024    Specimen: Blood Updated: 04/30/22 0110     Glucose 107 mg/dL      BUN 6 mg/dL      Creatinine 0.73 mg/dL      Sodium 138 mmol/L      Potassium 3.8 mmol/L      Comment: Slight hemolysis detected by analyzer. Results may be affected.        Chloride 104 mmol/L      CO2 19.8 mmol/L      Calcium 8.4 mg/dL      Total Protein 7.7 g/dL      Albumin 3.70 g/dL      ALT (SGPT) 41 U/L      AST (SGOT) 41 U/L      Alkaline Phosphatase 68 U/L      Total Bilirubin 0.2 mg/dL      Globulin 4.0 gm/dL      A/G Ratio 0.9 g/dL      BUN/Creatinine Ratio 8.2     Anion Gap 14.2 mmol/L      eGFR 115.8 mL/min/1.73      Comment: National Kidney Foundation and American Society of Nephrology (ASN) Task Force recommended calculation based on the Chronic Kidney Disease Epidemiology Collaboration (CKD-EPI) equation refit without adjustment for race.       Narrative:      GFR Normal >60  Chronic Kidney Disease <60  Kidney Failure <15       "Procalcitonin [[id number]]  (Normal) Collected: 04/30/22 0024    Specimen: Blood Updated: 04/30/22 0106     Procalcitonin 0.04 ng/mL     Narrative:      As a Marker for Sepsis (Non-Neonates):    1. <0.5 ng/mL represents a low risk of severe sepsis and/or septic shock.  2. >2 ng/mL represents a high risk of severe sepsis and/or septic shock.    As a Marker for Lower Respiratory Tract Infections that require antibiotic therapy:    PCT on Admission    Antibiotic Therapy       6-12 Hrs later    >0.5                Strongly Recommended  >0.25 - <0.5        Recommended   0.1 - 0.25          Discouraged              Remeasure/reassess PCT  <0.1                Strongly Discouraged     Remeasure/reassess PCT    As 28 day mortality risk marker: \"Change in Procalcitonin Result\" (>80% or <=80%) if Day 0 (or Day 1) and Day 4 values are available. Refer to http://www.Smallaa-pct-calculator.com    Change in PCT <=80%  A decrease of PCT levels below or equal to 80% defines a positive change in PCT test result representing a higher risk for 28-day all-cause mortality of patients diagnosed with severe sepsis for septic shock.    Change in PCT >80%  A decrease of PCT levels of more than 80% defines a negative change in PCT result representing a lower risk for 28-day all-cause mortality of patients diagnosed with severe sepsis or septic shock.       Troponin [[id number]]  (Normal) Collected: 04/30/22 0024    Specimen: Blood Updated: 04/30/22 0101     Troponin T <0.010 ng/mL     Narrative:      Troponin T Reference Range:  <= 0.03 ng/mL-   Negative for AMI  >0.03 ng/mL-     Abnormal for myocardial necrosis.  Clinicians would have to utilize clinical acumen, EKG, Troponin and serial changes to determine if it is an Acute Myocardial Infarction or myocardial injury due to an underlying chronic condition.       Results may be falsely decreased if patient taking Biotin.      hCG, Serum, Qualitative [[id number]]  (Normal) Collected: 04/30/22 0024 "    Specimen: Blood Updated: 04/30/22 0059     HCG Qualitative Negative    Lactic Acid, Plasma [[id number]]  (Abnormal) Collected: 04/30/22 0024    Specimen: Blood Updated: 04/30/22 0058     Lactate 2.2 mmol/L     CBC & Differential [[id number]]  (Abnormal) Collected: 04/30/22 0024    Specimen: Blood Updated: 04/30/22 0040    Narrative:      The following orders were created for panel order CBC & Differential.  Procedure                               Abnormality         Status                     ---------                               -----------         ------                     CBC Auto Differential[[id number]]        Abnormal            Final result                 Please view results for these tests on the individual orders.    CBC Auto Differential [[id number]]  (Abnormal) Collected: 04/30/22 0024    Specimen: Blood Updated: 04/30/22 0040     WBC 8.12 10*3/mm3      RBC 4.38 10*6/mm3      Hemoglobin 10.0 g/dL      Hematocrit 31.5 %      MCV 71.9 fL      MCH 22.8 pg      MCHC 31.7 g/dL      RDW 16.9 %      RDW-SD 42.8 fl      MPV 10.3 fL      Platelets 328 10*3/mm3      Neutrophil % 70.6 %      Lymphocyte % 21.9 %      Monocyte % 6.2 %      Eosinophil % 0.4 %      Basophil % 0.5 %      Neutrophils, Absolute 5.74 10*3/mm3      Lymphocytes, Absolute 1.78 10*3/mm3      Monocytes, Absolute 0.50 10*3/mm3      Eosinophils, Absolute 0.03 10*3/mm3      Basophils, Absolute 0.04 10*3/mm3         Imaging Results (Last 24 Hours)     Procedure Component Value Units Date/Time    CT Angiogram Chest [[id number]] Collected: 04/30/22 0231     Updated: 04/30/22 0248    Narrative:      CT ANGIOGRAM OF THE CHEST     HISTORY: Shortness of air palpitations     COMPARISON: 01/18/2022     TECHNIQUE: Axial CT imaging was obtained through the thorax. IV contrast  was administered. Three-D reformatted images were obtained.     FINDINGS:  There do appear to be filling defects within segmental pulmonary  arterial branches of the right lower lobe.  Acute pulmonary emboli cannot  be excluded. There is no evidence of pulmonary infarction. The thyroid  gland, trachea, and esophagus appear unremarkable. There is no evidence  of right heart strain. Mediastinal lymph nodes do not appear  pathologically enlarged. There is no pleural or pericardial effusion. No  acute infiltrates are seen. There is a right internal jugular vein  tunneled catheter which terminates within the superior vena cava. There  are some prominent collaterals noted within the mediastinum. Thoracic  aorta is normal in caliber. There is no evidence of dissection. Images  through the upper abdomen do not demonstrate any acute abnormalities. No  acute osseous abnormalities are seen.       Impression:      Study does appear to be positive for pulmonary emboli within pulmonary  artery branches to the right lower lobe. There is no evidence of  pulmonary infarction or right heart strain.     FINDINGS were called to Dr. Guzman at 2:45 AM.     Radiation dose reduction techniques were utilized, including automated  exposure control and exposure modulation based on body size.     This report was finalized on 4/30/2022 2:45 AM by Dr. Yanci Jessica M.D.       XR Chest 1 View [[id number]] Collected: 04/29/22 2355     Updated: 04/29/22 2359    Narrative:      SINGLE VIEW OF THE CHEST     HISTORY: Shortness of air     COMPARISON: 04/18/2022     FINDINGS:  Right internal jugular vein tunneled catheter extends in the vena cava.  Heart size is at the upper limits of normal. No pneumothorax, pleural  effusion, or acute infiltrate is seen.       Impression:      No acute findings.     This report was finalized on 4/29/2022 11:56 PM by Dr. Yanci Jessica M.D.           ECG 12 Lead       HEART RATE= 125  bpm  RR Interval= 480  ms  AK Interval= 141  ms  P Horizontal Axis= 35  deg  P Front Axis= 60  deg  QRSD Interval= 79  ms  QT Interval=   ms  QRS Axis= 47  deg  T Wave Axis=   deg  - ABNORMAL ECG -  Sinus  tachycardia  Borderline Q waves in inferior leads  Nonspecific T abnrm, anterolateral leads             Current Facility-Administered Medications:   •  albuterol (PROVENTIL) nebulizer solution 0.083% 2.5 mg/3mL, 2.5 mg, Nebulization, Q6H PRN, Caleb Naik MD  •  cholecalciferol (VITAMIN D3) tablet 1,000 Units, 1,000 Units, Oral, Daily, Caleb Naik MD, 1,000 Units at 04/30/22 0943  •  dronabinol (MARINOL) capsule 5 mg, 5 mg, Oral, TID, Caleb Naik MD, 5 mg at 04/30/22 1144  •  gabapentin (NEURONTIN) capsule 100 mg, 100 mg, Oral, TID, Caleb Naik MD, 100 mg at 04/30/22 0943  •  heparin (porcine) 5000 UNIT/ML injection 4,500-9,000 Units, 40-80 Units/kg (Order-Specific), Intravenous, Q6H PRN, Moe Guzman MD  •  heparin 30950 units/250 mL (100 units/mL) in 0.9% NaCl infusion, 13.3 Units/kg/hr (Order-Specific), Intravenous, Titrated, Moe Guzman MD, Last Rate: 12.76 mL/hr at 04/30/22 1038, 11.3 Units/kg/hr at 04/30/22 1038  •  hydrOXYzine pamoate (VISTARIL) capsule 50 mg, 50 mg, Oral, TID PRN, Caleb Naik MD  •  levothyroxine (SYNTHROID, LEVOTHROID) tablet 50 mcg, 50 mcg, Oral, Q AM, Caleb Naik MD, 50 mcg at 04/30/22 0943  •  metoprolol tartrate (LOPRESSOR) tablet 25 mg, 25 mg, Oral, TID, Caleb Naik MD, 25 mg at 04/30/22 0943  •  midodrine (PROAMATINE) tablet 10 mg, 10 mg, Oral, TID AC, Caleb Naik MD, 10 mg at 04/30/22 1141  •  montelukast (SINGULAIR) tablet 10 mg, 10 mg, Oral, Nightly, Caleb Naik MD  •  [DISCONTINUED] ondansetron (ZOFRAN) tablet 8 mg, 8 mg, Oral, Q8H PRN **OR** ondansetron (ZOFRAN) injection 4 mg, 4 mg, Intravenous, Q8H PRN, Caleb Naik MD  •  phenazopyridine (PYRIDIUM) tablet 100 mg, 100 mg, Oral, TID With Meals, Caleb Naik MD, 100 mg at 04/30/22 1144  •  prochlorperazine (COMPAZINE) tablet 10 mg, 10 mg, Oral, Q6H PRN, Caleb Naik MD, 10 mg at 04/30/22 1141  •  pyridostigmine (MESTINON) tablet 30 mg, 30 mg, Oral, Daily, Caleb Naik MD, 30 mg at  04/30/22 1141  •  [COMPLETED] Insert peripheral IV, , , Once **AND** sodium chloride 0.9 % flush 10 mL, 10 mL, Intravenous, PRN, Moe Guzman MD  •  Vortioxetine HBr 5 MG 5 mg, 5 mg, Oral, Daily, Ann Naik MD     ASSESSMENT  Recurrent pulm embolism involving the right lower lobe  Chronic orthostatic hypotension  POTS syndrome  Phoebe-Danlos syndrome  Hypothyroidism  Irritable bowel syndrome  Severe gastroparesis  Gastroesophageal reflux disease    PLAN  Admit  Heparin  Adjust home medications  Stress ulcer DVT prophylaxis  Pulmonary consult  Hematology to see patient for possible Eliquis failure  Supportive care  Patient is full code  Discussed with family nursing staff  Follow closely further recommendation current hospital course    ANN NAIK MD          Electronically signed by Ann Naik MD at 04/30/22 1243          Emergency Department Notes      Gema Dyer RN at 04/29/22 2252        To ER via EMS from home.  C/o nausea.  Pt has hx of chronic nausea.  Pt has addiosn catheter in place and gives herself IV Phenergan, IV Zofran, and Marinol for nausea, but meds have not relieved symptoms.  Pt has hx of gastoparesis and is making an appt to get a gastric stimulator.    Pt also c/o SOA.      Pt in mask at time of triage.  Triage staff in appropriate PPE.      Electronically signed by Gema Dyer RN at 04/29/22 7759     Moe Guzman MD at 04/29/22 0829      Procedure Orders    1. Critical Care [[id number]] ordered by Moe Guzman MD                EMERGENCY DEPARTMENT ENCOUNTER    Room Number:  E558/1  Date of encounter:  4/30/2022  PCP: Jason Borrero MD  Historian: Patient      HPI:  Chief Complaint: Palpitation, feeling, shortness of breath  A complete HPI/ROS/PMH/PSH/SH/FH are unobtainable due to: None    Context: Liliam Lorenz is a 27 y.o. female who presents to the ED c/o sudden onset of palpitations and generalized warm feeling and shortness of breath that  started about 20 minutes prior to arrival.  Patient has personal history of multiple PE's.  Currently on Eliquis.  Patient also has history of gastroparesis.  Reports baseline nausea.  Denies any significant abdominal pain at this time.      PAST MEDICAL HISTORY  Active Ambulatory Problems     Diagnosis Date Noted   • Poor venous access 10/15/2020   • POTS (postural orthostatic tachycardia syndrome) 05/06/2021   • Sinus tachycardia 09/08/2021   • Multiple subsegmental pulmonary emboli without acute cor pulmonale (HCC) 09/09/2021   • Autoimmune disease ([facility]) 09/06/2020   • 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   • Nausea and vomiting 08/22/2017   • Gastroparesis 08/22/2017   • Postural orthostatic tachycardia syndrome 06/12/2020   • Valverde catheter dysfunction ([facility]) 10/25/2021   • Febrile illness, acute 01/18/2022     Resolved Ambulatory Problems     Diagnosis Date Noted   • No Resolved Ambulatory Problems     Past Medical History:   Diagnosis Date   • Bronchitis    • Chest pain    • Chronic hypotension    • Eczema    • GERD (gastroesophageal reflux disease)    • IBS (irritable bowel syndrome)    • Lightheadedness    • Rectal fissure    • Rosacea    • Tachycardia          PAST SURGICAL HISTORY  Past Surgical History:   Procedure Laterality Date   • COLONOSCOPY     • MEDIPORT INSERTION, DOUBLE  09/01/2020    [facility] [place]    • UPPER GASTROINTESTINAL ENDOSCOPY     • VENOUS ACCESS DEVICE (PORT) INSERTION Left 10/16/2020    Procedure: INSERTION VENOUS ACCESS DEVICE UNDER FLURO;  Surgeon: Pa Lane MD;  Location: [facility];  Service: General;  Laterality: Left;   • VENOUS ACCESS DEVICE (PORT) INSERTION Right 10/26/2021    Procedure: REMOVAL AND INSERTION OF VALVERDE CATHETER;  Surgeon: Pa Lane MD;  Location: [facility];  Service: General;  Laterality: Right;         FAMILY HISTORY  Family History   Problem Relation Age of Onset   • Hypertension Mother    • Diabetes Mother    • Diabetes  Father    • Heart disease Paternal Grandfather    • Stroke Paternal Grandfather    • Diabetes Paternal Grandfather    • Cancer Paternal Grandfather    • Coronary artery disease Maternal Grandmother         MGM with 4 vessel CABG and multiple stents   • Cancer Maternal Grandmother    • Coronary artery disease Maternal Uncle         Maternal uncle with MI   • Breast cancer Other    • Malig Hyperthermia Neg Hx          SOCIAL HISTORY  Social History     Socioeconomic History   • Marital status:    Tobacco Use   • Smoking status: Never Smoker   • Smokeless tobacco: Never Used   Vaping Use   • Vaping Use: Never used   Substance and Sexual Activity   • Alcohol use: No   • Drug use: No   • Sexual activity: Yes     Partners: Female     Birth control/protection: None         ALLERGIES  Eggs or egg-derived products, Pepcid [famotidine], and Scopolamine        REVIEW OF SYSTEMS  Review of Systems     All systems reviewed and negative except for those discussed in HPI.       PHYSICAL EXAM    I have reviewed the triage vital signs and nursing notes.    ED Triage Vitals [04/29/22 9866]   Temp Heart Rate Resp BP SpO2   97.9 °F (36.6 °C) (!) 140 18 140/80 100 %      Temp src Heart Rate Source Patient Position BP Location FiO2 (%)   -- -- -- -- --       Physical Exam  GENERAL: Mild distressed  HENT: nares patent  EYES: no scleral icterus  CV: regular rhythm, tachycardia  RESPIRATORY: normal effort, clear to auscultation bilaterally  ABDOMEN: soft, nontender nondistended  MUSCULOSKELETAL: no deformity  NEURO: alert, moves all extremities, follows commands  SKIN: warm, dry        LAB RESULTS  Recent Results (from the past 24 hour(s))   Comprehensive Metabolic Panel    Collection Time: 04/30/22 12:24 AM    Specimen: Blood   Result Value Ref Range    Glucose 107 (H) 65 - 99 mg/dL    BUN 6 6 - 20 mg/dL    Creatinine 0.73 0.57 - 1.00 mg/dL    Sodium 138 136 - 145 mmol/L    Potassium 3.8 3.5 - 5.2 mmol/L    Chloride 104 98 - 107  mmol/L    CO2 19.8 (L) 22.0 - 29.0 mmol/L    Calcium 8.4 (L) 8.6 - 10.5 mg/dL    Total Protein 7.7 6.0 - 8.5 g/dL    Albumin 3.70 3.50 - 5.20 g/dL    ALT (SGPT) 41 (H) 1 - 33 U/L    AST (SGOT) 41 (H) 1 - 32 U/L    Alkaline Phosphatase 68 39 - 117 U/L    Total Bilirubin 0.2 0.0 - 1.2 mg/dL    Globulin 4.0 gm/dL    A/G Ratio 0.9 g/dL    BUN/Creatinine Ratio 8.2 7.0 - 25.0    Anion Gap 14.2 5.0 - 15.0 mmol/L    eGFR 115.8 >60.0 mL/min/1.73   Protime-INR    Collection Time: 04/30/22 12:24 AM    Specimen: Blood   Result Value Ref Range    Protime 12.7 11.7 - 14.2 Seconds    INR 0.96 0.90 - 1.10   aPTT    Collection Time: 04/30/22 12:24 AM    Specimen: Blood   Result Value Ref Range    PTT 33.4 22.7 - 35.4 seconds   Lipase    Collection Time: 04/30/22 12:24 AM    Specimen: Blood   Result Value Ref Range    Lipase 20 13 - 60 U/L   hCG, Serum, Qualitative    Collection Time: 04/30/22 12:24 AM    Specimen: Blood   Result Value Ref Range    HCG Qualitative Negative Negative   Troponin    Collection Time: 04/30/22 12:24 AM    Specimen: Blood   Result Value Ref Range    Troponin T <0.010 0.000 - 0.030 ng/mL   Lactic Acid, Plasma    Collection Time: 04/30/22 12:24 AM    Specimen: Blood   Result Value Ref Range    Lactate 2.2 (C) 0.5 - 2.0 mmol/L   Procalcitonin    Collection Time: 04/30/22 12:24 AM    Specimen: Blood   Result Value Ref Range    Procalcitonin 0.04 0.00 - 0.25 ng/mL   CBC Auto Differential    Collection Time: 04/30/22 12:24 AM    Specimen: Blood   Result Value Ref Range    WBC 8.12 3.40 - 10.80 10*3/mm3    RBC 4.38 3.77 - 5.28 10*6/mm3    Hemoglobin 10.0 (L) 12.0 - 15.9 g/dL    Hematocrit 31.5 (L) 34.0 - 46.6 %    MCV 71.9 (L) 79.0 - 97.0 fL    MCH 22.8 (L) 26.6 - 33.0 pg    MCHC 31.7 31.5 - 35.7 g/dL    RDW 16.9 (H) 12.3 - 15.4 %    RDW-SD 42.8 37.0 - 54.0 fl    MPV 10.3 6.0 - 12.0 fL    Platelets 328 140 - 450 10*3/mm3    Neutrophil % 70.6 42.7 - 76.0 %    Lymphocyte % 21.9 19.6 - 45.3 %    Monocyte % 6.2 5.0 -  12.0 %    Eosinophil % 0.4 0.3 - 6.2 %    Basophil % 0.5 0.0 - 1.5 %    Neutrophils, Absolute 5.74 1.70 - 7.00 10*3/mm3    Lymphocytes, Absolute 1.78 0.70 - 3.10 10*3/mm3    Monocytes, Absolute 0.50 0.10 - 0.90 10*3/mm3    Eosinophils, Absolute 0.03 0.00 - 0.40 10*3/mm3    Basophils, Absolute 0.04 0.00 - 0.20 10*3/mm3   Troponin    Collection Time: 04/30/22  2:09 AM    Specimen: Blood   Result Value Ref Range    Troponin T <0.010 0.000 - 0.030 ng/mL   STAT Lactic Acid, Reflex    Collection Time: 04/30/22  3:11 AM    Specimen: Blood   Result Value Ref Range    Lactate 2.3 (C) 0.5 - 2.0 mmol/L   Protime-INR    Collection Time: 04/30/22  3:11 AM    Specimen: Blood   Result Value Ref Range    Protime 12.9 11.7 - 14.2 Seconds    INR 0.98 0.90 - 1.10   aPTT    Collection Time: 04/30/22  3:11 AM    Specimen: Blood   Result Value Ref Range    PTT 29.8 22.7 - 35.4 seconds   COVID-19,[facility] IN-HOUSE CEPHEID/BETI NP SWAB IN TRANSPORT MEDIA 8-12 HR TAT - Swab, Nasopharynx    Collection Time: 04/30/22  3:27 AM    Specimen: Nasopharynx; Swab   Result Value Ref Range    COVID19 Not Detected Not Detected - Ref. Range   CBC Auto Differential    Collection Time: 04/30/22  5:02 AM    Specimen: Blood   Result Value Ref Range    WBC 8.44 3.40 - 10.80 10*3/mm3    RBC 4.04 3.77 - 5.28 10*6/mm3    Hemoglobin 9.0 (L) 12.0 - 15.9 g/dL    Hematocrit 29.5 (L) 34.0 - 46.6 %    MCV 73.0 (L) 79.0 - 97.0 fL    MCH 22.3 (L) 26.6 - 33.0 pg    MCHC 30.5 (L) 31.5 - 35.7 g/dL    RDW 17.2 (H) 12.3 - 15.4 %    RDW-SD 45.2 37.0 - 54.0 fl    MPV 10.2 6.0 - 12.0 fL    Platelets 299 140 - 450 10*3/mm3    Neutrophil % 57.3 42.7 - 76.0 %    Lymphocyte % 36.8 19.6 - 45.3 %    Monocyte % 4.7 (L) 5.0 - 12.0 %    Eosinophil % 0.2 (L) 0.3 - 6.2 %    Basophil % 0.6 0.0 - 1.5 %    Neutrophils, Absolute 4.83 1.70 - 7.00 10*3/mm3    Lymphocytes, Absolute 3.11 (H) 0.70 - 3.10 10*3/mm3    Monocytes, Absolute 0.40 0.10 - 0.90 10*3/mm3    Eosinophils, Absolute 0.02 0.00 -  0.40 10*3/mm3    Basophils, Absolute 0.05 0.00 - 0.20 10*3/mm3       Ordered the above labs and independently reviewed the results.        RADIOLOGY  XR Chest 1 View    Result Date: 4/29/2022  SINGLE VIEW OF THE CHEST  HISTORY: Shortness of air  COMPARISON: 04/18/2022  FINDINGS: Right internal jugular vein tunneled catheter extends in the vena cava. Heart size is at the upper limits of normal. No pneumothorax, pleural effusion, or acute infiltrate is seen.      No acute findings.  This report was finalized on 4/29/2022 11:56 PM by Dr. Yanci Jessica M.D.      CT Angiogram Chest    Result Date: 4/30/2022  CT ANGIOGRAM OF THE CHEST  HISTORY: Shortness of air palpitations  COMPARISON: 01/18/2022  TECHNIQUE: Axial CT imaging was obtained through the thorax. IV contrast was administered. Three-D reformatted images were obtained.  FINDINGS: There do appear to be filling defects within segmental pulmonary arterial branches of the right lower lobe. Acute pulmonary emboli cannot be excluded. There is no evidence of pulmonary infarction. The thyroid gland, trachea, and esophagus appear unremarkable. There is no evidence of right heart strain. Mediastinal lymph nodes do not appear pathologically enlarged. There is no pleural or pericardial effusion. No acute infiltrates are seen. There is a right internal jugular vein tunneled catheter which terminates within the superior vena cava. There are some prominent collaterals noted within the mediastinum. Thoracic aorta is normal in caliber. There is no evidence of dissection. Images through the upper abdomen do not demonstrate any acute abnormalities. No acute osseous abnormalities are seen.      Study does appear to be positive for pulmonary emboli within pulmonary artery branches to the right lower lobe. There is no evidence of pulmonary infarction or right heart strain.  FINDINGS were called to Dr. Guzman at 2:45 AM.  Radiation dose reduction techniques were utilized,  including automated exposure control and exposure modulation based on body size.  This report was finalized on 4/30/2022 2:45 AM by Dr. Yanci Jessica M.D.        I ordered the above noted radiological studies. Reviewed by me and discussed with radiologist.  See dictation for official radiology interpretation.      PROCEDURES    Critical Care  Performed by: Moe Guzman MD  Authorized by: Moe Guzman MD     Critical care provider statement:     Critical care time (minutes):  42    Critical care was necessary to treat or prevent imminent or life-threatening deterioration of the following conditions:  Cardiac failure, circulatory failure and respiratory failure    Critical care was time spent personally by me on the following activities:  Development of treatment plan with patient or surrogate, examination of patient, evaluation of patient's response to treatment, ordering and performing treatments and interventions, ordering and review of laboratory studies, ordering and review of radiographic studies, pulse oximetry, re-evaluation of patient's condition and review of old charts          MEDICATIONS GIVEN IN ER    Medications   sodium chloride 0.9 % flush 10 mL (has no administration in time range)   metoclopramide (REGLAN) injection 10 mg (10 mg Intravenous Not Given 4/30/22 0025)   diphenhydrAMINE (BENADRYL) injection 25 mg (25 mg Intravenous Not Given 4/30/22 0025)   heparin 81515 units/250 mL (100 units/mL) in 0.9% NaCl infusion (13.3 Units/kg/hr × 113 kg (Order-Specific) Intravenous New Bag 4/30/22 0320)   heparin (porcine) 5000 UNIT/ML injection 4,500-9,000 Units (has no administration in time range)   sodium chloride 0.9 % bolus 500 mL (0 mL Intravenous Stopped 4/30/22 0105)   iopamidol (ISOVUE-370) 76 % injection 100 mL (95 mL Intravenous Given 4/30/22 0152)   heparin (porcine) 5000 UNIT/ML injection 9,000 Units (9,000 Units Intravenous Given 4/30/22 0320)         PROGRESS, DATA  ANALYSIS, CONSULTS, AND MEDICAL DECISION MAKING    All labs have been independently reviewed by me.  All radiology studies have been reviewed by me and discussed with radiologist dictating the report.   EKG's independently viewed and interpreted by me.  Discussion below represents my analysis of pertinent findings related to patient's condition, differential diagnosis, treatment plan and final disposition.        ED Course as of 04/30/22 0607   Fri Apr 29, 2022 2317 EKG          EKG time: 2331  Rhythm/Rate: Sinus tachycardia rate 125   P waves and [place]: Normal  QRS, axis: Normal  ST and T waves: Nonspecific    Interpreted Contemporaneously by me, independently viewed   [TJ]      ED Course User Index  [TJ] Moe Guzman MD           PPE: The patient wore a surgical mask throughout the entire patient encounter. I wore an N95.    AS OF 06:07 EDT VITALS:    BP - 121/76  HR - 106  TEMP - 97.9 °F (36.6 °C) (Oral)  O2 SATS - 98%        DIAGNOSIS  Final diagnoses:   Single subsegmental pulmonary embolism without acute cor pulmonale (HCC)         DISPOSITION  Admit           Moe Guzman MD  04/30/22 0607      Electronically signed by Moe Guzman MD at 04/30/22 0607     Jon Gallegos, RN at 04/30/22 0025        Pt refused benadryl stating it worsens her nausea. Pt refused reglan stating it gives her weird neuro symptoms. Pt given permission to self administer phenergan dose through Dolan catheter per MD Guzman     Electronically signed by Jon Gallegos, RN at 04/30/22 0044           Lines, Drains & Airways     Active LDAs     Name Placement date Placement time Site Days    CVC Double Lumen Right Subclavian --  --  Subclavian  --    Peripheral IV 04/30/22 0023 Anterior;Left;Proximal Forearm 04/30/22 0023  Forearm  5    Peripheral IV 05/03/22 1956 Anterior;Right Forearm 05/03/22 1956  Forearm  1                Medication Administration Report for Liliam Lorenz as of 05/05/22  1009   Legend:    Given Hold Not Given Due Canceled Entry Other Actions    Time Time (Time) Time  Time-Action       Discontinued     Completed     Future     MAR Hold     Linked           Medications 04/29/22 04/30/22 05/01/22 05/02/22 05/03/22 05/04/22 05/05/22    cholecalciferol (VITAMIN D3) tablet 1,000 Units  Dose: 1,000 Units  Freq: Daily Route: PO  Start: 04/30/22 0900     0943-Given        0824-Given        0823-Given        0854-Given        0858-Given        0849-Given           cyanocobalamin injection 1,000 mcg  Dose: 1,000 mcg  Freq: Every 28 Days Route: IM  Start: 05/01/22 1130      1250-Given               dronabinol (MARINOL) capsule 5 mg  Dose: 5 mg  Freq: 3 Times Daily Route: PO  Start: 04/30/22 0900   End: 05/07/22 0859   Admin Instructions:        1144-Given       1558-Given       1952-Given       (2030)-Not Given [C]        0824-Given       1610-Given       2035-Given        0824-Given       1656-Given       2018-Given        1150-Given       1747-Given       2138-Given        0858-Given       1553-Given       2124-Given        0849-Given       1600       2100           gabapentin (NEURONTIN) capsule 100 mg  Dose: 100 mg  Freq: 3 Times Daily Route: PO  Start: 04/30/22 0900   Admin Instructions:        0943-Given       1558-Given       1952-Given       (2030)-Not Given [C]        0824-Given       1610-Given       2034-Given        0824-Given       1656-Given       2017-Given        0854-Given       1748-Given       2138-Given        0857-Given       1553-Given       2124-Given        0849-Given       1600       2100           levothyroxine (SYNTHROID, LEVOTHROID) tablet 50 mcg  Dose: 50 mcg  Freq: Every Early Morning Route: PO  Start: 04/30/22 0730   Admin Instructions:   Take on empty stomach.     0943-Given        0636-Given        0608-Given        0630-Given        0613-Given        0621-Given           metoprolol tartrate (LOPRESSOR) tablet 25 mg  Dose: 25 mg  Freq: 3 Times Daily Route:  PO  Start: 04/30/22 0900     0943-Given       1559-Given       1952-Given       (2030)-Not Given [C]        0824-Given       1610-Given       2034-Given        0823-Given       1656-Given       2017-Given        0854-Given       1748-Given       2138-Given        0858-Given       1553-Given       2125-Hold        (0849)-Not Given       1600       2100           midodrine (PROAMATINE) tablet 10 mg  Dose: 10 mg  Freq: 3 Times Daily Before Meals Route: PO  Start: 04/30/22 0730     0943-Given       1141-Given       1754-Given        0824-Given       1250-Given       1701-Given        0607-Given       0730-Canceled Entry       1138-Given       1656-Given        0630-Given       1150-Given       1747-Given        0613-Given       0730-Canceled Entry       1337-Given       1738-Given        0621-Given       0634-Canceled Entry       1130       1730           montelukast (SINGULAIR) tablet 10 mg  Dose: 10 mg  Freq: Nightly Route: PO  Start: 04/30/22 2100     1952-Given       (2031)-Not Given [C]        2035-Given        2017-Given        2138-Given        2124-Given        2100           pantoprazole (PROTONIX) EC tablet 40 mg  Dose: 40 mg  Freq: Every Early Morning Route: PO  Start: 05/01/22 0600   Admin Instructions:   Swallow whole; do not crush, split, or chew.      0636-Given        0608-Given        0630-Given        0613-Given        0621-Given           phenazopyridine (PYRIDIUM) tablet 100 mg  Dose: 100 mg  Freq: 3 Times Daily With Meals Route: PO  Start: 04/30/22 0800   Admin Instructions:   Swallow whole; do not crush, split, or chew.     0943-Given       1144-Given       1754-Given        0824-Given       1250-Given       1701-Given        0824-Given       1138-Given       1656-Given       1843-Canceled Entry        0854-Given       1150-Given       1747-Given        0858-Given       1338-Given       1738-Given        0849-Given       1200       1800           pyridostigmine (MESTINON) tablet 30 mg  Dose: 30  mg  Freq: Daily Route: PO  Start: 04/30/22 0900     1141-Given        0824-Given        0823-Given        0854-Given        0858-Given        0849-Given           Vortioxetine HBr 5 MG 5 mg  Dose: 5 mg  Freq: Daily Route: PO  Start: 04/30/22 0900   Order specific questions:   Drug Name: Vortioxetine       (0944)-Not Given        (1022)-Not Given        (0826)-Not Given        (0856)-Not Given [C]        0908-Given        0851-Given           warfarin (COUMADIN) tablet 6 mg  Dose: 6 mg  Freq: Daily Warfarin Route: PO  Indications of Use: DVT/PE (active thrombosis)  Start: 05/04/22 1800   Admin Instructions:   Food-Drug Interaction  If INR Greater Than Target, Contact Pharmacy Prior to Giving Dose.  Acutely Hazardous. Waste BOTH Residual Medication and/or Empty Package. .   Order specific questions:   Target INR 2 - 3           1738-Given        1800          Completed Medications  Medications 04/29/22 04/30/22 05/01/22 05/02/22 05/03/22 05/04/22 05/05/22       ferric gluconate (FERRLECIT) 250 mg in sodium chloride 0.9 % 120 mL IVPB  Dose: 250 mg  Freq: Once Route: IV  Start: 05/01/22 1130   End: 05/01/22 1809      1609-New Bag               heparin (porcine) 5000 UNIT/ML injection 9,000 Units  Dose: 80 Units/kg  Weight Dosing Info: 113 kg (Order-Specific)  Freq: Once Route: IV  Indications of Use: DVT/PE (active thrombosis)  Start: 04/30/22 0249   End: 04/30/22 0320   Admin Instructions:   **Max Dose of 10,000 units** Bolus for VTE / PE     0320-Given                iopamidol (ISOVUE-370) 76 % injection 100 mL  Dose: 100 mL  Freq: Once in Imaging Route: IV  Start: 04/30/22 0137   End: 04/30/22 0152     0152-Given                sodium chloride 0.9 % bolus 500 mL  Dose: 500 mL  Freq: Once Route: IV  Start: 04/29/22 2324   End: 04/30/22 0105     0023-New Bag       0105-Stopped                warfarin (COUMADIN) tablet 10 mg  Dose: 10 mg  Freq: Once (Warfarin) Route: PO  Indications of Use: DVT/PE (active  thrombosis)  Start: 05/03/22 1800   End: 05/03/22 1748   Admin Instructions:   Food-Drug Interaction  If INR Greater Than Target, Contact Pharmacy Prior to Giving Dose.  Acutely Hazardous. Waste BOTH Residual Medication and/or Empty Package. .   Order specific questions:   Target INR 2 - 3          1748-Given             warfarin (COUMADIN) tablet 10 mg  Dose: 10 mg  Freq: Once (Warfarin) Route: PO  Indications of Use: DVT/PE (active thrombosis)  Start: 05/02/22 1800   End: 05/02/22 1656   Admin Instructions:   Food-Drug Interaction  If INR Greater Than Target, Contact Pharmacy Prior to Giving Dose.  Acutely Hazardous. Waste BOTH Residual Medication and/or Empty Package. .   Order specific questions:   Target INR 2 - 3         1656-Given       1844-Canceled Entry             Discontinued Medications  Medications 04/29/22 04/30/22 05/01/22 05/02/22 05/03/22 05/04/22 05/05/22       apixaban (ELIQUIS) tablet 5 mg  Dose: 5 mg  Freq: Every 12 Hours Scheduled Route: PO  Indications of Use: DVT/PE (active thrombosis)  Start: 04/30/22 0900   End: 04/30/22 0646   Admin Instructions:   Tablet may be crushed and suspended in 60 mL of water or D5W and immediately delivered via NG tube.              diphenhydrAMINE (BENADRYL) injection 25 mg  Dose: 25 mg  Freq: Once Route: IV  Start: 04/29/22 2324   End: 04/30/22 1234   Admin Instructions:   25 mg may be given IV push over less than 1 minute.  Caution: Look alike/sound alike drug alert. This med may be ordered in other forms and routes. Before giving verify the last time the drug was given by any route/form.       (0025)-Not Given                metoclopramide (REGLAN) injection 10 mg  Dose: 10 mg  Freq: Once Route: IV  Start: 04/29/22 2324   End: 04/30/22 1234     (0025)-Not Given                promethazine (PHENERGAN) 25 mg in sodium chloride 0.9 % 50 mL  Dose: 25 mg  Freq: Every 3 Hours Route: IV  Start: 04/30/22 0730   End: 04/30/22 0838   Admin Instructions:   If multiple  antiemetics ordered, give antiemetics in this order: ondansetron, prochlorperazine, promethazine.   Order specific questions:   [facility] PT requires failure of two alternative therapies prior to ordering promethazine IVPB. Has this patient failed two alternative therapies? Yes  Please choose prior alternative therapies that have failed: Zofran  Please choose prior alternative therapies that have failed: Compazine       0842-Not Given:  See Alt               Or  promethazine (PHENERGAN) tablet 25 mg  Dose: 25 mg  Freq: Every 3 Hours Route: PO  Start: 04/30/22 0730   End: 04/30/22 0838   Admin Instructions:        0842-Canceled Entry                sodium chloride 0.9 % bolus 500 mL  Dose: 500 mL  Freq: Once Route: IV  Start: 04/30/22 1330   End: 04/30/22 1234              warfarin (COUMADIN) tablet 7.5 mg  Dose: 7.5 mg  Freq: Daily Warfarin Route: PO  Indications of Use: DVT/PE (active thrombosis)  Start: 05/01/22 1800   End: 05/02/22 0843   Admin Instructions:   Food-Drug Interaction  If INR Greater Than Target, Contact Pharmacy Prior to Giving Dose.  Acutely Hazardous. Waste BOTH Residual Medication and/or Empty Package. .   Order specific questions:   Target INR 2 - 3        1701-Given                    ,   Medication Administration Report for Liliam Lorenz as of 05/05/22 1009   Legend:    Given Hold Not Given Due Canceled Entry Other Actions    Time Time (Time) Time  Time-Action       Discontinued     Completed     Future     MAR Hold     Linked           Medications 04/29/22 04/30/22 05/01/22 05/02/22 05/03/22 05/04/22 05/05/22    heparin 07769 units/250 mL (100 units/mL) in 0.9% NaCl infusion  Rate: 15.02 mL/hr Dose: 13.3 Units/kg/hr  Weight Dosing Info: 113 kg (Order-Specific)  Freq: Titrated Route: IV  Indications of Use: DVT/PE (active thrombosis)  Start: 04/30/22 0249   Admin Instructions:   Weight Based Heparin Nomogram: VTE / PE: Initial Heparin Infusion 18 units/kg/hr  aPTT Less Than 60:  Bolus 80 units/kg & Increase Dose By 4 units/kg/hr.  aPTT 60-70: Bolus 40 units/kg & Increase Dose By 2 units/kg/hr.  aPTT 71-95: No Bolus, No Dose Change  aPTT : No Bolus, Decrease Dose By 2 units/kg/hr.  aPTT Greater Than 115: No Bolus. Hold Infusion For 1 hour.  Decrease Dose By 3 units/kg/hr. Repeat aPTT 6 Hours After Restarted.  If aPTT Remains Greater Than 115 Stop Heparin Drip & Contact Provider     0320-New Bag       1038-Rate Change (DUAL SIGN)       1501-Currently Infusing [C]       1909-Handoff       2057-New Bag        0702-Handoff       0827-Rate Change (DUAL SIGN)       1439-New Bag       1600-Stopped       1700-Rate Change (DUAL SIGN)       1908-Handoff       2350-Hold [C]        0103-New Bag       0828-New Bag       1444-New Bag       1655-New Bag       1856-Currently Infusing       1914-Handoff       2213-Rate/Dose Verify        0117-Rate/Dose Verify       0631-Rate/Dose Verify       1446-New Bag       1819-Rate Change (DUAL SIGN)       2105-Rate/Dose Verify       2344-Rate/Dose Verify        0203-Rate/Dose Verify       0507-Rate/Dose Verify       0612-Rate Change (DUAL SIGN)       0857-New Bag       1526-Stopped       1727-Rate Change (DUAL SIGN)       1913-Handoff       2123-Rate/Dose Verify        0216-Hold [C]       0326-Rate Change (DUAL SIGN)       0544-Rate/Dose Verify       0717-Handoff       0738-New Bag       0954-Currently Infusing                    Discontinued Medications  Medications 04/29/22 04/30/22 05/01/22 05/02/22 05/03/22 05/04/22 05/05/22                       and   Medication Administration Report for LorenzLiliam as of 05/05/22 1009   Legend:    Given Hold Not Given Due Canceled Entry Other Actions    Time Time (Time) Time  Time-Action       Discontinued     Completed     Future     MAR Hold     Linked           Medications 04/29/22 04/30/22 05/01/22 05/02/22 05/03/22 05/04/22 05/05/22    albuterol (PROVENTIL) nebulizer solution 0.083% 2.5 mg/3mL  Dose: 2.5 mg  Freq:  Every 6 Hours PRN Route: NEBULIZATION  PRN Reason: Shortness of Air  Start: 04/30/22 0619   Admin Instructions:   Include Respiratory Treatment Education              heparin (porcine) 5000 UNIT/ML injection 4,500-9,000 Units  Dose: 40-80 Units/kg  Weight Dosing Info: 113 kg (Order-Specific)  Freq: Every 6 Hours PRN Route: IV  PRN Comment: Per Heparin Nomogram  Indications of Use: DVT/PE (active thrombosis)  Start: 04/30/22 0246   Admin Instructions:   **Max Dose of 10,000 units** Bolus for VTE / PE:  PTT Less Than 60 sec, Administer 80 units/kg Bolus   PTT 60 - 70 sec, Administer 40 units/kg Bolus      0824-Given          1738-Given            hydrOXYzine pamoate (VISTARIL) capsule 50 mg  Dose: 50 mg  Freq: 3 Times Daily PRN Route: PO  PRN Reason: Itching  Start: 04/30/22 0622   Admin Instructions:   Caution: Look alike/sound alike drug alert       2130-Given              ondansetron (ZOFRAN) injection 4 mg  Dose: 4 mg  Freq: Every 8 Hours PRN Route: IV  PRN Reasons: Nausea,Vomiting  Start: 04/30/22 1235     1559-Given        0859-Given       1704-Given        0608-Given       1821-Given        0920-Given       1747-Given        0908-Given       1738-Given                      prochlorperazine (COMPAZINE) tablet 10 mg  Dose: 10 mg  Freq: Every 6 Hours PRN Route: PO  PRN Reasons: Nausea,Vomiting  Start: 04/30/22 0627     1141-Given       1952-Given        2203-Given         1522-Given       2138-Given        1553-Given            sodium chloride 0.9 % flush 10 mL  Dose: 10 mL  Freq: As Needed Route: IV  PRN Reason: Line Care  Start: 04/29/22 2322       0824-Given             Discontinued Medications  Medications 04/29/22 04/30/22 05/01/22 05/02/22 05/03/22 05/04/22 05/05/22                 ondansetron (ZOFRAN) tablet 8 mg  Dose: 8 mg  Freq: Every 8 Hours PRN Route: PO  PRN Reasons: Nausea,Vomiting  Start: 04/30/22 0651   End: 04/30/22 1235     0851-Not Given:  See Alt               Or  ondansetron (ZOFRAN) injection  "8 mg  Dose: 8 mg  Freq: Every 8 Hours PRN Route: IV  PRN Reasons: Nausea,Vomiting  Start: 04/30/22 0651   End: 04/30/22 1235     0851-Given                                        Physician Progress Notes      Caleb Naik MD at 05/04/22 1612          Daily progress note    Chief complaint  Doing same  No new complaints  Family at bedside   Denies chest pain shortness of breath palpitation    History of present illness  27-year-old white female with very complex past medical history including pulmonary embolism on Eliquis chronic orthostatic hypotension POTS syndrome hypothyroidism Phoebe-Danlos syndrome presented to [facility] emergency room with shortness of breath and she checked her oxygen saturation which was low and also feel palpitations and not feeling well.  Patient did miss her 1 dose of Eliquis.  Patient work-up in ER revealed recurrent pulm embolism despite on therapeutic dose of Eliquis admit for management.     REVIEW OF SYSTEMS  Unremarkable     PHYSICAL EXAM   Blood pressure 99/62, pulse 86, temperature 97.4 °F (36.3 °C), temperature source Oral, resp. rate 16, height 165.1 cm (65\"), weight 111 kg (244 lb 11.4 oz), SpO2 97 %, not currently breastfeeding.    GENERAL:  Awake and alert in no respiratory distress  HENT: nares patent  EYES: no scleral icterus  CV: regular rhythm, tachycardia  RESPIRATORY: normal effort, clear to auscultation bilaterally  ABDOMEN: soft, nontender nondistended bowel sounds positive  MUSCULOSKELETAL: no deformity  NEURO: alert, moves all extremities, follows commands  SKIN: warm, dry     LAB RESULTS  Lab Results (last 24 hours)     Procedure Component Value Units Date/Time    aPTT [[id number]]  (Abnormal) Collected: 05/04/22 1211    Specimen: Blood from Hand, Left Updated: 05/04/22 1311     .9 seconds     aPTT [[id number]]  (Abnormal) Collected: 05/04/22 0450    Specimen: Blood Updated: 05/04/22 0526     .9 seconds     Protime-INR [[id number]]  " (Abnormal) Collected: 05/04/22 0450    Specimen: Blood Updated: 05/04/22 0525     Protime 17.2 Seconds      INR 1.41    CBC & Differential [[id number]]  (Abnormal) Collected: 05/04/22 0450    Specimen: Blood Updated: 05/04/22 0510    Narrative:      The following orders were created for panel order CBC & Differential.  Procedure                               Abnormality         Status                     ---------                               -----------         ------                     CBC Auto Differential[[id number]]        Abnormal            Final result                 Please view results for these tests on the individual orders.    CBC Auto Differential [[id number]]  (Abnormal) Collected: 05/04/22 0450    Specimen: Blood Updated: 05/04/22 0510     WBC 9.09 10*3/mm3      RBC 4.24 10*6/mm3      Hemoglobin 9.4 g/dL      Hematocrit 31.4 %      MCV 74.1 fL      MCH 22.2 pg      MCHC 29.9 g/dL      RDW 17.4 %      RDW-SD 45.0 fl      MPV 10.2 fL      Platelets 359 10*3/mm3      Neutrophil % 52.5 %      Lymphocyte % 36.9 %      Monocyte % 6.3 %      Eosinophil % 1.7 %      Basophil % 0.8 %      Neutrophils, Absolute 4.79 10*3/mm3      Lymphocytes, Absolute 3.35 10*3/mm3      Monocytes, Absolute 0.57 10*3/mm3      Eosinophils, Absolute 0.15 10*3/mm3      Basophils, Absolute 0.07 10*3/mm3     aPTT [[id number]]  (Abnormal) Collected: 05/03/22 1659    Specimen: Blood from Hand, Right Updated: 05/03/22 1752     PTT 62.2 seconds         ECG 12 Lead       HEART RATE= 125  bpm  RR Interval= 480  ms  VT Interval= 141  ms  P Horizontal Axis= 35  deg  P Front Axis= 60  deg  QRSD Interval= 79  ms  QT Interval=   ms  QRS Axis= 47  deg  T Wave Axis=   deg  - ABNORMAL ECG -  Sinus tachycardia  Borderline Q waves in inferior leads  Nonspecific T abnrm, anterolateral leads               ASSESSMENT  Recurrent pulm embolism involving the right lower lobe with Eliquis.  Chronic orthostatic hypotension  POTS syndrome  Phoebe-Danlos  "syndrome  Hypothyroidism  Irritable bowel syndrome  Severe gastroparesis  Gastroesophageal reflux disease    PLAN  CPM  Heparin and bridge with Coumadin  Adjust home medications  Stress ulcer DVT prophylaxis  Pulmonary consult appreciated  Hematology follow-up with  Supportive care  Discussed with family nursing staff  Discharge home once INR more than 2.5    ANN GONZALEZ MD      Electronically signed by Ann Gonzalez MD at 05/04/22 1613     Ann Gonzalez MD at 05/03/22 1242          Daily progress note    Chief complaint  Doing better  No new complaints  Family at bedside   Denies chest pain shortness of breath palpitation    History of present illness  27-year-old white female with very complex past medical history including pulmonary embolism on Eliquis chronic orthostatic hypotension POTS syndrome hypothyroidism Phoebe-Danlos syndrome presented to [facility] emergency room with shortness of breath and she checked her oxygen saturation which was low and also feel palpitations and not feeling well.  Patient did miss her 1 dose of Eliquis.  Patient work-up in ER revealed recurrent pulm embolism despite on therapeutic dose of Eliquis admit for management.     REVIEW OF SYSTEMS  Unremarkable     PHYSICAL EXAM   Blood pressure 107/68, pulse 82, temperature 97.9 °F (36.6 °C), temperature source Oral, resp. rate 18, height 165.1 cm (65\"), weight 111 kg (244 lb 11.4 oz), SpO2 93 %, not currently breastfeeding.    GENERAL:  Awake and alert in no respiratory distress  HENT: nares patent  EYES: no scleral icterus  CV: regular rhythm, tachycardia  RESPIRATORY: normal effort, clear to auscultation bilaterally  ABDOMEN: soft, nontender nondistended bowel sounds positive  MUSCULOSKELETAL: no deformity  NEURO: alert, moves all extremities, follows commands  SKIN: warm, dry     LAB RESULTS  Lab Results (last 24 hours)     Procedure Component Value Units Date/Time    Protein S Antigen, Free [[id number]]  (Abnormal) " Collected: 04/30/22 1514    Specimen: Blood Updated: 05/03/22 1137     Protein S Ag, Free 145.0 %     Narrative:      Deficiency of Protein S is associated with a high risk of developing venous thromboembolism, especially in young adults.  Acquired deficiencies of Protein S are associated with pregnancy, hepatic disorder, oral anticoagulant therapy, disseminated intravascular coagulation (DIC), newborns, and other clinical conditions.      Protein S Functional [[id number]]  (Normal) Collected: 04/30/22 1427    Specimen: Blood Updated: 05/03/22 1130     Protein S Functional 122 %     Narrative:      Lupus anticoagulants and/or anti-phospholipid antibodies (APA) have been noted to interfere in the assay. Highly elevated levels of Factor VIII (greater than 250%) may lead to an under-estimation of the functional protein S level. Thrombin inhibitors and heparin may lead to an over-estimation of the functional protein S level.    Antithrombin III [[id number]]  (Abnormal) Collected: 04/30/22 1405    Specimen: Blood Updated: 05/03/22 0959     Antithrombin Activity 88 %     Protime-INR [[id number]]  (Abnormal) Collected: 05/03/22 0541    Specimen: Blood Updated: 05/03/22 0703     Protime 14.5 Seconds      INR 1.14    aPTT [[id number]]  (Abnormal) Collected: 05/03/22 0541    Specimen: Blood Updated: 05/03/22 0703     PTT 67.7 seconds     CBC & Differential [[id number]]  (Abnormal) Collected: 05/03/22 0541    Specimen: Blood Updated: 05/03/22 0702    Narrative:      The following orders were created for panel order CBC & Differential.  Procedure                               Abnormality         Status                     ---------                               -----------         ------                     CBC Auto Differential[[id number]]        Abnormal            Final result                 Please view results for these tests on the individual orders.    CBC Auto Differential [[id number]]  (Abnormal) Collected: 05/03/22 0541     Specimen: Blood Updated: 05/03/22 0702     WBC 7.86 10*3/mm3      RBC 4.25 10*6/mm3      Hemoglobin 9.5 g/dL      Hematocrit 30.1 %      MCV 70.8 fL      MCH 22.4 pg      MCHC 31.6 g/dL      RDW 16.9 %      RDW-SD 42.2 fl      MPV 10.5 fL      Platelets 307 10*3/mm3      Neutrophil % 52.3 %      Lymphocyte % 36.3 %      Monocyte % 7.0 %      Eosinophil % 1.7 %      Basophil % 0.9 %      Neutrophils, Absolute 4.12 10*3/mm3      Lymphocytes, Absolute 2.85 10*3/mm3      Monocytes, Absolute 0.55 10*3/mm3      Eosinophils, Absolute 0.13 10*3/mm3      Basophils, Absolute 0.07 10*3/mm3     aPTT [[id number]]  (Abnormal) Collected: 05/02/22 2256    Specimen: Blood Updated: 05/02/22 2324     PTT 94.1 seconds     aPTT [[id number]]  (Abnormal) Collected: 05/02/22 1535    Specimen: Blood Updated: 05/02/22 1635     .9 seconds       ECG 12 Lead       HEART RATE= 125  bpm  RR Interval= 480  ms  AR Interval= 141  ms  P Horizontal Axis= 35  deg  P Front Axis= 60  deg  QRSD Interval= 79  ms  QT Interval=   ms  QRS Axis= 47  deg  T Wave Axis=   deg  - ABNORMAL ECG -  Sinus tachycardia  Borderline Q waves in inferior leads  Nonspecific T abnrm, anterolateral leads           ASSESSMENT  Recurrent pulm embolism involving the right lower lobe with Eliquis.  Chronic orthostatic hypotension  POTS syndrome  Phoebe-Danlos syndrome  Hypothyroidism  Irritable bowel syndrome  Severe gastroparesis  Gastroesophageal reflux disease    PLAN  CPM  Heparin and bridge with Coumadin  Adjust home medications  Stress ulcer DVT prophylaxis  Pulmonary consult appreciated  Hematology follow-up with  Supportive care  Discussed with family nursing staff  Discharge home once INR more than 2.5    ANN GONZALEZ MD    Electronically signed by Ann Gonzalez MD at 05/03/22 1242     Ann Gonzalez MD at 05/02/22 1252          Daily progress note    Chief complaint  Doing better  No new complaints  Family at bedside   Denies chest pain shortness of breath  "palpitation    History of present illness  27-year-old white female with very complex past medical history including pulmonary embolism on Eliquis chronic orthostatic hypotension POTS syndrome hypothyroidism Phoebe-Danlos syndrome presented to [facility] emergency room with shortness of breath and she checked her oxygen saturation which was low and also feel palpitations and not feeling well.  Patient did miss her 1 dose of Eliquis.  Patient work-up in ER revealed recurrent pulm embolism despite on therapeutic dose of Eliquis admit for management.     REVIEW OF SYSTEMS  Unremarkable     PHYSICAL EXAM   Blood pressure 100/70, pulse 101, temperature 97.9 °F (36.6 °C), temperature source Oral, resp. rate 18, height 165.1 cm (65\"), weight 111 kg (244 lb 11.4 oz), SpO2 94 %, not currently breastfeeding.    GENERAL:  Awake and alert in no respiratory distress  HENT: nares patent  EYES: no scleral icterus  CV: regular rhythm, tachycardia  RESPIRATORY: normal effort, clear to auscultation bilaterally  ABDOMEN: soft, nontender nondistended bowel sounds positive  MUSCULOSKELETAL: no deformity  NEURO: alert, moves all extremities, follows commands  SKIN: warm, dry     LAB RESULTS  Lab Results (last 24 hours)     Procedure Component Value Units Date/Time    Lupus Anticoagulant [[id number]] Collected: 05/02/22 1038    Specimen: Blood Updated: 05/02/22 1220    Manual Differential [[id number]]  (Normal) Collected: 05/02/22 0654    Specimen: Blood Updated: 05/02/22 0822     Neutrophil % 66.3 %      Lymphocyte % 28.6 %      Monocyte % 5.1 %      Neutrophils Absolute 5.02 10*3/mm3      Lymphocytes Absolute 2.17 10*3/mm3      Monocytes Absolute 0.39 10*3/mm3      RBC Morphology Normal     WBC Morphology Normal     Platelet Morphology Normal    CBC & Differential [[id number]]  (Abnormal) Collected: 05/02/22 0654    Specimen: Blood Updated: 05/02/22 0822    Narrative:      The following orders were created for panel order CBC & " Differential.  Procedure                               Abnormality         Status                     ---------                               -----------         ------                     CBC Auto Differential[[id number]]        Abnormal            Final result                 Please view results for these tests on the individual orders.    CBC Auto Differential [[id number]]  (Abnormal) Collected: 05/02/22 0654    Specimen: Blood Updated: 05/02/22 0822     WBC 7.57 10*3/mm3      RBC 4.24 10*6/mm3      Hemoglobin 9.6 g/dL      Hematocrit 30.5 %      MCV 71.9 fL      MCH 22.6 pg      MCHC 31.5 g/dL      RDW 16.8 %      RDW-SD 42.0 fl      MPV 10.0 fL      Platelets 340 10*3/mm3      ECG 12 Lead       HEART RATE= 125  bpm  RR Interval= 480  ms  ND Interval= 141  ms  P Horizontal Axis= 35  deg  P Front Axis= 60  deg  QRSD Interval= 79  ms  QT Interval=   ms  QRS Axis= 47  deg  T Wave Axis=   deg  - ABNORMAL ECG -  Sinus tachycardia  Borderline Q waves in inferior leads  Nonspecific T abnrm, anterolateral leads             ASSESSMENT  Recurrent pulm embolism involving the right lower lobe with Eliquis.  Chronic orthostatic hypotension  POTS syndrome  Phoebe-Danlos syndrome  Hypothyroidism  Irritable bowel syndrome  Severe gastroparesis  Gastroesophageal reflux disease    PLAN  CPM  Heparin and bridge with Coumadin  Adjust home medications  Stress ulcer DVT prophylaxis  Pulmonary consult appreciated  Hematology follow-up with  Supportive care  Discussed with family nursing staff  Discharge home once INR more than 2.5    ANN GONZALEZ MD      Electronically signed by Ann Gonzalez MD at 05/02/22 1252     Amanda Medina MD at 05/01/22 1004            Subjective      REASON FOR FOLLOWUP/CHIEF COMPLAINT:   Recurrent PE   Anticoagulation recommendation    HISTORY OF PRESENT ILLNESS:   Continues to experience nausea which is chronic and unchanged.  She is on Compazine for the same.  No other complaints at this time    Past  Medical History, Past Surgical History, Social History, Family History have been reviewed and are without significant changes except as mentioned.    Review of Systems   Review of Systems   Constitutional: Negative.    HENT: Negative.    Respiratory: Negative.    Cardiovascular: Negative.    Gastrointestinal: Positive for nausea.   Genitourinary: Negative.    Musculoskeletal: Negative.    Skin: Negative.    Neurological: Positive for dizziness.   Hematological: Negative.    Psychiatric/Behavioral: Negative.        Medications:  The current medication list was reviewed in the EMR    ALLERGIES:    Allergies   Allergen Reactions   • Eggs Or Egg-Derived Products GI Intolerance     Rash & vomiting   • Pepcid [Famotidine] Rash and GI Intolerance   • Scopolamine Rash and GI Intolerance       Objective       Vitals:    04/30/22 1100 04/30/22 1500 04/30/22 1945 04/30/22 2246   BP: 111/69 112/56 120/66    BP Location: Right arm Right arm     Patient Position: Lying Lying Lying    Pulse: 94 97     Resp: 16 16 17 16   Temp: 98.2 °F (36.8 °C) 98.4 °F (36.9 °C) 97.9 °F (36.6 °C) 97.8 °F (36.6 °C)   TempSrc: Oral Oral Oral Oral   SpO2: 97% 96%     Weight:       Height:         Physical Exam    CONSTITUTIONAL:  Vital signs reviewed.  No distress, looks comfortable.  EYES:  Conjunctivae and lids unremarkable.  PERRLA  EARS,NOSE,MOUTH,THROAT:  Ears and nose appear unremarkable.  Lips, teeth, gums appear unremarkable.  RESPIRATORY:  Normal respiratory effort.  Lungs clear to auscultation bilaterally.  CARDIOVASCULAR:  Normal S1, S2.  No murmurs rubs or gallops.  No significant lower extremity edema.  GASTROINTESTINAL: Abdomen appears unremarkable.  Nontender.  No hepatomegaly.  No splenomegaly.  NEURO: cranial nerves 2-12 grossly intact.  No focal deficits.  Appears to have equal strength all 4 extremities.  MUSCULOSKELETAL:  Unremarkable digits/nails.  No cyanosis or clubbing.  SKIN:  Warm.  No rashes.  PSYCHIATRIC:  Normal judgment  and insight.  Normal mood and affect.       Assessment/Plan  ASSESSMENT/PLAN:  Liliam Lorenz E558/1     *Recurrent PE while on Eliquis  · She does have several comorbidities including irritable bowel syndrome, EDS, autoimmune gastritis, gastroparesis  · She remains sedentary due to the POTS syndrome  · Previous unprovoked PE in September 2021  · Has been on Eliquis since  · She has missed only 1 dose of Eliquis  ·  hypercoagulability work-up pending  · Continue heparin for now  · Since she has failed DOAC's, would have to be treated with Coumadin in the future  · Hypercoagulability work-up will be useful in determining the INR, will need a higher INR goal of 2.5-3.5 if APS testing positive  · She does have a history of gastroparesis which may make the absorption of any of the medications somewhat iffy.  · Regardless Coumadin might be a better choice since we are at least able to monitor the INR to make sure that she is therapeutic.  · Pharmacy consulted for Coumadin management     *Anemia  · Microcytic  · Hemoglobin has been low over the past 6 to 8 months  · Obtain anemia work-up  · Most likely secondary to iron deficiency as patient reports significant menorrhagia.  · She has been told before that she needs iron infusions.  · If work-up consistent with iron deficiency we will schedule her for IV iron here  · Vitamin B12 low at less than 150, administered a dose of B12 today  · Iron studies consistent with iron deficiency with a low ferritin of 6.76  · Given that she has gastroparesis we will proceed with IV iron     *POTS syndrome-reports being symptomatic with chronic dizziness when she stands     *Autoimmune gastritis  · Also has gastroparesis  · Treated with IVIG  · [facility]. Lani Escobar [facility] and Dr. Joya     *EDS     *Obesity-this is a risk factor for PE     *B12 deficiency  · B12 low at less than 150  · Administer B12 parenteral      Plan  · Start Coumadin  · Continue heparin till  "INR is therapeutic  · Follow-up on hypercoagulable work-up  · If APS testing is positive then INR goal would be 2.5-3.5 if not it would be 2-3  · She sees Dr. Adonis Eddy at [facility].  Follow-up with her hematologist on discharge    We will sign off at this time.  Please contact with any additional questions or concerns.    Electronically signed by Amanda Medina MD at 05/01/22 9907     Caleb Naik MD at 05/01/22 3922          Daily progress note    Chief complaint  Doing better  No new complaints  Family at bedside   Denies chest pain shortness of breath palpitation    History of present illness  27-year-old white female with very complex past medical history including pulmonary embolism on Eliquis chronic orthostatic hypotension POTS syndrome hypothyroidism Phoebe-Danlos syndrome presented to [facility] [place], operated by [facility] emergency room with shortness of breath and she checked her oxygen saturation which was low and also feel palpitations and not feeling well.  Patient did miss her 1 dose of Eliquis.  Patient work-up in ER revealed recurrent pulm embolism despite on therapeutic dose of Eliquis admit for management.     REVIEW OF SYSTEMS  All systems reviewed and negative except for those discussed in HPI.      PHYSICAL EXAM   Blood pressure 101/72, pulse 97, temperature 99 °F (37.2 °C), temperature source Oral, resp. rate 16, height 165.1 cm (65\"), weight 111 kg (244 lb 11.4 oz), SpO2 96 %, not currently breastfeeding.    GENERAL:  Awake and alert in no respiratory distress  HENT: nares patent  EYES: no scleral icterus  CV: regular rhythm, tachycardia  RESPIRATORY: normal effort, clear to auscultation bilaterally  ABDOMEN: soft, nontender nondistended bowel sounds positive  MUSCULOSKELETAL: no deformity  NEURO: alert, moves all extremities, follows commands  SKIN: warm, dry       ECG 12 Lead       HEART RATE= 125  bpm  RR Interval= 480  ms  MS Interval= 141  ms  P Horizontal Axis= 35  deg  P Front Axis= 60  " deg  QRSD Interval= 79  ms  QT Interval=   ms  QRS Axis= 47  deg  T Wave Axis=   deg  - ABNORMAL ECG -  Sinus tachycardia  Borderline Q waves in inferior leads  Nonspecific T abnrm, anterolateral leads             ASSESSMENT  Recurrent pulm embolism involving the right lower lobe with Eliquis.  Chronic orthostatic hypotension  POTS syndrome  Phoebe-Danlos syndrome  Hypothyroidism  Irritable bowel syndrome  Severe gastroparesis  Gastroesophageal reflux disease    PLAN  CPM  Heparin and bridge with Coumadin  Adjust home medications  Stress ulcer DVT prophylaxis  Pulmonary consult appreciated  Hematology follow-up with  Supportive care  Discussed with family nursing staff  Discharge once INR more than 2.5    ANN NAIK MD    Electronically signed by Ann Naik MD at 05/01/22 1347          Consult Notes       Ramón Brown MD at 04/30/22 1416      Consult Orders    1. Inpatient Pulmonology Consult [[id number]] ordered by Ann Naik MD at 04/30/22 1138               Group: [facility]         CONSULT NOTE    Patient Identification:  Liliam Lorenz  27 y.o.  female  1995  [id number]            Requesting physician: Dr Medina    Reason for Consultation: Recurrent pulmonary embolism    CC: Shortness of breath    History of Present Illness:  27-year-old female who presents with shortness of breath.  She apparently came to this hospital's emergency room mainly because her oxygen saturations were low but there is no actual documentation of such.  She says she has been short of breath for many months, waxing and waning.  Sometimes with mild to moderate exertion but never at rest.  She does go into some spastic cough episodes occasionally but does not produce much sputum.  Denies fever chills or hemoptysis.  She has an extensive medical history including POTS, Phoebe-Danlos, fibromyalgia, Raynaud's, irritable bowel disease, hemiplegic migraine and obesity.  She was diagnosed with pulmonary embolism  "in September 2021.  She has been on Eliquis since then and was seen by Dr. Adonis Eddy, hematologist at [facility] who recommended lifelong anticoagulation due to her unprovoked pulmonary embolism and her multiple comorbidities.  She now presents with recurrent right pulmonary embolism.  There is no evidence of right heart strain or hypoxemia.  Previously she was on Depo-Provera but has been off of this for about 5 months.  She does have a history of endometriosis.        Review of Systems   Constitutional: Positive for fatigue. Negative for diaphoresis and fever.   HENT: Negative for ear discharge and sore throat.    Eyes: Negative for pain and visual disturbance.   Respiratory: Positive for shortness of breath. Negative for cough.    Cardiovascular: Negative for chest pain and leg swelling.   Gastrointestinal: Positive for abdominal pain and constipation. Negative for diarrhea.   Endocrine: Negative for cold intolerance and polyuria.   Genitourinary: Negative for dysuria and hematuria.   Musculoskeletal: Negative for joint swelling and myalgias.   Skin: Negative for rash and wound.   Neurological: Negative for speech difficulty and numbness.   Hematological: Negative for adenopathy. Does not bruise/bleed easily.   Psychiatric/Behavioral: Negative for agitation and confusion.       Allergies:  Allergies   Allergen Reactions   • Eggs Or Egg-Derived Products GI Intolerance     Rash & vomiting   • Pepcid [Famotidine] Rash and GI Intolerance   • Scopolamine Rash and GI Intolerance       Physical Exam:  /69 (BP Location: Right arm, Patient Position: Lying)   Pulse 94   Temp 98.2 °F (36.8 °C) (Oral)   Resp 16   Ht 165.1 cm (65\")   Wt 111 kg (244 lb 11.4 oz)   SpO2 97%   BMI 40.72 kg/m²  Body mass index is 40.72 kg/m². 97% 111 kg (244 lb 11.4 oz)  Physical Exam  HENT:      Right Ear: External ear normal.      Left Ear: External ear normal.      Nose: Nose normal.   Eyes:      Conjunctiva/sclera: " Conjunctivae normal.      Pupils: Pupils are equal, round, and reactive to light.   Neck:      Thyroid: No thyromegaly.      Vascular: No JVD.      Trachea: No tracheal deviation.   Cardiovascular:      Rate and Rhythm: Normal rate and regular rhythm.      Heart sounds: Normal heart sounds. No murmur heard.  Pulmonary:      Effort: Pulmonary effort is normal.      Breath sounds: Normal breath sounds.   Abdominal:      Palpations: Abdomen is soft.      Tenderness: There is no abdominal tenderness. There is no rebound.      Comments: Overweight abdomen   Musculoskeletal:         General: No deformity. Normal range of motion.      Cervical back: Neck supple. No rigidity.   Skin:     General: Skin is warm.      Findings: No rash.      Comments: No palpable nodules   Neurological:      General: No focal deficit present.      Mental Status: She is alert and oriented to person, place, and time.      Cranial Nerves: No cranial nerve deficit.      Motor: No weakness.   Psychiatric:         Mood and Affect: Mood normal.         Thought Content: Thought content normal.         Assessment:  Recurrent pulmonary embolism  Obesity  Probable autoimmune disease  Unprovoked pulmonary embolism September 2021    Recommendations:  Agree with hematology, obtain autoimmune screening serology and hypercoagulable blood work-up.  From the pulmonary standpoint there is nothing else to add.  She is not requiring oxygen.  She has very minute pulmonary emboli, small clot burden.  Agree with switching from Eliquis to Coumadin.  Pulmonology will sign off.  No need to follow-up with us as outpatient      Ramón Brown MD  4/30/2022  14:17 EDT      Much of this encounter note is an electronic transcription/translation of spoken language to printed text using Dragon Software.    Electronically signed by Ramón Brown MD at 04/30/22 2532     Amanda Medina MD at 04/30/22 1207      Consult Orders    1. Hematology & Oncology Inpatient Consult  [[id number]] ordered by Caleb Naik MD at 04/30/22 1146                 Subjective  .     REASON FOR CONSULTATION:     Provide an opinion on any further workup or treatment  PE while on anticoagulation with Eliquis  Recommendations on anticoagulation                             REQUESTING PHYSICIAN: Caleb Naik MD  RECORDS OBTAINED:  Records of the patient's history including those obtained from the referring provider were reviewed and summarized in detail.    HISTORY OF PRESENT ILLNESS:  The patient is a 27 y.o. year old female  who is here for follow-up with the above-mentioned history.  Ms. Lorenz is a 27-year-old with several comorbidities including irritable bowel disease, gastroesophageal reflux disease, gastroparesis, Raynaud's, hemiplegic migraine, fibromyalgia, EDS, POTS syndrome due to but she has limited mobility as she is symptomatic with dizziness when she stands up.  She is also obese.  Previous unprovoked bilateral PE in September 2021 for which she has been on Eliquis.  She has been seen by Dr. Adonis Eddy at [facility] who recommended indefinite anticoagulation given that she had unprovoked PE and multiple comorbidities.  She denies any family history of DVT/PE, family history limited as her father was adopted.  She presented to the emergency room 4/29/2022 with complaints of acute onset of palpitations and dyspnea.  CT PE performed 4/30/2022 with evidence of pulmonary emboli within the pulmonary artery branches of the right lower lobe.  No evidence of pulmonary infarction or right heart strain.  Patient has been started on heparin and hematology has been consulted for management of anticoagulation given Eliquis failure.CBC reviewed and noted to be chronically anemic with an MCV of 73 since 2021.  Hemoglobin 4/30/2022 noted to be 9.  CMP shows elevated LFTs with an ALT of 41, AST 41 otherwise Within normal limits.  She reports being compliant with Eliquis except for missing 1 dose  "yesterday.  She was previously on Depo-Provera however she has not received that in over 5 to 6 months.  She does report menorrhagia and endometriosis.  She also has autoimmune gastritis for which she has been on IVIG and also receives monthly B12 injections.  She is treated by Dr. Lani Escobar at [facility] motility clinic.      ALLERGIES:     Allergies   Allergen Reactions   • Eggs Or Egg-Derived Products GI Intolerance     Rash & vomiting   • Pepcid [Famotidine] Rash and GI Intolerance   • Scopolamine Rash and GI Intolerance       REVIEW OF SYSTEMS:  Review of Systems   Constitutional: Negative.    HENT: Negative.    Eyes: Negative.    Respiratory: Positive for chest tightness and shortness of breath.    Cardiovascular: Positive for palpitations.   Gastrointestinal: Negative.    Endocrine: Negative.    Genitourinary: Negative.    Musculoskeletal: Negative.    Skin: Negative.    Allergic/Immunologic: Negative.    Neurological: Negative.    Hematological: Negative.    Psychiatric/Behavioral: Negative.          Objective     Vitals:    04/30/22 0358 04/30/22 0500 04/30/22 0700 04/30/22 1100   BP:  121/76 108/66 111/69   BP Location:  Left arm Left arm Right arm   Patient Position:  Lying Lying Lying   Pulse: 107 106 96 94   Resp:  17 18 16   Temp:  97.9 °F (36.6 °C) 97.7 °F (36.5 °C) 98.2 °F (36.8 °C)   TempSrc:  Oral Oral Oral   SpO2: 99% 98% 99% 97%   Weight:  111 kg (244 lb 11.4 oz)     Height:  165.1 cm (65\")       No flowsheet data found.   PHYSICAL EXAM:      CONSTITUTIONAL:  Vital signs reviewed.  No distress, looks comfortable.  EYES:  Conjunctivae and lids unremarkable.  PERRLA  EARS,NOSE,MOUTH,THROAT:  Ears and nose appear unremarkable.  Lips, teeth, gums appear unremarkable.  RESPIRATORY:  Normal respiratory effort.  Lungs clear to auscultation bilaterally.  CARDIOVASCULAR:  Normal S1, S2.  No murmurs rubs or gallops.  No significant lower extremity edema.  GASTROINTESTINAL: Abdomen " appears unremarkable.  Nontender.  No hepatomegaly.  No splenomegaly.  LYMPHATIC:  No cervical, supraclavicular, axillary lymphadenopathy.  MUSCULOSKELETAL:  Unremarkable gait and station.  Unremarkable digits/nails.  No cyanosis or clubbing.  SKIN:  Warm.  No rashes.  PSYCHIATRIC:  Normal judgment and insight.  Normal mood and affect.      Assessment/Plan   [unfilled]      Liliam Lorenz     *Recurrent PE while on Eliquis  · She does have several comorbidities including irritable bowel syndrome, EDS, autoimmune gastritis, gastroparesis  · She is very large part very sedentary due to the POTS syndrome  · Previous unprovoked PE in September 2021  · Has been on Eliquis since  · She has missed only 1 dose of Eliquis  · We will obtain hypercoagulable work-up  · Continue heparin for now  · Since she has failed DOAC's, would have to be treated with Coumadin in the future  · Hypercoagulability work-up will be useful in determining the INR, will need a higher INR goal of 2.5-3.5 if APS testing positive  · She does have a history of gastroparesis which may make the absorption of any of the medications somewhat iffy.  · Regardless Coumadin might be a better choice since we are at least able to monitor the INR to make sure that she is therapeutic.    *Anemia  · Microcytic  · Hemoglobin has been low over the past 6 to 8 months  · Obtain anemia work-up  · Most likely secondary to iron deficiency as patient reports significant menorrhagia.  · She has been told before that she needs iron infusions.  · If work-up consistent with iron deficiency we will schedule her for IV iron here  · She has also been B12 deficiency in the past.    *POTS syndrome-reports being symptomatic with chronic dizziness when she stands    *Autoimmune gastritis  · Also has gastroparesis  · Treated with IVIG  · Sees Dr. Lani Escobar at the motility clinic and Dr. Joya    *EDS    *Obesity-this is a risk factor for PE    *B12 deficiency  · Obtain B12  levels today  · Patient reports being on B12 injections as an outpatient  · Has not received 1 over 2 months  · We will administer while she is on here      Plan  · Obtain iron studies, B12 folic acid  · Continue heparin  · Start Coumadin, pharmacy to dose  · Hypercoagulable work-up    Plan discussed with patient her wife and friend at bedside.      Electronically signed by Amanda Medina MD at 04/30/22 0600

## 2022-05-05 NOTE — PLAN OF CARE
Problem: Fall Injury Risk  Goal: Absence of Fall and Fall-Related Injury  Outcome: Ongoing, Progressing   Goal Outcome Evaluation:  Plan of Care Reviewed With: patient, significant other        Progress: improving  Outcome Evaluation: VSS. No c/o pain. Hep gtt infusing, PTT q6. Will discharge home once INR is in range. Will monitor.

## 2022-05-05 NOTE — PROGRESS NOTES
"Daily progress note    Chief complaint  Doing same  No new complaints  Family at bedside   Denies chest pain shortness of breath palpitation    History of present illness  27-year-old white female with very complex past medical history including pulmonary embolism on Eliquis chronic orthostatic hypotension POTS syndrome hypothyroidism Phoebe-Danlos syndrome presented to [facility] emergency room with shortness of breath and she checked her oxygen saturation which was low and also feel palpitations and not feeling well.  Patient did miss her 1 dose of Eliquis.  Patient work-up in ER revealed recurrent pulm embolism despite on therapeutic dose of Eliquis admit for management.     REVIEW OF SYSTEMS  Unremarkable     PHYSICAL EXAM   Blood pressure 114/59, pulse 84, temperature 97.6 °F (36.4 °C), temperature source Oral, resp. rate 16, height 165.1 cm (65\"), weight 111 kg (244 lb 11.4 oz), SpO2 97 %, not currently breastfeeding.    GENERAL:  Awake and alert in no respiratory distress  HENT: nares patent  EYES: no scleral icterus  CV: regular rhythm, tachycardia  RESPIRATORY: normal effort, clear to auscultation bilaterally  ABDOMEN: soft, nontender nondistended bowel sounds positive  MUSCULOSKELETAL: no deformity  NEURO: alert, moves all extremities, follows commands  SKIN: warm, dry     LAB RESULTS  Lab Results (last 24 hours)     Procedure Component Value Units Date/Time    aPTT [[id number]]  (Abnormal) Collected: 05/05/22 1110    Specimen: Blood Updated: 05/05/22 1155     .3 seconds     Protime-INR [[id number]]  (Abnormal) Collected: 05/05/22 1110    Specimen: Blood Updated: 05/05/22 1146     Protime 19.8 Seconds      INR 1.68    CBC & Differential [[id number]]  (Abnormal) Collected: 05/05/22 0924    Specimen: Blood Updated: 05/05/22 1014    Narrative:      The following orders were created for panel order CBC & Differential.  Procedure                               Abnormality         Status            "          ---------                               -----------         ------                     CBC Auto Differential[[id number]]        Abnormal            Final result                 Please view results for these tests on the individual orders.    CBC Auto Differential [[id number]]  (Abnormal) Collected: 05/05/22 0924    Specimen: Blood Updated: 05/05/22 1014     WBC 6.73 10*3/mm3      RBC 4.54 10*6/mm3      Hemoglobin 10.2 g/dL      Hematocrit 36.3 %      MCV 80.0 fL      MCH 22.5 pg      MCHC 28.1 g/dL      RDW 18.4 %      RDW-SD 49.0 fl      MPV 10.0 fL      Platelets 312 10*3/mm3      Neutrophil % 57.6 %      Lymphocyte % 32.2 %      Monocyte % 6.7 %      Eosinophil % 1.6 %      Basophil % 0.7 %      Immature Grans % 1.2 %      Neutrophils, Absolute 3.87 10*3/mm3      Lymphocytes, Absolute 2.17 10*3/mm3      Monocytes, Absolute 0.45 10*3/mm3      Eosinophils, Absolute 0.11 10*3/mm3      Basophils, Absolute 0.05 10*3/mm3      Immature Grans, Absolute 0.08 10*3/mm3      nRBC 0.1 /100 WBC     aPTT [[id number]]  (Abnormal) Collected: 05/05/22 0101    Specimen: Blood Updated: 05/05/22 0212     PTT >200.0 seconds     POC Glucose Once [[id number]]  (Normal) Collected: 05/04/22 1800    Specimen: Blood Updated: 05/04/22 1802     Glucose 92 mg/dL      Comment: Meter: [id number] : [id number] Diana Blanchard ANUP       aPTT [[id number]]  (Abnormal) Collected: 05/04/22 1551    Specimen: Blood Updated: 05/04/22 1654     PTT 47.0 seconds     Lupus Anticoagulant [[id number]]  (Abnormal) Collected: 05/02/22 1038    Specimen: Blood Updated: 05/04/22 1615     Dilute Prothrombin Time(dPT) 38.0 sec      dPT Confirm Ratio 1.03 Ratio      Thrombin Time 48.4 sec      PTT Lupus Anticoagulant 47.7 sec      Comment: Additional testing confirms the presence of heparin in the test  sample. Results obtained after heparin neutralization.        Dilute Viper Venom Time 36.4 sec      Lupus Anticoagulant Reflex Comment:     Comment: No lupus  anticoagulant was detected. The extended thrombin time corrected  on addition of a heparin neutralizer, consistent with the presence of  heparin in the sample. Heparin acts as a non-specific inhibitor.  Important Note: The results of lupus anticoagulant (LA) testing are not  valid for patients receiving heparin. Despite neutralization of heparin in  the laboratory, the presence of heparin may produce false LA results.  Heparin does not, however, interfere with anticardiolipin and beta-2  glycoprotein 1 antibody testing.       Narrative:      Performed at:  [address] [place] [address]  [id number]  : Boaz Evans MD, Phone:  [phone]    Reflexed TT Mix+TTN [[id number]]  (Abnormal) Collected: 05/02/22 1038    Specimen: Blood Updated: 05/04/22 1615     Thrombin Time Mix 37.5 sec      Thrombin Neutralization 21.7 sec     Narrative:      Performed at:  [address] [place] [address]  [id number]  : Boaz Evans MD, Phone:  [phone]        Imaging Results (Last 24 Hours)     ** No results found for the last 24 hours. **        ECG 12 Lead       HEART RATE= 125  bpm  RR Interval= 480  ms  IL Interval= 141  ms  P Horizontal Axis= 35  deg  P Front Axis= 60  deg  QRSD Interval= 79  ms  QT Interval=   ms  QRS Axis= 47  deg  T Wave Axis=   deg  - ABNORMAL ECG -  Sinus tachycardia  Borderline Q waves in inferior leads  Nonspecific T abnrm, anterolateral leads             Current Facility-Administered Medications:   •  albuterol (PROVENTIL) nebulizer solution 0.083% 2.5 mg/3mL, 2.5 mg, Nebulization, Q6H PRN, Caleb Naik MD  •  cholecalciferol (VITAMIN D3) tablet 1,000 Units, 1,000 Units, Oral, Daily, Caleb Naik MD, 1,000 Units at 05/05/22 0849  •  cyanocobalamin injection 1,000 mcg, 1,000 mcg, Intramuscular, Q28 Days, Amanda Medina MD, 1,000 mcg at 05/01/22 1250  •  dronabinol (MARINOL) capsule 5 mg, 5 mg, Oral, TID, Caleb Naik MD, 5  mg at 05/05/22 1510  •  gabapentin (NEURONTIN) capsule 100 mg, 100 mg, Oral, TID, Caleb Naik MD, 100 mg at 05/05/22 1510  •  heparin (porcine) 5000 UNIT/ML injection 4,500-9,000 Units, 40-80 Units/kg (Order-Specific), Intravenous, Q6H PRN, Moe Guzman MD, 8,880 Units at 05/04/22 1738  •  heparin 11263 units/250 mL (100 units/mL) in 0.9% NaCl infusion, 13.3 Units/kg/hr (Order-Specific), Intravenous, Titrated, Moe Guzman MD, Last Rate: 10.5 mL/hr at 05/05/22 1353, 9.3 Units/kg/hr at 05/05/22 1353  •  hydrOXYzine pamoate (VISTARIL) capsule 50 mg, 50 mg, Oral, TID PRN, Caelb Naik MD, 50 mg at 05/02/22 2130  •  levothyroxine (SYNTHROID, LEVOTHROID) tablet 50 mcg, 50 mcg, Oral, Q AM, Caleb Naik MD, 50 mcg at 05/05/22 0621  •  metoprolol tartrate (LOPRESSOR) tablet 25 mg, 25 mg, Oral, TID, Caleb Naik MD, 25 mg at 05/05/22 1510  •  midodrine (PROAMATINE) tablet 10 mg, 10 mg, Oral, TID AC, Caleb Naik MD, 10 mg at 05/05/22 1059  •  montelukast (SINGULAIR) tablet 10 mg, 10 mg, Oral, Nightly, Caleb Naik MD, 10 mg at 05/04/22 2124  •  [DISCONTINUED] ondansetron (ZOFRAN) tablet 8 mg, 8 mg, Oral, Q8H PRN **OR** ondansetron (ZOFRAN) injection 4 mg, 4 mg, Intravenous, Q8H PRN, Caleb Naik MD, 4 mg at 05/05/22 1248  •  pantoprazole (PROTONIX) EC tablet 40 mg, 40 mg, Oral, Q AM, Caleb Naik MD, 40 mg at 05/05/22 0621  •  Pharmacy to dose warfarin, , Does not apply, Continuous PRN, Caleb Naik MD  •  phenazopyridine (PYRIDIUM) tablet 100 mg, 100 mg, Oral, TID With Meals, Caleb Naik MD, 100 mg at 05/05/22 0849  •  prochlorperazine (COMPAZINE) tablet 10 mg, 10 mg, Oral, Q6H PRN, Caleb Naik MD, 10 mg at 05/04/22 1553  •  pyridostigmine (MESTINON) tablet 30 mg, 30 mg, Oral, Daily, Caleb Naik MD, 30 mg at 05/05/22 0849  •  [COMPLETED] Insert peripheral IV, , , Once **AND** sodium chloride 0.9 % flush 10 mL, 10 mL, Intravenous, PRN, Moe Guzman MD, 10 mL at 05/02/22 0824  •   Vortioxetine HBr 5 MG 5 mg, 5 mg, Oral, Daily, Ann Naik MD, 5 mg at 05/05/22 0852  •  warfarin (COUMADIN) tablet 6 mg, 6 mg, Oral, Daily, Amanda Medina MD, 6 mg at 05/04/22 1738     ASSESSMENT  Recurrent pulm embolism involving the right lower lobe with Eliquis.  Chronic orthostatic hypotension  POTS syndrome  Phoebe-Danlos syndrome  Hypothyroidism  Irritable bowel syndrome  Severe gastroparesis  Gastroesophageal reflux disease    PLAN  CPM  Heparin and bridge with Coumadin  Adjust home medications  Stress ulcer DVT prophylaxis  Pulmonary consult appreciated  Hematology follow-up with  Supportive care  Discussed with family nursing staff  Discharge home once INR more than 2.5    ANN NAIK MD

## 2022-05-06 LAB
APTT PPP: 65.2 SECONDS (ref 22.7–35.4)
APTT PPP: 90.3 SECONDS (ref 22.7–35.4)
BASOPHILS # BLD AUTO: 0.03 10*3/MM3 (ref 0–0.2)
BASOPHILS NFR BLD AUTO: 0.4 % (ref 0–1.5)
DEPRECATED RDW RBC AUTO: 46.2 FL (ref 37–54)
EOSINOPHIL # BLD AUTO: 0.1 10*3/MM3 (ref 0–0.4)
EOSINOPHIL NFR BLD AUTO: 1.3 % (ref 0.3–6.2)
ERYTHROCYTE [DISTWIDTH] IN BLOOD BY AUTOMATED COUNT: 18.5 % (ref 12.3–15.4)
HCT VFR BLD AUTO: 32.4 % (ref 34–46.6)
HGB BLD-MCNC: 9.7 G/DL (ref 12–15.9)
IMM GRANULOCYTES # BLD AUTO: 0.09 10*3/MM3 (ref 0–0.05)
IMM GRANULOCYTES NFR BLD AUTO: 1.2 % (ref 0–0.5)
INR PPP: 1.85 (ref 0.9–1.1)
LYMPHOCYTES # BLD AUTO: 2.64 10*3/MM3 (ref 0.7–3.1)
LYMPHOCYTES NFR BLD AUTO: 35 % (ref 19.6–45.3)
MCH RBC QN AUTO: 22.5 PG (ref 26.6–33)
MCHC RBC AUTO-ENTMCNC: 29.9 G/DL (ref 31.5–35.7)
MCV RBC AUTO: 75.2 FL (ref 79–97)
MONOCYTES # BLD AUTO: 0.54 10*3/MM3 (ref 0.1–0.9)
MONOCYTES NFR BLD AUTO: 7.2 % (ref 5–12)
NEUTROPHILS NFR BLD AUTO: 4.14 10*3/MM3 (ref 1.7–7)
NEUTROPHILS NFR BLD AUTO: 54.9 % (ref 42.7–76)
NRBC BLD AUTO-RTO: 0.1 /100 WBC (ref 0–0.2)
PLATELET # BLD AUTO: 340 10*3/MM3 (ref 140–450)
PMV BLD AUTO: 10 FL (ref 6–12)
PROTHROMBIN TIME: 21.3 SECONDS (ref 11.7–14.2)
RBC # BLD AUTO: 4.31 10*6/MM3 (ref 3.77–5.28)
WBC NRBC COR # BLD: 7.54 10*3/MM3 (ref 3.4–10.8)

## 2022-05-06 PROCEDURE — 63710000001 PROCHLORPERAZINE MALEATE PER 10 MG: Performed by: HOSPITALIST

## 2022-05-06 PROCEDURE — 85730 THROMBOPLASTIN TIME PARTIAL: CPT | Performed by: HOSPITALIST

## 2022-05-06 PROCEDURE — 85025 COMPLETE CBC W/AUTO DIFF WBC: CPT | Performed by: EMERGENCY MEDICINE

## 2022-05-06 PROCEDURE — 25010000002 ONDANSETRON PER 1 MG: Performed by: HOSPITALIST

## 2022-05-06 PROCEDURE — 25010000002 HEPARIN (PORCINE) PER 1000 UNITS: Performed by: EMERGENCY MEDICINE

## 2022-05-06 PROCEDURE — 63710000001 DRONABINOL PER 2.5 MG: Performed by: HOSPITALIST

## 2022-05-06 PROCEDURE — 85610 PROTHROMBIN TIME: CPT | Performed by: INTERNAL MEDICINE

## 2022-05-06 RX ORDER — PROCHLORPERAZINE MALEATE 10 MG
10 TABLET ORAL EVERY 4 HOURS PRN
Status: DISCONTINUED | OUTPATIENT
Start: 2022-05-06 | End: 2022-05-08 | Stop reason: HOSPADM

## 2022-05-06 RX ORDER — ONDANSETRON 2 MG/ML
4 INJECTION INTRAMUSCULAR; INTRAVENOUS EVERY 4 HOURS PRN
Status: DISCONTINUED | OUTPATIENT
Start: 2022-05-06 | End: 2022-05-08 | Stop reason: HOSPADM

## 2022-05-06 RX ADMIN — ONDANSETRON 4 MG: 2 INJECTION INTRAMUSCULAR; INTRAVENOUS at 22:24

## 2022-05-06 RX ADMIN — PHENAZOPYRIDINE HYDROCHLORIDE 100 MG: 100 TABLET ORAL at 08:31

## 2022-05-06 RX ADMIN — MIDODRINE HYDROCHLORIDE 10 MG: 5 TABLET ORAL at 11:31

## 2022-05-06 RX ADMIN — WARFARIN 7 MG: 6 TABLET ORAL at 18:08

## 2022-05-06 RX ADMIN — GABAPENTIN 100 MG: 100 CAPSULE ORAL at 15:53

## 2022-05-06 RX ADMIN — VORTIOXETINE 10 MG: 10 TABLET, FILM COATED ORAL at 11:31

## 2022-05-06 RX ADMIN — LEVOTHYROXINE SODIUM 50 MCG: 0.05 TABLET ORAL at 06:22

## 2022-05-06 RX ADMIN — Medication 10 ML: at 08:31

## 2022-05-06 RX ADMIN — PYRIDOSTIGMINE BROMIDE 30 MG: 60 TABLET ORAL at 08:30

## 2022-05-06 RX ADMIN — PROCHLORPERAZINE MALEATE 10 MG: 10 TABLET ORAL at 20:06

## 2022-05-06 RX ADMIN — PROCHLORPERAZINE MALEATE 10 MG: 10 TABLET ORAL at 15:53

## 2022-05-06 RX ADMIN — ONDANSETRON 4 MG: 2 INJECTION INTRAMUSCULAR; INTRAVENOUS at 09:45

## 2022-05-06 RX ADMIN — PHENAZOPYRIDINE HYDROCHLORIDE 100 MG: 100 TABLET ORAL at 11:31

## 2022-05-06 RX ADMIN — ONDANSETRON 4 MG: 2 INJECTION INTRAMUSCULAR; INTRAVENOUS at 13:52

## 2022-05-06 RX ADMIN — DRONABINOL 5 MG: 2.5 CAPSULE ORAL at 09:39

## 2022-05-06 RX ADMIN — PANTOPRAZOLE SODIUM 40 MG: 40 TABLET, DELAYED RELEASE ORAL at 06:22

## 2022-05-06 RX ADMIN — ONDANSETRON 4 MG: 2 INJECTION INTRAMUSCULAR; INTRAVENOUS at 18:05

## 2022-05-06 RX ADMIN — HEPARIN SODIUM 9.29 UNITS/KG/HR: 5000 INJECTION INTRAVENOUS; SUBCUTANEOUS at 09:49

## 2022-05-06 RX ADMIN — METOPROLOL TARTRATE 25 MG: 25 TABLET, FILM COATED ORAL at 15:53

## 2022-05-06 RX ADMIN — METOPROLOL TARTRATE 25 MG: 25 TABLET, FILM COATED ORAL at 08:30

## 2022-05-06 RX ADMIN — MIDODRINE HYDROCHLORIDE 10 MG: 5 TABLET ORAL at 08:31

## 2022-05-06 RX ADMIN — MIDODRINE HYDROCHLORIDE 10 MG: 5 TABLET ORAL at 18:05

## 2022-05-06 RX ADMIN — MONTELUKAST SODIUM 10 MG: 10 TABLET, FILM COATED ORAL at 20:03

## 2022-05-06 RX ADMIN — HYDROXYZINE PAMOATE 50 MG: 50 CAPSULE ORAL at 15:22

## 2022-05-06 RX ADMIN — GABAPENTIN 100 MG: 100 CAPSULE ORAL at 20:03

## 2022-05-06 RX ADMIN — DRONABINOL 5 MG: 2.5 CAPSULE ORAL at 15:53

## 2022-05-06 RX ADMIN — GABAPENTIN 100 MG: 100 CAPSULE ORAL at 08:30

## 2022-05-06 RX ADMIN — PHENAZOPYRIDINE HYDROCHLORIDE 100 MG: 100 TABLET ORAL at 18:08

## 2022-05-06 RX ADMIN — Medication 1000 UNITS: at 08:31

## 2022-05-06 RX ADMIN — METOPROLOL TARTRATE 25 MG: 25 TABLET, FILM COATED ORAL at 20:03

## 2022-05-06 NOTE — CASE MANAGEMENT/SOCIAL WORK
Continued Stay Note  [facility]     Patient Name: Liliam Lorenz  MRN: [id number]  Today's Date: 5/6/2022    Admit Date: 4/29/2022     Discharge Plan     Row Name 05/06/22 1314       Plan    Plan Return home    Plan Comments Spoke with pt and family to discuss any concerns, they denied any needs except the INR appt already made with PCP, plan remains home. CCP will continue to follow.               Discharge Codes    No documentation.               Expected Discharge Date and Time     Expected Discharge Date Expected Discharge Time    May 6, 2022             Dorene Reardon RN

## 2022-05-06 NOTE — PLAN OF CARE
Problem: Adult Inpatient Plan of Care  Goal: Plan of Care Review  Outcome: Ongoing, Progressing  Flowsheets (Taken 5/5/2022 1325 by Justa Godoy, RN)  Outcome Evaluation: VSS. No c/o pain. Hep gtt infusing, PTT q6. Will discharge home once INR is in range. Will monitor.  Goal: Patient-Specific Goal (Individualized)  Outcome: Ongoing, Progressing  Goal: Absence of Hospital-Acquired Illness or Injury  Outcome: Ongoing, Progressing  Intervention: Identify and Manage Fall Risk  Recent Flowsheet Documentation  Taken 5/6/2022 0232 by Lily Knapp, RN  Safety Promotion/Fall Prevention:   activity supervised   assistive device/personal items within reach   clutter free environment maintained   fall prevention program maintained   lighting adjusted   mobility aid in reach   nonskid shoes/slippers when out of bed   safety round/check completed   toileting scheduled  Taken 5/6/2022 0003 by Lily Knapp, RN  Safety Promotion/Fall Prevention:   activity supervised   assistive device/personal items within reach   clutter free environment maintained   fall prevention program maintained   lighting adjusted   mobility aid in reach   nonskid shoes/slippers when out of bed   safety round/check completed   toileting scheduled  Taken 5/5/2022 2237 by Lily Knapp, RN  Safety Promotion/Fall Prevention:   activity supervised   assistive device/personal items within reach   clutter free environment maintained   fall prevention program maintained   lighting adjusted   mobility aid in reach   nonskid shoes/slippers when out of bed   safety round/check completed   toileting scheduled  Taken 5/5/2022 2014 by Lily Knapp, RN  Safety Promotion/Fall Prevention:   activity supervised   assistive device/personal items within reach   clutter free environment maintained   fall prevention program maintained   lighting adjusted   mobility aid in reach   nonskid shoes/slippers when out of bed   safety round/check  completed   toileting scheduled  Intervention: Prevent and Manage VTE (Venous Thromboembolism) Risk  Recent Flowsheet Documentation  Taken 5/6/2022 0232 by Lily Knapp, RN  Activity Management: up ad michael  VTE Prevention/Management:   bilateral   dorsiflexion/plantar flexion performed  Taken 5/6/2022 0003 by Lily Knapp, RN  Activity Management: up ad michael  Taken 5/5/2022 2237 by Lily Knapp, RN  Activity Management: up ad michael  Taken 5/5/2022 2014 by Lily Knapp, RN  Activity Management: up ad michael  VTE Prevention/Management: (on warfarin and hep gtt)   bilateral   dorsiflexion/plantar flexion performed  Goal: Optimal Comfort and Wellbeing  Outcome: Ongoing, Progressing  Intervention: Provide Person-Centered Care  Recent Flowsheet Documentation  Taken 5/6/2022 0232 by Lily Knapp, RN  Trust Relationship/Rapport:   care explained   choices provided   emotional support provided   questions answered   questions encouraged   reassurance provided   thoughts/feelings acknowledged  Taken 5/5/2022 2014 by Lily Knapp, RN  Trust Relationship/Rapport:   care explained   choices provided   emotional support provided   questions answered   questions encouraged   reassurance provided   thoughts/feelings acknowledged  Goal: Readiness for Transition of Care  Outcome: Ongoing, Progressing   Goal Outcome Evaluation:              Outcome Evaluation: pt arrived from ER on heparin gtt. ST on the monitor. AO x 4. WCM

## 2022-05-06 NOTE — PROGRESS NOTES
[facility] Clinical Pharmacy Services: Warfarin Dosing/Monitoring Consult    Liliam Lorenz is a 27 y.o. female, estimated creatinine clearance is 161.3 mL/min (by C-G formula based on SCr of 0.65 mg/dL). weighing 111 kg (244 lb 11.4 oz).    Results from last 7 days   Lab Units 05/06/22  0316 05/05/22 2010 05/05/22  1110 05/05/22  0924 05/05/22  0101 05/04/22  1551 05/04/22  1211 05/04/22  0450 05/03/22  1659 05/03/22  0541 05/02/22  1535 05/02/22  0654   INR  1.85*  --  1.68*  --   --   --   --  1.41*  --  1.14*  --  1.01   APTT seconds 65.2* 104.5* 174.3*  --  >200.0* 47.0*   < > 101.9*   < > 67.7*   < > 50.8*   HEMOGLOBIN g/dL 9.7*  --   --  10.2*  --   --   --  9.4*  --  9.5*  --  9.6*   HEMATOCRIT % 32.4*  --   --  36.3  --   --   --  31.4*  --  30.1*  --  30.5*   PLATELETS 10*3/mm3 340  --   --  312  --   --   --  359  --  307  --  340    < > = values in this interval not displayed.     Prior to admission anticoagulation: [facility] Anticoagulation:  Consulting provider: Dr. Medina  Start date: 5/1/22  Indication: recurrent PE-failed eliquis; (hypercoag workup per hematology)    Target INR: 2 - 3 initially-may need higher goal depending on lab results  Expected duration: lifetime   Bridge Therapy: Yes  with unfractionated heparin    Potential food or drug interactions:    Dronabinol-moderate-may increase INR and risk of bleeding  Levothyroxine-moderate-may increase INR/bleeding risk  Pantoprazole-moderate-may increase INR  Vortioxetine-moderate-may increase bleed risk d/t mild antiplatelet effects of SSRIs    Education complete?/Date: No; plan for follow up TBD    Assessment/Plan:    INR remains subtherapeutic at 1.85 but continues to trend up nicely. Given that the goal INR is 2.5-3.5 I will increase the daily dose to 7 mg daily.  Monitor for any signs or symptoms of bleeding. CBC stable.  Follow up daily INRs and dose adjustments. INR ordered daily with AM labs while inpatient.  Recommend  continuing bridge therapy with heparin IV until INR is within goal range for at least 24 hours.    Pharmacy will continue to follow until discharge or discontinuation of warfarin.     Michelle Connors, [facility]  Clinical Pharmacist

## 2022-05-06 NOTE — PROGRESS NOTES
"Daily progress note    Chief complaint  Doing same  No new complaints except nausea  No abdominal pain or vomiting  Family at bedside   Denies chest pain shortness of breath palpitation    History of present illness  27-year-old white female with very complex past medical history including pulmonary embolism on Eliquis chronic orthostatic hypotension POTS syndrome hypothyroidism Phoebe-Danlos syndrome presented to [facility] emergency room with shortness of breath and she checked her oxygen saturation which was low and also feel palpitations and not feeling well.  Patient did miss her 1 dose of Eliquis.  Patient work-up in ER revealed recurrent pulm embolism despite on therapeutic dose of Eliquis admit for management.     REVIEW OF SYSTEMS  Unremarkable     PHYSICAL EXAM   Blood pressure 101/71, pulse 78, temperature 97.5 °F (36.4 °C), temperature source Oral, resp. rate 16, height 165.1 cm (65\"), weight 111 kg (244 lb 11.4 oz), SpO2 97 %, not currently breastfeeding.    GENERAL:  Awake and alert in no respiratory distress  HENT: nares patent  EYES: no scleral icterus  CV: regular rhythm, tachycardia  RESPIRATORY: normal effort, clear to auscultation bilaterally  ABDOMEN: soft, nontender nondistended bowel sounds positive  MUSCULOSKELETAL: no deformity  NEURO: alert, moves all extremities, follows commands  SKIN: warm, dry     LAB RESULTS  Lab Results (last 24 hours)     Procedure Component Value Units Date/Time    aPTT [[id number]]  (Abnormal) Collected: 05/06/22 1002    Specimen: Blood Updated: 05/06/22 1039     PTT 90.3 seconds     Protime-INR [[id number]]  (Abnormal) Collected: 05/06/22 0316    Specimen: Blood Updated: 05/06/22 0412     Protime 21.3 Seconds      INR 1.85    aPTT [[id number]]  (Abnormal) Collected: 05/06/22 0316    Specimen: Blood Updated: 05/06/22 0412     PTT 65.2 seconds     CBC & Differential [[id number]]  (Abnormal) Collected: 05/06/22 0316    Specimen: Blood Updated: 05/06/22 0354    " Narrative:      The following orders were created for panel order CBC & Differential.  Procedure                               Abnormality         Status                     ---------                               -----------         ------                     CBC Auto Differential[[id number]]        Abnormal            Final result                 Please view results for these tests on the individual orders.    CBC Auto Differential [[id number]]  (Abnormal) Collected: 05/06/22 0316    Specimen: Blood Updated: 05/06/22 0354     WBC 7.54 10*3/mm3      RBC 4.31 10*6/mm3      Hemoglobin 9.7 g/dL      Hematocrit 32.4 %      MCV 75.2 fL      MCH 22.5 pg      MCHC 29.9 g/dL      RDW 18.5 %      RDW-SD 46.2 fl      MPV 10.0 fL      Platelets 340 10*3/mm3      Neutrophil % 54.9 %      Lymphocyte % 35.0 %      Monocyte % 7.2 %      Eosinophil % 1.3 %      Basophil % 0.4 %      Immature Grans % 1.2 %      Neutrophils, Absolute 4.14 10*3/mm3      Lymphocytes, Absolute 2.64 10*3/mm3      Monocytes, Absolute 0.54 10*3/mm3      Eosinophils, Absolute 0.10 10*3/mm3      Basophils, Absolute 0.03 10*3/mm3      Immature Grans, Absolute 0.09 10*3/mm3      nRBC 0.1 /100 WBC     aPTT [[id number]]  (Abnormal) Collected: 05/05/22 2010    Specimen: Blood from Hand, Right Updated: 05/05/22 2100     .5 seconds         Imaging Results (Last 24 Hours)     ** No results found for the last 24 hours. **        ECG 12 Lead       HEART RATE= 125  bpm  RR Interval= 480  ms  NC Interval= 141  ms  P Horizontal Axis= 35  deg  P Front Axis= 60  deg  QRSD Interval= 79  ms  QT Interval=   ms  QRS Axis= 47  deg  T Wave Axis=   deg  - ABNORMAL ECG -  Sinus tachycardia  Borderline Q waves in inferior leads  Nonspecific T abnrm, anterolateral leads             Current Facility-Administered Medications:   •  albuterol (PROVENTIL) nebulizer solution 0.083% 2.5 mg/3mL, 2.5 mg, Nebulization, Q6H PRN, Caleb Naik MD  •  cholecalciferol (VITAMIN D3) tablet  1,000 Units, 1,000 Units, Oral, Daily, Caleb Naik MD, 1,000 Units at 05/06/22 0831  •  cyanocobalamin injection 1,000 mcg, 1,000 mcg, Intramuscular, Q28 Days, Amanda Medina MD, 1,000 mcg at 05/01/22 1250  •  dronabinol (MARINOL) capsule 5 mg, 5 mg, Oral, TID, Caleb Naik MD, 5 mg at 05/06/22 0939  •  gabapentin (NEURONTIN) capsule 100 mg, 100 mg, Oral, TID, Caleb Naik MD, 100 mg at 05/06/22 0830  •  heparin (porcine) 5000 UNIT/ML injection 4,500-9,000 Units, 40-80 Units/kg (Order-Specific), Intravenous, Q6H PRN, Moe Guzman MD, 8,880 Units at 05/04/22 1738  •  heparin 83732 units/250 mL (100 units/mL) in 0.9% NaCl infusion, 13.3 Units/kg/hr (Order-Specific), Intravenous, Titrated, Moe Guzman MD, Last Rate: 10.5 mL/hr at 05/06/22 0949, 9.292 Units/kg/hr at 05/06/22 0949  •  hydrOXYzine pamoate (VISTARIL) capsule 50 mg, 50 mg, Oral, TID PRN, Caleb Naik MD, 50 mg at 05/05/22 2138  •  levothyroxine (SYNTHROID, LEVOTHROID) tablet 50 mcg, 50 mcg, Oral, Q AM, Caleb Naik MD, 50 mcg at 05/06/22 0622  •  metoprolol tartrate (LOPRESSOR) tablet 25 mg, 25 mg, Oral, TID, Caleb Naik MD, 25 mg at 05/06/22 0830  •  midodrine (PROAMATINE) tablet 10 mg, 10 mg, Oral, TID AC, Caleb Naik MD, 10 mg at 05/06/22 1131  •  montelukast (SINGULAIR) tablet 10 mg, 10 mg, Oral, Nightly, Caleb Naik MD, 10 mg at 05/05/22 2013  •  [DISCONTINUED] ondansetron (ZOFRAN) tablet 8 mg, 8 mg, Oral, Q8H PRN **OR** ondansetron (ZOFRAN) injection 4 mg, 4 mg, Intravenous, Q4H PRN, Caleb Naik MD  •  pantoprazole (PROTONIX) EC tablet 40 mg, 40 mg, Oral, Q AM, Caleb Naik MD, 40 mg at 05/06/22 0622  •  Pharmacy to dose warfarin, , Does not apply, Continuous PRN, Caleb Naik MD  •  phenazopyridine (PYRIDIUM) tablet 100 mg, 100 mg, Oral, TID With Meals, Caleb Naik MD, 100 mg at 05/06/22 1131  •  prochlorperazine (COMPAZINE) tablet 10 mg, 10 mg, Oral, Q4H PRN, Caleb Naik MD  •  pyridostigmine (MESTINON)  tablet 30 mg, 30 mg, Oral, Daily, Ann Naik MD, 30 mg at 05/06/22 0830  •  [COMPLETED] Insert peripheral IV, , , Once **AND** sodium chloride 0.9 % flush 10 mL, 10 mL, Intravenous, PRN, Moe Guzman MD, 10 mL at 05/06/22 0831  •  Vortioxetine HBr tablet 10 mg, 10 mg, Oral, Daily, Ann Naik MD, 10 mg at 05/06/22 1131  •  warfarin (COUMADIN) tablet 7 mg, 7 mg, Oral, Daily, Amanda Medina MD     ASSESSMENT  Recurrent pulm embolism involving the right lower lobe with Eliquis.  Chronic orthostatic hypotension  POTS syndrome  Phoebe-Danlos syndrome  Hypothyroidism  Irritable bowel syndrome  Severe gastroparesis  Gastroesophageal reflux disease    PLAN  CPM  Heparin and bridge with Coumadin  Symptomatic treatment for nausea with Zofran and Compazine  Adjust home medications  Stress ulcer DVT prophylaxis  Pulmonary consult appreciated  Hematology follow-up with  Supportive care  Discussed with family nursing staff  Discharge home once INR more than 2.5    ANN NAIK MD

## 2022-05-06 NOTE — CASE MANAGEMENT/SOCIAL WORK
Continued Stay Note  [facility]     Patient Name: Liliam Lorenz  MRN: [id number]  Today's Date: 5/6/2022    Admit Date: 4/29/2022     Discharge Plan     Row Name 05/06/22 1610       Plan    Plan Home    Patient/Family in Agreement with Plan yes    Plan Comments Spoke with Dr Eddy/hematology office who state Dr Eddy is no longer with their group.  Pt has appt to follow up with Dr Flores with [facility] [place] for June 4.  Dr Flores will want to manage pt's warfarin dosing and want pt to come into their office for labs as well.  Notified pt and wife who are agreeable. Pt will need to call office for lab appt when discharged.  EDWIN Sethi RN    Row Name 05/06/22 1314       Plan                         Discharge Codes    No documentation.               Expected Discharge Date and Time     Expected Discharge Date Expected Discharge Time    May 6, 2022             Dayanara Sethi RN

## 2022-05-07 LAB
APTT PPP: 80 SECONDS (ref 22.7–35.4)
BASOPHILS # BLD AUTO: 0.05 10*3/MM3 (ref 0–0.2)
BASOPHILS NFR BLD AUTO: 0.5 % (ref 0–1.5)
DEPRECATED RDW RBC AUTO: 47.6 FL (ref 37–54)
EOSINOPHIL # BLD AUTO: 0.08 10*3/MM3 (ref 0–0.4)
EOSINOPHIL NFR BLD AUTO: 0.8 % (ref 0.3–6.2)
ERYTHROCYTE [DISTWIDTH] IN BLOOD BY AUTOMATED COUNT: 19.1 % (ref 12.3–15.4)
HCT VFR BLD AUTO: 35.3 % (ref 34–46.6)
HGB BLD-MCNC: 10.8 G/DL (ref 12–15.9)
IMM GRANULOCYTES # BLD AUTO: 0.06 10*3/MM3 (ref 0–0.05)
IMM GRANULOCYTES NFR BLD AUTO: 0.6 % (ref 0–0.5)
INR PPP: 2.12 (ref 0.9–1.1)
LYMPHOCYTES # BLD AUTO: 2.21 10*3/MM3 (ref 0.7–3.1)
LYMPHOCYTES NFR BLD AUTO: 23 % (ref 19.6–45.3)
MCH RBC QN AUTO: 23 PG (ref 26.6–33)
MCHC RBC AUTO-ENTMCNC: 30.6 G/DL (ref 31.5–35.7)
MCV RBC AUTO: 75.1 FL (ref 79–97)
MONOCYTES # BLD AUTO: 0.69 10*3/MM3 (ref 0.1–0.9)
MONOCYTES NFR BLD AUTO: 7.2 % (ref 5–12)
NEUTROPHILS NFR BLD AUTO: 6.5 10*3/MM3 (ref 1.7–7)
NEUTROPHILS NFR BLD AUTO: 67.9 % (ref 42.7–76)
NRBC BLD AUTO-RTO: 0.2 /100 WBC (ref 0–0.2)
PLATELET # BLD AUTO: 355 10*3/MM3 (ref 140–450)
PMV BLD AUTO: 10 FL (ref 6–12)
PROTHROMBIN TIME: 23.8 SECONDS (ref 11.7–14.2)
RBC # BLD AUTO: 4.7 10*6/MM3 (ref 3.77–5.28)
WBC NRBC COR # BLD: 9.59 10*3/MM3 (ref 3.4–10.8)

## 2022-05-07 PROCEDURE — 25010000002 ONDANSETRON PER 1 MG: Performed by: HOSPITALIST

## 2022-05-07 PROCEDURE — 85025 COMPLETE CBC W/AUTO DIFF WBC: CPT | Performed by: EMERGENCY MEDICINE

## 2022-05-07 PROCEDURE — 36415 COLL VENOUS BLD VENIPUNCTURE: CPT | Performed by: EMERGENCY MEDICINE

## 2022-05-07 PROCEDURE — 63710000001 DRONABINOL PER 2.5 MG: Performed by: HOSPITALIST

## 2022-05-07 PROCEDURE — 25010000002 HEPARIN (PORCINE) PER 1000 UNITS: Performed by: EMERGENCY MEDICINE

## 2022-05-07 PROCEDURE — 85610 PROTHROMBIN TIME: CPT | Performed by: INTERNAL MEDICINE

## 2022-05-07 PROCEDURE — 63710000001 PROCHLORPERAZINE MALEATE PER 10 MG: Performed by: HOSPITALIST

## 2022-05-07 PROCEDURE — 85730 THROMBOPLASTIN TIME PARTIAL: CPT | Performed by: EMERGENCY MEDICINE

## 2022-05-07 RX ORDER — WARFARIN SODIUM 10 MG/1
10 TABLET ORAL
Status: COMPLETED | OUTPATIENT
Start: 2022-05-07 | End: 2022-05-07

## 2022-05-07 RX ORDER — WARFARIN SODIUM 4 MG/1
8 TABLET ORAL
Status: DISCONTINUED | OUTPATIENT
Start: 2022-05-08 | End: 2022-05-08 | Stop reason: HOSPADM

## 2022-05-07 RX ADMIN — HEPARIN SODIUM 9.3 UNITS/KG/HR: 5000 INJECTION INTRAVENOUS; SUBCUTANEOUS at 15:41

## 2022-05-07 RX ADMIN — MIDODRINE HYDROCHLORIDE 10 MG: 5 TABLET ORAL at 08:15

## 2022-05-07 RX ADMIN — ONDANSETRON 4 MG: 2 INJECTION INTRAMUSCULAR; INTRAVENOUS at 02:22

## 2022-05-07 RX ADMIN — MONTELUKAST SODIUM 10 MG: 10 TABLET, FILM COATED ORAL at 21:04

## 2022-05-07 RX ADMIN — GABAPENTIN 100 MG: 100 CAPSULE ORAL at 16:22

## 2022-05-07 RX ADMIN — PROCHLORPERAZINE MALEATE 10 MG: 10 TABLET ORAL at 13:39

## 2022-05-07 RX ADMIN — GABAPENTIN 100 MG: 100 CAPSULE ORAL at 10:42

## 2022-05-07 RX ADMIN — ONDANSETRON 4 MG: 2 INJECTION INTRAMUSCULAR; INTRAVENOUS at 16:22

## 2022-05-07 RX ADMIN — METOPROLOL TARTRATE 25 MG: 25 TABLET, FILM COATED ORAL at 21:04

## 2022-05-07 RX ADMIN — ONDANSETRON 4 MG: 2 INJECTION INTRAMUSCULAR; INTRAVENOUS at 21:04

## 2022-05-07 RX ADMIN — GABAPENTIN 100 MG: 100 CAPSULE ORAL at 21:04

## 2022-05-07 RX ADMIN — PHENAZOPYRIDINE HYDROCHLORIDE 100 MG: 100 TABLET ORAL at 08:14

## 2022-05-07 RX ADMIN — MIDODRINE HYDROCHLORIDE 10 MG: 5 TABLET ORAL at 13:38

## 2022-05-07 RX ADMIN — METOPROLOL TARTRATE 25 MG: 25 TABLET, FILM COATED ORAL at 16:22

## 2022-05-07 RX ADMIN — PROCHLORPERAZINE MALEATE 10 MG: 10 TABLET ORAL at 18:24

## 2022-05-07 RX ADMIN — VORTIOXETINE 10 MG: 10 TABLET, FILM COATED ORAL at 08:15

## 2022-05-07 RX ADMIN — PROCHLORPERAZINE MALEATE 10 MG: 10 TABLET ORAL at 09:33

## 2022-05-07 RX ADMIN — MIDODRINE HYDROCHLORIDE 10 MG: 5 TABLET ORAL at 18:23

## 2022-05-07 RX ADMIN — PYRIDOSTIGMINE BROMIDE 30 MG: 60 TABLET ORAL at 08:14

## 2022-05-07 RX ADMIN — ONDANSETRON 4 MG: 2 INJECTION INTRAMUSCULAR; INTRAVENOUS at 06:18

## 2022-05-07 RX ADMIN — METOPROLOL TARTRATE 25 MG: 25 TABLET, FILM COATED ORAL at 08:15

## 2022-05-07 RX ADMIN — PANTOPRAZOLE SODIUM 40 MG: 40 TABLET, DELAYED RELEASE ORAL at 06:18

## 2022-05-07 RX ADMIN — Medication 1000 UNITS: at 08:14

## 2022-05-07 RX ADMIN — PHENAZOPYRIDINE HYDROCHLORIDE 100 MG: 100 TABLET ORAL at 18:24

## 2022-05-07 RX ADMIN — ONDANSETRON 4 MG: 2 INJECTION INTRAMUSCULAR; INTRAVENOUS at 12:18

## 2022-05-07 RX ADMIN — PHENAZOPYRIDINE HYDROCHLORIDE 100 MG: 100 TABLET ORAL at 13:38

## 2022-05-07 RX ADMIN — WARFARIN 10 MG: 10 TABLET ORAL at 18:22

## 2022-05-07 RX ADMIN — LEVOTHYROXINE SODIUM 50 MCG: 0.05 TABLET ORAL at 06:18

## 2022-05-07 NOTE — PROGRESS NOTES
"Daily progress note    05/07/22      Chief complaint    No new events.  On heparin drip for pulmonary embolism.  Work-up ongoing  Still with nausea, related to severe gastroparesis.  Denies chest pain shortness of breath palpitation  Vital signs stable, saturation in 1 L 98%.  INR 2.12 today, hemoglobin 10.8    History of present illness  27-year-old white female with very complex past medical history including pulmonary embolism on Eliquis chronic orthostatic hypotension POTS syndrome hypothyroidism Pohebe-Danlos syndrome presented to [facility] emergency room with shortness of breath and she checked her oxygen saturation which was low and also feel palpitations and not feeling well.  Patient did miss her 1 dose of Eliquis.  Patient work-up in ER revealed recurrent pulm embolism despite on therapeutic dose of Eliquis admit for management.     REVIEW OF SYSTEMS  Unremarkable     PHYSICAL EXAM   Blood pressure 118/61, pulse 84, temperature 98 °F (36.7 °C), temperature source Oral, resp. rate 16, height 165.1 cm (65\"), weight 111 kg (244 lb 11.4 oz), SpO2 96 %, not currently breastfeeding.    GENERAL:  Awake and alert in no respiratory distress  HENT: nares patent  EYES: no scleral icterus  CV: regular rhythm, tachycardia  RESPIRATORY: normal effort, clear to auscultation bilaterally  ABDOMEN: soft, nontender nondistended bowel sounds positive  MUSCULOSKELETAL: no deformity  NEURO: alert, moves all extremities, follows commands  SKIN: warm, dry     LAB RESULTS  Lab Results (last 24 hours)     Procedure Component Value Units Date/Time    Protime-INR [[id number]]  (Abnormal) Collected: 05/07/22 0504    Specimen: Blood from Hand, Right Updated: 05/07/22 0545     Protime 23.8 Seconds      INR 2.12    aPTT [[id number]]  (Abnormal) Collected: 05/07/22 0504    Specimen: Blood from Hand, Right Updated: 05/07/22 0545     PTT 80.0 seconds     CBC & Differential [[id number]]  (Abnormal) Collected: 05/07/22 0504    " Specimen: Blood Updated: 05/07/22 0537    Narrative:      The following orders were created for panel order CBC & Differential.  Procedure                               Abnormality         Status                     ---------                               -----------         ------                     CBC Auto Differential[[id number]]        Abnormal            Final result                 Please view results for these tests on the individual orders.    CBC Auto Differential [[id number]]  (Abnormal) Collected: 05/07/22 0504    Specimen: Blood Updated: 05/07/22 0537     WBC 9.59 10*3/mm3      RBC 4.70 10*6/mm3      Hemoglobin 10.8 g/dL      Hematocrit 35.3 %      MCV 75.1 fL      MCH 23.0 pg      MCHC 30.6 g/dL      RDW 19.1 %      RDW-SD 47.6 fl      MPV 10.0 fL      Platelets 355 10*3/mm3      Neutrophil % 67.9 %      Lymphocyte % 23.0 %      Monocyte % 7.2 %      Eosinophil % 0.8 %      Basophil % 0.5 %      Immature Grans % 0.6 %      Neutrophils, Absolute 6.50 10*3/mm3      Lymphocytes, Absolute 2.21 10*3/mm3      Monocytes, Absolute 0.69 10*3/mm3      Eosinophils, Absolute 0.08 10*3/mm3      Basophils, Absolute 0.05 10*3/mm3      Immature Grans, Absolute 0.06 10*3/mm3      nRBC 0.2 /100 WBC     aPTT [[id number]]  (Abnormal) Collected: 05/06/22 1002    Specimen: Blood Updated: 05/06/22 1039     PTT 90.3 seconds         Imaging Results (Last 24 Hours)     ** No results found for the last 24 hours. **        ECG 12 Lead       HEART RATE= 125  bpm  RR Interval= 480  ms  WI Interval= 141  ms  P Horizontal Axis= 35  deg  P Front Axis= 60  deg  QRSD Interval= 79  ms  QT Interval=   ms  QRS Axis= 47  deg  T Wave Axis=   deg  - ABNORMAL ECG -  Sinus tachycardia  Borderline Q waves in inferior leads  Nonspecific T abnrm, anterolateral leads             Current Facility-Administered Medications:   •  albuterol (PROVENTIL) nebulizer solution 0.083% 2.5 mg/3mL, 2.5 mg, Nebulization, Q6H PRN, Caleb Naik MD  •   cholecalciferol (VITAMIN D3) tablet 1,000 Units, 1,000 Units, Oral, Daily, Caleb Naik MD, 1,000 Units at 05/07/22 0814  •  cyanocobalamin injection 1,000 mcg, 1,000 mcg, Intramuscular, Q28 Days, Amanda Medina MD, 1,000 mcg at 05/01/22 1250  •  dronabinol (MARINOL) capsule 5 mg, 5 mg, Oral, TID, Caleb Naik MD, 5 mg at 05/07/22 0814  •  gabapentin (NEURONTIN) capsule 100 mg, 100 mg, Oral, TID, Caleb Naik MD, 100 mg at 05/07/22 0814  •  heparin (porcine) 5000 UNIT/ML injection 4,500-9,000 Units, 40-80 Units/kg (Order-Specific), Intravenous, Q6H PRN, Moe Guzman MD, 8,880 Units at 05/04/22 1738  •  heparin 81648 units/250 mL (100 units/mL) in 0.9% NaCl infusion, 13.3 Units/kg/hr (Order-Specific), Intravenous, Titrated, Moe Guzman MD, Last Rate: 10.5 mL/hr at 05/06/22 0949, 9.292 Units/kg/hr at 05/06/22 0949  •  hydrOXYzine pamoate (VISTARIL) capsule 50 mg, 50 mg, Oral, TID PRN, Caleb Naik MD, 50 mg at 05/06/22 1522  •  levothyroxine (SYNTHROID, LEVOTHROID) tablet 50 mcg, 50 mcg, Oral, Q AM, Caleb Naik MD, 50 mcg at 05/07/22 0618  •  metoprolol tartrate (LOPRESSOR) tablet 25 mg, 25 mg, Oral, TID, Caleb Naik MD, 25 mg at 05/07/22 0815  •  midodrine (PROAMATINE) tablet 10 mg, 10 mg, Oral, TID AC, Caleb Naik MD, 10 mg at 05/07/22 0815  •  montelukast (SINGULAIR) tablet 10 mg, 10 mg, Oral, Nightly, Caleb Naik MD, 10 mg at 05/06/22 2003  •  [DISCONTINUED] ondansetron (ZOFRAN) tablet 8 mg, 8 mg, Oral, Q8H PRN **OR** ondansetron (ZOFRAN) injection 4 mg, 4 mg, Intravenous, Q4H PRN, Caleb Naik MD, 4 mg at 05/07/22 0618  •  pantoprazole (PROTONIX) EC tablet 40 mg, 40 mg, Oral, Q AM, Caleb Naik MD, 40 mg at 05/07/22 0618  •  Pharmacy to dose warfarin, , Does not apply, Continuous PRN, Caleb Naik MD  •  phenazopyridine (PYRIDIUM) tablet 100 mg, 100 mg, Oral, TID With Meals, Caleb Naik MD, 100 mg at 05/07/22 0814  •  prochlorperazine (COMPAZINE) tablet 10 mg, 10 mg, Oral,  Q4H PRN, Caleb Naik MD, 10 mg at 05/06/22 2006  •  pyridostigmine (MESTINON) tablet 30 mg, 30 mg, Oral, Daily, Caleb Naik MD, 30 mg at 05/07/22 0814  •  [COMPLETED] Insert peripheral IV, , , Once **AND** sodium chloride 0.9 % flush 10 mL, 10 mL, Intravenous, PRN, Moe Guzman MD, 10 mL at 05/06/22 0831  •  Vortioxetine HBr tablet 10 mg, 10 mg, Oral, Daily, Caleb Naik MD, 10 mg at 05/07/22 0815  •  warfarin (COUMADIN) tablet 7 mg, 7 mg, Oral, Daily, Amanda Medina MD, 7 mg at 05/06/22 1808     ASSESSMENT  Recurrent pulm embolism involving the right lower lobe with Eliquis.  Chronic orthostatic hypotension  POTS syndrome  Phoebe-Danlos syndrome  Hypothyroidism  Irritable bowel syndrome  Severe gastroparesis  Gastroesophageal reflux disease    PLAN  INR is 2.1, will stop heparin when INR is 2.5  Continue symptomatic treatment for nausea with Zofran and Compazine  Continue stress ulcer prophylaxis  Hematology input appreciated   Continue supportive care  Discharge home once INR more than 2.5  Check labs in the morning    Andreas Owen MD  05/07/22

## 2022-05-07 NOTE — PLAN OF CARE
Problem: Adult Inpatient Plan of Care  Goal: Plan of Care Review  Outcome: Ongoing, Progressing  Flowsheets (Taken 5/5/2022 1325 by Justa Godoy, RN)  Outcome Evaluation: VSS. No c/o pain. Hep gtt infusing, PTT q6. Will discharge home once INR is in range. Will monitor.  Goal: Patient-Specific Goal (Individualized)  Outcome: Ongoing, Progressing  Goal: Absence of Hospital-Acquired Illness or Injury  Outcome: Ongoing, Progressing  Intervention: Identify and Manage Fall Risk  Recent Flowsheet Documentation  Taken 5/7/2022 0429 by Lily Knapp, RN  Safety Promotion/Fall Prevention:   activity supervised   assistive device/personal items within reach   clutter free environment maintained   fall prevention program maintained   lighting adjusted   mobility aid in reach   nonskid shoes/slippers when out of bed   safety round/check completed   toileting scheduled  Taken 5/7/2022 0247 by Lily Knapp, RN  Safety Promotion/Fall Prevention:   activity supervised   clutter free environment maintained   assistive device/personal items within reach   fall prevention program maintained   lighting adjusted   mobility aid in reach   nonskid shoes/slippers when out of bed   safety round/check completed   other (see comments)  Taken 5/7/2022 0020 by Lily Knapp, RN  Safety Promotion/Fall Prevention:   activity supervised   assistive device/personal items within reach   clutter free environment maintained   fall prevention program maintained   lighting adjusted   mobility aid in reach   nonskid shoes/slippers when out of bed   safety round/check completed  Taken 5/6/2022 2210 by Lily Knapp, RN  Safety Promotion/Fall Prevention:   activity supervised   assistive device/personal items within reach   clutter free environment maintained   fall prevention program maintained   lighting adjusted   mobility aid in reach   nonskid shoes/slippers when out of bed   safety round/check completed   toileting  scheduled  Taken 5/6/2022 2000 by Lily Knapp, RN  Safety Promotion/Fall Prevention:   activity supervised   assistive device/personal items within reach   clutter free environment maintained   fall prevention program maintained   lighting adjusted   mobility aid in reach   nonskid shoes/slippers when out of bed   safety round/check completed   toileting scheduled  Intervention: Prevent and Manage VTE (Venous Thromboembolism) Risk  Recent Flowsheet Documentation  Taken 5/7/2022 0429 by Lily Knapp, RN  Activity Management:   activity adjusted per tolerance   activity encouraged  Taken 5/7/2022 0247 by Lily Knapp, RN  Activity Management:   activity encouraged   activity adjusted per tolerance  Taken 5/7/2022 0020 by Lily Knapp, RN  Activity Management:   activity adjusted per tolerance   activity encouraged  Taken 5/6/2022 2210 by Lily Knapp, RN  Activity Management:   activity adjusted per tolerance   activity encouraged  Taken 5/6/2022 2000 by Lily Knapp, RN  Activity Management:   activity adjusted per tolerance   activity encouraged  VTE Prevention/Management:   bilateral   dorsiflexion/plantar flexion performed  Goal: Optimal Comfort and Wellbeing  Outcome: Ongoing, Progressing  Intervention: Provide Person-Centered Care  Recent Flowsheet Documentation  Taken 5/6/2022 2000 by Lily Knapp, RN  Trust Relationship/Rapport:   care explained   choices provided   emotional support provided   questions answered   questions encouraged   reassurance provided   thoughts/feelings acknowledged  Goal: Readiness for Transition of Care  Outcome: Ongoing, Progressing   Goal Outcome Evaluation:              Outcome Evaluation: pt arrived from ER on heparin gtt. ST on the monitor. AO x 4. WCM

## 2022-05-07 NOTE — PLAN OF CARE
Problem: Adult Inpatient Plan of Care  Goal: Plan of Care Review  Outcome: Ongoing, Progressing  Flowsheets (Taken 5/7/2022 9528)  Progress: improving  Plan of Care Reviewed With: patient  Outcome Evaluation: vss. no c/o pain. heparin gtt infusion. c/o nausea this shift, prn medications provided.   Goal Outcome Evaluation:  Plan of Care Reviewed With: patient        Progress: improving  Outcome Evaluation: vss. no c/o pain. heparin gtt infusion. c/o nausea this shift, prn medications provided.

## 2022-05-07 NOTE — PROGRESS NOTES
[facility] Clinical Pharmacy Services: Warfarin Dosing/Monitoring Consult    Liliam Lorenz is a 27 y.o. female, estimated creatinine clearance is 161.3 mL/min (by C-G formula based on SCr of 0.65 mg/dL). weighing 111 kg (244 lb 11.4 oz).    Results from last 7 days   Lab Units 05/07/22  0504 05/06/22  1002 05/06/22  0316 05/05/22 2010 05/05/22  1110 05/05/22  0924 05/04/22  1211 05/04/22  0450 05/03/22  1659 05/03/22  0541   INR  2.12*  --  1.85*  --  1.68*  --   --  1.41*  --  1.14*   APTT seconds 80.0* 90.3* 65.2* 104.5* 174.3*  --    < > 101.9*   < > 67.7*   HEMOGLOBIN g/dL 10.8*  --  9.7*  --   --  10.2*  --  9.4*  --  9.5*   HEMATOCRIT % 35.3  --  32.4*  --   --  36.3  --  31.4*  --  30.1*   PLATELETS 10*3/mm3 355  --  340  --   --  312  --  359  --  307    < > = values in this interval not displayed.     Prior to admission anticoagulation: [facility] Anticoagulation:  Consulting provider: Dr. Medina  Start date: 5/1/22  Indication: DVT/PE (active thrombosis), while on Eliquis   Target INR: 2.5 - 3.5   Expected duration: lifetime   Bridge Therapy: Yes  with unfractionated heparin    Potential food or drug interactions:    Dronabinol-moderate-may increase INR and risk of bleeding  Levothyroxine-moderate-may increase INR/bleeding risk  Pantoprazole-moderate-may increase INR  Vortioxetine-moderate-may increase bleed risk d/t mild antiplatelet effects of SSRIs    Education complete?/Date: No; plan for follow up TBD    Assessment/Plan:  INR remains subtherapeutic following 46.5 mg total over the past 6 days (7.75 mg/day); anticipate she may require at least ~56 mg/week to achieve and sustain a therapeutic INR.  Boost dose 10 mg once today then tentatively trial 8 mg daily starting tomorrow  Monitor for any signs or symptoms of bleeding. CBC stable.  Follow up daily INRs and dose adjustments. INR ordered daily with AM labs while inpatient.  Recommend continuing bridge therapy with heparin IV until  INR is within goal range for at least 24 hours.    Pharmacy will continue to follow until discharge or discontinuation of warfarin.     Allen Lake III, PharmD  Clinical Pharmacist

## 2022-05-08 ENCOUNTER — READMISSION MANAGEMENT (OUTPATIENT)
Dept: CALL CENTER | Facility: HOSPITAL | Age: 27
End: 2022-05-08

## 2022-05-08 VITALS
RESPIRATION RATE: 16 BRPM | DIASTOLIC BLOOD PRESSURE: 60 MMHG | SYSTOLIC BLOOD PRESSURE: 122 MMHG | WEIGHT: 244.71 LBS | BODY MASS INDEX: 40.77 KG/M2 | OXYGEN SATURATION: 92 % | TEMPERATURE: 98.2 F | HEART RATE: 76 BPM | HEIGHT: 65 IN

## 2022-05-08 LAB
ALBUMIN SERPL-MCNC: 3.7 G/DL (ref 3.5–5.2)
ALBUMIN/GLOB SERPL: 0.9 G/DL
ALP SERPL-CCNC: 53 U/L (ref 39–117)
ALT SERPL W P-5'-P-CCNC: 139 U/L (ref 1–33)
ANION GAP SERPL CALCULATED.3IONS-SCNC: 16.6 MMOL/L (ref 5–15)
APTT PPP: 89.5 SECONDS (ref 22.7–35.4)
AST SERPL-CCNC: 112 U/L (ref 1–32)
BASOPHILS # BLD AUTO: 0.05 10*3/MM3 (ref 0–0.2)
BASOPHILS NFR BLD AUTO: 0.6 % (ref 0–1.5)
BILIRUB SERPL-MCNC: 0.4 MG/DL (ref 0–1.2)
BUN SERPL-MCNC: 9 MG/DL (ref 6–20)
BUN/CREAT SERPL: 12.7 (ref 7–25)
CALCIUM SPEC-SCNC: 9.4 MG/DL (ref 8.6–10.5)
CHLORIDE SERPL-SCNC: 102 MMOL/L (ref 98–107)
CO2 SERPL-SCNC: 18.4 MMOL/L (ref 22–29)
CREAT SERPL-MCNC: 0.71 MG/DL (ref 0.57–1)
DEPRECATED RDW RBC AUTO: 49.8 FL (ref 37–54)
EGFRCR SERPLBLD CKD-EPI 2021: 119.7 ML/MIN/1.73
EOSINOPHIL # BLD AUTO: 0.09 10*3/MM3 (ref 0–0.4)
EOSINOPHIL NFR BLD AUTO: 1.1 % (ref 0.3–6.2)
ERYTHROCYTE [DISTWIDTH] IN BLOOD BY AUTOMATED COUNT: 19.7 % (ref 12.3–15.4)
GLOBULIN UR ELPH-MCNC: 3.9 GM/DL
GLUCOSE SERPL-MCNC: 93 MG/DL (ref 65–99)
HCT VFR BLD AUTO: 35.8 % (ref 34–46.6)
HGB BLD-MCNC: 10.9 G/DL (ref 12–15.9)
IMM GRANULOCYTES # BLD AUTO: 0.05 10*3/MM3 (ref 0–0.05)
IMM GRANULOCYTES NFR BLD AUTO: 0.6 % (ref 0–0.5)
INR PPP: 2.59 (ref 0.9–1.1)
LYMPHOCYTES # BLD AUTO: 2.57 10*3/MM3 (ref 0.7–3.1)
LYMPHOCYTES NFR BLD AUTO: 30.6 % (ref 19.6–45.3)
MCH RBC QN AUTO: 23 PG (ref 26.6–33)
MCHC RBC AUTO-ENTMCNC: 30.4 G/DL (ref 31.5–35.7)
MCV RBC AUTO: 75.7 FL (ref 79–97)
MONOCYTES # BLD AUTO: 0.65 10*3/MM3 (ref 0.1–0.9)
MONOCYTES NFR BLD AUTO: 7.7 % (ref 5–12)
NEUTROPHILS NFR BLD AUTO: 5 10*3/MM3 (ref 1.7–7)
NEUTROPHILS NFR BLD AUTO: 59.4 % (ref 42.7–76)
NRBC BLD AUTO-RTO: 0 /100 WBC (ref 0–0.2)
PLATELET # BLD AUTO: 309 10*3/MM3 (ref 140–450)
PMV BLD AUTO: 10 FL (ref 6–12)
POTASSIUM SERPL-SCNC: 3.9 MMOL/L (ref 3.5–5.2)
PROT SERPL-MCNC: 7.6 G/DL (ref 6–8.5)
PROTHROMBIN TIME: 27.8 SECONDS (ref 11.7–14.2)
RBC # BLD AUTO: 4.73 10*6/MM3 (ref 3.77–5.28)
SODIUM SERPL-SCNC: 137 MMOL/L (ref 136–145)
WBC NRBC COR # BLD: 8.41 10*3/MM3 (ref 3.4–10.8)

## 2022-05-08 PROCEDURE — 36415 COLL VENOUS BLD VENIPUNCTURE: CPT | Performed by: EMERGENCY MEDICINE

## 2022-05-08 PROCEDURE — 25010000002 ONDANSETRON PER 1 MG: Performed by: HOSPITALIST

## 2022-05-08 PROCEDURE — 85610 PROTHROMBIN TIME: CPT | Performed by: INTERNAL MEDICINE

## 2022-05-08 PROCEDURE — 80053 COMPREHEN METABOLIC PANEL: CPT | Performed by: INTERNAL MEDICINE

## 2022-05-08 PROCEDURE — 85025 COMPLETE CBC W/AUTO DIFF WBC: CPT | Performed by: EMERGENCY MEDICINE

## 2022-05-08 PROCEDURE — 85730 THROMBOPLASTIN TIME PARTIAL: CPT | Performed by: EMERGENCY MEDICINE

## 2022-05-08 RX ORDER — WARFARIN SODIUM 4 MG/1
8 TABLET ORAL NIGHTLY
Qty: 60 TABLET | Refills: 0 | Status: SHIPPED | OUTPATIENT
Start: 2022-05-08 | End: 2022-06-14

## 2022-05-08 RX ORDER — VORTIOXETINE 10 MG/1
10 TABLET, FILM COATED ORAL DAILY
Qty: 30 TABLET | Refills: 5 | Status: SHIPPED | OUTPATIENT
Start: 2022-05-09 | End: 2022-06-30

## 2022-05-08 RX ORDER — CYANOCOBALAMIN 1000 UG/ML
1000 INJECTION, SOLUTION INTRAMUSCULAR; SUBCUTANEOUS
Qty: 1 ML | Refills: 0 | Status: SHIPPED | OUTPATIENT
Start: 2022-05-29

## 2022-05-08 RX ADMIN — Medication 1000 UNITS: at 10:20

## 2022-05-08 RX ADMIN — VORTIOXETINE 10 MG: 10 TABLET, FILM COATED ORAL at 10:21

## 2022-05-08 RX ADMIN — LEVOTHYROXINE SODIUM 50 MCG: 0.05 TABLET ORAL at 10:19

## 2022-05-08 RX ADMIN — MIDODRINE HYDROCHLORIDE 10 MG: 5 TABLET ORAL at 10:19

## 2022-05-08 RX ADMIN — PHENAZOPYRIDINE HYDROCHLORIDE 100 MG: 100 TABLET ORAL at 10:20

## 2022-05-08 RX ADMIN — PYRIDOSTIGMINE BROMIDE 30 MG: 60 TABLET ORAL at 10:19

## 2022-05-08 RX ADMIN — METOPROLOL TARTRATE 25 MG: 25 TABLET, FILM COATED ORAL at 10:19

## 2022-05-08 RX ADMIN — ONDANSETRON 4 MG: 2 INJECTION INTRAMUSCULAR; INTRAVENOUS at 09:04

## 2022-05-08 RX ADMIN — GABAPENTIN 100 MG: 100 CAPSULE ORAL at 10:19

## 2022-05-08 RX ADMIN — PANTOPRAZOLE SODIUM 40 MG: 40 TABLET, DELAYED RELEASE ORAL at 10:19

## 2022-05-08 NOTE — PLAN OF CARE
Goal Outcome Evaluation:  Plan of Care Reviewed With: patient        Progress: improving  Outcome Evaluation: VSS, room air, no c/o pain, intermittent nausea - medicated per order. Heparin gtt infusing - PTT in AM. Will CTM.

## 2022-05-08 NOTE — PROGRESS NOTES
[facility] Clinical Pharmacy Services: Warfarin Dosing/Monitoring Consult    Liliam Lorenz is a 27 y.o. female, estimated creatinine clearance is 161.3 mL/min (by C-G formula based on SCr of 0.65 mg/dL). weighing 111 kg (244 lb 11.4 oz).    Results from last 7 days   Lab Units 05/07/22  0504 05/06/22  1002 05/06/22  0316 05/05/22 2010 05/05/22  1110 05/05/22  0924 05/04/22  1211 05/04/22  0450 05/03/22  1659 05/03/22  0541   INR  2.12*  --  1.85*  --  1.68*  --   --  1.41*  --  1.14*   APTT seconds 80.0* 90.3* 65.2* 104.5* 174.3*  --    < > 101.9*   < > 67.7*   HEMOGLOBIN g/dL 10.8*  --  9.7*  --   --  10.2*  --  9.4*  --  9.5*   HEMATOCRIT % 35.3  --  32.4*  --   --  36.3  --  31.4*  --  30.1*   PLATELETS 10*3/mm3 355  --  340  --   --  312  --  359  --  307    < > = values in this interval not displayed.     Prior to admission anticoagulation: [facility] Anticoagulation:  Consulting provider: Dr. Medina  Start date: 5/1/22  Indication: DVT/PE (active thrombosis), while on Eliquis   Target INR: 2.5 - 3.5   Expected duration: lifetime   Bridge Therapy: Yes  with unfractionated heparin    Potential food or drug interactions:    Dronabinol-moderate-may increase INR and risk of bleeding  Levothyroxine-moderate-may increase INR/bleeding risk  Pantoprazole-moderate-may increase INR  Vortioxetine-moderate-may increase bleed risk d/t mild antiplatelet effects of SSRIs    Education complete?/Date: No; plan for follow up TBD    Assessment/Plan:  INR at therapeutic goal today - trial 8 mg daily   Monitor for any signs or symptoms of bleeding  Follow up daily INRs and dose adjustments    Pharmacy will continue to follow until discharge or discontinuation of warfarin.     Allen Lake III, PharmD  Clinical Pharmacist

## 2022-05-08 NOTE — DISCHARGE SUMMARY
PHYSICIAN DISCHARGE SUMMARY  [facility], [facility]    Patient Identification:    Name: Liliam Lorenz  Age: 27 y.o.  Sex: female  :  1995  MRN: [id number]    Primary Care Physician: Jason Borrero MD    Admit date: 2022    Discharge date and time:2022    Discharged Condition: good    Discharge Diagnoses:    Single subsegmental pulmonary embolism without acute cor pulmonale (HCC)  Recurrent pulm embolism involving the right lower lobe with Eliquis.  Chronic orthostatic hypotension  POTS syndrome  Phoebe-Danlos syndrome  Hypothyroidism  Irritable bowel syndrome  Severe gastroparesis  Gastroesophageal reflux disease       Hospital Course: Liliam Lorenz  is a 27-year-old white female with very complex past medical history including pulmonary embolism on Eliquis chronic orthostatic hypotension POTS syndrome hypothyroidism Phoebe-Danlos syndrome presented to [facility] emergency room with shortness of breath and she checked her oxygen saturation which was low and also feel palpitations and not feeling well.  Patient did miss her 1 dose of Eliquis.  Patient work-up in ER revealed recurrent pulm embolism despite on therapeutic dose of Eliquis.  Patient was admitted for further management.    Upon admission, patient was placed on heparin drip, pulmonary and hematology were consulted.  Patient symptoms improved over the next few days.  Patient did develop nausea related to her gastroparesis.  However, vital signs remained stable throughout her hospitalization.  By May 7, her INR was 2.1, and today it is 2.5.  Heparin drip was discontinued and patient agreed to be discharged home.  Patient is to see her primary care physician in about a week.    PMHX:   Past Medical History:   Diagnosis Date   • Bronchitis    • Chest pain    • Chronic hypotension    • Eczema    • Phoebe-Danlos syndrome    • Gastroparesis    • GERD (gastroesophageal reflux disease)    • IBS  "(irritable bowel syndrome)    • Lightheadedness    • POTS (postural orthostatic tachycardia syndrome)    • Rectal fissure    • Rosacea    • Tachycardia      PSHX:   Past Surgical History:   Procedure Laterality Date   • COLONOSCOPY     • MEDIPORT INSERTION, DOUBLE  09/01/2020    [facility]    • UPPER GASTROINTESTINAL ENDOSCOPY     • VENOUS ACCESS DEVICE (PORT) INSERTION Left 10/16/2020    Procedure: INSERTION VENOUS ACCESS DEVICE UNDER FLURO;  Surgeon: Pa Lane MD;  Location: [facility] MAIN OR;  Service: General;  Laterality: Left;   • VENOUS ACCESS DEVICE (PORT) INSERTION Right 10/26/2021    Procedure: REMOVAL AND INSERTION OF VALVERDE CATHETER;  Surgeon: Pa Lane MD;  Location: [facility] MAIN OR;  Service: General;  Laterality: Right;           Consults:     Consults     Date and Time Order Name Status Description    4/30/2022 11:46 AM Hematology & Oncology Inpatient Consult Completed     4/30/2022 11:38 AM Inpatient Pulmonology Consult Completed           Discharge Exam:    /81 (BP Location: Right arm, Patient Position: Lying)   Pulse 82   Temp 98.6 °F (37 °C) (Oral)   Resp 16   Ht 165.1 cm (65\")   Wt 111 kg (244 lb 11.4 oz)   SpO2 96%   BMI 40.72 kg/m²     GENERAL:  Awake and alert in no respiratory distress  HENT: nares patent  EYES: no scleral icterus  CV: regular rhythm, tachycardia  RESPIRATORY: normal effort, clear to auscultation bilaterally  ABDOMEN: soft, nontender nondistended bowel sounds positive  MUSCULOSKELETAL: no deformity  NEURO: alert, moves all extremities, follows commands  SKIN: warm, dry       Data Review:        Results from last 7 days   Lab Units 05/08/22  0545 05/07/22  0504 05/06/22  0316   WBC 10*3/mm3 8.41 9.59 7.54   HEMOGLOBIN g/dL 10.9* 10.8* 9.7*   HEMATOCRIT % 35.8 35.3 32.4*   PLATELETS 10*3/mm3 309 355 340       Results from last 7 days   Lab Units 05/08/22  0545 05/02/22  0654   SODIUM mmol/L 137 138   POTASSIUM mmol/L 3.9 4.1   CHLORIDE " mmol/L 102 105   CO2 mmol/L 18.4* 21.1*   BUN mg/dL 9 5*   CREATININE mg/dL 0.71 0.65   CALCIUM mg/dL 9.4 8.9   BILIRUBIN mg/dL 0.4  --    ALK PHOS U/L 53  --    ALT (SGPT) U/L 139*  --    AST (SGOT) U/L 112*  --    GLUCOSE mg/dL 93 98     Results from last 7 days   Lab Units 05/08/22  0545 05/07/22  0504 05/06/22  0316   INR  2.59* 2.12* 1.85*       Lab Results   Lab Value Date/Time    TROPONINT <0.010 04/30/2022 0209    TROPONINT <0.010 04/30/2022 0024    TROPONINT <0.010 01/18/2022 2014    TROPONINT <0.010 09/08/2021 1155    TROPONINT <0.010 09/08/2021 0947    TROPONINT <0.010 05/05/2021 2230       Microbiology Results (last 10 days)     Procedure Component Value - Date/Time    COVID PRE-OP / PRE-PROCEDURE SCREENING ORDER (NO ISOLATION) - Swab, Nasopharynx [[id number]]  (Normal) Collected: 04/30/22 0327    Lab Status: Final result Specimen: Swab from Nasopharynx Updated: 04/30/22 0427    Narrative:      The following orders were created for panel order COVID PRE-OP / PRE-PROCEDURE SCREENING ORDER (NO ISOLATION) - Swab, Nasopharynx.  Procedure                               Abnormality         Status                     ---------                               -----------         ------                     COVID-19,[facility] IN-HOUSE...[[id number]]  Normal              Final result                 Please view results for these tests on the individual orders.    COVID-19,[facility] IN-HOUSE CEPHEID/BETI NP SWAB IN TRANSPORT MEDIA 8-12 HR TAT - Swab, Nasopharynx [[id number]]  (Normal) Collected: 04/30/22 0327    Lab Status: Final result Specimen: Swab from Nasopharynx Updated: 04/30/22 0427     COVID19 Not Detected    Narrative:      Fact sheet for providers: https://www.fda.gov/media/385197/download     Fact sheet for patients: https://www.fda.gov/media/596737/download           Imaging Results (All)     Procedure Component Value Units Date/Time    CT Angiogram Chest [[id number]] Collected: 04/30/22 0231     Updated: 04/30/22  0248    Narrative:      CT ANGIOGRAM OF THE CHEST     HISTORY: Shortness of air palpitations     COMPARISON: 01/18/2022     TECHNIQUE: Axial CT imaging was obtained through the thorax. IV contrast  was administered. Three-D reformatted images were obtained.     FINDINGS:  There do appear to be filling defects within segmental pulmonary  arterial branches of the right lower lobe. Acute pulmonary emboli cannot  be excluded. There is no evidence of pulmonary infarction. The thyroid  gland, trachea, and esophagus appear unremarkable. There is no evidence  of right heart strain. Mediastinal lymph nodes do not appear  pathologically enlarged. There is no pleural or pericardial effusion. No  acute infiltrates are seen. There is a right internal jugular vein  tunneled catheter which terminates within the superior vena cava. There  are some prominent collaterals noted within the mediastinum. Thoracic  aorta is normal in caliber. There is no evidence of dissection. Images  through the upper abdomen do not demonstrate any acute abnormalities. No  acute osseous abnormalities are seen.       Impression:      Study does appear to be positive for pulmonary emboli within pulmonary  artery branches to the right lower lobe. There is no evidence of  pulmonary infarction or right heart strain.     FINDINGS were called to Dr. Guzman at 2:45 AM.     Radiation dose reduction techniques were utilized, including automated  exposure control and exposure modulation based on body size.     This report was finalized on 4/30/2022 2:45 AM by Dr. Yanci Jessica M.D.       XR Chest 1 View [[id number]] Collected: 04/29/22 2355     Updated: 04/29/22 9550    Narrative:      SINGLE VIEW OF THE CHEST     HISTORY: Shortness of air     COMPARISON: 04/18/2022     FINDINGS:  Right internal jugular vein tunneled catheter extends in the vena cava.  Heart size is at the upper limits of normal. No pneumothorax, pleural  effusion, or acute infiltrate is  seen.       Impression:      No acute findings.     This report was finalized on 4/29/2022 11:56 PM by Dr. Yanci Jessica M.D.               Disposition:    Home    Patient Instructions:        Discharge Medications      ASK your doctor about these medications      Instructions Start Date   albuterol sulfate  (90 Base) MCG/ACT inhaler  Commonly known as: PROVENTIL HFA;VENTOLIN HFA;PROAIR HFA   2 puffs, Inhalation, Every 4 Hours PRN      apixaban 5 MG tablet tablet  Commonly known as: ELIQUIS   5 mg, Oral, Every 12 Hours Scheduled      cholecalciferol 25 MCG (1000 UT) tablet  Commonly known as: VITAMIN D3   1,000 Units, Oral, Daily      dilTIAZem powder   Topical, 2 Times Daily PRN      dronabinol 5 MG capsule  Commonly known as: MARINOL   5 mg, Oral, 3 Times Daily PRN      EMGALITY SC   Subcutaneous, Every 30 Days      gabapentin 100 MG capsule  Commonly known as: NEURONTIN   100 mg, Oral, 3 Times Daily      SARMAD ROOT PO   1 tablet, Oral      hydrOXYzine pamoate 25 MG capsule  Commonly known as: VISTARIL   25-50 mg, Oral, 3 Times Daily PRN      immune globulin (human) 10 g infusion  Commonly known as: GAMMAGARD S/D   Intravenous, Weekly      levothyroxine 50 MCG tablet  Commonly known as: SYNTHROID, LEVOTHROID   50 mcg, Oral, Daily      loperamide 2 MG tablet  Commonly known as: IMODIUM A-D   2 mg, Oral, 4 Times Daily PRN      meclizine 25 MG tablet  Commonly known as: ANTIVERT   25 mg, Oral, 3 Times Daily PRN      medroxyPROGESTERone 150 MG/ML injection  Commonly known as: Depo-Provera   150 mg, Intramuscular, Every 3 Months      metoprolol tartrate 25 MG tablet  Commonly known as: LOPRESSOR   12.5 mg, Oral, 3 Times Daily      metroNIDAZOLE 0.75 % lotion lotion  Commonly known as: FLAGYL   1 application, Topical, Every 12 Hours Scheduled      midodrine 10 MG tablet  Commonly known as: PROAMATINE   TAKE 1 TABLET BY MOUTH THREE TIMES DAILY      Mirabegron ER 25 MG tablet sustained-release 24 hour 24 hr  tablet  Commonly known as: MYRBETRIQ   25 mg, Oral, Daily      montelukast 10 MG tablet  Commonly known as: SINGULAIR   10 mg, Oral, Nightly      omeprazole 20 MG capsule  Commonly known as: priLOSEC   40 mg, Oral, Nightly      ondansetron 4 MG tablet  Commonly known as: ZOFRAN   4 mg, Oral, Every 8 Hours PRN      ondansetron ODT 8 MG disintegrating tablet  Commonly known as: Zofran ODT   8 mg, Oral, Every 8 Hours PRN      phenazopyridine 100 MG tablet  Commonly known as: PYRIDIUM   100 mg, Oral, 3 Times Daily PRN      prochlorperazine 10 MG tablet  Commonly known as: COMPAZINE   10 mg, Oral, Every 6 Hours PRN      promethazine  Commonly known as: PHENERGAN   25 mg, Intravenous, Every 3 Hours      promethazine 25 MG tablet  Commonly known as: PHENERGAN   TK 1 T PO  Q 6 H PRN NAUSEA      promethazine 12.5 MG tablet  Commonly known as: PHENERGAN   TK 1 T PO Q 6 H FOR UP TO 7 DAYS PRF NAUSEA      pyridostigmine 60 MG tablet  Commonly known as: MESTINON   TAKE 1/2 TABLET BY MOUTH TWICE DAILY AS DIRECTED      RABEprazole 20 MG EC tablet  Commonly known as: ACIPHEX   20 mg, Oral, Every Morning      triamcinolone 0.1 % cream  Commonly known as: KENALOG   1 application, Topical, 2 Times Daily      Vortioxetine HBr 5 MG tablet   5 mg, Oral, Daily With Breakfast      ZOFRAN IV   8 mg, Intravenous, Every 8 Hours PRN             Discharge Order (From admission, onward)    None              Total time spent discharging patient including evaluation,post hospitalization follow up,  medication and post hospitalization instructions and education total time exceeds 30 minutes.    Signed:  Andreas Owen MD  5/8/2022  11:34 EDT       I wore protective equipment throughout this patient encounter including a face mask, gloves, and protective eyewear.  Hand hygiene was performed before donning protective equipment and after removal when leaving the room.

## 2022-05-08 NOTE — PLAN OF CARE
Goal Outcome Evaluation:  Plan of Care Reviewed With: patient        Progress: improving  Outcome Evaluation: vss. on room air. plan to d/c home today

## 2022-05-09 NOTE — OUTREACH NOTE
Prep Survey    Flowsheet Row Responses   [facility]? [facility]   Is LACE score < 7 ? No   Emergency Room discharge w/ pulse ox? No   Eligibility Readm Mgmt   Discharge diagnosis Shortness of breathrecurrent pulm embolism    Does the patient have one of the following disease processes/diagnoses(primary or secondary)? Other   Does the patient have Home health ordered? No   Is there a DME ordered? No   Prep survey completed? Yes          MIKAL ALLEN - Registered Nurse

## 2022-05-09 NOTE — CASE MANAGEMENT/SOCIAL WORK
Case Management Discharge Note      Final Note: Pt discharged home, no known needs.  EDWIN Sethi RN         Selected Continued Care - Discharged on 5/8/2022 Admission date: 4/29/2022 - Discharge disposition: Home or Self Care    Destination    No services have been selected for the patient.              Durable Medical Equipment    No services have been selected for the patient.              Dialysis/Infusion    No services have been selected for the patient.              Home Medical Care    No services have been selected for the patient.              Therapy    No services have been selected for the patient.              Community Resources    No services have been selected for the patient.              Community & DME    No services have been selected for the patient.                  Transportation Services  Private: Car    Final Discharge Disposition Code: 01 - home or self-care

## 2022-05-09 NOTE — PAYOR COMM NOTE
"DISCHARGED  REF #[id number]    Liliam Da Silva (27 y.o. Female)             Date of Birth   1995    Social Security Number       Address   [address] [place] [address]    Home Phone   [phone]    MRN   [id number]       [place]   M[employer]ism    Marital Status                               Admission Date   4/29/22    Admission Type   Emergency    Admitting Provider   Caleb Naik MD    Attending Provider       Department, Room/Bed   [address], E558/1       Discharge Date   5/8/2022    Discharge Disposition   Home or Self Care    Discharge Destination                               Attending Provider: (none)   Allergies: Eggs Or Egg-derived Products, Pepcid [Famotidine], Scopolamine    Isolation: None   Infection: None   Code Status: Prior   Advance Care Planning Activity    Ht: 165.1 cm (65\")   Wt: 111 kg (244 lb 11.4 oz)    Admission Cmt: None   Principal Problem: None                Active Insurance as of 4/29/2022     Primary Coverage     Payor Plan Insurance Group Employer/Plan Group    [employer] PPO [id number]     Payor Plan Address Payor Plan Phone Number Payor Plan Fax Number Effective Dates    [address] [phone]  4/1/2019 - None Entered    [place] [address]       Subscriber Name Subscriber Birth Date Member ID       AZEB DA SILVA 1995 [id number]                 Emergency Contacts      (Rel.) Home Phone Work Phone Mobile Phone    AZEB DA SILVA (Spouse) [phone] -- [phone]            Discharge Order (From admission, onward)     Start     Ordered    05/08/22 1137  Discharge patient  Once        Expected Discharge Date: 05/08/22    Expected Discharge Time: Afternoon    Discharge Disposition: Home or Self Care    Physician of Record for Attribution - Please select from Treatment Team: NEY BURKS [[id number]]    Review needed by CMO to determine Physician of Record: No       Question Answer " Comment   Physician of Record for Attribution - Please select from Treatment Team NEY BURKS    Review needed by CMO to determine Physician of Record No        05/08/22 5917

## 2022-05-11 ENCOUNTER — READMISSION MANAGEMENT (OUTPATIENT)
Dept: CALL CENTER | Facility: HOSPITAL | Age: 27
End: 2022-05-11

## 2022-05-11 NOTE — OUTREACH NOTE
Medical Week 1 Survey    Flowsheet Row Responses   [facility] patient discharged from? [place]   Does the patient have one of the following disease processes/diagnoses(primary or secondary)? Other   Week 1 attempt successful? Yes   Call start time 1556   Call end time 1600   Discharge diagnosis Shortness of breath,recurrent pulm embolism    Is patient permission given to speak with other caregiver? No   Person spoke with today (if not patient) and relationship patient   Medication alerts for this patient Patient new on warfarin therapy. Patient having INR checked tomorrow.    Meds reviewed with patient/caregiver? Yes   Does the patient have all medications ordered at discharge? Yes   Is the patient taking all medications as directed (includes completed medication regime)? Yes   Does the patient have a primary care provider?  Yes   Does the patient have an appointment with their PCP within 7 days of discharge? Yes   Comments regarding PCP PCP Dr Borrero. Patient seeing tomorrow 5/12   Has the patient kept scheduled appointments due by today? N/A   Has home health visited the patient within 72 hours of discharge? N/A   Psychosocial issues? No   Did the patient receive a copy of their discharge instructions? Yes   Nursing interventions Reviewed instructions with patient   What is the patient's perception of their health status since discharge? Improving   Is the patient/caregiver able to teach back signs and symptoms related to disease process for when to call PCP? Yes   Is the patient/caregiver able to teach back signs and symptoms related to disease process for when to call 911? Yes   Is the patient/caregiver able to teach back the hierarchy of who to call/visit for symptoms/problems? PCP, Specialist, Home health nurse, Urgent Care, ED, 911 Yes   If the patient is a current smoker, are they able to teach back resources for cessation? Not a smoker   Week 1 call completed? Yes          MORGAN Reina  Nurse

## 2022-06-11 RX ORDER — WARFARIN SODIUM 4 MG/1
8 TABLET ORAL NIGHTLY
Qty: 60 TABLET | Refills: 0 | Status: CANCELLED | OUTPATIENT
Start: 2022-06-11 | End: 2022-07-11

## 2022-06-15 ENCOUNTER — HOSPITAL ENCOUNTER (EMERGENCY)
Facility: HOSPITAL | Age: 27
Discharge: HOME | End: 2022-06-15
Attending: EMERGENCY MEDICINE | Admitting: EMERGENCY MEDICINE

## 2022-06-15 ENCOUNTER — APPOINTMENT (OUTPATIENT)
Dept: CT IMAGING | Facility: HOSPITAL | Age: 27
End: 2022-06-15

## 2022-06-15 ENCOUNTER — TELEPHONE (OUTPATIENT)
Dept: CARDIOLOGY | Facility: CLINIC | Age: 27
End: 2022-06-15

## 2022-06-15 VITALS
BODY MASS INDEX: 42 KG/M2 | HEART RATE: 92 BPM | SYSTOLIC BLOOD PRESSURE: 136 MMHG | TEMPERATURE: 98.2 F | WEIGHT: 246 LBS | OXYGEN SATURATION: 96 % | HEIGHT: 64 IN | RESPIRATION RATE: 16 BRPM | DIASTOLIC BLOOD PRESSURE: 87 MMHG

## 2022-06-15 DIAGNOSIS — Q79.60 EHLERS-DANLOS SYNDROME: ICD-10-CM

## 2022-06-15 DIAGNOSIS — R07.89 ATYPICAL CHEST PAIN: Primary | ICD-10-CM

## 2022-06-15 DIAGNOSIS — M35.9 AUTOIMMUNE DISEASE: ICD-10-CM

## 2022-06-15 DIAGNOSIS — Z86.711 HISTORY OF PULMONARY EMBOLISM: ICD-10-CM

## 2022-06-15 DIAGNOSIS — K31.84 GASTROPARESIS: ICD-10-CM

## 2022-06-15 DIAGNOSIS — G90.A POSTURAL ORTHOSTATIC TACHYCARDIA SYNDROME: ICD-10-CM

## 2022-06-15 LAB
ALBUMIN SERPL-MCNC: 3.9 G/DL (ref 3.5–5.2)
ALBUMIN/GLOB SERPL: 0.8 G/DL
ALP SERPL-CCNC: 88 U/L (ref 39–117)
ALT SERPL W P-5'-P-CCNC: 102 U/L (ref 1–33)
ANION GAP SERPL CALCULATED.3IONS-SCNC: 15.1 MMOL/L (ref 5–15)
ANISOCYTOSIS BLD QL: NORMAL
APTT PPP: 41.6 SECONDS (ref 22.7–35.4)
AST SERPL-CCNC: 98 U/L (ref 1–32)
BILIRUB SERPL-MCNC: 0.5 MG/DL (ref 0–1.2)
BUN SERPL-MCNC: 3 MG/DL (ref 6–20)
BUN/CREAT SERPL: 3.6 (ref 7–25)
CALCIUM SPEC-SCNC: 9.8 MG/DL (ref 8.6–10.5)
CHLORIDE SERPL-SCNC: 101 MMOL/L (ref 98–107)
CO2 SERPL-SCNC: 18.9 MMOL/L (ref 22–29)
CREAT SERPL-MCNC: 0.83 MG/DL (ref 0.57–1)
DEPRECATED RDW RBC AUTO: 68.5 FL (ref 37–54)
EGFRCR SERPLBLD CKD-EPI 2021: 99.2 ML/MIN/1.73
EOSINOPHIL # BLD MANUAL: 0.06 10*3/MM3 (ref 0–0.4)
EOSINOPHIL NFR BLD MANUAL: 1 % (ref 0.3–6.2)
ERYTHROCYTE [DISTWIDTH] IN BLOOD BY AUTOMATED COUNT: 24.6 % (ref 12.3–15.4)
GIANT PLATELETS: NORMAL
GLOBULIN UR ELPH-MCNC: 5.2 GM/DL
GLUCOSE SERPL-MCNC: 120 MG/DL (ref 65–99)
HCT VFR BLD AUTO: 42.8 % (ref 34–46.6)
HGB BLD-MCNC: 14 G/DL (ref 12–15.9)
INR PPP: 1.87 (ref 0.9–1.1)
LYMPHOCYTES # BLD MANUAL: 1.12 10*3/MM3 (ref 0.7–3.1)
LYMPHOCYTES NFR BLD MANUAL: 7 % (ref 5–12)
MCH RBC QN AUTO: 26.5 PG (ref 26.6–33)
MCHC RBC AUTO-ENTMCNC: 32.7 G/DL (ref 31.5–35.7)
MCV RBC AUTO: 81.1 FL (ref 79–97)
MICROCYTES BLD QL: NORMAL
MONOCYTES # BLD: 0.39 10*3/MM3 (ref 0.1–0.9)
NEUTROPHILS # BLD AUTO: 4.05 10*3/MM3 (ref 1.7–7)
NEUTROPHILS NFR BLD MANUAL: 72 % (ref 42.7–76)
NT-PROBNP SERPL-MCNC: 32.4 PG/ML (ref 0–450)
PLATELET # BLD AUTO: 389 10*3/MM3 (ref 140–450)
PMV BLD AUTO: 10 FL (ref 6–12)
POTASSIUM SERPL-SCNC: 3.6 MMOL/L (ref 3.5–5.2)
PROT SERPL-MCNC: 9.1 G/DL (ref 6–8.5)
PROTHROMBIN TIME: 21.1 SECONDS (ref 11.7–14.2)
QT INTERVAL: 337 MS
RBC # BLD AUTO: 5.28 10*6/MM3 (ref 3.77–5.28)
SODIUM SERPL-SCNC: 135 MMOL/L (ref 136–145)
TROPONIN T SERPL-MCNC: <0.01 NG/ML (ref 0–0.03)
VARIANT LYMPHS NFR BLD MANUAL: 20 % (ref 19.6–45.3)
WBC MORPH BLD: NORMAL
WBC NRBC COR # BLD: 5.62 10*3/MM3 (ref 3.4–10.8)

## 2022-06-15 PROCEDURE — 80053 COMPREHEN METABOLIC PANEL: CPT | Performed by: EMERGENCY MEDICINE

## 2022-06-15 PROCEDURE — 93010 ELECTROCARDIOGRAM REPORT: CPT | Performed by: INTERNAL MEDICINE

## 2022-06-15 PROCEDURE — 85610 PROTHROMBIN TIME: CPT | Performed by: EMERGENCY MEDICINE

## 2022-06-15 PROCEDURE — 71275 CT ANGIOGRAPHY CHEST: CPT

## 2022-06-15 PROCEDURE — 99284 EMERGENCY DEPT VISIT MOD MDM: CPT

## 2022-06-15 PROCEDURE — 96374 THER/PROPH/DIAG INJ IV PUSH: CPT

## 2022-06-15 PROCEDURE — 25010000002 ONDANSETRON PER 1 MG: Performed by: EMERGENCY MEDICINE

## 2022-06-15 PROCEDURE — 85025 COMPLETE CBC W/AUTO DIFF WBC: CPT | Performed by: EMERGENCY MEDICINE

## 2022-06-15 PROCEDURE — 0 IOPAMIDOL PER 1 ML: Performed by: EMERGENCY MEDICINE

## 2022-06-15 PROCEDURE — 83880 ASSAY OF NATRIURETIC PEPTIDE: CPT | Performed by: EMERGENCY MEDICINE

## 2022-06-15 PROCEDURE — 84484 ASSAY OF TROPONIN QUANT: CPT | Performed by: EMERGENCY MEDICINE

## 2022-06-15 PROCEDURE — 85007 BL SMEAR W/DIFF WBC COUNT: CPT | Performed by: EMERGENCY MEDICINE

## 2022-06-15 PROCEDURE — 85730 THROMBOPLASTIN TIME PARTIAL: CPT | Performed by: EMERGENCY MEDICINE

## 2022-06-15 PROCEDURE — 93005 ELECTROCARDIOGRAM TRACING: CPT | Performed by: EMERGENCY MEDICINE

## 2022-06-15 RX ORDER — ONDANSETRON 2 MG/ML
4 INJECTION INTRAMUSCULAR; INTRAVENOUS ONCE
Status: COMPLETED | OUTPATIENT
Start: 2022-06-15 | End: 2022-06-15

## 2022-06-15 RX ORDER — SODIUM CHLORIDE 0.9 % (FLUSH) 0.9 %
10 SYRINGE (ML) INJECTION AS NEEDED
Status: DISCONTINUED | OUTPATIENT
Start: 2022-06-15 | End: 2022-06-15 | Stop reason: HOSPADM

## 2022-06-15 RX ADMIN — IOPAMIDOL 95 ML: 755 INJECTION, SOLUTION INTRAVENOUS at 17:34

## 2022-06-15 RX ADMIN — ONDANSETRON 4 MG: 2 INJECTION INTRAMUSCULAR; INTRAVENOUS at 17:56

## 2022-06-15 NOTE — TELEPHONE ENCOUNTER
"Called Liliam Lorenz for additional information.    Patient reports she was at the gastroparesis lab this morning and too nauseous to take her medications.  Patient took medications around 1pm.    Patient reports feeling dizzy, like she's floating, and just \"off\".  Patient stated her shortness of breath and chest discomfort are at baseline.     BP: unavailable, unsure where machine is  HR: 110s-140s  O2 sat: 86-96    Patient reports when oxygen saturation drops down, it only stays there for a few seconds.  Patient is currently wearing 2L via NC.    Patient reports being changed from eliquis to warfarin.  Patient developed a PE while on eliquis.  Patient stated her PCP is currently managing this, however, her INR is not therapeutic.  Patient reports it was 1.7 Monday and is due for recheck tomorrow.    Please let me know how you would like to proceed.    Thank you,  Renea Yeung, RN  Triage Nurse [facility]  " Ambulatory

## 2022-06-15 NOTE — ED TRIAGE NOTES
Pt arrives via EMS with c/o SOB, tachycardia and low oxygen saturation.  Pt reports hx of PE, pt is taking warfarin, last dose was last night. Pt reports that she was not in a therapeutic range. Pt reports that her hematologist told her take Vit K r/t pt's vit K deficiency. Pt reports last dose was yesterday.  Pt had sudden onset around 1300 of persistent 86% oxygenation on RA, pt placed herself on 2L NC. Oxygen recovered to 98% with 2LNC.  Pt felt SOB, and heart racing.

## 2022-06-15 NOTE — ED PROVIDER NOTES
" EMERGENCY DEPARTMENT ENCOUNTER    Room Number:  29/29  Date of encounter:  6/15/2022  PCP: Jason Borrero MD   Historian: Patient      HPI:  Chief Complaint: \"I feel just like I did when I had my last pulmonary embolism\"  A complete HPI/ROS/PMH/PSH/SH/FH are unobtainable due to: N/A    Context: Liliam Lorenz is a 27 y.o. female who presents to the ED c/o sudden onset of tachycardia and hypoxemia around 130 today while she was sitting on her couch.  She states that all of a sudden, her heart rate shot up to the 130s to 140s and her oxygen saturation fell to the upper 80s.  Chest feels tight and she feels short of breath.  Chest tightness does not radiate.  Symptoms continue the present time.  No recent fevers or chills, cough or congestion.  Symptoms are worsened when she takes her oxygen off and still improved with supplemental oxygen.  She states her symptoms remind her of the last time she had a pulmonary embolism.  She is on Coumadin due to Eliquis failure, however her INR has been low over the past few days.    She has an indwelling catheter in her chest-she gets IV fluids and IV Phenergan daily as well as IVIG once a month.    Prior record review-patient was admitted to the hospital here from 4/29/2022 through 5/8/2022 for a single segmental pulmonary embolism (recurrent).  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  She also has a history of gastroparesis.      The patient was placed in a mask in triage, hand hygiene was performed before and after my interaction with the patient.  I wore a mask, safety glasses and gloves during my entire interaction with the patient.    PAST MEDICAL HISTORY  Active Ambulatory Problems     Diagnosis Date Noted   • Poor venous access 10/15/2020   • POTS (postural orthostatic tachycardia syndrome) 05/06/2021   • Sinus tachycardia 09/08/2021   • Multiple subsegmental pulmonary emboli without acute cor pulmonale (HCC) 09/09/2021   • Autoimmune disease " (Formerly [facility]) 09/06/2020   • 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   • Nausea and vomiting 08/22/2017   • Gastroparesis 08/22/2017   • Postural orthostatic tachycardia syndrome 06/12/2020   • Valverde catheter dysfunction (Formerly [facility]) 10/25/2021   • Febrile illness, acute 01/18/2022   • Single subsegmental pulmonary embolism without acute cor pulmonale (Formerly [facility]) 04/30/2022     Resolved Ambulatory Problems     Diagnosis Date Noted   • No Resolved Ambulatory Problems     Past Medical History:   Diagnosis Date   • Bronchitis    • Chest pain    • Chronic hypotension    • Eczema    • GERD (gastroesophageal reflux disease)    • IBS (irritable bowel syndrome)    • Lightheadedness    • Rectal fissure    • Rosacea    • Tachycardia          PAST SURGICAL HISTORY  Past Surgical History:   Procedure Laterality Date   • COLONOSCOPY     • MEDIPORT INSERTION, DOUBLE  09/01/2020    [facility]ism D[place]NT[place]    • UPPER GASTROINTESTINAL ENDOSCOPY     • VENOUS ACCESS DEVICE (PORT) INSERTION Left 10/16/2020    Procedure: INSERTION VENOUS ACCESS DEVICE UNDER FLURO;  Surgeon: Pa Lane MD;  Location: [facility];  Service: General;  Laterality: Left;   • VENOUS ACCESS DEVICE (PORT) INSERTION Right 10/26/2021    Procedure: REMOVAL AND INSERTION OF VALVERDE CATHETER;  Surgeon: Pa Lane MD;  Location: [facility];  Service: General;  Laterality: Right;         FAMILY HISTORY  Family History   Problem Relation Age of Onset   • Hypertension Mother    • Diabetes Mother    • Diabetes Father    • Heart disease Paternal Grandfather    • Stroke Paternal Grandfather    • Diabetes Paternal Grandfather    • Cancer Paternal Grandfather    • Coronary artery disease Maternal Grandmother         MGM with 4 vessel CABG and multiple stents   • Cancer Maternal Grandmother    • Coronary artery disease Maternal Uncle         Maternal uncle with MI   • Breast cancer Other    • Malig Hyperthermia Neg Hx          SOCIAL HISTORY  Social History     Socioeconomic  History   • Marital status:    Tobacco Use   • Smoking status: Never Smoker   • Smokeless tobacco: Never Used   Vaping Use   • Vaping Use: Never used   Substance and Sexual Activity   • Alcohol use: No   • Drug use: No   • Sexual activity: Yes     Partners: Female     Birth control/protection: None         ALLERGIES  Eggs or egg-derived products, Pepcid [famotidine], and Scopolamine        REVIEW OF SYSTEMS  Review of Systems   Constitutional: Negative for chills and fever.   Respiratory: Positive for chest tightness and shortness of breath.    Cardiovascular: Positive for palpitations.   Hematological: Bruises/bleeds easily.        All systems reviewed and negative except for those discussed in HPI.       PHYSICAL EXAM    I have reviewed the triage vital signs and nursing notes.    ED Triage Vitals   Temp Pulse Resp BP SpO2   -- -- -- -- --      Temp src Heart Rate Source Patient Position BP Location FiO2 (%)   -- -- -- -- --       Physical Exam   Constitutional: Pt. is oriented to person, place, and time and well-developed, well-nourished, and in no distress.  She is chronically ill-appearing.  HENT: Normocephalic and atraumatic.   Neck: Normal range of motion. Neck supple. No JVD present.   Cardiovascular: Slightly tachycardic.  Rate, regular rhythm and normal heart sounds. No murmur heard.  Pulmonary/Chest: Effort normal and breath sounds normal. No stridor. No respiratory distress. No wheezes, no rales.   Abdominal: Soft. There is no tenderness. There is no rebound and no guarding.   Musculoskeletal: Normal range of motion. No edema, tenderness or deformity.   Neurological: Pt. is alert and oriented to person, place, and time.  She has no focal neurologic deficits  Skin: Skin is warm and dry. No rash noted. Pt. is not diaphoretic. No erythema.   Psychiatric: Mood is slightly anxious, affect is congruent.  Judgment appears normal.  She is pleasant and cooperative.  Nursing note and vitals  reviewed.        LAB RESULTS  Recent Results (from the past 24 hour(s))   Comprehensive Metabolic Panel    Collection Time: 06/15/22  3:33 PM    Specimen: Blood   Result Value Ref Range    Glucose 120 (H) 65 - 99 mg/dL    BUN 3 (L) 6 - 20 mg/dL    Creatinine 0.83 0.57 - 1.00 mg/dL    Sodium 135 (L) 136 - 145 mmol/L    Potassium 3.6 3.5 - 5.2 mmol/L    Chloride 101 98 - 107 mmol/L    CO2 18.9 (L) 22.0 - 29.0 mmol/L    Calcium 9.8 8.6 - 10.5 mg/dL    Total Protein 9.1 (H) 6.0 - 8.5 g/dL    Albumin 3.90 3.50 - 5.20 g/dL    ALT (SGPT) 102 (H) 1 - 33 U/L    AST (SGOT) 98 (H) 1 - 32 U/L    Alkaline Phosphatase 88 39 - 117 U/L    Total Bilirubin 0.5 0.0 - 1.2 mg/dL    Globulin 5.2 gm/dL    A/G Ratio 0.8 g/dL    BUN/Creatinine Ratio 3.6 (L) 7.0 - 25.0    Anion Gap 15.1 (H) 5.0 - 15.0 mmol/L    eGFR 99.2 >60.0 mL/min/1.73   Protime-INR    Collection Time: 06/15/22  3:33 PM    Specimen: Blood   Result Value Ref Range    Protime 21.1 (H) 11.7 - 14.2 Seconds    INR 1.87 (H) 0.90 - 1.10   aPTT    Collection Time: 06/15/22  3:33 PM    Specimen: Blood   Result Value Ref Range    PTT 41.6 (H) 22.7 - 35.4 seconds   Troponin    Collection Time: 06/15/22  3:33 PM    Specimen: Blood   Result Value Ref Range    Troponin T <0.010 0.000 - 0.030 ng/mL   BNP    Collection Time: 06/15/22  3:33 PM    Specimen: Blood   Result Value Ref Range    proBNP 32.4 0.0 - 450.0 pg/mL   CBC Auto Differential    Collection Time: 06/15/22  3:33 PM    Specimen: Blood   Result Value Ref Range    WBC 5.62 3.40 - 10.80 10*3/mm3    RBC 5.28 3.77 - 5.28 10*6/mm3    Hemoglobin 14.0 12.0 - 15.9 g/dL    Hematocrit 42.8 34.0 - 46.6 %    MCV 81.1 79.0 - 97.0 fL    MCH 26.5 (L) 26.6 - 33.0 pg    MCHC 32.7 31.5 - 35.7 g/dL    RDW 24.6 (H) 12.3 - 15.4 %    RDW-SD 68.5 (H) 37.0 - 54.0 fl    MPV 10.0 6.0 - 12.0 fL    Platelets 389 140 - 450 10*3/mm3   Manual Differential    Collection Time: 06/15/22  3:33 PM    Specimen: Blood   Result Value Ref Range    Neutrophil % 72.0  42.7 - 76.0 %    Lymphocyte % 20.0 19.6 - 45.3 %    Monocyte % 7.0 5.0 - 12.0 %    Eosinophil % 1.0 0.3 - 6.2 %    Neutrophils Absolute 4.05 1.70 - 7.00 10*3/mm3    Lymphocytes Absolute 1.12 0.70 - 3.10 10*3/mm3    Monocytes Absolute 0.39 0.10 - 0.90 10*3/mm3    Eosinophils Absolute 0.06 0.00 - 0.40 10*3/mm3    Anisocytosis Slight/1+ None Seen    Microcytes Slight/1+ None Seen    WBC Morphology Normal Normal    Giant Platelets Slight/1+ None Seen   ECG 12 Lead    Collection Time: 06/15/22  3:47 PM   Result Value Ref Range    QT Interval 337 ms       Ordered the above labs and independently reviewed the results.        RADIOLOGY  CT Angiogram Chest    Result Date: 6/15/2022  CT ANGIOGRAM CHEST-  CLINICAL HISTORY: Dyspnea. Tachycardia. History of pulmonary embolism.  TECHNIQUE: Spiral CT images were obtained through the chest during rapid IV injection of contrast and were reconstructed in 2 mm thick axial slices. Multiple coronal and sagittal and 3-D reconstructions were produced.  Radiation dose reduction techniques were utilized, including automated exposure control and exposure modulation based on body size.  COMPARISON: CTA dated 04/30/2022.  FINDINGS: The main pulmonary arteries and their lobar and segmental branches are well opacified, and demonstrate no filling defects. There is no CT evidence of acute pulmonary thromboembolism. The thoracic aorta was faintly opacified and appears normal. There is no mediastinal or hilar or axillary lymphadenopathy. Lung window images demonstrate no parenchymal infiltrates or masses. There are no pleural effusions. Images through the upper abdomen show no abnormalities. The chest wall is unremarkable.      Normal CTA of the chest with PE protocol. There is no CT evidence of pulmonary embolism or acute process.  This report was finalized on 6/15/2022 5:57 PM by Dr. Peter Santiago M.D.        I ordered the above noted radiological studies. Reviewed by me and discussed with  radiologist.  See dictation for official radiology interpretation.      PROCEDURES    Procedures      MEDICATIONS GIVEN IN ER    Medications   sodium chloride 0.9 % flush 10 mL (has no administration in time range)   iopamidol (ISOVUE-370) 76 % injection 95 mL (95 mL Intravenous Given by Other 6/15/22 1734)   ondansetron (ZOFRAN) injection 4 mg (4 mg Intravenous Given 6/15/22 1756)         PROGRESS, DATA ANALYSIS, CONSULTS, AND MEDICAL DECISION MAKING    Any/all labs have been independently reviewed by me.  Any/all radiology studies have been reviewed by me and discussed with radiologist dictating the report.   EKG's independently viewed and interpreted by me.  Discussion below represents my analysis of pertinent findings related to patient's condition, differential diagnosis, treatment plan and final disposition.    Number of Diagnoses or Management Options     Amount and/or Complexity of Data Reviewed  Clinical lab tests:  Yes  Tests in the radiology section of CPT®:  Yes  Tests in the medicine section of CPT®:  Yes  Review and summarize past medical records:  (See HPI)  Independent visualization of images, tracings, or specimens: (See below)      ED Course as of 06/15/22 1800   Wed Mike 15, 2022   1753 Differential diagnosis includes but is not limited to: Pulmonary embolism, aortic dissection, acute coronary syndrome/STEMI, pneumonia/CHF/COPD exacerbation, pneumothorax, pleurisy, bronchitis, gastroesophageal reflux, referred pain, traumatic injury/rib fractures, etc. [WC]   1753 Cardiac monitor revealed sinus tachycardia as interpreted by me.  Cardiac monitoring was ordered secondary to the patient's history of chest pain and to monitor the patient for dysrhythmia. [WC]   1753 EKG performed at 1547 and interpreted by me shows sinus tachycardia with a rate of 107 bpm.  The NE interval and QRS complexes are unremarkable.  There are nonspecific ST-T changes.  There is no significant change when compared to  4/29/2022. [WC]   1758 Discussed pertinent labs and imaging findings with the patient/family.  Patient/Family voiced understanding of need to follow-up for recheck, further testing as needed.  Return to the emergency Department warnings were given, including lack of acute pulmonary emboli.  We also discussed taking 10 mg of Coumadin tonight.  She will have her INR checked tomorrow..   [WC]   1800 Labs, EKG and imaging as above.  My clinical suspicion for a serious underlying pulmonary or cardiac etiology is low.  There is no evidence to suggest pulmonary embolism, aortic dissection or acute coronary syndrome.  No evidence of pneumonia/CHF/COPD exacerbation that would require inpatient admission.  The patient was advised to return to the emergency department should the symptoms worsen or for any new concerns.  The patient can be safely followed as an outpatient for further workup as indicated.   [WC]      ED Course User Index  [WC] Juarez Mendoza MD       AS OF 18:00 EDT VITALS:    BP - 120/79  HR - 113  TEMP - 98.2 °F (36.8 °C) (Tympanic)  02 SATS - 98%        DIAGNOSIS  Final diagnoses:   Atypical chest pain   History of pulmonary embolism   Phoebe-Danlos syndrome   Postural orthostatic tachycardia syndrome   Autoimmune disease (HCC)   Gastroparesis         DISPOSITION  Discharged           Juarez Mendoza MD  06/15/22 1800

## 2022-06-15 NOTE — TELEPHONE ENCOUNTER
----- Message from Liliam Lorenz sent at 6/15/2022  1:23 PM EDT -----  Regarding: Symptoms  Hi Dr. Rausch! I have a question for you. My heart rate is staying at 120-125 and my oxygen keeps dropping down to 92-95 but it doesn’t stay that low very long. It’s sitting at 94-96 right now and I just need to know when I should worry and go to the ER? I did miss my morning meds so I’m sure it could be that. I’m just worried and wanted to check and make sure I was okay!    Thank you

## 2022-06-15 NOTE — TELEPHONE ENCOUNTER
Discussed with Dr. Rausch: if patient is not feeling well/concerned, given her history of multiple pulmonary emboli, subtherapeutic INR, and dropping O2 saturations, it would be best for her to go to ED for evaluation    Reviewed recommendations with Liliam Lorenz and the patient verbalized understanding of the recommendations.      Thank you,  Renea Yeung RN  Triage Nurse SAPPHIRE

## 2022-06-15 NOTE — DISCHARGE INSTRUCTIONS
Take 10 mg of Coumadin tonight as discussed.  Follow-up with your primary care physician tomorrow for an INR check.  Return to the emergency department for any problems.  Good luck!

## 2022-06-30 ENCOUNTER — TELEMEDICINE (OUTPATIENT)
Dept: CARDIOLOGY | Facility: CLINIC | Age: 27
End: 2022-06-30

## 2022-06-30 VITALS — WEIGHT: 243 LBS | BODY MASS INDEX: 41.48 KG/M2 | HEIGHT: 64 IN | OXYGEN SATURATION: 99 % | HEART RATE: 86 BPM

## 2022-06-30 DIAGNOSIS — G90.A POTS (POSTURAL ORTHOSTATIC TACHYCARDIA SYNDROME): Primary | ICD-10-CM

## 2022-06-30 DIAGNOSIS — G90.1 DYSAUTONOMIA: ICD-10-CM

## 2022-06-30 DIAGNOSIS — Z86.711 HISTORY OF PULMONARY EMBOLISM: ICD-10-CM

## 2022-06-30 DIAGNOSIS — Q79.62 EHLERS-DANLOS SYNDROME TYPE III: ICD-10-CM

## 2022-06-30 PROCEDURE — 99213 OFFICE O/P EST LOW 20 MIN: CPT | Performed by: INTERNAL MEDICINE

## 2022-06-30 RX ORDER — LOPERAMIDE HYDROCHLORIDE 2 MG/1
2 CAPSULE ORAL 4 TIMES DAILY PRN
COMMUNITY

## 2022-06-30 RX ORDER — MECLIZINE HYDROCHLORIDE 25 MG/1
25 TABLET ORAL 3 TIMES DAILY PRN
COMMUNITY

## 2022-06-30 RX ORDER — RABEPRAZOLE SODIUM 20 MG/1
20 TABLET, DELAYED RELEASE ORAL DAILY
COMMUNITY
Start: 2022-06-12 | End: 2023-03-05 | Stop reason: HOSPADM

## 2022-06-30 RX ORDER — PROMETHAZINE HYDROCHLORIDE 25 MG/ML
INJECTION, SOLUTION INTRAMUSCULAR; INTRAVENOUS
COMMUNITY

## 2022-06-30 RX ORDER — ASCORBIC ACID 125 MG
100 TABLET,CHEWABLE ORAL DAILY
Status: ON HOLD | COMMUNITY
End: 2023-03-02

## 2022-06-30 RX ORDER — GALCANEZUMAB 120 MG/ML
INJECTION, SOLUTION SUBCUTANEOUS
COMMUNITY
Start: 2022-06-23

## 2022-06-30 RX ORDER — MONTELUKAST SODIUM 10 MG/1
1 TABLET ORAL NIGHTLY
Status: ON HOLD | COMMUNITY
Start: 2022-02-28 | End: 2023-03-02

## 2022-06-30 RX ORDER — ESCITALOPRAM OXALATE 10 MG/1
20 TABLET ORAL DAILY
COMMUNITY
Start: 2022-06-24

## 2022-06-30 RX ORDER — ENOXAPARIN SODIUM 100 MG/ML
110 INJECTION SUBCUTANEOUS EVERY 12 HOURS SCHEDULED
COMMUNITY
Start: 2022-06-20 | End: 2023-03-05 | Stop reason: HOSPADM

## 2022-08-10 RX ORDER — MIDODRINE HYDROCHLORIDE 10 MG/1
TABLET ORAL
Qty: 90 TABLET | Refills: 6 | Status: SHIPPED | OUTPATIENT
Start: 2022-08-10

## 2022-08-15 ENCOUNTER — TELEPHONE (OUTPATIENT)
Dept: CARDIOLOGY | Facility: CLINIC | Age: 27
End: 2022-08-15

## 2022-08-15 NOTE — TELEPHONE ENCOUNTER
Received a PA request for Eliquis    PA completed through [facility]    Per [facility]:  CaseId:[id number];Status:Approved;Review Type:Prior Auth;Coverage Start Date:07/16/2022;Coverage End Date:08/15/2023;

## 2022-11-02 ENCOUNTER — TELEPHONE (OUTPATIENT)
Dept: CARDIOLOGY | Facility: CLINIC | Age: 27
End: 2022-11-02

## 2022-11-02 NOTE — TELEPHONE ENCOUNTER
Called and spoke with pt to inform, she verbalized understanding.     Scheduling, can you please arrange a f/u with RM for pt? // HG

## 2022-11-02 NOTE — TELEPHONE ENCOUNTER
Called and spoke with pt regarding today's appt to get more information. She states she does not feel that this appt is necessary since she just saw [facility] 2 weeks ago and they are in the process of adjusting her medications, however she would feel more comfortable if she still had a cardiologist close by to follow her and stay up to date with her condition, just in case there is an emergency and she needs to be seen within a timely manner. She understands that [facility] is technically going to take over her care due to her POTS, but would still like a cardiologist here in [place]. Cha, would you be able to refer pt to another cardiologist? // HG

## 2022-11-11 RX ORDER — PYRIDOSTIGMINE BROMIDE 60 MG/1
TABLET ORAL
Qty: 90 TABLET | Refills: 3 | Status: SHIPPED | OUTPATIENT
Start: 2022-11-11

## 2022-12-16 ENCOUNTER — APPOINTMENT (OUTPATIENT)
Dept: GENERAL RADIOLOGY | Facility: HOSPITAL | Age: 27
End: 2022-12-16

## 2022-12-16 ENCOUNTER — HOSPITAL ENCOUNTER (EMERGENCY)
Facility: HOSPITAL | Age: 27
Discharge: HOME | End: 2022-12-16
Attending: EMERGENCY MEDICINE | Admitting: EMERGENCY MEDICINE

## 2022-12-16 VITALS
RESPIRATION RATE: 16 BRPM | HEART RATE: 110 BPM | HEIGHT: 64 IN | SYSTOLIC BLOOD PRESSURE: 133 MMHG | OXYGEN SATURATION: 96 % | DIASTOLIC BLOOD PRESSURE: 89 MMHG | WEIGHT: 245 LBS | TEMPERATURE: 98.3 F | BODY MASS INDEX: 41.83 KG/M2

## 2022-12-16 DIAGNOSIS — Z45.2 ENCOUNTER FOR CENTRAL LINE CARE: Primary | ICD-10-CM

## 2022-12-16 PROCEDURE — 99282 EMERGENCY DEPT VISIT SF MDM: CPT

## 2022-12-16 PROCEDURE — 71046 X-RAY EXAM CHEST 2 VIEWS: CPT

## 2022-12-16 NOTE — ED TRIAGE NOTES
"Pt via PV from home with c/o \"possible central line infection/dislodged\" x few days. Pt reports pain and swelling above site and into the neck.     All triage performed with this RN wearing appropriate PPE.  Pt placed in mask upon arrival to ED.    "

## 2022-12-16 NOTE — ED PROVIDER NOTES
" EMERGENCY DEPARTMENT ENCOUNTER    Room Number:  36/36  Date of encounter:  12/16/2022  PCP: Jason Borrero MD  Historian: Patient     I used full protective equipment while examining this patient.  This includes face mask, gloves and protective eyewear.  I washed my hands before entering the room and immediately upon leaving the room.  Patient was wearing a surgical mask.      HPI:  Chief Complaint: \"I want my central line checked out\"  A complete HPI/ROS/PMH/PSH/SH/FH are unobtainable due to: None    Context: Liliam Lorenz is a 27 y.o. female, with history of POTS and gastroparesis, who presents to the ED for evaluation of her central line.  Patient had a central line placed in March 2022.  She gets frequent IVIG infusions and also gives herself IV fluids and IV nausea medication regularly.  Several days ago, her dog got tangled up and IV tubing.  Since then, she reports mild discomfort and possible swelling just above the site of the central line in her right chest.  She reports that the line is working normally.  She is able to administer fluids/medications and withdrawal blood without difficulty since this occurred.  It is not leaking.  Denies fever, chills, or shortness of breath.  Patient states \"I just want to make sure that there is nothing wrong with it\".      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   • Nausea and vomiting 08/22/2017   • Gastroparesis 08/22/2017   • Postural orthostatic tachycardia syndrome 06/12/2020   • Dolan catheter dysfunction (HCC) 10/25/2021   • Febrile illness, acute 01/18/2022   • Single subsegmental pulmonary embolism without acute cor pulmonale " ([facility]) 04/30/2022     Resolved Ambulatory Problems     Diagnosis Date Noted   • No Resolved Ambulatory Problems     Past Medical History:   Diagnosis Date   • Bronchitis    • Chest pain    • Chronic hypotension    • Eczema    • GERD (gastroesophageal reflux disease)    • IBS (irritable bowel syndrome)    • Lightheadedness    • Rectal fissure    • Rosacea    • Tachycardia          PAST SURGICAL HISTORY  Past Surgical History:   Procedure Laterality Date   • COLONOSCOPY     • MEDIPORT INSERTION, DOUBLE  09/01/2020    [facility] [place]    • UPPER GASTROINTESTINAL ENDOSCOPY     • VENOUS ACCESS DEVICE (PORT) INSERTION Left 10/16/2020    Procedure: INSERTION VENOUS ACCESS DEVICE UNDER FLURO;  Surgeon: Pa Lane MD;  Location: [facility] OR;  Service: General;  Laterality: Left;   • VENOUS ACCESS DEVICE (PORT) INSERTION Right 10/26/2021    Procedure: REMOVAL AND INSERTION OF VALVERDE CATHETER;  Surgeon: Pa Lane MD;  Location: [facility] OR;  Service: General;  Laterality: Right;         FAMILY HISTORY  Family History   Problem Relation Age of Onset   • Hypertension Mother    • Diabetes Mother    • Diabetes Father    • Heart disease Paternal Grandfather    • Stroke Paternal Grandfather    • Diabetes Paternal Grandfather    • Cancer Paternal Grandfather    • Coronary artery disease Maternal Grandmother         MGM with 4 vessel CABG and multiple stents   • Cancer Maternal Grandmother    • Coronary artery disease Maternal Uncle         Maternal uncle with MI   • Breast cancer Other    • Malig Hyperthermia Neg Hx          SOCIAL HISTORY  Social History     Socioeconomic History   • Marital status:    Tobacco Use   • Smoking status: Never   • Smokeless tobacco: Never   Vaping Use   • Vaping Use: Never used   Substance and Sexual Activity   • Alcohol use: No   • Drug use: No   • Sexual activity: Yes     Partners: Female     Birth control/protection: None         ALLERGIES  Eggs or egg-derived  products, Pepcid [famotidine], and Scopolamine       REVIEW OF SYSTEMS  Review of Systems      All systems have been reviewed and are negative except as as discussed in the HPI    PHYSICAL EXAM    I have reviewed the triage vital signs and nursing notes.    ED Triage Vitals   Temp Heart Rate Resp BP SpO2   12/16/22 1621 12/16/22 1621 12/16/22 1621 12/16/22 1707 12/16/22 1621   97.6 °F (36.4 °C) 119 16 137/88 97 %      Temp src Heart Rate Source Patient Position BP Location FiO2 (%)   -- -- -- -- --              Physical Exam  GENERAL: Awake, alert, oriented x3.  Well-developed, well-nourished female.  Resting comfortably in no acute distress  HENT: NCAT, nares patent  EYES: no scleral icterus  CV: regular rhythm, regular rate  RESPIRATORY: normal effort, clear to auscultation bilaterally  ABDOMEN: soft, nontender  MUSCULOSKELETAL: Extremities are nontender and without obvious deformity.    NEURO: Speech is normal.  No facial droop  SKIN: There is a dual-lumen central line in the right upper chest.  There is a clear dressing in place.  There is no surrounding erythema.  No drainage.  Chest wall is nontender.  No fluctuance.  PSYCH: Normal mood and affect      LAB RESULTS  No results found for this or any previous visit (from the past 24 hour(s)).    Ordered the above labs and independently reviewed the results.      RADIOLOGY  XR Chest 2 View    Result Date: 12/16/2022  Stat portable view of the chest 12/16/2022  CLINICAL HISTORY: Central line problem. The dog got caught on on it and may have pulled it  This is correlated to a chest CT on 06/15/2022.  FINDINGS: There is a right internal jugular central line in place with its distal tip projecting over the superior aspect of the right atrium. The cardiomediastinal silhouette and pulmonary vasculature are within normal limits. The lungs are clear. Costophrenic angles are sharp.      1. The tip of the right internal jugular central line projects over the superior right  atrium. No active disease is currently seen in the chest.  This report was finalized on 12/16/2022 6:15 PM by Dr. Pa Engel M.D.        I ordered the above noted radiological studies. Reviewed by me and discussed with radiologist.  See dictation for official radiology interpretation.      PROCEDURES  Procedures      MEDICATIONS GIVEN IN ER    Medications - No data to display      PROGRESS, DATA ANALYSIS, CONSULTS, AND MEDICAL DECISION MAKING    All labs have been independently reviewed by me.  All radiology studies have been reviewed by me and discussed with radiologist dictating the report.   EKG's independently viewed and interpreted by me.  I have reviewed the nurse's notes, vital signs, past medical history, and medication list.  Discussion below represents my analysis of pertinent findings related to patient's condition, differential diagnosis, treatment plan and final disposition.      ED Course as of 12/16/22 2332   Fri Dec 16, 2022   1848 Old records reviewed.  Patient was last admitted here in April 2022 for pulmonary embolus.  She has a history of POTS syndrome, Phoebe-Danlos syndrome, hypothyroidism, and gastroparesis.  She was last seen here in the ED in June 2022 for atypical chest pain [WH]   1849 Chest x-ray interpreted by the radiologist.  X-ray independently viewed and interpreted by me.  The tip of the right internal jugular central line projects over the superior right atrium.  There is no active disease. [WH]   1903 Chest x-ray shows that the central line is in good position.  Patient reports that the line is functioning normally.  She is afebrile and well-appearing.  No further work-up is indicated.  Patient will be discharged. [WH]      ED Course User Index  [WH] Juarez Gallardo MD       AS OF 23:32 EST VITALS:    BP - 133/89  HR - 110  TEMP - 98.3 °F (36.8 °C) (Oral)  O2 SATS - 96%      DIAGNOSIS  Final diagnoses:   Encounter for central line care          DISPOSITION  DISCHARGE    Patient discharged in stable condition.    Reviewed implications of results, diagnosis, meds, responsibility to follow up, warning signs and symptoms of possible worsening, potential complications and reasons to return to ER, including leaking from central line, if central line will not flush, fever, chills, or other concern.    Patient/Family voiced understanding of above instructions.    Discussed plan for discharge, as there is no emergent indication for admission. Patient referred to primary care provider for BP management due to today's BP. Pt/family is agreeable and understands need for follow up and repeat testing.  Pt is aware that discharge does not mean that nothing is wrong but it indicates no emergency is present that requires admission and they must continue care with follow-up as given below or physician of their choice.     FOLLOW-UP  Jason Borrero MD  [address] [id number] [phone]      As needed         Medication List      No changes were made to your prescriptions during this visit.           Dictated utilizing Dragon dictation     Juarez Gallardo MD  12/16/22 5081

## 2022-12-17 NOTE — DISCHARGE INSTRUCTIONS
Return to the emergency department for fever, chills, or if your line is leaking or if it will not flush.

## 2023-01-09 ENCOUNTER — APPOINTMENT (OUTPATIENT)
Dept: GENERAL RADIOLOGY | Facility: HOSPITAL | Age: 28
DRG: 314 | End: 2023-01-09
Payer: COMMERCIAL

## 2023-01-09 ENCOUNTER — HOSPITAL ENCOUNTER (INPATIENT)
Facility: HOSPITAL | Age: 28
LOS: 5 days | Discharge: HOME | DRG: 314 | End: 2023-01-14
Attending: EMERGENCY MEDICINE | Admitting: HOSPITALIST
Payer: COMMERCIAL

## 2023-01-09 ENCOUNTER — APPOINTMENT (OUTPATIENT)
Dept: CT IMAGING | Facility: HOSPITAL | Age: 28
DRG: 314 | End: 2023-01-09
Payer: COMMERCIAL

## 2023-01-09 ENCOUNTER — APPOINTMENT (OUTPATIENT)
Dept: CARDIOLOGY | Facility: HOSPITAL | Age: 28
DRG: 314 | End: 2023-01-09
Payer: COMMERCIAL

## 2023-01-09 DIAGNOSIS — T80.212A LOCAL INFECTION ASSOCIATED WITH CENTRAL VENOUS CATHETER, INITIAL ENCOUNTER: ICD-10-CM

## 2023-01-09 DIAGNOSIS — L03.313 CELLULITIS OF CHEST WALL: Primary | ICD-10-CM

## 2023-01-09 DIAGNOSIS — I87.8 POOR VENOUS ACCESS: ICD-10-CM

## 2023-01-09 LAB
ALBUMIN SERPL-MCNC: 4.6 G/DL (ref 3.5–5.2)
ALBUMIN/GLOB SERPL: 1.2 G/DL
ALP SERPL-CCNC: 115 U/L (ref 39–117)
ALT SERPL W P-5'-P-CCNC: 46 U/L (ref 1–33)
ANION GAP SERPL CALCULATED.3IONS-SCNC: 11 MMOL/L (ref 5–15)
AST SERPL-CCNC: 30 U/L (ref 1–32)
BASOPHILS # BLD AUTO: 0.04 10*3/MM3 (ref 0–0.2)
BASOPHILS NFR BLD AUTO: 0.3 % (ref 0–1.5)
BH CV UPPER VENOUS LEFT INTERNAL JUGULAR AUGMENT: NORMAL
BH CV UPPER VENOUS LEFT INTERNAL JUGULAR COMPRESS: NORMAL
BH CV UPPER VENOUS LEFT INTERNAL JUGULAR PHASIC: NORMAL
BH CV UPPER VENOUS LEFT INTERNAL JUGULAR SPONT: NORMAL
BH CV UPPER VENOUS LEFT SUBCLAVIAN AUGMENT: NORMAL
BH CV UPPER VENOUS LEFT SUBCLAVIAN COMPRESS: NORMAL
BH CV UPPER VENOUS LEFT SUBCLAVIAN PHASIC: NORMAL
BH CV UPPER VENOUS LEFT SUBCLAVIAN SPONT: NORMAL
BH CV UPPER VENOUS RIGHT AXILLARY AUGMENT: NORMAL
BH CV UPPER VENOUS RIGHT AXILLARY COMPRESS: NORMAL
BH CV UPPER VENOUS RIGHT AXILLARY PHASIC: NORMAL
BH CV UPPER VENOUS RIGHT AXILLARY SPONT: NORMAL
BH CV UPPER VENOUS RIGHT BASILIC FOREARM COMPRESS: NORMAL
BH CV UPPER VENOUS RIGHT BASILIC UPPER COMPRESS: NORMAL
BH CV UPPER VENOUS RIGHT BRACHIAL COMPRESS: NORMAL
BH CV UPPER VENOUS RIGHT CEPHALIC FOREARM COMPRESS: NORMAL
BH CV UPPER VENOUS RIGHT CEPHALIC UPPER COMPRESS: NORMAL
BH CV UPPER VENOUS RIGHT INTERNAL JUGULAR AUGMENT: NORMAL
BH CV UPPER VENOUS RIGHT INTERNAL JUGULAR COMPRESS: NORMAL
BH CV UPPER VENOUS RIGHT INTERNAL JUGULAR PHASIC: NORMAL
BH CV UPPER VENOUS RIGHT INTERNAL JUGULAR SPONT: NORMAL
BH CV UPPER VENOUS RIGHT RADIAL COMPRESS: NORMAL
BH CV UPPER VENOUS RIGHT SUBCLAVIAN AUGMENT: NORMAL
BH CV UPPER VENOUS RIGHT SUBCLAVIAN COMPRESS: NORMAL
BH CV UPPER VENOUS RIGHT SUBCLAVIAN PHASIC: NORMAL
BH CV UPPER VENOUS RIGHT SUBCLAVIAN SPONT: NORMAL
BH CV UPPER VENOUS RIGHT ULNAR COMPRESS: NORMAL
BILIRUB SERPL-MCNC: 0.5 MG/DL (ref 0–1.2)
BUN SERPL-MCNC: 7 MG/DL (ref 6–20)
BUN/CREAT SERPL: 8.2 (ref 7–25)
CALCIUM SPEC-SCNC: 9.5 MG/DL (ref 8.6–10.5)
CHLORIDE SERPL-SCNC: 98 MMOL/L (ref 98–107)
CO2 SERPL-SCNC: 24 MMOL/L (ref 22–29)
CREAT SERPL-MCNC: 0.85 MG/DL (ref 0.57–1)
D-LACTATE SERPL-SCNC: 1.3 MMOL/L (ref 0.5–2)
D-LACTATE SERPL-SCNC: 2.5 MMOL/L (ref 0.5–2)
DEPRECATED RDW RBC AUTO: 42.6 FL (ref 37–54)
EGFRCR SERPLBLD CKD-EPI 2021: 96.4 ML/MIN/1.73
EOSINOPHIL # BLD AUTO: 0.03 10*3/MM3 (ref 0–0.4)
EOSINOPHIL NFR BLD AUTO: 0.2 % (ref 0.3–6.2)
ERYTHROCYTE [DISTWIDTH] IN BLOOD BY AUTOMATED COUNT: 15.5 % (ref 12.3–15.4)
GLOBULIN UR ELPH-MCNC: 3.7 GM/DL
GLUCOSE SERPL-MCNC: 106 MG/DL (ref 65–99)
HCG SERPL QL: NEGATIVE
HCT VFR BLD AUTO: 40.3 % (ref 34–46.6)
HGB BLD-MCNC: 13.3 G/DL (ref 12–15.9)
IMM GRANULOCYTES # BLD AUTO: 0.06 10*3/MM3 (ref 0–0.05)
IMM GRANULOCYTES NFR BLD AUTO: 0.5 % (ref 0–0.5)
LYMPHOCYTES # BLD AUTO: 1.45 10*3/MM3 (ref 0.7–3.1)
LYMPHOCYTES NFR BLD AUTO: 12 % (ref 19.6–45.3)
MAXIMAL PREDICTED HEART RATE: 193 BPM
MCH RBC QN AUTO: 25.2 PG (ref 26.6–33)
MCHC RBC AUTO-ENTMCNC: 33 G/DL (ref 31.5–35.7)
MCV RBC AUTO: 76.5 FL (ref 79–97)
MONOCYTES # BLD AUTO: 0.52 10*3/MM3 (ref 0.1–0.9)
MONOCYTES NFR BLD AUTO: 4.3 % (ref 5–12)
NEUTROPHILS NFR BLD AUTO: 82.7 % (ref 42.7–76)
NEUTROPHILS NFR BLD AUTO: 9.94 10*3/MM3 (ref 1.7–7)
NRBC BLD AUTO-RTO: 0 /100 WBC (ref 0–0.2)
NT-PROBNP SERPL-MCNC: <5 PG/ML (ref 0–450)
PLATELET # BLD AUTO: 369 10*3/MM3 (ref 140–450)
PMV BLD AUTO: 10.6 FL (ref 6–12)
POTASSIUM SERPL-SCNC: 4.1 MMOL/L (ref 3.5–5.2)
PROT SERPL-MCNC: 8.3 G/DL (ref 6–8.5)
RBC # BLD AUTO: 5.27 10*6/MM3 (ref 3.77–5.28)
SODIUM SERPL-SCNC: 133 MMOL/L (ref 136–145)
STRESS TARGET HR: 164 BPM
WBC NRBC COR # BLD: 12.04 10*3/MM3 (ref 3.4–10.8)

## 2023-01-09 PROCEDURE — 02PYX3Z REMOVAL OF INFUSION DEVICE FROM GREAT VESSEL, EXTERNAL APPROACH: ICD-10-PCS | Performed by: SURGERY

## 2023-01-09 PROCEDURE — 87040 BLOOD CULTURE FOR BACTERIA: CPT | Performed by: EMERGENCY MEDICINE

## 2023-01-09 PROCEDURE — 83605 ASSAY OF LACTIC ACID: CPT | Performed by: EMERGENCY MEDICINE

## 2023-01-09 PROCEDURE — 87147 CULTURE TYPE IMMUNOLOGIC: CPT | Performed by: SURGERY

## 2023-01-09 PROCEDURE — 25010000002 CEFEPIME PER 500 MG: Performed by: EMERGENCY MEDICINE

## 2023-01-09 PROCEDURE — 0 IOPAMIDOL PER 1 ML: Performed by: HOSPITALIST

## 2023-01-09 PROCEDURE — 0 LIDOCAINE 1 % SOLUTION: Performed by: SURGERY

## 2023-01-09 PROCEDURE — 25010000002 ENOXAPARIN PER 10 MG: Performed by: HOSPITALIST

## 2023-01-09 PROCEDURE — 87070 CULTURE OTHR SPECIMN AEROBIC: CPT | Performed by: SURGERY

## 2023-01-09 PROCEDURE — 71045 X-RAY EXAM CHEST 1 VIEW: CPT

## 2023-01-09 PROCEDURE — 80053 COMPREHEN METABOLIC PANEL: CPT | Performed by: EMERGENCY MEDICINE

## 2023-01-09 PROCEDURE — 85025 COMPLETE CBC W/AUTO DIFF WBC: CPT | Performed by: EMERGENCY MEDICINE

## 2023-01-09 PROCEDURE — 36415 COLL VENOUS BLD VENIPUNCTURE: CPT | Performed by: EMERGENCY MEDICINE

## 2023-01-09 PROCEDURE — 71275 CT ANGIOGRAPHY CHEST: CPT

## 2023-01-09 PROCEDURE — 84703 CHORIONIC GONADOTROPIN ASSAY: CPT | Performed by: EMERGENCY MEDICINE

## 2023-01-09 PROCEDURE — 25010000002 ONDANSETRON PER 1 MG: Performed by: HOSPITALIST

## 2023-01-09 PROCEDURE — 25010000002 VANCOMYCIN 10 G RECONSTITUTED SOLUTION: Performed by: EMERGENCY MEDICINE

## 2023-01-09 PROCEDURE — 93971 EXTREMITY STUDY: CPT

## 2023-01-09 PROCEDURE — 87186 SC STD MICRODIL/AGAR DIL: CPT | Performed by: SURGERY

## 2023-01-09 PROCEDURE — 83880 ASSAY OF NATRIURETIC PEPTIDE: CPT | Performed by: HOSPITALIST

## 2023-01-09 PROCEDURE — 25010000002 CEFEPIME PER 500 MG: Performed by: HOSPITALIST

## 2023-01-09 PROCEDURE — 99285 EMERGENCY DEPT VISIT HI MDM: CPT

## 2023-01-09 RX ORDER — LIDOCAINE HYDROCHLORIDE 10 MG/ML
10 INJECTION, SOLUTION INFILTRATION; PERINEURAL ONCE
Status: COMPLETED | OUTPATIENT
Start: 2023-01-09 | End: 2023-01-09

## 2023-01-09 RX ORDER — PYRIDOSTIGMINE BROMIDE 60 MG/1
30 TABLET ORAL 2 TIMES DAILY
Status: DISCONTINUED | OUTPATIENT
Start: 2023-01-09 | End: 2023-01-15 | Stop reason: HOSPADM

## 2023-01-09 RX ORDER — PANTOPRAZOLE SODIUM 40 MG/1
40 TABLET, DELAYED RELEASE ORAL EVERY MORNING
Status: DISCONTINUED | OUTPATIENT
Start: 2023-01-10 | End: 2023-01-15 | Stop reason: HOSPADM

## 2023-01-09 RX ORDER — ALBUTEROL SULFATE 90 UG/1
2 AEROSOL, METERED RESPIRATORY (INHALATION) EVERY 4 HOURS PRN
Status: DISCONTINUED | OUTPATIENT
Start: 2023-01-09 | End: 2023-01-15 | Stop reason: HOSPADM

## 2023-01-09 RX ORDER — SODIUM CHLORIDE 9 MG/ML
75 INJECTION, SOLUTION INTRAVENOUS CONTINUOUS
Status: DISCONTINUED | OUTPATIENT
Start: 2023-01-09 | End: 2023-01-10

## 2023-01-09 RX ORDER — PROCHLORPERAZINE MALEATE 10 MG
10 TABLET ORAL EVERY 6 HOURS PRN
Status: DISCONTINUED | OUTPATIENT
Start: 2023-01-09 | End: 2023-01-15 | Stop reason: HOSPADM

## 2023-01-09 RX ORDER — ENOXAPARIN SODIUM 150 MG/ML
1 INJECTION SUBCUTANEOUS EVERY 12 HOURS
Status: DISCONTINUED | OUTPATIENT
Start: 2023-01-09 | End: 2023-01-15 | Stop reason: HOSPADM

## 2023-01-09 RX ORDER — SODIUM CHLORIDE 0.9 % (FLUSH) 0.9 %
10 SYRINGE (ML) INJECTION AS NEEDED
Status: DISCONTINUED | OUTPATIENT
Start: 2023-01-09 | End: 2023-01-15 | Stop reason: HOSPADM

## 2023-01-09 RX ORDER — ONDANSETRON 2 MG/ML
4 INJECTION INTRAMUSCULAR; INTRAVENOUS EVERY 4 HOURS PRN
Status: DISCONTINUED | OUTPATIENT
Start: 2023-01-09 | End: 2023-01-15 | Stop reason: HOSPADM

## 2023-01-09 RX ORDER — VANCOMYCIN HYDROCHLORIDE 1 G/200ML
1000 INJECTION, SOLUTION INTRAVENOUS EVERY 12 HOURS
Status: DISCONTINUED | OUTPATIENT
Start: 2023-01-10 | End: 2023-01-10

## 2023-01-09 RX ORDER — LEVOTHYROXINE SODIUM 0.05 MG/1
50 TABLET ORAL DAILY
Status: DISCONTINUED | OUTPATIENT
Start: 2023-01-09 | End: 2023-01-15 | Stop reason: HOSPADM

## 2023-01-09 RX ORDER — MELATONIN
1000 DAILY
Status: DISCONTINUED | OUTPATIENT
Start: 2023-01-09 | End: 2023-01-15 | Stop reason: HOSPADM

## 2023-01-09 RX ORDER — GABAPENTIN 100 MG/1
100 CAPSULE ORAL 3 TIMES DAILY
Status: DISCONTINUED | OUTPATIENT
Start: 2023-01-09 | End: 2023-01-15 | Stop reason: HOSPADM

## 2023-01-09 RX ORDER — MONTELUKAST SODIUM 10 MG/1
10 TABLET ORAL NIGHTLY
Status: DISCONTINUED | OUTPATIENT
Start: 2023-01-09 | End: 2023-01-15 | Stop reason: HOSPADM

## 2023-01-09 RX ORDER — MIDODRINE HYDROCHLORIDE 5 MG/1
10 TABLET ORAL
Status: DISCONTINUED | OUTPATIENT
Start: 2023-01-09 | End: 2023-01-15 | Stop reason: HOSPADM

## 2023-01-09 RX ORDER — ESCITALOPRAM OXALATE 10 MG/1
10 TABLET ORAL DAILY
Status: DISCONTINUED | OUTPATIENT
Start: 2023-01-09 | End: 2023-01-15 | Stop reason: HOSPADM

## 2023-01-09 RX ADMIN — MIDODRINE HYDROCHLORIDE 10 MG: 5 TABLET ORAL at 18:24

## 2023-01-09 RX ADMIN — SODIUM CHLORIDE, POTASSIUM CHLORIDE, SODIUM LACTATE AND CALCIUM CHLORIDE 1000 ML: 600; 310; 30; 20 INJECTION, SOLUTION INTRAVENOUS at 12:36

## 2023-01-09 RX ADMIN — ONDANSETRON 4 MG: 2 INJECTION INTRAMUSCULAR; INTRAVENOUS at 22:51

## 2023-01-09 RX ADMIN — LEVOTHYROXINE SODIUM 50 MCG: 0.05 TABLET ORAL at 17:54

## 2023-01-09 RX ADMIN — VANCOMYCIN HYDROCHLORIDE 2250 MG: 10 INJECTION, POWDER, LYOPHILIZED, FOR SOLUTION INTRAVENOUS at 16:09

## 2023-01-09 RX ADMIN — Medication 10 ML: at 18:24

## 2023-01-09 RX ADMIN — GABAPENTIN 100 MG: 100 CAPSULE ORAL at 22:51

## 2023-01-09 RX ADMIN — SODIUM CHLORIDE 75 ML/HR: 9 INJECTION, SOLUTION INTRAVENOUS at 18:46

## 2023-01-09 RX ADMIN — IOPAMIDOL 95 ML: 755 INJECTION, SOLUTION INTRAVENOUS at 15:23

## 2023-01-09 RX ADMIN — LIDOCAINE HYDROCHLORIDE 10 ML: 10 INJECTION, SOLUTION INFILTRATION; PERINEURAL at 20:45

## 2023-01-09 RX ADMIN — PYRIDOSTIGMINE BROMIDE 30 MG: 60 TABLET ORAL at 20:47

## 2023-01-09 RX ADMIN — GABAPENTIN 100 MG: 100 CAPSULE ORAL at 17:54

## 2023-01-09 RX ADMIN — METOPROLOL TARTRATE 25 MG: 25 TABLET, FILM COATED ORAL at 20:49

## 2023-01-09 RX ADMIN — Medication 1000 UNITS: at 18:24

## 2023-01-09 RX ADMIN — ESCITALOPRAM 10 MG: 10 TABLET, FILM COATED ORAL at 18:24

## 2023-01-09 RX ADMIN — ENOXAPARIN SODIUM 110 MG: 150 INJECTION SUBCUTANEOUS at 20:47

## 2023-01-09 RX ADMIN — MONTELUKAST SODIUM 10 MG: 10 TABLET, FILM COATED ORAL at 20:45

## 2023-01-09 RX ADMIN — CEFEPIME 2 G: 2 INJECTION, POWDER, FOR SOLUTION INTRAVENOUS at 14:59

## 2023-01-09 RX ADMIN — CEFEPIME HYDROCHLORIDE 1 G: 1 INJECTION, POWDER, FOR SOLUTION INTRAMUSCULAR; INTRAVENOUS at 22:51

## 2023-01-09 RX ADMIN — ONDANSETRON 4 MG: 2 INJECTION INTRAMUSCULAR; INTRAVENOUS at 18:45

## 2023-01-09 NOTE — PROGRESS NOTES
The preliminary findings of today's right upper extremity venous duplex are negative for thrombosis. These preliminary findings were called to Dr. Moe Jeffers.

## 2023-01-09 NOTE — ED NOTES
".Nursing report ED to floor  Liliam Lorenz  27 y.o.  female    HPI :   Chief Complaint   Patient presents with    Neck Pain       Admitting doctor:   Caleb Naik MD    Admitting diagnosis:   The primary encounter diagnosis was Cellulitis of chest wall. A diagnosis of Local infection associated with central venous catheter, initial encounter was also pertinent to this visit.    Code status:   Current Code Status       Date Active Code Status Order ID Comments User Context       Prior            Allergies:   Eggs or egg-derived products, Pepcid [famotidine], and Scopolamine    Isolation:   No active isolations    Intake and Output    Intake/Output Summary (Last 24 hours) at 1/9/2023 1514  Last data filed at 1/9/2023 1457  Gross per 24 hour   Intake 1000 ml   Output --   Net 1000 ml       Weight:       01/09/23  1118   Weight: 109 kg (241 lb)       Most recent vitals:   Vitals:    01/09/23 1118 01/09/23 1121 01/09/23 1130 01/09/23 1251   BP: (!) 145/110 139/99  124/85   BP Location: Left arm Left arm  Left arm   Patient Position: Lying Lying  Lying   Pulse: (!) 131 (!) 123 (!) 128 115   Resp: 18 18  18   Temp:       SpO2: 97% 96% 95% 96%   Weight: 109 kg (241 lb)      Height: 162.6 cm (64\")          Active LDAs/IV Access:   Lines, Drains & Airways       Active LDAs       Name Placement date Placement time Site Days    CVC Double Lumen Right Subclavian --  --  Subclavian  --    Peripheral IV 01/09/23 1227 Anterior;Distal;Right;Upper Arm 01/09/23  1227  Arm  less than 1                    Labs (abnormal labs have a star):   Labs Reviewed   COMPREHENSIVE METABOLIC PANEL - Abnormal; Notable for the following components:       Result Value    Glucose 106 (*)     Sodium 133 (*)     ALT (SGPT) 46 (*)     All other components within normal limits    Narrative:     GFR Normal >60  Chronic Kidney Disease <60  Kidney Failure <15     CBC WITH AUTO DIFFERENTIAL - Abnormal; Notable for the following components:    WBC 12.04 (*)  "    MCV 76.5 (*)     MCH 25.2 (*)     RDW 15.5 (*)     Neutrophil % 82.7 (*)     Lymphocyte % 12.0 (*)     Monocyte % 4.3 (*)     Eosinophil % 0.2 (*)     Neutrophils, Absolute 9.94 (*)     Immature Grans, Absolute 0.06 (*)     All other components within normal limits   LACTIC ACID, PLASMA - Abnormal; Notable for the following components:    Lactate 2.5 (*)     All other components within normal limits   HCG, SERUM, QUALITATIVE - Normal   BLOOD CULTURE   BLOOD CULTURE   LACTIC ACID, REFLEX   CBC AND DIFFERENTIAL    Narrative:     The following orders were created for panel order CBC & Differential.  Procedure                               Abnormality         Status                     ---------                               -----------         ------                     CBC Auto Differential[[id number]]        Abnormal            Final result                 Please view results for these tests on the individual orders.       EKG:   No orders to display       Meds given in ED:   Medications   sodium chloride 0.9 % flush 10 mL (has no administration in time range)   cefepime 2 gm IVPB in 100 ml NS (VTB) (2 g Intravenous New Bag 1/9/23 1459)   vancomycin 2250 mg/500 mL 0.9% NS IVPB ([facility]) (has no administration in time range)   lactated ringers bolus 1,000 mL (0 mL Intravenous Stopped 1/9/23 1457)       Imaging results:  No radiology results for the last day    Ambulatory status:   - assist stand by    Social issues:   Social History     Socioeconomic History    Marital status:    Tobacco Use    Smoking status: Never    Smokeless tobacco: Never   Vaping Use    Vaping Use: Never used   Substance and Sexual Activity    Alcohol use: No    Drug use: No    Sexual activity: Yes     Partners: Female     Birth control/protection: None       NIH Stroke Scale:         Sonia Knutson, RN  01/09/23 15:14 EST

## 2023-01-09 NOTE — H&P
History and physical    Primary care physician  Dr. 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  Patient evaluated in ER found to have line sepsis and tiny pulm embolism admit for management.    PAST MEDICAL HISTORY  • Bronchitis     • Chest pain     • Chronic hypotension     • Eczema     • GERD (gastroesophageal reflux disease)     • IBS (irritable bowel syndrome)     • Lightheadedness     • Rectal fissure     • Rosacea     • Tachycardia        PAST SURGICAL HISTORY              Procedure Laterality Date   • COLONOSCOPY       • MEDIPORT INSERTION, DOUBLE   09/01/2020     [facility]    • UPPER GASTROINTESTINAL ENDOSCOPY       • VENOUS ACCESS DEVICE (PORT) INSERTION Left 10/16/2020     Procedure: INSERTION VENOUS ACCESS DEVICE UNDER FLURO;  Surgeon: Pa Lane MD;  Location: [facility];  Service: General;  Laterality: Left;   • VENOUS ACCESS DEVICE (PORT) INSERTION Right 10/26/2021     Procedure: REMOVAL AND INSERTION OF VALVERDE CATHETER;  Surgeon: Pa Lane MD;  Location: [facility] OR;  Service: General;  Laterality: Right;         FAMILY HISTORY           Problem Relation Age of Onset   • Hypertension Mother     • Diabetes Mother     • Diabetes Father     • Heart disease Paternal Grandfather     • Stroke Paternal Grandfather     • Diabetes Paternal Grandfather     • Cancer Paternal Grandfather     • Coronary artery disease Maternal Grandmother           MGM with 4 vessel CABG and multiple stents   • Cancer Maternal  "Grandmother     • Coronary artery disease Maternal Uncle           Maternal uncle with MI   • Breast cancer Other     • Malig Hyperthermia Neg Hx        SOCIAL HISTORY                 Socioeconomic History   • Marital status:    Tobacco Use   • Smoking status: Never   • Smokeless tobacco: Never   Vaping Use   • Vaping Use: Never used   Substance and Sexual Activity   • Alcohol use: No   • Drug use: No   • Sexual activity: Yes       Partners: Female       Birth control/protection: None         ALLERGIES  Eggs or egg-derived products, Pepcid [famotidine], and Scopolamine  Home medications reviewed     REVIEW OF SYSTEMS  All systems reviewed and negative except for those discussed in HPI.      PHYSICAL EXAM  Blood pressure 126/84, pulse 114, temperature 98.1 °F (36.7 °C), resp. rate 18, height 162.6 cm (64\"), weight 110 kg (242 lb 8.1 oz), SpO2 95 %, not currently breastfeeding.    GENERAL:  Awake and alert in no acute distress  HEENT:  Unremarkable  NECK:  Supple  CV: regular rhythm, tachycardic S1-S2  RESPIRATORY: normal effort, decreased breath sounds at the bases  ABDOMEN: soft, nontender nontender bowel sounds positive  MUSCULOSKELETAL: no deformity  NEURO: alert, moves all extremities, follows commands  PSYCH:  calm, cooperative  SKIN: Poorly demarcated blanching erythema along the right anterior chest that extends up the neck overlying the subcutaneous course of the Dolan catheter but also laterally towards the axilla.  No crepitus.     LAB RESULTS  Lab Results (last 24 hours)     Procedure Component Value Units Date/Time    STAT Lactic Acid, Reflex [[id number]]  (Normal) Collected: 01/09/23 1608    Specimen: Blood Updated: 01/09/23 1646     Lactate 1.3 mmol/L     hCG, Serum, Qualitative [[id number]]  (Normal) Collected: 01/09/23 1234    Specimen: Blood Updated: 01/09/23 1336     HCG Qualitative Negative    Lactic Acid, Plasma [[id number]]  (Abnormal) Collected: 01/09/23 1234    Specimen: Blood Updated: " 01/09/23 1311     Lactate 2.5 mmol/L     Comprehensive Metabolic Panel [[id number]]  (Abnormal) Collected: 01/09/23 1234    Specimen: Blood Updated: 01/09/23 1308     Glucose 106 mg/dL      BUN 7 mg/dL      Creatinine 0.85 mg/dL      Sodium 133 mmol/L      Potassium 4.1 mmol/L      Chloride 98 mmol/L      CO2 24.0 mmol/L      Calcium 9.5 mg/dL      Total Protein 8.3 g/dL      Albumin 4.6 g/dL      ALT (SGPT) 46 U/L      AST (SGOT) 30 U/L      Alkaline Phosphatase 115 U/L      Total Bilirubin 0.5 mg/dL      Globulin 3.7 gm/dL      A/G Ratio 1.2 g/dL      BUN/Creatinine Ratio 8.2     Anion Gap 11.0 mmol/L      eGFR 96.4 mL/min/1.73      Comment: National Kidney Foundation and American Society of Nephrology (ASN) Task Force recommended calculation based on the Chronic Kidney Disease Epidemiology Collaboration (CKD-EPI) equation refit without adjustment for race.       Narrative:      GFR Normal >60  Chronic Kidney Disease <60  Kidney Failure <15      Blood Culture - Blood, Arm, Right [[id number]] Collected: 01/09/23 1250    Specimen: Blood from Arm, Right Updated: 01/09/23 1303    CBC & Differential [[id number]]  (Abnormal) Collected: 01/09/23 1234    Specimen: Blood Updated: 01/09/23 1252    Narrative:      The following orders were created for panel order CBC & Differential.  Procedure                               Abnormality         Status                     ---------                               -----------         ------                     CBC Auto Differential[[id number]]        Abnormal            Final result                 Please view results for these tests on the individual orders.    CBC Auto Differential [[id number]]  (Abnormal) Collected: 01/09/23 1234    Specimen: Blood Updated: 01/09/23 1252     WBC 12.04 10*3/mm3      RBC 5.27 10*6/mm3      Hemoglobin 13.3 g/dL      Hematocrit 40.3 %      MCV 76.5 fL      MCH 25.2 pg      MCHC 33.0 g/dL      RDW 15.5 %      RDW-SD 42.6 fl      MPV 10.6 fL       Platelets 369 10*3/mm3      Neutrophil % 82.7 %      Lymphocyte % 12.0 %      Monocyte % 4.3 %      Eosinophil % 0.2 %      Basophil % 0.3 %      Immature Grans % 0.5 %      Neutrophils, Absolute 9.94 10*3/mm3      Lymphocytes, Absolute 1.45 10*3/mm3      Monocytes, Absolute 0.52 10*3/mm3      Eosinophils, Absolute 0.03 10*3/mm3      Basophils, Absolute 0.04 10*3/mm3      Immature Grans, Absolute 0.06 10*3/mm3      nRBC 0.0 /100 WBC     Blood Culture - Blood, Arm, Right [[id number]] Collected: 01/09/23 1234    Specimen: Blood from Arm, Right Updated: 01/09/23 1241        Imaging Results (Last 24 Hours)     Procedure Component Value Units Date/Time    CT Angiogram Chest [[id number]] Collected: 01/09/23 1544     Updated: 01/09/23 1609    Narrative:      CT ANGIOGRAM OF THE CHEST. MULTIPLE CORONAL, SAGITTAL, AND 3-D  RECONSTRUCTIONS.     HISTORY: Right-sided chest pain., Cellulitis/erythema right chest wall  and neck extending along the catheter and axilla.     HISTORY of pulmonary embolus on anticoagulation, Galeas syndrome, Phoebe  Danlos  syndrome.           TECHNIQUE: Radiation dose reduction techniques were utilized, including  automated exposure control and exposure modulation based on body size.   CT angiogram of the chest was performed. Multiple coronal, sagittal, and  3-D reconstruction images were obtained.      COMPARISON:06/15/2022, 01/09/2023     FINDINGS:      Evaluation is somewhat limited by respiratory motion/artifact. The  thoracic inlet is within normal limits. There is a catheter in the right  chest wall with subjacent subcutaneous soft tissue stranding along the  visualized portion of the catheter. No gas or focal fluid collections to  suggest abscess. The heart size is stable. No significant pericardial  effusion. The RV to LV ratio approximates 1. The main pulmonary artery  measures within normal limits. No central or saddle pulmonary embolus.  Evaluation of the pulmonary vasculature is somewhat  limited by motion  and decreased opacification. Lobe filling defect within a left lower  lobe subsegmental branch (series 6 image [id number] similar to 09/08/2021  but not as conspicuous on more recent imaging. Bibasilar atelectasis.     Hepatic steatosis. Circumferential thickening of the mid to distal  esophagus suggestive of esophagitis which could further be evaluated  with upper GI or direct visualization. Splenule left upper quadrant.     Patent central airway. No focal consolidations, effusions, or  pneumothorax. No pathologically enlarged nodes.     Osseous structures within normal limits. Subcutaneous soft tissue  inflammatory changes along the anterior abdominal wall likely the  sequela of prior injections.             Impression:      1.  Filling defects within subsegmental left lower lobe pulmonary  arterial branches new from recent imaging but similar to 09/08/2021  suggesting recurrent pulmonary embolus.  2.  Right central venous catheter with the tip terminating in the  superior vena cava. Stranding along the subcutaneous portion of the  catheter suggesting cellulitis given history.  3.  Small hiatal hernia with thickening of the distal esophagus  suggestive of esophagitis.  4.  Please see above for additional findings/recommendations.     5.  I discussed these findings Madina Ramon RN in the emergency emergency  room at 3:00 PM on 01/09/2023.     This report was finalized on 1/9/2023 4:06 PM by Dr. Jae Joyce M.D.       XR Chest 1 View [[id number]] Collected: 01/09/23 1524     Updated: 01/09/23 1534    Narrative:      XR CHEST 1 VW-     HISTORY: Female who is 27 years-old,  chest pain     TECHNIQUE: Frontal view of the chest     COMPARISON: 12/16/2022     FINDINGS: Right-sided central venous catheter appears stable. The heart  size is normal. Pulmonary vasculature is unremarkable. No focal  pulmonary consolidation, pleural effusion, or pneumothorax. No acute  osseous process.       Impression:      No  evidence for acute pulmonary process. Follow-up as  clinical indications persist.     This report was finalized on 1/9/2023 3:31 PM by Dr. Chacho Christie M.D.           ECG 12 Lead           Component  Ref Range & Units 6 mo ago  (6/15/22) 11 mo ago  (1/18/22) 1 yr ago  (9/17/21) 1 yr ago  (9/8/21) 1 yr ago  (5/6/21) 1 yr ago  (5/5/21)   QT Interval  ms 337  334  350  298  279  342    Resulting Agency BH ECG BH ECG BH ECG BH ECG BH ECG BH ECG             HEART RATE= 107  bpm  RR Interval= 564  ms  WV Interval= 127  ms  P Horizontal Axis= 26  deg  P Front Axis= 50  deg  QRSD Interval= 77  ms  QT Interval= 337  ms  QRS Axis= 23  deg  T Wave Axis= 44  deg  - BORDERLINE ECG -  Sinus tachycardia  Borderline T abnormalities, anterior leads  SINCE PREVIOUS TRACING,  HR HAS DECREASED             Current Facility-Administered Medications:   •  albuterol sulfate HFA (PROVENTIL HFA;VENTOLIN HFA;PROAIR HFA) inhaler 2 puff, 2 puff, Inhalation, Q4H PRN, Caleb Naik MD  •  cefepime 1 gm IVPB in 100 mL NS (VTB), 1 g, Intravenous, Q8H, Caleb Naik MD  •  cholecalciferol (VITAMIN D3) tablet 1,000 Units, 1,000 Units, Oral, Daily, Caleb Naik MD  •  escitalopram (LEXAPRO) tablet 10 mg, 10 mg, Oral, Daily, Caleb Naik MD  •  gabapentin (NEURONTIN) capsule 100 mg, 100 mg, Oral, TID, Caleb Naik MD  •  levothyroxine (SYNTHROID, LEVOTHROID) tablet 50 mcg, 50 mcg, Oral, Daily, Caleb Naik MD  •  metoprolol tartrate (LOPRESSOR) tablet 25 mg, 25 mg, Oral, Q12H, Caleb Naik MD  •  midodrine (PROAMATINE) tablet 10 mg, 10 mg, Oral, TID AC, Caleb Naik MD  •  montelukast (SINGULAIR) tablet 10 mg, 10 mg, Oral, Nightly, Caleb Naik MD  •  [START ON 1/10/2023] pantoprazole (PROTONIX) EC tablet 40 mg, 40 mg, Oral, QAMHeena Aftab, MD  •  Pharmacy to Dose enoxaparin (LOVENOX), , Does not apply, Continuous PRZAKIYA, Caleb Naik MD  •  Pharmacy to dose vancomycin, , Does not apply, Continuous PRZAKIYA, Caleb Naik MD  •   prochlorperazine (COMPAZINE) tablet 10 mg, 10 mg, Oral, Q6H PRN, Ann Naik MD  •  pyridostigmine (MESTINON) tablet 30 mg, 30 mg, Oral, BID, Ann Naik MD  •  [COMPLETED] Insert Peripheral IV, , , Once **AND** sodium chloride 0.9 % flush 10 mL, 10 mL, Intravenous, PRN, Moe Jeffers II, MD     ASSESSMENT  Probable line sepsis  Recurrent pulm embolism despite on full anticoagulation   Chronic orthostatic hypotension  POTS syndrome  Phoebe-Danlos syndrome  Hypothyroidism  Severe gastroparesis  Irritable bowel syndrome  Gastroesophageal reflux disease    PLAN  Admit  IV antibiotics after blood culture  General surgery consult to remove Dolan  Anticoagulation  Continue home medications  Stress ulcer DVT prophylaxis  Hematology and infectious disease to follow patient  Supportive care  Patient is full code  Discussed with family nursing staff  Follow closely further recommendation current hospital course    ANN NAIK MD

## 2023-01-09 NOTE — PROGRESS NOTES
"[facility] Clinical Pharmacy Services: Enoxaparin Consult    Liliam Lorenz has a pharmacy consult to dose full-dose enoxaparin per Dr. Naik's request.     Indication: DVT/PE (active thrombosis)  Home Anticoagulation: Lovenox 160 mg daily     Relevant clinical data and objective history reviewed:  27 y.o. female 162.6 cm (64\") 110 kg (242 lb 8.1 oz)   Body mass index is 41.63 kg/m².   Results from last 7 days   Lab Units 01/09/23  1234   PLATELETS 10*3/mm3 369     Estimated Creatinine Clearance: 120.5 mL/min (by C-G formula based on SCr of 0.85 mg/dL).    Assessment/Plan    Will start patient on  110 mg (1mg/kg) subcutaneous every 12 hours, adjusted for renal function. Consult order will be discontinued but pharmacy will continue to follow.     Maryanne Santana, [facility]  Clinical Pharmacist    "

## 2023-01-09 NOTE — PROGRESS NOTES
"[facility] Clinical Pharmacy Services: Vancomycin Pharmacokinetic Initial Consult Note    Liliam Lorenz is a 27 y.o. female who is on day 1 of pharmacy to dose vancomycin.    Indication: Sepsis  Consulting Provider: Dr. Naik  Planned Duration of Therapy: 5 days  Loading Dose Ordered or Given: 2250 mg on 1/9 at 1609  MRSA PCR performed: no  Culture/Source: 1/9 Bcx x2 - pending  Target: -600 mg/L.hr   Other Antimicrobials: Cefepime 1g q8h    Vitals/Labs  Ht: 162.6 cm (64\"); Wt: 110 kg (242 lb 8.1 oz)  Temp Readings from Last 1 Encounters:   01/09/23 98.1 °F (36.7 °C)    Estimated Creatinine Clearance: 120.5 mL/min (by C-G formula based on SCr of 0.85 mg/dL).        Results from last 7 days   Lab Units 01/09/23  1234   CREATININE mg/dL 0.85   WBC 10*3/mm3 12.04*     Assessment/Plan:    Vancomycin Dose:   1000 mg IV every  12  hours  Predictive AUC level for the dose ordered is 446 mg/L.hr, which is within the target of 400-600 mg/L.hr  Vanc Trough has been ordered for 1/10 at 1500     Pharmacy will follow patient's kidney function and will adjust doses and obtain levels as necessary. Thank you for involving pharmacy in this patient's care. Please contact pharmacy with any questions or concerns.                           Maryanne Santana, [facility]  Clinical Pharmacist    "

## 2023-01-09 NOTE — ED PROVIDER NOTES
EMERGENCY DEPARTMENT ENCOUNTER    Room Number:  S619/1  Date seen:  1/9/2023  PCP: Jason Borrero MD      HPI:  Chief Complaint: Chest pain  A complete HPI/ROS/PMH/PSH/SH/FH are unobtainable due to: None  Context: Liliam Lorenz is a 27 y.o. female who presents to the ED c/o chest pain.  She reports having tenderness to the right upper chest along the course of her Dolan catheter line.  Onset last night.  It is soreness that is worse with the touch.  She is on Lovenox 80mg daily.  She also reports feeling fatigued and nauseated.  No shortness of breath or fever.      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   • Nausea and vomiting 08/22/2017   • Gastroparesis 08/22/2017   • Postural orthostatic tachycardia syndrome 06/12/2020   • Dolan catheter dysfunction (HCC) 10/25/2021   • Febrile illness, acute 01/18/2022   • Single subsegmental pulmonary embolism without acute cor pulmonale (HCC) 04/30/2022     Resolved Ambulatory Problems     Diagnosis Date Noted   • No Resolved Ambulatory Problems     Past Medical History:   Diagnosis Date   • Bronchitis    • Chest pain    • Chronic hypotension    • Eczema    • GERD (gastroesophageal reflux disease)    • IBS (irritable bowel syndrome)    • Lightheadedness    • Rectal fissure    • Rosacea    • Tachycardia          PAST SURGICAL HISTORY  Past Surgical History:   Procedure Laterality Date   • COLONOSCOPY     • MEDIPORT INSERTION, DOUBLE  09/01/2020    [facility]    • UPPER GASTROINTESTINAL ENDOSCOPY     • VENOUS ACCESS DEVICE (PORT) INSERTION Left 10/16/2020    Procedure: INSERTION VENOUS ACCESS DEVICE UNDER FLURO;  Surgeon: Domingo  Pa SIGALA MD;  Location: [facility] OR;  Service: General;  Laterality: Left;   • VENOUS ACCESS DEVICE (PORT) INSERTION Right 10/26/2021    Procedure: REMOVAL AND INSERTION OF VALVERDE CATHETER;  Surgeon: Pa Lane MD;  Location: [facility] OR;  Service: General;  Laterality: Right;         FAMILY HISTORY  Family History   Problem Relation Age of Onset   • Hypertension Mother    • Diabetes Mother    • Diabetes Father    • Heart disease Paternal Grandfather    • Stroke Paternal Grandfather    • Diabetes Paternal Grandfather    • Cancer Paternal Grandfather    • Coronary artery disease Maternal Grandmother         MGM with 4 vessel CABG and multiple stents   • Cancer Maternal Grandmother    • Coronary artery disease Maternal Uncle         Maternal uncle with MI   • Breast cancer Other    • Malig Hyperthermia Neg Hx          SOCIAL HISTORY  Social History     Socioeconomic History   • Marital status:    Tobacco Use   • Smoking status: Never   • Smokeless tobacco: Never   Vaping Use   • Vaping Use: Never used   Substance and Sexual Activity   • Alcohol use: No   • Drug use: No   • Sexual activity: Yes     Partners: Female     Birth control/protection: None         ALLERGIES  Eggs or egg-derived products, Pepcid [famotidine], and Scopolamine        REVIEW OF SYSTEMS  Review of Systems     All systems reviewed and negative except for those discussed in HPI.       PHYSICAL EXAM  ED Triage Vitals   Temp Heart Rate Resp BP SpO2   01/09/23 1107 01/09/23 1107 01/09/23 1107 01/09/23 1107 01/09/23 1107   98.1 °F (36.7 °C) (!) 132 16 142/100 100 %      Temp src Heart Rate Source Patient Position BP Location FiO2 (%)   -- 01/09/23 1118 01/09/23 1118 01/09/23 1118 --    Monitor Lying Left arm        Physical Exam      GENERAL: no acute distress  HENT: nares patent  EYES: no scleral icterus  CV: regular rhythm, tachycardic  RESPIRATORY: normal effort, clear to auscultation bilaterally  ABDOMEN: soft,  nontender  MUSCULOSKELETAL: no deformity  NEURO: alert, moves all extremities, follows commands  PSYCH:  calm, cooperative  SKIN: Poorly demarcated blanching erythema along the right anterior chest that extends up the neck overlying the subcutaneous course of the Dolan catheter but also laterally towards the axilla.  No crepitus.    Vital signs and nursing notes reviewed.          LAB RESULTS  Recent Results (from the past 24 hour(s))   Comprehensive Metabolic Panel    Collection Time: 01/09/23 12:34 PM    Specimen: Blood   Result Value Ref Range    Glucose 106 (H) 65 - 99 mg/dL    BUN 7 6 - 20 mg/dL    Creatinine 0.85 0.57 - 1.00 mg/dL    Sodium 133 (L) 136 - 145 mmol/L    Potassium 4.1 3.5 - 5.2 mmol/L    Chloride 98 98 - 107 mmol/L    CO2 24.0 22.0 - 29.0 mmol/L    Calcium 9.5 8.6 - 10.5 mg/dL    Total Protein 8.3 6.0 - 8.5 g/dL    Albumin 4.6 3.5 - 5.2 g/dL    ALT (SGPT) 46 (H) 1 - 33 U/L    AST (SGOT) 30 1 - 32 U/L    Alkaline Phosphatase 115 39 - 117 U/L    Total Bilirubin 0.5 0.0 - 1.2 mg/dL    Globulin 3.7 gm/dL    A/G Ratio 1.2 g/dL    BUN/Creatinine Ratio 8.2 7.0 - 25.0    Anion Gap 11.0 5.0 - 15.0 mmol/L    eGFR 96.4 >60.0 mL/min/1.73   CBC Auto Differential    Collection Time: 01/09/23 12:34 PM    Specimen: Blood   Result Value Ref Range    WBC 12.04 (H) 3.40 - 10.80 10*3/mm3    RBC 5.27 3.77 - 5.28 10*6/mm3    Hemoglobin 13.3 12.0 - 15.9 g/dL    Hematocrit 40.3 34.0 - 46.6 %    MCV 76.5 (L) 79.0 - 97.0 fL    MCH 25.2 (L) 26.6 - 33.0 pg    MCHC 33.0 31.5 - 35.7 g/dL    RDW 15.5 (H) 12.3 - 15.4 %    RDW-SD 42.6 37.0 - 54.0 fl    MPV 10.6 6.0 - 12.0 fL    Platelets 369 140 - 450 10*3/mm3    Neutrophil % 82.7 (H) 42.7 - 76.0 %    Lymphocyte % 12.0 (L) 19.6 - 45.3 %    Monocyte % 4.3 (L) 5.0 - 12.0 %    Eosinophil % 0.2 (L) 0.3 - 6.2 %    Basophil % 0.3 0.0 - 1.5 %    Immature Grans % 0.5 0.0 - 0.5 %    Neutrophils, Absolute 9.94 (H) 1.70 - 7.00 10*3/mm3    Lymphocytes, Absolute 1.45 0.70 - 3.10 10*3/mm3     Monocytes, Absolute 0.52 0.10 - 0.90 10*3/mm3    Eosinophils, Absolute 0.03 0.00 - 0.40 10*3/mm3    Basophils, Absolute 0.04 0.00 - 0.20 10*3/mm3    Immature Grans, Absolute 0.06 (H) 0.00 - 0.05 10*3/mm3    nRBC 0.0 0.0 - 0.2 /100 WBC   Lactic Acid, Plasma    Collection Time: 01/09/23 12:34 PM    Specimen: Blood   Result Value Ref Range    Lactate 2.5 (C) 0.5 - 2.0 mmol/L   hCG, Serum, Qualitative    Collection Time: 01/09/23 12:34 PM    Specimen: Blood   Result Value Ref Range    HCG Qualitative Negative Negative   Duplex Venous Upper Extremity - Right    Collection Time: 01/09/23  2:28 PM   Result Value Ref Range    Target HR (85%) 164 bpm    Max. Pred. HR (100%) 193 bpm    Right Internal Jugular Spont Y     Right Internal Jugular Phasic Y     Right Internal Jugular Compress C     Right Internal Jugular Augment Y     Right Subclavian Spont Y     Right Subclavian Phasic Y     Right Subclavian Compress C     Right Subclavian Augment Y     Right Axillary Spont Y     Right Axillary Phasic Y     Right Axillary Compress C     Right Axillary Augment Y     Right Brachial Compress C     Right Radial Compress C     Right Ulnar Compress C     Right Basilic Upper Compress C     Right Basilic Forearm Compress C     Right Cephalic Upper Compress C     Right Cephalic Forearm Compress C     Left Internal Jugular Spont Y     Left Internal Jugular Phasic Y     Left Internal Jugular Compress C     Left Internal Jugular Augment Y     Left Subclavian Spont Y     Left Subclavian Phasic Y     Left Subclavian Compress C     Left Subclavian Augment Y    STAT Lactic Acid, Reflex    Collection Time: 01/09/23  4:08 PM    Specimen: Blood   Result Value Ref Range    Lactate 1.3 0.5 - 2.0 mmol/L   BNP    Collection Time: 01/09/23  6:52 PM    Specimen: Blood   Result Value Ref Range    proBNP <5.0 0.0 - 450.0 pg/mL       Ordered the above labs and reviewed the results.        RADIOLOGY  XR Chest 1 View    Result Date: 1/9/2023  XR CHEST 1 VW-   HISTORY: Female who is 27 years-old,  chest pain  TECHNIQUE: Frontal view of the chest  COMPARISON: 12/16/2022  FINDINGS: Right-sided central venous catheter appears stable. The heart size is normal. Pulmonary vasculature is unremarkable. No focal pulmonary consolidation, pleural effusion, or pneumothorax. No acute osseous process.      No evidence for acute pulmonary process. Follow-up as clinical indications persist.  This report was finalized on 1/9/2023 3:31 PM by Dr. Chacho Christie M.D.      Duplex Venous Upper Extremity - Right    Result Date: 1/9/2023  •  Normal right upper extremity venous duplex scan.     CT Angiogram Chest    Result Date: 1/9/2023  CT ANGIOGRAM OF THE CHEST. MULTIPLE CORONAL, SAGITTAL, AND 3-D RECONSTRUCTIONS.  HISTORY: Right-sided chest pain., Cellulitis/erythema right chest wall and neck extending along the catheter and axilla.  HISTORY of pulmonary embolus on anticoagulation, Galeas syndrome, Phoebe Danlos  syndrome.    TECHNIQUE: Radiation dose reduction techniques were utilized, including automated exposure control and exposure modulation based on body size. CT angiogram of the chest was performed. Multiple coronal, sagittal, and 3-D reconstruction images were obtained.  COMPARISON:06/15/2022, 01/09/2023  FINDINGS:  Evaluation is somewhat limited by respiratory motion/artifact. The thoracic inlet is within normal limits. There is a catheter in the right chest wall with subjacent subcutaneous soft tissue stranding along the visualized portion of the catheter. No gas or focal fluid collections to suggest abscess. The heart size is stable. No significant pericardial effusion. The RV to LV ratio approximates 1. The main pulmonary artery measures within normal limits. No central or saddle pulmonary embolus. Evaluation of the pulmonary vasculature is somewhat limited by motion and decreased opacification. Lobe filling defect within a left lower lobe subsegmental branch (series 6 image  106-109 similar to 09/08/2021 but not as conspicuous on more recent imaging. Bibasilar atelectasis.  Hepatic steatosis. Circumferential thickening of the mid to distal esophagus suggestive of esophagitis which could further be evaluated with upper GI or direct visualization. Splenule left upper quadrant.  Patent central airway. No focal consolidations, effusions, or pneumothorax. No pathologically enlarged nodes.  Osseous structures within normal limits. Subcutaneous soft tissue inflammatory changes along the anterior abdominal wall likely the sequela of prior injections.        1.  Filling defects within subsegmental left lower lobe pulmonary arterial branches new from recent imaging but similar to 09/08/2021 suggesting recurrent pulmonary embolus. 2.  Right central venous catheter with the tip terminating in the superior vena cava. Stranding along the subcutaneous portion of the catheter suggesting cellulitis given history. 3.  Small hiatal hernia with thickening of the distal esophagus suggestive of esophagitis. 4.  Please see above for additional findings/recommendations.  5.  I discussed these findings Madina Ramon RN in the emergency emergency room at 3:00 PM on 01/09/2023.  This report was finalized on 1/9/2023 4:06 PM by Dr. Jae Joyce M.D.        Ordered the above noted radiological studies. Reviewed by me in PACS.          PROCEDURES  Procedures          MEDICATIONS GIVEN IN ER  Medications   sodium chloride 0.9 % flush 10 mL (10 mL Intravenous Given 1/9/23 1824)   cefepime 1 gm IVPB in 100 mL NS (VTB) (has no administration in time range)   Pharmacy to dose vancomycin (has no administration in time range)   albuterol sulfate HFA (PROVENTIL HFA;VENTOLIN HFA;PROAIR HFA) inhaler 2 puff (has no administration in time range)   cholecalciferol (VITAMIN D3) tablet 1,000 Units (1,000 Units Oral Given 1/9/23 1824)   escitalopram (LEXAPRO) tablet 10 mg (10 mg Oral Given 1/9/23 1824)   gabapentin (NEURONTIN) capsule  100 mg (100 mg Oral Given 1/9/23 1754)   levothyroxine (SYNTHROID, LEVOTHROID) tablet 50 mcg (50 mcg Oral Given 1/9/23 1754)   midodrine (PROAMATINE) tablet 10 mg (10 mg Oral Given 1/9/23 1824)   montelukast (SINGULAIR) tablet 10 mg (10 mg Oral Given 1/9/23 2045)   pantoprazole (PROTONIX) EC tablet 40 mg (has no administration in time range)   prochlorperazine (COMPAZINE) tablet 10 mg (has no administration in time range)   pyridostigmine (MESTINON) tablet 30 mg (30 mg Oral Given 1/9/23 2047)   Pharmacy to Dose enoxaparin (LOVENOX) (has no administration in time range)   Enoxaparin Sodium (LOVENOX) syringe 110 mg (110 mg Subcutaneous Given 1/9/23 2047)   vancomycin (VANCOCIN) 1000 mg/200 mL dextrose 5% IVPB (has no administration in time range)   sodium chloride 0.9 % infusion (75 mL/hr Intravenous New Bag 1/9/23 1846)   metoprolol tartrate (LOPRESSOR) tablet 25 mg (25 mg Oral Given 1/9/23 2049)   ondansetron (ZOFRAN) injection 4 mg (4 mg Intravenous Given 1/9/23 1845)   lactated ringers bolus 1,000 mL (0 mL Intravenous Stopped 1/9/23 1457)   cefepime 2 gm IVPB in 100 ml NS (VTB) (0 g Intravenous Stopped 1/9/23 1604)   vancomycin 2250 mg/500 mL 0.9% NS IVPB ([facility]) (2,250 mg Intravenous New Bag 1/9/23 1609)   iopamidol (ISOVUE-370) 76 % injection 100 mL (95 mL Intravenous Given 1/9/23 1523)   lidocaine (XYLOCAINE) 1 % injection 10 mL (10 mL Intradermal Given 1/9/23 2045)           MEDICAL DECISION MAKING, PROGRESS, and CONSULTS    All labs have been independently reviewed by me.  All radiology studies have been reviewed by me and discussed with radiologist dictating the report.   EKG's independently viewed and interpreted by me.  Discussion below represents my analysis of pertinent findings related to patient's condition, differential diagnosis, treatment plan and final disposition.      Orders placed during this visit:  Orders Placed This Encounter   Procedures   • Blood Culture - Blood,   • Blood Culture - Blood,    • CT Angiogram Chest   • XR Chest 1 View   • Comprehensive Metabolic Panel   • CBC Auto Differential   • Lactic Acid, Plasma   • hCG, Serum, Qualitative   • STAT Lactic Acid, Reflex   • Comprehensive Metabolic Panel   • BNP   • TSH   • Lipid Panel   • Hemoglobin A1c   • Vancomycin, Trough   • Diet: Regular/House Diet; Texture: Regular Texture (IDDSI 7); Fluid Consistency: Thin (IDDSI 0)   • Cardiac Monitoring   • Place Sequential Compression Device   • Maintain Sequential Compression Device   • Code Status and Medical Interventions:   • Vascular Surgery Consult   • Surgery (on-call MD unless specified)   • Inpatient Infectious Diseases Consult   • Hematology & Oncology Inpatient Consult   • Insert Peripheral IV   • Inpatient Admission   • CBC & Differential   • CBC & Differential         Additional sources:  - Discussed/obtained information from independent historians: Patient's partner at bedside  Additional information was obtained to confirm the patient's history.    - External (non-ED) record review: See summary in ED course below      Differential diagnosis:    PE, DVT, cellulitis, infected Dolan catheter, bacteremia, skin irritation from dressing           Independent interpretation of labs, radiology studies, and discussions with consultants:  ED Course as of 01/09/23 2055 Mon Jan 09, 2023   1138 On medical chart review, patient was discharged from the hospital on 5/8/2022.  She was discharged with pulmonary embolism.  She history of POTS syndrome and Phoebe-Danlos syndrome.  Also has a history of severe gastroparesis.  Work-up in the ER revealed recurrent pulmonary embolism despite being on therapeutic dose of Eliquis.  She did admit, however, to missing 1 dose of Eliquis. [TD]   1257 WBC(!): 12.04 [TD]   1508 I discussed the case with Dr. Naik, hospitalist.  We reviewed patient's labs, history, imaging.  He will admit. [TD]   1508 Ultrasound is negative for DVT. [TD]   1614 CTA of the chest shows a  subsegmental PE in the left lower lobe.  No heart strain.  I personally discussed this with Dr. Naik, who is now here in the ED evaluated the patient. [WH]      ED Course User Index  [TD] Moe Jeffers II, MD  [WH] Juarez Gallardo MD       Patient admitted to Dr. Naik for Dolan catheter infection. Given history of prior failure of Eliquis, I checked CTA to r/o PE.     PPE: The patient wore a mask throughout the entire encounter. I wore a well-fitting mask.    DIAGNOSIS  Final diagnoses:   Cellulitis of chest wall   Local infection associated with central venous catheter, initial encounter         DISPOSITION  Admit      Latest Documented Vital Signs:  As of 20:55 EST  BP- 112/67 HR- 113 Temp- 98.1 °F (36.7 °C) (Oral) O2 sat- 97%      --    Please note that portions of this were completed with a voice recognition program.       Note Disclaimer: At [facility], we believe that sharing information builds trust and better relationships. You are receiving this note because you are receiving care at [facility] or recently visited. It is possible you will see health information before a provider has talked with you about it. This kind of information can be easy to misunderstand. To help you fully understand what it means for your health, we urge you to discuss this note with your provider.       Moe Jeffers II, MD  01/09/23 2057

## 2023-01-09 NOTE — ED NOTES
Patient from home via ems; patient has central line that she has had since march. Hx of central line infection and blood clots. Patient called ems because of concerns of either infection or blood clots. Patient c/o pain on right side of neck, worse with palpation and movement and ems noticed a lump. Patient was able to use central line today at home with no issues.

## 2023-01-10 LAB
ALBUMIN SERPL-MCNC: 3.4 G/DL (ref 3.5–5.2)
ALBUMIN/GLOB SERPL: 1 G/DL
ALP SERPL-CCNC: 84 U/L (ref 39–117)
ALT SERPL W P-5'-P-CCNC: 26 U/L (ref 1–33)
ANION GAP SERPL CALCULATED.3IONS-SCNC: 10.7 MMOL/L (ref 5–15)
AST SERPL-CCNC: 17 U/L (ref 1–32)
BASOPHILS # BLD AUTO: 0.05 10*3/MM3 (ref 0–0.2)
BASOPHILS NFR BLD AUTO: 0.5 % (ref 0–1.5)
BILIRUB SERPL-MCNC: 0.6 MG/DL (ref 0–1.2)
BUN SERPL-MCNC: 8 MG/DL (ref 6–20)
BUN/CREAT SERPL: 10.4 (ref 7–25)
CALCIUM SPEC-SCNC: 8.2 MG/DL (ref 8.6–10.5)
CHLORIDE SERPL-SCNC: 103 MMOL/L (ref 98–107)
CHOLEST SERPL-MCNC: 229 MG/DL (ref 0–200)
CO2 SERPL-SCNC: 22.3 MMOL/L (ref 22–29)
CREAT SERPL-MCNC: 0.77 MG/DL (ref 0.57–1)
DEPRECATED RDW RBC AUTO: 45 FL (ref 37–54)
EGFRCR SERPLBLD CKD-EPI 2021: 108.6 ML/MIN/1.73
EOSINOPHIL # BLD AUTO: 0.11 10*3/MM3 (ref 0–0.4)
EOSINOPHIL NFR BLD AUTO: 1.2 % (ref 0.3–6.2)
ERYTHROCYTE [DISTWIDTH] IN BLOOD BY AUTOMATED COUNT: 15.7 % (ref 12.3–15.4)
GLOBULIN UR ELPH-MCNC: 3.5 GM/DL
GLUCOSE SERPL-MCNC: 98 MG/DL (ref 65–99)
HBA1C MFR BLD: 5.6 % (ref 4.8–5.6)
HCT VFR BLD AUTO: 34.2 % (ref 34–46.6)
HDLC SERPL-MCNC: 29 MG/DL (ref 40–60)
HGB BLD-MCNC: 11.1 G/DL (ref 12–15.9)
IMM GRANULOCYTES # BLD AUTO: 0.04 10*3/MM3 (ref 0–0.05)
IMM GRANULOCYTES NFR BLD AUTO: 0.4 % (ref 0–0.5)
LDLC SERPL CALC-MCNC: 140 MG/DL (ref 0–100)
LDLC/HDLC SERPL: 4.63 {RATIO}
LYMPHOCYTES # BLD AUTO: 2.49 10*3/MM3 (ref 0.7–3.1)
LYMPHOCYTES NFR BLD AUTO: 26.5 % (ref 19.6–45.3)
MCH RBC QN AUTO: 25.8 PG (ref 26.6–33)
MCHC RBC AUTO-ENTMCNC: 32.5 G/DL (ref 31.5–35.7)
MCV RBC AUTO: 79.5 FL (ref 79–97)
MONOCYTES # BLD AUTO: 0.54 10*3/MM3 (ref 0.1–0.9)
MONOCYTES NFR BLD AUTO: 5.8 % (ref 5–12)
NEUTROPHILS NFR BLD AUTO: 6.15 10*3/MM3 (ref 1.7–7)
NEUTROPHILS NFR BLD AUTO: 65.6 % (ref 42.7–76)
NRBC BLD AUTO-RTO: 0 /100 WBC (ref 0–0.2)
PLATELET # BLD AUTO: 327 10*3/MM3 (ref 140–450)
PMV BLD AUTO: 10.4 FL (ref 6–12)
POTASSIUM SERPL-SCNC: 4.2 MMOL/L (ref 3.5–5.2)
PROT SERPL-MCNC: 6.9 G/DL (ref 6–8.5)
RBC # BLD AUTO: 4.3 10*6/MM3 (ref 3.77–5.28)
SODIUM SERPL-SCNC: 136 MMOL/L (ref 136–145)
TRIGL SERPL-MCNC: 328 MG/DL (ref 0–150)
TSH SERPL DL<=0.05 MIU/L-ACNC: 2.26 UIU/ML (ref 0.27–4.2)
VANCOMYCIN TROUGH SERPL-MCNC: 8.8 MCG/ML (ref 5–20)
VLDLC SERPL-MCNC: 60 MG/DL (ref 5–40)
WBC NRBC COR # BLD: 9.38 10*3/MM3 (ref 3.4–10.8)

## 2023-01-10 PROCEDURE — 83036 HEMOGLOBIN GLYCOSYLATED A1C: CPT | Performed by: HOSPITALIST

## 2023-01-10 PROCEDURE — 25010000002 VANCOMYCIN PER 500 MG: Performed by: INTERNAL MEDICINE

## 2023-01-10 PROCEDURE — 25010000002 CEFEPIME PER 500 MG: Performed by: HOSPITALIST

## 2023-01-10 PROCEDURE — 80050 GENERAL HEALTH PANEL: CPT | Performed by: HOSPITALIST

## 2023-01-10 PROCEDURE — 25010000002 ONDANSETRON PER 1 MG: Performed by: HOSPITALIST

## 2023-01-10 PROCEDURE — 25010000002 ENOXAPARIN PER 10 MG: Performed by: HOSPITALIST

## 2023-01-10 PROCEDURE — 80202 ASSAY OF VANCOMYCIN: CPT | Performed by: HOSPITALIST

## 2023-01-10 PROCEDURE — 99222 1ST HOSP IP/OBS MODERATE 55: CPT | Performed by: INTERNAL MEDICINE

## 2023-01-10 PROCEDURE — 25010000002 VANCOMYCIN PER 500 MG: Performed by: HOSPITALIST

## 2023-01-10 PROCEDURE — 63710000001 PROCHLORPERAZINE MALEATE PER 10 MG: Performed by: HOSPITALIST

## 2023-01-10 PROCEDURE — 80061 LIPID PANEL: CPT | Performed by: HOSPITALIST

## 2023-01-10 RX ORDER — VANCOMYCIN HYDROCHLORIDE 1 G/200ML
1000 INJECTION, SOLUTION INTRAVENOUS EVERY 8 HOURS
Status: DISCONTINUED | OUTPATIENT
Start: 2023-01-10 | End: 2023-01-12

## 2023-01-10 RX ADMIN — ENOXAPARIN SODIUM 110 MG: 150 INJECTION SUBCUTANEOUS at 20:14

## 2023-01-10 RX ADMIN — ESCITALOPRAM 10 MG: 10 TABLET, FILM COATED ORAL at 08:53

## 2023-01-10 RX ADMIN — Medication 1000 UNITS: at 08:53

## 2023-01-10 RX ADMIN — METOPROLOL TARTRATE 25 MG: 25 TABLET, FILM COATED ORAL at 16:20

## 2023-01-10 RX ADMIN — ENOXAPARIN SODIUM 110 MG: 150 INJECTION SUBCUTANEOUS at 08:53

## 2023-01-10 RX ADMIN — MIDODRINE HYDROCHLORIDE 10 MG: 5 TABLET ORAL at 06:38

## 2023-01-10 RX ADMIN — Medication 10 ML: at 08:54

## 2023-01-10 RX ADMIN — PROCHLORPERAZINE MALEATE 10 MG: 10 TABLET ORAL at 12:37

## 2023-01-10 RX ADMIN — VANCOMYCIN HYDROCHLORIDE 1000 MG: 1 INJECTION, SOLUTION INTRAVENOUS at 04:27

## 2023-01-10 RX ADMIN — CEFEPIME HYDROCHLORIDE 1 G: 1 INJECTION, POWDER, FOR SOLUTION INTRAMUSCULAR; INTRAVENOUS at 15:05

## 2023-01-10 RX ADMIN — PYRIDOSTIGMINE BROMIDE 30 MG: 60 TABLET ORAL at 20:15

## 2023-01-10 RX ADMIN — ONDANSETRON 4 MG: 2 INJECTION INTRAMUSCULAR; INTRAVENOUS at 16:20

## 2023-01-10 RX ADMIN — CEFEPIME HYDROCHLORIDE 1 G: 1 INJECTION, POWDER, FOR SOLUTION INTRAMUSCULAR; INTRAVENOUS at 08:53

## 2023-01-10 RX ADMIN — ONDANSETRON 4 MG: 2 INJECTION INTRAMUSCULAR; INTRAVENOUS at 11:45

## 2023-01-10 RX ADMIN — LEVOTHYROXINE SODIUM 50 MCG: 0.05 TABLET ORAL at 08:53

## 2023-01-10 RX ADMIN — PYRIDOSTIGMINE BROMIDE 30 MG: 60 TABLET ORAL at 08:53

## 2023-01-10 RX ADMIN — MONTELUKAST SODIUM 10 MG: 10 TABLET, FILM COATED ORAL at 20:09

## 2023-01-10 RX ADMIN — PROCHLORPERAZINE MALEATE 10 MG: 10 TABLET ORAL at 18:49

## 2023-01-10 RX ADMIN — CEFEPIME HYDROCHLORIDE 1 G: 1 INJECTION, POWDER, FOR SOLUTION INTRAMUSCULAR; INTRAVENOUS at 22:00

## 2023-01-10 RX ADMIN — GABAPENTIN 100 MG: 100 CAPSULE ORAL at 16:20

## 2023-01-10 RX ADMIN — PANTOPRAZOLE SODIUM 40 MG: 40 TABLET, DELAYED RELEASE ORAL at 06:38

## 2023-01-10 RX ADMIN — MIDODRINE HYDROCHLORIDE 10 MG: 5 TABLET ORAL at 12:37

## 2023-01-10 RX ADMIN — METOPROLOL TARTRATE 25 MG: 25 TABLET, FILM COATED ORAL at 08:54

## 2023-01-10 RX ADMIN — MIDODRINE HYDROCHLORIDE 10 MG: 5 TABLET ORAL at 17:10

## 2023-01-10 RX ADMIN — ONDANSETRON 4 MG: 2 INJECTION INTRAMUSCULAR; INTRAVENOUS at 06:42

## 2023-01-10 RX ADMIN — METOPROLOL TARTRATE 25 MG: 25 TABLET, FILM COATED ORAL at 20:09

## 2023-01-10 RX ADMIN — VANCOMYCIN HYDROCHLORIDE 1000 MG: 1 INJECTION, SOLUTION INTRAVENOUS at 20:09

## 2023-01-10 RX ADMIN — GABAPENTIN 100 MG: 100 CAPSULE ORAL at 20:14

## 2023-01-10 RX ADMIN — SODIUM CHLORIDE 75 ML/HR: 9 INJECTION, SOLUTION INTRAVENOUS at 08:06

## 2023-01-10 RX ADMIN — ONDANSETRON 4 MG: 2 INJECTION INTRAMUSCULAR; INTRAVENOUS at 21:53

## 2023-01-10 RX ADMIN — GABAPENTIN 100 MG: 100 CAPSULE ORAL at 08:54

## 2023-01-10 NOTE — PROGRESS NOTES
"Daily progress note    Primary care physician  Dr. Jason Borrero    Chief complaint  S/p central venous catheter removal.  Patient feeling better with no new complaints but remains sleepy lethargic.  Patient family at bedside.    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  Patient evaluated in ER found to have line sepsis and tiny pulm embolism admit for management.     REVIEW OF SYSTEMS  All systems reviewed and negative except for those discussed in HPI.      PHYSICAL EXAM  Blood pressure 93/52, pulse 85, temperature 97.3 °F (36.3 °C), temperature source Oral, resp. rate 16, height 162.6 cm (64\"), weight 110 kg (242 lb 8.1 oz), SpO2 96 %, not currently breastfeeding.    GENERAL:  Awake and alert in no acute distress  HEENT:  Unremarkable  NECK:  Supple  CV: regular rhythm, tachycardic S1-S2  RESPIRATORY: normal effort, decreased breath sounds at the bases  ABDOMEN: soft, nontender nontender bowel sounds positive  MUSCULOSKELETAL: no deformity  NEURO: alert, moves all extremities, follows commands  PSYCH:  calm, cooperative  SKIN: Poorly demarcated blanching erythema along the right anterior chest that extends up the neck overlying the subcutaneous course of the Dolan catheter but also laterally towards the axilla.  No crepitus.     LAB RESULTS  Lab Results (last 24 hours)     Procedure Component Value Units Date/Time    Blood Culture - Blood, Arm, Right [[id number]]  (Normal) Collected: 01/09/23 1250    Specimen: Blood from Arm, Right Updated: 01/10/23 1317     Blood Culture No growth at 24 hours    Blood Culture - Blood, Arm, " Right [[id number]]  (Normal) Collected: 01/09/23 1234    Specimen: Blood from Arm, Right Updated: 01/10/23 1245     Blood Culture No growth at 24 hours    Catheter Culture - Cath Tip, Chest, Right [[id number]] Collected: 01/09/23 2110    Specimen: Cath Tip from Chest, Right Updated: 01/10/23 1005     CATHETER CULTURE Culture in progress    CBC & Differential [[id number]]  (Abnormal) Collected: 01/10/23 0808    Specimen: Blood Updated: 01/10/23 0909    Narrative:      The following orders were created for panel order CBC & Differential.  Procedure                               Abnormality         Status                     ---------                               -----------         ------                     CBC Auto Differential[[id number]]        Abnormal            Final result                 Please view results for these tests on the individual orders.    CBC Auto Differential [[id number]]  (Abnormal) Collected: 01/10/23 0808    Specimen: Blood Updated: 01/10/23 0909     WBC 9.38 10*3/mm3      RBC 4.30 10*6/mm3      Hemoglobin 11.1 g/dL      Hematocrit 34.2 %      MCV 79.5 fL      MCH 25.8 pg      MCHC 32.5 g/dL      RDW 15.7 %      RDW-SD 45.0 fl      MPV 10.4 fL      Platelets 327 10*3/mm3      Neutrophil % 65.6 %      Lymphocyte % 26.5 %      Monocyte % 5.8 %      Eosinophil % 1.2 %      Basophil % 0.5 %      Immature Grans % 0.4 %      Neutrophils, Absolute 6.15 10*3/mm3      Lymphocytes, Absolute 2.49 10*3/mm3      Monocytes, Absolute 0.54 10*3/mm3      Eosinophils, Absolute 0.11 10*3/mm3      Basophils, Absolute 0.05 10*3/mm3      Immature Grans, Absolute 0.04 10*3/mm3      nRBC 0.0 /100 WBC     TSH [[id number]]  (Normal) Collected: 01/10/23 0808    Specimen: Blood Updated: 01/10/23 0853     TSH 2.260 uIU/mL     Comprehensive Metabolic Panel [[id number]]  (Abnormal) Collected: 01/10/23 0808    Specimen: Blood Updated: 01/10/23 0850     Glucose 98 mg/dL      BUN 8 mg/dL      Creatinine 0.77 mg/dL      Sodium 136  mmol/L      Potassium 4.2 mmol/L      Chloride 103 mmol/L      CO2 22.3 mmol/L      Calcium 8.2 mg/dL      Total Protein 6.9 g/dL      Albumin 3.4 g/dL      ALT (SGPT) 26 U/L      AST (SGOT) 17 U/L      Alkaline Phosphatase 84 U/L      Total Bilirubin 0.6 mg/dL      Globulin 3.5 gm/dL      A/G Ratio 1.0 g/dL      BUN/Creatinine Ratio 10.4     Anion Gap 10.7 mmol/L      eGFR 108.6 mL/min/1.73      Comment: National Kidney Foundation and American Society of Nephrology (ASN) Task Force recommended calculation based on the Chronic Kidney Disease Epidemiology Collaboration (CKD-EPI) equation refit without adjustment for race.       Narrative:      GFR Normal >60  Chronic Kidney Disease <60  Kidney Failure <15      Lipid Panel [[id number]]  (Abnormal) Collected: 01/10/23 0808    Specimen: Blood Updated: 01/10/23 0847     Total Cholesterol 229 mg/dL      Triglycerides 328 mg/dL      HDL Cholesterol 29 mg/dL      LDL Cholesterol  140 mg/dL      VLDL Cholesterol 60 mg/dL      LDL/HDL Ratio 4.63    Narrative:      Cholesterol Reference Ranges  (U.S. Department of Health and Human Services ATP III Classifications)    Desirable          <200 mg/dL  Borderline High    200-239 mg/dL  High Risk          >240 mg/dL      Triglyceride Reference Ranges  (U.S. Department of Health and Human Services ATP III Classifications)    Normal           <150 mg/dL  Borderline High  150-199 mg/dL  High             200-499 mg/dL  Very High        >500 mg/dL    HDL Reference Ranges  (U.S. Department of Health and Human Services ATP III Classifications)    Low     <40 mg/dl (major risk factor for CHD)  High    >60 mg/dl ('negative' risk factor for CHD)        LDL Reference Ranges  (U.S. Department of Health and Human Services ATP III Classifications)    Optimal          <100 mg/dL  Near Optimal     100-129 mg/dL  Borderline High  130-159 mg/dL  High             160-189 mg/dL  Very High        >189 mg/dL    Hemoglobin A1c [[id number]]  (Normal)  Collected: 01/10/23 0808    Specimen: Blood Updated: 01/10/23 0839     Hemoglobin A1C 5.60 %     Narrative:      Hemoglobin A1C Ranges:    Increased Risk for Diabetes  5.7% to 6.4%  Diabetes                     >= 6.5%  Diabetic Goal                < 7.0%    BNP [[id number]]  (Normal) Collected: 01/09/23 1852    Specimen: Blood Updated: 01/09/23 2013     proBNP <5.0 pg/mL     Narrative:      Among patients with dyspnea, NT-proBNP is highly sensitive for the detection of acute congestive heart failure. In addition NT-proBNP of <300 pg/ml effectively rules out acute congestive heart failure with 99% negative predictive value.    Results may be falsely decreased if patient taking Biotin.      STAT Lactic Acid, Reflex [[id number]]  (Normal) Collected: 01/09/23 1608    Specimen: Blood Updated: 01/09/23 1646     Lactate 1.3 mmol/L         Imaging Results (Last 24 Hours)     Procedure Component Value Units Date/Time    CT Angiogram Chest [[id number]] Collected: 01/09/23 1544     Updated: 01/09/23 1609    Narrative:      CT ANGIOGRAM OF THE CHEST. MULTIPLE CORONAL, SAGITTAL, AND 3-D  RECONSTRUCTIONS.     HISTORY: Right-sided chest pain., Cellulitis/erythema right chest wall  and neck extending along the catheter and axilla.     HISTORY of pulmonary embolus on anticoagulation, Galeas syndrome, Phoebe  Danlos  syndrome.           TECHNIQUE: Radiation dose reduction techniques were utilized, including  automated exposure control and exposure modulation based on body size.   CT angiogram of the chest was performed. Multiple coronal, sagittal, and  3-D reconstruction images were obtained.      COMPARISON:06/15/2022, 01/09/2023     FINDINGS:      Evaluation is somewhat limited by respiratory motion/artifact. The  thoracic inlet is within normal limits. There is a catheter in the right  chest wall with subjacent subcutaneous soft tissue stranding along the  visualized portion of the catheter. No gas or focal fluid collections  to  suggest abscess. The heart size is stable. No significant pericardial  effusion. The RV to LV ratio approximates 1. The main pulmonary artery  measures within normal limits. No central or saddle pulmonary embolus.  Evaluation of the pulmonary vasculature is somewhat limited by motion  and decreased opacification. Lobe filling defect within a left lower  lobe subsegmental branch (series 6 image [id number] similar to 09/08/2021  but not as conspicuous on more recent imaging. Bibasilar atelectasis.     Hepatic steatosis. Circumferential thickening of the mid to distal  esophagus suggestive of esophagitis which could further be evaluated  with upper GI or direct visualization. Splenule left upper quadrant.     Patent central airway. No focal consolidations, effusions, or  pneumothorax. No pathologically enlarged nodes.     Osseous structures within normal limits. Subcutaneous soft tissue  inflammatory changes along the anterior abdominal wall likely the  sequela of prior injections.             Impression:      1.  Filling defects within subsegmental left lower lobe pulmonary  arterial branches new from recent imaging but similar to 09/08/2021  suggesting recurrent pulmonary embolus.  2.  Right central venous catheter with the tip terminating in the  superior vena cava. Stranding along the subcutaneous portion of the  catheter suggesting cellulitis given history.  3.  Small hiatal hernia with thickening of the distal esophagus  suggestive of esophagitis.  4.  Please see above for additional findings/recommendations.     5.  I discussed these findings Madina Ramon RN in the emergency emergency  room at 3:00 PM on 01/09/2023.     This report was finalized on 1/9/2023 4:06 PM by Dr. Jae Joyce M.D.       XR Chest 1 View [[id number]] Collected: 01/09/23 1524     Updated: 01/09/23 1534    Narrative:      XR CHEST 1 VW-     HISTORY: Female who is 27 years-old,  chest pain     TECHNIQUE: Frontal view of the chest      COMPARISON: 12/16/2022     FINDINGS: Right-sided central venous catheter appears stable. The heart  size is normal. Pulmonary vasculature is unremarkable. No focal  pulmonary consolidation, pleural effusion, or pneumothorax. No acute  osseous process.       Impression:      No evidence for acute pulmonary process. Follow-up as  clinical indications persist.     This report was finalized on 1/9/2023 3:31 PM by Dr. Chacho Christie M.D.           ECG 12 Lead           Component  Ref Range & Units 6 mo ago  (6/15/22) 11 mo ago  (1/18/22) 1 yr ago  (9/17/21) 1 yr ago  (9/8/21) 1 yr ago  (5/6/21) 1 yr ago  (5/5/21)   QT Interval  ms 337  334  350  298  279  342    Resulting Agency BH ECG BH ECG BH ECG BH ECG BH ECG BH ECG             HEART RATE= 107  bpm  RR Interval= 564  ms  LA Interval= 127  ms  P Horizontal Axis= 26  deg  P Front Axis= 50  deg  QRSD Interval= 77  ms  QT Interval= 337  ms  QRS Axis= 23  deg  T Wave Axis= 44  deg  - BORDERLINE ECG -  Sinus tachycardia  Borderline T abnormalities, anterior leads  SINCE PREVIOUS TRACING,  HR HAS DECREASED             Current Facility-Administered Medications:   •  albuterol sulfate HFA (PROVENTIL HFA;VENTOLIN HFA;PROAIR HFA) inhaler 2 puff, 2 puff, Inhalation, Q4H PRN, Caleb Naik MD  •  cefepime 1 gm IVPB in 100 mL NS (VTB), 1 g, Intravenous, Q8H, Caleb Naik MD, Last Rate: 25 mL/hr at 01/10/23 0853, 1 g at 01/10/23 0853  •  cholecalciferol (VITAMIN D3) tablet 1,000 Units, 1,000 Units, Oral, Daily, Caleb Naik MD, 1,000 Units at 01/10/23 0853  •  Enoxaparin Sodium (LOVENOX) syringe 110 mg, 1 mg/kg, Subcutaneous, Q12H, Caleb Naik MD, 110 mg at 01/10/23 0853  •  escitalopram (LEXAPRO) tablet 10 mg, 10 mg, Oral, Daily, Caleb Naik MD, 10 mg at 01/10/23 0853  •  gabapentin (NEURONTIN) capsule 100 mg, 100 mg, Oral, TID, Caleb Naik MD, 100 mg at 01/10/23 0854  •  levothyroxine (SYNTHROID, LEVOTHROID) tablet 50 mcg, 50 mcg, Oral, Daily, Caleb Naik MD, 50  mcg at 01/10/23 0853  •  metoprolol tartrate (LOPRESSOR) tablet 25 mg, 25 mg, Oral, TID, Caleb Naik MD, 25 mg at 01/10/23 0854  •  midodrine (PROAMATINE) tablet 10 mg, 10 mg, Oral, TID AC, Caleb Naik MD, 10 mg at 01/10/23 1237  •  montelukast (SINGULAIR) tablet 10 mg, 10 mg, Oral, Nightly, Caleb Naik MD, 10 mg at 01/09/23 2045  •  ondansetron (ZOFRAN) injection 4 mg, 4 mg, Intravenous, Q4H PRN, Caleb Naik MD, 4 mg at 01/10/23 1145  •  pantoprazole (PROTONIX) EC tablet 40 mg, 40 mg, Oral, QAM, Caleb Naik MD, 40 mg at 01/10/23 0638  •  Pharmacy to Dose enoxaparin (LOVENOX), , Does not apply, Continuous PRN, Caleb Naik MD  •  Pharmacy to dose vancomycin, , Does not apply, Continuous PRN, Caleb Naik MD  •  prochlorperazine (COMPAZINE) tablet 10 mg, 10 mg, Oral, Q6H PRN, Caleb Naik MD, 10 mg at 01/10/23 1237  •  pyridostigmine (MESTINON) tablet 30 mg, 30 mg, Oral, BID, Caleb Naik MD, 30 mg at 01/10/23 0853  •  [COMPLETED] Insert Peripheral IV, , , Once **AND** sodium chloride 0.9 % flush 10 mL, 10 mL, Intravenous, PRN, Moe Jeffers II, MD, 10 mL at 01/10/23 0854  •  sodium chloride 0.9 % infusion, 75 mL/hr, Intravenous, Continuous, Caleb Naik MD, Last Rate: 75 mL/hr at 01/10/23 0806, 75 mL/hr at 01/10/23 0806  •  vancomycin (VANCOCIN) 1000 mg/200 mL dextrose 5% IVPB, 1,000 mg, Intravenous, Q12H, Caleb Naik MD, 1,000 mg at 01/10/23 0427     ASSESSMENT  Line sepsis status post Dolan catheter removal  Recurrent pulm embolism despite on full anticoagulation   Chronic orthostatic hypotension  POTS syndrome  Phoebe-Danlos syndrome  Hypothyroidism  Severe gastroparesis  Irritable bowel syndrome  Gastroesophageal reflux disease    PLAN  CPM  Postop care  Continue IV antibiotics   Lovenox  Continue home medications  Stress ulcer DVT prophylaxis  Hematology and infectious disease to follow patient  Supportive care  Discussed with family and nursing staff  Follow closely further  recommendation current hospital course    ANN GONZALEZ MD

## 2023-01-10 NOTE — PLAN OF CARE
Goal Outcome Evaluation:  Plan of Care Reviewed With: patient, spouse        Progress: improving  Outcome Evaluation: Patient had minor c/o burning sensation in right upper chest from where CVC was removed, ice packs applied and effective. Dr. Anne removed CVC present on admission early this shift, catheter tip was sent down for culture. Up with assist, spouse at bedside active in patient care and very helpful. IVF infusing. IV cefepime scheduled. IV vanc scheduled. VSS overall. Using 2 L O2 while sleeping until spouse brings in home CPAP today/tonight. Lovenox BID. Zofran given once so far this shift and was effective. Positive on Sepsis Screen. WCTM.

## 2023-01-10 NOTE — PROCEDURES
Procedure note:      Preoperative diagnosis: Bacteremia related to indwelling Dolan catheter    Postoperative diagnosis: Same    Procedure performed: Removal of tunneled central venous catheter    Surgeon: Cristhian Anne MD    Anesthesia: Local    Indication: 27-year-old female with longstanding indwelling line.  Previously removed by Dr. Castañeda and seen by Dr. Moncada who placed a new one about a year ago.  Presents at this time with fever chills and concern for bacteremia.  I was requested to see her for removal of central line.    Description of procedure:  After discussion with patient, the chest was sterilely prepped and draped.  I then infiltrated the area with a 1% lidocaine solution.  I used a curved hemostat and scissors to dissect around the cuff and  completely and was able to slide the catheter out.  The tip was cut off and sent for culture.  The site was hemostatic and a sterile dressing was placed over this.  The patient tolerated this well.    Recommendation:  She will likely need a  In the next couple of days, will allow antibiotics to continue and ask radiology to place new tunneled catheter.    Cristhian Anne MD  General and Endoscopic Surgery  [facility]    [address] [place] [address], [address]  [place], [place], [address]  P: [phone]  F: [phone]

## 2023-01-10 NOTE — CONSULTS
CONSULT NOTE    Infectious Diseases - Angélica George MD  [facility]       Patient Identification:  Name: Liliam Lorenz  Age: 27 y.o.  Sex: female  :  1995  MRN: [id number]             Date of Consultation: 2023      Primary Care Physician: Jason Borrero MD                               Requesting Physician: Dr. Naik  Reason for Consultation: Line sepsis    Impression: Patient is a 27-year-old female who has complicated past medical history including history of Phoebe-Danlos syndrome, history of irritable bowel syndrome, need for chronic IV access with prior history of recurrent infections requiring removal of port and replacement with the latest port placed in  presented to the emergency room earlier today via EMS for increasing pain on the right side of the neck with pain getting worse with movement.  Patient claims that she has had previous port and line infections and this discomfort that she noticed today was reminder of previous episodes and this time she did not take it too long to come to the hospital.  Evaluation in the emergency room revealed poorly demarcated blanching erythema along the right anterior chest extending up to her neck overlying the Dolan catheter and extending into the axilla.  Blood cultures were drawn and patient has been started on empiric vancomycin and cefepime.  General surgery was consulted and patient had her right tunneled central catheter removed earlier today.  Catheter tip has been sent for cultures.  Patient is tolerating antibiotic well so far and does not have any fever and chills.  Venous Doppler of the upper extremity and did not show any DVT but CT scan of the chest PE protocol did not show evidence of pulmonary embolism.  This presentation and above context is consistent with:  1-probable infected Dolan catheter with tunnel infection and surrounding cellulitis-status post removal of catheter earlier today with likely pathogen to  consider would be MRSA, gram-negative rods.  2-pulmonary embolus likely due to central line associated DVT  3-Phoebe-Danlos syndrome  4-chronic orthostatic hypotension with POTS syndrome  5-hypothyroidism  6-morbid obesity  7-history of recurrent line sepsis    Recommendations/Discussions:  At this juncture I agree with the care plan consisting of broad-spectrum antibiotics to address cellulitis at the right chest wall and neck due to infected tunneled catheter with tunnel infection and monitor her clinical course and blood culture results.  Management of underlying pulmonary embolism per primary team.  If blood cultures remain negative and there is no evidence of phlebitis then she could be treated for soft tissue infection with couple of weeks of antibiotics guided by the culture results some of which can be administered orally.  If the blood cultures are positive or she has evidence of phlebitis then patient would need 2 to 4 weeks of antibiotic treatment depending upon the presence of phlebitis and whether or not she has persistent bacteremia and whether or not the pathogen identified as staph aureus or not.  Would not recommend explant of any further IV access if possible but if she needs 1 then would require at least negative blood culture for 48 hours before and other permanent central IV access can be placed.  If IV access is an issue then temporary central catheter is not unreasonable with understanding with BPH positive blood culture results it may need to be changed.  Overall concept of care discussed with patient.  Thank you Dr. Joyce for letting me be the part of your patient care please see above impression and recommendations      History of Present Illness:   Patient is a 27-year-old female who has complicated past medical history including history of Phoebe-Danlos syndrome, history of irritable bowel syndrome, need for chronic IV access with prior history of recurrent infections requiring removal of  port and replacement with the latest port placed in 2021 presented to the emergency room earlier today via EMS for increasing pain on the right side of the neck with pain getting worse with movement.  Patient claims that she has had previous port and line infections and this discomfort that she noticed today was reminder of previous episodes and this time she did not take it too long to come to the hospital.  Evaluation in the emergency room revealed poorly demarcated blanching erythema along the right anterior chest extending up to her neck overlying the Dolan catheter and extending into the axilla.  Blood cultures were drawn and patient has been started on empiric vancomycin and cefepime.  General surgery was consulted and patient had her right tunneled central catheter removed earlier today.  Catheter tip has been sent for cultures.  Patient is tolerating antibiotic well so far and does not have any fever and chills.  Venous Doppler of the upper extremity and did not show any DVT but CT scan of the chest PE protocol did not show evidence of pulmonary embolism.      Past Medical History:  Past Medical History:   Diagnosis Date   • Bronchitis    • Chest pain    • Chronic hypotension    • Eczema    • Phoebe-Danlos syndrome    • Gastroparesis    • GERD (gastroesophageal reflux disease)    • IBS (irritable bowel syndrome)    • Lightheadedness    • POTS (postural orthostatic tachycardia syndrome)    • Rectal fissure    • Rosacea    • Tachycardia      Past Surgical History:  Past Surgical History:   Procedure Laterality Date   • COLONOSCOPY     • MEDIPORT INSERTION, DOUBLE  09/01/2020    [facility]    • UPPER GASTROINTESTINAL ENDOSCOPY     • VENOUS ACCESS DEVICE (PORT) INSERTION Left 10/16/2020    Procedure: INSERTION VENOUS ACCESS DEVICE UNDER FLURO;  Surgeon: Pa Lane MD;  Location: [facility];  Service: General;  Laterality: Left;   • VENOUS ACCESS DEVICE (PORT) INSERTION Right 10/26/2021     Procedure: REMOVAL AND INSERTION OF VALVERDE CATHETER;  Surgeon: Pa Lane MD;  Location: [facility] OR;  Service: General;  Laterality: Right;      Home Meds:  Medications Prior to Admission   Medication Sig Dispense Refill Last Dose   • cholecalciferol (Cholecalciferol) 25 MCG (1000 UT) tablet Take 1,000 Units by mouth Daily.   1/9/2023   • dronabinol (MARINOL) 5 MG capsule Take 5 mg by mouth 3 (Three) Times a Day As Needed.   1/9/2023   • Emgality 120 MG/ML auto-injector pen Every 30 (Thirty) Days.   1/9/2023   • Enoxaparin Sodium (LOVENOX) 80 MG/0.8ML solution prefilled syringe syringe 160 mg Daily.   1/9/2023   • escitalopram (LEXAPRO) 10 MG tablet Take 10 mg by mouth Daily.   1/9/2023   • gabapentin (NEURONTIN) 100 MG capsule Take 100 mg by mouth 3 (Three) Times a Day.   1/9/2023   • Ginger, Zingiber officinalis, (Ginger Root) 550 MG capsule Take  by mouth Daily.   1/9/2023   • Heparin & NaCl Lock Flush (HEPARIN COMBINATION IV) Infuse  into a venous catheter 2 (Two) Times a Day.   1/9/2023   • Immune Globulin, Human,-ifas (PANZYGA IV) Infuse  into a venous catheter Take As Directed.   1/9/2023   • levothyroxine (SYNTHROID, LEVOTHROID) 50 MCG tablet Take 50 mcg by mouth Daily.   1/9/2023   • loperamide (IMODIUM) 2 MG capsule Take 2 mg by mouth 4 (Four) Times a Day As Needed for Diarrhea.   1/9/2023   • meclizine (ANTIVERT) 25 MG tablet Take 25 mg by mouth 3 (Three) Times a Day As Needed for Dizziness.   1/9/2023   • Menaquinone-7 (Vitamin K2) 100 MCG capsule Take 100 mcg by mouth Daily.   1/9/2023   • metoprolol tartrate (LOPRESSOR) 25 MG tablet 12.5mg in am /  25mg middle of day/  12.5mg in the pm 180 tablet 3 1/9/2023   • metoprolol tartrate (LOPRESSOR) 25 MG tablet Take 25 mg by mouth 3 (Three) Times a Day.      • midodrine (PROAMATINE) 10 MG tablet TAKE 1 TABLET BY MOUTH THREE TIMES DAILY 90 tablet 6 1/9/2023   • montelukast (SINGULAIR) 10 MG tablet Take 10 mg by mouth Every Night.   1/9/2023   •  omeprazole (priLOSEC) 20 MG capsule Take 40 mg by mouth Every Night.   1/9/2023   • ondansetron ODT (ZOFRAN ODT) 8 MG disintegrating tablet Take 1 tablet by mouth Every 8 (Eight) Hours As Needed for Nausea or Vomiting. 12 tablet 0 1/9/2023   • phenazopyridine (PYRIDIUM) 100 MG tablet Take 100 mg by mouth 3 (Three) Times a Day As Needed.   1/9/2023   • promethazine (PHENERGAN) 25 MG/ML injection Inject  as directed. IV every 3 hours   1/9/2023   • pyridostigmine (MESTINON) 60 MG tablet TAKE 1/2 TABLET BY MOUTH TWICE DAILY 90 tablet 3 1/9/2023   • RABEprazole (ACIPHEX) 20 MG EC tablet Take 20 mg by mouth Daily.   1/9/2023   • albuterol sulfate  (90 Base) MCG/ACT inhaler Inhale 2 puffs Every 4 (Four) Hours As Needed for Wheezing.   More than a month   • cyanocobalamin 1000 MCG/ML injection Inject 1 mL into the appropriate muscle as directed by prescriber Every 28 (Twenty-Eight) Days. 1 mL 0 More than a month   • hydrOXYzine pamoate (VISTARIL) 25 MG capsule Take 25-50 mg by mouth 3 (Three) Times a Day As Needed.      • montelukast (SINGULAIR) 10 MG tablet Take 1 tablet by mouth Every Night.      • prochlorperazine (COMPAZINE) 10 MG tablet Take 10 mg by mouth Every 6 (Six) Hours As Needed for Nausea or Vomiting.        Current Meds:     Current Facility-Administered Medications:   •  albuterol sulfate HFA (PROVENTIL HFA;VENTOLIN HFA;PROAIR HFA) inhaler 2 puff, 2 puff, Inhalation, Q4H PRN, Caleb Naik MD  •  cefepime 1 gm IVPB in 100 mL NS (VTB), 1 g, Intravenous, Q8H, Caleb Naik MD  •  cholecalciferol (VITAMIN D3) tablet 1,000 Units, 1,000 Units, Oral, Daily, Caleb Naik MD, 1,000 Units at 01/09/23 1824  •  Enoxaparin Sodium (LOVENOX) syringe 110 mg, 1 mg/kg, Subcutaneous, Q12H, Caleb Naik MD, 110 mg at 01/09/23 2047  •  escitalopram (LEXAPRO) tablet 10 mg, 10 mg, Oral, Daily, Caleb Naik MD, 10 mg at 01/09/23 1824  •  gabapentin (NEURONTIN) capsule 100 mg, 100 mg, Oral, TID, Caleb Naik MD, 100 mg  at 01/09/23 1754  •  levothyroxine (SYNTHROID, LEVOTHROID) tablet 50 mcg, 50 mcg, Oral, Daily, Caleb Naik MD, 50 mcg at 01/09/23 1754  •  metoprolol tartrate (LOPRESSOR) tablet 25 mg, 25 mg, Oral, TID, Caleb Naik MD, 25 mg at 01/09/23 2049  •  midodrine (PROAMATINE) tablet 10 mg, 10 mg, Oral, TID AC, Caleb Naik MD, 10 mg at 01/09/23 1824  •  montelukast (SINGULAIR) tablet 10 mg, 10 mg, Oral, Nightly, Caleb Naik MD, 10 mg at 01/09/23 2045  •  ondansetron (ZOFRAN) injection 4 mg, 4 mg, Intravenous, Q4H PRN, Caleb Naik MD, 4 mg at 01/09/23 1845  •  [START ON 1/10/2023] pantoprazole (PROTONIX) EC tablet 40 mg, 40 mg, Oral, QAM, Caleb Naik MD  •  Pharmacy to Dose enoxaparin (LOVENOX), , Does not apply, Continuous PRN, Caleb Naik MD  •  Pharmacy to dose vancomycin, , Does not apply, Continuous PRN, Caleb Naik MD  •  prochlorperazine (COMPAZINE) tablet 10 mg, 10 mg, Oral, Q6H PRN, Caleb Naik MD  •  pyridostigmine (MESTINON) tablet 30 mg, 30 mg, Oral, BID, Caleb Naik MD, 30 mg at 01/09/23 2047  •  [COMPLETED] Insert Peripheral IV, , , Once **AND** sodium chloride 0.9 % flush 10 mL, 10 mL, Intravenous, PRN, Moe Jeffers II, MD, 10 mL at 01/09/23 1824  •  sodium chloride 0.9 % infusion, 75 mL/hr, Intravenous, Continuous, Caleb Naik MD, Last Rate: 75 mL/hr at 01/09/23 1846, 75 mL/hr at 01/09/23 1846  •  [START ON 1/10/2023] vancomycin (VANCOCIN) 1000 mg/200 mL dextrose 5% IVPB, 1,000 mg, Intravenous, Q12H, Caleb Naik MD  Allergies:  Allergies   Allergen Reactions   • Eggs Or Egg-Derived Products GI Intolerance     Rash & vomiting   • Pepcid [Famotidine] Rash and GI Intolerance   • Scopolamine Rash and GI Intolerance     Social History:   Social History     Tobacco Use   • Smoking status: Never   • Smokeless tobacco: Never   Substance Use Topics   • Alcohol use: No      Family History:  Family History   Problem Relation Age of Onset   • Hypertension Mother    • Diabetes Mother    •  "Diabetes Father    • Heart disease Paternal Grandfather    • Stroke Paternal Grandfather    • Diabetes Paternal Grandfather    • Cancer Paternal Grandfather    • Coronary artery disease Maternal Grandmother         MGM with 4 vessel CABG and multiple stents   • Cancer Maternal Grandmother    • Coronary artery disease Maternal Uncle         Maternal uncle with MI   • Breast cancer Other    • Malig Hyperthermia Neg Hx           Review of Systems  See history of present illness and past medical history.   As described in history presenting illness.      Vitals:   /67 (BP Location: Left arm, Patient Position: Lying)   Pulse 113   Temp 98.1 °F (36.7 °C) (Oral)   Resp 18   Ht 162.6 cm (64\")   Wt 110 kg (242 lb 8.1 oz)   SpO2 97%   BMI 41.63 kg/m²   I/O:     Intake/Output Summary (Last 24 hours) at 1/9/2023 2216  Last data filed at 1/9/2023 2056  Gross per 24 hour   Intake 1460 ml   Output --   Net 1460 ml     Exam:  Patient is examined using the personal protective equipment as per guidelines from infection control for this particular patient as enacted.  Hand washing was performed before and after patient interaction.  General Appearance:    Alert, cooperative, no distress, appears stated age, morbidly obese female who does not appear to be in any acute distress   Head:    Normocephalic, without obvious abnormality, atraumatic   Eyes:    PERRL, conjunctivae/corneas clear, EOM's intact, both eyes   Ears:    Normal external ear canals, both ears   Nose:   Nares normal, septum midline, mucosa normal, no drainage    or sinus tenderness   Throat:   Lips, tongue, gums normal; oral mucosa pink and moist   Neck:  Tenderness of the right side of the neck   Back:     Symmetric, no curvature, ROM normal, no CVA tenderness   Lungs:     Clear to auscultation bilaterally, respirations unlabored   Chest Wall:   Tenderness of the right chest wall with dressing over the central catheter removal site.    Heart:   S1-S2 " tachycardia   Abdomen:    Obese nontender   Extremities:   Extremities normal, atraumatic, no cyanosis or edema   Pulses:   Pulses palpable in all extremities; symmetric all extremities   Skin:  Cellulitic changes right upper chest wall extending into the neck.   Neurologic:  Alert and oriented and grossly nonfocal       Data Review:    I reviewed the patient's new clinical results.  Results from last 7 days   Lab Units 01/09/23  1234   WBC 10*3/mm3 12.04*   HEMOGLOBIN g/dL 13.3   PLATELETS 10*3/mm3 369     Results from last 7 days   Lab Units 01/09/23  1234   SODIUM mmol/L 133*   POTASSIUM mmol/L 4.1   CHLORIDE mmol/L 98   CO2 mmol/L 24.0   BUN mg/dL 7   CREATININE mg/dL 0.85   CALCIUM mg/dL 9.5   GLUCOSE mg/dL 106*     Microbiology Results (last 10 days)     ** No results found for the last 240 hours. **        XR Chest 2 View    Result Date: 12/16/2022  1. The tip of the right internal jugular central line projects over the superior right atrium. No active disease is currently seen in the chest.  This report was finalized on 12/16/2022 6:15 PM by Dr. Pa Engel M.D.      XR Chest 1 View    Result Date: 1/9/2023  No evidence for acute pulmonary process. Follow-up as clinical indications persist.  This report was finalized on 1/9/2023 3:31 PM by Dr. Chacho Christie M.D.      CT Angiogram Chest    Result Date: 1/9/2023  1.  Filling defects within subsegmental left lower lobe pulmonary arterial branches new from recent imaging but similar to 09/08/2021 suggesting recurrent pulmonary embolus. 2.  Right central venous catheter with the tip terminating in the superior vena cava. Stranding along the subcutaneous portion of the catheter suggesting cellulitis given history. 3.  Small hiatal hernia with thickening of the distal esophagus suggestive of esophagitis. 4.  Please see above for additional findings/recommendations.  5.  I discussed these findings Madina Ramon RN in the emergency emergency room at 3:00 PM on  01/09/2023.  This report was finalized on 1/9/2023 4:06 PM by Dr. Jae Joyce M.D.        Assessment:  Active Hospital Problems    Diagnosis  POA   • **Cellulitis of chest wall [L03.313]  Yes      Resolved Hospital Problems   No resolved problems to display.         Plan:  See above  Angélica Avalos MD   1/9/2023  22:16 EST    Parts of this note may be an electronic transcription/translation of spoken language to printed text using the Dragon dictation system.

## 2023-01-10 NOTE — CASE MANAGEMENT/SOCIAL WORK
Discharge Planning Assessment  [facility]     Patient Name: Liliam Lorenz  MRN: [id number]  Today's Date: 1/10/2023    Admit Date: 1/9/2023    Plan: Return home with spouse and IV meds/infusion per [facility]   Discharge Needs Assessment     Row Name 01/10/23 1542       Living Environment    People in Home spouse    Name(s) of People in Home Wife Deedee and 4 other people    Current Living Arrangements home    Primary Care Provided by self    Provides Primary Care For no one    Family Caregiver if Needed spouse;parent(s)    Family Caregiver Names spouse Deedee [phone] or mother Monica Enriquez [phone]    Quality of Family Relationships helpful    Able to Return to Prior Arrangements yes       Resource/Environmental Concerns    Resource/Environmental Concerns none    Transportation Concerns none       Transition Planning    Patient/Family Anticipates Transition to home with family    Patient/Family Anticipated Services at Transition other (see comments)  Home infusions    Transportation Anticipated family or friend will provide       Discharge Needs Assessment    Readmission Within the Last 30 Days no previous admission in last 30 days    Equipment Currently Used at Home rollator;cane, straight;shower chair;cpap;other (see comments)  IV supplies    Concerns to be Addressed basic needs;discharge planning    Anticipated Changes Related to Illness none               Discharge Plan     Row Name 01/10/23 1542       Plan    Plan Return home with spouse and IV meds/infusion per [facility]    Patient/Family in Agreement with Plan yes    Plan Comments Spoke with patient at bedside.  Patient lives with spouse Deedee [phone] and 4 other people.  She uses a cane, rollator, shower chair, C-pap from [employer].  She gets IVIG/IV meds/IV fluids from [facility]. Call to [facility] [phone] ext. [phone].  She requests any new orders be faxed to [phone].   She requests call a couple days in advance as they are located in [place], [place] and ship by [facility].   She has never been to SNF.  PCP is Dr. Jason Borrero and pharmacy is [facility] [address] [facility] [address]. Patient states her spouse or mother Monica Enriquez [phone] will assist if needed.  Plan is to return home at OR.  CCP will follow.  Myriam RIVERA              Continued Care and Services - Admitted Since 1/9/2023    Coordination has not been started for this encounter.       Expected Discharge Date and Time     Expected Discharge Date Expected Discharge Time    Jan 12, 2023          Demographic Summary     Row Name 01/10/23 1542       General Information    Admission Type inpatient    Arrived From home    Referral Source admission list    Reason for Consult discharge planning    Preferred Language English               Functional Status     Row Name 01/10/23 1542       Functional Status    Usual Activity Tolerance good    Current Activity Tolerance moderate       Functional Status, IADL    Medications independent    Meal Preparation assistive person    Housekeeping assistive person    Laundry assistive person    Shopping assistive person       Mental Status    General Appearance WDL WDL       Mental Status Summary    Recent Changes in Mental Status/Cognitive Functioning no changes                      Becky S. [place], RN

## 2023-01-10 NOTE — CONSULTS
Subjective     REASON FOR CONSULTATION:  Pulmonary embolism despite AC  Provide an opinion on any further workup or treatment                             REQUESTING PHYSICIAN:  Heena    RECORDS OBTAINED:  Records of the patients history including those obtained from the referring provider were reviewed and summarized in detail.    History of Present Illness   This is a 27-year-old woman with Phoebe-Danlos syndrome, POTS syndrome with chronic hypotension, irritable bowel syndrome.  She has an indwelling venous catheter for pulm fluid replacement.  She has been seen previously by my partner Dr. Medina in April 2022 seen for recurrent pulmonary embolism while on Eliquis; she is also followed by [facility].  On 4/30/2022 the patient had shortness of breath and palpitations and a CT angiogram was positive for pulmonary emboli within pulmonary artery branches in the right lower lobe with no infarction or heart strain.  She had been on Eliquis at the time and recommendation was made by Dr. Medina to switch anticoagulation to Coumadin.  A hypercoagulable evaluation on 4/30/2022 was negative for factor V Leiden, negative prothrombin gene mutation, normal protein S studies, Antithrombin 3 88%, negative lupus anticoagulant, negative beta-2 glycoprotein 1 profile, negative cardiolipin profile.  The patient reports that she had difficulty maintaining a therapeutic INR and she was subsequently switched to Lovenox 80 mg every 12 hours until last week dose was decreased to once daily dosing for prophylaxis by Dr. Mcdonald.    The patient presented to the ER on 1/9/2023 with complaints of chest redness and discomfort at a central line site in the right chest.  She denies to me significant chest pain or shortness of breath prior to admission.  A CT angiogram chest showed filling defects in subsegmental left lower lobe pulmonary artery branches new from most recent imaging 6/15/2022 but similar to prior films 9/8/2021  consistent with recurrent pulmonary embolus.  There is a right central venous catheter tip in the superior vena cava and stranding along the subcutaneous portion of the catheter consistent with cellulitis/soft tissue infection.  The patient is undergone line removal and on antibiotic therapy.        She is currently on Lovenox 110 mg SQ every 12 hours.    Past Medical History:   Diagnosis Date   • Bronchitis    • Chest pain    • Chronic hypotension    • Eczema    • Phoebe-Danlos syndrome    • Gastroparesis    • GERD (gastroesophageal reflux disease)    • IBS (irritable bowel syndrome)    • Lightheadedness    • POTS (postural orthostatic tachycardia syndrome)    • Rectal fissure    • Rosacea    • Tachycardia         Past Surgical History:   Procedure Laterality Date   • COLONOSCOPY     • MEDIPORT INSERTION, DOUBLE  09/01/2020    [facility]    • UPPER GASTROINTESTINAL ENDOSCOPY     • VENOUS ACCESS DEVICE (PORT) INSERTION Left 10/16/2020    Procedure: INSERTION VENOUS ACCESS DEVICE UNDER FLURO;  Surgeon: Pa Lane MD;  Location: [facility];  Service: General;  Laterality: Left;   • VENOUS ACCESS DEVICE (PORT) INSERTION Right 10/26/2021    Procedure: REMOVAL AND INSERTION OF VALVERDE CATHETER;  Surgeon: Pa Lane MD;  Location: [facility];  Service: General;  Laterality: Right;        No current facility-administered medications on file prior to encounter.     Current Outpatient Medications on File Prior to Encounter   Medication Sig Dispense Refill   • cholecalciferol (Cholecalciferol) 25 MCG (1000 UT) tablet Take 1,000 Units by mouth Daily.     • dronabinol (MARINOL) 5 MG capsule Take 5 mg by mouth 3 (Three) Times a Day As Needed.     • Emgality 120 MG/ML auto-injector pen Every 30 (Thirty) Days.     • Enoxaparin Sodium (LOVENOX) 80 MG/0.8ML solution prefilled syringe syringe 160 mg Daily.     • escitalopram (LEXAPRO) 10 MG tablet Take 10 mg by mouth Daily.     • gabapentin  (NEURONTIN) 100 MG capsule Take 100 mg by mouth 3 (Three) Times a Day.     • Ginger, Zingiber officinalis, (Ginger Root) 550 MG capsule Take  by mouth Daily.     • Heparin & NaCl Lock Flush (HEPARIN COMBINATION IV) Infuse  into a venous catheter 2 (Two) Times a Day.     • Immune Globulin, Human,-ifas (PANZYGA IV) Infuse  into a venous catheter Take As Directed.     • levothyroxine (SYNTHROID, LEVOTHROID) 50 MCG tablet Take 50 mcg by mouth Daily.     • loperamide (IMODIUM) 2 MG capsule Take 2 mg by mouth 4 (Four) Times a Day As Needed for Diarrhea.     • meclizine (ANTIVERT) 25 MG tablet Take 25 mg by mouth 3 (Three) Times a Day As Needed for Dizziness.     • Menaquinone-7 (Vitamin K2) 100 MCG capsule Take 100 mcg by mouth Daily.     • metoprolol tartrate (LOPRESSOR) 25 MG tablet 12.5mg in am /  25mg middle of day/  12.5mg in the pm 180 tablet 3   • metoprolol tartrate (LOPRESSOR) 25 MG tablet Take 25 mg by mouth 3 (Three) Times a Day.     • midodrine (PROAMATINE) 10 MG tablet TAKE 1 TABLET BY MOUTH THREE TIMES DAILY 90 tablet 6   • montelukast (SINGULAIR) 10 MG tablet Take 10 mg by mouth Every Night.     • omeprazole (priLOSEC) 20 MG capsule Take 40 mg by mouth Every Night.     • ondansetron ODT (ZOFRAN ODT) 8 MG disintegrating tablet Take 1 tablet by mouth Every 8 (Eight) Hours As Needed for Nausea or Vomiting. 12 tablet 0   • phenazopyridine (PYRIDIUM) 100 MG tablet Take 100 mg by mouth 3 (Three) Times a Day As Needed.     • promethazine (PHENERGAN) 25 MG/ML injection Inject  as directed. IV every 3 hours     • pyridostigmine (MESTINON) 60 MG tablet TAKE 1/2 TABLET BY MOUTH TWICE DAILY 90 tablet 3   • RABEprazole (ACIPHEX) 20 MG EC tablet Take 20 mg by mouth Daily.     • albuterol sulfate  (90 Base) MCG/ACT inhaler Inhale 2 puffs Every 4 (Four) Hours As Needed for Wheezing.     • cyanocobalamin 1000 MCG/ML injection Inject 1 mL into the appropriate muscle as directed by prescriber Every 28 (Twenty-Eight)  Days. 1 mL 0   • hydrOXYzine pamoate (VISTARIL) 25 MG capsule Take 25-50 mg by mouth 3 (Three) Times a Day As Needed.     • montelukast (SINGULAIR) 10 MG tablet Take 1 tablet by mouth Every Night.     • prochlorperazine (COMPAZINE) 10 MG tablet Take 10 mg by mouth Every 6 (Six) Hours As Needed for Nausea or Vomiting.          ALLERGIES:    Allergies   Allergen Reactions   • Eggs Or Egg-Derived Products GI Intolerance     Rash & vomiting   • Pepcid [Famotidine] Rash and GI Intolerance   • Scopolamine Rash and GI Intolerance        Social History     Socioeconomic History   • Marital status:    Tobacco Use   • Smoking status: Never   • Smokeless tobacco: Never   Vaping Use   • Vaping Use: Never used   Substance and Sexual Activity   • Alcohol use: No   • Drug use: No   • Sexual activity: Yes     Partners: Female     Birth control/protection: None        Family History   Problem Relation Age of Onset   • Hypertension Mother    • Diabetes Mother    • Diabetes Father    • Heart disease Paternal Grandfather    • Stroke Paternal Grandfather    • Diabetes Paternal Grandfather    • Cancer Paternal Grandfather    • Coronary artery disease Maternal Grandmother         MGM with 4 vessel CABG and multiple stents   • Cancer Maternal Grandmother    • Coronary artery disease Maternal Uncle         Maternal uncle with MI   • Breast cancer Other    • Malig Hyperthermia Neg Hx         Review of Systems   Constitutional: Negative.    HENT: Negative.    Respiratory: Negative.    Cardiovascular: Negative.    Gastrointestinal: Positive for diarrhea and nausea.   Skin: Negative.    Neurological: Positive for light-headedness.   Hematological: Negative.    Psychiatric/Behavioral: Negative.           Objective     Vitals:    01/09/23 1926 01/09/23 2049 01/09/23 2342 01/10/23 0638   BP: 133/76 112/67 98/51 94/50   BP Location: Right arm Left arm Right arm    Patient Position: Lying Lying Lying    Pulse: (!) 121 113 92 91   Resp: 18 18  16    Temp: 98.1 °F (36.7 °C)  98.3 °F (36.8 °C)    TempSrc: Oral  Oral    SpO2: 97%  96%    Weight:       Height:         No flowsheet data found.    Physical Exam    CONSTITUTIONAL: pleasant well-developed adult woman  HEENT: no icterus, no thrush, moist membranes  LYMPH: no cervical or supraclavicular lad  CV: RRR, S1S2, no murmur  Chest: Erythema right upper chest wall site of line removal  RESP: cta bilat, no wheezing, no rales  GI: soft, non-tender, no splenomegaly, +bs  MUSC: no edema   NEURO: alert and oriented x3, normal strength  PSYCH: normal mood and affect  Skin: See above  RECENT LABS:  Hematology WBC   Date Value Ref Range Status   01/09/2023 12.04 (H) 3.40 - 10.80 10*3/mm3 Final     RBC   Date Value Ref Range Status   01/09/2023 5.27 3.77 - 5.28 10*6/mm3 Final     Hemoglobin   Date Value Ref Range Status   01/09/2023 13.3 12.0 - 15.9 g/dL Final     Hematocrit   Date Value Ref Range Status   01/09/2023 40.3 34.0 - 46.6 % Final     Platelets   Date Value Ref Range Status   01/09/2023 369 140 - 450 10*3/mm3 Final        Lab Results   Component Value Date    GLUCOSE 98 01/10/2023    BUN 8 01/10/2023    CREATININE 0.77 01/10/2023    EGFRIFNONA 85 02/02/2022    BCR 10.4 01/10/2023    K 4.2 01/10/2023    CO2 22.3 01/10/2023    CALCIUM 8.2 (L) 01/10/2023    ALBUMIN 3.4 (L) 01/10/2023    LABIL2 0.9 (L) 07/09/2021    AST 17 01/10/2023    ALT 26 01/10/2023         Assessment & Plan     *Recurrent VTE on chronic anticoagulation  · Prior pulmonary embolism 4/20/2022 while on Eliquis (Eliquis failure likely secondary to malabsorption) transition to warfarin  · Difficulty maintaining therapeutic INR on warfarin likely secondary to erratic/malabsorption transition to Lovenox 80 mg every 12 hours (subtherapeutic based on current weight), been dose reduced to once daily dosing 1 week prior to current admission  · 1/9/2023 admitted with chest line infection/cellulitis- CT angiogram chest incidentally showed new filling  defect subsegmental left lower lobe pulmonary artery  · Previous hypercoagulable evaluation has been unremarkable    *Infected Dolan catheter/cellulitis  · Catheter has been removed  · Cultures and antibiotics per ID    * Phoebe-Danlos syndrome, POTS syndrome with chronic hypotension, irritable bowel syndrome requiring indwelling central line for fluid administration etc.    *Gastroparesis    Hematology plan/recommendations:  This patient has recurrent VTE and requires a indwelling central catheter for fluid administration at home increasing her risk of thrombosis recurrence.  She needs indefinite/chronic anticoagulation.  She has gastroparesis likely affecting medication absorption and has previously failed Eliquis and difficulty regulating INR's on Coumadin likely secondary to erratic GI absorption.  She was not on therapeutic dose Lovenox prior to admission so I do not think she has had a Lovenox failure.  I think Lovenox is the best long-term anticoagulant for this patient at a dose of 1 mg/kg every 12 hours subcu.  She will follow-up with her regular hematologist after discharge.    Thank you for allowing me to participate in the care of this patient; please call if further needed during the hospital stay.

## 2023-01-10 NOTE — PROGRESS NOTES
"[facility] Clinical Pharmacy Services: Vancomycin Monitoring Note    Liliam Lorenz is a 27 y.o. female who is on day 2/5 of pharmacy to dose vancomycin for Sepsis.    Previous Vancomycin Dose:   1000 mg IV every  12  hours  Updated Cultures and Sensitivities:   1/9 bcx pending   1/10 cath tip cx pending   Results from last 7 days   Lab Units 01/10/23  1506   VANCOMYCIN TR mcg/mL 8.80     Vitals/Labs  Ht: 162.6 cm (64\"); Wt: 110 kg (242 lb 8.1 oz)   Temp Readings from Last 1 Encounters:   01/10/23 97.3 °F (36.3 °C) (Oral)     Estimated Creatinine Clearance: 133.1 mL/min (by C-G formula based on SCr of 0.77 mg/dL).        Results from last 7 days   Lab Units 01/10/23  0808 01/09/23  1234   CREATININE mg/dL 0.77 0.85   WBC 10*3/mm3 9.38 12.04*     Assessment/Plan    Current Vancomycin Dose:will increase dose to  1000 mg IV every  8  hours; provides a predicted  mg/L.hr   Next Level Date and Time: Vanc Trough on 1/11 at 0900  We will continue to monitor patient changes and renal function     Thank you for involving pharmacy in this patient's care. Please contact pharmacy with any questions or concerns.       Maryanne Santana, [facility]  Clinical Pharmacist          "

## 2023-01-11 LAB
ANION GAP SERPL CALCULATED.3IONS-SCNC: 11 MMOL/L (ref 5–15)
BASOPHILS # BLD AUTO: 0.03 10*3/MM3 (ref 0–0.2)
BASOPHILS NFR BLD AUTO: 0.4 % (ref 0–1.5)
BUN SERPL-MCNC: 10 MG/DL (ref 6–20)
BUN/CREAT SERPL: 11.8 (ref 7–25)
CALCIUM SPEC-SCNC: 8.5 MG/DL (ref 8.6–10.5)
CHLORIDE SERPL-SCNC: 102 MMOL/L (ref 98–107)
CO2 SERPL-SCNC: 20 MMOL/L (ref 22–29)
CREAT SERPL-MCNC: 0.85 MG/DL (ref 0.57–1)
DEPRECATED RDW RBC AUTO: 45.9 FL (ref 37–54)
EGFRCR SERPLBLD CKD-EPI 2021: 96.4 ML/MIN/1.73
EOSINOPHIL # BLD AUTO: 0.13 10*3/MM3 (ref 0–0.4)
EOSINOPHIL NFR BLD AUTO: 1.7 % (ref 0.3–6.2)
ERYTHROCYTE [DISTWIDTH] IN BLOOD BY AUTOMATED COUNT: 15.8 % (ref 12.3–15.4)
GLUCOSE SERPL-MCNC: 98 MG/DL (ref 65–99)
HCT VFR BLD AUTO: 35.7 % (ref 34–46.6)
HGB BLD-MCNC: 11.2 G/DL (ref 12–15.9)
IMM GRANULOCYTES # BLD AUTO: 0.02 10*3/MM3 (ref 0–0.05)
IMM GRANULOCYTES NFR BLD AUTO: 0.3 % (ref 0–0.5)
LYMPHOCYTES # BLD AUTO: 2.3 10*3/MM3 (ref 0.7–3.1)
LYMPHOCYTES NFR BLD AUTO: 30.9 % (ref 19.6–45.3)
MCH RBC QN AUTO: 25.3 PG (ref 26.6–33)
MCHC RBC AUTO-ENTMCNC: 31.4 G/DL (ref 31.5–35.7)
MCV RBC AUTO: 80.6 FL (ref 79–97)
MONOCYTES # BLD AUTO: 0.47 10*3/MM3 (ref 0.1–0.9)
MONOCYTES NFR BLD AUTO: 6.3 % (ref 5–12)
NEUTROPHILS NFR BLD AUTO: 4.5 10*3/MM3 (ref 1.7–7)
NEUTROPHILS NFR BLD AUTO: 60.4 % (ref 42.7–76)
NRBC BLD AUTO-RTO: 0 /100 WBC (ref 0–0.2)
PLATELET # BLD AUTO: 258 10*3/MM3 (ref 140–450)
PMV BLD AUTO: 10.6 FL (ref 6–12)
POTASSIUM SERPL-SCNC: 4.2 MMOL/L (ref 3.5–5.2)
RBC # BLD AUTO: 4.43 10*6/MM3 (ref 3.77–5.28)
SODIUM SERPL-SCNC: 133 MMOL/L (ref 136–145)
WBC NRBC COR # BLD: 7.45 10*3/MM3 (ref 3.4–10.8)

## 2023-01-11 PROCEDURE — 25010000002 ENOXAPARIN PER 10 MG: Performed by: HOSPITALIST

## 2023-01-11 PROCEDURE — 63710000001 PROCHLORPERAZINE MALEATE PER 10 MG: Performed by: HOSPITALIST

## 2023-01-11 PROCEDURE — 85025 COMPLETE CBC W/AUTO DIFF WBC: CPT | Performed by: HOSPITALIST

## 2023-01-11 PROCEDURE — 25010000002 CEFEPIME PER 500 MG: Performed by: HOSPITALIST

## 2023-01-11 PROCEDURE — 25010000002 ONDANSETRON PER 1 MG: Performed by: HOSPITALIST

## 2023-01-11 PROCEDURE — 80048 BASIC METABOLIC PNL TOTAL CA: CPT | Performed by: HOSPITALIST

## 2023-01-11 PROCEDURE — 25010000002 VANCOMYCIN PER 500 MG: Performed by: INTERNAL MEDICINE

## 2023-01-11 RX ADMIN — PYRIDOSTIGMINE BROMIDE 30 MG: 60 TABLET ORAL at 09:45

## 2023-01-11 RX ADMIN — ONDANSETRON 4 MG: 2 INJECTION INTRAMUSCULAR; INTRAVENOUS at 17:48

## 2023-01-11 RX ADMIN — GABAPENTIN 100 MG: 100 CAPSULE ORAL at 20:33

## 2023-01-11 RX ADMIN — ONDANSETRON 4 MG: 2 INJECTION INTRAMUSCULAR; INTRAVENOUS at 06:09

## 2023-01-11 RX ADMIN — METOPROLOL TARTRATE 25 MG: 25 TABLET, FILM COATED ORAL at 16:03

## 2023-01-11 RX ADMIN — ENOXAPARIN SODIUM 110 MG: 150 INJECTION SUBCUTANEOUS at 20:33

## 2023-01-11 RX ADMIN — VANCOMYCIN HYDROCHLORIDE 1000 MG: 1 INJECTION, SOLUTION INTRAVENOUS at 09:54

## 2023-01-11 RX ADMIN — CEFEPIME HYDROCHLORIDE 1 G: 1 INJECTION, POWDER, FOR SOLUTION INTRAMUSCULAR; INTRAVENOUS at 06:08

## 2023-01-11 RX ADMIN — METOPROLOL TARTRATE 25 MG: 25 TABLET, FILM COATED ORAL at 20:33

## 2023-01-11 RX ADMIN — LEVOTHYROXINE SODIUM 50 MCG: 0.05 TABLET ORAL at 09:45

## 2023-01-11 RX ADMIN — VANCOMYCIN HYDROCHLORIDE 1000 MG: 1 INJECTION, SOLUTION INTRAVENOUS at 01:49

## 2023-01-11 RX ADMIN — Medication 1000 UNITS: at 09:45

## 2023-01-11 RX ADMIN — VANCOMYCIN HYDROCHLORIDE 1000 MG: 1 INJECTION, SOLUTION INTRAVENOUS at 17:08

## 2023-01-11 RX ADMIN — GABAPENTIN 100 MG: 100 CAPSULE ORAL at 16:03

## 2023-01-11 RX ADMIN — ENOXAPARIN SODIUM 110 MG: 150 INJECTION SUBCUTANEOUS at 09:45

## 2023-01-11 RX ADMIN — PROCHLORPERAZINE MALEATE 10 MG: 10 TABLET ORAL at 12:09

## 2023-01-11 RX ADMIN — METOPROLOL TARTRATE 25 MG: 25 TABLET, FILM COATED ORAL at 09:45

## 2023-01-11 RX ADMIN — MIDODRINE HYDROCHLORIDE 10 MG: 5 TABLET ORAL at 06:08

## 2023-01-11 RX ADMIN — PROCHLORPERAZINE MALEATE 10 MG: 10 TABLET ORAL at 20:33

## 2023-01-11 RX ADMIN — PANTOPRAZOLE SODIUM 40 MG: 40 TABLET, DELAYED RELEASE ORAL at 06:09

## 2023-01-11 RX ADMIN — GABAPENTIN 100 MG: 100 CAPSULE ORAL at 09:45

## 2023-01-11 RX ADMIN — PYRIDOSTIGMINE BROMIDE 30 MG: 60 TABLET ORAL at 20:34

## 2023-01-11 RX ADMIN — ESCITALOPRAM 10 MG: 10 TABLET, FILM COATED ORAL at 09:45

## 2023-01-11 RX ADMIN — Medication 10 ML: at 20:33

## 2023-01-11 RX ADMIN — ONDANSETRON 4 MG: 2 INJECTION INTRAMUSCULAR; INTRAVENOUS at 22:09

## 2023-01-11 RX ADMIN — MIDODRINE HYDROCHLORIDE 10 MG: 5 TABLET ORAL at 17:08

## 2023-01-11 RX ADMIN — ONDANSETRON 4 MG: 2 INJECTION INTRAMUSCULAR; INTRAVENOUS at 09:52

## 2023-01-11 RX ADMIN — MONTELUKAST SODIUM 10 MG: 10 TABLET, FILM COATED ORAL at 20:33

## 2023-01-11 RX ADMIN — ONDANSETRON 4 MG: 2 INJECTION INTRAMUSCULAR; INTRAVENOUS at 14:06

## 2023-01-11 RX ADMIN — MIDODRINE HYDROCHLORIDE 10 MG: 5 TABLET ORAL at 12:09

## 2023-01-11 NOTE — PROGRESS NOTES
"  Infectious Diseases Progress Note    Angélica Avalos MD     [facility]  [place]: 1 day  Patient Identification:  Name: Liliam Lorenz  Age: 27 y.o.  Sex: female  :  1995  MRN: [id number]         Primary Care Physician: Jason Borrero MD        Subjective: Feeling better with decreased pain and discomfort in her neck and upper chest.  Currently watching TV with her family member at the bedside.  Denies any fever and chills.  Interval History: See consultation note.    Objective:    Scheduled Meds:cefepime, 1 g, Intravenous, Q8H  cholecalciferol, 1,000 Units, Oral, Daily  enoxaparin, 1 mg/kg, Subcutaneous, Q12H  escitalopram, 10 mg, Oral, Daily  gabapentin, 100 mg, Oral, TID  levothyroxine, 50 mcg, Oral, Daily  metoprolol tartrate, 25 mg, Oral, TID  midodrine, 10 mg, Oral, TID AC  montelukast, 10 mg, Oral, Nightly  pantoprazole, 40 mg, Oral, QAM  pyridostigmine, 30 mg, Oral, BID  vancomycin, 1,000 mg, Intravenous, Q8H      Continuous Infusions:Pharmacy to Dose enoxaparin (LOVENOX),   Pharmacy to dose vancomycin,         Vital signs in last 24 hours:  Temp:  [97.3 °F (36.3 °C)-98.3 °F (36.8 °C)] 97.3 °F (36.3 °C)  Heart Rate:  [] 85  Resp:  [16-18] 16  BP: ()/(50-67) 93/52    Intake/Output:    Intake/Output Summary (Last 24 hours) at 1/10/2023 2008  Last data filed at 1/10/2023 1755  Gross per 24 hour   Intake 1810 ml   Output --   Net 1810 ml       Exam:  BP 93/52 (BP Location: Left arm, Patient Position: Lying)   Pulse 85   Temp 97.3 °F (36.3 °C) (Oral)   Resp 16   Ht 162.6 cm (64\")   Wt 110 kg (242 lb 8.1 oz)   SpO2 96%   BMI 41.63 kg/m²   Patient is examined using the personal protective equipment as per guidelines from infection control for this particular patient as enacted.  Hand washing was performed before and after patient interaction.  General Appearance:    Alert, cooperative, no distress, AAOx3                          Head:    Normocephalic, without obvious " abnormality, atraumatic                           Eyes:    PERRL, conjunctivae/corneas clear, EOM's intact, both eyes                         Throat:   Lips, tongue, gums normal; oral mucosa pink and moist                           Neck: No significant tenderness on the right side of the chest noted.                         Lungs:    Clear to auscultation bilaterally, respirations unlabored                 Chest Wall:  Resolution of cellulitis in the right upper chest.                          Heart:    Regular rate and rhythm, S1 and S2 normal, no murmur, no rub                                         or gallop                  Abdomen:   Soft nontender                 Extremities:   Extremities normal, atraumatic, no cyanosis or edema                        Pulses:   Pulses palpable in all extremities                            Skin:   Skin is warm and dry,  no rashes or palpable lesions                  Neurologic: Grossly nonfocal       Data Review:    I reviewed the patient's new clinical results.  Results from last 7 days   Lab Units 01/10/23  0808 01/09/23  1234   WBC 10*3/mm3 9.38 12.04*   HEMOGLOBIN g/dL 11.1* 13.3   PLATELETS 10*3/mm3 327 369     Results from last 7 days   Lab Units 01/10/23  0808 01/09/23  1234   SODIUM mmol/L 136 133*   POTASSIUM mmol/L 4.2 4.1   CHLORIDE mmol/L 103 98   CO2 mmol/L 22.3 24.0   BUN mg/dL 8 7   CREATININE mg/dL 0.77 0.85   CALCIUM mg/dL 8.2* 9.5   GLUCOSE mg/dL 98 106*     Microbiology Results (last 10 days)     Procedure Component Value - Date/Time    Catheter Culture - Cath Tip, Chest, Right [[id number]] Collected: 01/09/23 2110    Lab Status: Preliminary result Specimen: Cath Tip from Chest, Right Updated: 01/10/23 1005     CATHETER CULTURE Culture in progress    Blood Culture - Blood, Arm, Right [[id number]]  (Normal) Collected: 01/09/23 1250    Lab Status: Preliminary result Specimen: Blood from Arm, Right Updated: 01/10/23 1317     Blood Culture No growth at 24 hours     Blood Culture - Blood, Arm, Right [[id number]]  (Normal) Collected: 01/09/23 1234    Lab Status: Preliminary result Specimen: Blood from Arm, Right Updated: 01/10/23 1245     Blood Culture No growth at 24 hours              Assessment:    Cellulitis of chest wall  1-probable infected Dolan catheter with tunnel infection and surrounding cellulitis-status post removal of catheter earlier today with likely pathogen to consider would be MRSA, gram-negative rods.  2-pulmonary embolus likely due to central line associated DVT  3-Phoebe-Danlos syndrome  4-chronic orthostatic hypotension with POTS syndrome  5-hypothyroidism  6-morbid obesity  7-history of recurrent line sepsis     Recommendations/Discussions:  Continue present antibiotic regimen and follow-up on the operative culture results.  De-escalate antibiotics based on evolving culture data.  If there is negative blood culture and no evidence of endovascular involvement such as phlebitis of the neck and great veins then patient would need only couple of weeks of antibiotic therapy for soft tissue infection after the removal of infected Dolan.  Angélica Avalos MD  1/10/2023  20:08 EST    Parts of this note may be an electronic transcription/translation of spoken language to printed text using the Dragon dictation system.

## 2023-01-11 NOTE — PLAN OF CARE
Goal Outcome Evaluation:  Plan of Care Reviewed With: patient        Progress: improving  Outcome Evaluation: Patient had no c/o pain through the night. Some nausea, treated with PRN Zofran. Up with stand-by assist. IV Cefepime and Vanc as scheduled. Vanc trough due at 0900, next dose of Vanc scheduled at 1000. VSS. Placed on 1.5 L O2 while sleeping since patient does not have home CPAP here yet. Lovenox BID. Patient curious about implanted ports vs another CVC since this is not the first time issues have occured from having a CVC, patient to address with surgery. WCTM.

## 2023-01-11 NOTE — PROGRESS NOTES
"[facility] Clinical Pharmacy Services: Vancomycin Monitoring Note    Liliam Lorenz is a 27 y.o. female who is on day 3/5 of pharmacy to dose vancomycin for Sepsis.    Previous Vancomycin Dose:   1000 mg IV every  12  hours  Updated Cultures and Sensitivities:   1/9 bcx pending   1/10 cath tip cx pending   Results from last 7 days   Lab Units 01/10/23  1506   VANCOMYCIN TR mcg/mL 8.80     Vitals/Labs  Ht: 162.6 cm (64\"); Wt: 110 kg (242 lb 8.1 oz)   Temp Readings from Last 1 Encounters:   01/11/23 97.9 °F (36.6 °C) (Oral)     Estimated Creatinine Clearance: 120.5 mL/min (by C-G formula based on SCr of 0.85 mg/dL).        Results from last 7 days   Lab Units 01/11/23  0705 01/10/23  0808 01/09/23  1234   CREATININE mg/dL 0.85 0.77 0.85   WBC 10*3/mm3 7.45 9.38 12.04*     Assessment/Plan    Current Vancomycin Dose:will increase dose to  1000 mg IV every  8  hours; provides a predicted  mg/L.hr   Next Level Date and Time: Vanc Trough on 1/12 at 0930  We will continue to monitor patient changes and renal function     Thank you for involving pharmacy in this patient's care. Please contact pharmacy with any questions or concerns.       Rosemarie Dey [facility]  Clinical Pharmacist            "

## 2023-01-11 NOTE — PROGRESS NOTES
"Daily progress note    Primary care physician  Dr. Jason Borrero    Chief complaint  Doing better with no new complaint and wants to go home.  Patient family at bedside.  Patient wants to get new port placement prior to discharge.    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 emergency room with  chest discomfort and shortness of breath started today while she was at home comfortably sitting in chair followed by tachycardia and decreased saturation.  Patient evaluated in ER found to have line sepsis and tiny pulm embolism admit for management.     REVIEW OF SYSTEMS  Unremarkable except weakness.     PHYSICAL EXAM  Blood pressure 96/60, pulse 69, temperature 97.9 °F (36.6 °C), temperature source Oral, resp. rate 18, height 162.6 cm (64\"), weight 110 kg (242 lb 8.1 oz), SpO2 96 %, not currently breastfeeding.    GENERAL:  Awake and alert in no acute distress  HEENT:  Unremarkable  NECK:  Supple  CV: regular rhythm, tachycardic S1-S2  RESPIRATORY: normal effort, decreased breath sounds at the bases  ABDOMEN: soft, nontender nontender bowel sounds positive  MUSCULOSKELETAL: no deformity  NEURO: alert, moves all extremities, follows commands  PSYCH:  calm, cooperative  SKIN: Poorly demarcated blanching erythema along the right anterior chest that extends up the neck overlying the subcutaneous course of the Dolan catheter but also laterally towards the axilla.  No crepitus.     LAB RESULTS  Lab Results (last 24 hours)     Procedure Component Value Units Date/Time    Blood Culture - Blood, Arm, Right [[id number]]  (Normal) Collected: 01/09/23 1250    Specimen: Blood from Arm, Right Updated: 01/11/23 1315     Blood Culture No growth at 2 days    Blood Culture - Blood, Arm, Right [[id number]]  (Normal) Collected: " 01/09/23 1234    Specimen: Blood from Arm, Right Updated: 01/11/23 1245     Blood Culture No growth at 2 days    Basic Metabolic Panel [[id number]]  (Abnormal) Collected: 01/11/23 0705    Specimen: Blood Updated: 01/11/23 0743     Glucose 98 mg/dL      BUN 10 mg/dL      Creatinine 0.85 mg/dL      Sodium 133 mmol/L      Potassium 4.2 mmol/L      Comment: Slight hemolysis detected by analyzer. Results may be affected.        Chloride 102 mmol/L      CO2 20.0 mmol/L      Calcium 8.5 mg/dL      BUN/Creatinine Ratio 11.8     Anion Gap 11.0 mmol/L      eGFR 96.4 mL/min/1.73      Comment: National Kidney Foundation and American Society of Nephrology (ASN) Task Force recommended calculation based on the Chronic Kidney Disease Epidemiology Collaboration (CKD-EPI) equation refit without adjustment for race.       Narrative:      GFR Normal >60  Chronic Kidney Disease <60  Kidney Failure <15      CBC & Differential [[id number]]  (Abnormal) Collected: 01/11/23 0705    Specimen: Blood Updated: 01/11/23 0731    Narrative:      The following orders were created for panel order CBC & Differential.  Procedure                               Abnormality         Status                     ---------                               -----------         ------                     CBC Auto Differential[[id number]]        Abnormal            Final result                 Please view results for these tests on the individual orders.    CBC Auto Differential [[id number]]  (Abnormal) Collected: 01/11/23 0705    Specimen: Blood Updated: 01/11/23 0731     WBC 7.45 10*3/mm3      RBC 4.43 10*6/mm3      Hemoglobin 11.2 g/dL      Hematocrit 35.7 %      MCV 80.6 fL      MCH 25.3 pg      MCHC 31.4 g/dL      RDW 15.8 %      RDW-SD 45.9 fl      MPV 10.6 fL      Platelets 258 10*3/mm3      Neutrophil % 60.4 %      Lymphocyte % 30.9 %      Monocyte % 6.3 %      Eosinophil % 1.7 %      Basophil % 0.4 %      Immature Grans % 0.3 %      Neutrophils, Absolute 4.50  10*3/mm3      Lymphocytes, Absolute 2.30 10*3/mm3      Monocytes, Absolute 0.47 10*3/mm3      Eosinophils, Absolute 0.13 10*3/mm3      Basophils, Absolute 0.03 10*3/mm3      Immature Grans, Absolute 0.02 10*3/mm3      nRBC 0.0 /100 WBC     Catheter Culture - Cath Tip, Chest, Right [[id number]]  (Abnormal) Collected: 01/09/23 2110    Specimen: Cath Tip from Chest, Right Updated: 01/11/23 0731     CATHETER CULTURE Heavy growth (4+) Staphylococcus aureus    Vancomycin, Trough [[id number]]  (Normal) Collected: 01/10/23 1506    Specimen: Blood Updated: 01/10/23 1644     Vancomycin Trough 8.80 mcg/mL     Narrative:      Therapeutic Ranges for Vancomycin    Vancomycin Random   5.0-40.0 mcg/mL  Vancomycin Trough   5.0-20.0 mcg/mL  Vancomycin Peak     20.0-40.0 mcg/mL        Imaging Results (Last 24 Hours)     ** No results found for the last 24 hours. **        ECG 12 Lead           Component  Ref Range & Units 6 mo ago  (6/15/22) 11 mo ago  (1/18/22) 1 yr ago  (9/17/21) 1 yr ago  (9/8/21) 1 yr ago  (5/6/21) 1 yr ago  (5/5/21)   QT Interval  ms 337  334  350  298  279  342    Resulting Agency BH ECG BH ECG BH ECG BH ECG BH ECG BH ECG             HEART RATE= 107  bpm  RR Interval= 564  ms  HI Interval= 127  ms  P Horizontal Axis= 26  deg  P Front Axis= 50  deg  QRSD Interval= 77  ms  QT Interval= 337  ms  QRS Axis= 23  deg  T Wave Axis= 44  deg  - BORDERLINE ECG -  Sinus tachycardia  Borderline T abnormalities, anterior leads  SINCE PREVIOUS TRACING,  HR HAS DECREASED             Current Facility-Administered Medications:   •  albuterol sulfate HFA (PROVENTIL HFA;VENTOLIN HFA;PROAIR HFA) inhaler 2 puff, 2 puff, Inhalation, Q4H PRN, Caleb Naik MD  •  cholecalciferol (VITAMIN D3) tablet 1,000 Units, 1,000 Units, Oral, Daily, Caleb Naik MD, 1,000 Units at 01/11/23 0945  •  Enoxaparin Sodium (LOVENOX) syringe 110 mg, 1 mg/kg, Subcutaneous, Q12H, Caleb Naik MD, 110 mg at 01/11/23 0945  •  escitalopram (LEXAPRO) tablet 10  mg, 10 mg, Oral, Daily, Caleb Naik MD, 10 mg at 01/11/23 0945  •  gabapentin (NEURONTIN) capsule 100 mg, 100 mg, Oral, TID, Caleb Naik MD, 100 mg at 01/11/23 0945  •  levothyroxine (SYNTHROID, LEVOTHROID) tablet 50 mcg, 50 mcg, Oral, Daily, Caleb Naik MD, 50 mcg at 01/11/23 0945  •  metoprolol tartrate (LOPRESSOR) tablet 25 mg, 25 mg, Oral, TID, Caleb Naik MD, 25 mg at 01/11/23 0945  •  midodrine (PROAMATINE) tablet 10 mg, 10 mg, Oral, TID AC, Caleb Naik MD, 10 mg at 01/11/23 1209  •  montelukast (SINGULAIR) tablet 10 mg, 10 mg, Oral, Nightly, Caleb Naik MD, 10 mg at 01/10/23 2009  •  ondansetron (ZOFRAN) injection 4 mg, 4 mg, Intravenous, Q4H PRN, Caleb Naik MD, 4 mg at 01/11/23 0952  •  pantoprazole (PROTONIX) EC tablet 40 mg, 40 mg, Oral, QAM, Caleb Naik MD, 40 mg at 01/11/23 0609  •  Pharmacy to dose vancomycin, , Does not apply, Continuous PRN, Caleb Naik MD  •  prochlorperazine (COMPAZINE) tablet 10 mg, 10 mg, Oral, Q6H PRN, Caleb Naik MD, 10 mg at 01/11/23 1209  •  pyridostigmine (MESTINON) tablet 30 mg, 30 mg, Oral, BID, Caleb Naik MD, 30 mg at 01/11/23 0945  •  [COMPLETED] Insert Peripheral IV, , , Once **AND** sodium chloride 0.9 % flush 10 mL, 10 mL, Intravenous, PRN, Moe Jeffers II, MD, 10 mL at 01/10/23 0854  •  vancomycin (VANCOCIN) 1000 mg/200 mL dextrose 5% IVPB, 1,000 mg, Intravenous, Q8H, Robbie, Jawed, MD, 1,000 mg at 01/11/23 0954     ASSESSMENT  Line sepsis status post Dolan catheter removal  Recurrent pulm embolism despite on full anticoagulation   Chronic orthostatic hypotension  POTS syndrome  Phoebe-Danlos syndrome  Hypothyroidism  Severe gastroparesis  Irritable bowel syndrome  Gastroesophageal reflux disease    PLAN  CPM  Postop care  Continue IV antibiotics   Lovenox  Continue home medications  Stress ulcer DVT prophylaxis  Hematology and infectious disease to follow patient  Supportive care  Discussed with family and nursing staff  Follow  closely further recommendation current hospital course    ANN GONZALEZ MD

## 2023-01-12 ENCOUNTER — APPOINTMENT (OUTPATIENT)
Dept: CARDIOLOGY | Facility: HOSPITAL | Age: 28
DRG: 314 | End: 2023-01-12
Payer: COMMERCIAL

## 2023-01-12 LAB
ANION GAP SERPL CALCULATED.3IONS-SCNC: 10 MMOL/L (ref 5–15)
BASOPHILS # BLD AUTO: 0.04 10*3/MM3 (ref 0–0.2)
BASOPHILS NFR BLD AUTO: 0.6 % (ref 0–1.5)
BH CV UPPER VENOUS LEFT INTERNAL JUGULAR AUGMENT: NORMAL
BH CV UPPER VENOUS LEFT INTERNAL JUGULAR COMPRESS: NORMAL
BH CV UPPER VENOUS LEFT INTERNAL JUGULAR PHASIC: NORMAL
BH CV UPPER VENOUS LEFT INTERNAL JUGULAR SPONT: NORMAL
BH CV UPPER VENOUS LEFT SUBCLAVIAN AUGMENT: NORMAL
BH CV UPPER VENOUS LEFT SUBCLAVIAN COLOR: 1
BH CV UPPER VENOUS LEFT SUBCLAVIAN COMPRESS: NORMAL
BH CV UPPER VENOUS LEFT SUBCLAVIAN PHASIC: NORMAL
BH CV UPPER VENOUS LEFT SUBCLAVIAN SPONT: NORMAL
BH CV UPPER VENOUS RIGHT AXILLARY AUGMENT: NORMAL
BH CV UPPER VENOUS RIGHT AXILLARY COLOR: 1
BH CV UPPER VENOUS RIGHT AXILLARY COMPRESS: NORMAL
BH CV UPPER VENOUS RIGHT AXILLARY PHASIC: NORMAL
BH CV UPPER VENOUS RIGHT AXILLARY SPONT: NORMAL
BH CV UPPER VENOUS RIGHT BASILIC FOREARM COMPRESS: NORMAL
BH CV UPPER VENOUS RIGHT BASILIC UPPER COLOR: 1
BH CV UPPER VENOUS RIGHT BASILIC UPPER COMPRESS: NORMAL
BH CV UPPER VENOUS RIGHT BASILIC UPPER THROMBUS: NORMAL
BH CV UPPER VENOUS RIGHT BRACHIAL COMPRESS: NORMAL
BH CV UPPER VENOUS RIGHT CEPHALIC FOREARM COMPRESS: NORMAL
BH CV UPPER VENOUS RIGHT INTERNAL JUGULAR AUGMENT: NORMAL
BH CV UPPER VENOUS RIGHT INTERNAL JUGULAR COMPRESS: NORMAL
BH CV UPPER VENOUS RIGHT INTERNAL JUGULAR PHASIC: NORMAL
BH CV UPPER VENOUS RIGHT INTERNAL JUGULAR SPONT: NORMAL
BH CV UPPER VENOUS RIGHT MED CUBITAL COMPRESS: NORMAL
BH CV UPPER VENOUS RIGHT RADIAL COMPRESS: NORMAL
BH CV UPPER VENOUS RIGHT SUBCLAVIAN AUGMENT: NORMAL
BH CV UPPER VENOUS RIGHT SUBCLAVIAN COLOR: 1
BH CV UPPER VENOUS RIGHT SUBCLAVIAN COMPRESS: NORMAL
BH CV UPPER VENOUS RIGHT SUBCLAVIAN PHASIC: NORMAL
BH CV UPPER VENOUS RIGHT SUBCLAVIAN SPONT: NORMAL
BH CV UPPER VENOUS RIGHT ULNAR COMPRESS: NORMAL
BUN SERPL-MCNC: 10 MG/DL (ref 6–20)
BUN/CREAT SERPL: 11.1 (ref 7–25)
CALCIUM SPEC-SCNC: 9 MG/DL (ref 8.6–10.5)
CATHETER CULTURE: ABNORMAL
CHLORIDE SERPL-SCNC: 101 MMOL/L (ref 98–107)
CO2 SERPL-SCNC: 25 MMOL/L (ref 22–29)
CREAT SERPL-MCNC: 0.9 MG/DL (ref 0.57–1)
DEPRECATED RDW RBC AUTO: 43.8 FL (ref 37–54)
EGFRCR SERPLBLD CKD-EPI 2021: 90 ML/MIN/1.73
EOSINOPHIL # BLD AUTO: 0.14 10*3/MM3 (ref 0–0.4)
EOSINOPHIL NFR BLD AUTO: 2 % (ref 0.3–6.2)
ERYTHROCYTE [DISTWIDTH] IN BLOOD BY AUTOMATED COUNT: 15.6 % (ref 12.3–15.4)
GLUCOSE SERPL-MCNC: 99 MG/DL (ref 65–99)
HCT VFR BLD AUTO: 38.3 % (ref 34–46.6)
HGB BLD-MCNC: 12.4 G/DL (ref 12–15.9)
IMM GRANULOCYTES # BLD AUTO: 0.03 10*3/MM3 (ref 0–0.05)
IMM GRANULOCYTES NFR BLD AUTO: 0.4 % (ref 0–0.5)
LYMPHOCYTES # BLD AUTO: 1.9 10*3/MM3 (ref 0.7–3.1)
LYMPHOCYTES NFR BLD AUTO: 27.6 % (ref 19.6–45.3)
MAXIMAL PREDICTED HEART RATE: 193 BPM
MCH RBC QN AUTO: 25.1 PG (ref 26.6–33)
MCHC RBC AUTO-ENTMCNC: 32.4 G/DL (ref 31.5–35.7)
MCV RBC AUTO: 77.4 FL (ref 79–97)
MONOCYTES # BLD AUTO: 0.41 10*3/MM3 (ref 0.1–0.9)
MONOCYTES NFR BLD AUTO: 6 % (ref 5–12)
NEUTROPHILS NFR BLD AUTO: 4.37 10*3/MM3 (ref 1.7–7)
NEUTROPHILS NFR BLD AUTO: 63.4 % (ref 42.7–76)
NRBC BLD AUTO-RTO: 0 /100 WBC (ref 0–0.2)
PLATELET # BLD AUTO: 323 10*3/MM3 (ref 140–450)
PMV BLD AUTO: 10.3 FL (ref 6–12)
POTASSIUM SERPL-SCNC: 3.9 MMOL/L (ref 3.5–5.2)
RBC # BLD AUTO: 4.95 10*6/MM3 (ref 3.77–5.28)
SODIUM SERPL-SCNC: 136 MMOL/L (ref 136–145)
STRESS TARGET HR: 164 BPM
WBC NRBC COR # BLD: 6.89 10*3/MM3 (ref 3.4–10.8)

## 2023-01-12 PROCEDURE — 80048 BASIC METABOLIC PNL TOTAL CA: CPT | Performed by: HOSPITALIST

## 2023-01-12 PROCEDURE — 63710000001 PROCHLORPERAZINE MALEATE PER 10 MG: Performed by: HOSPITALIST

## 2023-01-12 PROCEDURE — 85025 COMPLETE CBC W/AUTO DIFF WBC: CPT | Performed by: HOSPITALIST

## 2023-01-12 PROCEDURE — 25010000002 ONDANSETRON PER 1 MG: Performed by: HOSPITALIST

## 2023-01-12 PROCEDURE — 25010000002 CEFAZOLIN IN DEXTROSE 2-4 GM/100ML-% SOLUTION: Performed by: INTERNAL MEDICINE

## 2023-01-12 PROCEDURE — 25010000002 ENOXAPARIN PER 10 MG: Performed by: HOSPITALIST

## 2023-01-12 PROCEDURE — 25010000002 VANCOMYCIN PER 500 MG: Performed by: INTERNAL MEDICINE

## 2023-01-12 PROCEDURE — 93971 EXTREMITY STUDY: CPT

## 2023-01-12 RX ORDER — CEFAZOLIN SODIUM 2 G/100ML
2 INJECTION, SOLUTION INTRAVENOUS EVERY 8 HOURS
Status: DISCONTINUED | OUTPATIENT
Start: 2023-01-12 | End: 2023-01-14

## 2023-01-12 RX ADMIN — Medication 10 ML: at 21:05

## 2023-01-12 RX ADMIN — PYRIDOSTIGMINE BROMIDE 30 MG: 60 TABLET ORAL at 21:04

## 2023-01-12 RX ADMIN — PANTOPRAZOLE SODIUM 40 MG: 40 TABLET, DELAYED RELEASE ORAL at 06:31

## 2023-01-12 RX ADMIN — CEFAZOLIN SODIUM 2 G: 2 INJECTION, SOLUTION INTRAVENOUS at 19:44

## 2023-01-12 RX ADMIN — PROCHLORPERAZINE MALEATE 10 MG: 10 TABLET ORAL at 18:32

## 2023-01-12 RX ADMIN — ONDANSETRON 4 MG: 2 INJECTION INTRAMUSCULAR; INTRAVENOUS at 21:04

## 2023-01-12 RX ADMIN — METOPROLOL TARTRATE 25 MG: 25 TABLET, FILM COATED ORAL at 09:59

## 2023-01-12 RX ADMIN — ONDANSETRON 4 MG: 2 INJECTION INTRAMUSCULAR; INTRAVENOUS at 10:07

## 2023-01-12 RX ADMIN — PROCHLORPERAZINE MALEATE 10 MG: 10 TABLET ORAL at 11:46

## 2023-01-12 RX ADMIN — Medication 10 ML: at 09:59

## 2023-01-12 RX ADMIN — ESCITALOPRAM 10 MG: 10 TABLET, FILM COATED ORAL at 09:59

## 2023-01-12 RX ADMIN — GABAPENTIN 100 MG: 100 CAPSULE ORAL at 21:05

## 2023-01-12 RX ADMIN — CEFAZOLIN SODIUM 2 G: 2 INJECTION, SOLUTION INTRAVENOUS at 11:46

## 2023-01-12 RX ADMIN — MIDODRINE HYDROCHLORIDE 10 MG: 5 TABLET ORAL at 18:32

## 2023-01-12 RX ADMIN — MONTELUKAST SODIUM 10 MG: 10 TABLET, FILM COATED ORAL at 21:05

## 2023-01-12 RX ADMIN — MIDODRINE HYDROCHLORIDE 10 MG: 5 TABLET ORAL at 06:31

## 2023-01-12 RX ADMIN — GABAPENTIN 100 MG: 100 CAPSULE ORAL at 16:22

## 2023-01-12 RX ADMIN — ONDANSETRON 4 MG: 2 INJECTION INTRAMUSCULAR; INTRAVENOUS at 16:22

## 2023-01-12 RX ADMIN — ENOXAPARIN SODIUM 110 MG: 150 INJECTION SUBCUTANEOUS at 09:58

## 2023-01-12 RX ADMIN — PYRIDOSTIGMINE BROMIDE 30 MG: 60 TABLET ORAL at 09:59

## 2023-01-12 RX ADMIN — METOPROLOL TARTRATE 25 MG: 25 TABLET, FILM COATED ORAL at 21:05

## 2023-01-12 RX ADMIN — GABAPENTIN 100 MG: 100 CAPSULE ORAL at 09:59

## 2023-01-12 RX ADMIN — ONDANSETRON 4 MG: 2 INJECTION INTRAMUSCULAR; INTRAVENOUS at 02:07

## 2023-01-12 RX ADMIN — ENOXAPARIN SODIUM 110 MG: 150 INJECTION SUBCUTANEOUS at 21:06

## 2023-01-12 RX ADMIN — VANCOMYCIN HYDROCHLORIDE 1000 MG: 1 INJECTION, SOLUTION INTRAVENOUS at 02:07

## 2023-01-12 RX ADMIN — METOPROLOL TARTRATE 25 MG: 25 TABLET, FILM COATED ORAL at 16:22

## 2023-01-12 RX ADMIN — LEVOTHYROXINE SODIUM 50 MCG: 0.05 TABLET ORAL at 09:59

## 2023-01-12 RX ADMIN — Medication 1000 UNITS: at 09:59

## 2023-01-12 NOTE — CASE MANAGEMENT/SOCIAL WORK
Continued Stay Note  [facility]     Patient Name: Liliam Lorenz  MRN: [id number]  Today's Date: 1/12/2023    Admit Date: 1/9/2023    Plan: Return home with family   Discharge Plan     Row Name 01/12/23 1647       Plan    Plan Return home with family    Plan Comments Patient plan to return home with ABX PO vs IV. LANNY Rosado RN, CCP               Discharge Codes    No documentation.               Expected Discharge Date and Time     Expected Discharge Date Expected Discharge Time    Jan 16, 2023             Pamela Rosado, RN

## 2023-01-12 NOTE — PROGRESS NOTES
"Daily progress note    Primary care physician  Dr. Jason Borrero    Chief complaint  Doing better with no new complaint and family at bedside.    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  Patient evaluated in ER found to have line sepsis and tiny pulm embolism admit for management.     REVIEW OF SYSTEMS  Unremarkable except weakness.     PHYSICAL EXAM  Blood pressure 100/65, pulse 61, temperature 98.2 °F (36.8 °C), temperature source Oral, resp. rate 18, height 162.6 cm (64\"), weight 110 kg (242 lb 8.1 oz), SpO2 96 %, not currently breastfeeding.    GENERAL:  Awake and alert in no acute distress  HEENT:  Unremarkable  NECK:  Supple  CV: regular rhythm, tachycardic S1-S2  RESPIRATORY: normal effort, decreased breath sounds at the bases  ABDOMEN: soft, nontender nontender bowel sounds positive  MUSCULOSKELETAL: no deformity  NEURO: alert, moves all extremities, follows commands  PSYCH:  calm, cooperative  SKIN: Poorly demarcated blanching erythema along the right anterior chest that extends up the neck overlying the subcutaneous course of the Dolan catheter but also laterally towards the axilla.  No crepitus.     LAB RESULTS  Lab Results (last 24 hours)     Procedure Component Value Units Date/Time    Blood Culture - Blood, Arm, Right [[id number]]  (Normal) Collected: 01/09/23 1250    Specimen: Blood from Arm, Right Updated: 01/12/23 1315     Blood Culture No growth at 3 days    Blood Culture - Blood, Arm, Right [[id number]]  (Normal) Collected: 01/09/23 1234    Specimen: Blood from Arm, Right Updated: 01/12/23 1245     Blood " Culture No growth at 3 days    Basic Metabolic Panel [[id number]]  (Normal) Collected: 01/12/23 1124    Specimen: Blood Updated: 01/12/23 1206     Glucose 99 mg/dL      BUN 10 mg/dL      Creatinine 0.90 mg/dL      Sodium 136 mmol/L      Potassium 3.9 mmol/L      Chloride 101 mmol/L      CO2 25.0 mmol/L      Calcium 9.0 mg/dL      BUN/Creatinine Ratio 11.1     Anion Gap 10.0 mmol/L      eGFR 90.0 mL/min/1.73      Comment: National Kidney Foundation and American Society of Nephrology (ASN) Task Force recommended calculation based on the Chronic Kidney Disease Epidemiology Collaboration (CKD-EPI) equation refit without adjustment for race.       Narrative:      GFR Normal >60  Chronic Kidney Disease <60  Kidney Failure <15      CBC & Differential [[id number]]  (Abnormal) Collected: 01/12/23 1124    Specimen: Blood Updated: 01/12/23 1143    Narrative:      The following orders were created for panel order CBC & Differential.  Procedure                               Abnormality         Status                     ---------                               -----------         ------                     CBC Auto Differential[[id number]]        Abnormal            Final result                 Please view results for these tests on the individual orders.    CBC Auto Differential [[id number]]  (Abnormal) Collected: 01/12/23 1124    Specimen: Blood Updated: 01/12/23 1143     WBC 6.89 10*3/mm3      RBC 4.95 10*6/mm3      Hemoglobin 12.4 g/dL      Hematocrit 38.3 %      MCV 77.4 fL      MCH 25.1 pg      MCHC 32.4 g/dL      RDW 15.6 %      RDW-SD 43.8 fl      MPV 10.3 fL      Platelets 323 10*3/mm3      Neutrophil % 63.4 %      Lymphocyte % 27.6 %      Monocyte % 6.0 %      Eosinophil % 2.0 %      Basophil % 0.6 %      Immature Grans % 0.4 %      Neutrophils, Absolute 4.37 10*3/mm3      Lymphocytes, Absolute 1.90 10*3/mm3      Monocytes, Absolute 0.41 10*3/mm3      Eosinophils, Absolute 0.14 10*3/mm3      Basophils, Absolute 0.04  10*3/mm3      Immature Grans, Absolute 0.03 10*3/mm3      nRBC 0.0 /100 WBC     Catheter Culture - Cath Tip, Chest, Right [[id number]]  (Abnormal)  (Susceptibility) Collected: 01/09/23 2110    Specimen: Cath Tip from Chest, Right Updated: 01/12/23 0822     CATHETER CULTURE >15 CFU/mL - Indicative of infection. Staphylococcus aureus    Susceptibility      Staphylococcus aureus      ALESSANDRO      Gentamicin Susceptible      Oxacillin Susceptible      Rifampin Susceptible      Vancomycin Susceptible                       Susceptibility Comments     Staphylococcus aureus    This isolate does not demonstrate inducible clindamycin resistance in vitro.                   Imaging Results (Last 24 Hours)     ** No results found for the last 24 hours. **        ECG 12 Lead           Component  Ref Range & Units 6 mo ago  (6/15/22) 11 mo ago  (1/18/22) 1 yr ago  (9/17/21) 1 yr ago  (9/8/21) 1 yr ago  (5/6/21) 1 yr ago  (5/5/21)   QT Interval  ms 337  334  350  298  279  342    Resulting Agency  [facility]G [facility] ECG  ECG  ECG  ECG  ECG             HEART RATE= 107  bpm  RR Interval= 564  ms  GA Interval= 127  ms  P Horizontal Axis= 26  deg  P Front Axis= 50  deg  QRSD Interval= 77  ms  QT Interval= 337  ms  QRS Axis= 23  deg  T Wave Axis= 44  deg  - BORDERLINE ECG -  Sinus tachycardia  Borderline T abnormalities, anterior leads  SINCE PREVIOUS TRACING,  HR HAS DECREASED             Current Facility-Administered Medications:   •  albuterol sulfate HFA (PROVENTIL HFA;VENTOLIN HFA;PROAIR HFA) inhaler 2 puff, 2 puff, Inhalation, Q4H PRN, Caleb Naik MD  •  ceFAZolin in dextrose (ANCEF) IVPB solution 2 g, 2 g, Intravenous, Q8H, Angélica Avalos MD, 2 g at 01/12/23 1146  •  cholecalciferol (VITAMIN D3) tablet 1,000 Units, 1,000 Units, Oral, Daily, Caleb Naik MD, 1,000 Units at 01/12/23 0959  •  Enoxaparin Sodium (LOVENOX) syringe 110 mg, 1 mg/kg, Subcutaneous, Q12H, Caleb Naik MD, 110 mg at 01/12/23 0958  •  escitalopram (LEXAPRO)  tablet 10 mg, 10 mg, Oral, Daily, Ann Naik MD, 10 mg at 01/12/23 0959  •  gabapentin (NEURONTIN) capsule 100 mg, 100 mg, Oral, TID, Ann Naik MD, 100 mg at 01/12/23 0959  •  levothyroxine (SYNTHROID, LEVOTHROID) tablet 50 mcg, 50 mcg, Oral, Daily, Ann Naik MD, 50 mcg at 01/12/23 0959  •  metoprolol tartrate (LOPRESSOR) tablet 25 mg, 25 mg, Oral, TID, Ann Naik MD, 25 mg at 01/12/23 0959  •  midodrine (PROAMATINE) tablet 10 mg, 10 mg, Oral, TID AC, Ann Naik MD, 10 mg at 01/12/23 0631  •  montelukast (SINGULAIR) tablet 10 mg, 10 mg, Oral, Nightly, Ann Naik MD, 10 mg at 01/11/23 2033  •  ondansetron (ZOFRAN) injection 4 mg, 4 mg, Intravenous, Q4H PRN, Ann Naik MD, 4 mg at 01/12/23 1007  •  pantoprazole (PROTONIX) EC tablet 40 mg, 40 mg, Oral, QAM, Ann Naik MD, 40 mg at 01/12/23 0631  •  prochlorperazine (COMPAZINE) tablet 10 mg, 10 mg, Oral, Q6H PRN, Ann Naik MD, 10 mg at 01/12/23 1146  •  pyridostigmine (MESTINON) tablet 30 mg, 30 mg, Oral, BID, Ann Naik MD, 30 mg at 01/12/23 0959  •  [COMPLETED] Insert Peripheral IV, , , Once **AND** sodium chloride 0.9 % flush 10 mL, 10 mL, Intravenous, PRN, Moe Jeffers II, MD, 10 mL at 01/12/23 0959     ASSESSMENT  Line sepsis status post Dolan catheter removal  Recurrent pulm embolism despite on full anticoagulation   Chronic orthostatic hypotension  POTS syndrome  Phoebe-Danlos syndrome  Hypothyroidism  Severe gastroparesis  Irritable bowel syndrome  Gastroesophageal reflux disease    PLAN  CPM  Postop care  Continue IV antibiotics   Lovenox  Dolan placement per surgery prior to discharge  Continue home medications  Stress ulcer DVT prophylaxis  Hematology and infectious disease to follow patient  Supportive care  Discussed with family and nursing staff  Follow closely further recommendation current hospital course    ANN NAIK MD

## 2023-01-12 NOTE — PROGRESS NOTES
"  Infectious Diseases Progress Note    Angélica Avalos MD     [facility]  [place]: 3 days  Patient Identification:  Name: Liliam Lorenz  Age: 27 y.o.  Sex: female  :  1995  MRN: [id number]         Primary Care Physician: Jason Borrero MD        Subjective: Feeling better with decreased pain and discomfort in her neck and upper chest.  Currently watching TV with her family member at the bedside.  Denies any fever and chills.  Interval History: See consultation note.    Objective:    Scheduled Meds:ceFAZolin, 2 g, Intravenous, Q8H  cholecalciferol, 1,000 Units, Oral, Daily  enoxaparin, 1 mg/kg, Subcutaneous, Q12H  escitalopram, 10 mg, Oral, Daily  gabapentin, 100 mg, Oral, TID  levothyroxine, 50 mcg, Oral, Daily  metoprolol tartrate, 25 mg, Oral, TID  midodrine, 10 mg, Oral, TID AC  montelukast, 10 mg, Oral, Nightly  pantoprazole, 40 mg, Oral, QAM  pyridostigmine, 30 mg, Oral, BID      Continuous Infusions:     Vital signs in last 24 hours:  Temp:  [97.4 °F (36.3 °C)-98.7 °F (37.1 °C)] 98.4 °F (36.9 °C)  Heart Rate:  [61-71] 65  Resp:  [16-18] 16  BP: ()/(58-72) 102/58    Intake/Output:    Intake/Output Summary (Last 24 hours) at 2023 1214  Last data filed at 2023 0608  Gross per 24 hour   Intake 120 ml   Output --   Net 120 ml       Exam:  /58 (BP Location: Left arm, Patient Position: Lying)   Pulse 65   Temp 98.4 °F (36.9 °C) (Oral)   Resp 16   Ht 162.6 cm (64\")   Wt 110 kg (242 lb 8.1 oz)   SpO2 97%   BMI 41.63 kg/m²   Patient is examined using the personal protective equipment as per guidelines from infection control for this particular patient as enacted.  Hand washing was performed before and after patient interaction.  General Appearance:    Alert, cooperative, no distress, AAOx3                          Head:    Normocephalic, without obvious abnormality, atraumatic                           Eyes:    PERRL, conjunctivae/corneas clear, EOM's intact, both eyes    "                      Throat:   Lips, tongue, gums normal; oral mucosa pink and moist                           Neck: No significant tenderness on the right side of the chest noted.                         Lungs:    Clear to auscultation bilaterally, respirations unlabored                 Chest Wall:  Resolution of cellulitis in the right upper chest.                          Heart:    Regular rate and rhythm, S1 and S2 normal, no murmur, no rub                                         or gallop                  Abdomen:   Soft nontender                 Extremities:   Extremities normal, atraumatic, no cyanosis or edema                        Pulses:   Pulses palpable in all extremities                            Skin:   Skin is warm and dry,  no rashes or palpable lesions                  Neurologic: Grossly nonfocal       Data Review:    I reviewed the patient's new clinical results.  Results from last 7 days   Lab Units 01/12/23  1124 01/11/23  0705 01/10/23  0808 01/09/23  1234   WBC 10*3/mm3 6.89 7.45 9.38 12.04*   HEMOGLOBIN g/dL 12.4 11.2* 11.1* 13.3   PLATELETS 10*3/mm3 323 258 327 369     Results from last 7 days   Lab Units 01/12/23  1124 01/11/23  0705 01/10/23  0808 01/09/23  1234   SODIUM mmol/L 136 133* 136 133*   POTASSIUM mmol/L 3.9 4.2 4.2 4.1   CHLORIDE mmol/L 101 102 103 98   CO2 mmol/L 25.0 20.0* 22.3 24.0   BUN mg/dL 10 10 8 7   CREATININE mg/dL 0.90 0.85 0.77 0.85   CALCIUM mg/dL 9.0 8.5* 8.2* 9.5   GLUCOSE mg/dL 99 98 98 106*     Microbiology Results (last 10 days)     Procedure Component Value - Date/Time    Catheter Culture - Cath Tip, Chest, Right [[id number]]  (Abnormal)  (Susceptibility) Collected: 01/09/23 2110    Lab Status: Final result Specimen: Cath Tip from Chest, Right Updated: 01/12/23 0822     CATHETER CULTURE >15 CFU/mL - Indicative of infection. Staphylococcus aureus    Susceptibility      Staphylococcus aureus      ALESSANDRO      Gentamicin Susceptible      Oxacillin Susceptible       Rifampin Susceptible      Vancomycin Susceptible                       Susceptibility Comments     Staphylococcus aureus    This isolate does not demonstrate inducible clindamycin resistance in vitro.               Blood Culture - Blood, Arm, Right [[id number]]  (Normal) Collected: 01/09/23 1250    Lab Status: Preliminary result Specimen: Blood from Arm, Right Updated: 01/11/23 1315     Blood Culture No growth at 2 days    Blood Culture - Blood, Arm, Right [[id number]]  (Normal) Collected: 01/09/23 1234    Lab Status: Preliminary result Specimen: Blood from Arm, Right Updated: 01/11/23 1245     Blood Culture No growth at 2 days              Assessment:    Cellulitis of chest wall  1-probable infected Dolan catheter with tunnel infection and surrounding cellulitis-status post removal of catheter earlier today with likely pathogen to consider would be MRSA, gram-negative rods.  2-pulmonary embolus likely due to central line associated DVT  3-Phoebe-Danlos syndrome  4-chronic orthostatic hypotension with POTS syndrome  5-hypothyroidism  6-morbid obesity  7-history of recurrent line sepsis     Recommendations/Discussions:  Continue present antibiotic regimen and follow-up on the operative culture results.  DC vancomycin and start cefazolin  If there is negative blood culture and no evidence of endovascular involvement such as phlebitis of the neck and great veins then patient would need only couple of weeks of antibiotic therapy for soft tissue infection after the removal of infected Dolan.  Discussed with patient  Discussed with Dr. Villafana and Kelsi of [facility] anytime while on antibiotic therapy as her blood cultures are negative.  Angélica Avalos MD  1/12/2023  12:14 EST    Parts of this note may be an electronic transcription/translation of spoken language to printed text using the Dragon dictation system.

## 2023-01-12 NOTE — PLAN OF CARE
Goal Outcome Evaluation:  Plan of Care Reviewed With: patient, spouse        Progress: no change   VSS. Pt has gotten 2 doses of zofran and 1 of PO compazine for continuous nausea. Pt up ad michael to BR. Spouse at bedside. Drsg to site where addison removed on R chest-slightly red. Pt on vanc IV. No c/o pain. On Lovenox 110 mg BID for PE. Wears CPAP while sleeping.

## 2023-01-12 NOTE — PROGRESS NOTES
Right upper extremity venous duplex completed and preliminary report of acute right arm SVT called to Uma LYNCH RN taking care of patient.

## 2023-01-13 ENCOUNTER — APPOINTMENT (OUTPATIENT)
Dept: GENERAL RADIOLOGY | Facility: HOSPITAL | Age: 28
DRG: 314 | End: 2023-01-13
Payer: COMMERCIAL

## 2023-01-13 ENCOUNTER — ANESTHESIA EVENT (OUTPATIENT)
Dept: PERIOP | Facility: HOSPITAL | Age: 28
DRG: 314 | End: 2023-01-13
Payer: COMMERCIAL

## 2023-01-13 ENCOUNTER — ANESTHESIA (OUTPATIENT)
Dept: PERIOP | Facility: HOSPITAL | Age: 28
DRG: 314 | End: 2023-01-13
Payer: COMMERCIAL

## 2023-01-13 ENCOUNTER — APPOINTMENT (OUTPATIENT)
Dept: INTERVENTIONAL RADIOLOGY/VASCULAR | Facility: HOSPITAL | Age: 28
DRG: 314 | End: 2023-01-13
Payer: COMMERCIAL

## 2023-01-13 LAB
ANION GAP SERPL CALCULATED.3IONS-SCNC: 9.9 MMOL/L (ref 5–15)
B-HCG UR QL: NEGATIVE
BASOPHILS # BLD AUTO: 0.05 10*3/MM3 (ref 0–0.2)
BASOPHILS NFR BLD AUTO: 0.6 % (ref 0–1.5)
BUN SERPL-MCNC: 12 MG/DL (ref 6–20)
BUN/CREAT SERPL: 12.9 (ref 7–25)
CALCIUM SPEC-SCNC: 8.9 MG/DL (ref 8.6–10.5)
CHLORIDE SERPL-SCNC: 103 MMOL/L (ref 98–107)
CO2 SERPL-SCNC: 24.1 MMOL/L (ref 22–29)
CREAT SERPL-MCNC: 0.93 MG/DL (ref 0.57–1)
DEPRECATED RDW RBC AUTO: 44.1 FL (ref 37–54)
EGFRCR SERPLBLD CKD-EPI 2021: 86.6 ML/MIN/1.73
EOSINOPHIL # BLD AUTO: 0.15 10*3/MM3 (ref 0–0.4)
EOSINOPHIL NFR BLD AUTO: 1.8 % (ref 0.3–6.2)
ERYTHROCYTE [DISTWIDTH] IN BLOOD BY AUTOMATED COUNT: 15.7 % (ref 12.3–15.4)
GLUCOSE SERPL-MCNC: 98 MG/DL (ref 65–99)
HCT VFR BLD AUTO: 36.2 % (ref 34–46.6)
HGB BLD-MCNC: 11.4 G/DL (ref 12–15.9)
IMM GRANULOCYTES # BLD AUTO: 0.02 10*3/MM3 (ref 0–0.05)
IMM GRANULOCYTES NFR BLD AUTO: 0.2 % (ref 0–0.5)
LYMPHOCYTES # BLD AUTO: 3.12 10*3/MM3 (ref 0.7–3.1)
LYMPHOCYTES NFR BLD AUTO: 36.4 % (ref 19.6–45.3)
MCH RBC QN AUTO: 25.2 PG (ref 26.6–33)
MCHC RBC AUTO-ENTMCNC: 31.5 G/DL (ref 31.5–35.7)
MCV RBC AUTO: 79.9 FL (ref 79–97)
MONOCYTES # BLD AUTO: 0.49 10*3/MM3 (ref 0.1–0.9)
MONOCYTES NFR BLD AUTO: 5.7 % (ref 5–12)
NEUTROPHILS NFR BLD AUTO: 4.74 10*3/MM3 (ref 1.7–7)
NEUTROPHILS NFR BLD AUTO: 55.3 % (ref 42.7–76)
NRBC BLD AUTO-RTO: 0 /100 WBC (ref 0–0.2)
PLATELET # BLD AUTO: 343 10*3/MM3 (ref 140–450)
PMV BLD AUTO: 10.4 FL (ref 6–12)
POTASSIUM SERPL-SCNC: 3.8 MMOL/L (ref 3.5–5.2)
RBC # BLD AUTO: 4.53 10*6/MM3 (ref 3.77–5.28)
SODIUM SERPL-SCNC: 137 MMOL/L (ref 136–145)
WBC NRBC COR # BLD: 8.57 10*3/MM3 (ref 3.4–10.8)

## 2023-01-13 PROCEDURE — 36558 INSERT TUNNELED CV CATH: CPT | Performed by: SURGERY

## 2023-01-13 PROCEDURE — 0 IOTHALAMATE 60 % SOLUTION: Performed by: SURGERY

## 2023-01-13 PROCEDURE — 63710000001 PROCHLORPERAZINE MALEATE PER 10 MG: Performed by: HOSPITALIST

## 2023-01-13 PROCEDURE — 25010000002 ONDANSETRON PER 1 MG: Performed by: HOSPITALIST

## 2023-01-13 PROCEDURE — 25010000002 ONDANSETRON PER 1 MG: Performed by: NURSE ANESTHETIST, CERTIFIED REGISTERED

## 2023-01-13 PROCEDURE — 25010000002 MIDAZOLAM PER 1 MG: Performed by: NURSE ANESTHETIST, CERTIFIED REGISTERED

## 2023-01-13 PROCEDURE — 02HV33Z INSERTION OF INFUSION DEVICE INTO SUPERIOR VENA CAVA, PERCUTANEOUS APPROACH: ICD-10-PCS | Performed by: SURGERY

## 2023-01-13 PROCEDURE — 25010000002 CEFAZOLIN IN DEXTROSE 2-4 GM/100ML-% SOLUTION: Performed by: SURGERY

## 2023-01-13 PROCEDURE — 25010000002 HEPARIN (PORCINE) PER 1000 UNITS: Performed by: SURGERY

## 2023-01-13 PROCEDURE — 77001 FLUOROGUIDE FOR VEIN DEVICE: CPT | Performed by: SURGERY

## 2023-01-13 PROCEDURE — 63710000001 PROMETHAZINE PER 25 MG: Performed by: NURSE ANESTHETIST, CERTIFIED REGISTERED

## 2023-01-13 PROCEDURE — C1769 GUIDE WIRE: HCPCS | Performed by: SURGERY

## 2023-01-13 PROCEDURE — 80048 BASIC METABOLIC PNL TOTAL CA: CPT | Performed by: HOSPITALIST

## 2023-01-13 PROCEDURE — 76000 FLUOROSCOPY <1 HR PHYS/QHP: CPT

## 2023-01-13 PROCEDURE — 25010000002 ENOXAPARIN PER 10 MG: Performed by: SURGERY

## 2023-01-13 PROCEDURE — 25010000002 ONDANSETRON PER 1 MG: Performed by: SURGERY

## 2023-01-13 PROCEDURE — 0JH63XZ INSERTION OF TUNNELED VASCULAR ACCESS DEVICE INTO CHEST SUBCUTANEOUS TISSUE AND FASCIA, PERCUTANEOUS APPROACH: ICD-10-PCS | Performed by: SURGERY

## 2023-01-13 PROCEDURE — 25010000002 FENTANYL CITRATE (PF) 50 MCG/ML SOLUTION: Performed by: NURSE ANESTHETIST, CERTIFIED REGISTERED

## 2023-01-13 PROCEDURE — C1751 CATH, INF, PER/CENT/MIDLINE: HCPCS | Performed by: SURGERY

## 2023-01-13 PROCEDURE — 81025 URINE PREGNANCY TEST: CPT | Performed by: HOSPITALIST

## 2023-01-13 PROCEDURE — 85025 COMPLETE CBC W/AUTO DIFF WBC: CPT | Performed by: HOSPITALIST

## 2023-01-13 PROCEDURE — 25010000002 PROPOFOL 500 MG/50ML EMULSION: Performed by: NURSE ANESTHETIST, CERTIFIED REGISTERED

## 2023-01-13 PROCEDURE — 25010000002 CEFAZOLIN IN DEXTROSE 2-4 GM/100ML-% SOLUTION: Performed by: INTERNAL MEDICINE

## 2023-01-13 DEVICE — HICKMAN 12 FR DUAL-LUMEN CV CATHETER, PEEL-APART INTRODUCER KIT WITH SURECUFF TISSUE INGROWTH CUFF
Type: IMPLANTABLE DEVICE | Site: INTERNAL JUGULAR | Status: FUNCTIONAL
Brand: HICKMAN

## 2023-01-13 RX ORDER — EPHEDRINE SULFATE 50 MG/ML
5 INJECTION, SOLUTION INTRAVENOUS ONCE AS NEEDED
Status: DISCONTINUED | OUTPATIENT
Start: 2023-01-13 | End: 2023-01-13

## 2023-01-13 RX ORDER — HEPARIN SODIUM (PORCINE) LOCK FLUSH IV SOLN 100 UNIT/ML 100 UNIT/ML
3 SOLUTION INTRAVENOUS DAILY PRN
Status: DISCONTINUED | OUTPATIENT
Start: 2023-01-13 | End: 2023-01-15 | Stop reason: HOSPADM

## 2023-01-13 RX ORDER — SODIUM CHLORIDE 0.9 % (FLUSH) 0.9 %
10 SYRINGE (ML) INJECTION AS NEEDED
Status: DISCONTINUED | OUTPATIENT
Start: 2023-01-13 | End: 2023-01-15 | Stop reason: HOSPADM

## 2023-01-13 RX ORDER — SODIUM CHLORIDE 0.9 % (FLUSH) 0.9 %
3-10 SYRINGE (ML) INJECTION AS NEEDED
Status: DISCONTINUED | OUTPATIENT
Start: 2023-01-13 | End: 2023-01-13 | Stop reason: HOSPADM

## 2023-01-13 RX ORDER — FENTANYL CITRATE 50 UG/ML
50 INJECTION, SOLUTION INTRAMUSCULAR; INTRAVENOUS
Status: DISCONTINUED | OUTPATIENT
Start: 2023-01-13 | End: 2023-01-13

## 2023-01-13 RX ORDER — SODIUM CHLORIDE 0.9 % (FLUSH) 0.9 %
10 SYRINGE (ML) INJECTION EVERY 12 HOURS SCHEDULED
Status: DISCONTINUED | OUTPATIENT
Start: 2023-01-13 | End: 2023-01-14

## 2023-01-13 RX ORDER — FENTANYL CITRATE 50 UG/ML
INJECTION, SOLUTION INTRAMUSCULAR; INTRAVENOUS AS NEEDED
Status: DISCONTINUED | OUTPATIENT
Start: 2023-01-13 | End: 2023-01-13 | Stop reason: SURG

## 2023-01-13 RX ORDER — DIPHENHYDRAMINE HYDROCHLORIDE 50 MG/ML
12.5 INJECTION INTRAMUSCULAR; INTRAVENOUS
Status: DISCONTINUED | OUTPATIENT
Start: 2023-01-13 | End: 2023-01-13

## 2023-01-13 RX ORDER — HYDROMORPHONE HYDROCHLORIDE 1 MG/ML
0.5 INJECTION, SOLUTION INTRAMUSCULAR; INTRAVENOUS; SUBCUTANEOUS
Status: DISCONTINUED | OUTPATIENT
Start: 2023-01-13 | End: 2023-01-13

## 2023-01-13 RX ORDER — HYDROCODONE BITARTRATE AND ACETAMINOPHEN 7.5; 325 MG/1; MG/1
1 TABLET ORAL ONCE AS NEEDED
Status: DISCONTINUED | OUTPATIENT
Start: 2023-01-13 | End: 2023-01-13

## 2023-01-13 RX ORDER — SODIUM CHLORIDE, SODIUM LACTATE, POTASSIUM CHLORIDE, CALCIUM CHLORIDE 600; 310; 30; 20 MG/100ML; MG/100ML; MG/100ML; MG/100ML
9 INJECTION, SOLUTION INTRAVENOUS CONTINUOUS
Status: DISCONTINUED | OUTPATIENT
Start: 2023-01-13 | End: 2023-01-13

## 2023-01-13 RX ORDER — SODIUM CHLORIDE 0.9 % (FLUSH) 0.9 %
20 SYRINGE (ML) INJECTION AS NEEDED
Status: DISCONTINUED | OUTPATIENT
Start: 2023-01-13 | End: 2023-01-15 | Stop reason: HOSPADM

## 2023-01-13 RX ORDER — ONDANSETRON 2 MG/ML
INJECTION INTRAMUSCULAR; INTRAVENOUS AS NEEDED
Status: DISCONTINUED | OUTPATIENT
Start: 2023-01-13 | End: 2023-01-13 | Stop reason: SURG

## 2023-01-13 RX ORDER — MIDAZOLAM HYDROCHLORIDE 1 MG/ML
1 INJECTION INTRAMUSCULAR; INTRAVENOUS
Status: DISCONTINUED | OUTPATIENT
Start: 2023-01-13 | End: 2023-01-13 | Stop reason: HOSPADM

## 2023-01-13 RX ORDER — LIDOCAINE HYDROCHLORIDE 10 MG/ML
0.5 INJECTION, SOLUTION EPIDURAL; INFILTRATION; INTRACAUDAL; PERINEURAL ONCE AS NEEDED
Status: DISCONTINUED | OUTPATIENT
Start: 2023-01-13 | End: 2023-01-13 | Stop reason: HOSPADM

## 2023-01-13 RX ORDER — FLUMAZENIL 0.1 MG/ML
0.2 INJECTION INTRAVENOUS AS NEEDED
Status: DISCONTINUED | OUTPATIENT
Start: 2023-01-13 | End: 2023-01-13

## 2023-01-13 RX ORDER — SODIUM CHLORIDE 0.9 % (FLUSH) 0.9 %
3 SYRINGE (ML) INJECTION EVERY 12 HOURS SCHEDULED
Status: DISCONTINUED | OUTPATIENT
Start: 2023-01-13 | End: 2023-01-13 | Stop reason: HOSPADM

## 2023-01-13 RX ORDER — PROPOFOL 10 MG/ML
INJECTION, EMULSION INTRAVENOUS CONTINUOUS PRN
Status: DISCONTINUED | OUTPATIENT
Start: 2023-01-13 | End: 2023-01-13 | Stop reason: SURG

## 2023-01-13 RX ORDER — NALOXONE HCL 0.4 MG/ML
0.2 VIAL (ML) INJECTION AS NEEDED
Status: DISCONTINUED | OUTPATIENT
Start: 2023-01-13 | End: 2023-01-13

## 2023-01-13 RX ORDER — PROMETHAZINE HYDROCHLORIDE 25 MG/1
25 SUPPOSITORY RECTAL ONCE AS NEEDED
Status: COMPLETED | OUTPATIENT
Start: 2023-01-13 | End: 2023-01-13

## 2023-01-13 RX ORDER — OXYCODONE AND ACETAMINOPHEN 7.5; 325 MG/1; MG/1
1 TABLET ORAL EVERY 4 HOURS PRN
Status: DISCONTINUED | OUTPATIENT
Start: 2023-01-13 | End: 2023-01-13

## 2023-01-13 RX ORDER — MIDAZOLAM HYDROCHLORIDE 1 MG/ML
INJECTION INTRAMUSCULAR; INTRAVENOUS AS NEEDED
Status: DISCONTINUED | OUTPATIENT
Start: 2023-01-13 | End: 2023-01-13 | Stop reason: SURG

## 2023-01-13 RX ORDER — LABETALOL HYDROCHLORIDE 5 MG/ML
5 INJECTION, SOLUTION INTRAVENOUS
Status: DISCONTINUED | OUTPATIENT
Start: 2023-01-13 | End: 2023-01-13

## 2023-01-13 RX ORDER — ONDANSETRON 2 MG/ML
4 INJECTION INTRAMUSCULAR; INTRAVENOUS ONCE AS NEEDED
Status: COMPLETED | OUTPATIENT
Start: 2023-01-13 | End: 2023-01-13

## 2023-01-13 RX ORDER — PROMETHAZINE HYDROCHLORIDE 25 MG/1
25 TABLET ORAL ONCE AS NEEDED
Status: COMPLETED | OUTPATIENT
Start: 2023-01-13 | End: 2023-01-13

## 2023-01-13 RX ORDER — MAGNESIUM HYDROXIDE 1200 MG/15ML
LIQUID ORAL AS NEEDED
Status: DISCONTINUED | OUTPATIENT
Start: 2023-01-13 | End: 2023-01-13 | Stop reason: HOSPADM

## 2023-01-13 RX ORDER — DIPHENHYDRAMINE HCL 25 MG
25 CAPSULE ORAL
Status: DISCONTINUED | OUTPATIENT
Start: 2023-01-13 | End: 2023-01-13

## 2023-01-13 RX ORDER — HYDRALAZINE HYDROCHLORIDE 20 MG/ML
5 INJECTION INTRAMUSCULAR; INTRAVENOUS
Status: DISCONTINUED | OUTPATIENT
Start: 2023-01-13 | End: 2023-01-13

## 2023-01-13 RX ADMIN — Medication 10 ML: at 22:47

## 2023-01-13 RX ADMIN — PROCHLORPERAZINE MALEATE 10 MG: 10 TABLET ORAL at 11:36

## 2023-01-13 RX ADMIN — FENTANYL CITRATE 50 MCG: 50 INJECTION, SOLUTION INTRAMUSCULAR; INTRAVENOUS at 15:38

## 2023-01-13 RX ADMIN — FENTANYL CITRATE 50 MCG: 50 INJECTION, SOLUTION INTRAMUSCULAR; INTRAVENOUS at 15:09

## 2023-01-13 RX ADMIN — Medication 1000 UNITS: at 09:42

## 2023-01-13 RX ADMIN — ENOXAPARIN SODIUM 110 MG: 150 INJECTION SUBCUTANEOUS at 21:00

## 2023-01-13 RX ADMIN — ONDANSETRON 4 MG: 2 INJECTION INTRAMUSCULAR; INTRAVENOUS at 17:25

## 2023-01-13 RX ADMIN — MIDODRINE HYDROCHLORIDE 10 MG: 5 TABLET ORAL at 09:42

## 2023-01-13 RX ADMIN — CEFAZOLIN SODIUM 2 G: 2 INJECTION, SOLUTION INTRAVENOUS at 03:45

## 2023-01-13 RX ADMIN — MIDODRINE HYDROCHLORIDE 10 MG: 5 TABLET ORAL at 11:36

## 2023-01-13 RX ADMIN — LEVOTHYROXINE SODIUM 50 MCG: 0.05 TABLET ORAL at 09:42

## 2023-01-13 RX ADMIN — MIDAZOLAM 2 MG: 1 INJECTION INTRAMUSCULAR; INTRAVENOUS at 15:23

## 2023-01-13 RX ADMIN — CEFAZOLIN SODIUM 2 G: 2 INJECTION, SOLUTION INTRAVENOUS at 11:36

## 2023-01-13 RX ADMIN — METOPROLOL TARTRATE 25 MG: 25 TABLET, FILM COATED ORAL at 09:42

## 2023-01-13 RX ADMIN — SODIUM CHLORIDE, POTASSIUM CHLORIDE, SODIUM LACTATE AND CALCIUM CHLORIDE: 600; 310; 30; 20 INJECTION, SOLUTION INTRAVENOUS at 16:44

## 2023-01-13 RX ADMIN — CEFAZOLIN SODIUM 2 G: 2 INJECTION, SOLUTION INTRAVENOUS at 18:49

## 2023-01-13 RX ADMIN — PYRIDOSTIGMINE BROMIDE 30 MG: 60 TABLET ORAL at 09:42

## 2023-01-13 RX ADMIN — Medication 10 ML: at 22:48

## 2023-01-13 RX ADMIN — ESCITALOPRAM 10 MG: 10 TABLET, FILM COATED ORAL at 09:42

## 2023-01-13 RX ADMIN — SODIUM CHLORIDE, POTASSIUM CHLORIDE, SODIUM LACTATE AND CALCIUM CHLORIDE 9 ML/HR: 600; 310; 30; 20 INJECTION, SOLUTION INTRAVENOUS at 13:48

## 2023-01-13 RX ADMIN — PROMETHAZINE HYDROCHLORIDE 25 MG: 25 TABLET ORAL at 17:15

## 2023-01-13 RX ADMIN — GABAPENTIN 100 MG: 100 CAPSULE ORAL at 09:42

## 2023-01-13 RX ADMIN — ONDANSETRON 4 MG: 2 INJECTION INTRAMUSCULAR; INTRAVENOUS at 09:42

## 2023-01-13 RX ADMIN — ONDANSETRON 4 MG: 2 INJECTION INTRAMUSCULAR; INTRAVENOUS at 18:48

## 2023-01-13 RX ADMIN — PROPOFOL 100 MCG/KG/MIN: 10 INJECTION, EMULSION INTRAVENOUS at 15:10

## 2023-01-13 RX ADMIN — Medication 10 ML: at 09:43

## 2023-01-13 RX ADMIN — ONDANSETRON 4 MG: 2 INJECTION INTRAMUSCULAR; INTRAVENOUS at 16:37

## 2023-01-13 NOTE — PROGRESS NOTES
"  Infectious Diseases Progress Note    Angélica Avalos MD     [facility]  [place]: 4 days  Patient Identification:  Name: Liliam Lorenz  Age: 27 y.o.  Sex: female  :  1995  MRN: [id number]         Primary Care Physician: Jason Borrero MD        Subjective: Complaining of nausea and vomiting after surgery earlier today for Dolan catheter placement.  Interval History: See consultation note.  2023 Dolan catheter is placed.  Objective:    Scheduled Meds:ceFAZolin, 2 g, Intravenous, Q8H  [MAR Hold] cholecalciferol, 1,000 Units, Oral, Daily  [MAR Hold] enoxaparin, 1 mg/kg, Subcutaneous, Q12H  [MAR Hold] escitalopram, 10 mg, Oral, Daily  gabapentin, 100 mg, Oral, TID  [MAR Hold] levothyroxine, 50 mcg, Oral, Daily  metoprolol tartrate, 25 mg, Oral, TID  [MAR Hold] midodrine, 10 mg, Oral, TID AC  [MAR Hold] montelukast, 10 mg, Oral, Nightly  [MAR Hold] pantoprazole, 40 mg, Oral, QAM  [MAR Hold] pyridostigmine, 30 mg, Oral, BID  sodium chloride, 3 mL, Intravenous, Q12H      Continuous Infusions:lactated ringers, 9 mL/hr, Last Rate: 9 mL/hr (23 1502)        Vital signs in last 24 hours:  Temp:  [97 °F (36.1 °C)-98.6 °F (37 °C)] 98.6 °F (37 °C)  Heart Rate:  [58-72] 63  Resp:  [16-18] 16  BP: (105-120)/(52-88) 105/69    Intake/Output:  No intake or output data in the 24 hours ending 23 1642    Exam:  /69 (BP Location: Left arm, Patient Position: Lying)   Pulse 63   Temp 98.6 °F (37 °C) (Oral)   Resp 16   Ht 163.8 cm (64.5\")   Wt 110 kg (242 lb)   LMP 2023 (Exact Date) Comment: currently menstruating  SpO2 97%   BMI 40.90 kg/m²   Patient is examined using the personal protective equipment as per guidelines from infection control for this particular patient as enacted.  Hand washing was performed before and after patient interaction.  General Appearance:    Alert, cooperative, no distress, AAOx3                          Head:    Normocephalic, without obvious " abnormality, atraumatic                           Eyes:    PERRL, conjunctivae/corneas clear, EOM's intact, both eyes                         Throat:   Lips, tongue, gums normal; oral mucosa pink and moist                           Neck: No significant tenderness on the right side of the chest noted.                         Lungs:    Clear to auscultation bilaterally, respirations unlabored                 Chest Wall:  Resolution of cellulitis in the right upper chest.  Left IJ Lee catheter in place                          Heart:    Regular rate and rhythm, S1 and S2 normal, no murmur, no rub                                         or gallop                  Abdomen:   Soft nontender                 Extremities:   Extremities normal, atraumatic, no cyanosis or edema                        Pulses:   Pulses palpable in all extremities                            Skin:   Skin is warm and dry,  no rashes or palpable lesions                  Neurologic: Grossly nonfocal       Data Review:    I reviewed the patient's new clinical results.  Results from last 7 days   Lab Units 01/13/23  0649 01/12/23  1124 01/11/23  0705 01/10/23  0808 01/09/23  1234   WBC 10*3/mm3 8.57 6.89 7.45 9.38 12.04*   HEMOGLOBIN g/dL 11.4* 12.4 11.2* 11.1* 13.3   PLATELETS 10*3/mm3 343 323 258 327 369     Results from last 7 days   Lab Units 01/13/23  0649 01/12/23  1124 01/11/23  0705 01/10/23  0808 01/09/23  1234   SODIUM mmol/L 137 136 133* 136 133*   POTASSIUM mmol/L 3.8 3.9 4.2 4.2 4.1   CHLORIDE mmol/L 103 101 102 103 98   CO2 mmol/L 24.1 25.0 20.0* 22.3 24.0   BUN mg/dL 12 10 10 8 7   CREATININE mg/dL 0.93 0.90 0.85 0.77 0.85   CALCIUM mg/dL 8.9 9.0 8.5* 8.2* 9.5   GLUCOSE mg/dL 98 99 98 98 106*     Microbiology Results (last 10 days)     Procedure Component Value - Date/Time    Catheter Culture - Cath Tip, Chest, Right [[id number]]  (Abnormal)  (Susceptibility) Collected: 01/09/23 2110    Lab Status: Final result Specimen: Cath Tip from  Chest, Right Updated: 01/12/23 0822     CATHETER CULTURE >15 CFU/mL - Indicative of infection. Staphylococcus aureus    Susceptibility      Staphylococcus aureus      ALESSANDRO      Gentamicin Susceptible      Oxacillin Susceptible      Rifampin Susceptible      Vancomycin Susceptible                       Susceptibility Comments     Staphylococcus aureus    This isolate does not demonstrate inducible clindamycin resistance in vitro.               Blood Culture - Blood, Arm, Right [[id number]]  (Normal) Collected: 01/09/23 1250    Lab Status: Preliminary result Specimen: Blood from Arm, Right Updated: 01/13/23 1315     Blood Culture No growth at 4 days    Blood Culture - Blood, Arm, Right [[id number]]  (Normal) Collected: 01/09/23 1234    Lab Status: Preliminary result Specimen: Blood from Arm, Right Updated: 01/13/23 1245     Blood Culture No growth at 4 days              Assessment:    Cellulitis of chest wall    Poor venous access  1-probable infected Dolan catheter with tunnel infection and surrounding cellulitis-status post removal of catheter earlier today with likely pathogen to consider would be MRSA, gram-negative rods.  2-pulmonary embolus likely due to central line associated DVT  3-Phoebe-Danlos syndrome  4-chronic orthostatic hypotension with POTS syndrome  5-hypothyroidism  6-morbid obesity  7-history of recurrent line sepsis     Recommendations/Discussions:  Continue IV cefazolin for 4 weeks from last negative blood culture or intervention which ever is the latest.  Following orders are written for case management:  Please arrange for 4 weeks of IV cefazolin at 2 g every 8 hours starting 1/10/2023.  Check weekly CBC CMP and CRP and call abnormal results to Dr. Avalos at [phone].  Please educate patient to call Dr. Avalos on weekly basis at [phone] to go over review of systems to monitor antibiotic toxicity and effectiveness of treatment.  Follow-up and discharge instructions are completed from ID  standpoint for above instructions in patient's AVS.  Patient can be discharged from infectious disease standpoint anytime.  Angélica Avalos MD  1/13/2023  16:42 EST    Parts of this note may be an electronic transcription/translation of spoken language to printed text using the Dragon dictation system.

## 2023-01-13 NOTE — OP NOTE
Operative Note :   Cristhian Anne MD    Liliam Lorenz  1995    Procedure Date: 01/13/23    Pre-op Diagnosis:  Poor venous access [I87.8]    Post-Op Diagnosis:  Same    Procedure:   · Left internal jugular vein approach placement of tunneled Dolan catheter  ·     Surgeon: Cristhian Anne MD    Assistant: Dr. Abhilash Palma    Anesthesia:  MAC (monitored anesthetic care)    EBL:   minimal    Specimens:   None    Indications:  · 27-year-old lady with history of gastroparesis and peritoneum for long-term venous access.  She was admitted earlier this admission and had her infected line removed and now presents for placement of a    Findings:   · Incomplete central venous stenosis    Recommendations:   · Routine catheter care    Description of procedure:    After obtaining informed consent, the patient was brought to the operating room and sedated.  Her chest and neck were bilaterally and sterilely prepped and draped.  I used the ultrasound probe to identify the left internal jugular vein.  This area was anesthetized and I then used a seeker needle first and then the 18-gauge needle to access the vein with some difficulty.  Wire was passed into the left internal jugular vein but I was not able to have it crossed the midline into the vena cava.  We made multiple attempts but it would not go.  I asked for vascular surgery consultation and Dr. Palma was kind enough to come and exchanged her catheter for slick wire.  He shot a venogram which demonstrated some central venous stenosis but this was incomplete.  He will dictate his portion separately.  He was eventually able to manipulate the wire across the midline.  I then was able to pass the vein dilator and sheath over the wire and under fluoroscopic guidance direct this into the superior vena cava.  The catheter was tunneled up to the stick site.  The Dolan catheter was cut to the appropriate length and then fed through the sheath into the appropriate position.   The peel-away sheath was removed.  I was able to easily flush the catheter and then I locked it with the concentrated heparin solution.  The catheter was secured at the skin level with 3-0 nylon.  The stick site in the neck was closed with 3-0 Vicryl and then skin glue.    Cristhian Anne MD  General and Endoscopic Surgery  [facility] ([facility]    [address], [address]  [place], [place], [address]  P: [phone]  F: [phone]

## 2023-01-13 NOTE — PROGRESS NOTES
"Daily progress note    Primary care physician  Dr. Jason Borrero    Chief complaint  Doing same with no new complaint and family at bedside.  Patient and family wants Dolan placement prior to discharge.    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  Patient evaluated in ER found to have line sepsis and tiny pulm embolism admit for management.     REVIEW OF SYSTEMS  Unremarkable except weakness.     PHYSICAL EXAM  Blood pressure 112/57, pulse 58, temperature 97.8 °F (36.6 °C), temperature source Oral, resp. rate 16, height 162.6 cm (64\"), weight 110 kg (242 lb 8.1 oz), SpO2 96 %, not currently breastfeeding.    GENERAL:  Awake and alert in no acute distress  HEENT:  Unremarkable  NECK:  Supple  CV: regular rhythm, tachycardic S1-S2  RESPIRATORY: normal effort, decreased breath sounds at the bases  ABDOMEN: soft, nontender nontender bowel sounds positive  MUSCULOSKELETAL: no deformity  NEURO: alert, moves all extremities, follows commands  PSYCH:  calm, cooperative  SKIN: Poorly demarcated blanching erythema along the right anterior chest that extends up the neck overlying the subcutaneous course of the Dolan catheter but also laterally towards the axilla.  No crepitus.     LAB RESULTS  Lab Results (last 24 hours)     Procedure Component Value Units Date/Time    Basic Metabolic Panel [[id number]]  (Normal) Collected: 01/13/23 0649    Specimen: Blood Updated: 01/13/23 0741     Glucose 98 mg/dL      BUN 12 mg/dL      Creatinine 0.93 mg/dL      Sodium 137 mmol/L      Potassium 3.8 mmol/L      Chloride 103 mmol/L      CO2 24.1 mmol/L "      Calcium 8.9 mg/dL      BUN/Creatinine Ratio 12.9     Anion Gap 9.9 mmol/L      eGFR 86.6 mL/min/1.73      Comment: National Kidney Foundation and American Society of Nephrology (ASN) Task Force recommended calculation based on the Chronic Kidney Disease Epidemiology Collaboration (CKD-EPI) equation refit without adjustment for race.       Narrative:      GFR Normal >60  Chronic Kidney Disease <60  Kidney Failure <15      CBC & Differential [[id number]]  (Abnormal) Collected: 01/13/23 0649    Specimen: Blood Updated: 01/13/23 0724    Narrative:      The following orders were created for panel order CBC & Differential.  Procedure                               Abnormality         Status                     ---------                               -----------         ------                     CBC Auto Differential[[id number]]        Abnormal            Final result                 Please view results for these tests on the individual orders.    CBC Auto Differential [[id number]]  (Abnormal) Collected: 01/13/23 0649    Specimen: Blood Updated: 01/13/23 0724     WBC 8.57 10*3/mm3      RBC 4.53 10*6/mm3      Hemoglobin 11.4 g/dL      Hematocrit 36.2 %      MCV 79.9 fL      MCH 25.2 pg      MCHC 31.5 g/dL      RDW 15.7 %      RDW-SD 44.1 fl      MPV 10.4 fL      Platelets 343 10*3/mm3      Neutrophil % 55.3 %      Lymphocyte % 36.4 %      Monocyte % 5.7 %      Eosinophil % 1.8 %      Basophil % 0.6 %      Immature Grans % 0.2 %      Neutrophils, Absolute 4.74 10*3/mm3      Lymphocytes, Absolute 3.12 10*3/mm3      Monocytes, Absolute 0.49 10*3/mm3      Eosinophils, Absolute 0.15 10*3/mm3      Basophils, Absolute 0.05 10*3/mm3      Immature Grans, Absolute 0.02 10*3/mm3      nRBC 0.0 /100 WBC     Blood Culture - Blood, Arm, Right [[id number]]  (Normal) Collected: 01/09/23 1250    Specimen: Blood from Arm, Right Updated: 01/12/23 1315     Blood Culture No growth at 3 days    Blood Culture - Blood, Arm, Right [[id number]]   (Normal) Collected: 01/09/23 1234    Specimen: Blood from Arm, Right Updated: 01/12/23 1245     Blood Culture No growth at 3 days    Basic Metabolic Panel [[id number]]  (Normal) Collected: 01/12/23 1124    Specimen: Blood Updated: 01/12/23 1206     Glucose 99 mg/dL      BUN 10 mg/dL      Creatinine 0.90 mg/dL      Sodium 136 mmol/L      Potassium 3.9 mmol/L      Chloride 101 mmol/L      CO2 25.0 mmol/L      Calcium 9.0 mg/dL      BUN/Creatinine Ratio 11.1     Anion Gap 10.0 mmol/L      eGFR 90.0 mL/min/1.73      Comment: National Kidney Foundation and American Society of Nephrology (ASN) Task Force recommended calculation based on the Chronic Kidney Disease Epidemiology Collaboration (CKD-EPI) equation refit without adjustment for race.       Narrative:      GFR Normal >60  Chronic Kidney Disease <60  Kidney Failure <15          Imaging Results (Last 24 Hours)     ** No results found for the last 24 hours. **        ECG 12 Lead           Component  Ref Range & Units 6 mo ago  (6/15/22) 11 mo ago  (1/18/22) 1 yr ago  (9/17/21) 1 yr ago  (9/8/21) 1 yr ago  (5/6/21) 1 yr ago  (5/5/21)   QT Interval  ms 337  334  350  298  279  342    Resulting Agency BH ECG BH ECG BH ECG BH ECG BH ECG BH ECG             HEART RATE= 107  bpm  RR Interval= 564  ms  GA Interval= 127  ms  P Horizontal Axis= 26  deg  P Front Axis= 50  deg  QRSD Interval= 77  ms  QT Interval= 337  ms  QRS Axis= 23  deg  T Wave Axis= 44  deg  - BORDERLINE ECG -  Sinus tachycardia  Borderline T abnormalities, anterior leads  SINCE PREVIOUS TRACING,  HR HAS DECREASED             Current Facility-Administered Medications:   •  albuterol sulfate HFA (PROVENTIL HFA;VENTOLIN HFA;PROAIR HFA) inhaler 2 puff, 2 puff, Inhalation, Q4H PRN, Caleb Naik MD  •  ceFAZolin in dextrose (ANCEF) IVPB solution 2 g, 2 g, Intravenous, Q8H, Angélica Avalos MD, 2 g at 01/13/23 1136  •  cholecalciferol (VITAMIN D3) tablet 1,000 Units, 1,000 Units, Oral, Daily, Caleb Naik MD, 1,000  Units at 01/13/23 0942  •  Enoxaparin Sodium (LOVENOX) syringe 110 mg, 1 mg/kg, Subcutaneous, Q12H, Caleb Naik MD, 110 mg at 01/12/23 2106  •  escitalopram (LEXAPRO) tablet 10 mg, 10 mg, Oral, Daily, Caleb Naik MD, 10 mg at 01/13/23 0942  •  gabapentin (NEURONTIN) capsule 100 mg, 100 mg, Oral, TID, Caleb Naik MD, 100 mg at 01/13/23 0942  •  levothyroxine (SYNTHROID, LEVOTHROID) tablet 50 mcg, 50 mcg, Oral, Daily, Caleb Naik MD, 50 mcg at 01/13/23 0942  •  metoprolol tartrate (LOPRESSOR) tablet 25 mg, 25 mg, Oral, TID, Caleb Naik MD, 25 mg at 01/13/23 0942  •  midodrine (PROAMATINE) tablet 10 mg, 10 mg, Oral, TID AC, Caleb Naik MD, 10 mg at 01/13/23 1136  •  montelukast (SINGULAIR) tablet 10 mg, 10 mg, Oral, Nightly, Caleb Naik MD, 10 mg at 01/12/23 2105  •  ondansetron (ZOFRAN) injection 4 mg, 4 mg, Intravenous, Q4H PRN, Caleb Naik MD, 4 mg at 01/13/23 0942  •  pantoprazole (PROTONIX) EC tablet 40 mg, 40 mg, Oral, QAM, Caleb Naik MD, 40 mg at 01/12/23 0631  •  prochlorperazine (COMPAZINE) tablet 10 mg, 10 mg, Oral, Q6H PRN, Caleb Naik MD, 10 mg at 01/13/23 1136  •  pyridostigmine (MESTINON) tablet 30 mg, 30 mg, Oral, BID, Caleb Naik MD, 30 mg at 01/13/23 0942  •  [COMPLETED] Insert Peripheral IV, , , Once **AND** sodium chloride 0.9 % flush 10 mL, 10 mL, Intravenous, PRN, Moe Jeffers II, MD, 10 mL at 01/13/23 0943     ASSESSMENT  Line sepsis status post Dolan catheter removal  Recurrent pulm embolism despite on full anticoagulation   Chronic orthostatic hypotension  POTS syndrome  Phoebe-Danlos syndrome  Hypothyroidism  Severe gastroparesis  Irritable bowel syndrome  Gastroesophageal reflux disease    PLAN  CPM  Postop care  Continue IV antibiotics   Lovenox  Dolan placement today  Continue home medications  Stress ulcer DVT prophylaxis  Hematology and infectious disease to follow patient  Supportive care  Discussed with family and nursing staff  Follow closely further  recommendation current hospital course    ANN GONZALEZ MD

## 2023-01-13 NOTE — PLAN OF CARE
Goal Outcome Evaluation:  Plan of Care Reviewed With: patient        Progress: no change  Outcome Evaluation: Pt has had no complaints of pain or discomforts. Continuous nausea throughout shift, PRN meds given. Continue IV abx. Plan for addison catheter insertion tomorrow w/ Dr. Anne. Consent signed, NPO midnight. Doppler of RUE completed this evening and positive for superficial DVT. Dr. Avalos notified. Spoke w/ Dr. Ramos on call for Dr. Anne and okay to give evening of dose of lovenox but hold AM dose. Up adlib. VSS. Will continue to monitor.

## 2023-01-13 NOTE — CASE MANAGEMENT/SOCIAL WORK
Continued Stay Note  [facility]     Patient Name: Liliam Lorenz  MRN: [id number]  Today's Date: 1/13/2023    Admit Date: 1/9/2023    Plan: Return home with family   Discharge Plan     Row Name 01/13/23 6935       Plan    Plan Comments Patient in OR.  Patient plans to return home at dc.  CCP continuing to follow for ID recs ( po vs IV abx).  Will follow up on Monday. Meena ANTONIO RN               Discharge Codes    No documentation.               Expected Discharge Date and Time     Expected Discharge Date Expected Discharge Time    Jan 16, 2023             Meena Leroy RN

## 2023-01-13 NOTE — ANESTHESIA POSTPROCEDURE EVALUATION
"Patient: Liliam Lorenz    Procedure Summary     Date: 01/13/23 Room / Location: [facility] OR [address] [place] [address] MAIN OR    Anesthesia Start: 1502 Anesthesia Stop: 1652    Procedure: INSERTION OF VALVERDE CATHETER, VENOGARM (Left) Diagnosis:       Poor venous access      (Poor venous access [I87.8])    Surgeons: Cristhian Anne MD Provider: Sunil Mendez MD    Anesthesia Type: MAC ASA Status: 3          Anesthesia Type: MAC    Vitals  Vitals Value Taken Time   /74 01/13/23 1811   Temp 36.4 °C (97.5 °F) 01/13/23 1803   Pulse 72 01/13/23 1820   Resp 12 01/13/23 1649   SpO2 93 % 01/13/23 1820   Vitals shown include unvalidated device data.        Post Anesthesia Care and Evaluation    Patient location during evaluation: PACU  Patient participation: complete - patient participated  Level of consciousness: awake and alert  Pain management: adequate    Airway patency: patent  Anesthetic complications: No anesthetic complications  PONV Status: controlled  Cardiovascular status: acceptable and hemodynamically stable  Respiratory status: acceptable  Hydration status: acceptable    Comments: /81   Pulse 65   Temp 36.4 °C (97.5 °F) (Oral)   Resp 12   Ht 163.8 cm (64.5\")   Wt 110 kg (242 lb)   LMP 01/13/2023 (Exact Date) Comment: currently menstruating  SpO2 98%   BMI 40.90 kg/m²       "

## 2023-01-13 NOTE — ANESTHESIA PREPROCEDURE EVALUATION
" Anesthesia Evaluation     Patient summary reviewed and Nursing notes reviewed   no history of anesthetic complications:  NPO Solid Status: > 8 hours  NPO Liquid Status: > 2 hours           Airway   Mallampati: II  TM distance: >3 FB  Neck ROM: full  Possible difficult intubation and Large neck circumference  Dental - normal exam     Pulmonary - normal exam   (+) pulmonary embolism (acute and recurrent),   Cardiovascular - normal exam    PT is on anticoagulation therapy  Patient on routine beta blocker and Beta blocker given within 24 hours of surgery    (+) hypertension (chronic HYPOtension),     ROS comment: Presented with hypoxemia and chest pain, found to have acute subsegmental PEs and line sepsis      Neuro/Psych  (+) neuromuscular disease (Phoebe-danlos sydrome),    GI/Hepatic/Renal/Endo    (+) obesity, morbid obesity, GERD (gastroparesis, severe) poorly controlled,      Musculoskeletal     Abdominal   (+) obese,    Substance History      OB/GYN          Other        ROS/Med Hx Other: Line sepsis status post Dolan catheter removal  Recurrent pulm embolism despite on full        anticoagulation   Chronic orthostatic hypotension  POTS syndrome  Phoebe-Danlos syndrome  Hypothyroidism  Severe gastroparesis  Irritable bowel syndrome  Gastroesophageal reflux disease      Phys Exam Other: Extremely prominent tonsils                    Anesthesia Plan    ASA 3     MAC     (Risk of aspriation with deep mac however patient NPO since 2200 last night    I have reviewed the patient's history and chart with the patient, including all pertinent laboratory results and imaging. I have explained the risks of anesthesia including but not limited to dental damage, corneal abrasion, nerve injury, MI, stroke, aspiration, and death. Patient has agreed to proceed.     /69 (BP Location: Left arm, Patient Position: Lying)   Pulse 63   Temp 37 °C (98.6 °F) (Oral)   Resp 16   Ht 163.8 cm (64.5\")   Wt 110 kg (242 lb)   LMP " 01/13/2023 (Exact Date) Comment: currently menstruating  SpO2 97%   BMI 40.90 kg/m²   )  intravenous induction     Anesthetic plan, risks, benefits, and alternatives have been provided, discussed and informed consent has been obtained with: patient.        CODE STATUS:    Level Of Support Discussed With: Patient  Code Status (Patient has no pulse and is not breathing): CPR (Attempt to Resuscitate)  Medical Interventions (Patient has pulse or is breathing): Full Support

## 2023-01-14 ENCOUNTER — READMISSION MANAGEMENT (OUTPATIENT)
Dept: CALL CENTER | Facility: HOSPITAL | Age: 28
End: 2023-01-14
Payer: COMMERCIAL

## 2023-01-14 VITALS
WEIGHT: 242 LBS | RESPIRATION RATE: 18 BRPM | TEMPERATURE: 97.9 F | BODY MASS INDEX: 40.32 KG/M2 | SYSTOLIC BLOOD PRESSURE: 124 MMHG | HEIGHT: 65 IN | HEART RATE: 78 BPM | DIASTOLIC BLOOD PRESSURE: 78 MMHG | OXYGEN SATURATION: 94 %

## 2023-01-14 LAB
ANION GAP SERPL CALCULATED.3IONS-SCNC: 7.2 MMOL/L (ref 5–15)
BACTERIA SPEC AEROBE CULT: NORMAL
BACTERIA SPEC AEROBE CULT: NORMAL
BASOPHILS # BLD AUTO: 0.04 10*3/MM3 (ref 0–0.2)
BASOPHILS NFR BLD AUTO: 0.4 % (ref 0–1.5)
BUN SERPL-MCNC: 9 MG/DL (ref 6–20)
BUN/CREAT SERPL: 11.7 (ref 7–25)
CALCIUM SPEC-SCNC: 9.1 MG/DL (ref 8.6–10.5)
CHLORIDE SERPL-SCNC: 98 MMOL/L (ref 98–107)
CO2 SERPL-SCNC: 27.8 MMOL/L (ref 22–29)
CREAT SERPL-MCNC: 0.77 MG/DL (ref 0.57–1)
DEPRECATED RDW RBC AUTO: 43.5 FL (ref 37–54)
EGFRCR SERPLBLD CKD-EPI 2021: 108.6 ML/MIN/1.73
EOSINOPHIL # BLD AUTO: 0.03 10*3/MM3 (ref 0–0.4)
EOSINOPHIL NFR BLD AUTO: 0.3 % (ref 0.3–6.2)
ERYTHROCYTE [DISTWIDTH] IN BLOOD BY AUTOMATED COUNT: 15.7 % (ref 12.3–15.4)
GLUCOSE SERPL-MCNC: 99 MG/DL (ref 65–99)
HCT VFR BLD AUTO: 34.2 % (ref 34–46.6)
HGB BLD-MCNC: 11 G/DL (ref 12–15.9)
IMM GRANULOCYTES # BLD AUTO: 0.02 10*3/MM3 (ref 0–0.05)
IMM GRANULOCYTES NFR BLD AUTO: 0.2 % (ref 0–0.5)
LYMPHOCYTES # BLD AUTO: 1.75 10*3/MM3 (ref 0.7–3.1)
LYMPHOCYTES NFR BLD AUTO: 18.8 % (ref 19.6–45.3)
MCH RBC QN AUTO: 25.1 PG (ref 26.6–33)
MCHC RBC AUTO-ENTMCNC: 32.2 G/DL (ref 31.5–35.7)
MCV RBC AUTO: 77.9 FL (ref 79–97)
MONOCYTES # BLD AUTO: 0.57 10*3/MM3 (ref 0.1–0.9)
MONOCYTES NFR BLD AUTO: 6.1 % (ref 5–12)
NEUTROPHILS NFR BLD AUTO: 6.9 10*3/MM3 (ref 1.7–7)
NEUTROPHILS NFR BLD AUTO: 74.2 % (ref 42.7–76)
NRBC BLD AUTO-RTO: 0 /100 WBC (ref 0–0.2)
PLATELET # BLD AUTO: 307 10*3/MM3 (ref 140–450)
PMV BLD AUTO: 10.1 FL (ref 6–12)
POTASSIUM SERPL-SCNC: 3.7 MMOL/L (ref 3.5–5.2)
RBC # BLD AUTO: 4.39 10*6/MM3 (ref 3.77–5.28)
SODIUM SERPL-SCNC: 133 MMOL/L (ref 136–145)
WBC NRBC COR # BLD: 9.31 10*3/MM3 (ref 3.4–10.8)

## 2023-01-14 PROCEDURE — 85025 COMPLETE CBC W/AUTO DIFF WBC: CPT | Performed by: SURGERY

## 2023-01-14 PROCEDURE — 80048 BASIC METABOLIC PNL TOTAL CA: CPT | Performed by: SURGERY

## 2023-01-14 PROCEDURE — 25010000002 ONDANSETRON PER 1 MG: Performed by: SURGERY

## 2023-01-14 PROCEDURE — 25010000002 CEFAZOLIN IN DEXTROSE 2-4 GM/100ML-% SOLUTION: Performed by: SURGERY

## 2023-01-14 PROCEDURE — 25010000002 ENOXAPARIN PER 10 MG: Performed by: SURGERY

## 2023-01-14 PROCEDURE — 25010000002 CEFAZOLIN IN DEXTROSE 2-4 GM/100ML-% SOLUTION: Performed by: HOSPITALIST

## 2023-01-14 RX ORDER — CEFAZOLIN SODIUM 2 G/100ML
2 INJECTION, SOLUTION INTRAVENOUS EVERY 8 HOURS
Qty: 7200 ML | Refills: 0 | Status: SHIPPED | OUTPATIENT
Start: 2023-01-14 | End: 2023-02-07

## 2023-01-14 RX ORDER — CEFAZOLIN SODIUM 2 G/100ML
2 INJECTION, SOLUTION INTRAVENOUS EVERY 8 HOURS
Status: DISCONTINUED | OUTPATIENT
Start: 2023-01-14 | End: 2023-01-15 | Stop reason: HOSPADM

## 2023-01-14 RX ADMIN — LEVOTHYROXINE SODIUM 50 MCG: 0.05 TABLET ORAL at 10:01

## 2023-01-14 RX ADMIN — ONDANSETRON 4 MG: 2 INJECTION INTRAMUSCULAR; INTRAVENOUS at 17:24

## 2023-01-14 RX ADMIN — PYRIDOSTIGMINE BROMIDE 30 MG: 60 TABLET ORAL at 10:00

## 2023-01-14 RX ADMIN — ENOXAPARIN SODIUM 110 MG: 150 INJECTION SUBCUTANEOUS at 20:16

## 2023-01-14 RX ADMIN — Medication 10 ML: at 10:39

## 2023-01-14 RX ADMIN — ONDANSETRON 4 MG: 2 INJECTION INTRAMUSCULAR; INTRAVENOUS at 03:06

## 2023-01-14 RX ADMIN — Medication 1000 UNITS: at 10:02

## 2023-01-14 RX ADMIN — ONDANSETRON 4 MG: 2 INJECTION INTRAMUSCULAR; INTRAVENOUS at 09:13

## 2023-01-14 RX ADMIN — METOPROLOL TARTRATE 25 MG: 25 TABLET, FILM COATED ORAL at 10:01

## 2023-01-14 RX ADMIN — CEFAZOLIN SODIUM 2 G: 2 INJECTION, SOLUTION INTRAVENOUS at 13:40

## 2023-01-14 RX ADMIN — ENOXAPARIN SODIUM 110 MG: 150 INJECTION SUBCUTANEOUS at 09:15

## 2023-01-14 RX ADMIN — CEFAZOLIN SODIUM 2 G: 2 INJECTION, SOLUTION INTRAVENOUS at 03:03

## 2023-01-14 RX ADMIN — ESCITALOPRAM 10 MG: 10 TABLET, FILM COATED ORAL at 10:02

## 2023-01-14 RX ADMIN — ONDANSETRON 4 MG: 2 INJECTION INTRAMUSCULAR; INTRAVENOUS at 13:14

## 2023-01-14 RX ADMIN — CEFAZOLIN SODIUM 2 G: 2 INJECTION, SOLUTION INTRAVENOUS at 20:16

## 2023-01-14 RX ADMIN — GABAPENTIN 100 MG: 100 CAPSULE ORAL at 10:02

## 2023-01-14 RX ADMIN — PANTOPRAZOLE SODIUM 40 MG: 40 TABLET, DELAYED RELEASE ORAL at 10:02

## 2023-01-14 RX ADMIN — MIDODRINE HYDROCHLORIDE 10 MG: 5 TABLET ORAL at 10:05

## 2023-01-14 RX ADMIN — Medication 10 ML: at 10:02

## 2023-01-14 NOTE — PLAN OF CARE
Goal Outcome Evaluation:  Plan of Care Reviewed With: patient        Progress: no change  Outcome Evaluation: Patient down for dolan catheter this afternoon. Dolan placed on L chest. Okay to use for med administration and lab draws per Dr. Anne. Nausea noted when back to the floor, PRN zofran given. VSS. Will continue to monitor.

## 2023-01-14 NOTE — DISCHARGE SUMMARY
Discharge summary    Date of admission 1/9/2023  Date of discharge 1/14/2023    Final diagnosis  Chest wall cellulitis  Line sepsis status post Dolan catheter removal  Status post new left IJ Dolan catheter placement  Recurrent pulm embolism despite on full anticoagulation   Chronic orthostatic hypotension  POTS syndrome  Phoebe-Danlos syndrome  Hypothyroidism  Severe gastroparesis  Irritable bowel syndrome  Gastroesophageal reflux disease    Discharge medications    Current Facility-Administered Medications:   •  albuterol sulfate HFA (PROVENTIL HFA;VENTOLIN HFA;PROAIR HFA) inhaler 2 puff, 2 puff, Inhalation, Q4H PRN, Cristhian Anne MD  •  ceFAZolin in dextrose (ANCEF) IVPB solution 2 g, 2 g, Intravenous, Q8H, Caleb Naik MD  •  cholecalciferol (VITAMIN D3) tablet 1,000 Units, 1,000 Units, Oral, Daily, Cristhian Anne MD, 1,000 Units at 01/14/23 1002  •  Enoxaparin Sodium (LOVENOX) syringe 110 mg, 1 mg/kg, Subcutaneous, Q12H, Cristhian Anne MD, 110 mg at 01/14/23 0915  •  escitalopram (LEXAPRO) tablet 10 mg, 10 mg, Oral, Daily, Cristhian Anne MD, 10 mg at 01/14/23 1002  •  gabapentin (NEURONTIN) capsule 100 mg, 100 mg, Oral, TID, Cristhian Anne MD, 100 mg at 01/14/23 1002  •  heparin injection 300 Units, 3 mL, Intravenous, Daily PRN, Cristhian Anne MD  •  levothyroxine (SYNTHROID, LEVOTHROID) tablet 50 mcg, 50 mcg, Oral, Daily, Cristhian Anne MD, 50 mcg at 01/14/23 1001  •  metoprolol tartrate (LOPRESSOR) tablet 25 mg, 25 mg, Oral, TID, Cristhian Anne MD, 25 mg at 01/14/23 1001  •  midodrine (PROAMATINE) tablet 10 mg, 10 mg, Oral, TID AC, Cristhian Anne MD, 10 mg at 01/14/23 1005  •  montelukast (SINGULAIR) tablet 10 mg, 10 mg, Oral, Nightly, Cristhian Anne MD, 10 mg at 01/12/23 2105  •  ondansetron (ZOFRAN) injection 4 mg, 4 mg, Intravenous, Q4H PRN, Cristhian Anne MD, 4 mg at 01/14/23 1314  •  pantoprazole (PROTONIX) EC tablet 40 mg, 40 mg, Oral, QAMDayana,  Cristhian LYNCH MD, 40 mg at 01/14/23 1002  •  prochlorperazine (COMPAZINE) tablet 10 mg, 10 mg, Oral, Q6H PRN, Cristhian Anne MD, 10 mg at 01/13/23 1136  •  pyridostigmine (MESTINON) tablet 30 mg, 30 mg, Oral, BID, Cristhian Anne MD, 30 mg at 01/14/23 1000  •  [COMPLETED] Insert Peripheral IV, , , Once **AND** sodium chloride 0.9 % flush 10 mL, 10 mL, Intravenous, PRN, Cristhian Anne MD, 10 mL at 01/13/23 0943  •  sodium chloride 0.9 % flush 10 mL, 10 mL, Intravenous, PRN, Cristhian Anne MD  •  sodium chloride 0.9 % flush 20 mL, 20 mL, Intravenous, PRN, Cristhian Anne MD     Consults obtained  Infectious disease   Hematology oncology  General surgery    Procedures  Removal of tunneled central venous catheter 1/9/2023  Left internal jugular tunneled Dolan catheter placement 1/13/2023    Hospital course  27-year white female with very complex past medical history including POTS syndrome Phoebe-Danlos syndrome hypothyroidism gastroparesis who also have pulm embolism on Lovenox admitted through emergency room with chest discomfort shortness of breath.  Patient work-up in ER revealed chest wall cellulitis with probable line sepsis and also found to have recurrent pulm embolism despite on full anticoagulation.  Patient admitted treated with IV antibiotics after obtaining the cultures and general surgery consult obtained to remove Dolan.  Patient Dolan is removed and hematology recommended no change in anticoagulation as her Lovenox doses recently decreased and want her to continue Lovenox with the full dose.  Patient responded to the treatment but she has a poor access and wants to have a new Dolan placed prior to discharge.  Patient blood cultures remains negative and cleared from infectious disease to place a new Dolan which is done yesterday and wants to go home and clear for discharge on IV antibiotics for total of 4 weeks.    Discharge diet regular     Activity as tolerated    Medication as  above    Follow-up with [facility] doctor in 1 week and follow-up with hematology infectious disease and general surgery per their instructions and take medication as directed.     ANN GONZALEZ MD

## 2023-01-14 NOTE — CASE MANAGEMENT/SOCIAL WORK
Continued Stay Note  [facility]     Patient Name: Liliam Lorenz  MRN: [id number]  Today's Date: 1/14/2023    Admit Date: 1/9/2023    Plan: Return home with family   Discharge Plan     Row Name 01/14/23 0962       Plan    Plan Comments [facility] notified by  that pt was discharging today and will need IV antibiotics. [facility] unable to get the IV antibiotic delivered for two days. CCP spoke with pt who was agreeable w/ using [facility] for her IV antibiotics and reports [facility] will do her dressing changes. Spoke w/ Smitha at [facility] infusion who reports that they can deliver the pts antibiotics in the morning. Pt agreeable to discharging after her evening dose of antibiotics. RN and pt updated               Discharge Codes    No documentation.               Expected Discharge Date and Time     Expected Discharge Date Expected Discharge Time    Jan 14, 2023             La Nena Edgar RN

## 2023-01-14 NOTE — DISCHARGE INSTRUCTIONS
Call Dr. Avalos on a weekly basis at [phone] to go over review of systems to monitor antibiotic toxicity and effectiveness of treatment.  Call Dr. Avalos if there is a concern or need.

## 2023-01-14 NOTE — PROGRESS NOTES
"Daily progress note    Primary care physician  Dr. Jason Borrero    Chief complaint  S/p left internal jugular Dolan catheter placement and doing better with no new complaint and family at bedside.    History of present illness  27-year white female with history of pulmonary embolism on Lovenox at home chronic orthostatic hypotension with POTS syndrome hypothyroidism Phoebe-Danlos syndrome who is well-known to our service presented to [facility] emergency room with  chest discomfort and shortness of breath started today while she was at home comfortably sitting in chair followed by tachycardia and decreased saturation.  Patient evaluated in ER found to have line sepsis and tiny pulm embolism admit for management.     REVIEW OF SYSTEMS  Unremarkable except weakness.     PHYSICAL EXAM  Blood pressure 133/93, pulse 98, temperature 97.7 °F (36.5 °C), temperature source Oral, resp. rate 18, height 163.8 cm (64.5\"), weight 110 kg (242 lb), last menstrual period 01/13/2023, SpO2 94 %, not currently breastfeeding.    GENERAL:  Awake and alert in no acute distress  HEENT:  Unremarkable  NECK:  Supple  CV: regular rhythm, tachycardic S1-S2  RESPIRATORY: normal effort, decreased breath sounds at the bases  ABDOMEN: soft, nontender nontender bowel sounds positive  MUSCULOSKELETAL: no deformity  NEURO: alert, moves all extremities, follows commands  PSYCH:  calm, cooperative  SKIN: Poorly demarcated blanching erythema along the right anterior chest that extends up the neck overlying the subcutaneous course of the Dolan catheter but also laterally towards the axilla.  No crepitus.     LAB RESULTS  Lab Results (last 24 hours)     Procedure Component Value Units Date/Time    Blood Culture - Blood, Arm, Right [[id number]]  (Normal) Collected: 01/09/23 1250    Specimen: Blood from Arm, Right Updated: 01/14/23 1316     Blood Culture No growth at 5 days    Blood Culture - Blood, Arm, Right [[id number]]  (Normal) Collected: " 01/09/23 1234    Specimen: Blood from Arm, Right Updated: 01/14/23 1245     Blood Culture No growth at 5 days    Basic Metabolic Panel [[id number]]  (Abnormal) Collected: 01/14/23 0604    Specimen: Blood Updated: 01/14/23 0645     Glucose 99 mg/dL      BUN 9 mg/dL      Creatinine 0.77 mg/dL      Sodium 133 mmol/L      Potassium 3.7 mmol/L      Chloride 98 mmol/L      CO2 27.8 mmol/L      Calcium 9.1 mg/dL      BUN/Creatinine Ratio 11.7     Anion Gap 7.2 mmol/L      eGFR 108.6 mL/min/1.73      Comment: National Kidney Foundation and American Society of Nephrology (ASN) Task Force recommended calculation based on the Chronic Kidney Disease Epidemiology Collaboration (CKD-EPI) equation refit without adjustment for race.       Narrative:      GFR Normal >60  Chronic Kidney Disease <60  Kidney Failure <15      CBC & Differential [[id number]]  (Abnormal) Collected: 01/14/23 0604    Specimen: Blood Updated: 01/14/23 0629    Narrative:      The following orders were created for panel order CBC & Differential.  Procedure                               Abnormality         Status                     ---------                               -----------         ------                     CBC Auto Differential[[id number]]        Abnormal            Final result                 Please view results for these tests on the individual orders.    CBC Auto Differential [[id number]]  (Abnormal) Collected: 01/14/23 0604    Specimen: Blood Updated: 01/14/23 0629     WBC 9.31 10*3/mm3      RBC 4.39 10*6/mm3      Hemoglobin 11.0 g/dL      Hematocrit 34.2 %      MCV 77.9 fL      MCH 25.1 pg      MCHC 32.2 g/dL      RDW 15.7 %      RDW-SD 43.5 fl      MPV 10.1 fL      Platelets 307 10*3/mm3      Neutrophil % 74.2 %      Lymphocyte % 18.8 %      Monocyte % 6.1 %      Eosinophil % 0.3 %      Basophil % 0.4 %      Immature Grans % 0.2 %      Neutrophils, Absolute 6.90 10*3/mm3      Lymphocytes, Absolute 1.75 10*3/mm3      Monocytes, Absolute 0.57  10*3/mm3      Eosinophils, Absolute 0.03 10*3/mm3      Basophils, Absolute 0.04 10*3/mm3      Immature Grans, Absolute 0.02 10*3/mm3      nRBC 0.0 /100 WBC     Pregnancy, Urine - Urine, Clean Catch [[id number]]  (Normal) Collected: 01/13/23 1334    Specimen: Urine, Clean Catch Updated: 01/13/23 1355     HCG, Urine QL Negative        Imaging Results (Last 24 Hours)     Procedure Component Value Units Date/Time    XR Chest Post CVA Port [[id number]] Collected: 01/13/23 1733     Updated: 01/13/23 1738    Narrative:      XR CHEST POST CVA PORT-     INDICATIONS: Central line placement     TECHNIQUE: Frontal view of the chest     COMPARISON: 01/09/2023     FINDINGS:     Left-sided central venous catheter extends to the superior vena cava.  The heart is enlarged. Pulmonary vasculature is unremarkable. Small  likely atelectasis in the left lung. No pleural effusion or  pneumothorax. No acute osseous process.       Impression:         Central line placement. No pneumothorax.     This report was finalized on 1/13/2023 5:35 PM by Dr. Chacho Christie M.D.       FL C Arm During Surgery [[id number]] Resulted: 01/13/23 1646     Updated: 01/13/23 1646    Narrative:      This procedure was auto-finalized with no dictation required.        ECG 12 Lead           Component  Ref Range & Units 6 mo ago  (6/15/22) 11 mo ago  (1/18/22) 1 yr ago  (9/17/21) 1 yr ago  (9/8/21) 1 yr ago  (5/6/21) 1 yr ago  (5/5/21)   QT Interval  ms 337  334  350  298  279  342    Resulting Agency BH ECG BH ECG BH ECG BH ECG BH ECG BH ECG             HEART RATE= 107  bpm  RR Interval= 564  ms  ND Interval= 127  ms  P Horizontal Axis= 26  deg  P Front Axis= 50  deg  QRSD Interval= 77  ms  QT Interval= 337  ms  QRS Axis= 23  deg  T Wave Axis= 44  deg  - BORDERLINE ECG -  Sinus tachycardia  Borderline T abnormalities, anterior leads  SINCE PREVIOUS TRACING,  HR HAS DECREASED             Current Facility-Administered Medications:   •  albuterol sulfate HFA  (PROVENTIL HFA;VENTOLIN HFA;PROAIR HFA) inhaler 2 puff, 2 puff, Inhalation, Q4H PRN, Cristhian Anne MD  •  ceFAZolin in dextrose (ANCEF) IVPB solution 2 g, 2 g, Intravenous, Q8H, Cristhian Anne MD, 2 g at 01/14/23 0303  •  cholecalciferol (VITAMIN D3) tablet 1,000 Units, 1,000 Units, Oral, Daily, Cristhian Anne MD, 1,000 Units at 01/14/23 1002  •  Enoxaparin Sodium (LOVENOX) syringe 110 mg, 1 mg/kg, Subcutaneous, Q12H, Cristhian Anne MD, 110 mg at 01/14/23 0915  •  escitalopram (LEXAPRO) tablet 10 mg, 10 mg, Oral, Daily, Cristhian Anne MD, 10 mg at 01/14/23 1002  •  gabapentin (NEURONTIN) capsule 100 mg, 100 mg, Oral, TID, Cristhian Anne MD, 100 mg at 01/14/23 1002  •  heparin injection 300 Units, 3 mL, Intravenous, Daily PRN, Cristhian Anne MD  •  levothyroxine (SYNTHROID, LEVOTHROID) tablet 50 mcg, 50 mcg, Oral, Daily, Cristhian Anne MD, 50 mcg at 01/14/23 1001  •  metoprolol tartrate (LOPRESSOR) tablet 25 mg, 25 mg, Oral, TID, Cristhian Anne MD, 25 mg at 01/14/23 1001  •  midodrine (PROAMATINE) tablet 10 mg, 10 mg, Oral, TID AC, Cristhian Anne MD, 10 mg at 01/14/23 1005  •  montelukast (SINGULAIR) tablet 10 mg, 10 mg, Oral, Nightly, Cristhian Anne MD, 10 mg at 01/12/23 2105  •  ondansetron (ZOFRAN) injection 4 mg, 4 mg, Intravenous, Q4H PRN, Cristhian Anne MD, 4 mg at 01/14/23 1314  •  pantoprazole (PROTONIX) EC tablet 40 mg, 40 mg, Oral, QAM, Cristhian Anne MD, 40 mg at 01/14/23 1002  •  prochlorperazine (COMPAZINE) tablet 10 mg, 10 mg, Oral, Q6H PRN, Cristhian Anne MD, 10 mg at 01/13/23 1136  •  pyridostigmine (MESTINON) tablet 30 mg, 30 mg, Oral, BID, Cristhian Anne MD, 30 mg at 01/14/23 1000  •  [COMPLETED] Insert Peripheral IV, , , Once **AND** sodium chloride 0.9 % flush 10 mL, 10 mL, Intravenous, PRN, Cristhian Anne MD, 10 mL at 01/13/23 0943  •  sodium chloride 0.9 % flush 10 mL, 10 mL, Intravenous, Q12H, Cristhian Anne MD, 10 mL at  01/14/23 1039  •  sodium chloride 0.9 % flush 10 mL, 10 mL, Intravenous, Q12H, Cristhian Anne MD, 10 mL at 01/14/23 1002  •  sodium chloride 0.9 % flush 10 mL, 10 mL, Intravenous, PRN, Cristhian Anne MD  •  sodium chloride 0.9 % flush 20 mL, 20 mL, Intravenous, PRN, Cristhian Anne MD     ASSESSMENT  Line sepsis status post Dolan catheter removal  Status post new left IJ Dolan catheter placement  Recurrent pulm embolism despite on full anticoagulation   Chronic orthostatic hypotension  POTS syndrome  Phoebe-Danlos syndrome  Hypothyroidism  Severe gastroparesis  Irritable bowel syndrome  Gastroesophageal reflux disease    PLAN  CPM  Postop care  Continue IV antibiotics   Lovenox  Continue home medications  Stress ulcer DVT prophylaxis  Infectious disease to follow patient  Supportive care  Discussed with family and nursing staff  Discharge planning    ANN GONZALEZ MD

## 2023-01-15 NOTE — PAYOR COMM NOTE
"Liliam Da Silva (27 y.o. Female)     PLEASE SEE ATTACHED DC SUMMARY    REF#[id number]    THANK YOU    MARLON HAMMONDS LPN CCP    Date of Birth   1995    Social Security Number       Address   [address] [place] [address]    Home Phone   [phone]    MRN   [id number]       Sabianist   Islam    Marital Status                               Admission Date   1/9/23    Admission Type   Emergency    Admitting Provider   Caleb Naik MD    Attending Provider       Department, Room/Bed   [address], S619/1       Discharge Date   1/14/2023    Discharge Disposition   Home or Self Care    Discharge Destination                               Attending Provider: (none)   Allergies: Eggs Or Egg-derived Products, Pepcid [Famotidine], Scopolamine    Isolation: None   Infection: None   Code Status: Prior    Ht: 163.8 cm (64.5\")   Wt: 110 kg (242 lb)    Admission Cmt: None   Principal Problem: Cellulitis of chest wall [L03.313]                 Active Insurance as of 1/9/2023     Primary Coverage     Payor Plan Insurance Group Employer/Plan Group    [employer] BLUE CROSS AN[employer]M BLUE CROSS BLUE SHIELD PPO [id number]     Payor Plan Address Payor Plan Phone Number Payor Plan Fax Number Effective Dates    [address] [phone]  4/1/2019 - None Entered    [facility] [id number]       Subscriber Name Subscriber Birth Date Member ID       AZEB DA SILVA 12/30/1994 [id number]                 Emergency Contacts      (Rel.) Home Phone Work Phone Mobile Phone    AZEB DA SILVA (Spouse) [phone] -- [phone]    Monica Enriquez (Mother) -- -- [phone]               Discharge Summary      Caleb Naik MD at 01/14/23 1648        Discharge summary    Date of admission 1/9/2023  Date of discharge 1/14/2023    Final diagnosis  Chest wall cellulitis  Line sepsis status post Dolan catheter removal  Status post new left IJ Dolan catheter placement  Recurrent pulm embolism despite " on full anticoagulation   Chronic orthostatic hypotension  POTS syndrome  Phoebe-Danlos syndrome  Hypothyroidism  Severe gastroparesis  Irritable bowel syndrome  Gastroesophageal reflux disease    Discharge medications    Current Facility-Administered Medications:   •  albuterol sulfate HFA (PROVENTIL HFA;VENTOLIN HFA;PROAIR HFA) inhaler 2 puff, 2 puff, Inhalation, Q4H PRN, Cristhian Anne MD  •  ceFAZolin in dextrose (ANCEF) IVPB solution 2 g, 2 g, Intravenous, Q8H, Caleb Naik MD  •  cholecalciferol (VITAMIN D3) tablet 1,000 Units, 1,000 Units, Oral, Daily, Cristhian Anne MD, 1,000 Units at 01/14/23 1002  •  Enoxaparin Sodium (LOVENOX) syringe 110 mg, 1 mg/kg, Subcutaneous, Q12H, Cristhian Anne MD, 110 mg at 01/14/23 0915  •  escitalopram (LEXAPRO) tablet 10 mg, 10 mg, Oral, Daily, Cristhian Anne MD, 10 mg at 01/14/23 1002  •  gabapentin (NEURONTIN) capsule 100 mg, 100 mg, Oral, TID, Cristhian Anne MD, 100 mg at 01/14/23 1002  •  heparin injection 300 Units, 3 mL, Intravenous, Daily PRN, Cristhian Anne MD  •  levothyroxine (SYNTHROID, LEVOTHROID) tablet 50 mcg, 50 mcg, Oral, Daily, Cristhian Anne MD, 50 mcg at 01/14/23 1001  •  metoprolol tartrate (LOPRESSOR) tablet 25 mg, 25 mg, Oral, TID, Cristhian Anne MD, 25 mg at 01/14/23 1001  •  midodrine (PROAMATINE) tablet 10 mg, 10 mg, Oral, TID AC, Cristhian Anne MD, 10 mg at 01/14/23 1005  •  montelukast (SINGULAIR) tablet 10 mg, 10 mg, Oral, Nightly, Cristhian Anne MD, 10 mg at 01/12/23 2105  •  ondansetron (ZOFRAN) injection 4 mg, 4 mg, Intravenous, Q4H PRN, Cristhian Anne MD, 4 mg at 01/14/23 1314  •  pantoprazole (PROTONIX) EC tablet 40 mg, 40 mg, Oral, QAM, Cristhian Anne MD, 40 mg at 01/14/23 1002  •  prochlorperazine (COMPAZINE) tablet 10 mg, 10 mg, Oral, Q6H PRN, Cristhian Anne MD, 10 mg at 01/13/23 1136  •  pyridostigmine (MESTINON) tablet 30 mg, 30 mg, Oral, BID, Cristhian Anne MD, 30 mg at 01/14/23  1000  •  [COMPLETED] Insert Peripheral IV, , , Once **AND** sodium chloride 0.9 % flush 10 mL, 10 mL, Intravenous, PRN, Cristhian Anne MD, 10 mL at 01/13/23 0943  •  sodium chloride 0.9 % flush 10 mL, 10 mL, Intravenous, PRN, Cristhian nAne MD  •  sodium chloride 0.9 % flush 20 mL, 20 mL, Intravenous, PRN, Cristhian Anne MD     Consults obtained  Infectious disease   Hematology oncology  General surgery    Procedures  Removal of tunneled central venous catheter 1/9/2023  Left internal jugular tunneled Dolan catheter placement 1/13/2023    Hospital course  27-year white female with very complex past medical history including POTS syndrome Phoebe-Danlos syndrome hypothyroidism gastroparesis who also have pulm embolism on Lovenox admitted through emergency room with chest discomfort shortness of breath.  Patient work-up in ER revealed chest wall cellulitis with probable line sepsis and also found to have recurrent pulm embolism despite on full anticoagulation.  Patient admitted treated with IV antibiotics after obtaining the cultures and general surgery consult obtained to remove Dolan.  Patient Dolan is removed and hematology recommended no change in anticoagulation as her Lovenox doses recently decreased and want her to continue Lovenox with the full dose.  Patient responded to the treatment but she has a poor access and wants to have a new Dolan placed prior to discharge.  Patient blood cultures remains negative and cleared from infectious disease to place a new Dolan which is done yesterday and wants to go home and clear for discharge on IV antibiotics for total of 4 weeks.    Discharge diet regular     Activity as tolerated    Medication as above    Follow-up with prime doctor in 1 week and follow-up with hematology infectious disease and general surgery per their instructions and take medication as directed.     ANN GONZALEZ MD      Electronically signed by Ann Gonzalez MD at 01/14/23 2564        Discharge Order (From admission, onward)     Start     Ordered    01/14/23 7634  Discharge patient  Once        Expected Discharge Date: 01/14/23    Discharge Disposition: Home or Self Care    Physician of Record for Attribution - Please select from Treatment Team: ANN GONZALEZ [[id number]]    Review needed by CMO to determine Physician of Record: No       Question Answer Comment   Physician of Record for Attribution - Please select from Treatment Team ANN GONZALEZ    Review needed by CMO to determine Physician of Record No        01/14/23 0941

## 2023-01-15 NOTE — OUTREACH NOTE
Prep Survey    Flowsheet Row Responses   Sabianist facility patient discharged from? [facility]   Is LACE score < 7 ? No   Eligibility Readm Mgmt   Discharge diagnosis Chest wall cellulitisLine sepsis status post Dolan catheter removal   Does the patient have one of the following disease processes/diagnoses(primary or secondary)? Sepsis   Does the patient have Home health ordered? Yes   What is the Home health agency?  [facility]   Is there a DME ordered? No   Prep survey completed? Yes          MIKAL ALLEN - Registered Nurse

## 2023-01-16 NOTE — CASE MANAGEMENT/SOCIAL WORK
Case Management Discharge Note      Final Note: Patient discharged home with [facility] for IV ABX, [facility] will follow for dressing changes. All CCP needs addressed. LANNY Rosado RN, CCP         Selected Continued Care - Discharged on 1/14/2023 Admission date: 1/9/2023 - Discharge disposition: Home or Self Care    Destination    No services have been selected for the patient.              Durable Medical Equipment    No services have been selected for the patient.              Dialysis/Infusion     Service Provider Selected Services Address Phone Fax Patient Preferred    [facility] [address] [place] [place] [id number] [phone] --          Home Medical Care    No services have been selected for the patient.              Therapy    No services have been selected for the patient.              Community Resources    No services have been selected for the patient.              Community & DME    No services have been selected for the patient.                       Final Discharge Disposition Code: 06 - home with home health care

## 2023-01-18 ENCOUNTER — READMISSION MANAGEMENT (OUTPATIENT)
Dept: CALL CENTER | Facility: HOSPITAL | Age: 28
End: 2023-01-18
Payer: COMMERCIAL

## 2023-01-18 NOTE — OUTREACH NOTE
Sepsis Week 1 Survey    Flowsheet Row Responses   [facility] patient discharged from? [place]   Does the patient have one of the following disease processes/diagnoses(primary or secondary)? Sepsis   Week 1 attempt successful? Yes   Call start time 1523   Call end time 1527   Discharge diagnosis Chest wall cellulitisLine sepsis status post Dolan catheter removal   Meds reviewed with patient/caregiver? Yes   Is the patient having any side effects they believe may be caused by any medication additions or changes? No   Does the patient have all medications related to this admission filled (includes all antibiotics, inhalers, nebulizers,steroids,etc.) Yes   Is the patient taking all medications as directed (includes completed medication regime)? Yes   Does the patient have a primary care provider?  Yes   Does the patient have an appointment with their PCP within 7 days of discharge? Yes   Has the patient kept scheduled appointments due by today? N/A   Comments PCP next week.   What is the Home health agency?  [facility]   Has home health visited the patient within 72 hours of discharge? Yes   Has all DME been delivered? No   Psychosocial issues? No   Did the patient receive a copy of their discharge instructions? Yes   Nursing interventions Reviewed instructions with patient   What is the patient's perception of their health status since discharge? Improving   Nursing interventions Nurse provided patient education   Is the patient/caregiver able to teach back TIME? T emperature - higher or lower than normal, I nfection - may have signs and symptoms of an infection, M ental Decline - confused, sleepy, difficult to arouse, E xtremely Ill - severe pain, discomfort, shortness of breath   Nursing interventions Nurse provided patient education   Is patient/caregiver able to teach back steps to recovery at home? Set small, achievable goals for return to baseline health, Rest and regain strength,  Talk about feelings with family/friends, Eat a balanced diet, Exercise as tolerated, Make a list of questions for PCP appoinment   Is the patient/caregiver able to teach back signs and symptoms of worsening condition: Fever, Shortness of breath/rapid respiratory rate, Edema   If the patient is a current smoker, are they able to teach back resources for cessation? Not a smoker   Is the patient/caregiver able to teach back the hierarchy of who to call/visit for symptoms/problems? PCP, Specialist, Home health nurse, Urgent Care, ED, 911 Yes   Additional teach back comments She is not having any issues with infusion.   Week 1 call completed? Yes   Wrap up additional comments She states she is doing her infusions and has no questions or concerns at this time.          DUGLAS BOWMAN - Registered Nurse

## 2023-01-26 ENCOUNTER — READMISSION MANAGEMENT (OUTPATIENT)
Dept: CALL CENTER | Facility: HOSPITAL | Age: 28
End: 2023-01-26
Payer: COMMERCIAL

## 2023-01-26 NOTE — OUTREACH NOTE
Sepsis Week 2 Survey    Flowsheet Row Responses   [facility] patient discharged from? [place]   Does the patient have one of the following disease processes/diagnoses(primary or secondary)? Sepsis   Week 2 attempt successful? Yes   Call start time 0859   Unsuccessful attempts Attempt 1   Call end time 0904   Discharge diagnosis Chest wall cellulitisLine sepsis status post Dolan catheter removal   Meds reviewed with patient/caregiver? Yes   Is the patient taking all medications as directed (includes completed medication regime)? Yes   Does the patient have a primary care provider?  Yes   Has the patient kept scheduled appointments due by today? Yes   Comments Goes every Monday to PCP for labs   Psychosocial issues? No   Did the patient receive a copy of their discharge instructions? Yes   Nursing interventions Reviewed instructions with patient   What is the patient's perception of their health status since discharge? Improving   Nursing interventions Nurse provided patient education   Is the patient/caregiver able to teach back TIME? T emperature - higher or lower than normal, I nfection - may have signs and symptoms of an infection, M ental Decline - confused, sleepy, difficult to arouse, E xtremely Ill - severe pain, discomfort, shortness of breath   Nursing interventions Nurse provided patient education   Is patient/caregiver able to teach back steps to recovery at home? Eat a balanced diet, Set small, achievable goals for return to baseline health   Is the patient/caregiver able to teach back signs and symptoms of worsening condition: Fever, Rapid heart rate (>90), Shortness of breath/rapid respiratory rate   Is the patient/caregiver able to teach back the hierarchy of who to call/visit for symptoms/problems? PCP, Specialist, Home health nurse, Urgent Care, ED, 911 Yes   Week 2 call completed? Yes   Wrap up additional comments Mother reports Pt still receiving ABT IV. Pt aware of when to call             RILEY GAUTAM - Registered Nurse

## 2023-03-01 ENCOUNTER — HOSPITAL ENCOUNTER (INPATIENT)
Facility: HOSPITAL | Age: 28
LOS: 3 days | Discharge: HOME | DRG: 176 | End: 2023-03-05
Attending: EMERGENCY MEDICINE | Admitting: HOSPITALIST
Payer: COMMERCIAL

## 2023-03-01 ENCOUNTER — APPOINTMENT (OUTPATIENT)
Dept: CT IMAGING | Facility: HOSPITAL | Age: 28
DRG: 176 | End: 2023-03-01
Payer: COMMERCIAL

## 2023-03-01 ENCOUNTER — APPOINTMENT (OUTPATIENT)
Dept: GENERAL RADIOLOGY | Facility: HOSPITAL | Age: 28
DRG: 176 | End: 2023-03-01
Payer: COMMERCIAL

## 2023-03-01 DIAGNOSIS — I26.93 SINGLE SUBSEGMENTAL PULMONARY EMBOLISM WITHOUT ACUTE COR PULMONALE: Primary | ICD-10-CM

## 2023-03-01 DIAGNOSIS — R09.02 HYPOXIA: ICD-10-CM

## 2023-03-01 DIAGNOSIS — K31.84 GASTROPARESIS: ICD-10-CM

## 2023-03-01 LAB
ALBUMIN SERPL-MCNC: 4.1 G/DL (ref 3.5–5.2)
ALBUMIN/GLOB SERPL: 0.9 G/DL
ALP SERPL-CCNC: 108 U/L (ref 39–117)
ALT SERPL W P-5'-P-CCNC: 55 U/L (ref 1–33)
ANION GAP SERPL CALCULATED.3IONS-SCNC: 11 MMOL/L (ref 5–15)
AST SERPL-CCNC: 40 U/L (ref 1–32)
BASOPHILS # BLD AUTO: 0.09 10*3/MM3 (ref 0–0.2)
BASOPHILS NFR BLD AUTO: 1.1 % (ref 0–1.5)
BILIRUB SERPL-MCNC: 0.4 MG/DL (ref 0–1.2)
BUN SERPL-MCNC: 7 MG/DL (ref 6–20)
BUN/CREAT SERPL: 7.4 (ref 7–25)
CALCIUM SPEC-SCNC: 9.4 MG/DL (ref 8.6–10.5)
CHLORIDE SERPL-SCNC: 102 MMOL/L (ref 98–107)
CO2 SERPL-SCNC: 22 MMOL/L (ref 22–29)
CREAT SERPL-MCNC: 0.94 MG/DL (ref 0.57–1)
DEPRECATED RDW RBC AUTO: 42.6 FL (ref 37–54)
EGFRCR SERPLBLD CKD-EPI 2021: 85.5 ML/MIN/1.73
EOSINOPHIL # BLD AUTO: 0.9 10*3/MM3 (ref 0–0.4)
EOSINOPHIL NFR BLD AUTO: 10.9 % (ref 0.3–6.2)
ERYTHROCYTE [DISTWIDTH] IN BLOOD BY AUTOMATED COUNT: 15.2 % (ref 12.3–15.4)
GLOBULIN UR ELPH-MCNC: 4.4 GM/DL
GLUCOSE SERPL-MCNC: 106 MG/DL (ref 65–99)
HCG SERPL QL: NEGATIVE
HCT VFR BLD AUTO: 34.7 % (ref 34–46.6)
HGB BLD-MCNC: 11.3 G/DL (ref 12–15.9)
HOLD SPECIMEN: NORMAL
HOLD SPECIMEN: NORMAL
IMM GRANULOCYTES # BLD AUTO: 0.04 10*3/MM3 (ref 0–0.05)
IMM GRANULOCYTES NFR BLD AUTO: 0.5 % (ref 0–0.5)
LYMPHOCYTES # BLD AUTO: 2.23 10*3/MM3 (ref 0.7–3.1)
LYMPHOCYTES NFR BLD AUTO: 26.9 % (ref 19.6–45.3)
MCH RBC QN AUTO: 25.2 PG (ref 26.6–33)
MCHC RBC AUTO-ENTMCNC: 32.6 G/DL (ref 31.5–35.7)
MCV RBC AUTO: 77.3 FL (ref 79–97)
MONOCYTES # BLD AUTO: 0.32 10*3/MM3 (ref 0.1–0.9)
MONOCYTES NFR BLD AUTO: 3.9 % (ref 5–12)
NEUTROPHILS NFR BLD AUTO: 4.7 10*3/MM3 (ref 1.7–7)
NEUTROPHILS NFR BLD AUTO: 56.7 % (ref 42.7–76)
NRBC BLD AUTO-RTO: 0 /100 WBC (ref 0–0.2)
NT-PROBNP SERPL-MCNC: 42.4 PG/ML (ref 0–450)
PLATELET # BLD AUTO: 329 10*3/MM3 (ref 140–450)
PMV BLD AUTO: 9.9 FL (ref 6–12)
POTASSIUM SERPL-SCNC: 3.7 MMOL/L (ref 3.5–5.2)
PROT SERPL-MCNC: 8.5 G/DL (ref 6–8.5)
QT INTERVAL: 384 MS
RBC # BLD AUTO: 4.49 10*6/MM3 (ref 3.77–5.28)
SODIUM SERPL-SCNC: 135 MMOL/L (ref 136–145)
TROPONIN T SERPL HS-MCNC: <6 NG/L
WBC NRBC COR # BLD: 8.28 10*3/MM3 (ref 3.4–10.8)
WHOLE BLOOD HOLD COAG: NORMAL
WHOLE BLOOD HOLD SPECIMEN: NORMAL

## 2023-03-01 PROCEDURE — 99285 EMERGENCY DEPT VISIT HI MDM: CPT

## 2023-03-01 PROCEDURE — 80053 COMPREHEN METABOLIC PANEL: CPT

## 2023-03-01 PROCEDURE — 85025 COMPLETE CBC W/AUTO DIFF WBC: CPT

## 2023-03-01 PROCEDURE — 84703 CHORIONIC GONADOTROPIN ASSAY: CPT | Performed by: EMERGENCY MEDICINE

## 2023-03-01 PROCEDURE — 71046 X-RAY EXAM CHEST 2 VIEWS: CPT

## 2023-03-01 PROCEDURE — 84484 ASSAY OF TROPONIN QUANT: CPT

## 2023-03-01 PROCEDURE — 93010 ELECTROCARDIOGRAM REPORT: CPT | Performed by: INTERNAL MEDICINE

## 2023-03-01 PROCEDURE — 93005 ELECTROCARDIOGRAM TRACING: CPT

## 2023-03-01 PROCEDURE — 83880 ASSAY OF NATRIURETIC PEPTIDE: CPT | Performed by: EMERGENCY MEDICINE

## 2023-03-01 PROCEDURE — 36415 COLL VENOUS BLD VENIPUNCTURE: CPT

## 2023-03-01 PROCEDURE — 93005 ELECTROCARDIOGRAM TRACING: CPT | Performed by: EMERGENCY MEDICINE

## 2023-03-01 RX ORDER — ASPIRIN 325 MG
325 TABLET ORAL ONCE
Status: DISCONTINUED | OUTPATIENT
Start: 2023-03-01 | End: 2023-03-02

## 2023-03-01 RX ORDER — SODIUM CHLORIDE 0.9 % (FLUSH) 0.9 %
10 SYRINGE (ML) INJECTION AS NEEDED
Status: DISCONTINUED | OUTPATIENT
Start: 2023-03-01 | End: 2023-03-05

## 2023-03-01 NOTE — ED NOTES
Pt arrives via EMS from home for chest pain and soa for the past 1.5 hours. Pt was at rest when the symptoms started. Pt also reports nausea and dizziness. Pt was given 324 mg of aspirin and 4mg of ondansetron.

## 2023-03-01 NOTE — ED NOTES
Pt to ED from home via ambulance. Pt reports chest pain and SOA for about 2 hours. Pt reports hx of PE, pt reports being on Lovenox.

## 2023-03-02 ENCOUNTER — APPOINTMENT (OUTPATIENT)
Dept: CARDIOLOGY | Facility: HOSPITAL | Age: 28
DRG: 176 | End: 2023-03-02
Payer: COMMERCIAL

## 2023-03-02 ENCOUNTER — APPOINTMENT (OUTPATIENT)
Dept: CT IMAGING | Facility: HOSPITAL | Age: 28
DRG: 176 | End: 2023-03-02
Payer: COMMERCIAL

## 2023-03-02 PROBLEM — I26.99 PULMONARY EMBOLUS: Status: ACTIVE | Noted: 2023-03-02

## 2023-03-02 LAB
APTT PPP: 42.2 SECONDS (ref 22.7–35.4)
APTT PPP: 62.7 SECONDS (ref 22.7–35.4)
APTT PPP: >200 SECONDS (ref 22.7–35.4)
BASOPHILS # BLD AUTO: 0.05 10*3/MM3 (ref 0–0.2)
BASOPHILS NFR BLD AUTO: 0.6 % (ref 0–1.5)
D DIMER PPP FEU-MCNC: 0.39 MCGFEU/ML (ref 0–0.5)
DEPRECATED RDW RBC AUTO: 43.9 FL (ref 37–54)
EOSINOPHIL # BLD AUTO: 1.07 10*3/MM3 (ref 0–0.4)
EOSINOPHIL NFR BLD AUTO: 12.7 % (ref 0.3–6.2)
ERYTHROCYTE [DISTWIDTH] IN BLOOD BY AUTOMATED COUNT: 15.5 % (ref 12.3–15.4)
HCT VFR BLD AUTO: 33.3 % (ref 34–46.6)
HGB BLD-MCNC: 10.7 G/DL (ref 12–15.9)
IMM GRANULOCYTES # BLD AUTO: 0.02 10*3/MM3 (ref 0–0.05)
IMM GRANULOCYTES NFR BLD AUTO: 0.2 % (ref 0–0.5)
INR PPP: 1.09 (ref 0.9–1.1)
LYMPHOCYTES # BLD AUTO: 3.39 10*3/MM3 (ref 0.7–3.1)
LYMPHOCYTES NFR BLD AUTO: 40.1 % (ref 19.6–45.3)
MCH RBC QN AUTO: 25.2 PG (ref 26.6–33)
MCHC RBC AUTO-ENTMCNC: 32.1 G/DL (ref 31.5–35.7)
MCV RBC AUTO: 78.5 FL (ref 79–97)
MONOCYTES # BLD AUTO: 0.46 10*3/MM3 (ref 0.1–0.9)
MONOCYTES NFR BLD AUTO: 5.4 % (ref 5–12)
NEUTROPHILS NFR BLD AUTO: 3.46 10*3/MM3 (ref 1.7–7)
NEUTROPHILS NFR BLD AUTO: 41 % (ref 42.7–76)
NRBC BLD AUTO-RTO: 0 /100 WBC (ref 0–0.2)
PLATELET # BLD AUTO: 272 10*3/MM3 (ref 140–450)
PMV BLD AUTO: 10 FL (ref 6–12)
PROTHROMBIN TIME: 14.3 SECONDS (ref 11.7–14.2)
RBC # BLD AUTO: 4.24 10*6/MM3 (ref 3.77–5.28)
UFH PPP CHRO-ACNC: 0.69 IU/ML
WBC NRBC COR # BLD: 8.45 10*3/MM3 (ref 3.4–10.8)

## 2023-03-02 PROCEDURE — 25010000002 HEPARIN (PORCINE) PER 1000 UNITS: Performed by: EMERGENCY MEDICINE

## 2023-03-02 PROCEDURE — 85240 CLOT FACTOR VIII AHG 1 STAGE: CPT | Performed by: INTERNAL MEDICINE

## 2023-03-02 PROCEDURE — 25010000002 ONDANSETRON PER 1 MG: Performed by: HOSPITALIST

## 2023-03-02 PROCEDURE — 71275 CT ANGIOGRAPHY CHEST: CPT

## 2023-03-02 PROCEDURE — 63710000001 DRONABINOL PER 2.5 MG: Performed by: HOSPITALIST

## 2023-03-02 PROCEDURE — 99223 1ST HOSP IP/OBS HIGH 75: CPT | Performed by: INTERNAL MEDICINE

## 2023-03-02 PROCEDURE — 85379 FIBRIN DEGRADATION QUANT: CPT | Performed by: INTERNAL MEDICINE

## 2023-03-02 PROCEDURE — 85520 HEPARIN ASSAY: CPT | Performed by: INTERNAL MEDICINE

## 2023-03-02 PROCEDURE — 25010000002 HEPARIN (PORCINE) 25000-0.45 UT/250ML-% SOLUTION: Performed by: EMERGENCY MEDICINE

## 2023-03-02 PROCEDURE — 85300 ANTITHROMBIN III ACTIVITY: CPT | Performed by: INTERNAL MEDICINE

## 2023-03-02 PROCEDURE — 85730 THROMBOPLASTIN TIME PARTIAL: CPT | Performed by: HOSPITALIST

## 2023-03-02 PROCEDURE — 93970 EXTREMITY STUDY: CPT

## 2023-03-02 PROCEDURE — 25510000001 IOPAMIDOL PER 1 ML: Performed by: EMERGENCY MEDICINE

## 2023-03-02 PROCEDURE — 85025 COMPLETE CBC W/AUTO DIFF WBC: CPT | Performed by: HOSPITALIST

## 2023-03-02 PROCEDURE — 85610 PROTHROMBIN TIME: CPT | Performed by: EMERGENCY MEDICINE

## 2023-03-02 PROCEDURE — 85730 THROMBOPLASTIN TIME PARTIAL: CPT | Performed by: EMERGENCY MEDICINE

## 2023-03-02 RX ORDER — ONDANSETRON 2 MG/ML
4 INJECTION INTRAMUSCULAR; INTRAVENOUS EVERY 4 HOURS PRN
Status: DISCONTINUED | OUTPATIENT
Start: 2023-03-02 | End: 2023-03-05

## 2023-03-02 RX ORDER — HEPARIN SODIUM 10000 [USP'U]/100ML
18 INJECTION, SOLUTION INTRAVENOUS
Status: DISCONTINUED | OUTPATIENT
Start: 2023-03-02 | End: 2023-03-04

## 2023-03-02 RX ORDER — PROCHLORPERAZINE MALEATE 10 MG
10 TABLET ORAL EVERY 6 HOURS PRN
Status: DISCONTINUED | OUTPATIENT
Start: 2023-03-02 | End: 2023-03-05

## 2023-03-02 RX ORDER — MECLIZINE HYDROCHLORIDE 25 MG/1
25 TABLET ORAL 3 TIMES DAILY PRN
Status: DISCONTINUED | OUTPATIENT
Start: 2023-03-02 | End: 2023-03-05

## 2023-03-02 RX ORDER — HEPARIN SODIUM 5000 [USP'U]/ML
40-80 INJECTION, SOLUTION INTRAVENOUS; SUBCUTANEOUS EVERY 6 HOURS PRN
Status: DISCONTINUED | OUTPATIENT
Start: 2023-03-02 | End: 2023-03-04

## 2023-03-02 RX ORDER — LEVOTHYROXINE SODIUM 0.05 MG/1
50 TABLET ORAL DAILY
Status: DISCONTINUED | OUTPATIENT
Start: 2023-03-02 | End: 2023-03-05 | Stop reason: HOSPADM

## 2023-03-02 RX ORDER — PANTOPRAZOLE SODIUM 40 MG/1
40 TABLET, DELAYED RELEASE ORAL
Status: DISCONTINUED | OUTPATIENT
Start: 2023-03-03 | End: 2023-03-05 | Stop reason: HOSPADM

## 2023-03-02 RX ORDER — HEPARIN SODIUM 5000 [USP'U]/ML
40-80 INJECTION, SOLUTION INTRAVENOUS; SUBCUTANEOUS EVERY 6 HOURS PRN
Status: DISCONTINUED | OUTPATIENT
Start: 2023-03-02 | End: 2023-03-02 | Stop reason: SDUPTHER

## 2023-03-02 RX ORDER — MONTELUKAST SODIUM 10 MG/1
10 TABLET ORAL NIGHTLY
Status: DISCONTINUED | OUTPATIENT
Start: 2023-03-02 | End: 2023-03-05 | Stop reason: HOSPADM

## 2023-03-02 RX ORDER — HEPARIN SODIUM 10000 [USP'U]/100ML
18 INJECTION, SOLUTION INTRAVENOUS
Status: DISCONTINUED | OUTPATIENT
Start: 2023-03-02 | End: 2023-03-02 | Stop reason: SDUPTHER

## 2023-03-02 RX ORDER — DRONABINOL 2.5 MG/1
5 CAPSULE ORAL 3 TIMES DAILY PRN
Status: DISCONTINUED | OUTPATIENT
Start: 2023-03-02 | End: 2023-03-05

## 2023-03-02 RX ORDER — HEPARIN SODIUM 5000 [USP'U]/ML
80 INJECTION, SOLUTION INTRAVENOUS; SUBCUTANEOUS ONCE
Status: COMPLETED | OUTPATIENT
Start: 2023-03-02 | End: 2023-03-02

## 2023-03-02 RX ORDER — LOPERAMIDE HYDROCHLORIDE 2 MG/1
2 CAPSULE ORAL 4 TIMES DAILY PRN
Status: DISCONTINUED | OUTPATIENT
Start: 2023-03-02 | End: 2023-03-05

## 2023-03-02 RX ORDER — HEPARIN SODIUM 5000 [USP'U]/ML
80 INJECTION, SOLUTION INTRAVENOUS; SUBCUTANEOUS ONCE
Status: DISCONTINUED | OUTPATIENT
Start: 2023-03-02 | End: 2023-03-02

## 2023-03-02 RX ORDER — ALBUTEROL SULFATE 90 UG/1
2 AEROSOL, METERED RESPIRATORY (INHALATION) EVERY 4 HOURS PRN
Status: DISCONTINUED | OUTPATIENT
Start: 2023-03-02 | End: 2023-03-02

## 2023-03-02 RX ORDER — MIDODRINE HYDROCHLORIDE 5 MG/1
10 TABLET ORAL
Status: DISCONTINUED | OUTPATIENT
Start: 2023-03-02 | End: 2023-03-05 | Stop reason: HOSPADM

## 2023-03-02 RX ORDER — ALBUTEROL SULFATE 2.5 MG/3ML
2.5 SOLUTION RESPIRATORY (INHALATION) EVERY 4 HOURS PRN
Status: DISCONTINUED | OUTPATIENT
Start: 2023-03-02 | End: 2023-03-05

## 2023-03-02 RX ORDER — MELATONIN
1000 DAILY
Status: DISCONTINUED | OUTPATIENT
Start: 2023-03-02 | End: 2023-03-05 | Stop reason: HOSPADM

## 2023-03-02 RX ORDER — ESCITALOPRAM OXALATE 20 MG/1
20 TABLET ORAL DAILY
Status: DISCONTINUED | OUTPATIENT
Start: 2023-03-02 | End: 2023-03-05 | Stop reason: HOSPADM

## 2023-03-02 RX ORDER — GABAPENTIN 100 MG/1
100 CAPSULE ORAL 3 TIMES DAILY
Status: DISCONTINUED | OUTPATIENT
Start: 2023-03-02 | End: 2023-03-05 | Stop reason: HOSPADM

## 2023-03-02 RX ORDER — PYRIDOSTIGMINE BROMIDE 60 MG/1
30 TABLET ORAL 2 TIMES DAILY
Status: DISCONTINUED | OUTPATIENT
Start: 2023-03-02 | End: 2023-03-05 | Stop reason: HOSPADM

## 2023-03-02 RX ADMIN — ONDANSETRON 4 MG: 2 INJECTION INTRAMUSCULAR; INTRAVENOUS at 14:06

## 2023-03-02 RX ADMIN — IOPAMIDOL 95 ML: 755 INJECTION, SOLUTION INTRAVENOUS at 00:56

## 2023-03-02 RX ADMIN — HEPARIN SODIUM 4400 UNITS: 5000 INJECTION INTRAVENOUS; SUBCUTANEOUS at 17:12

## 2023-03-02 RX ADMIN — ONDANSETRON 4 MG: 2 INJECTION INTRAMUSCULAR; INTRAVENOUS at 20:41

## 2023-03-02 RX ADMIN — MIDODRINE HYDROCHLORIDE 10 MG: 5 TABLET ORAL at 16:55

## 2023-03-02 RX ADMIN — HEPARIN SODIUM 18 UNITS/KG/HR: 10000 INJECTION, SOLUTION INTRAVENOUS at 01:39

## 2023-03-02 RX ADMIN — METOPROLOL TARTRATE 37.5 MG: 25 TABLET, FILM COATED ORAL at 14:05

## 2023-03-02 RX ADMIN — ESCITALOPRAM 20 MG: 20 TABLET, FILM COATED ORAL at 17:15

## 2023-03-02 RX ADMIN — HEPARIN SODIUM 8700 UNITS: 5000 INJECTION INTRAVENOUS; SUBCUTANEOUS at 01:37

## 2023-03-02 RX ADMIN — ONDANSETRON 4 MG: 2 INJECTION INTRAMUSCULAR; INTRAVENOUS at 09:58

## 2023-03-02 RX ADMIN — DRONABINOL 5 MG: 2.5 CAPSULE ORAL at 16:53

## 2023-03-02 RX ADMIN — ONDANSETRON 4 MG: 2 INJECTION INTRAMUSCULAR; INTRAVENOUS at 04:48

## 2023-03-02 RX ADMIN — HEPARIN SODIUM 17 UNITS/KG/HR: 10000 INJECTION, SOLUTION INTRAVENOUS at 19:06

## 2023-03-02 RX ADMIN — LEVOTHYROXINE SODIUM 50 MCG: 0.05 TABLET ORAL at 14:06

## 2023-03-02 RX ADMIN — Medication 1000 UNITS: at 15:41

## 2023-03-02 RX ADMIN — GABAPENTIN 100 MG: 100 CAPSULE ORAL at 15:41

## 2023-03-02 NOTE — CONSULTS
Subjective     REASON FOR CONSULTATION: Management of anticoagulation  Provide an opinion on any further workup or treatment    REQUESTING PHYSICIAN: [facility]     RECORDS OBTAINED:  Records of the patients history including those obtained from the referring provider were reviewed and summarized in detail.    HISTORY OF PRESENT ILLNESS:  The patient is a 27 y.o. year old female who is here for an opinion about the above issue.    History of Present Illness patient is a 27-year-old female with Erler's Danlos syndrome and POTS syndrome and gastroparesis who has an indwelling Dolan catheter for IV fluids and antiemetics admitted with shortness of breath and drop in her O2 sats with evidence of new pulmonary embolism despite Lovenox 110 mg subcu twice daily which she has been on since mid January when she was admitted with another PE after her Lovenox was cut down to once a day 80 mg by her hematologist     Patient reports she has been extremely compliant with her Lovenox dosing because she was afraid to have another blood clot and was taking the 110 mg dose twice a day and does not understand why she has a new blood clot despite this.    On admission to the ER PT was normal PTT was 42.2 seconds but it is not clear whether this dose was giving her therapeutic PT and PTT's which really is essential for us to know before we make any treatment changes  She is currently on IV heparin and tolerating it well    Of note she had never had a DVT in the past despite being overweight and on birth control pills until 2021 after her Dolan catheter was placed when she had a first clot.  Second clot was in January of this year and this is her third PE.  Each time she has had no clots in her legs or arm veins    She cannot live without IV access because her quality of life is miserable without her IV antiemetics and IV fluids although I suspect the Dolan may be source of thrombosis    Past Medical History:   Diagnosis Date    • Bronchitis    • Chest pain    • Chronic hypotension    • Eczema    • Phoebe-Danlos syndrome    • Gastroparesis    • GERD (gastroesophageal reflux disease)    • IBS (irritable bowel syndrome)    • Lightheadedness    • POTS (postural orthostatic tachycardia syndrome)    • Rectal fissure    • Rosacea    • Tachycardia         Past Surgical History:   Procedure Laterality Date   • COLONOSCOPY     • MEDIPORT INSERTION, DOUBLE  09/01/2020    [facility]    • UPPER GASTROINTESTINAL ENDOSCOPY     • VENOUS ACCESS DEVICE (PORT) INSERTION Left 10/16/2020    Procedure: INSERTION VENOUS ACCESS DEVICE UNDER FLURO;  Surgeon: Pa Lane MD;  Location: [facility];  Service: General;  Laterality: Left;   • VENOUS ACCESS DEVICE (PORT) INSERTION Right 10/26/2021    Procedure: REMOVAL AND INSERTION OF VAVLERDE CATHETER;  Surgeon: Pa Lane MD;  Location: [facility] OR;  Service: General;  Laterality: Right;   • VENOUS ACCESS DEVICE (PORT) INSERTION Left 1/13/2023    Procedure: INSERTION OF VALVERDE CATHETER, VENOGARM;  Surgeon: Cristhian Anne MD;  Location: [facility] OR;  Service: General;  Laterality: Left;      This is a 27-year-old woman with Phoebe-Danlos syndrome, POTS syndrome with chronic hypotension, irritable bowel syndrome.  She has an indwelling venous catheter for pulm fluid replacement.  She has been seen previously by my partner Dr. Medina in April 2022 seen for recurrent pulmonary embolism while on Eliquis; she is also followed [facility].  On 4/30/2022 the patient had shortness of breath and palpitations and a CT angiogram was positive for pulmonary emboli within pulmonary artery branches in the right lower lobe with no infarction or heart strain.  She had been on Eliquis at the time and recommendation was made by Dr. Medina to switch anticoagulation to Coumadin.  A hypercoagulable evaluation on 4/30/2022 was negative for factor V Leiden, negative prothrombin gene  mutation, normal protein S studies, Antithrombin 3 88%, negative lupus anticoagulant, negative beta-2 glycoprotein 1 profile, negative cardiolipin profile.  The patient reports that she had difficulty maintaining a therapeutic INR and she was subsequently switched to Lovenox 80 mg every 12 hours until last week dose was decreased to once daily dosing for prophylaxis by Dr. Mcdonald.     The patient presented to the ER on 1/9/2023 with complaints of chest redness and discomfort at a central line site in the right chest.  She denies to me significant chest pain or shortness of breath prior to admission.  A CT angiogram chest showed filling defects in subsegmental left lower lobe pulmonary artery branches new from most recent imaging 6/15/2022 but similar to prior films 9/8/2021 consistent with recurrent pulmonary embolus.  There is a right central venous catheter tip in the superior vena cava and stranding along the subcutaneous portion of the catheter consistent with cellulitis/soft tissue infection.  The patient is undergone line removal and on antibiotic therapy.  On Lovenox now        No current facility-administered medications on file prior to encounter.     Current Outpatient Medications on File Prior to Encounter   Medication Sig Dispense Refill   • albuterol sulfate  (90 Base) MCG/ACT inhaler Inhale 2 puffs Every 4 (Four) Hours As Needed for Wheezing.     • cholecalciferol (Cholecalciferol) 25 MCG (1000 UT) tablet Take 1 tablet by mouth Daily.     • cyanocobalamin 1000 MCG/ML injection Inject 1 mL into the appropriate muscle as directed by prescriber Every 28 (Twenty-Eight) Days. (Patient taking differently: Inject 1 mL into the appropriate muscle as directed by prescriber Every 28 (Twenty-Eight) Days. Usually 15th of the month) 1 mL 0   • dronabinol (MARINOL) 5 MG capsule Take 1 capsule by mouth 3 (Three) Times a Day As Needed (nausea).     • Emgality 120 MG/ML auto-injector pen Every 30 (Thirty) Days.  Takes the 20th of the month     • Enoxaparin Sodium (LOVENOX) 80 MG/0.8ML solution prefilled syringe syringe 1.1 mL Every 12 (Twelve) Hours.     • escitalopram (LEXAPRO) 10 MG tablet Take 2 tablets by mouth Daily.     • gabapentin (NEURONTIN) 100 MG capsule Take 1 capsule by mouth 3 (Three) Times a Day.     • Ginger, Zingiber officinalis, (Ginger Root) 550 MG capsule Take  by mouth Daily.     • Heparin & NaCl Lock Flush (HEPARIN COMBINATION IV) Infuse  into a venous catheter 2 (Two) Times a Day.     • hydrOXYzine pamoate (VISTARIL) 25 MG capsule Take 1-2 capsules by mouth 3 (Three) Times a Day As Needed (anxiety).     • Immune Globulin, Human,-ifas (PANZYGA IV) Infuse  into a venous catheter Take As Directed. Every Tuesday     • levothyroxine (SYNTHROID, LEVOTHROID) 50 MCG tablet Take 1 tablet by mouth Daily.     • loperamide (IMODIUM) 2 MG capsule Take 1 capsule by mouth 4 (Four) Times a Day As Needed for Diarrhea.     • meclizine (ANTIVERT) 25 MG tablet Take 1 tablet by mouth 3 (Three) Times a Day As Needed for Dizziness.     • metoprolol tartrate (LOPRESSOR) 25 MG tablet 12.5mg in am /  25mg middle of day/  12.5mg in the pm (Patient taking differently: 1.5 tablets Every 8 (Eight) Hours.) 180 tablet 3   • midodrine (PROAMATINE) 10 MG tablet TAKE 1 TABLET BY MOUTH THREE TIMES DAILY 90 tablet 6   • montelukast (SINGULAIR) 10 MG tablet Take 1 tablet by mouth Every Night.     • omeprazole (priLOSEC) 20 MG capsule Take 2 capsules by mouth Every Night.     • phenazopyridine (PYRIDIUM) 100 MG tablet Take 1 tablet by mouth 3 (Three) Times a Day As Needed.     • prochlorperazine (COMPAZINE) 10 MG tablet Take 1 tablet by mouth Every 6 (Six) Hours As Needed for Nausea or Vomiting.     • promethazine (PHENERGAN) 25 MG/ML injection Inject  as directed. IV every 3 hours     • pyridostigmine (MESTINON) 60 MG tablet TAKE 1/2 TABLET BY MOUTH TWICE DAILY 90 tablet 3   • RABEprazole (ACIPHEX) 20 MG EC tablet Take 1 tablet by mouth  Daily.     • Ondansetron HCl (ZOFRAN IV) Infuse 8 mg into a venous catheter Every 6 (Six) Hours.     • [DISCONTINUED] Menaquinone-7 (Vitamin K2) 100 MCG capsule Take 100 mcg by mouth Daily.     • [DISCONTINUED] montelukast (SINGULAIR) 10 MG tablet Take 1 tablet by mouth Every Night.     • [DISCONTINUED] ondansetron ODT (ZOFRAN ODT) 8 MG disintegrating tablet Take 1 tablet by mouth Every 8 (Eight) Hours As Needed for Nausea or Vomiting. 12 tablet 0        ALLERGIES:    Allergies   Allergen Reactions   • Eggs Or Egg-Derived Products GI Intolerance     Rash & vomiting   • Pepcid [Famotidine] Rash and GI Intolerance   • Scopolamine Rash and GI Intolerance        Social History     Socioeconomic History   • Marital status:    Tobacco Use   • Smoking status: Never   • Smokeless tobacco: Never   Vaping Use   • Vaping Use: Never used   Substance and Sexual Activity   • Alcohol use: No   • Drug use: No   • Sexual activity: Yes     Partners: Female     Birth control/protection: None        Family History   Problem Relation Age of Onset   • Hypertension Mother    • Diabetes Mother    • Diabetes Father    • Heart disease Paternal Grandfather    • Stroke Paternal Grandfather    • Diabetes Paternal Grandfather    • Cancer Paternal Grandfather    • Coronary artery disease Maternal Grandmother         MGM with 4 vessel CABG and multiple stents   • Cancer Maternal Grandmother    • Coronary artery disease Maternal Uncle         Maternal uncle with MI   • Breast cancer Other    • Malig Hyperthermia Neg Hx         Review of Systems   Constitutional: Positive for fatigue.   Respiratory: Positive for shortness of breath.    Gastrointestinal: Positive for nausea and vomiting.   Hematological: Bruises/bleeds easily.        Objective     Vitals:    03/02/23 0233 03/02/23 0329 03/02/23 0330 03/02/23 0741   BP: 130/76   115/62   BP Location:    Right arm   Patient Position:    Lying   Pulse: 89 98 95 72   Resp: 18   18   Temp: 97.8 °F  "(36.6 °C)   97.7 °F (36.5 °C)   TempSrc: Oral   Oral   SpO2:  94%  97%   Weight: 109 kg (240 lb 9.6 oz)      Height: 162.6 cm (64\")        No flowsheet data found.    Physical Exam     CONSTITUTIONAL:  Vital signs reviewed.  No distress, looks comfortable.  EYES:  Conjunctivae and lids unremarkable.  PERRLA  EARS,NOSE,MOUTH,THROAT:  Ears and nose appear unremarkable.  Lips, teeth, gums appear unremarkable.  RESPIRATORY:  Normal respiratory effort.  Lungs clear to auscultation bilaterally.  CARDIOVASCULAR:  Normal S1, S2.  No murmurs rubs or gallops.  No significant lower extremity edema.  GASTROINTESTINAL: Abdomen appears unremarkable.  Nontender.  No hepatomegaly.  No splenomegaly.  Multiple bruises from Lovenox injection  LYMPHATIC:  No cervical, supraclavicular, axillary lymphadenopathy.  SKIN:  Warm.  No rashes.  PSYCHIATRIC:  Normal judgment and insight.  Normal mood and affect.      RECENT LABS:  Hematology WBC   Date Value Ref Range Status   03/02/2023 8.45 3.40 - 10.80 10*3/mm3 Final     RBC   Date Value Ref Range Status   03/02/2023 4.24 3.77 - 5.28 10*6/mm3 Final     Hemoglobin   Date Value Ref Range Status   03/02/2023 10.7 (L) 12.0 - 15.9 g/dL Final     Hematocrit   Date Value Ref Range Status   03/02/2023 33.3 (L) 34.0 - 46.6 % Final     Platelets   Date Value Ref Range Status   03/02/2023 272 140 - 450 10*3/mm3 Final      CTA  IMPRESSION:       1.  Compared to 1/9/2023 There is a new pulmonary defect in the proximal subsegmental branch   of the left posterior apical segment (series 4; images 50-52).  The   filling defect involving the proximal left anterior basal segment is   different in appearance from the previous examination with the partially   occlusive filling defect proximal to the bifurcation.  Previously the   filling defect was noted distal the bifurcation.  Findings are most   consistent with subsegmental recurrent left-sided pulmonary emboli.  2.  No CT evidence for right heart strain.  3. "  No focal airspace consolidation.  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  • Prior pulmonary embolism 4/20/2022 while on Eliquis (Eliquis failure likely secondary to malabsorption) transition to warfarin  • Difficulty maintaining therapeutic INR on warfarin likely secondary to erratic/malabsorption transition to Lovenox 80 mg every 12 hours (subtherapeutic based on current weight), been dose reduced to once daily dosing 1 week prior to current admission  • 1/9/2023 admitted with chest line infection/cellulitis- CT angiogram chest incidentally showed new filling defect subsegmental left lower lobe pulmonary artery on 80 mg Lovenox subcu daily  • Previous hypercoagulable evaluation has been unremarkable  • Lovenox dose increased to 110 mg every 12 in 1/23  • Readmitted with new PE on this dose of Lovenox on 3/2/2023     *Infected Dolan catheter/cellulitis  • Catheter has been removed  • Cultures and antibiotics per ID  • Catheter replaced     * Phoebe-Danlos syndrome, POTS syndrome with chronic hypotension, irritable bowel syndrome requiring indwelling central line for fluid administration etc.     *Gastroparesis      plan/recommendations:  1.agree with IV heparin -check Antithrombin III and factor VIII levels  2.  Doppler legs and check D-dimer  3.?  IVC filter  4.?  Arixtra 10 mg subcu daily     discussed with her outpatient hematologist  Thank you for the consult  I spent >65 total minutes, face-to-face, caring for Liliam today.  Greater than 50% of this time involved counseling and/or coordination of care as documented within this note.

## 2023-03-02 NOTE — ED NOTES
Pt c/o chest pain, shortness of breath, and O2 saturation in the 80's. Pt states she put oxygen on at home and got her O2 saturation up into the low 90's. Pt is currently on 2L O2 via NC at 98%. Pt states she recently had pneumonia and bronchitis. Hx of pulmonary embolism.     This RN wore mask, gloves, eyewear and all other appropriate PPE while performing patient care.

## 2023-03-02 NOTE — PLAN OF CARE
Goal Outcome Evaluation:  Plan of Care Reviewed With: patient, significant other        Progress: no change  Outcome Evaluation: VSS, pt from ER on Heparin gtt per protocol, PTT timed for recheck at 0730, denies pain, denies SOA, pt on O2 2L while asleep per request because home CPAP not available, family to bring in today, Zofran IV x1 for c/o persistent nausea

## 2023-03-02 NOTE — H&P
History and physical    Primary care physician  Dr. Jason Borrero    Chief complaint  Shortness of breath    History of present illness  27-year-old white female with history of chronic orthostatic hypotension with POTS syndrome Phoebe-Danlos syndrome hypothyroidism gastroparesis irritable bowel syndrome and gastroesophageal reflux disease who is well-known to our service who also have recurrent pulmonary embolism on Lovenox at home as she felt oral anticoagulants brought to the emergency room with increased shortness of breath for last 2 to 3 days which is getting worse and develops chest discomfort yesterday but no fever chills cough congestion but remain nauseated and did not miss any medications especially Lovenox evaluated in ER found to have recurrent syncopal symptoms segmental pulm embolism despite full dose of Lovenox with no noncompliance admitted for management.    PAST MEDICAL HISTORY  • Bronchitis     • Chest pain     • Chronic hypotension     • Eczema     • GERD (gastroesophageal reflux disease)     • IBS (irritable bowel syndrome)     • Lightheadedness     • Rectal fissure     • Rosacea     • Tachycardia        PAST SURGICAL HISTORY              Procedure Laterality Date   • COLONOSCOPY       • MEDIPORT INSERTION, DOUBLE   09/01/2020     [facility]    • UPPER GASTROINTESTINAL ENDOSCOPY       • VENOUS ACCESS DEVICE (PORT) INSERTION Left 10/16/2020     Procedure: INSERTION VENOUS ACCESS DEVICE UNDER FLURO;  Surgeon: Pa Lane MD;  Location: [facility] OR;  Service: General;  Laterality: Left;   • VENOUS ACCESS DEVICE (PORT) INSERTION Right 10/26/2021     Procedure: REMOVAL AND INSERTION OF VALVERDE CATHETER;  Surgeon: Pa Lane MD;  Location: [facility] OR;  Service: General;  Laterality: Right;   • VENOUS ACCESS DEVICE (PORT) INSERTION Left 1/13/2023     Procedure: INSERTION OF VALVERDE CATHETER, VENOGARM;  Surgeon: Cristhian Anne MD;  Location: [facility] OR;  Service:  "General;  Laterality: Left;         FAMILY HISTORY           Problem Relation Age of Onset   • Hypertension Mother     • Diabetes Mother     • Diabetes Father     • Heart disease Paternal Grandfather     • Stroke Paternal Grandfather     • Diabetes Paternal Grandfather     • Cancer Paternal Grandfather     • Coronary artery disease Maternal Grandmother           MGM with 4 vessel CABG and multiple stents   • Cancer Maternal Grandmother     • Coronary artery disease Maternal Uncle           Maternal uncle with MI   • Breast cancer Other     • Malig Hyperthermia Neg Hx        SOCIAL HISTORY                 Socioeconomic History   • Marital status:    Tobacco Use   • Smoking status: Never   • Smokeless tobacco: Never   Vaping Use   • Vaping Use: Never used   Substance and Sexual Activity   • Alcohol use: No   • Drug use: No   • Sexual activity: Yes       Partners: Female       Birth control/protection: None         ALLERGIES  Eggs or egg-derived products, Pepcid [famotidine], and Scopolamine  Home medications reviewed     PHYSICAL EXAM  Blood pressure 134/89, pulse 103, temperature 97.7 °F (36.5 °C), temperature source Oral, resp. rate 16, height 162.6 cm (64\"), weight 109 kg (240 lb 9.6 oz), SpO2 94 %, not currently breastfeeding.    GENERAL: Awake and alert in no distress  HEENT:  Unremarkable  NECK:  Supple  CV: regular rhythm, normal rate, distal pulses symmetric and palpable  RESPIRATORY: normal effort  and moving air bilaterally  ABDOMEN: soft, nondistended nontender with normal bowel sound  MUSCULOSKELETAL: no deformity, no calf tenderness or pedal edema  NEURO: alert, moves all extremities, follows commands  PSYCH:  calm, cooperative  SKIN: warm, dry with no rash     LAB RESULTS  Lab Results (last 24 hours)     Procedure Component Value Units Date/Time    Heparin Anti-Xa LMWH [[id number]]  (Normal) Collected: 03/02/23 1030    Specimen: Blood Updated: 03/02/23 1151     Heparin Anti-Xa (LMWH) 0.69 IU/ml     " Narrative:      For Therapeutic Doses of Low Molecular Weight Heparin   These levels represent target drug concentrations collected 4 hours after at least 4 doses = Steady state LMWH levels.    Anti-Xa LMWH ranges:   ·Treatment of VTE (full anticoagulation): 0.6-1 units/ml for TWICE daily dosing   ·Treatment of VTE (full anticoagulation): 1-2 units/ml for ONCE daily dosing   ·Prevention of VTE: less than 0.5 units/ml      Factor 8 Activity [[id number]] Collected: 03/02/23 0932    Specimen: Blood Updated: 03/02/23 0939    Antithrombin III [[id number]] Collected: 03/02/23 0932    Specimen: Blood Updated: 03/02/23 0939    factor Xa level - Miscellaneous Test [[id number]] Collected: 03/02/23 0932    Specimen: Blood Updated: 03/02/23 0939    aPTT [[id number]]  (Abnormal) Collected: 03/02/23 0732    Specimen: Blood from Hand, Right Updated: 03/02/23 0837     PTT >200.0 seconds     CBC & Differential [[id number]]  (Abnormal) Collected: 03/02/23 0732    Specimen: Blood Updated: 03/02/23 0757    Narrative:      The following orders were created for panel order CBC & Differential.  Procedure                               Abnormality         Status                     ---------                               -----------         ------                     CBC Auto Differential[[id number]]        Abnormal            Final result                 Please view results for these tests on the individual orders.    CBC Auto Differential [[id number]]  (Abnormal) Collected: 03/02/23 0732    Specimen: Blood Updated: 03/02/23 0757     WBC 8.45 10*3/mm3      RBC 4.24 10*6/mm3      Hemoglobin 10.7 g/dL      Hematocrit 33.3 %      MCV 78.5 fL      MCH 25.2 pg      MCHC 32.1 g/dL      RDW 15.5 %      RDW-SD 43.9 fl      MPV 10.0 fL      Platelets 272 10*3/mm3      Neutrophil % 41.0 %      Lymphocyte % 40.1 %      Monocyte % 5.4 %      Eosinophil % 12.7 %      Basophil % 0.6 %      Immature Grans % 0.2 %      Neutrophils, Absolute 3.46 10*3/mm3       Lymphocytes, Absolute 3.39 10*3/mm3      Monocytes, Absolute 0.46 10*3/mm3      Eosinophils, Absolute 1.07 10*3/mm3      Basophils, Absolute 0.05 10*3/mm3      Immature Grans, Absolute 0.02 10*3/mm3      nRBC 0.0 /100 WBC     aPTT [[id number]]  (Abnormal) Collected: 03/02/23 0136    Specimen: Blood Updated: 03/02/23 0207     PTT 42.2 seconds     Protime-INR [[id number]]  (Abnormal) Collected: 03/02/23 0136    Specimen: Blood Updated: 03/02/23 0207     Protime 14.3 Seconds      INR 1.09    BNP [[id number]]  (Normal) Collected: 03/01/23 1851    Specimen: Blood Updated: 03/01/23 2208     proBNP 42.4 pg/mL     Narrative:      Among patients with dyspnea, NT-proBNP is highly sensitive for the detection of acute congestive heart failure. In addition NT-proBNP of <300 pg/ml effectively rules out acute congestive heart failure with 99% negative predictive value.    Results may be falsely decreased if patient taking Biotin.      hCG, Serum, Qualitative [[id number]]  (Normal) Collected: 03/01/23 1851    Specimen: Blood Updated: 03/01/23 2127     HCG Qualitative Negative    [place] Draw [[id number]] Collected: 03/01/23 1851    Specimen: Blood Updated: 03/01/23 2001    Narrative:      The following orders were created for panel order [place] Draw.  Procedure                               Abnormality         Status                     ---------                               -----------         ------                     Green Top (Gel)[[id number]]                                  Final result               Lavender Top[[id number]]                                     Final result               Gold Top - SST[[id number]]                                   Final result               Light Blue Top[[id number]]                                   Final result                 Please view results for these tests on the individual orders.    Green Top (Gel) [[id number]] Collected: 03/01/23 1851    Specimen: Blood Updated: 03/01/23 2001     Extra  Tube Hold for add-ons.     Comment: Auto resulted.       Lavender Top [[id number]] Collected: 03/01/23 1851    Specimen: Blood Updated: 03/01/23 2001     Extra Tube hold for add-on     Comment: Auto resulted       Gold Top - SST [[id number]] Collected: 03/01/23 1851    Specimen: Blood Updated: 03/01/23 2001     Extra Tube Hold for add-ons.     Comment: Auto resulted.       Light Blue Top [[id number]] Collected: 03/01/23 1851    Specimen: Blood Updated: 03/01/23 2001     Extra Tube Hold for add-ons.     Comment: Auto resulted       Comprehensive Metabolic Panel [[id number]]  (Abnormal) Collected: 03/01/23 1851    Specimen: Blood Updated: 03/01/23 1925     Glucose 106 mg/dL      BUN 7 mg/dL      Creatinine 0.94 mg/dL      Sodium 135 mmol/L      Potassium 3.7 mmol/L      Chloride 102 mmol/L      CO2 22.0 mmol/L      Calcium 9.4 mg/dL      Total Protein 8.5 g/dL      Albumin 4.1 g/dL      ALT (SGPT) 55 U/L      AST (SGOT) 40 U/L      Alkaline Phosphatase 108 U/L      Total Bilirubin 0.4 mg/dL      Globulin 4.4 gm/dL      A/G Ratio 0.9 g/dL      BUN/Creatinine Ratio 7.4     Anion Gap 11.0 mmol/L      eGFR 85.5 mL/min/1.73     Narrative:      GFR Normal >60  Chronic Kidney Disease <60  Kidney Failure <15      High Sensitivity Troponin T [[id number]]  (Normal) Collected: 03/01/23 1851    Specimen: Blood Updated: 03/01/23 1925     HS Troponin T <6 ng/L     Narrative:      High Sensitive Troponin T Reference Range:  <10.0 ng/L- Negative Female for AMI  <15.0 ng/L- Negative Male for AMI  >=10 - Abnormal Female indicating possible myocardial injury.  >=15 - Abnormal Male indicating possible myocardial injury.   Clinicians would have to utilize clinical acumen, EKG, Troponin, and serial changes to determine if it is an Acute Myocardial Infarction or myocardial injury due to an underlying chronic condition.         CBC & Differential [[id number]]  (Abnormal) Collected: 03/01/23 1851    Specimen: Blood Updated: 03/01/23 1910     Narrative:      The following orders were created for panel order CBC & Differential.  Procedure                               Abnormality         Status                     ---------                               -----------         ------                     CBC Auto Differential[[id number]]        Abnormal            Final result                 Please view results for these tests on the individual orders.    CBC Auto Differential [[id number]]  (Abnormal) Collected: 03/01/23 1851    Specimen: Blood Updated: 03/01/23 1910     WBC 8.28 10*3/mm3      RBC 4.49 10*6/mm3      Hemoglobin 11.3 g/dL      Hematocrit 34.7 %      MCV 77.3 fL      MCH 25.2 pg      MCHC 32.6 g/dL      RDW 15.2 %      RDW-SD 42.6 fl      MPV 9.9 fL      Platelets 329 10*3/mm3      Neutrophil % 56.7 %      Lymphocyte % 26.9 %      Monocyte % 3.9 %      Eosinophil % 10.9 %      Basophil % 1.1 %      Immature Grans % 0.5 %      Neutrophils, Absolute 4.70 10*3/mm3      Lymphocytes, Absolute 2.23 10*3/mm3      Monocytes, Absolute 0.32 10*3/mm3      Eosinophils, Absolute 0.90 10*3/mm3      Basophils, Absolute 0.09 10*3/mm3      Immature Grans, Absolute 0.04 10*3/mm3      nRBC 0.0 /100 WBC         Imaging Results (Last 24 Hours)     Procedure Component Value Units Date/Time    CT Angiogram Chest [[id number]] Collected: 03/02/23 0120     Updated: 03/02/23 0137    Addenda:        ADDENDUM:  03 02 23 01:36 Verify Receipt Verified receipt with PONCHO Dumont,given to   DR Myers   on 03 02 01:36 (-05:00)      Signed: 03/02/23 0119 by Ruperto Novak MD    Narrative:      CR  Patient: MICHELLE DA SILVA  Time Out: 01:19  Exam(s): CTA CHEST     EXAM:    CT Angiography Chest With Intravenous Contrast    CLINICAL HISTORY:    soa. h.o pe.    TECHNIQUE:    Axial computed tomographic angiography images of the chest with   intravenous contrast.  CTDI is 11.38 mGy and DLP is 407.6 mGy-cm.  This   CT exam was performed according to the principle of ALARA (As Low As    Reasonably Achievable) by using one or more of the following dose   reduction techniques: automated exposure control, adjustment of the mA   and or kV according to patient size, and or use of iterative   reconstruction technique.    3D and MIP reconstructed images were created and reviewed.    COMPARISON:    1 9 2023    FINDINGS:    Pulmonary arteries:  There is a new pulmonary defect in the proximal   subsegmental branch of the left posterior apical segment (series 4;   images 50-52).  The filling defect involving the proximal left anterior   basal segment is different in appearance from the previous examination   with partially occlusive filling defect proximal to the bifurcation.    Previously the filling defect was noted distal the bifurcation.  The   remaining pulmonary artery segments are patent, accounting for   limitations with respiratory artifact.    Aorta:  The aorta is stable and within normal limits.  No dissection or   aneurysm identified.    Lungs:  No focal airspace consolidation.    Pleural space:  Unremarkable.  No significant effusion.  No   pneumothorax.    Heart:  The cardiac chambers are within normal limits.  The RV LV ratio   is normal and less than 1.  There is incidental gas in the right   ventricle and the proximal pulmonary artery segment, presumed inadvertent   administration from the intravenous access.  No pericardial effusion.    Bones joints:  No acute fracture.  No dislocation.    Soft tissues:  Unremarkable.    Lymph nodes:  Unremarkable.  No enlarged lymph nodes.    IMPRESSION:       1.  There is a new pulmonary defect in the proximal subsegmental branch   of the left posterior apical segment (series 4; images 50-52).  The   filling defect involving the proximal left anterior basal segment is   different in appearance from the previous examination with the partially   occlusive filling defect proximal to the bifurcation.  Previously the   filling defect was noted distal the  bifurcation.  Findings are most   consistent with subsegmental recurrent left-sided pulmonary emboli.  2.  No CT evidence for right heart strain.  3.  No focal airspace consolidation.  No pleural effusion or pneumothorax.      Impression:            Communications:     Verify Receipt     Electronically signed by Ruperto Novak MD on 03-02-23 at 0119    XR Chest 2 View [[id number]] Collected: 03/01/23 1934     Updated: 03/01/23 1946    Narrative:      PA AND LATERAL RADIOGRAPHIC VIEWS OF THE CHEST     CLINICAL HISTORY: Chest pain. Triage protocol.     FINDINGS:     There is a left IJ approach central venous catheter terminating at the  level of the SVC. The lungs are clear of acute infiltrates. The  cardiomediastinal silhouette is within normal limits. The osseous  structures are unremarkable.       Impression:         No active disease in the chest.     Left IJ approach central venous catheter terminates at the level of the  SVC.     This report was finalized on 3/1/2023 7:43 PM by Dr. Yaya Gordon M.D.             ECG 12 Lead             Component  Ref Range & Units 1 d ago  (3/1/23) 8 mo ago  (6/15/22) 1 yr ago  (1/18/22) 1 yr ago  (9/17/21) 1 yr ago  (9/8/21) 1 yr ago  (5/6/21) 1 yr ago  (5/5/21)   QT Interval  ms 384  337  334  350  298  279  342    Resulting Agency BH ECG BH ECG BH ECG BH ECG BH ECG BH ECG BH ECG             HEART RATE= 85  bpm  RR Interval= 706  ms  WY Interval=   ms  P Horizontal Axis=   deg  P Front Axis=   deg  QRSD Interval= 95  ms  QT Interval= 384  ms  QRS Axis= 25  deg  T Wave Axis= 40  deg  - ABNORMAL ECG -  Sinus rhythm  Since prior tracing hr has decreased             Current Facility-Administered Medications:   •  albuterol (PROVENTIL) nebulizer solution 0.083% 2.5 mg/3mL, 2.5 mg, Nebulization, Q4H PRN, Caleb Naik MD  •  cholecalciferol (VITAMIN D3) tablet 1,000 Units, 1,000 Units, Oral, Daily, Caleb Naik MD  •  dronabinol (MARINOL) capsule 5 mg, 5 mg, Oral, TID PRN, Heena  MD Caleb  •  escitalopram (LEXAPRO) tablet 20 mg, 20 mg, Oral, Daily, Caleb Naik MD  •  gabapentin (NEURONTIN) capsule 100 mg, 100 mg, Oral, TID, aCleb Naik MD  •  heparin (porcine) 5000 UNIT/ML injection 4,400-8,700 Units, 40-80 Units/kg, Intravenous, Q6H PRN, Mihai Myers MD  •  heparin 46191 units/250 mL (100 units/mL) in 0.45 % NaCl infusion, 18 Units/kg/hr, Intravenous, Titrated, Mihai Myers MD, Last Rate: 16.35 mL/hr at 03/02/23 0951, 15 Units/kg/hr at 03/02/23 0951  •  levothyroxine (SYNTHROID, LEVOTHROID) tablet 50 mcg, 50 mcg, Oral, Daily, Caleb Naik MD  •  loperamide (IMODIUM) capsule 2 mg, 2 mg, Oral, 4x Daily PRN, Caleb Naik MD  •  meclizine (ANTIVERT) tablet 25 mg, 25 mg, Oral, TID PRN, Caleb Naik MD  •  metoprolol tartrate (LOPRESSOR) tablet 37.5 mg, 37.5 mg, Oral, Q8H, Caleb Naik MD  •  midodrine (PROAMATINE) tablet 10 mg, 10 mg, Oral, TID AC, Caleb Naik MD  •  montelukast (SINGULAIR) tablet 10 mg, 10 mg, Oral, Nightly, Caleb Naik MD  •  ondansetron (ZOFRAN) injection 4 mg, 4 mg, Intravenous, Q4H PRN, Caleb Naik MD, 4 mg at 03/02/23 0958  •  [START ON 3/3/2023] pantoprazole (PROTONIX) EC tablet 40 mg, 40 mg, Oral, Q AM, Caleb Naik MD  •  prochlorperazine (COMPAZINE) tablet 10 mg, 10 mg, Oral, Q6H PRN, Caleb Naik MD  •  pyridostigmine (MESTINON) tablet 30 mg, 30 mg, Oral, BID, Caleb Naik MD  •  sodium chloride 0.9 % flush 10 mL, 10 mL, Intravenous, PRN, Mihai Myers MD     ASSESSMENT  Recurrent single subsegmental pulm embolism despite full anticoagulation with Lovenox  Chronic orthostatic hypotension  POTS syndrome  Phoebe-Danlos syndrome  Hypothyroidism  Gastroparesis  Irritable bowel syndrome  Gastroesophageal reflux disease    PLAN  Admit  Supplemental oxygen as needed  Heparin  Hematology consult  Continue home medications  Stress ulcer DVT prophylaxis  Supportive care  Patient is full code  Discussed with family and nursing staff  Follow closely  and further recommendation current hospital course    ANN GONZALEZ MD

## 2023-03-02 NOTE — PAYOR COMM NOTE
"Liliam Da Silva (27 y.o. Female)     PLEASE SEE ATTACHED FOR INPT AUTH AS REQUESTED.    REF#[id number]    PLEASE CALL   OR  [phone]    THANK YOU    MARLON HAMMONDS LPN CCP    Date of Birth   1995    Social Security Number       Address   [address] [place] [address]    Home Phone   [phone]    MRN   [id number]       Buddhist   Mu-ism    Marital Status                               Admission Date   3/1/23    Admission Type   Emergency    Admitting Provider   Caleb Naik MD    Attending Provider   Caleb Naik MD    Department, Room/Bed   [address], [address]/1       Discharge Date       Discharge Disposition       Discharge Destination                               Attending Provider: Caleb Naik MD    Allergies: Eggs Or Egg-derived Products, Pepcid [Famotidine], Scopolamine    Isolation: None   Infection: None   Code Status: CPR    Ht: 162.6 cm (64\")   Wt: 109 kg (240 lb 9.6 oz)    Admission Cmt: None   Principal Problem: Pulmonary embolus (HCC) [I26.99]                 Active Insurance as of 3/1/2023     Primary Coverage     Payor Plan Insurance Group Employer/Plan Group    [employer] BLUE [employer]OSS [employer] BLUE [employer]OSS BLUE [employer] PPO [id number]     Payor Plan Address Payor Plan Phone Number Payor Plan Fax Number Effective Dates    [address] [phone]  4/1/2019 - None Entered    [facility] [id number]       Subscriber Name Subscriber Birth Date Member ID       AZEB DA SILVA 12/30/1994 [id number]                 Emergency Contacts      (Rel.) Home Phone Work Phone Mobile Phone    AZEB DA SILVA (Spouse) [phone] -- [phone]    Monica Enriquez (Mother) -- -- [phone]            [place]: NPI [id number]  Tax ID [id number]     History & Physical      Caleb Naik MD at 03/02/23 0937          History and physical    Primary care physician  Dr. Jason Borrero    Chief complaint  Shortness of breath    History of present " illness  27-year-old white female with history of chronic orthostatic hypotension with POTS syndrome Phoebe-Danlos syndrome hypothyroidism gastroparesis irritable bowel syndrome and gastroesophageal reflux disease who is well-known to our service who also have recurrent pulmonary embolism on Lovenox at home as she felt oral anticoagulants brought to the emergency room with increased shortness of breath for last 2 to 3 days which is getting worse and develops chest discomfort yesterday but no fever chills cough congestion but remain nauseated and did not miss any medications especially Lovenox evaluated in ER found to have recurrent syncopal symptoms segmental pulm embolism despite full dose of Lovenox with no noncompliance admitted for management.    PAST MEDICAL HISTORY  • Bronchitis     • Chest pain     • Chronic hypotension     • Eczema     • GERD (gastroesophageal reflux disease)     • IBS (irritable bowel syndrome)     • Lightheadedness     • Rectal fissure     • Rosacea     • Tachycardia        PAST SURGICAL HISTORY              Procedure Laterality Date   • COLONOSCOPY       • MEDIPORT INSERTION, DOUBLE   09/01/2020     [facility]    • UPPER GASTROINTESTINAL ENDOSCOPY       • VENOUS ACCESS DEVICE (PORT) INSERTION Left 10/16/2020     Procedure: INSERTION VENOUS ACCESS DEVICE UNDER FLURO;  Surgeon: Pa Lane MD;  Location: [facility];  Service: General;  Laterality: Left;   • VENOUS ACCESS DEVICE (PORT) INSERTION Right 10/26/2021     Procedure: REMOVAL AND INSERTION OF VALVERDE CATHETER;  Surgeon: Pa Lane MD;  Location: [facility] OR;  Service: General;  Laterality: Right;   • VENOUS ACCESS DEVICE (PORT) INSERTION Left 1/13/2023     Procedure: INSERTION OF VALVERDE CATHETER, VENOGARM;  Surgeon: Cristhian Anne MD;  Location: [facility] OR;  Service: General;  Laterality: Left;         FAMILY HISTORY           Problem Relation Age of Onset   • Hypertension Mother     •  "Diabetes Mother     • Diabetes Father     • Heart disease Paternal Grandfather     • Stroke Paternal Grandfather     • Diabetes Paternal Grandfather     • Cancer Paternal Grandfather     • Coronary artery disease Maternal Grandmother           MGM with 4 vessel CABG and multiple stents   • Cancer Maternal Grandmother     • Coronary artery disease Maternal Uncle           Maternal uncle with MI   • Breast cancer Other     • Malig Hyperthermia Neg Hx        SOCIAL HISTORY                 Socioeconomic History   • Marital status:    Tobacco Use   • Smoking status: Never   • Smokeless tobacco: Never   Vaping Use   • Vaping Use: Never used   Substance and Sexual Activity   • Alcohol use: No   • Drug use: No   • Sexual activity: Yes       Partners: Female       Birth control/protection: None         ALLERGIES  Eggs or egg-derived products, Pepcid [famotidine], and Scopolamine  Home medications reviewed     PHYSICAL EXAM  Blood pressure 134/89, pulse 103, temperature 97.7 °F (36.5 °C), temperature source Oral, resp. rate 16, height 162.6 cm (64\"), weight 109 kg (240 lb 9.6 oz), SpO2 94 %, not currently breastfeeding.    GENERAL: Awake and alert in no distress  HEENT:  Unremarkable  NECK:  Supple  CV: regular rhythm, normal rate, distal pulses symmetric and palpable  RESPIRATORY: normal effort  and moving air bilaterally  ABDOMEN: soft, nondistended nontender with normal bowel sound  MUSCULOSKELETAL: no deformity, no calf tenderness or pedal edema  NEURO: alert, moves all extremities, follows commands  PSYCH:  calm, cooperative  SKIN: warm, dry with no rash     LAB RESULTS  Lab Results (last 24 hours)     Procedure Component Value Units Date/Time    Heparin Anti-Xa LMWH [[id number]]  (Normal) Collected: 03/02/23 1030    Specimen: Blood Updated: 03/02/23 1151     Heparin Anti-Xa (LMWH) 0.69 IU/ml     Narrative:      For Therapeutic Doses of Low Molecular Weight Heparin   These levels represent target drug " concentrations collected 4 hours after at least 4 doses = Steady state LMWH levels.    Anti-Xa LMWH ranges:   ·Treatment of VTE (full anticoagulation): 0.6-1 units/ml for TWICE daily dosing   ·Treatment of VTE (full anticoagulation): 1-2 units/ml for ONCE daily dosing   ·Prevention of VTE: less than 0.5 units/ml      Factor 8 Activity [[id number]] Collected: 03/02/23 0932    Specimen: Blood Updated: 03/02/23 0939    Antithrombin III [[id number]] Collected: 03/02/23 0932    Specimen: Blood Updated: 03/02/23 0939    factor Xa level - Miscellaneous Test [[id number]] Collected: 03/02/23 0932    Specimen: Blood Updated: 03/02/23 0939    aPTT [[id number]]  (Abnormal) Collected: 03/02/23 0732    Specimen: Blood from Hand, Right Updated: 03/02/23 0837     PTT >200.0 seconds     CBC & Differential [[id number]]  (Abnormal) Collected: 03/02/23 0732    Specimen: Blood Updated: 03/02/23 0757    Narrative:      The following orders were created for panel order CBC & Differential.  Procedure                               Abnormality         Status                     ---------                               -----------         ------                     CBC Auto Differential[[id number]]        Abnormal            Final result                 Please view results for these tests on the individual orders.    CBC Auto Differential [[id number]]  (Abnormal) Collected: 03/02/23 0732    Specimen: Blood Updated: 03/02/23 0757     WBC 8.45 10*3/mm3      RBC 4.24 10*6/mm3      Hemoglobin 10.7 g/dL      Hematocrit 33.3 %      MCV 78.5 fL      MCH 25.2 pg      MCHC 32.1 g/dL      RDW 15.5 %      RDW-SD 43.9 fl      MPV 10.0 fL      Platelets 272 10*3/mm3      Neutrophil % 41.0 %      Lymphocyte % 40.1 %      Monocyte % 5.4 %      Eosinophil % 12.7 %      Basophil % 0.6 %      Immature Grans % 0.2 %      Neutrophils, Absolute 3.46 10*3/mm3      Lymphocytes, Absolute 3.39 10*3/mm3      Monocytes, Absolute 0.46 10*3/mm3      Eosinophils, Absolute 1.07  10*3/mm3      Basophils, Absolute 0.05 10*3/mm3      Immature Grans, Absolute 0.02 10*3/mm3      nRBC 0.0 /100 WBC     aPTT [[id number]]  (Abnormal) Collected: 03/02/23 0136    Specimen: Blood Updated: 03/02/23 0207     PTT 42.2 seconds     Protime-INR [[id number]]  (Abnormal) Collected: 03/02/23 0136    Specimen: Blood Updated: 03/02/23 0207     Protime 14.3 Seconds      INR 1.09    BNP [[id number]]  (Normal) Collected: 03/01/23 1851    Specimen: Blood Updated: 03/01/23 2208     proBNP 42.4 pg/mL     Narrative:      Among patients with dyspnea, NT-proBNP is highly sensitive for the detection of acute congestive heart failure. In addition NT-proBNP of <300 pg/ml effectively rules out acute congestive heart failure with 99% negative predictive value.    Results may be falsely decreased if patient taking Biotin.      hCG, Serum, Qualitative [[id number]]  (Normal) Collected: 03/01/23 1851    Specimen: Blood Updated: 03/01/23 2127     HCG Qualitative Negative    [place] Draw [[id number]] Collected: 03/01/23 1851    Specimen: Blood Updated: 03/01/23 2001    Narrative:      The following orders were created for panel order [place] Draw.  Procedure                               Abnormality         Status                     ---------                               -----------         ------                     Green Top (Gel)[[id number]]                                  Final result               Lavender Top[[id number]]                                     Final result               Gold Top - SST[[id number]]                                   Final result               Light Blue Top[[id number]]                                   Final result                 Please view results for these tests on the individual orders.    Green Top (Gel) [[id number]] Collected: 03/01/23 1851    Specimen: Blood Updated: 03/01/23 2001     Extra Tube Hold for add-ons.     Comment: Auto resulted.       Lavender Top [[id number]] Collected: 03/01/23 1851     Specimen: Blood Updated: 03/01/23 2001     Extra Tube hold for add-on     Comment: Auto resulted       Gold Top - SST [[id number]] Collected: 03/01/23 1851    Specimen: Blood Updated: 03/01/23 2001     Extra Tube Hold for add-ons.     Comment: Auto resulted.       Light Blue Top [[id number]] Collected: 03/01/23 1851    Specimen: Blood Updated: 03/01/23 2001     Extra Tube Hold for add-ons.     Comment: Auto resulted       Comprehensive Metabolic Panel [[id number]]  (Abnormal) Collected: 03/01/23 1851    Specimen: Blood Updated: 03/01/23 1925     Glucose 106 mg/dL      BUN 7 mg/dL      Creatinine 0.94 mg/dL      Sodium 135 mmol/L      Potassium 3.7 mmol/L      Chloride 102 mmol/L      CO2 22.0 mmol/L      Calcium 9.4 mg/dL      Total Protein 8.5 g/dL      Albumin 4.1 g/dL      ALT (SGPT) 55 U/L      AST (SGOT) 40 U/L      Alkaline Phosphatase 108 U/L      Total Bilirubin 0.4 mg/dL      Globulin 4.4 gm/dL      A/G Ratio 0.9 g/dL      BUN/Creatinine Ratio 7.4     Anion Gap 11.0 mmol/L      eGFR 85.5 mL/min/1.73     Narrative:      GFR Normal >60  Chronic Kidney Disease <60  Kidney Failure <15      High Sensitivity Troponin T [[id number]]  (Normal) Collected: 03/01/23 1851    Specimen: Blood Updated: 03/01/23 1925     HS Troponin T <6 ng/L     Narrative:      High Sensitive Troponin T Reference Range:  <10.0 ng/L- Negative Female for AMI  <15.0 ng/L- Negative Male for AMI  >=10 - Abnormal Female indicating possible myocardial injury.  >=15 - Abnormal Male indicating possible myocardial injury.   Clinicians would have to utilize clinical acumen, EKG, Troponin, and serial changes to determine if it is an Acute Myocardial Infarction or myocardial injury due to an underlying chronic condition.         CBC & Differential [[id number]]  (Abnormal) Collected: 03/01/23 1851    Specimen: Blood Updated: 03/01/23 1910    Narrative:      The following orders were created for panel order CBC & Differential.  Procedure                                Abnormality         Status                     ---------                               -----------         ------                     CBC Auto Differential[[id number]]        Abnormal            Final result                 Please view results for these tests on the individual orders.    CBC Auto Differential [[id number]]  (Abnormal) Collected: 03/01/23 1851    Specimen: Blood Updated: 03/01/23 1910     WBC 8.28 10*3/mm3      RBC 4.49 10*6/mm3      Hemoglobin 11.3 g/dL      Hematocrit 34.7 %      MCV 77.3 fL      MCH 25.2 pg      MCHC 32.6 g/dL      RDW 15.2 %      RDW-SD 42.6 fl      MPV 9.9 fL      Platelets 329 10*3/mm3      Neutrophil % 56.7 %      Lymphocyte % 26.9 %      Monocyte % 3.9 %      Eosinophil % 10.9 %      Basophil % 1.1 %      Immature Grans % 0.5 %      Neutrophils, Absolute 4.70 10*3/mm3      Lymphocytes, Absolute 2.23 10*3/mm3      Monocytes, Absolute 0.32 10*3/mm3      Eosinophils, Absolute 0.90 10*3/mm3      Basophils, Absolute 0.09 10*3/mm3      Immature Grans, Absolute 0.04 10*3/mm3      nRBC 0.0 /100 WBC         Imaging Results (Last 24 Hours)     Procedure Component Value Units Date/Time    CT Angiogram Chest [[id number]] Collected: 03/02/23 0120     Updated: 03/02/23 0137    Addenda:        ADDENDUM:  03 02 23 01:36 Verify Receipt Verified receipt with PONCHO Dumont,given to   DR Myers   on 03 02 01:36 (-05:00)      Signed: 03/02/23 0119 by Ruperto Novak MD    Narrative:      CR  Patient: MICHELLE DA SILVA  Time Out: 01:19  Exam(s): CTA CHEST     EXAM:    CT Angiography Chest With Intravenous Contrast    CLINICAL HISTORY:    soa. h.o pe.    TECHNIQUE:    Axial computed tomographic angiography images of the chest with   intravenous contrast.  CTDI is 11.38 mGy and DLP is 407.6 mGy-cm.  This   CT exam was performed according to the principle of ALARA (As Low As   Reasonably Achievable) by using one or more of the following dose   reduction techniques: automated exposure control,  adjustment of the mA   and or kV according to patient size, and or use of iterative   reconstruction technique.    3D and MIP reconstructed images were created and reviewed.    COMPARISON:    1 9 2023    FINDINGS:    Pulmonary arteries:  There is a new pulmonary defect in the proximal   subsegmental branch of the left posterior apical segment (series 4;   images 50-52).  The filling defect involving the proximal left anterior   basal segment is different in appearance from the previous examination   with partially occlusive filling defect proximal to the bifurcation.    Previously the filling defect was noted distal the bifurcation.  The   remaining pulmonary artery segments are patent, accounting for   limitations with respiratory artifact.    Aorta:  The aorta is stable and within normal limits.  No dissection or   aneurysm identified.    Lungs:  No focal airspace consolidation.    Pleural space:  Unremarkable.  No significant effusion.  No   pneumothorax.    Heart:  The cardiac chambers are within normal limits.  The RV LV ratio   is normal and less than 1.  There is incidental gas in the right   ventricle and the proximal pulmonary artery segment, presumed inadvertent   administration from the intravenous access.  No pericardial effusion.    Bones joints:  No acute fracture.  No dislocation.    Soft tissues:  Unremarkable.    Lymph nodes:  Unremarkable.  No enlarged lymph nodes.    IMPRESSION:       1.  There is a new pulmonary defect in the proximal subsegmental branch   of the left posterior apical segment (series 4; images 50-52).  The   filling defect involving the proximal left anterior basal segment is   different in appearance from the previous examination with the partially   occlusive filling defect proximal to the bifurcation.  Previously the   filling defect was noted distal the bifurcation.  Findings are most   consistent with subsegmental recurrent left-sided pulmonary emboli.  2.  No CT evidence  for right heart strain.  3.  No focal airspace consolidation.  No pleural effusion or pneumothorax.      Impression:            Communications:     Verify Receipt     Electronically signed by Ruperto Novak MD on 03-02-23 at 0119    XR Chest 2 View [[id number]] Collected: 03/01/23 1934     Updated: 03/01/23 1946    Narrative:      PA AND LATERAL RADIOGRAPHIC VIEWS OF THE CHEST     CLINICAL HISTORY: Chest pain. Triage protocol.     FINDINGS:     There is a left IJ approach central venous catheter terminating at the  level of the SVC. The lungs are clear of acute infiltrates. The  cardiomediastinal silhouette is within normal limits. The osseous  structures are unremarkable.       Impression:         No active disease in the chest.     Left IJ approach central venous catheter terminates at the level of the  SVC.     This report was finalized on 3/1/2023 7:43 PM by Dr. Yaya Gordon M.D.             ECG 12 Lead             Component  Ref Range & Units 1 d ago  (3/1/23) 8 mo ago  (6/15/22) 1 yr ago  (1/18/22) 1 yr ago  (9/17/21) 1 yr ago  (9/8/21) 1 yr ago  (5/6/21) 1 yr ago  (5/5/21)   QT Interval  ms 384  337  334  350  298  279  342    Resulting Agency BH ECG BH ECG BH ECG BH ECG BH ECG BH ECG BH ECG             HEART RATE= 85  bpm  RR Interval= 706  ms  NH Interval=   ms  P Horizontal Axis=   deg  P Front Axis=   deg  QRSD Interval= 95  ms  QT Interval= 384  ms  QRS Axis= 25  deg  T Wave Axis= 40  deg  - ABNORMAL ECG -  Sinus rhythm  Since prior tracing hr has decreased             Current Facility-Administered Medications:   •  albuterol (PROVENTIL) nebulizer solution 0.083% 2.5 mg/3mL, 2.5 mg, Nebulization, Q4H PRN, Caleb Naik MD  •  cholecalciferol (VITAMIN D3) tablet 1,000 Units, 1,000 Units, Oral, Daily, Caleb Naik MD  •  dronabinol (MARINOL) capsule 5 mg, 5 mg, Oral, TID PRN, Caleb Naik MD  •  escitalopram (LEXAPRO) tablet 20 mg, 20 mg, Oral, Daily, Caleb Naik MD  •  gabapentin (NEURONTIN) capsule  100 mg, 100 mg, Oral, TID, Ann Gonzalez MD  •  heparin (porcine) 5000 UNIT/ML injection 4,400-8,700 Units, 40-80 Units/kg, Intravenous, Q6H PRN, Mihai Myers MD  •  heparin 79837 units/250 mL (100 units/mL) in 0.45 % NaCl infusion, 18 Units/kg/hr, Intravenous, Titrated, Mihai Myers MD, Last Rate: 16.35 mL/hr at 03/02/23 0951, 15 Units/kg/hr at 03/02/23 0951  •  levothyroxine (SYNTHROID, LEVOTHROID) tablet 50 mcg, 50 mcg, Oral, Daily, Ann Gonzalez MD  •  loperamide (IMODIUM) capsule 2 mg, 2 mg, Oral, 4x Daily PRN, Ann Gonzalez MD  •  meclizine (ANTIVERT) tablet 25 mg, 25 mg, Oral, TID PRN, Ann Gonzalez MD  •  metoprolol tartrate (LOPRESSOR) tablet 37.5 mg, 37.5 mg, Oral, Q8H, Ann Gonzalez MD  •  midodrine (PROAMATINE) tablet 10 mg, 10 mg, Oral, TID AC, Ann Gonzalez MD  •  montelukast (SINGULAIR) tablet 10 mg, 10 mg, Oral, Nightly, Ann Gonzalez MD  •  ondansetron (ZOFRAN) injection 4 mg, 4 mg, Intravenous, Q4H PRN, Ann Gonzalez MD, 4 mg at 03/02/23 0958  •  [START ON 3/3/2023] pantoprazole (PROTONIX) EC tablet 40 mg, 40 mg, Oral, Q AM, Ann Gonzalez MD  •  prochlorperazine (COMPAZINE) tablet 10 mg, 10 mg, Oral, Q6H PRN, Ann Gonzalez MD  •  pyridostigmine (MESTINON) tablet 30 mg, 30 mg, Oral, BID, Ann Gonzalez MD  •  sodium chloride 0.9 % flush 10 mL, 10 mL, Intravenous, PRN, Mihai Myers MD     ASSESSMENT  Recurrent single subsegmental pulm embolism despite full anticoagulation with Lovenox  Chronic orthostatic hypotension  POTS syndrome  Phoebe-Danlos syndrome  Hypothyroidism  Gastroparesis  Irritable bowel syndrome  Gastroesophageal reflux disease    PLAN  Admit  Supplemental oxygen as needed  Heparin  Hematology consult  Continue home medications  Stress ulcer DVT prophylaxis  Supportive care  Patient is full code  Discussed with family and nursing staff  Follow closely and further recommendation current hospital course    ANN GONZALEZ MD      Electronically signed by Ann Gonzalez MD at  "03/02/23 1345          Emergency Department Notes      Sonia Knutson, RN at 03/01/23 1828        Pt arrives via EMS from home for chest pain and soa for the past 1.5 hours. Pt was at rest when the symptoms started. Pt also reports nausea and dizziness. Pt was given 324 mg of aspirin and 4mg of ondansetron.     Electronically signed by Sonia Knutson RN at 03/01/23 1829     Madina Ramon RN at 03/01/23 1856        Pt to ED from home via ambulance. Pt reports chest pain and SOA for about 2 hours. Pt reports hx of PE, pt reports being on Lovenox.     Electronically signed by Madina Ramon RN at 03/01/23 1857     Mihai Myers MD at 03/01/23 2022      Procedure Orders    1. Critical Care [[id number]] ordered by Mihai Myers MD                EMERGENCY DEPARTMENT ENCOUNTER    Room Number:  21/21  Date seen:  3/2/2023  PCP: Jason Borrero MD  Historian: Patient  Relevant information provided by sources other than the patient, review of external records, and social determinants of health may be included in the HPI section.      HPI:  Chief Complaint: Shortness of breath and chest discomfort  Additional historical features obtained directly from: No one  Context: Liliam Lorenz is a 27 y.o. female who presents to the ED c/o moderate severity shortness of breath and chest discomfort which has been worsening now for the last 2 to 3 days.  Patient said she has been on antibiotics for \"pneumonia\" over this last week.  Patient said that she has oxygen at home to use as needed and she has been using it the last couple of days.  She has a history of recurrent PE which has been resistant to multiple anticoagulants in the past.  She is concerned that this could be a PE.    Patient has a significant amount of other comorbid conditions including POTS, autonomic dysfunction, gastroparesis for which she has a central line to give her self meds, and Phoebe-Danlos syndrome.        Initial impression including social " determinants which will impact the assessment     Certainly high risk and complicated patient who could easily have a pulmonary embolism which would require admission to the hospital because she is on Lovenox and is compliant according to her history          PAST MEDICAL HISTORY  Active Ambulatory Problems     Diagnosis Date Noted   • Poor venous access 10/15/2020   • POTS (postural orthostatic tachycardia syndrome) 05/06/2021   • Sinus tachycardia 09/08/2021   • Multiple subsegmental pulmonary emboli without acute cor pulmonale (HCC) 09/09/2021   • Autoimmune disease ([facility]) 09/06/2020   • 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   • Nausea and vomiting 08/22/2017   • Gastroparesis 08/22/2017   • Postural orthostatic tachycardia syndrome 06/12/2020   • Valverde catheter dysfunction ([facility]) 10/25/2021   • Febrile illness, acute 01/18/2022   • Single subsegmental pulmonary embolism without acute cor pulmonale ([facility]) 04/30/2022   • Cellulitis of chest wall 01/09/2023     Resolved Ambulatory Problems     Diagnosis Date Noted   • No Resolved Ambulatory Problems     Past Medical History:   Diagnosis Date   • Bronchitis    • Chest pain    • Chronic hypotension    • Eczema    • GERD (gastroesophageal reflux disease)    • IBS (irritable bowel syndrome)    • Lightheadedness    • Rectal fissure    • Rosacea    • Tachycardia          PAST SURGICAL HISTORY  Past Surgical History:   Procedure Laterality Date   • COLONOSCOPY     • [facility]    • UPPER GASTROINTESTINAL ENDOSCOPY     • VENOUS ACCESS DEVICE (PORT) INSERTION Left 10/16/2020    Procedure: INSERTION VENOUS ACCESS DEVICE UNDER FLURO;  Surgeon: Pa Lane MD;  Location: [facility];  Service: General;  Laterality: Left;   • VENOUS ACCESS DEVICE (PORT) INSERTION Right 10/26/2021    Procedure: REMOVAL AND INSERTION OF VALVERDE CATHETER;  Surgeon: Pa Lane MD;  Location: [facility] OR;  Service: General;   Laterality: Right;   • VENOUS ACCESS DEVICE (PORT) INSERTION Left 1/13/2023    Procedure: INSERTION OF VALVERDE CATHETER, VENOGARM;  Surgeon: Cristhian Anne MD;  Location: [facility] OR;  Service: General;  Laterality: Left;         FAMILY HISTORY  Family History   Problem Relation Age of Onset   • Hypertension Mother    • Diabetes Mother    • Diabetes Father    • Heart disease Paternal Grandfather    • Stroke Paternal Grandfather    • Diabetes Paternal Grandfather    • Cancer Paternal Grandfather    • Coronary artery disease Maternal Grandmother         MGM with 4 vessel CABG and multiple stents   • Cancer Maternal Grandmother    • Coronary artery disease Maternal Uncle         Maternal uncle with MI   • Breast cancer Other    • Malig Hyperthermia Neg Hx          SOCIAL HISTORY  Social History     Socioeconomic History   • Marital status:    Tobacco Use   • Smoking status: Never   • Smokeless tobacco: Never   Vaping Use   • Vaping Use: Never used   Substance and Sexual Activity   • Alcohol use: No   • Drug use: No   • Sexual activity: Yes     Partners: Female     Birth control/protection: None         ALLERGIES  Eggs or egg-derived products, Pepcid [famotidine], and Scopolamine          PHYSICAL EXAM  ED Triage Vitals [03/01/23 1830]   Temp Heart Rate Resp BP SpO2   98.5 °F (36.9 °C) 90 18 137/95 98 %      Temp src Heart Rate Source Patient Position BP Location FiO2 (%)   Oral Monitor -- -- --         Physical Exam      GENERAL: Awake and alert, morbidly obese.  No distress at rest, but just walking down the hallway to the restroom she gets extremely short of breath and has to stop every few steps.  O2 sats dropped into the 80s with ambulation on room air  HENT: nares patent  EYES: no scleral icterus, PERRL, EOMI  CV: regular rhythm, normal rate, distal pulses symmetric and palpable  RESPIRATORY: normal effort at rest, but significant tachypnea with minimal exertion but that improves with rest  ABDOMEN:  soft, nondistended nontender with normal bowel sound  MUSCULOSKELETAL: no deformity, no calf tenderness or pedal edema  NEURO: alert, moves all extremities, follows commands  PSYCH:  calm, cooperative  SKIN: warm, dry with no rash        LAB RESULTS  Recent Results (from the past 24 hour(s))   ECG 12 Lead ED Triage Standing Order; Chest Pain    Collection Time: 03/01/23  6:44 PM   Result Value Ref Range    QT Interval 384 ms   Comprehensive Metabolic Panel    Collection Time: 03/01/23  6:51 PM    Specimen: Blood   Result Value Ref Range    Glucose 106 (H) 65 - 99 mg/dL    BUN 7 6 - 20 mg/dL    Creatinine 0.94 0.57 - 1.00 mg/dL    Sodium 135 (L) 136 - 145 mmol/L    Potassium 3.7 3.5 - 5.2 mmol/L    Chloride 102 98 - 107 mmol/L    CO2 22.0 22.0 - 29.0 mmol/L    Calcium 9.4 8.6 - 10.5 mg/dL    Total Protein 8.5 6.0 - 8.5 g/dL    Albumin 4.1 3.5 - 5.2 g/dL    ALT (SGPT) 55 (H) 1 - 33 U/L    AST (SGOT) 40 (H) 1 - 32 U/L    Alkaline Phosphatase 108 39 - 117 U/L    Total Bilirubin 0.4 0.0 - 1.2 mg/dL    Globulin 4.4 gm/dL    A/G Ratio 0.9 g/dL    BUN/Creatinine Ratio 7.4 7.0 - 25.0    Anion Gap 11.0 5.0 - 15.0 mmol/L    eGFR 85.5 >60.0 mL/min/1.73   High Sensitivity Troponin T    Collection Time: 03/01/23  6:51 PM    Specimen: Blood   Result Value Ref Range    HS Troponin T <6 <10 ng/L   Green Top (Gel)    Collection Time: 03/01/23  6:51 PM   Result Value Ref Range    Extra Tube Hold for add-ons.    Lavender Top    Collection Time: 03/01/23  6:51 PM   Result Value Ref Range    Extra Tube hold for add-on    Gold Top - SST    Collection Time: 03/01/23  6:51 PM   Result Value Ref Range    Extra Tube Hold for add-ons.    Light Blue Top    Collection Time: 03/01/23  6:51 PM   Result Value Ref Range    Extra Tube Hold for add-ons.    CBC Auto Differential    Collection Time: 03/01/23  6:51 PM    Specimen: Blood   Result Value Ref Range    WBC 8.28 3.40 - 10.80 10*3/mm3    RBC 4.49 3.77 - 5.28 10*6/mm3    Hemoglobin 11.3 (L) 12.0  - 15.9 g/dL    Hematocrit 34.7 34.0 - 46.6 %    MCV 77.3 (L) 79.0 - 97.0 fL    MCH 25.2 (L) 26.6 - 33.0 pg    MCHC 32.6 31.5 - 35.7 g/dL    RDW 15.2 12.3 - 15.4 %    RDW-SD 42.6 37.0 - 54.0 fl    MPV 9.9 6.0 - 12.0 fL    Platelets 329 140 - 450 10*3/mm3    Neutrophil % 56.7 42.7 - 76.0 %    Lymphocyte % 26.9 19.6 - 45.3 %    Monocyte % 3.9 (L) 5.0 - 12.0 %    Eosinophil % 10.9 (H) 0.3 - 6.2 %    Basophil % 1.1 0.0 - 1.5 %    Immature Grans % 0.5 0.0 - 0.5 %    Neutrophils, Absolute 4.70 1.70 - 7.00 10*3/mm3    Lymphocytes, Absolute 2.23 0.70 - 3.10 10*3/mm3    Monocytes, Absolute 0.32 0.10 - 0.90 10*3/mm3    Eosinophils, Absolute 0.90 (H) 0.00 - 0.40 10*3/mm3    Basophils, Absolute 0.09 0.00 - 0.20 10*3/mm3    Immature Grans, Absolute 0.04 0.00 - 0.05 10*3/mm3    nRBC 0.0 0.0 - 0.2 /100 WBC   hCG, Serum, Qualitative    Collection Time: 03/01/23  6:51 PM    Specimen: Blood   Result Value Ref Range    HCG Qualitative Negative Negative   BNP    Collection Time: 03/01/23  6:51 PM    Specimen: Blood   Result Value Ref Range    proBNP 42.4 0.0 - 450.0 pg/mL   Protime-INR    Collection Time: 03/02/23  1:36 AM    Specimen: Blood   Result Value Ref Range    Protime 14.3 (H) 11.7 - 14.2 Seconds    INR 1.09 0.90 - 1.10   aPTT    Collection Time: 03/02/23  1:36 AM    Specimen: Blood   Result Value Ref Range    PTT 42.2 (H) 22.7 - 35.4 seconds       Ordered the above labs and independently interpreted results. My findings will be discussed in the medical decision making section below        RADIOLOGY  XR Chest 2 View    Result Date: 3/1/2023  PA AND LATERAL RADIOGRAPHIC VIEWS OF THE CHEST  CLINICAL HISTORY: Chest pain. Triage protocol.  FINDINGS:  There is a left IJ approach central venous catheter terminating at the level of the SVC. The lungs are clear of acute infiltrates. The cardiomediastinal silhouette is within normal limits. The osseous structures are unremarkable.       No active disease in the chest.  Left IJ approach  central venous catheter terminates at the level of the SVC.  This report was finalized on 3/1/2023 7:43 PM by Dr. Yaya Gordon M.D.      CT Angiogram Chest    Addendum Date: 3/2/2023    ADDENDUM: 03 02 23 01:36 Verify Receipt Verified receipt with PONCHO Dumont,given to DR Myers   on 03 02 01:36 (-05:00)     Result Date: 3/2/2023  CR Patient: MICHELLE DA SILVA  Time Out: 01:19 Exam(s): CTA CHEST EXAM:   CT Angiography Chest With Intravenous Contrast CLINICAL HISTORY:   soa. h.o pe. TECHNIQUE:   Axial computed tomographic angiography images of the chest with intravenous contrast.  CTDI is 11.38 mGy and DLP is 407.6 mGy-cm.  This CT exam was performed according to the principle of ALARA (As Low As Reasonably Achievable) by using one or more of the following dose reduction techniques: automated exposure control, adjustment of the mA and or kV according to patient size, and or use of iterative reconstruction technique.   3D and MIP reconstructed images were created and reviewed. COMPARISON:   1 9 2023 FINDINGS:   Pulmonary arteries:  There is a new pulmonary defect in the proximal subsegmental branch of the left posterior apical segment (series 4; images 50-52).  The filling defect involving the proximal left anterior basal segment is different in appearance from the previous examination with partially occlusive filling defect proximal to the bifurcation.  Previously the filling defect was noted distal the bifurcation.  The remaining pulmonary artery segments are patent, accounting for limitations with respiratory artifact.   Aorta:  The aorta is stable and within normal limits.  No dissection or aneurysm identified.   Lungs:  No focal airspace consolidation.   Pleural space:  Unremarkable.  No significant effusion.  No pneumothorax.   Heart:  The cardiac chambers are within normal limits.  The RV LV ratio is normal and less than 1.  There is incidental gas in the right ventricle and the proximal pulmonary artery segment,  presumed inadvertent administration from the intravenous access.  No pericardial effusion.   Bones joints:  No acute fracture.  No dislocation.   Soft tissues:  Unremarkable.   Lymph nodes:  Unremarkable.  No enlarged lymph nodes. IMPRESSION:     1.  There is a new pulmonary defect in the proximal subsegmental branch of the left posterior apical segment (series 4; images 50-52).  The filling defect involving the proximal left anterior basal segment is different in appearance from the previous examination with the partially occlusive filling defect proximal to the bifurcation.  Previously the filling defect was noted distal the bifurcation.  Findings are most consistent with subsegmental recurrent left-sided pulmonary emboli. 2.  No CT evidence for right heart strain. 3.  No focal airspace consolidation.  No pleural effusion or pneumothorax.     Communications:  Verify Receipt Electronically signed by Ruperto Novak MD on 03-02-23 at 0119      Ordered the above noted radiological studies.  Independently interpreted by me in PACS.  My findings will be discussed in the medical decision making section below        PROCEDURES  Critical Care  Performed by: Mihai Myers MD  Authorized by: Mihai Myers MD     Critical care provider statement:     Critical care time (minutes):  35    Critical care time was exclusive of:  Separately billable procedures and treating other patients    Critical care was necessary to treat or prevent imminent or life-threatening deterioration of the following conditions:  Respiratory failure (Hypoxia with a pulmonary embolus.  On heparin drip)    Critical care was time spent personally by me on the following activities:  Development of treatment plan with patient or surrogate, discussions with consultants, evaluation of patient's response to treatment, examination of patient, ordering and performing treatments and interventions, ordering and review of laboratory studies, ordering and  review of radiographic studies, pulse oximetry, re-evaluation of patient's condition and review of old charts    I assumed direction of critical care for this patient from another provider in my specialty: no      Care discussed with: admitting provider                MEDICATIONS GIVEN IN ER  Medications   sodium chloride 0.9 % flush 10 mL (has no administration in time range)   aspirin tablet 325 mg (325 mg Oral Not Given 3/1/23 2040)   heparin 67215 units/250 mL (100 units/mL) in 0.45 % NaCl infusion (18 Units/kg/hr × 109 kg Intravenous New Bag 3/2/23 0139)   heparin (porcine) 5000 UNIT/ML injection 4,400-8,700 Units (has no administration in time range)   iopamidol (ISOVUE-370) 76 % injection 100 mL (95 mL Intravenous Given 3/2/23 0056)   heparin (porcine) 5000 UNIT/ML injection 8,700 Units (8,700 Units Intravenous Given 3/2/23 0137)                   MEDICAL DECISION MAKING, PROGRESS, and CONSULTS    Please note that this section constitutes my independent interpretation of clinical data including lab results, radiology, EKG's.  This constitutes my independent professional opinion regarding differential diagnosis and management of this patient.  It may include any factors such as history from outside sources, review of external records, social determinants of health, management of medications, response to those treatments, and discussions with other providers.      ED Course as of 03/02/23 0211   Thu Mar 02, 2023   0208 CBC shows normal white count and hemoglobin of 11.3.  Chemistry shows normal renal function and slightly elevated LFTs which appear to be chronic and likely due to fatty liver disease [DP]   0208 High-sensitivity troponin is normal as is the BNP.  hCG is negative [DP]   0208 EKG on arrival  Sinus rhythm in the 80s  Normal QRS and QT  Diffuse nonspecific ST abnormalities which are unchanged compared to most recent previous which I reviewed in epic from January 9, 2023 [DP]   0209 Chest x-ray shows  no cardiomegaly or pulmonary infiltrate [DP]   0209 Based on the assessment, I decided not to do a D-dimer based on her risk factors and just go ahead and do a CT angiogram.  She is a difficult IV stick and it took significant amount of time to get the IV for the angiogram.  However, the angiogram does appear to show a subsegmental filling defect on the left which is new compared to the most recent.  There is no sign of right heart strain on the imaging, no significant infiltrates or pulmonary edema. [DP]   0209 She is hemodynamically stable and mostly symptomatic with exertion.  However, she will need more work-up and discussion of anticoagulation.  I have placed her on a heparin drip for now. [DP]   0210 Discussed case with Dr. Naik who agrees to admit the patient to a telemetry bed for further.  She is currently on 2 L of oxygen by nasal cannula [DP]      ED Course User Index  [DP] Mihai Myers MD                All appropriate hygiene and PPE requirements were satisfied with this patient encounter      FINAL DIAGNOSES  Final diagnoses:   Single subsegmental pulmonary embolism without acute cor pulmonale (HCC)   Gastroparesis   Hypoxia           DISPOSITION  Admit to telemetry          Latest Documented Vital Signs:  As of 02:11 EST  BP- 98/62 HR- 87 Temp- 98.5 °F (36.9 °C) (Oral) O2 sat- 97%        --    Please note that portions of this were completed with a voice recognition program.       Note Disclaimer: At [facility], we believe that sharing information builds trust and better relationships. You are receiving this note because you are receiving care at [facility] or recently visited. It is possible you will see health information before a provider has talked with you about it. This kind of information can be easy to misunderstand. To help you fully understand what it      Mihai Myers MD  03/02/23 0211      Electronically signed by Mihai Myers MD at 03/02/23 0211     Cyndi  NICOLE Russell at 03/01/23 2041        Pt c/o chest pain, shortness of breath, and O2 saturation in the 80's. Pt states she put oxygen on at home and got her O2 saturation up into the low 90's. Pt is currently on 2L O2 via NC at 98%. Pt states she recently had pneumonia and bronchitis. Hx of pulmonary embolism.     This RN wore mask, gloves, eyewear and all other appropriate PPE while performing patient care.      Electronically signed by Yvonne Hanna RN at 03/01/23 2045     Samuel Suarez RN at 03/01/23 2323        This RN attempted IV access with ultrasound and was unsuccessful.     Electronically signed by Samuel Suarez RN at 03/01/23 2323       Oxygen Therapy (since admission)     Date/Time SpO2 Device (Oxygen Therapy) Flow (L/min) Oxygen Concentration (%) ETCO2 (mmHg)    03/02/23 1303 94 nasal cannula -- -- --    03/02/23 1211 99 -- -- -- --    03/02/23 0741 97 room air -- -- --    03/02/23 0329 94 room air -- -- --    03/02/23 0233 -- room air -- -- --    03/02/23 0216 93 -- -- -- --    03/02/23 0135 97 -- -- -- --    03/02/23 0030 96 -- -- -- --    03/01/23 2345 94 -- -- -- --    03/01/23 2301 95 -- -- -- --    03/01/23 2230 95 -- -- -- --    03/01/23 2026 94 -- -- -- --    03/01/23 1830 98 nasal cannula -- -- --        Intake & Output (last 3 days)       02/27 0701 02/28 0700 02/28 0701 03/01 0700 03/01 0701 03/02 0700 03/02 0701  03/03 0700    P.O.   120     Total Intake(mL/kg)   120 (1.1)     Net   +120             Urine Unmeasured Occurrence   2 x 1 x    Stool Unmeasured Occurrence   1 x          Lines, Drains & Airways     Active LDAs     Name Placement date Placement time Site Days    CVC Double Lumen 01/13/23 Left Internal jugular 01/13/23  1533  Internal jugular  47    Peripheral IV 03/02/23 0100 Anterior;Left;Proximal Forearm 03/02/23  0100  Forearm  less than 1          Inactive LDAs     None                  Medication Administration Report for Liliam Lorenz as of 03/02/23 0405    Legend:    Given Hold Not Given Due Canceled Entry Other Actions    Time Time (Time) Time  Time-Action       Discontinued     Completed     Future     MAR Hold     Linked           Medications 02/28/23 03/01/23 03/02/23    albuterol (PROVENTIL) nebulizer solution 0.083% 2.5 mg/3mL  Dose: 2.5 mg  Freq: Every 4 Hours PRN Route: NEBULIZATION  PRN Reason: Shortness of Air  Start: 03/02/23 1338          albuterol sulfate HFA (PROVENTIL HFA;VENTOLIN HFA;PROAIR HFA) inhaler 2 puff  Dose: 2 puff  Freq: Every 4 Hours PRN Route: IN  PRN Reason: Shortness of Air  Start: 03/02/23 1334   End: 03/02/23 1338   Admin Instructions:    Shake well.          aspirin tablet 325 mg  Dose: 325 mg  Freq: Once Route: PO  Start: 03/01/23 1834   End: 03/02/23 1333   Admin Instructions:   Based on patient request - if ordered for moderate or severe pain, provider allows for administration of a medication prescribed for a lower pain scale.  Do not exceed 4 grams of aspirin in a 24 hr period.    If given for pain, use the following pain scale:   Mild Pain = Pain Score of 1-3, CPOT 1-2  Moderate Pain = Pain Score of 4-6, CPOT 3-4  Severe Pain = Pain Score of 7-10, CPOT 5-8     (2040)-Not Given              cholecalciferol (VITAMIN D3) tablet 1,000 Units  Dose: 1,000 Units  Freq: Daily Route: PO  Start: 03/02/23 1530      1530             dronabinol (MARINOL) capsule 5 mg  Dose: 5 mg  Freq: 3 Times Daily PRN Route: PO  PRN Comment: nausea  Start: 03/02/23 1334          heparin (porcine) 5000 UNIT/ML injection 4,400-8,700 Units  Dose: 40-80 Units/kg  Weight Dosing Info: 109 kg  Freq: Every 6 Hours PRN Route: IV  PRN Comment: Per Heparin Nomogram  Indications of Use: DVT/PE (active thrombosis)  Start: 03/02/23 0123   Admin Instructions:   **Max Dose of 10,000 units** Bolus for VTE / PE:  PTT Less Than 60 sec, Administer 80 units/kg Bolus   PTT 60 - 70 sec, Administer 40 units/kg Bolus          heparin (porcine) 5000 UNIT/ML injection 8,700  Units  Dose: 80 Units/kg  Weight Dosing Info: 109 kg  Freq: Once Route: IV  Indications of Use: DVT/PE (active thrombosis)  Start: 03/02/23 1430   End: 03/02/23 1333   Admin Instructions:   **Max Dose of 10,000 units** Bolus for VTE / PE          heparin 12850 units/250 mL (100 units/mL) in 0.45 % NaCl infusion  Rate: 19.62 mL/hr Dose: 18 Units/kg/hr  Weight Dosing Info: 109 kg  Freq: Titrated Route: IV  Indications of Use: DVT/PE (active thrombosis)  Start: 03/02/23 0125   Admin Instructions:   Weight Based Heparin Nomogram: VTE / PE: Heparin Infusion 18 units/kg/hr (initial max of 1,500 units/hr)    aPTT Less Than 60: Bolus 80 units/kg & Increase Dose By 4 units/kg/hr.  aPTT 60-70: Bolus 40 units/kg & Increase Dose By 2 units/kg/hr.  aPTT 71-95: No Bolus, No Dose Change  aPTT : No Bolus, Decrease Dose By 2 units/kg/hr.  aPTT Greater Than 115: No Bolus. Hold Infusion For 1 hour.  Decrease Dose By 3 units/kg/hr. Repeat aPTT 6 Hours After Restarted.  If aPTT Remains Greater Than 115 Stop Heparin Drip & Contact Provider      0139-New Bag     0314-Rate/Dose Verify     0728-Handoff       0837-Handoff [C]     0971-Restarted            iopamidol (ISOVUE-370) 76 % injection 100 mL  Dose: 100 mL  Freq: Once in Imaging Route: IV  Start: 03/02/23 1430   End: 03/02/23 1333          levothyroxine (SYNTHROID, LEVOTHROID) tablet 50 mcg  Dose: 50 mcg  Freq: Daily Route: PO  Start: 03/02/23 1430   Admin Instructions:   Take on empty stomach.      1430             loperamide (IMODIUM) capsule 2 mg  Dose: 2 mg  Freq: 4 Times Daily PRN Route: PO  PRN Reason: Diarrhea  Start: 03/02/23 1334   Admin Instructions:   Maximum recommended 16 mg / 24 hours.            meclizine (ANTIVERT) tablet 25 mg  Dose: 25 mg  Freq: 3 Times Daily PRN Route: PO  PRN Reason: Dizziness  Start: 03/02/23 1334          metoprolol tartrate (LOPRESSOR) tablet 37.5 mg  Dose: 37.5 mg  Freq: Every 8 Hours Route: PO  Start: 03/02/23 1433 [id number]      "       ondansetron (ZOFRAN) injection 4 mg  Dose: 4 mg  Freq: Every 4 Hours PRN Route: IV  PRN Reasons: Nausea,Vomiting  Start: 03/02/23 0420   Admin Instructions:   \"If multiple N/V medications ordered, use in the following order: Ondansetron, Prochlorperazine, Promethazine. Use PO unless patient refuses or patient unable to swallow.\"        0448-Given     0958-Given            prochlorperazine (COMPAZINE) tablet 10 mg  Dose: 10 mg  Freq: Every 6 Hours PRN Route: PO  PRN Reasons: Nausea,Vomiting  Start: 03/02/23 1335   Admin Instructions:   \"If multiple N/V medications ordered, use in the following order: Ondansetron, Prochlorperazine, Promethazine. Use PO unless patient refuses or patient unable to swallow.\"            sodium chloride 0.9 % flush 10 mL  Dose: 10 mL  Freq: As Needed Route: IV  PRN Reason: Line Care  Start: 03/01/23 1832         Future Medications  Medications 02/28/23 03/01/23 03/02/23       escitalopram (LEXAPRO) tablet 20 mg  Dose: 20 mg  Freq: Daily Route: PO  Start: 03/02/23 1800   Admin Instructions:   Caution: Look alike/sound alike drug alert.      1800             gabapentin (NEURONTIN) capsule 100 mg  Dose: 100 mg  Freq: 3 Times Daily Route: PO  Start: 03/02/23 1600   Admin Instructions:         1600     2100            midodrine (PROAMATINE) tablet 10 mg  Dose: 10 mg  Freq: 3 Times Daily Before Meals Route: PO  Start: 03/02/23 1730      1730             montelukast (SINGULAIR) tablet 10 mg  Dose: 10 mg  Freq: Nightly Route: PO  Start: 03/02/23 2100      2100             pantoprazole (PROTONIX) EC tablet 40 mg  Dose: 40 mg  Freq: Every Early Morning Route: PO  Start: 03/03/23 0600   Admin Instructions:   Swallow whole; do not crush, split, or chew.          pyridostigmine (MESTINON) tablet 30 mg  Dose: 30 mg  Freq: 2 Times Daily Route: PO  Start: 03/02/23 2100      2100            Completed Medications  Medications 02/28/23 03/01/23 03/02/23       heparin (porcine) 5000 UNIT/ML injection " 8,700 Units  Dose: 80 Units/kg  Weight Dosing Info: 109 kg  Freq: Once Route: IV  Indications of Use: DVT/PE (active thrombosis)  Start: 03/02/23 0126   End: 03/02/23 0137   Admin Instructions:   **Max Dose of 10,000 units** Bolus for VTE / PE      0137-Given             iopamidol (ISOVUE-370) 76 % injection 100 mL  Dose: 100 mL  Freq: Once in Imaging Route: IV  Start: 03/02/23 0058   End: 03/02/23 0056 0056-Given            Discontinued Medications  Medications 02/28/23 03/01/23 03/02/23       heparin (porcine) 5000 UNIT/ML injection 4,400-8,700 Units  Dose: 40-80 Units/kg  Weight Dosing Info: 109 kg  Freq: Every 6 Hours PRN Route: IV  PRN Comment: Per Heparin Nomogram  Indications of Use: DVT/PE (active thrombosis)  Start: 03/02/23 0420   End: 03/02/23 0429   Admin Instructions:   **Max Dose of 10,000 units** Bolus for VTE / PE:  PTT Less Than 60 sec, Administer 80 units/kg Bolus   PTT 60 - 70 sec, Administer 40 units/kg Bolus          heparin (porcine) 5000 UNIT/ML injection 8,700 Units  Dose: 80 Units/kg  Weight Dosing Info: 109 kg  Freq: Once Route: IV  Indications of Use: DVT/PE (active thrombosis)  Start: 03/02/23 0515   End: 03/02/23 0426   Admin Instructions:   **Max Dose of 10,000 units** Bolus for VTE / PE          heparin 76885 units/250 mL (100 units/mL) in 0.45 % NaCl infusion  Rate: 19.62 mL/hr Dose: 18 Units/kg/hr  Weight Dosing Info: 109 kg  Freq: Titrated Route: IV  Indications of Use: DVT/PE (active thrombosis)  Start: 03/02/23 0515   End: 03/02/23 0426   Admin Instructions:   Weight Based Heparin Nomogram: VTE / PE: Heparin Infusion 18 units/kg/hr (initial max of 1,500 units/hr)    aPTT Less Than 60: Bolus 80 units/kg & Increase Dose By 4 units/kg/hr.  aPTT 60-70: Bolus 40 units/kg & Increase Dose By 2 units/kg/hr.  aPTT 71-95: No Bolus, No Dose Change  aPTT : No Bolus, Decrease Dose By 2 units/kg/hr.  aPTT Greater Than 115: No Bolus. Hold Infusion For 1 hour.  Decrease Dose By 3  units/kg/hr. Repeat aPTT 6 Hours After Restarted.  If aPTT Remains Greater Than 115 Stop Heparin Drip & Contact Provider

## 2023-03-02 NOTE — ED PROVIDER NOTES
" EMERGENCY DEPARTMENT ENCOUNTER    Room Number:  21/21  Date seen:  3/2/2023  PCP: Jason Borrero MD  Historian: Patient  Relevant information provided by sources other than the patient, review of external records, and social determinants of health may be included in the HPI section.      HPI:  Chief Complaint: Shortness of breath and chest discomfort  Additional historical features obtained directly from: No one  Context: Liliam Lorenz is a 27 y.o. female who presents to the ED c/o moderate severity shortness of breath and chest discomfort which has been worsening now for the last 2 to 3 days.  Patient said she has been on antibiotics for \"pneumonia\" over this last week.  Patient said that she has oxygen at home to use as needed and she has been using it the last couple of days.  She has a history of recurrent PE which has been resistant to multiple anticoagulants in the past.  She is concerned that this could be a PE.    Patient has a significant amount of other comorbid conditions including POTS, autonomic dysfunction, gastroparesis for which she has a central line to give her self meds, and Phoebe-Danlos syndrome.        Initial impression including social determinants which will impact the assessment     Certainly high risk and complicated patient who could easily have a pulmonary embolism which would require admission to the hospital because she is on Lovenox and is compliant according to her history          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   • Nausea and vomiting 08/22/2017   • Gastroparesis 08/22/2017   • Postural orthostatic tachycardia syndrome 06/12/2020   • Dolan " catheter dysfunction ([facility]) 10/25/2021   • Febrile illness, acute 01/18/2022   • Single subsegmental pulmonary embolism without acute cor pulmonale ([facility]) 04/30/2022   • Cellulitis of chest wall 01/09/2023     Resolved Ambulatory Problems     Diagnosis Date Noted   • No Resolved Ambulatory Problems     Past Medical History:   Diagnosis Date   • Bronchitis    • Chest pain    • Chronic hypotension    • Eczema    • GERD (gastroesophageal reflux disease)    • IBS (irritable bowel syndrome)    • Lightheadedness    • Rectal fissure    • Rosacea    • Tachycardia          PAST SURGICAL HISTORY  Past Surgical History:   Procedure Laterality Date   • COLONOSCOPY     • MEDIPORT INSERTION, DOUBLE  09/01/2020    [facility]    • UPPER GASTROINTESTINAL ENDOSCOPY     • VENOUS ACCESS DEVICE (PORT) INSERTION Left 10/16/2020    Procedure: INSERTION VENOUS ACCESS DEVICE UNDER FLURO;  Surgeon: Pa Lane MD;  Location: [facility];  Service: General;  Laterality: Left;   • VENOUS ACCESS DEVICE (PORT) INSERTION Right 10/26/2021    Procedure: REMOVAL AND INSERTION OF VALVERDE CATHETER;  Surgeon: Pa Lane MD;  Location: [facility];  Service: General;  Laterality: Right;   • VENOUS ACCESS DEVICE (PORT) INSERTION Left 1/13/2023    Procedure: INSERTION OF VALVERDE CATHETER, VENOGARM;  Surgeon: Cristhian Anne MD;  Location: [facility];  Service: General;  Laterality: Left;         FAMILY HISTORY  Family History   Problem Relation Age of Onset   • Hypertension Mother    • Diabetes Mother    • Diabetes Father    • Heart disease Paternal Grandfather    • Stroke Paternal Grandfather    • Diabetes Paternal Grandfather    • Cancer Paternal Grandfather    • Coronary artery disease Maternal Grandmother         MGM with 4 vessel CABG and multiple stents   • Cancer Maternal Grandmother    • Coronary artery disease Maternal Uncle         Maternal uncle with MI   • Breast cancer Other    • Malig Hyperthermia Neg Hx           SOCIAL HISTORY  Social History     Socioeconomic History   • Marital status:    Tobacco Use   • Smoking status: Never   • Smokeless tobacco: Never   Vaping Use   • Vaping Use: Never used   Substance and Sexual Activity   • Alcohol use: No   • Drug use: No   • Sexual activity: Yes     Partners: Female     Birth control/protection: None         ALLERGIES  Eggs or egg-derived products, Pepcid [famotidine], and Scopolamine          PHYSICAL EXAM  ED Triage Vitals [03/01/23 1830]   Temp Heart Rate Resp BP SpO2   98.5 °F (36.9 °C) 90 18 137/95 98 %      Temp src Heart Rate Source Patient Position BP Location FiO2 (%)   Oral Monitor -- -- --         Physical Exam      GENERAL: Awake and alert, morbidly obese.  No distress at rest, but just walking down the hallway to the restroom she gets extremely short of breath and has to stop every few steps.  O2 sats dropped into the 80s with ambulation on room air  HENT: nares patent  EYES: no scleral icterus, PERRL, EOMI  CV: regular rhythm, normal rate, distal pulses symmetric and palpable  RESPIRATORY: normal effort at rest, but significant tachypnea with minimal exertion but that improves with rest  ABDOMEN: soft, nondistended nontender with normal bowel sound  MUSCULOSKELETAL: no deformity, no calf tenderness or pedal edema  NEURO: alert, moves all extremities, follows commands  PSYCH:  calm, cooperative  SKIN: warm, dry with no rash        LAB RESULTS  Recent Results (from the past 24 hour(s))   ECG 12 Lead ED Triage Standing Order; Chest Pain    Collection Time: 03/01/23  6:44 PM   Result Value Ref Range    QT Interval 384 ms   Comprehensive Metabolic Panel    Collection Time: 03/01/23  6:51 PM    Specimen: Blood   Result Value Ref Range    Glucose 106 (H) 65 - 99 mg/dL    BUN 7 6 - 20 mg/dL    Creatinine 0.94 0.57 - 1.00 mg/dL    Sodium 135 (L) 136 - 145 mmol/L    Potassium 3.7 3.5 - 5.2 mmol/L    Chloride 102 98 - 107 mmol/L    CO2 22.0 22.0 - 29.0 mmol/L     Calcium 9.4 8.6 - 10.5 mg/dL    Total Protein 8.5 6.0 - 8.5 g/dL    Albumin 4.1 3.5 - 5.2 g/dL    ALT (SGPT) 55 (H) 1 - 33 U/L    AST (SGOT) 40 (H) 1 - 32 U/L    Alkaline Phosphatase 108 39 - 117 U/L    Total Bilirubin 0.4 0.0 - 1.2 mg/dL    Globulin 4.4 gm/dL    A/G Ratio 0.9 g/dL    BUN/Creatinine Ratio 7.4 7.0 - 25.0    Anion Gap 11.0 5.0 - 15.0 mmol/L    eGFR 85.5 >60.0 mL/min/1.73   High Sensitivity Troponin T    Collection Time: 03/01/23  6:51 PM    Specimen: Blood   Result Value Ref Range    HS Troponin T <6 <10 ng/L   Green Top (Gel)    Collection Time: 03/01/23  6:51 PM   Result Value Ref Range    Extra Tube Hold for add-ons.    Lavender Top    Collection Time: 03/01/23  6:51 PM   Result Value Ref Range    Extra Tube hold for add-on    Gold Top - SST    Collection Time: 03/01/23  6:51 PM   Result Value Ref Range    Extra Tube Hold for add-ons.    Light Blue Top    Collection Time: 03/01/23  6:51 PM   Result Value Ref Range    Extra Tube Hold for add-ons.    CBC Auto Differential    Collection Time: 03/01/23  6:51 PM    Specimen: Blood   Result Value Ref Range    WBC 8.28 3.40 - 10.80 10*3/mm3    RBC 4.49 3.77 - 5.28 10*6/mm3    Hemoglobin 11.3 (L) 12.0 - 15.9 g/dL    Hematocrit 34.7 34.0 - 46.6 %    MCV 77.3 (L) 79.0 - 97.0 fL    MCH 25.2 (L) 26.6 - 33.0 pg    MCHC 32.6 31.5 - 35.7 g/dL    RDW 15.2 12.3 - 15.4 %    RDW-SD 42.6 37.0 - 54.0 fl    MPV 9.9 6.0 - 12.0 fL    Platelets 329 140 - 450 10*3/mm3    Neutrophil % 56.7 42.7 - 76.0 %    Lymphocyte % 26.9 19.6 - 45.3 %    Monocyte % 3.9 (L) 5.0 - 12.0 %    Eosinophil % 10.9 (H) 0.3 - 6.2 %    Basophil % 1.1 0.0 - 1.5 %    Immature Grans % 0.5 0.0 - 0.5 %    Neutrophils, Absolute 4.70 1.70 - 7.00 10*3/mm3    Lymphocytes, Absolute 2.23 0.70 - 3.10 10*3/mm3    Monocytes, Absolute 0.32 0.10 - 0.90 10*3/mm3    Eosinophils, Absolute 0.90 (H) 0.00 - 0.40 10*3/mm3    Basophils, Absolute 0.09 0.00 - 0.20 10*3/mm3    Immature Grans, Absolute 0.04 0.00 - 0.05  10*3/mm3    nRBC 0.0 0.0 - 0.2 /100 WBC   hCG, Serum, Qualitative    Collection Time: 03/01/23  6:51 PM    Specimen: Blood   Result Value Ref Range    HCG Qualitative Negative Negative   BNP    Collection Time: 03/01/23  6:51 PM    Specimen: Blood   Result Value Ref Range    proBNP 42.4 0.0 - 450.0 pg/mL   Protime-INR    Collection Time: 03/02/23  1:36 AM    Specimen: Blood   Result Value Ref Range    Protime 14.3 (H) 11.7 - 14.2 Seconds    INR 1.09 0.90 - 1.10   aPTT    Collection Time: 03/02/23  1:36 AM    Specimen: Blood   Result Value Ref Range    PTT 42.2 (H) 22.7 - 35.4 seconds       Ordered the above labs and independently interpreted results. My findings will be discussed in the medical decision making section below        RADIOLOGY  XR Chest 2 View    Result Date: 3/1/2023  PA AND LATERAL RADIOGRAPHIC VIEWS OF THE CHEST  CLINICAL HISTORY: Chest pain. Triage protocol.  FINDINGS:  There is a left IJ approach central venous catheter terminating at the level of the SVC. The lungs are clear of acute infiltrates. The cardiomediastinal silhouette is within normal limits. The osseous structures are unremarkable.       No active disease in the chest.  Left IJ approach central venous catheter terminates at the level of the SVC.  This report was finalized on 3/1/2023 7:43 PM by Dr. Yaya Gordon M.D.      CT Angiogram Chest    Addendum Date: 3/2/2023    ADDENDUM: 03 02 23 01:36 Verify Receipt Verified receipt with PONCHO Dumont,given to DR Myers   on 03 02 01:36 (-05:00)     Result Date: 3/2/2023  CR Patient: MICHELLE DA SILVA  Time Out: 01:19 Exam(s): CTA CHEST EXAM:   CT Angiography Chest With Intravenous Contrast CLINICAL HISTORY:   soa. h.o pe. TECHNIQUE:   Axial computed tomographic angiography images of the chest with intravenous contrast.  CTDI is 11.38 mGy and DLP is 407.6 mGy-cm.  This CT exam was performed according to the principle of ALARA (As Low As Reasonably Achievable) by using one or more of the following  dose reduction techniques: automated exposure control, adjustment of the mA and or kV according to patient size, and or use of iterative reconstruction technique.   3D and MIP reconstructed images were created and reviewed. COMPARISON:   1 9 2023 FINDINGS:   Pulmonary arteries:  There is a new pulmonary defect in the proximal subsegmental branch of the left posterior apical segment (series 4; images 50-52).  The filling defect involving the proximal left anterior basal segment is different in appearance from the previous examination with partially occlusive filling defect proximal to the bifurcation.  Previously the filling defect was noted distal the bifurcation.  The remaining pulmonary artery segments are patent, accounting for limitations with respiratory artifact.   Aorta:  The aorta is stable and within normal limits.  No dissection or aneurysm identified.   Lungs:  No focal airspace consolidation.   Pleural space:  Unremarkable.  No significant effusion.  No pneumothorax.   Heart:  The cardiac chambers are within normal limits.  The RV LV ratio is normal and less than 1.  There is incidental gas in the right ventricle and the proximal pulmonary artery segment, presumed inadvertent administration from the intravenous access.  No pericardial effusion.   Bones joints:  No acute fracture.  No dislocation.   Soft tissues:  Unremarkable.   Lymph nodes:  Unremarkable.  No enlarged lymph nodes. IMPRESSION:     1.  There is a new pulmonary defect in the proximal subsegmental branch of the left posterior apical segment (series 4; images 50-52).  The filling defect involving the proximal left anterior basal segment is different in appearance from the previous examination with the partially occlusive filling defect proximal to the bifurcation.  Previously the filling defect was noted distal the bifurcation.  Findings are most consistent with subsegmental recurrent left-sided pulmonary emboli. 2.  No CT evidence for right  heart strain. 3.  No focal airspace consolidation.  No pleural effusion or pneumothorax.     Communications:  Verify Receipt Electronically signed by Ruperto Novak MD on 03-02-23 at 0119      Ordered the above noted radiological studies.  Independently interpreted by me in PACS.  My findings will be discussed in the medical decision making section below        PROCEDURES  Critical Care  Performed by: Mihai Myers MD  Authorized by: Mihai Myers MD     Critical care provider statement:     Critical care time (minutes):  35    Critical care time was exclusive of:  Separately billable procedures and treating other patients    Critical care was necessary to treat or prevent imminent or life-threatening deterioration of the following conditions:  Respiratory failure (Hypoxia with a pulmonary embolus.  On heparin drip)    Critical care was time spent personally by me on the following activities:  Development of treatment plan with patient or surrogate, discussions with consultants, evaluation of patient's response to treatment, examination of patient, ordering and performing treatments and interventions, ordering and review of laboratory studies, ordering and review of radiographic studies, pulse oximetry, re-evaluation of patient's condition and review of old charts    I assumed direction of critical care for this patient from another provider in my specialty: no      Care discussed with: admitting provider                MEDICATIONS GIVEN IN ER  Medications   sodium chloride 0.9 % flush 10 mL (has no administration in time range)   aspirin tablet 325 mg (325 mg Oral Not Given 3/1/23 2040)   heparin 30027 units/250 mL (100 units/mL) in 0.45 % NaCl infusion (18 Units/kg/hr × 109 kg Intravenous New Bag 3/2/23 0139)   heparin (porcine) 5000 UNIT/ML injection 4,400-8,700 Units (has no administration in time range)   iopamidol (ISOVUE-370) 76 % injection 100 mL (95 mL Intravenous Given 3/2/23 0056)   heparin  (porcine) 5000 UNIT/ML injection 8,700 Units (8,700 Units Intravenous Given 3/2/23 0137)                   MEDICAL DECISION MAKING, PROGRESS, and CONSULTS    Please note that this section constitutes my independent interpretation of clinical data including lab results, radiology, EKG's.  This constitutes my independent professional opinion regarding differential diagnosis and management of this patient.  It may include any factors such as history from outside sources, review of external records, social determinants of health, management of medications, response to those treatments, and discussions with other providers.      ED Course as of 03/02/23 0211   Thu Mar 02, 2023   0208 CBC shows normal white count and hemoglobin of 11.3.  Chemistry shows normal renal function and slightly elevated LFTs which appear to be chronic and likely due to fatty liver disease [DP]   0208 High-sensitivity troponin is normal as is the BNP.  hCG is negative [DP]   0208 EKG on arrival  Sinus rhythm in the 80s  Normal QRS and QT  Diffuse nonspecific ST abnormalities which are unchanged compared to most recent previous which I reviewed in epic from January 9, 2023 [DP]   0209 Chest x-ray shows no cardiomegaly or pulmonary infiltrate [DP]   0209 Based on the assessment, I decided not to do a D-dimer based on her risk factors and just go ahead and do a CT angiogram.  She is a difficult IV stick and it took significant amount of time to get the IV for the angiogram.  However, the angiogram does appear to show a subsegmental filling defect on the left which is new compared to the most recent.  There is no sign of right heart strain on the imaging, no significant infiltrates or pulmonary edema. [DP]   0209 She is hemodynamically stable and mostly symptomatic with exertion.  However, she will need more work-up and discussion of anticoagulation.  I have placed her on a heparin drip for now. [DP]   0210 Discussed case with Dr. Naik who agrees to  admit the patient to a telemetry bed for further.  She is currently on 2 L of oxygen by nasal cannula [DP]      ED Course User Index  [DP] Mihai Myers MD                All appropriate hygiene and PPE requirements were satisfied with this patient encounter      FINAL DIAGNOSES  Final diagnoses:   Single subsegmental pulmonary embolism without acute cor pulmonale (HCC)   Gastroparesis   Hypoxia           DISPOSITION  Admit to telemetry          Latest Documented Vital Signs:  As of 02:11 EST  BP- 98/62 HR- 87 Temp- 98.5 °F (36.9 °C) (Oral) O2 sat- 97%        --    Please note that portions of this were completed with a voice recognition program.       Note Disclaimer: At [facility], we believe that sharing information builds trust and better relationships. You are receiving this note because you are receiving care at [facility] or recently visited. It is possible you will see health information before a provider has talked with you about it. This kind of information can be easy to misunderstand. To help you fully understand what it      Mihai Myers MD  03/02/23 0217

## 2023-03-02 NOTE — PLAN OF CARE
Goal Outcome Evaluation:   VSS. Med for intermittent nausea with prn meds with some relief.  IV heparin drip continues, adjustments made per protocol.  Next PTT at 2315.  Venous doppler studies this evening still pending. Hematology group in to see today, additional labs drawn.  Spouse at bedside throughout shift.  Safety maintained.  Continue to monitor.

## 2023-03-02 NOTE — PROGRESS NOTES
Called Ignacia LUI RN to make aware that heparin IV is complete. Instructed to turn channel off and she will fix it when patient gets back to her room.

## 2023-03-03 LAB
ALBUMIN SERPL-MCNC: 3.7 G/DL (ref 3.5–5.2)
ALBUMIN/GLOB SERPL: 0.9 G/DL
ALP SERPL-CCNC: 90 U/L (ref 39–117)
ALT SERPL W P-5'-P-CCNC: 42 U/L (ref 1–33)
ANION GAP SERPL CALCULATED.3IONS-SCNC: 10.7 MMOL/L (ref 5–15)
APTT PPP: 106.8 SECONDS (ref 22.7–35.4)
APTT PPP: 122.4 SECONDS (ref 22.7–35.4)
APTT PPP: 143.8 SECONDS (ref 22.7–35.4)
APTT PPP: 69.8 SECONDS (ref 22.7–35.4)
AST SERPL-CCNC: 26 U/L (ref 1–32)
AT III PPP CHRO-ACNC: 91 % (ref 90–134)
BASOPHILS # BLD AUTO: 0.08 10*3/MM3 (ref 0–0.2)
BASOPHILS NFR BLD AUTO: 1 % (ref 0–1.5)
BH CV LOWER VASCULAR LEFT COMMON FEMORAL AUGMENT: NORMAL
BH CV LOWER VASCULAR LEFT COMMON FEMORAL COMPETENT: NORMAL
BH CV LOWER VASCULAR LEFT COMMON FEMORAL COMPRESS: NORMAL
BH CV LOWER VASCULAR LEFT COMMON FEMORAL PHASIC: NORMAL
BH CV LOWER VASCULAR LEFT COMMON FEMORAL SPONT: NORMAL
BH CV LOWER VASCULAR LEFT DISTAL FEMORAL COMPRESS: NORMAL
BH CV LOWER VASCULAR LEFT GASTRONEMIUS COMPRESS: NORMAL
BH CV LOWER VASCULAR LEFT GREATER SAPH AK COMPRESS: NORMAL
BH CV LOWER VASCULAR LEFT GREATER SAPH BK COMPRESS: NORMAL
BH CV LOWER VASCULAR LEFT LESSER SAPH COMPRESS: NORMAL
BH CV LOWER VASCULAR LEFT MID FEMORAL AUGMENT: NORMAL
BH CV LOWER VASCULAR LEFT MID FEMORAL COMPETENT: NORMAL
BH CV LOWER VASCULAR LEFT MID FEMORAL COMPRESS: NORMAL
BH CV LOWER VASCULAR LEFT MID FEMORAL PHASIC: NORMAL
BH CV LOWER VASCULAR LEFT MID FEMORAL SPONT: NORMAL
BH CV LOWER VASCULAR LEFT PERONEAL COMPRESS: NORMAL
BH CV LOWER VASCULAR LEFT POPLITEAL AUGMENT: NORMAL
BH CV LOWER VASCULAR LEFT POPLITEAL COMPETENT: NORMAL
BH CV LOWER VASCULAR LEFT POPLITEAL COMPRESS: NORMAL
BH CV LOWER VASCULAR LEFT POPLITEAL PHASIC: NORMAL
BH CV LOWER VASCULAR LEFT POPLITEAL SPONT: NORMAL
BH CV LOWER VASCULAR LEFT POSTERIOR TIBIAL COMPRESS: NORMAL
BH CV LOWER VASCULAR LEFT PROFUNDA FEMORAL COMPRESS: NORMAL
BH CV LOWER VASCULAR LEFT PROXIMAL FEMORAL COMPRESS: NORMAL
BH CV LOWER VASCULAR LEFT SAPHENOFEMORAL JUNCTION COMPRESS: NORMAL
BH CV LOWER VASCULAR RIGHT COMMON FEMORAL AUGMENT: NORMAL
BH CV LOWER VASCULAR RIGHT COMMON FEMORAL COMPETENT: NORMAL
BH CV LOWER VASCULAR RIGHT COMMON FEMORAL COMPRESS: NORMAL
BH CV LOWER VASCULAR RIGHT COMMON FEMORAL PHASIC: NORMAL
BH CV LOWER VASCULAR RIGHT COMMON FEMORAL SPONT: NORMAL
BH CV LOWER VASCULAR RIGHT DISTAL FEMORAL COMPRESS: NORMAL
BH CV LOWER VASCULAR RIGHT GASTRONEMIUS COMPRESS: NORMAL
BH CV LOWER VASCULAR RIGHT GREATER SAPH AK COMPRESS: NORMAL
BH CV LOWER VASCULAR RIGHT GREATER SAPH BK COMPRESS: NORMAL
BH CV LOWER VASCULAR RIGHT LESSER SAPH COMPRESS: NORMAL
BH CV LOWER VASCULAR RIGHT MID FEMORAL AUGMENT: NORMAL
BH CV LOWER VASCULAR RIGHT MID FEMORAL COMPETENT: NORMAL
BH CV LOWER VASCULAR RIGHT MID FEMORAL COMPRESS: NORMAL
BH CV LOWER VASCULAR RIGHT MID FEMORAL PHASIC: NORMAL
BH CV LOWER VASCULAR RIGHT MID FEMORAL SPONT: NORMAL
BH CV LOWER VASCULAR RIGHT PERONEAL COMPRESS: NORMAL
BH CV LOWER VASCULAR RIGHT POPLITEAL AUGMENT: NORMAL
BH CV LOWER VASCULAR RIGHT POPLITEAL COMPETENT: NORMAL
BH CV LOWER VASCULAR RIGHT POPLITEAL COMPRESS: NORMAL
BH CV LOWER VASCULAR RIGHT POPLITEAL PHASIC: NORMAL
BH CV LOWER VASCULAR RIGHT POPLITEAL SPONT: NORMAL
BH CV LOWER VASCULAR RIGHT POSTERIOR TIBIAL COMPRESS: NORMAL
BH CV LOWER VASCULAR RIGHT PROFUNDA FEMORAL COMPRESS: NORMAL
BH CV LOWER VASCULAR RIGHT PROXIMAL FEMORAL COMPRESS: NORMAL
BH CV LOWER VASCULAR RIGHT SAPHENOFEMORAL JUNCTION COMPRESS: NORMAL
BILIRUB SERPL-MCNC: 0.6 MG/DL (ref 0–1.2)
BUN SERPL-MCNC: 10 MG/DL (ref 6–20)
BUN/CREAT SERPL: 12.7 (ref 7–25)
CALCIUM SPEC-SCNC: 8.5 MG/DL (ref 8.6–10.5)
CHLORIDE SERPL-SCNC: 105 MMOL/L (ref 98–107)
CHOLEST SERPL-MCNC: 238 MG/DL (ref 0–200)
CO2 SERPL-SCNC: 21.3 MMOL/L (ref 22–29)
CREAT SERPL-MCNC: 0.79 MG/DL (ref 0.57–1)
DEPRECATED RDW RBC AUTO: 40.3 FL (ref 37–54)
EGFRCR SERPLBLD CKD-EPI 2021: 105.3 ML/MIN/1.73
EOSINOPHIL # BLD AUTO: 0.91 10*3/MM3 (ref 0–0.4)
EOSINOPHIL NFR BLD AUTO: 11.3 % (ref 0.3–6.2)
ERYTHROCYTE [DISTWIDTH] IN BLOOD BY AUTOMATED COUNT: 14.8 % (ref 12.3–15.4)
FACT VIII ACT/NOR PPP: 303 % ACTIVITY (ref 60–150)
GLOBULIN UR ELPH-MCNC: 4.1 GM/DL
GLUCOSE SERPL-MCNC: 95 MG/DL (ref 65–99)
HBA1C MFR BLD: 4.8 % (ref 4.8–5.6)
HCT VFR BLD AUTO: 32.5 % (ref 34–46.6)
HDLC SERPL-MCNC: 33 MG/DL (ref 40–60)
HGB BLD-MCNC: 10.7 G/DL (ref 12–15.9)
IMM GRANULOCYTES # BLD AUTO: 0.03 10*3/MM3 (ref 0–0.05)
IMM GRANULOCYTES NFR BLD AUTO: 0.4 % (ref 0–0.5)
LDLC SERPL CALC-MCNC: 163 MG/DL (ref 0–100)
LDLC/HDLC SERPL: 4.86 {RATIO}
LYMPHOCYTES # BLD AUTO: 2.28 10*3/MM3 (ref 0.7–3.1)
LYMPHOCYTES NFR BLD AUTO: 28.3 % (ref 19.6–45.3)
Lab: ABNORMAL
MAXIMAL PREDICTED HEART RATE: 193 BPM
MCH RBC QN AUTO: 25.1 PG (ref 26.6–33)
MCHC RBC AUTO-ENTMCNC: 32.9 G/DL (ref 31.5–35.7)
MCV RBC AUTO: 76.3 FL (ref 79–97)
MONOCYTES # BLD AUTO: 0.46 10*3/MM3 (ref 0.1–0.9)
MONOCYTES NFR BLD AUTO: 5.7 % (ref 5–12)
NEUTROPHILS NFR BLD AUTO: 4.31 10*3/MM3 (ref 1.7–7)
NEUTROPHILS NFR BLD AUTO: 53.3 % (ref 42.7–76)
NRBC BLD AUTO-RTO: 0 /100 WBC (ref 0–0.2)
PLATELET # BLD AUTO: 294 10*3/MM3 (ref 140–450)
PMV BLD AUTO: 10 FL (ref 6–12)
POTASSIUM SERPL-SCNC: 3.9 MMOL/L (ref 3.5–5.2)
PROT SERPL-MCNC: 7.8 G/DL (ref 6–8.5)
RBC # BLD AUTO: 4.26 10*6/MM3 (ref 3.77–5.28)
SODIUM SERPL-SCNC: 137 MMOL/L (ref 136–145)
STRESS TARGET HR: 164 BPM
TRIGL SERPL-MCNC: 223 MG/DL (ref 0–150)
TSH SERPL DL<=0.05 MIU/L-ACNC: 1.54 UIU/ML (ref 0.27–4.2)
VLDLC SERPL-MCNC: 42 MG/DL (ref 5–40)
WBC NRBC COR # BLD: 8.07 10*3/MM3 (ref 3.4–10.8)

## 2023-03-03 PROCEDURE — 25010000002 ONDANSETRON PER 1 MG: Performed by: HOSPITALIST

## 2023-03-03 PROCEDURE — 25010000002 HEPARIN (PORCINE) PER 1000 UNITS: Performed by: EMERGENCY MEDICINE

## 2023-03-03 PROCEDURE — 83036 HEMOGLOBIN GLYCOSYLATED A1C: CPT | Performed by: HOSPITALIST

## 2023-03-03 PROCEDURE — 85730 THROMBOPLASTIN TIME PARTIAL: CPT | Performed by: HOSPITALIST

## 2023-03-03 PROCEDURE — 99232 SBSQ HOSP IP/OBS MODERATE 35: CPT | Performed by: INTERNAL MEDICINE

## 2023-03-03 PROCEDURE — 80050 GENERAL HEALTH PANEL: CPT | Performed by: HOSPITALIST

## 2023-03-03 PROCEDURE — 80061 LIPID PANEL: CPT | Performed by: HOSPITALIST

## 2023-03-03 PROCEDURE — 25010000002 HEPARIN (PORCINE) 25000-0.45 UT/250ML-% SOLUTION: Performed by: EMERGENCY MEDICINE

## 2023-03-03 RX ADMIN — ONDANSETRON 4 MG: 2 INJECTION INTRAMUSCULAR; INTRAVENOUS at 09:38

## 2023-03-03 RX ADMIN — METOPROLOL TARTRATE 37.5 MG: 25 TABLET, FILM COATED ORAL at 21:31

## 2023-03-03 RX ADMIN — GABAPENTIN 100 MG: 100 CAPSULE ORAL at 15:23

## 2023-03-03 RX ADMIN — PANTOPRAZOLE SODIUM 40 MG: 40 TABLET, DELAYED RELEASE ORAL at 09:32

## 2023-03-03 RX ADMIN — GABAPENTIN 100 MG: 100 CAPSULE ORAL at 21:31

## 2023-03-03 RX ADMIN — ONDANSETRON 4 MG: 2 INJECTION INTRAMUSCULAR; INTRAVENOUS at 21:32

## 2023-03-03 RX ADMIN — METOPROLOL TARTRATE 37.5 MG: 25 TABLET, FILM COATED ORAL at 15:23

## 2023-03-03 RX ADMIN — MIDODRINE HYDROCHLORIDE 10 MG: 5 TABLET ORAL at 12:07

## 2023-03-03 RX ADMIN — HEPARIN SODIUM 12 UNITS/KG/HR: 10000 INJECTION, SOLUTION INTRAVENOUS at 11:50

## 2023-03-03 RX ADMIN — PYRIDOSTIGMINE BROMIDE 30 MG: 60 TABLET ORAL at 09:30

## 2023-03-03 RX ADMIN — ONDANSETRON 4 MG: 2 INJECTION INTRAMUSCULAR; INTRAVENOUS at 17:24

## 2023-03-03 RX ADMIN — HEPARIN SODIUM 4400 UNITS: 5000 INJECTION INTRAVENOUS; SUBCUTANEOUS at 17:18

## 2023-03-03 RX ADMIN — MIDODRINE HYDROCHLORIDE 10 MG: 5 TABLET ORAL at 17:25

## 2023-03-03 RX ADMIN — MIDODRINE HYDROCHLORIDE 10 MG: 5 TABLET ORAL at 09:29

## 2023-03-03 RX ADMIN — GABAPENTIN 100 MG: 100 CAPSULE ORAL at 09:30

## 2023-03-03 RX ADMIN — LEVOTHYROXINE SODIUM 50 MCG: 0.05 TABLET ORAL at 09:30

## 2023-03-03 RX ADMIN — Medication 1000 UNITS: at 09:29

## 2023-03-03 RX ADMIN — ESCITALOPRAM 20 MG: 20 TABLET, FILM COATED ORAL at 09:29

## 2023-03-03 RX ADMIN — PYRIDOSTIGMINE BROMIDE 30 MG: 60 TABLET ORAL at 21:31

## 2023-03-03 RX ADMIN — METOPROLOL TARTRATE 37.5 MG: 25 TABLET, FILM COATED ORAL at 09:31

## 2023-03-03 RX ADMIN — MONTELUKAST SODIUM 10 MG: 10 TABLET, FILM COATED ORAL at 21:31

## 2023-03-03 NOTE — PLAN OF CARE
Goal Outcome Evaluation:  Plan of Care Reviewed With: patient, significant other        Progress: no change  Outcome Evaluation: VSS, pt reports cont nausea due to chronic illness, prn Zofran x1, evening meds held per pt request due to nausea, wife at BS and asst up to BR, pt reports unable to wear home CPAP due to nausea, cont on IV Heparin per protocol, pt slept at intervals

## 2023-03-03 NOTE — PLAN OF CARE
Goal Outcome Evaluation:  VSS. Rested off and on this shift.  Med x 1 for nausea with IV Zofran.  Heparin drip continues, changes made per protocol.  Next PTT timed for tonight at 2320.  Spouse at bedside throughout the shift, assists with getting up to bathroom, etc.  SCDs placed this afternoon when patient was agreeable.  Not eating much from hospital meal trays, spouse brings in food and client has snacks in room.  Safety maintained.  Continue to monitor.

## 2023-03-03 NOTE — PROGRESS NOTES
"Daily progress    Primary care physician  Dr. Jason Borrero    Chief complaint  Doing same with no new complaints and family at bedside.    History of present illness  27-year-old white female with history of chronic orthostatic hypotension with POTS syndrome Phoebe-Danlos syndrome hypothyroidism gastroparesis irritable bowel syndrome and gastroesophageal reflux disease who is well-known to our service who also have recurrent pulmonary embolism on Lovenox at home as she felt oral anticoagulants brought to the emergency room with increased shortness of breath for last 2 to 3 days which is getting worse and develops chest discomfort yesterday but no fever chills cough congestion but remain nauseated and did not miss any medications especially Lovenox evaluated in ER found to have recurrent syncopal symptoms segmental pulm embolism despite full dose of Lovenox with no noncompliance admitted for management.     PHYSICAL EXAM  Blood pressure 106/55, pulse 68, temperature 97.6 °F (36.4 °C), temperature source Oral, resp. rate 16, height 162.6 cm (64\"), weight 109 kg (240 lb 9.6 oz), SpO2 94 %, not currently breastfeeding.    GENERAL: Awake and alert in no distress  HEENT:  Unremarkable  NECK:  Supple  CV: regular rhythm, normal rate, distal pulses symmetric and palpable  RESPIRATORY: normal effort  and moving air bilaterally  ABDOMEN: soft, nondistended nontender with normal bowel sound  MUSCULOSKELETAL: no deformity, no calf tenderness or pedal edema  NEURO: alert, moves all extremities, follows commands  PSYCH:  calm, cooperative  SKIN: warm, dry with no rash     LAB RESULTS  Lab Results (last 24 hours)     Procedure Component Value Units Date/Time    Factor 8 Activity [[id number]]  (Abnormal) Collected: 03/02/23 0932    Specimen: Blood Updated: 03/03/23 1300     Factor VIII Activity 303 % Activity      Interpretation Non-specific inhibitor pattern not present    Antithrombin III [[id number]]  (Normal) Collected: 03/02/23 " 0932    Specimen: Blood Updated: 03/03/23 1039     Antithrombin Activity 91 %     TSH [[id number]]  (Normal) Collected: 03/03/23 0808    Specimen: Blood Updated: 03/03/23 0904     TSH 1.540 uIU/mL     aPTT [[id number]]  (Abnormal) Collected: 03/03/23 0808    Specimen: Blood Updated: 03/03/23 0902     .8 seconds     Comprehensive Metabolic Panel [[id number]]  (Abnormal) Collected: 03/03/23 0808    Specimen: Blood Updated: 03/03/23 0858     Glucose 95 mg/dL      BUN 10 mg/dL      Creatinine 0.79 mg/dL      Sodium 137 mmol/L      Potassium 3.9 mmol/L      Chloride 105 mmol/L      CO2 21.3 mmol/L      Calcium 8.5 mg/dL      Total Protein 7.8 g/dL      Albumin 3.7 g/dL      ALT (SGPT) 42 U/L      AST (SGOT) 26 U/L      Alkaline Phosphatase 90 U/L      Total Bilirubin 0.6 mg/dL      Globulin 4.1 gm/dL      A/G Ratio 0.9 g/dL      BUN/Creatinine Ratio 12.7     Anion Gap 10.7 mmol/L      eGFR 105.3 mL/min/1.73     Narrative:      GFR Normal >60  Chronic Kidney Disease <60  Kidney Failure <15      Lipid Panel [[id number]]  (Abnormal) Collected: 03/03/23 0808    Specimen: Blood Updated: 03/03/23 0858     Total Cholesterol 238 mg/dL      Triglycerides 223 mg/dL      HDL Cholesterol 33 mg/dL      LDL Cholesterol  163 mg/dL      VLDL Cholesterol 42 mg/dL      LDL/HDL Ratio 4.86    Narrative:      Cholesterol Reference Ranges  (U.S. Department of Health and Human Services ATP III Classifications)    Desirable          <200 mg/dL  Borderline High    200-239 mg/dL  High Risk          >240 mg/dL      Triglyceride Reference Ranges  (U.S. Department of Health and Human Services ATP III Classifications)    Normal           <150 mg/dL  Borderline High  150-199 mg/dL  High             200-499 mg/dL  Very High        >500 mg/dL    HDL Reference Ranges  (U.S. Department of Health and Human Services ATP III Classifications)    Low     <40 mg/dl (major risk factor for CHD)  High    >60 mg/dl ('negative' risk factor for CHD)        LDL  Reference Ranges  (U.S. Department of Health and Human Services ATP III Classifications)    Optimal          <100 mg/dL  Near Optimal     100-129 mg/dL  Borderline High  130-159 mg/dL  High             160-189 mg/dL  Very High        >189 mg/dL    Hemoglobin A1c [[id number]]  (Normal) Collected: 03/03/23 0808    Specimen: Blood Updated: 03/03/23 0850     Hemoglobin A1C 4.80 %     Narrative:      Hemoglobin A1C Ranges:    Increased Risk for Diabetes  5.7% to 6.4%  Diabetes                     >= 6.5%  Diabetic Goal                < 7.0%    CBC & Differential [[id number]]  (Abnormal) Collected: 03/03/23 0808    Specimen: Blood Updated: 03/03/23 0842    Narrative:      The following orders were created for panel order CBC & Differential.  Procedure                               Abnormality         Status                     ---------                               -----------         ------                     CBC Auto Differential[[id number]]        Abnormal            Final result                 Please view results for these tests on the individual orders.    CBC Auto Differential [[id number]]  (Abnormal) Collected: 03/03/23 0808    Specimen: Blood Updated: 03/03/23 0842     WBC 8.07 10*3/mm3      RBC 4.26 10*6/mm3      Hemoglobin 10.7 g/dL      Hematocrit 32.5 %      MCV 76.3 fL      MCH 25.1 pg      MCHC 32.9 g/dL      RDW 14.8 %      RDW-SD 40.3 fl      MPV 10.0 fL      Platelets 294 10*3/mm3      Neutrophil % 53.3 %      Lymphocyte % 28.3 %      Monocyte % 5.7 %      Eosinophil % 11.3 %      Basophil % 1.0 %      Immature Grans % 0.4 %      Neutrophils, Absolute 4.31 10*3/mm3      Lymphocytes, Absolute 2.28 10*3/mm3      Monocytes, Absolute 0.46 10*3/mm3      Eosinophils, Absolute 0.91 10*3/mm3      Basophils, Absolute 0.08 10*3/mm3      Immature Grans, Absolute 0.03 10*3/mm3      nRBC 0.0 /100 WBC     aPTT [[id number]]  (Abnormal) Collected: 03/02/23 6007    Specimen: Blood Updated: 03/03/23 0052     .8  "seconds     D-dimer, Quantitative [[id number]]  (Normal) Collected: 03/02/23 1534    Specimen: Blood Updated: 03/02/23 1646     D-Dimer, Quantitative 0.39 MCGFEU/mL     Narrative:      According to the assay 's published package insert, a normal (<0.50 MCGFEU/mL) D-dimer result in conjunction with a non-high clinical probability assessment, excludes deep vein thrombosis (DVT) and pulmonary embolism (PE) with high sensitivity.    D-dimer values increase with age and this can make VTE exclusion of an older population difficult. To address this, the American College [employer], based on best available evidence and recent guidelines, recommends that clinicians use age-adjusted D-dimer thresholds in patients greater than 50 years of age with: a) a low probability of PE who do not meet all Pulmonary Embolism Rule Out Criteria, or b) in those with intermediate probability of PE.   The formula for an age-adjusted D-dimer cut-off is \"age/100\".  For example, a 60 year old patient would have an age-adjusted cut-off of 0.60 MCGFEU/mL and an 80 year old 0.80 MCGFEU/mL.    aPTT [[id number]]  (Abnormal) Collected: 03/02/23 1534    Specimen: Blood Updated: 03/02/23 1646     PTT 62.7 seconds         Imaging Results (Last 24 Hours)     ** No results found for the last 24 hours. **          ECG 12 Lead             Component  Ref Range & Units 1 d ago  (3/1/23) 8 mo ago  (6/15/22) 1 yr ago  (1/18/22) 1 yr ago  (9/17/21) 1 yr ago  (9/8/21) 1 yr ago  (5/6/21) 1 yr ago  (5/5/21)   QT Interval  ms 384  337  334  350  298  279  342    Resulting Agency BH ECG BH ECG BH ECG BH ECG BH ECG BH ECG BH ECG             HEART RATE= 85  bpm  RR Interval= 706  ms  MO Interval=   ms  P Horizontal Axis=   deg  P Front Axis=   deg  QRSD Interval= 95  ms  QT Interval= 384  ms  QRS Axis= 25  deg  T Wave Axis= 40  deg  - ABNORMAL ECG -  Sinus rhythm  Since prior tracing hr has decreased             Current Facility-Administered Medications:   • "  albuterol (PROVENTIL) nebulizer solution 0.083% 2.5 mg/3mL, 2.5 mg, Nebulization, Q4H PRN, Caleb Naik MD  •  cholecalciferol (VITAMIN D3) tablet 1,000 Units, 1,000 Units, Oral, Daily, Caleb Naik MD, 1,000 Units at 03/03/23 0929  •  dronabinol (MARINOL) capsule 5 mg, 5 mg, Oral, TID PRN, Caleb Naik MD, 5 mg at 03/02/23 1653  •  escitalopram (LEXAPRO) tablet 20 mg, 20 mg, Oral, Daily, Caleb Naik MD, 20 mg at 03/03/23 0929  •  gabapentin (NEURONTIN) capsule 100 mg, 100 mg, Oral, TID, Caleb Naik MD, 100 mg at 03/03/23 1523  •  heparin (porcine) 5000 UNIT/ML injection 4,400-8,700 Units, 40-80 Units/kg, Intravenous, Q6H PRN, Mihai Myers MD, 4,400 Units at 03/02/23 1712  •  heparin 31738 units/250 mL (100 units/mL) in 0.45 % NaCl infusion, 18 Units/kg/hr, Intravenous, Titrated, Mihai Myers MD, Last Rate: 13.08 mL/hr at 03/03/23 1150, 12 Units/kg/hr at 03/03/23 1150  •  levothyroxine (SYNTHROID, LEVOTHROID) tablet 50 mcg, 50 mcg, Oral, Daily, Caleb Naik MD, 50 mcg at 03/03/23 0930  •  loperamide (IMODIUM) capsule 2 mg, 2 mg, Oral, 4x Daily PRN, Caleb Naik MD  •  meclizine (ANTIVERT) tablet 25 mg, 25 mg, Oral, TID PRN, Caleb Naik MD  •  metoprolol tartrate (LOPRESSOR) tablet 37.5 mg, 37.5 mg, Oral, Q8H, Caleb Naik MD, 37.5 mg at 03/03/23 1523  •  midodrine (PROAMATINE) tablet 10 mg, 10 mg, Oral, TID AC, Caleb Naik MD, 10 mg at 03/03/23 1207  •  montelukast (SINGULAIR) tablet 10 mg, 10 mg, Oral, Nightly, Caleb Naik MD  •  ondansetron (ZOFRAN) injection 4 mg, 4 mg, Intravenous, Q4H PRN, Caleb Naik MD, 4 mg at 03/03/23 0938  •  pantoprazole (PROTONIX) EC tablet 40 mg, 40 mg, Oral, Q AM, Caleb Naik MD, 40 mg at 03/03/23 0932  •  prochlorperazine (COMPAZINE) tablet 10 mg, 10 mg, Oral, Q6H PRN, Caleb Naik MD  •  pyridostigmine (MESTINON) tablet 30 mg, 30 mg, Oral, BID, Caleb Naik MD, 30 mg at 03/03/23 0930  •  sodium chloride 0.9 % flush 10 mL, 10 mL, Intravenous, PRN, Louis,  Mihai RANGEL MD     ASSESSMENT  Recurrent single subsegmental pulm embolism despite full anticoagulation with Lovenox  Chronic orthostatic hypotension  POTS syndrome  Phoebe-Danlos syndrome  Hypothyroidism  Gastroparesis  Irritable bowel syndrome  Gastroesophageal reflux disease    PLAN  CPM  Supplemental oxygen as needed  Continue heparin heparin  Hematology consult appreciated  Continue home medications  Stress ulcer DVT prophylaxis  Supportive care  Discussed with family and nursing staff  Follow closely and further recommendation current hospital course    ANN GONZALEZ MD    Copied text in this note has been reviewed and is accurate as of 03/03/23

## 2023-03-03 NOTE — CASE MANAGEMENT/SOCIAL WORK
Discharge Planning Assessment  [facility]     Patient Name: Liliam Lorenz  MRN: [id number]  Today's Date: 3/3/2023    Admit Date: 3/1/2023    Plan: Return home with family assist. Denies needs   Discharge Needs Assessment     Row Name 03/03/23 1623       Living Environment    People in Home spouse;other relative(s)    Current Living Arrangements home    Primary Care Provided by self    Provides Primary Care For no one    Family Caregiver if Needed spouse    Quality of Family Relationships involved;helpful    Able to Return to Prior Arrangements yes       Resource/Environmental Concerns    Resource/Environmental Concerns none       Transition Planning    Patient/Family Anticipated Services at Transition home health care    Transportation Anticipated family or friend will provide       Discharge Needs Assessment    Readmission Within the Last 30 Days current reason for admission unrelated to previous admission    Equipment Currently Used at Home walker, rolling;cpap;wheelchair;oxygen;cane, straight    Concerns to be Addressed discharge planning    Anticipated Changes Related to Illness none    Equipment Needed After Discharge none               Discharge Plan     Row Name 03/03/23 1612       Plan    Plan Return home with family assist. Denies needs    Patient/Family in Agreement with Plan yes    Plan Comments CCP met with patient at bedside. Introduced self and explained role. Patient lives with her wife and in laws. Her wife assists with all ADLs as needed and either patient's mother or wife provide transportation to appointments. Patient sees Jason Borrero for PCP and fills prescriptions at [facility]. For DME patient has a walker, wheelchair, cane, IV pole, medication pump, CPAP, and PRN o2 from [employer] (2L). She is current with [facility] for weekly IVIG infusions at home and denies any ONDINA history. At discharge patient plans to return home. Her family can provide  transportation at home and assistance as needed. Patient is enrolled in meds to beds and denies any discharge planning needs at this time. Peace ELDER RN/CCP              Continued Care and Services - Admitted Since 3/1/2023    Coordination has not been started for this encounter.     Selected Continued Care - Prior Encounters Includes continued care and service providers with selected services from prior encounters from 12/1/2022 to 3/3/2023    Discharged on 1/14/2023 Admission date: 1/9/2023 - Discharge disposition: Home or Self Care    Dialysis/Infusion     Service Provider Selected Services Address Phone Fax Patient Preferred    [facility] [address], [place] [id number] [phone] --                    Expected Discharge Date and Time     Expected Discharge Date Expected Discharge Time    Mar 4, 2023          Demographic Summary     Row Name 03/03/23 1621       General Information    Admission Type inpatient    Arrived From home    Referral Source admission list    Reason for Consult discharge planning               Functional Status     Row Name 03/03/23 1621       Functional Status    Usual Activity Tolerance good    Current Activity Tolerance moderate       Functional Status, IADL    Medications assistive person    Meal Preparation assistive person    Housekeeping assistive person    Laundry assistive person    Shopping assistive person               Psychosocial    No documentation.                Abuse/Neglect    No documentation.                Legal    No documentation.                Substance Abuse    No documentation.                Patient Forms    No documentation.                   Dorene Uribe

## 2023-03-03 NOTE — PROGRESS NOTES
Subjective   REASON FOR CONSULTATION: Management of anticoagulation    History of Present Illness  patient is a 27-year-old female with Erler's Danlos syndrome and POTS syndrome and gastroparesis who has an indwelling Dolan catheter for IV fluids and antiemetics admitted with shortness of breath and drop in her O2 sats with evidence of new pulmonary embolism despite Lovenox 110 mg subcu twice daily which she has been on since mid January when she was admitted with another PE after her Lovenox was cut down to once a day 80 mg by her hematologist      Patient reports she has been extremely compliant with her Lovenox dosing because she was afraid to have another blood clot and was taking the 110 mg dose twice a day and does not understand why she has a new blood clot despite this.     On admission to the ER PT was normal PTT was 42.2 seconds but it is not clear whether this dose was giving her therapeutic PT and PTT's which really is essential for us to know before we make any treatment changes  She is currently on IV heparin and tolerating it well     Of note she had never had a DVT in the past despite being overweight and on birth control pills until 2021 after her Dolan catheter was placed when she had a first clot.  Second clot was in January of this year and this is her third PE.  Each time she has had no clots in her legs or arm veins     She cannot live without IV access because her quality of life is miserable without her IV antiemetics and IV fluids although I suspect the Dolan may be source of thrombosis    Interval history  3/3/2023  D-dimer surprisingly normal  Antithrombin III normal-factor VIII activity 303%  She feels the same  Recommend switching to Arixtra at discharge    Past Medical History, Past Surgical History, Social History, Family History have been reviewed and are without significant changes except as mentioned.    Review of Systems   Constitutional: Positive for activity  change.   Respiratory: Positive for shortness of breath.       A comprehensive 14 point review of systems was performed and was negative except as mentioned.    Medications:  The current medication list was reviewed in the EMR    ALLERGIES:    Allergies   Allergen Reactions   • Eggs Or Egg-Derived Products GI Intolerance     Rash & vomiting   • Pepcid [Famotidine] Rash and GI Intolerance   • Scopolamine Rash and GI Intolerance       Objective      Vitals:    03/02/23 2332 03/03/23 0124 03/03/23 0744 03/03/23 1312   BP: 103/69  104/64 106/55   BP Location: Left arm  Right arm Left arm   Patient Position: Lying  Lying Lying   Pulse: 81 71 81 96   Resp: 16  16 16   Temp: 97.4 °F (36.3 °C)  97.7 °F (36.5 °C) 97.6 °F (36.4 °C)   TempSrc: Oral  Oral Oral   SpO2: 97% 95% 95% 94%   Weight:       Height:         No flowsheet data found.    Physical Exam  CONSTITUTIONAL:  Vital signs reviewed.  No distress, looks comfortable.  EYES:  Conjunctivae and lids unremarkable.  PERRLA  EARS,NOSE,MOUTH,THROAT:  Ears and nose appear unremarkable.  Lips, teeth, gums appear unremarkable.  RESPIRATORY:  Normal respiratory effort.  Lungs clear to auscultation bilaterally.  CARDIOVASCULAR:  Normal S1, S2.  No murmurs rubs or gallops.  No significant lower extremity edema.  GASTROINTESTINAL: Abdomen appears unremarkable.  Nontender.  No hepatomegaly.  No splenomegaly.  Multiple bruises from Lovenox injection  LYMPHATIC:  No cervical, supraclavicular, axillary lymphadenopathy.  SKIN:  Warm.  No rashes.  PSYCHIATRIC:  Normal judgment and insight.  Normal mood and affect.   I have reexamined the patient and the results are consistent with the previously documented exam. Justin Aldana MD       RECENT LABS:  Hematology WBC   Date Value Ref Range Status   03/03/2023 8.07 3.40 - 10.80 10*3/mm3 Final     RBC   Date Value Ref Range Status   03/03/2023 4.26 3.77 - 5.28 10*6/mm3 Final     Hemoglobin   Date Value Ref Range Status   03/03/2023  10.7 (L) 12.0 - 15.9 g/dL Final     Hematocrit   Date Value Ref Range Status   03/03/2023 32.5 (L) 34.0 - 46.6 % Final     Platelets   Date Value Ref Range Status   03/03/2023 294 140 - 450 10*3/mm3 Final              Assessment & Plan        *Recurrent VTE on chronic anticoagulation  • Prior pulmonary embolism 4/20/2022 while on Eliquis (Eliquis failure likely secondary to malabsorption) transition to warfarin  • Difficulty maintaining therapeutic INR on warfarin likely secondary to erratic/malabsorption transition to Lovenox 80 mg every 12 hours (subtherapeutic based on current weight), been dose reduced to once daily dosing 1 week prior to current admission  • 1/9/2023 admitted with chest line infection/cellulitis- CT angiogram chest incidentally showed new filling defect subsegmental left lower lobe pulmonary artery on 80 mg Lovenox subcu daily  • Previous hypercoagulable evaluation has been unremarkable  • Lovenox dose increased to 110 mg every 12 in 1/23  • Readmitted with new PE on this dose of Lovenox on 3/2/2023  • Currently on IV heparin  • Would recommend switching to Arixtra 10 mg subcu daily after 3 to 4 days of IV heparin  • Antithrombin III level normal factor VIII level 8 303% D-dimer surprisingly normal but was elevated 2 weeks ago      *Infected Dolan catheter/cellulitis  • Catheter has been removed  • Cultures and antibiotics per ID  • Catheter replaced     * Phoebe-Danlos syndrome, POTS syndrome with chronic hypotension, irritable bowel syndrome requiring indwelling central line for fluid administration etc.     *Gastroparesis     Plan  1.Discussed with her outpatient hematologist who would rather do Arixtra  subcu at this point then place an IVC filter  2. Would get in put from vascular -absence of clots in legs noted -?pelvic veins    Would transition to Arixtra 10 mg daily after 3 to 4 days of IV heparin    Will not follow thank you she will follow-up with Dr. Mcdonald in the office                   3/3/2023      CC:

## 2023-03-04 LAB
ALBUMIN SERPL-MCNC: 4 G/DL (ref 3.5–5.2)
ALBUMIN/GLOB SERPL: 1 G/DL
ALP SERPL-CCNC: 93 U/L (ref 39–117)
ALT SERPL W P-5'-P-CCNC: 34 U/L (ref 1–33)
ANION GAP SERPL CALCULATED.3IONS-SCNC: 8.3 MMOL/L (ref 5–15)
APTT PPP: 102.8 SECONDS (ref 22.7–35.4)
APTT PPP: 69 SECONDS (ref 22.7–35.4)
AST SERPL-CCNC: 18 U/L (ref 1–32)
BASOPHILS # BLD AUTO: 0.07 10*3/MM3 (ref 0–0.2)
BASOPHILS NFR BLD AUTO: 0.7 % (ref 0–1.5)
BILIRUB SERPL-MCNC: 0.6 MG/DL (ref 0–1.2)
BUN SERPL-MCNC: 12 MG/DL (ref 6–20)
BUN/CREAT SERPL: 13.8 (ref 7–25)
CALCIUM SPEC-SCNC: 9.2 MG/DL (ref 8.6–10.5)
CHLORIDE SERPL-SCNC: 103 MMOL/L (ref 98–107)
CO2 SERPL-SCNC: 22.7 MMOL/L (ref 22–29)
CREAT SERPL-MCNC: 0.87 MG/DL (ref 0.57–1)
DEPRECATED RDW RBC AUTO: 42.2 FL (ref 37–54)
EGFRCR SERPLBLD CKD-EPI 2021: 93.8 ML/MIN/1.73
EOSINOPHIL # BLD AUTO: 0.86 10*3/MM3 (ref 0–0.4)
EOSINOPHIL NFR BLD AUTO: 8.6 % (ref 0.3–6.2)
ERYTHROCYTE [DISTWIDTH] IN BLOOD BY AUTOMATED COUNT: 15.3 % (ref 12.3–15.4)
GLOBULIN UR ELPH-MCNC: 3.9 GM/DL
GLUCOSE SERPL-MCNC: 103 MG/DL (ref 65–99)
HCT VFR BLD AUTO: 33.3 % (ref 34–46.6)
HGB BLD-MCNC: 10.7 G/DL (ref 12–15.9)
IMM GRANULOCYTES # BLD AUTO: 0.06 10*3/MM3 (ref 0–0.05)
IMM GRANULOCYTES NFR BLD AUTO: 0.6 % (ref 0–0.5)
INR PPP: 0.99 (ref 0.9–1.1)
LYMPHOCYTES # BLD AUTO: 3.33 10*3/MM3 (ref 0.7–3.1)
LYMPHOCYTES NFR BLD AUTO: 33.4 % (ref 19.6–45.3)
MCH RBC QN AUTO: 24.8 PG (ref 26.6–33)
MCHC RBC AUTO-ENTMCNC: 32.1 G/DL (ref 31.5–35.7)
MCV RBC AUTO: 77.1 FL (ref 79–97)
MONOCYTES # BLD AUTO: 0.49 10*3/MM3 (ref 0.1–0.9)
MONOCYTES NFR BLD AUTO: 4.9 % (ref 5–12)
NEUTROPHILS NFR BLD AUTO: 5.15 10*3/MM3 (ref 1.7–7)
NEUTROPHILS NFR BLD AUTO: 51.8 % (ref 42.7–76)
NRBC BLD AUTO-RTO: 0 /100 WBC (ref 0–0.2)
PLATELET # BLD AUTO: 350 10*3/MM3 (ref 140–450)
PMV BLD AUTO: 9.8 FL (ref 6–12)
POTASSIUM SERPL-SCNC: 3.9 MMOL/L (ref 3.5–5.2)
PROT SERPL-MCNC: 7.9 G/DL (ref 6–8.5)
PROTHROMBIN TIME: 13.2 SECONDS (ref 11.7–14.2)
RBC # BLD AUTO: 4.32 10*6/MM3 (ref 3.77–5.28)
SODIUM SERPL-SCNC: 134 MMOL/L (ref 136–145)
WBC NRBC COR # BLD: 9.96 10*3/MM3 (ref 3.4–10.8)

## 2023-03-04 PROCEDURE — 25010000002 HEPARIN (PORCINE) 25000-0.45 UT/250ML-% SOLUTION: Performed by: EMERGENCY MEDICINE

## 2023-03-04 PROCEDURE — 85025 COMPLETE CBC W/AUTO DIFF WBC: CPT | Performed by: HOSPITALIST

## 2023-03-04 PROCEDURE — 85730 THROMBOPLASTIN TIME PARTIAL: CPT | Performed by: HOSPITALIST

## 2023-03-04 PROCEDURE — 25010000002 HEPARIN (PORCINE) 25000-0.45 UT/250ML-% SOLUTION: Performed by: HOSPITALIST

## 2023-03-04 PROCEDURE — 80053 COMPREHEN METABOLIC PANEL: CPT | Performed by: HOSPITALIST

## 2023-03-04 PROCEDURE — 25010000002 ONDANSETRON PER 1 MG: Performed by: HOSPITALIST

## 2023-03-04 PROCEDURE — 85610 PROTHROMBIN TIME: CPT | Performed by: HOSPITALIST

## 2023-03-04 RX ORDER — HEPARIN SODIUM 10000 [USP'U]/100ML
12 INJECTION, SOLUTION INTRAVENOUS
Status: DISCONTINUED | OUTPATIENT
Start: 2023-03-04 | End: 2023-03-05

## 2023-03-04 RX ORDER — HEPARIN SODIUM 5000 [USP'U]/ML
40-80 INJECTION, SOLUTION INTRAVENOUS; SUBCUTANEOUS EVERY 6 HOURS PRN
Status: DISCONTINUED | OUTPATIENT
Start: 2023-03-04 | End: 2023-03-05

## 2023-03-04 RX ORDER — FONDAPARINUX SODIUM 10 MG/.8ML
10 INJECTION SUBCUTANEOUS
Status: DISCONTINUED | OUTPATIENT
Start: 2023-03-04 | End: 2023-03-04

## 2023-03-04 RX ADMIN — ESCITALOPRAM 20 MG: 20 TABLET, FILM COATED ORAL at 09:58

## 2023-03-04 RX ADMIN — Medication 1000 UNITS: at 09:58

## 2023-03-04 RX ADMIN — METOPROLOL TARTRATE 37.5 MG: 25 TABLET, FILM COATED ORAL at 06:39

## 2023-03-04 RX ADMIN — PANTOPRAZOLE SODIUM 40 MG: 40 TABLET, DELAYED RELEASE ORAL at 06:39

## 2023-03-04 RX ADMIN — LEVOTHYROXINE SODIUM 50 MCG: 0.05 TABLET ORAL at 09:58

## 2023-03-04 RX ADMIN — HEPARIN SODIUM 12 UNITS/KG/HR: 10000 INJECTION, SOLUTION INTRAVENOUS at 21:00

## 2023-03-04 RX ADMIN — GABAPENTIN 100 MG: 100 CAPSULE ORAL at 09:58

## 2023-03-04 RX ADMIN — MIDODRINE HYDROCHLORIDE 10 MG: 5 TABLET ORAL at 17:18

## 2023-03-04 RX ADMIN — METOPROLOL TARTRATE 37.5 MG: 25 TABLET, FILM COATED ORAL at 12:59

## 2023-03-04 RX ADMIN — HEPARIN SODIUM 14 UNITS/KG/HR: 10000 INJECTION, SOLUTION INTRAVENOUS at 03:53

## 2023-03-04 RX ADMIN — GABAPENTIN 100 MG: 100 CAPSULE ORAL at 17:18

## 2023-03-04 RX ADMIN — MIDODRINE HYDROCHLORIDE 10 MG: 5 TABLET ORAL at 09:58

## 2023-03-04 RX ADMIN — PYRIDOSTIGMINE BROMIDE 30 MG: 60 TABLET ORAL at 20:59

## 2023-03-04 RX ADMIN — PYRIDOSTIGMINE BROMIDE 30 MG: 60 TABLET ORAL at 10:01

## 2023-03-04 RX ADMIN — MONTELUKAST SODIUM 10 MG: 10 TABLET, FILM COATED ORAL at 20:59

## 2023-03-04 RX ADMIN — METOPROLOL TARTRATE 37.5 MG: 25 TABLET, FILM COATED ORAL at 17:18

## 2023-03-04 RX ADMIN — MIDODRINE HYDROCHLORIDE 10 MG: 5 TABLET ORAL at 12:59

## 2023-03-04 RX ADMIN — GABAPENTIN 100 MG: 100 CAPSULE ORAL at 20:59

## 2023-03-04 RX ADMIN — ONDANSETRON 4 MG: 2 INJECTION INTRAMUSCULAR; INTRAVENOUS at 12:56

## 2023-03-04 RX ADMIN — ONDANSETRON 4 MG: 2 INJECTION INTRAMUSCULAR; INTRAVENOUS at 06:39

## 2023-03-04 RX ADMIN — ONDANSETRON 4 MG: 2 INJECTION INTRAMUSCULAR; INTRAVENOUS at 19:02

## 2023-03-04 NOTE — PLAN OF CARE
Goal Outcome Evaluation:              Outcome Evaluation: medicated x2 with zofran for c/o nausea. vss. heparin gtt infusing, cont to monitor

## 2023-03-04 NOTE — CONSULTS
Patient Name: Liliam Lorenz Account #: [id number]    MRN: [id number] Admission Date: 3/1/2023      Consulting Service: Vascular Surgery Date of Evaluation: 2023    Requesting Provider: Dr. Justin Aldana MD      [place]      CHIEF COMPLAINT: Subsegmental PE despite anticoagulation  HPI: Liliam Lorenz is a 27 y.o. female is being seen for a consultation and evaluation/management of possible subsegmental left-sided pulmonary emboli first in the left anterior basal segment that may or may not have extended.  There is an possible new lesion in the left posterior apical segment.  Neither of these are visible to me on CT.  Radiology feels this is likely recurrent left-sided pulmonary emboli.  However seems strange that the patient would throw a second clot to the same segment in the subsegmental branches without embolizing anywhere else.  These are also very small clots.  The patient carries a genetic diagnosis of Erler Danlos.  I have raised the question of possible spontaneous distal pulmonary artery dissection given this diagnosis is a alternate possibility.  Regardless she does have indwelling lines and could be throwing clots from multiple segments.  Her duplex scan of her legs is negative.  At this point time we would strongly recommend against the idea of IVC filter placement.  Filter is or not treatment for clotting and certainly in the face of known Erler Danlos any indwelling vascular device carries significant risk.    PAST MEDICAL HISTORY:   Past Medical History:   Diagnosis Date   • Bronchitis    • Chest pain    • Chronic hypotension    • Eczema    • Phoebe-Danlos syndrome    • Gastroparesis    • GERD (gastroesophageal reflux disease)    • IBS (irritable bowel syndrome)    • Lightheadedness    • POTS (postural orthostatic tachycardia syndrome)    • Rectal fissure    • Rosacea    • Tachycardia       PAST SURGICAL HISTORY:   Past Surgical History:   Procedure Laterality Date   • COLONOSCOPY      • MEDIPORT INSERTION, DOUBLE  09/01/2020    [facility]    • UPPER GASTROINTESTINAL ENDOSCOPY     • VENOUS ACCESS DEVICE (PORT) INSERTION Left 10/16/2020    Procedure: INSERTION VENOUS ACCESS DEVICE UNDER FLURO;  Surgeon: Pa Lane MD;  Location: [facility] MAIN OR;  Service: General;  Laterality: Left;   • VENOUS ACCESS DEVICE (PORT) INSERTION Right 10/26/2021    Procedure: REMOVAL AND INSERTION OF VALVERDE CATHETER;  Surgeon: Pa Lane MD;  Location: [facility] MAIN OR;  Service: General;  Laterality: Right;   • VENOUS ACCESS DEVICE (PORT) INSERTION Left 1/13/2023    Procedure: INSERTION OF VALVERDE CATHETER, VENOGARM;  Surgeon: Cristhian Anne MD;  Location: [facility] MAIN OR;  Service: General;  Laterality: Left;      FAMILY HISTORY:   Family History   Problem Relation Age of Onset   • Hypertension Mother    • Diabetes Mother    • Diabetes Father    • Heart disease Paternal Grandfather    • Stroke Paternal Grandfather    • Diabetes Paternal Grandfather    • Cancer Paternal Grandfather    • Coronary artery disease Maternal Grandmother         MGM with 4 vessel CABG and multiple stents   • Cancer Maternal Grandmother    • Coronary artery disease Maternal Uncle         Maternal uncle with MI   • Breast cancer Other    • Malig Hyperthermia Neg Hx       SOCIAL HISTORY:   Social History     Tobacco Use   • Smoking status: Never   • Smokeless tobacco: Never   Vaping Use   • Vaping Use: Never used   Substance Use Topics   • Alcohol use: No   • Drug use: No      MEDICATIONS:   No current facility-administered medications on file prior to encounter.     Current Outpatient Medications on File Prior to Encounter   Medication Sig Dispense Refill   • albuterol sulfate  (90 Base) MCG/ACT inhaler Inhale 2 puffs Every 4 (Four) Hours As Needed for Wheezing.     • cholecalciferol (Cholecalciferol) 25 MCG (1000 UT) tablet Take 1 tablet by mouth Daily.     • cyanocobalamin 1000 MCG/ML injection Inject 1 mL  into the appropriate muscle as directed by prescriber Every 28 (Twenty-Eight) Days. (Patient taking differently: Inject 1 mL into the appropriate muscle as directed by prescriber Every 28 (Twenty-Eight) Days. Usually 15th of the month) 1 mL 0   • dronabinol (MARINOL) 5 MG capsule Take 1 capsule by mouth 3 (Three) Times a Day As Needed (nausea).     • Emgality 120 MG/ML auto-injector pen Every 30 (Thirty) Days. Takes the 20th of the month     • Enoxaparin Sodium (LOVENOX) 80 MG/0.8ML solution prefilled syringe syringe 1.1 mL Every 12 (Twelve) Hours.     • escitalopram (LEXAPRO) 10 MG tablet Take 2 tablets by mouth Daily.     • gabapentin (NEURONTIN) 100 MG capsule Take 1 capsule by mouth 3 (Three) Times a Day.     • Ginger, Zingiber officinalis, (Ginger Root) 550 MG capsule Take  by mouth Daily.     • Heparin & NaCl Lock Flush (HEPARIN COMBINATION IV) Infuse  into a venous catheter 2 (Two) Times a Day.     • hydrOXYzine pamoate (VISTARIL) 25 MG capsule Take 1-2 capsules by mouth 3 (Three) Times a Day As Needed (anxiety).     • Immune Globulin, Human,-ifas (PANZYGA IV) Infuse  into a venous catheter Take As Directed. Every Tuesday     • levothyroxine (SYNTHROID, LEVOTHROID) 50 MCG tablet Take 1 tablet by mouth Daily.     • loperamide (IMODIUM) 2 MG capsule Take 1 capsule by mouth 4 (Four) Times a Day As Needed for Diarrhea.     • meclizine (ANTIVERT) 25 MG tablet Take 1 tablet by mouth 3 (Three) Times a Day As Needed for Dizziness.     • metoprolol tartrate (LOPRESSOR) 25 MG tablet 12.5mg in am /  25mg middle of day/  12.5mg in the pm (Patient taking differently: 1.5 tablets Every 8 (Eight) Hours.) 180 tablet 3   • midodrine (PROAMATINE) 10 MG tablet TAKE 1 TABLET BY MOUTH THREE TIMES DAILY 90 tablet 6   • montelukast (SINGULAIR) 10 MG tablet Take 1 tablet by mouth Every Night.     • omeprazole (priLOSEC) 20 MG capsule Take 2 capsules by mouth Every Night.     • phenazopyridine (PYRIDIUM) 100 MG tablet Take 1 tablet  by mouth 3 (Three) Times a Day As Needed.     • prochlorperazine (COMPAZINE) 10 MG tablet Take 1 tablet by mouth Every 6 (Six) Hours As Needed for Nausea or Vomiting.     • promethazine (PHENERGAN) 25 MG/ML injection Inject  as directed. IV every 3 hours     • pyridostigmine (MESTINON) 60 MG tablet TAKE 1/2 TABLET BY MOUTH TWICE DAILY 90 tablet 3   • RABEprazole (ACIPHEX) 20 MG EC tablet Take 1 tablet by mouth Daily.     • Ondansetron HCl (ZOFRAN IV) Infuse 8 mg into a venous catheter Every 6 (Six) Hours.               ALLERGIES: Eggs or egg-derived products, Pepcid [famotidine], and Scopolamine   COMPLETE REVIEW OF SYSTEMS:     ENT ROS: negative  Cardiovascular ROS: Resolved chest pain   no dyspnea on exertion  Respiratory ROS: no cough, shortness of breath, or wheezing  Gastrointestinal ROS: no abdominal pain, change in bowel habits, or black or bloody stools  Neurological ROS: no TIA or stroke symptoms  Genito-Urinary ROS: no dysuria, trouble voiding, or hematuria  Musculoskeletal ROS: negative  Dermatological ROS: negative  Psychological ROS: negative      PHYSICAL EXAM:   Patient Vitals for the past 24 hrs:   BP Temp Temp src Pulse Resp SpO2   03/04/23 1319 127/65 97.4 °F (36.3 °C) Oral 84 16 98 %   03/04/23 0724 100/53 98 °F (36.7 °C) Oral 71 16 95 %   03/03/23 2348 106/74 -- Oral 69 16 91 %   03/03/23 1942 107/69 98.2 °F (36.8 °C) Oral 86 16 93 %        General appearance: alert, well appearing, and in no distress.  Neurological exam reveals alert, oriented, normal speech, no focal findings or movement disorder noted.  ENT exam reveals - ENT exam normal, no neck nodes or sinus tenderness.  CVS exam: normal rate, regular rhythm, normal S1, S2, no murmurs, rubs, clicks or gallops.  Chest: clear to auscultation, no wheezes, rales or rhonchi, symmetric air entry.  Abdominal exam: soft, nontender, nondistended, no masses or organomegaly.  Examination of the feet reveals warm, good capillary refill.  Palpable  pulses: No swelling        LABS:      Results Review:       I reviewed the patient's new clinical results.  Results from last 7 days   Lab Units 03/04/23  0548 03/03/23  0808 03/02/23  0732 03/01/23  1851   WBC 10*3/mm3 9.96 8.07 8.45 8.28   HEMOGLOBIN g/dL 10.7* 10.7* 10.7* 11.3*   PLATELETS 10*3/mm3 350 294 272 329     Results from last 7 days   Lab Units 03/04/23  0547 03/03/23  0808 03/01/23  1851   SODIUM mmol/L 134* 137 135*   POTASSIUM mmol/L 3.9 3.9 3.7   CHLORIDE mmol/L 103 105 102   CO2 mmol/L 22.7 21.3* 22.0   BUN mg/dL 12 10 7   CREATININE mg/dL 0.87 0.79 0.94   GLUCOSE mg/dL 103* 95 106*   Estimated Creatinine Clearance: 117.1 mL/min (by C-G formula based on SCr of 0.87 mg/dL).  Results from last 7 days   Lab Units 03/04/23  0547 03/03/23  0808 03/01/23  1851   CALCIUM mg/dL 9.2 8.5* 9.4   ALBUMIN g/dL 4.0 3.7 4.1     Results from last 7 days   Lab Units 03/04/23  1649 03/02/23  0136   PROTIME Seconds 13.2 14.3*   INR  0.99 1.09       The following radiologic or non-invasive studies have been reviewed by me: CT angiogram reviewed with images reviewed, venous duplex reviewed    Active Hospital Problems    Diagnosis  POA   • **Pulmonary embolus (HCC) [I26.99]  Yes      Resolved Hospital Problems   No resolved problems to display.         ASSESSMENT/PLAN: 27 y.o. female with history of prior PE and now possible new subsegmental PE either extending or actually new sites based on readings from radiology.  Is not totally clear to me that some of this could be extension of prior clot or even an alternate diagnosis of pulmonary artery dissection given her history of Erler Danlos.  Regardless the decision to transition her to Arixtra has been made.  She is in the process of doing that.  We would strongly recommend against a filter for many and multiple reasons not the least of which is the fact she has no clots in her leg and clots that she has had to have her lungs are so small they would go right through the  filter regardless.  The risk of filter in her with Marissa Butler is definitely higher than the norm and the risk to put it in and take it out as well.  At this point in time I believe the filter would actually put her at more risk than benefit by far and have recommended strongly against it.  We will see her on an as-needed basis and if I can have one of the other body images reevaluate her pulmonary status I would like to see if they think there is any chance these lesions could be something other than pulmonary emboli.  We will see if that can be done while she is here.  Otherwise she may follow-up with these films at [facility] or at [facility].  Thank you      I discussed the plan with the patient and she is agreeable to the plan of care at this point. Thank you for this consult.     Ruperto Wells MD   03/04/23

## 2023-03-04 NOTE — PROGRESS NOTES
"Daily progress    Primary care physician  Dr. Jason Borrero    Chief complaint  Doing same with no new complaints and family at bedside.    History of present illness  27-year-old white female with history of chronic orthostatic hypotension with POTS syndrome Phoebe-Danlos syndrome hypothyroidism gastroparesis irritable bowel syndrome and gastroesophageal reflux disease who is well-known to our service who also have recurrent pulmonary embolism on Lovenox at home as she felt oral anticoagulants brought to the emergency room with increased shortness of breath for last 2 to 3 days which is getting worse and develops chest discomfort yesterday but no fever chills cough congestion but remain nauseated and did not miss any medications especially Lovenox evaluated in ER found to have recurrent syncopal symptoms segmental pulm embolism despite full dose of Lovenox with no noncompliance admitted for management.     PHYSICAL EXAM  Blood pressure 127/65, pulse 84, temperature 97.4 °F (36.3 °C), temperature source Oral, resp. rate 16, height 162.6 cm (64\"), weight 109 kg (240 lb 9.6 oz), SpO2 98 %, not currently breastfeeding.    GENERAL: Awake and alert in no distress  HEENT:  Unremarkable  NECK:  Supple  CV: regular rhythm, normal rate, distal pulses symmetric and palpable  RESPIRATORY: normal effort  and moving air bilaterally  ABDOMEN: soft, nondistended nontender with normal bowel sound  MUSCULOSKELETAL: no deformity, no calf tenderness or pedal edema  NEURO: alert, moves all extremities, follows commands  PSYCH:  calm, cooperative  SKIN: warm, dry with no rash     LAB RESULTS  Lab Results (last 24 hours)     Procedure Component Value Units Date/Time    aPTT [[id number]]  (Abnormal) Collected: 03/04/23 0803    Specimen: Blood from Hand, Right Updated: 03/04/23 0833     .8 seconds     Comprehensive Metabolic Panel [[id number]]  (Abnormal) Collected: 03/04/23 0547    Specimen: Blood Updated: 03/04/23 0622     Glucose " 103 mg/dL      BUN 12 mg/dL      Creatinine 0.87 mg/dL      Sodium 134 mmol/L      Potassium 3.9 mmol/L      Chloride 103 mmol/L      CO2 22.7 mmol/L      Calcium 9.2 mg/dL      Total Protein 7.9 g/dL      Albumin 4.0 g/dL      ALT (SGPT) 34 U/L      AST (SGOT) 18 U/L      Alkaline Phosphatase 93 U/L      Total Bilirubin 0.6 mg/dL      Globulin 3.9 gm/dL      A/G Ratio 1.0 g/dL      BUN/Creatinine Ratio 13.8     Anion Gap 8.3 mmol/L      eGFR 93.8 mL/min/1.73     Narrative:      GFR Normal >60  Chronic Kidney Disease <60  Kidney Failure <15      CBC & Differential [[id number]]  (Abnormal) Collected: 03/04/23 0548    Specimen: Blood Updated: 03/04/23 0603    Narrative:      The following orders were created for panel order CBC & Differential.  Procedure                               Abnormality         Status                     ---------                               -----------         ------                     CBC Auto Differential[[id number]]        Abnormal            Final result                 Please view results for these tests on the individual orders.    CBC Auto Differential [[id number]]  (Abnormal) Collected: 03/04/23 0548    Specimen: Blood Updated: 03/04/23 0603     WBC 9.96 10*3/mm3      RBC 4.32 10*6/mm3      Hemoglobin 10.7 g/dL      Hematocrit 33.3 %      MCV 77.1 fL      MCH 24.8 pg      MCHC 32.1 g/dL      RDW 15.3 %      RDW-SD 42.2 fl      MPV 9.8 fL      Platelets 350 10*3/mm3      Neutrophil % 51.8 %      Lymphocyte % 33.4 %      Monocyte % 4.9 %      Eosinophil % 8.6 %      Basophil % 0.7 %      Immature Grans % 0.6 %      Neutrophils, Absolute 5.15 10*3/mm3      Lymphocytes, Absolute 3.33 10*3/mm3      Monocytes, Absolute 0.49 10*3/mm3      Eosinophils, Absolute 0.86 10*3/mm3      Basophils, Absolute 0.07 10*3/mm3      Immature Grans, Absolute 0.06 10*3/mm3      nRBC 0.0 /100 WBC     aPTT [[id number]]  (Abnormal) Collected: 03/03/23 2305    Specimen: Blood Updated: 03/03/23 2342     .4  seconds     aPTT [[id number]]  (Abnormal) Collected: 03/03/23 1557    Specimen: Blood Updated: 03/03/23 1642     PTT 69.8 seconds         Imaging Results (Last 24 Hours)     ** No results found for the last 24 hours. **          ECG 12 Lead             Component  Ref Range & Units 1 d ago  (3/1/23) 8 mo ago  (6/15/22) 1 yr ago  (1/18/22) 1 yr ago  (9/17/21) 1 yr ago  (9/8/21) 1 yr ago  (5/6/21) 1 yr ago  (5/5/21)   QT Interval  ms 384  337  334  350  298  279  342    Resulting Agency BH ECG BH ECG BH ECG BH ECG BH ECG BH ECG BH ECG             HEART RATE= 85  bpm  RR Interval= 706  ms  KS Interval=   ms  P Horizontal Axis=   deg  P Front Axis=   deg  QRSD Interval= 95  ms  QT Interval= 384  ms  QRS Axis= 25  deg  T Wave Axis= 40  deg  - ABNORMAL ECG -  Sinus rhythm  Since prior tracing hr has decreased             Current Facility-Administered Medications:   •  albuterol (PROVENTIL) nebulizer solution 0.083% 2.5 mg/3mL, 2.5 mg, Nebulization, Q4H PRN, Caleb Naik MD  •  cholecalciferol (VITAMIN D3) tablet 1,000 Units, 1,000 Units, Oral, Daily, Caleb Naik MD, 1,000 Units at 03/04/23 0958  •  dronabinol (MARINOL) capsule 5 mg, 5 mg, Oral, TID PRN, Caleb Naik MD, 5 mg at 03/02/23 1653  •  escitalopram (LEXAPRO) tablet 20 mg, 20 mg, Oral, Daily, Caleb Naik MD, 20 mg at 03/04/23 0958  •  fondaparinux (ARIXTRA) injection 10 mg, 10 mg, Subcutaneous, Q24H, Caleb Naik MD  •  gabapentin (NEURONTIN) capsule 100 mg, 100 mg, Oral, TID, Caleb Naik MD, 100 mg at 03/04/23 0958  •  heparin (porcine) 5000 UNIT/ML injection 4,400-8,700 Units, 40-80 Units/kg, Intravenous, Q6H PRN, Mihai Myers MD, 4,400 Units at 03/03/23 1718  •  levothyroxine (SYNTHROID, LEVOTHROID) tablet 50 mcg, 50 mcg, Oral, Daily, Caleb Naik MD, 50 mcg at 03/04/23 0958  •  loperamide (IMODIUM) capsule 2 mg, 2 mg, Oral, 4x Daily PRN, Caleb Naik MD  •  meclizine (ANTIVERT) tablet 25 mg, 25 mg, Oral, TID PRN, Caleb Naik MD  •  metoprolol  tartrate (LOPRESSOR) tablet 37.5 mg, 37.5 mg, Oral, Q8H, Ann Gonzalez MD, 37.5 mg at 03/04/23 1259  •  midodrine (PROAMATINE) tablet 10 mg, 10 mg, Oral, TID AC, Ann Gonzalez MD, 10 mg at 03/04/23 1259  •  montelukast (SINGULAIR) tablet 10 mg, 10 mg, Oral, Nightly, Ann Gonzalez MD, 10 mg at 03/03/23 2131  •  ondansetron (ZOFRAN) injection 4 mg, 4 mg, Intravenous, Q4H PRN, Ann Gonzalez MD, 4 mg at 03/04/23 1256  •  pantoprazole (PROTONIX) EC tablet 40 mg, 40 mg, Oral, Q AM, Ann Gonzalez MD, 40 mg at 03/04/23 0639  •  prochlorperazine (COMPAZINE) tablet 10 mg, 10 mg, Oral, Q6H PRN, Ann Gonzalez MD  •  pyridostigmine (MESTINON) tablet 30 mg, 30 mg, Oral, BID, Ann Gonzalez MD, 30 mg at 03/04/23 1001  •  sodium chloride 0.9 % flush 10 mL, 10 mL, Intravenous, PRN, Mihai Myers MD     ASSESSMENT  Recurrent single subsegmental pulm embolism despite full anticoagulation with Lovenox  Chronic orthostatic hypotension  POTS syndrome  Phoebe-Danlos syndrome  Hypothyroidism  Gastroparesis  Irritable bowel syndrome  Gastroesophageal reflux disease    PLAN  CPM  Supplemental oxygen as needed  Start Arixtra and discontinue heparin  Hematology consult appreciated  Continue home medications  Stress ulcer DVT prophylaxis  Supportive care  Discussed with family and nursing staff  Discharge planning    ANN GONZALEZ MD    Copied text in this note has been reviewed and is accurate as of 03/04/23

## 2023-03-05 ENCOUNTER — READMISSION MANAGEMENT (OUTPATIENT)
Dept: CALL CENTER | Facility: HOSPITAL | Age: 28
End: 2023-03-05
Payer: COMMERCIAL

## 2023-03-05 VITALS
SYSTOLIC BLOOD PRESSURE: 105 MMHG | DIASTOLIC BLOOD PRESSURE: 60 MMHG | HEIGHT: 64 IN | OXYGEN SATURATION: 97 % | HEART RATE: 67 BPM | RESPIRATION RATE: 16 BRPM | TEMPERATURE: 97.9 F | WEIGHT: 240.6 LBS | BODY MASS INDEX: 41.07 KG/M2

## 2023-03-05 LAB
ANION GAP SERPL CALCULATED.3IONS-SCNC: 10.9 MMOL/L (ref 5–15)
APTT PPP: 70.4 SECONDS (ref 22.7–35.4)
BASOPHILS # BLD AUTO: 0.09 10*3/MM3 (ref 0–0.2)
BASOPHILS NFR BLD AUTO: 1 % (ref 0–1.5)
BUN SERPL-MCNC: 10 MG/DL (ref 6–20)
BUN/CREAT SERPL: 12.2 (ref 7–25)
CALCIUM SPEC-SCNC: 8.5 MG/DL (ref 8.6–10.5)
CHLORIDE SERPL-SCNC: 105 MMOL/L (ref 98–107)
CO2 SERPL-SCNC: 21.1 MMOL/L (ref 22–29)
CREAT SERPL-MCNC: 0.82 MG/DL (ref 0.57–1)
DEPRECATED RDW RBC AUTO: 43.3 FL (ref 37–54)
EGFRCR SERPLBLD CKD-EPI 2021: 100.7 ML/MIN/1.73
EOSINOPHIL # BLD AUTO: 0.75 10*3/MM3 (ref 0–0.4)
EOSINOPHIL NFR BLD AUTO: 8.4 % (ref 0.3–6.2)
ERYTHROCYTE [DISTWIDTH] IN BLOOD BY AUTOMATED COUNT: 15.3 % (ref 12.3–15.4)
GLUCOSE SERPL-MCNC: 103 MG/DL (ref 65–99)
HCT VFR BLD AUTO: 32.7 % (ref 34–46.6)
HGB BLD-MCNC: 10.4 G/DL (ref 12–15.9)
IMM GRANULOCYTES # BLD AUTO: 0.05 10*3/MM3 (ref 0–0.05)
IMM GRANULOCYTES NFR BLD AUTO: 0.6 % (ref 0–0.5)
LYMPHOCYTES # BLD AUTO: 3.39 10*3/MM3 (ref 0.7–3.1)
LYMPHOCYTES NFR BLD AUTO: 38 % (ref 19.6–45.3)
MCH RBC QN AUTO: 24.9 PG (ref 26.6–33)
MCHC RBC AUTO-ENTMCNC: 31.8 G/DL (ref 31.5–35.7)
MCV RBC AUTO: 78.4 FL (ref 79–97)
MONOCYTES # BLD AUTO: 0.52 10*3/MM3 (ref 0.1–0.9)
MONOCYTES NFR BLD AUTO: 5.8 % (ref 5–12)
NEUTROPHILS NFR BLD AUTO: 4.12 10*3/MM3 (ref 1.7–7)
NEUTROPHILS NFR BLD AUTO: 46.2 % (ref 42.7–76)
NRBC BLD AUTO-RTO: 0 /100 WBC (ref 0–0.2)
PLATELET # BLD AUTO: 344 10*3/MM3 (ref 140–450)
PMV BLD AUTO: 10.3 FL (ref 6–12)
POTASSIUM SERPL-SCNC: 3.7 MMOL/L (ref 3.5–5.2)
RBC # BLD AUTO: 4.17 10*6/MM3 (ref 3.77–5.28)
SODIUM SERPL-SCNC: 137 MMOL/L (ref 136–145)
WBC NRBC COR # BLD: 8.92 10*3/MM3 (ref 3.4–10.8)

## 2023-03-05 PROCEDURE — 25010000002 FONDAPARINUX PER 0.5 MG: Performed by: HOSPITALIST

## 2023-03-05 PROCEDURE — 80048 BASIC METABOLIC PNL TOTAL CA: CPT | Performed by: HOSPITALIST

## 2023-03-05 PROCEDURE — 85025 COMPLETE CBC W/AUTO DIFF WBC: CPT | Performed by: HOSPITALIST

## 2023-03-05 PROCEDURE — 85730 THROMBOPLASTIN TIME PARTIAL: CPT | Performed by: HOSPITALIST

## 2023-03-05 PROCEDURE — 25010000002 ONDANSETRON PER 1 MG: Performed by: HOSPITALIST

## 2023-03-05 RX ORDER — FONDAPARINUX SODIUM 10 MG/.8ML
10 INJECTION SUBCUTANEOUS
Status: DISCONTINUED | OUTPATIENT
Start: 2023-03-05 | End: 2023-03-05 | Stop reason: HOSPADM

## 2023-03-05 RX ORDER — FONDAPARINUX SODIUM 10 MG/.8ML
10 INJECTION SUBCUTANEOUS
Qty: 4 ML | Refills: 0 | Status: SHIPPED | OUTPATIENT
Start: 2023-03-05 | End: 2023-04-04

## 2023-03-05 RX ADMIN — FONDAPARINUX SODIUM 10 MG: 10 INJECTION, SOLUTION SUBCUTANEOUS at 15:37

## 2023-03-05 RX ADMIN — LEVOTHYROXINE SODIUM 50 MCG: 0.05 TABLET ORAL at 09:53

## 2023-03-05 RX ADMIN — Medication 1000 UNITS: at 09:53

## 2023-03-05 RX ADMIN — METOPROLOL TARTRATE 37.5 MG: 25 TABLET, FILM COATED ORAL at 09:53

## 2023-03-05 RX ADMIN — METOPROLOL TARTRATE 37.5 MG: 25 TABLET, FILM COATED ORAL at 12:45

## 2023-03-05 RX ADMIN — MIDODRINE HYDROCHLORIDE 10 MG: 5 TABLET ORAL at 09:53

## 2023-03-05 RX ADMIN — MIDODRINE HYDROCHLORIDE 10 MG: 5 TABLET ORAL at 12:46

## 2023-03-05 RX ADMIN — GABAPENTIN 100 MG: 100 CAPSULE ORAL at 09:56

## 2023-03-05 RX ADMIN — PANTOPRAZOLE SODIUM 40 MG: 40 TABLET, DELAYED RELEASE ORAL at 05:46

## 2023-03-05 RX ADMIN — PYRIDOSTIGMINE BROMIDE 30 MG: 60 TABLET ORAL at 09:53

## 2023-03-05 RX ADMIN — ESCITALOPRAM 20 MG: 20 TABLET, FILM COATED ORAL at 09:53

## 2023-03-05 RX ADMIN — ONDANSETRON 4 MG: 2 INJECTION INTRAMUSCULAR; INTRAVENOUS at 05:46

## 2023-03-05 NOTE — PROGRESS NOTES
Name: Liliam Lorenz ADMIT: 3/1/2023   : 1995  PCP: Jason Borrero MD    MRN: [id number] LOS: 3 days   AGE/SEX: 27 y.o. female  ROOM: [address]    [place], Subsequent Hospital Care    Chief Complaint   Patient presents with   • Chest Pain   • Shortness of Breath     CC: Left upper lobe pulmonary emboli x2 events  Subjective     27 y.o. female with 2 serial evidence of left upper lobe pulmonary emboli.  I reviewed the images with Dr. Patel and radiology to make sure this could not be a pulmonary artery hematoma or dissection it does appear to be clot indeed.  Given this picture I agree that transitioning to the Arixtra and having her follow-up with Dr. Mcdonald is our best answer.  She has no clot in the leg so I do not believe a filter would benefit her and with her Erler Danlos syndrome could be very much more risky than normal.  At this point time we will defer to the admitting service for plans for discharge and follow-up.    Review of Systems no new complaints    Objective     Scheduled Medications:   cholecalciferol, 1,000 Units, Oral, Daily  escitalopram, 20 mg, Oral, Daily  fondaparinux, 10 mg, Subcutaneous, Q24H  gabapentin, 100 mg, Oral, TID  levothyroxine, 50 mcg, Oral, Daily  metoprolol tartrate, 37.5 mg, Oral, TID AC  midodrine, 10 mg, Oral, TID AC  montelukast, 10 mg, Oral, Nightly  pantoprazole, 40 mg, Oral, Q AM  pyridostigmine, 30 mg, Oral, BID        Active Infusions:       As Needed Medications:      Vital Signs  Vital Signs Patient Vitals for the past 24 hrs:   BP Temp Temp src Pulse Resp SpO2   23 0734 105/60 -- -- 67 16 97 %   23 2309 110/69 -- Oral 72 16 92 %   23 1947 104/50 97.9 °F (36.6 °C) Oral 74 16 92 %     Vital Signs (range)  Temp:  [97.9 °F (36.6 °C)] 97.9 °F (36.6 °C)  Heart Rate:  [67-74] 67  Resp:  [16] 16  BP: (104-110)/(50-69) 105/60  I/O:  I/O last 3 completed shifts:  In: 240 [P.O.:240]  Out: -   I/O: No intake or output data  in the 24 hours ending 03/05/23 1323  BMI:  Body mass index is 41.3 kg/m².    Physical Exam:  Physical Exam   Lungs clear and equal  Heart regular rate and rhythm  Extremities with no real significant edema      Results Review:     CBC    Results from last 7 days   Lab Units 03/05/23  0420 03/04/23  0548 03/03/23  0808 03/02/23  0732 03/01/23  1851   WBC 10*3/mm3 8.92 9.96 8.07 8.45 8.28   HEMOGLOBIN g/dL 10.4* 10.7* 10.7* 10.7* 11.3*   PLATELETS 10*3/mm3 344 350 294 272 329     BMP   Results from last 7 days   Lab Units 03/05/23  0420 03/04/23  0547 03/03/23  0808 03/01/23  1851   SODIUM mmol/L 137 134* 137 135*   POTASSIUM mmol/L 3.7 3.9 3.9 3.7   CHLORIDE mmol/L 105 103 105 102   CO2 mmol/L 21.1* 22.7 21.3* 22.0   BUN mg/dL 10 12 10 7   CREATININE mg/dL 0.82 0.87 0.79 0.94   GLUCOSE mg/dL 103* 103* 95 106*     Cr Clearance Estimated Creatinine Clearance: 124.3 mL/min (by C-G formula based on SCr of 0.82 mg/dL).  Coag   Results from last 7 days   Lab Units 03/05/23  0420 03/04/23  1649 03/04/23  0803 03/03/23  2305 03/03/23  1557 03/03/23  0808 03/02/23  2337 03/02/23  0732 03/02/23  0136   INR   --  0.99  --   --   --   --   --   --  1.09   APTT seconds 70.4* 69.0* 102.8* 122.4* 69.8* 106.8* 143.8*   < > 42.2*    < > = values in this interval not displayed.     HbA1C   Lab Results   Component Value Date    HGBA1C 4.80 03/03/2023    HGBA1C 5.60 01/10/2023    HGBA1C 5.20 05/01/2022     Blood Glucose No results found for: POCGLU  Infection     CMP   Results from last 7 days   Lab Units 03/05/23  0420 03/04/23  0547 03/03/23  0808 03/01/23  1851   SODIUM mmol/L 137 134* 137 135*   POTASSIUM mmol/L 3.7 3.9 3.9 3.7   CHLORIDE mmol/L 105 103 105 102   CO2 mmol/L 21.1* 22.7 21.3* 22.0   BUN mg/dL 10 12 10 7   CREATININE mg/dL 0.82 0.87 0.79 0.94   GLUCOSE mg/dL 103* 103* 95 106*   ALBUMIN g/dL  --  4.0 3.7 4.1   BILIRUBIN mg/dL  --  0.6 0.6 0.4   ALK PHOS U/L  --  93 90 108   AST (SGOT) U/L  --  18 26 40*   ALT (SGPT)  U/L  --  34* 42* 55*     Radiology(recent) No radiology results for the last day    Assessment & Plan     Assessment & Plan      Pulmonary embolus (HCC)      27 y.o. female with left upper lobe pulmonary emboli x2.  No obvious source.  Size of the clots would not likely have been prevented by filter placement.  I believe filter currently is contraindicated without active clot noted in the legs and with her history of Erler Danlos.  Agree with anticoagulation management per hematology.  We will sign off and follow-up as needed.      Ruperto Wells MD  03/05/23  13:22 EST    Please call my office with any question: [phone]    Active Hospital Problems    Diagnosis  POA   • **Pulmonary embolus (HCC) [I26.99]  Yes      Resolved Hospital Problems   No resolved problems to display.

## 2023-03-05 NOTE — DISCHARGE SUMMARY
Discharge summary    Date of admission 3/1/2023  Date of discharge 3/5/2023    Final diagnosis  Recurrent pulm embolism  Chronic orthostatic hypotension  POTS syndrome  Phoebe-Danlos syndrome  Hypothyroidism  Gastroparesis  Irritable bowel syndrome  Gastroesophageal reflux disease    Discharge medications    Current Facility-Administered Medications:   •  cholecalciferol (VITAMIN D3) tablet 1,000 Units, 1,000 Units, Oral, Daily, Caleb Naik MD, 1,000 Units at 03/05/23 0953  •  escitalopram (LEXAPRO) tablet 20 mg, 20 mg, Oral, Daily, Caleb Naik MD, 20 mg at 03/05/23 0953  •  fondaparinux (ARIXTRA) injection 10 mg, 10 mg, Subcutaneous, Q24H, Caleb Naik MD  •  gabapentin (NEURONTIN) capsule 100 mg, 100 mg, Oral, TID, Caleb Naik MD, 100 mg at 03/05/23 0956  •  levothyroxine (SYNTHROID, LEVOTHROID) tablet 50 mcg, 50 mcg, Oral, Daily, Caleb Naik MD, 50 mcg at 03/05/23 0953  •  metoprolol tartrate (LOPRESSOR) tablet 37.5 mg, 37.5 mg, Oral, TID AC, Caleb Naik MD, 37.5 mg at 03/05/23 0953  •  midodrine (PROAMATINE) tablet 10 mg, 10 mg, Oral, TID AC, Caleb Naik MD, 10 mg at 03/05/23 0953  •  montelukast (SINGULAIR) tablet 10 mg, 10 mg, Oral, Nightly, Caleb Naik MD, 10 mg at 03/04/23 2059  •  pantoprazole (PROTONIX) EC tablet 40 mg, 40 mg, Oral, Q AM, Caleb Naik MD, 40 mg at 03/05/23 0546  •  pyridostigmine (MESTINON) tablet 30 mg, 30 mg, Oral, BID, Caleb Naik MD, 30 mg at 03/05/23 0953     Consults obtained  Hematology  Vascular surgery    Procedures  None    Hospital course  27-year white female with history of chronic orthostatic hypotension Phoebe-Danlos syndrome POTS syndrome hypothyroidism gastroparesis irritable bowel syndrome who also have pulmonary embolism on full dose Lovenox at home as she feels oral anticoagulants admitted to emergency room with shortness of breath and work-up in ER revealed segmental pulm embolism admitted for management.  Patient admitted started on heparin as she  has been compliant with Lovenox and further evaluated by hematology and recommended Arixtra after few days of heparin.  Patient further evaluated with lower extremity Doppler study which showed no evidence of DVT.  Patient and her home medication continued and prior to discharge vascular also evaluated and recommended no intervention at this time and no need for IVC filter.  Patient feeling better and her oxygenation 97% on room air.    Discharge diet regular    Activity as tolerated    Medication as above    Follow-up with [facility] doctor in 1 week and follow-up with hematology per the instructions and take medication as directed.    ANN GONZALEZ MD

## 2023-03-05 NOTE — PROGRESS NOTES
"Daily progress    Primary care physician  Dr. Jason Borrero    Chief complaint  Doing same with no new complaints and family at bedside.    History of present illness  27-year-old white female with history of chronic orthostatic hypotension with POTS syndrome Phoebe-Danlos syndrome hypothyroidism gastroparesis irritable bowel syndrome and gastroesophageal reflux disease who is well-known to our service who also have recurrent pulmonary embolism on Lovenox at home as she felt oral anticoagulants brought to the emergency room with increased shortness of breath for last 2 to 3 days which is getting worse and develops chest discomfort yesterday but no fever chills cough congestion but remain nauseated and did not miss any medications especially Lovenox evaluated in ER found to have recurrent syncopal symptoms segmental pulm embolism despite full dose of Lovenox with no noncompliance admitted for management.     PHYSICAL EXAM  Blood pressure 105/60, pulse 67, temperature 97.9 °F (36.6 °C), temperature source Oral, resp. rate 16, height 162.6 cm (64\"), weight 109 kg (240 lb 9.6 oz), SpO2 97 %, not currently breastfeeding.    GENERAL: Awake and alert in no distress  HEENT:  Unremarkable  NECK:  Supple  CV: regular rhythm, normal rate, distal pulses symmetric and palpable  RESPIRATORY: normal effort  and moving air bilaterally  ABDOMEN: soft, nondistended nontender with normal bowel sound  MUSCULOSKELETAL: no deformity, no calf tenderness or pedal edema  NEURO: alert, moves all extremities, follows commands  PSYCH:  calm, cooperative  SKIN: warm, dry with no rash     LAB RESULTS  Lab Results (last 24 hours)     Procedure Component Value Units Date/Time    Basic Metabolic Panel [[id number]]  (Abnormal) Collected: 03/05/23 0420    Specimen: Blood Updated: 03/05/23 0523     Glucose 103 mg/dL      BUN 10 mg/dL      Creatinine 0.82 mg/dL      Sodium 137 mmol/L      Potassium 3.7 mmol/L      Chloride 105 mmol/L      CO2 21.1 mmol/L "      Calcium 8.5 mg/dL      BUN/Creatinine Ratio 12.2     Anion Gap 10.9 mmol/L      eGFR 100.7 mL/min/1.73     Narrative:      GFR Normal >60  Chronic Kidney Disease <60  Kidney Failure <15      aPTT [[id number]]  (Abnormal) Collected: 03/05/23 0420    Specimen: Blood Updated: 03/05/23 0518     PTT 70.4 seconds     CBC & Differential [[id number]]  (Abnormal) Collected: 03/05/23 0420    Specimen: Blood Updated: 03/05/23 0504    Narrative:      The following orders were created for panel order CBC & Differential.  Procedure                               Abnormality         Status                     ---------                               -----------         ------                     CBC Auto Differential[[id number]]        Abnormal            Final result                 Please view results for these tests on the individual orders.    CBC Auto Differential [[id number]]  (Abnormal) Collected: 03/05/23 0420    Specimen: Blood Updated: 03/05/23 0504     WBC 8.92 10*3/mm3      RBC 4.17 10*6/mm3      Hemoglobin 10.4 g/dL      Hematocrit 32.7 %      MCV 78.4 fL      MCH 24.9 pg      MCHC 31.8 g/dL      RDW 15.3 %      RDW-SD 43.3 fl      MPV 10.3 fL      Platelets 344 10*3/mm3      Neutrophil % 46.2 %      Lymphocyte % 38.0 %      Monocyte % 5.8 %      Eosinophil % 8.4 %      Basophil % 1.0 %      Immature Grans % 0.6 %      Neutrophils, Absolute 4.12 10*3/mm3      Lymphocytes, Absolute 3.39 10*3/mm3      Monocytes, Absolute 0.52 10*3/mm3      Eosinophils, Absolute 0.75 10*3/mm3      Basophils, Absolute 0.09 10*3/mm3      Immature Grans, Absolute 0.05 10*3/mm3      nRBC 0.0 /100 WBC     aPTT [[id number]]  (Abnormal) Collected: 03/04/23 1649    Specimen: Blood Updated: 03/04/23 1746     PTT 69.0 seconds     Protime-INR [[id number]]  (Normal) Collected: 03/04/23 1649    Specimen: Blood Updated: 03/04/23 1746     Protime 13.2 Seconds      INR 0.99        Imaging Results (Last 24 Hours)     ** No results found for the last 24  hours. **          ECG 12 Lead             Component  Ref Range & Units 1 d ago  (3/1/23) 8 mo ago  (6/15/22) 1 yr ago  (1/18/22) 1 yr ago  (9/17/21) 1 yr ago  (9/8/21) 1 yr ago  (5/6/21) 1 yr ago  (5/5/21)   QT Interval  ms 384  337  334  350  298  279  342    Resulting Agency BH ECG BH ECG BH ECG BH ECG BH ECG BH ECG BH ECG             HEART RATE= 85  bpm  RR Interval= 706  ms  AL Interval=   ms  P Horizontal Axis=   deg  P Front Axis=   deg  QRSD Interval= 95  ms  QT Interval= 384  ms  QRS Axis= 25  deg  T Wave Axis= 40  deg  - ABNORMAL ECG -  Sinus rhythm  Since prior tracing hr has decreased             Current Facility-Administered Medications:   •  albuterol (PROVENTIL) nebulizer solution 0.083% 2.5 mg/3mL, 2.5 mg, Nebulization, Q4H PRN, Caleb Naik MD  •  cholecalciferol (VITAMIN D3) tablet 1,000 Units, 1,000 Units, Oral, Daily, Caleb Naik MD, 1,000 Units at 03/05/23 0953  •  dronabinol (MARINOL) capsule 5 mg, 5 mg, Oral, TID PRN, Caleb Naik MD, 5 mg at 03/02/23 1653  •  escitalopram (LEXAPRO) tablet 20 mg, 20 mg, Oral, Daily, Caleb Naik MD, 20 mg at 03/05/23 0953  •  gabapentin (NEURONTIN) capsule 100 mg, 100 mg, Oral, TID, Caleb Naik MD, 100 mg at 03/05/23 0956  •  heparin (porcine) 5000 UNIT/ML injection 4,400-8,700 Units, 40-80 Units/kg, Intravenous, Q6H PRN, Caleb Naik MD  •  heparin 23316 units/250 mL (100 units/mL) in 0.45 % NaCl infusion, 12 Units/kg/hr, Intravenous, Titrated, Caleb Naik MD, Last Rate: 13.08 mL/hr at 03/04/23 2100, 12 Units/kg/hr at 03/04/23 2100  •  levothyroxine (SYNTHROID, LEVOTHROID) tablet 50 mcg, 50 mcg, Oral, Daily, Caleb Naik MD, 50 mcg at 03/05/23 0953  •  loperamide (IMODIUM) capsule 2 mg, 2 mg, Oral, 4x Daily PRN, Caleb Naik MD  •  meclizine (ANTIVERT) tablet 25 mg, 25 mg, Oral, TID PRN, Caleb Naik MD  •  metoprolol tartrate (LOPRESSOR) tablet 37.5 mg, 37.5 mg, Oral, TID AC, Caleb Naik MD, 37.5 mg at 03/05/23 0953  •  midodrine (PROAMATINE)  tablet 10 mg, 10 mg, Oral, TID AC, Ann Gonzalez MD, 10 mg at 03/05/23 0953  •  montelukast (SINGULAIR) tablet 10 mg, 10 mg, Oral, Nightly, Ann Gonzalez MD, 10 mg at 03/04/23 2059  •  ondansetron (ZOFRAN) injection 4 mg, 4 mg, Intravenous, Q4H PRN, Ann Gonzalez MD, 4 mg at 03/05/23 0546  •  pantoprazole (PROTONIX) EC tablet 40 mg, 40 mg, Oral, Q AM, Ann Gonzalez MD, 40 mg at 03/05/23 0546  •  prochlorperazine (COMPAZINE) tablet 10 mg, 10 mg, Oral, Q6H PRN, Ann Gonzalez MD  •  pyridostigmine (MESTINON) tablet 30 mg, 30 mg, Oral, BID, Ann Gonzalez MD, 30 mg at 03/05/23 0953  •  sodium chloride 0.9 % flush 10 mL, 10 mL, Intravenous, PRN, Mihai Myers MD     ASSESSMENT  Recurrent pulm embolism  Chronic orthostatic hypotension  POTS syndrome  Phoebe-Danlos syndrome  Hypothyroidism  Gastroparesis  Irritable bowel syndrome  Gastroesophageal reflux disease    PLAN  Discharge home on Arixtra  Discharge summary dictated    ANN GONZALEZ MD    Copied text in this note has been reviewed and is accurate as of 03/05/23

## 2023-03-06 NOTE — PAYOR COMM NOTE
"Liliam Da Silva (27 y.o. Female)     PLEASE SEE ATTACHED DC SUMMARY    REF#[id number]    THANK YOU    MARLON HAMMONDS LPN CCP    Date of Birth   1995    Social Security Number       Address   [address] [place] [address]    Home Phone   [phone]    MRN   [id number]       Synagogue   Zoroastrianism    Marital Status                               Admission Date   3/1/23    Admission Type   Emergency    Admitting Provider   Caleb Naik MD    Attending Provider       Department, Room/Bed   [address], [address]       Discharge Date   3/5/2023    Discharge Disposition   Home or Self Care    Discharge Destination                               Attending Provider: (none)   Allergies: Eggs Or Egg-derived Products, Pepcid [Famotidine], Scopolamine    Isolation: None   Infection: None   Code Status: Prior    Ht: 162.6 cm (64\")   Wt: 109 kg (240 lb 9.6 oz)    Admission Cmt: None   Principal Problem: Pulmonary embolus (HCC) [I26.99]                 Active Insurance as of 3/1/2023     Primary Coverage     Payor Plan Insurance Group Employer/Plan Group    [employer] PPO [id number]     Payor Plan Address Payor Plan Phone Number Payor Plan Fax Number Effective Dates    [address] [phone]  4/1/2019 - None Entered    [facility] [id number]       Subscriber Name Subscriber Birth Date Member ID       AZEB DA SILVA 12/30/1994 [id number]                 Emergency Contacts      (Rel.) Home Phone Work Phone Mobile Phone    AZEB DA SILVA (Spouse) [phone] -- [phone]    Monica Enriquez (Mother) -- -- [phone]               Discharge Summary      Caleb Naik MD at 03/05/23 1230        Discharge summary    Date of admission 3/1/2023  Date of discharge 3/5/2023    Final diagnosis  Recurrent pulm embolism  Chronic orthostatic hypotension  POTS syndrome  Phoebe-Danlos syndrome  Hypothyroidism  Gastroparesis  Irritable bowel " syndrome  Gastroesophageal reflux disease    Discharge medications    Current Facility-Administered Medications:   •  cholecalciferol (VITAMIN D3) tablet 1,000 Units, 1,000 Units, Oral, Daily, Caleb Naik MD, 1,000 Units at 03/05/23 0953  •  escitalopram (LEXAPRO) tablet 20 mg, 20 mg, Oral, Daily, Caleb Naik MD, 20 mg at 03/05/23 0953  •  fondaparinux (ARIXTRA) injection 10 mg, 10 mg, Subcutaneous, Q24H, Caleb Naik MD  •  gabapentin (NEURONTIN) capsule 100 mg, 100 mg, Oral, TID, Caleb Naik MD, 100 mg at 03/05/23 0956  •  levothyroxine (SYNTHROID, LEVOTHROID) tablet 50 mcg, 50 mcg, Oral, Daily, Caleb Naik MD, 50 mcg at 03/05/23 0953  •  metoprolol tartrate (LOPRESSOR) tablet 37.5 mg, 37.5 mg, Oral, TID AC, Caleb Naik MD, 37.5 mg at 03/05/23 0953  •  midodrine (PROAMATINE) tablet 10 mg, 10 mg, Oral, TID AC, Caleb Naik MD, 10 mg at 03/05/23 0953  •  montelukast (SINGULAIR) tablet 10 mg, 10 mg, Oral, Nightly, Caleb Naik MD, 10 mg at 03/04/23 2059  •  pantoprazole (PROTONIX) EC tablet 40 mg, 40 mg, Oral, Q AM, Caleb Naik MD, 40 mg at 03/05/23 0546  •  pyridostigmine (MESTINON) tablet 30 mg, 30 mg, Oral, BID, Caleb Naik MD, 30 mg at 03/05/23 0953     Consults obtained  Hematology  Vascular surgery    Procedures  None    Hospital course  27-year white female with history of chronic orthostatic hypotension Phoebe-Danlos syndrome POTS syndrome hypothyroidism gastroparesis irritable bowel syndrome who also have pulmonary embolism on full dose Lovenox at home as she feels oral anticoagulants admitted to emergency room with shortness of breath and work-up in ER revealed segmental pulm embolism admitted for management.  Patient admitted started on heparin as she has been compliant with Lovenox and further evaluated by hematology and recommended Arixtra after few days of heparin.  Patient further evaluated with lower extremity Doppler study which showed no evidence of DVT.  Patient and her home  medication continued and prior to discharge vascular also evaluated and recommended no intervention at this time and no need for IVC filter.  Patient feeling better and her oxygenation 97% on room air.    Discharge diet regular    Activity as tolerated    Medication as above    Follow-up with prime doctor in 1 week and follow-up with hematology per the instructions and take medication as directed.    ANN GONZALEZ MD    Electronically signed by Ann Gonzalez MD at 03/05/23 1234       Discharge Order (From admission, onward)     Start     Ordered    03/05/23 1227  Discharge patient  Once        Expected Discharge Date: 03/05/23    Discharge Disposition: Home or Self Care    Physician of Record for Attribution - Please select from Treatment Team: ANN GONZALEZ [[id number]]    Review needed by CMO to determine Physician of Record: No       Question Answer Comment   Physician of Record for Attribution - Please select from Treatment Team ANN GONZALEZ    Review needed by CMO to determine Physician of Record No        03/05/23 9989

## 2023-03-06 NOTE — OUTREACH NOTE
Prep Survey    Flowsheet Row Responses   [facility] facility patient discharged from? [facility]   Is LACE score < 7 ? No   Eligibility Readm Mgmt   Discharge diagnosis Recurrent pulm embolism   Does the patient have one of the following disease processes/diagnoses(primary or secondary)? Other   Does the patient have Home health ordered? No   Medication alerts for this patient Fondaparinux Sodium    Prep survey completed? Yes          Uma RANGEL - Registered Nurse

## 2023-03-09 ENCOUNTER — READMISSION MANAGEMENT (OUTPATIENT)
Dept: CALL CENTER | Facility: HOSPITAL | Age: 28
End: 2023-03-09
Payer: COMMERCIAL

## 2023-04-12 ENCOUNTER — APPOINTMENT (OUTPATIENT)
Dept: GENERAL RADIOLOGY | Facility: HOSPITAL | Age: 28
End: 2023-04-12
Payer: COMMERCIAL

## 2023-04-12 ENCOUNTER — HOSPITAL ENCOUNTER (EMERGENCY)
Facility: HOSPITAL | Age: 28
Discharge: HOME | End: 2023-04-12
Attending: EMERGENCY MEDICINE | Admitting: EMERGENCY MEDICINE
Payer: COMMERCIAL

## 2023-04-12 VITALS
WEIGHT: 240 LBS | DIASTOLIC BLOOD PRESSURE: 80 MMHG | BODY MASS INDEX: 40.97 KG/M2 | HEART RATE: 96 BPM | SYSTOLIC BLOOD PRESSURE: 122 MMHG | HEIGHT: 64 IN | OXYGEN SATURATION: 93 % | TEMPERATURE: 98.8 F | RESPIRATION RATE: 22 BRPM

## 2023-04-12 DIAGNOSIS — R23.8 SKIN IRRITATION: Primary | ICD-10-CM

## 2023-04-12 LAB
ALBUMIN SERPL-MCNC: 3.8 G/DL (ref 3.5–5.2)
ALBUMIN/GLOB SERPL: 0.8 G/DL
ALP SERPL-CCNC: 88 U/L (ref 39–117)
ALT SERPL W P-5'-P-CCNC: 30 U/L (ref 1–33)
ANION GAP SERPL CALCULATED.3IONS-SCNC: 9 MMOL/L (ref 5–15)
AST SERPL-CCNC: 24 U/L (ref 1–32)
BASOPHILS # BLD AUTO: 0.06 10*3/MM3 (ref 0–0.2)
BASOPHILS NFR BLD AUTO: 0.7 % (ref 0–1.5)
BILIRUB SERPL-MCNC: 0.3 MG/DL (ref 0–1.2)
BUN SERPL-MCNC: 8 MG/DL (ref 6–20)
BUN/CREAT SERPL: 8 (ref 7–25)
CALCIUM SPEC-SCNC: 8.1 MG/DL (ref 8.6–10.5)
CHLORIDE SERPL-SCNC: 107 MMOL/L (ref 98–107)
CO2 SERPL-SCNC: 19 MMOL/L (ref 22–29)
CREAT SERPL-MCNC: 1 MG/DL (ref 0.57–1)
CRP SERPL-MCNC: 0.66 MG/DL (ref 0–0.5)
D-LACTATE SERPL-SCNC: 1.5 MMOL/L (ref 0.5–2)
DEPRECATED RDW RBC AUTO: 42.6 FL (ref 37–54)
EGFRCR SERPLBLD CKD-EPI 2021: 79.4 ML/MIN/1.73
EOSINOPHIL # BLD AUTO: 0.9 10*3/MM3 (ref 0–0.4)
EOSINOPHIL NFR BLD AUTO: 11.1 % (ref 0.3–6.2)
ERYTHROCYTE [DISTWIDTH] IN BLOOD BY AUTOMATED COUNT: 15.8 % (ref 12.3–15.4)
ERYTHROCYTE [SEDIMENTATION RATE] IN BLOOD: 70 MM/HR (ref 0–20)
GLOBULIN UR ELPH-MCNC: 4.7 GM/DL
GLUCOSE SERPL-MCNC: 94 MG/DL (ref 65–99)
HCT VFR BLD AUTO: 33.6 % (ref 34–46.6)
HGB BLD-MCNC: 10.9 G/DL (ref 12–15.9)
IMM GRANULOCYTES # BLD AUTO: 0.03 10*3/MM3 (ref 0–0.05)
IMM GRANULOCYTES NFR BLD AUTO: 0.4 % (ref 0–0.5)
LYMPHOCYTES # BLD AUTO: 1.79 10*3/MM3 (ref 0.7–3.1)
LYMPHOCYTES NFR BLD AUTO: 22.1 % (ref 19.6–45.3)
MCH RBC QN AUTO: 24.5 PG (ref 26.6–33)
MCHC RBC AUTO-ENTMCNC: 32.4 G/DL (ref 31.5–35.7)
MCV RBC AUTO: 75.7 FL (ref 79–97)
MONOCYTES # BLD AUTO: 0.24 10*3/MM3 (ref 0.1–0.9)
MONOCYTES NFR BLD AUTO: 3 % (ref 5–12)
NEUTROPHILS NFR BLD AUTO: 5.09 10*3/MM3 (ref 1.7–7)
NEUTROPHILS NFR BLD AUTO: 62.7 % (ref 42.7–76)
NRBC BLD AUTO-RTO: 0 /100 WBC (ref 0–0.2)
PLATELET # BLD AUTO: 430 10*3/MM3 (ref 140–450)
PMV BLD AUTO: 9.7 FL (ref 6–12)
POTASSIUM SERPL-SCNC: 3.9 MMOL/L (ref 3.5–5.2)
PROT SERPL-MCNC: 8.5 G/DL (ref 6–8.5)
RBC # BLD AUTO: 4.44 10*6/MM3 (ref 3.77–5.28)
SODIUM SERPL-SCNC: 135 MMOL/L (ref 136–145)
WBC NRBC COR # BLD: 8.11 10*3/MM3 (ref 3.4–10.8)

## 2023-04-12 PROCEDURE — 80053 COMPREHEN METABOLIC PANEL: CPT | Performed by: EMERGENCY MEDICINE

## 2023-04-12 PROCEDURE — 86140 C-REACTIVE PROTEIN: CPT | Performed by: EMERGENCY MEDICINE

## 2023-04-12 PROCEDURE — 83605 ASSAY OF LACTIC ACID: CPT | Performed by: EMERGENCY MEDICINE

## 2023-04-12 PROCEDURE — 85652 RBC SED RATE AUTOMATED: CPT | Performed by: EMERGENCY MEDICINE

## 2023-04-12 PROCEDURE — 71045 X-RAY EXAM CHEST 1 VIEW: CPT

## 2023-04-12 PROCEDURE — 99283 EMERGENCY DEPT VISIT LOW MDM: CPT

## 2023-04-12 PROCEDURE — 85025 COMPLETE CBC W/AUTO DIFF WBC: CPT | Performed by: EMERGENCY MEDICINE

## 2023-04-12 PROCEDURE — 36415 COLL VENOUS BLD VENIPUNCTURE: CPT | Performed by: EMERGENCY MEDICINE

## 2023-04-12 PROCEDURE — 87040 BLOOD CULTURE FOR BACTERIA: CPT | Performed by: EMERGENCY MEDICINE

## 2023-04-12 RX ORDER — SULFAMETHOXAZOLE AND TRIMETHOPRIM 400; 80 MG/1; MG/1
1 TABLET ORAL 2 TIMES DAILY
COMMUNITY

## 2023-04-12 NOTE — ED TRIAGE NOTES
Pt had central line placed in January.  Redness and tenderness at central line access.  Pt's drsg was changed yesterday, no improvement.     Pt ambulates with walker or uses wheelchair at baseline. Pt is on 2L NC at baseline.

## 2023-04-12 NOTE — ED PROVIDER NOTES
MD ATTESTATION NOTE  I wore full protective equipment throughout this patient encounter including an N95 face mask, googles, gown and gloves. Hand hygiene was performed before donning protective equipment and after removal when leaving the room.    The APRIL and I have discussed this patient's history, physical exam, and treatment plan. I have reviewed the documentation and personally had a face to face interaction with the patient. I affirm the APRIL documentation and agree with their diagnostics, findings, treatment, plan, and disposition.    I provided a substantive portion of the care of this patient.  I personally performed the physical exam, in its entirety.  The attached note describes my personal findings.    PCP: Jason Borrero MD  Patient Care Team:  Jason Borrero MD as PCP - General (Family Medicine)     Liliam Lorenz is a 27 y.o. female who presents to the ED c/o concern for line infection.  Patient reports that she noticed some redness at her central line site yesterday.  Patient reports that this was right after dressing change, reports that the redness improved, redness occurred again after dressing change today.  Patient reports some associated tenderness although she states that she has accidentally struck her chest wall on accident several times.  Patient denies any tugging or pulling on the line, denies any swelling, no drainage.  No chest pain or shortness of breath, no swelling of lips tongue or throat, no difficulty swallowing, no other erythema, no other rash, no itching.  No fever shakes chills or night sweats.  Patient reports that she is otherwise at baseline health.    On exam:  General: NAD.  Head: NCAT.  ENT: nares patent, no scleral icterus  Neck: Supple, trachea midline.  Cardiac: regular rate and rhythm.  Chest wall: Central line and left upper chest, trace pinkish erythema immediately adjacent to the site, no warmth, no induration, no fluctuance or drainage  Lungs: normal  effort.  Abdomen: Soft, NTTP.   Extremities: Moves all extremities well, no peripheral edema  Neuro: alert, MAEW, follows commands  Psych: calm, cooperative  Skin: Warm, dry.    Medical Decision Making:  After the initial H&P, I discussed pertinent information from history and physical exam with patient/family.  Discussed differential diagnosis.  Discussed plan for ED evaluation/work-up/treatment.  All questions answered.  Patient/family is agreeable with plan.         Diagnosis  Final diagnoses:   Skin irritation (chest wall)        Faustino Galvin MD  04/12/23 1959

## 2023-04-12 NOTE — ED PROVIDER NOTES
EMERGENCY DEPARTMENT ENCOUNTER    Room Number:  S04/04  Date seen:  4/12/2023  PCP: Jason Borrero MD        HPI:  Chief Complaint: Skin irritation    Context: Liliam Lorenz is a 27 y.o. female who presents to the ED c/o skin irritation that she noted around her central line.  Patient just recently had the central line placed.  She receives IVIG injections, IV fluids, and antiemetics through her central line.  Denies associated fever, chest wall swelling, or purulent drainage.  Reports she has had prior central line infections and wanted to err on the side of caution.  No injury or trauma to the chest.  No other systemic complaints at this time.      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   • Nausea and vomiting 08/22/2017   • Gastroparesis 08/22/2017   • Postural orthostatic tachycardia syndrome 06/12/2020   • Dolan catheter dysfunction 10/25/2021   • Febrile illness, acute 01/18/2022   • Single subsegmental pulmonary embolism without acute cor pulmonale 04/30/2022   • Cellulitis of chest wall 01/09/2023   • Pulmonary embolus 03/02/2023     Resolved Ambulatory Problems     Diagnosis Date Noted   • No Resolved Ambulatory Problems     Past Medical History:   Diagnosis Date   • Bronchitis    • Chest pain    • Chronic hypotension    • Eczema    • GERD (gastroesophageal reflux disease)    • IBS (irritable bowel syndrome)    • Lightheadedness    • Rectal fissure    • Rosacea    • Tachycardia          PAST SURGICAL HISTORY  Past Surgical History:   Procedure Laterality Date   • COLONOSCOPY     •  MEDIPORT INSERTION, DOUBLE  09/01/2020    [facility]    • UPPER GASTROINTESTINAL ENDOSCOPY     • VENOUS ACCESS DEVICE (PORT) INSERTION Left 10/16/2020    Procedure: INSERTION VENOUS ACCESS DEVICE UNDER FLURO;  Surgeon: Pa Lane MD;  Location: [facility] MAIN OR;  Service: General;  Laterality: Left;   • VENOUS ACCESS DEVICE (PORT) INSERTION Right 10/26/2021    Procedure: REMOVAL AND INSERTION OF VALVERDE CATHETER;  Surgeon: Pa Lane MD;  Location: [facility] MAIN OR;  Service: General;  Laterality: Right;   • VENOUS ACCESS DEVICE (PORT) INSERTION Left 1/13/2023    Procedure: INSERTION OF VALVERDE CATHETER, VENOGARM;  Surgeon: Cristhian Anne MD;  Location: [facility] MAIN OR;  Service: General;  Laterality: Left;         FAMILY HISTORY  Family History   Problem Relation Age of Onset   • Hypertension Mother    • Diabetes Mother    • Diabetes Father    • Heart disease Paternal Grandfather    • Stroke Paternal Grandfather    • Diabetes Paternal Grandfather    • Cancer Paternal Grandfather    • Coronary artery disease Maternal Grandmother         MGM with 4 vessel CABG and multiple stents   • Cancer Maternal Grandmother    • Coronary artery disease Maternal Uncle         Maternal uncle with MI   • Breast cancer Other    • Malig Hyperthermia Neg Hx          SOCIAL HISTORY  Social History     Socioeconomic History   • Marital status:    Tobacco Use   • Smoking status: Never   • Smokeless tobacco: Never   Vaping Use   • Vaping Use: Never used   Substance and Sexual Activity   • Alcohol use: No   • Drug use: No   • Sexual activity: Yes     Partners: Female     Birth control/protection: None         ALLERGIES  Eggs or egg-derived products, Pepcid [famotidine], and Scopolamine        REVIEW OF SYSTEMS  Review of Systems   Constitutional: Negative for chills and fever.   HENT: Negative for ear pain and sore throat.    Respiratory: Negative for cough and shortness of breath.    Cardiovascular: Negative  for chest pain and palpitations.   Gastrointestinal: Negative for abdominal pain and vomiting.   Genitourinary: Negative for dysuria and hematuria.   Musculoskeletal: Negative for arthralgias and joint swelling.   Skin: Negative for pallor and rash.        Positive for irritation   Neurological: Negative for numbness and headaches.   Psychiatric/Behavioral: Negative for confusion and hallucinations.          PHYSICAL EXAM  ED Triage Vitals   Temp Heart Rate Resp BP SpO2   04/12/23 1603 04/12/23 1603 04/12/23 1603 04/12/23 1641 04/12/23 1603   98.8 °F (37.1 °C) 104 18 127/90 94 %      Temp src Heart Rate Source Patient Position BP Location FiO2 (%)   -- 04/12/23 1603 -- -- --    Monitor          Physical Exam  Constitutional:       General: She is not in acute distress.     Appearance: She is well-developed.   HENT:      Head: Normocephalic and atraumatic.   Eyes:      Extraocular Movements: Extraocular movements intact.   Cardiovascular:      Rate and Rhythm: Normal rate and regular rhythm.      Heart sounds: Normal heart sounds.   Pulmonary:      Effort: Pulmonary effort is normal.      Breath sounds: Normal breath sounds.   Chest:       Abdominal:      General: There is no distension.   Skin:     General: Skin is warm.   Neurological:      General: No focal deficit present.      Mental Status: She is alert and oriented to person, place, and time.   Psychiatric:         Mood and Affect: Mood normal.         Vital signs and nursing notes reviewed.          LAB RESULTS  Recent Results (from the past 24 hour(s))   Comprehensive Metabolic Panel    Collection Time: 04/12/23  5:04 PM    Specimen: Blood   Result Value Ref Range    Glucose 94 65 - 99 mg/dL    BUN 8 6 - 20 mg/dL    Creatinine 1.00 0.57 - 1.00 mg/dL    Sodium 135 (L) 136 - 145 mmol/L    Potassium 3.9 3.5 - 5.2 mmol/L    Chloride 107 98 - 107 mmol/L    CO2 19.0 (L) 22.0 - 29.0 mmol/L    Calcium 8.1 (L) 8.6 - 10.5 mg/dL    Total Protein 8.5 6.0 - 8.5 g/dL     Albumin 3.8 3.5 - 5.2 g/dL    ALT (SGPT) 30 1 - 33 U/L    AST (SGOT) 24 1 - 32 U/L    Alkaline Phosphatase 88 39 - 117 U/L    Total Bilirubin 0.3 0.0 - 1.2 mg/dL    Globulin 4.7 gm/dL    A/G Ratio 0.8 g/dL    BUN/Creatinine Ratio 8.0 7.0 - 25.0    Anion Gap 9.0 5.0 - 15.0 mmol/L    eGFR 79.4 >60.0 mL/min/1.73   Lactic Acid, Plasma    Collection Time: 04/12/23  5:04 PM    Specimen: Blood   Result Value Ref Range    Lactate 1.5 0.5 - 2.0 mmol/L   Sedimentation Rate    Collection Time: 04/12/23  5:04 PM    Specimen: Blood   Result Value Ref Range    Sed Rate 70 (H) 0 - 20 mm/hr   C-reactive Protein    Collection Time: 04/12/23  5:04 PM    Specimen: Blood   Result Value Ref Range    C-Reactive Protein 0.66 (H) 0.00 - 0.50 mg/dL   CBC Auto Differential    Collection Time: 04/12/23  5:04 PM    Specimen: Blood   Result Value Ref Range    WBC 8.11 3.40 - 10.80 10*3/mm3    RBC 4.44 3.77 - 5.28 10*6/mm3    Hemoglobin 10.9 (L) 12.0 - 15.9 g/dL    Hematocrit 33.6 (L) 34.0 - 46.6 %    MCV 75.7 (L) 79.0 - 97.0 fL    MCH 24.5 (L) 26.6 - 33.0 pg    MCHC 32.4 31.5 - 35.7 g/dL    RDW 15.8 (H) 12.3 - 15.4 %    RDW-SD 42.6 37.0 - 54.0 fl    MPV 9.7 6.0 - 12.0 fL    Platelets 430 140 - 450 10*3/mm3    Neutrophil % 62.7 42.7 - 76.0 %    Lymphocyte % 22.1 19.6 - 45.3 %    Monocyte % 3.0 (L) 5.0 - 12.0 %    Eosinophil % 11.1 (H) 0.3 - 6.2 %    Basophil % 0.7 0.0 - 1.5 %    Immature Grans % 0.4 0.0 - 0.5 %    Neutrophils, Absolute 5.09 1.70 - 7.00 10*3/mm3    Lymphocytes, Absolute 1.79 0.70 - 3.10 10*3/mm3    Monocytes, Absolute 0.24 0.10 - 0.90 10*3/mm3    Eosinophils, Absolute 0.90 (H) 0.00 - 0.40 10*3/mm3    Basophils, Absolute 0.06 0.00 - 0.20 10*3/mm3    Immature Grans, Absolute 0.03 0.00 - 0.05 10*3/mm3    nRBC 0.0 0.0 - 0.2 /100 WBC       Ordered the above labs and reviewed the results.        RADIOLOGY  XR Chest 1 View    Result Date: 4/12/2023  AP CHEST  HISTORY: Concern for potential infected central line  COMPARISON: 03/01/2023   FINDINGS: Lungs are clear. Heart size stable. Stable position of tunneled left IJ central venous catheter.      No acute finding  This report was finalized on 4/12/2023 5:49 PM by Dr. Sunil Suarez M.D.        Ordered the above noted radiological studies. Reviewed by me in PACS.              MEDICAL DECISION MAKING, PROGRESS, and CONSULTS    All labs have been independently reviewed by me.  All radiology studies have been reviewed by me and I have also reviewed the radiology report.   EKG's independently viewed and interpreted by me.  Discussion below represents my analysis of pertinent findings related to patient's condition, differential diagnosis, treatment plan and final disposition.      Additional sources:    - Chronic or social conditions impacting care: Multiple chronic illnesses        Orders placed during this visit:  Orders Placed This Encounter   Procedures   • Blood Culture - Blood,   • XR Chest 1 View   • Comprehensive Metabolic Panel   • Lactic Acid, Plasma   • Sedimentation Rate   • C-reactive Protein   • CBC Auto Differential   • CBC & Differential         Additional orders considered but not ordered:  Empiric antibiotics    Differential diagnosis:  Skin irritation, central line infection, chest wall cellulitis      Independent interpretation of labs, radiology studies, and discussions with consultants:    Independent interpretation of chest x-ray is normal mediastinum, no infiltrate, noted central venous catheter      This is a 27-year-old female presenting to ED with concern for central line infection and noted to have mild skin irritation around central line site.  Worked up today with labs, chest x-ray, and blood culture.  WBC normal, nonspecific mild elevation of inflammatory markers noted.  Chest x-ray with stable position central venous catheter.  Physical exam does not appear consistent with acute chest wall infection.  Encourage close follow-up with PCP and discussed ED return  precautions.  She is otherwise well-appearing, hemodynamically stable, and therefore appropriate for discharge.          Patient was wearing a face mask when I entered the room and they continued to wear a mask throughout their stay in the ED.  I wore PPE, including  gloves, face mask with shield or face mask with goggles whenever I was in the room with patient.     DIAGNOSIS  Final diagnoses:   Skin irritation (chest wall)           Follow Up:  Jason Borrero MD  [id number] [id number]  [phone]    Schedule an appointment as soon as possible for a visit   To make appointment for blood pressure recheck and for follow-up      RX:     Medication List      Changed    metoprolol tartrate 25 MG tablet  Commonly known as: LOPRESSOR  12.5mg in am /  25mg middle of day/  12.5mg in the pm  What changed:   · how much to take  · when to take this  · additional instructions            Latest Documented Vital Signs:  As of 19:23 EDT  BP- 127/90 HR- 104 Temp- 98.8 °F (37.1 °C) O2 sat- 94%              --    Please note that portions of this were completed with a voice recognition program.       Note Disclaimer: At [facility], we believe that sharing information builds trust and better relationships. You are receiving this note because you are receiving care at [facility] or recently visited. It is possible you will see health information before a provider has talked with you about it. This kind of information can be easy to misunderstand. To help you fully understand what it means for your health, we urge you to discuss this note with your provider.           Maria Elena Solomon PA-C  04/13/23 [id number]

## 2023-04-12 NOTE — DISCHARGE INSTRUCTIONS
Follow-up with your primary care provider.  Return if you develop fever, noticed warmth and redness that is worsening on the chest.

## 2023-04-17 LAB — BACTERIA SPEC AEROBE CULT: NORMAL

## 2023-06-14 ENCOUNTER — OFFICE VISIT (OUTPATIENT)
Dept: CARDIOLOGY | Facility: CLINIC | Age: 28
End: 2023-06-14
Payer: COMMERCIAL

## 2023-06-14 VITALS
HEART RATE: 106 BPM | DIASTOLIC BLOOD PRESSURE: 80 MMHG | BODY MASS INDEX: 42.34 KG/M2 | SYSTOLIC BLOOD PRESSURE: 116 MMHG | HEIGHT: 64 IN | WEIGHT: 248 LBS

## 2023-06-14 DIAGNOSIS — I26.94 MULTIPLE SUBSEGMENTAL PULMONARY EMBOLI WITHOUT ACUTE COR PULMONALE: Primary | ICD-10-CM

## 2023-06-14 DIAGNOSIS — Q79.60 EHLERS-DANLOS SYNDROME: ICD-10-CM

## 2023-06-14 DIAGNOSIS — R09.02 HYPOXIA: ICD-10-CM

## 2023-06-14 DIAGNOSIS — G90.A POTS (POSTURAL ORTHOSTATIC TACHYCARDIA SYNDROME): ICD-10-CM

## 2023-06-14 DIAGNOSIS — K31.84 GASTROPARESIS: ICD-10-CM

## 2023-06-14 PROCEDURE — 99214 OFFICE O/P EST MOD 30 MIN: CPT | Performed by: INTERNAL MEDICINE

## 2023-06-14 PROCEDURE — 93000 ELECTROCARDIOGRAM COMPLETE: CPT | Performed by: INTERNAL MEDICINE

## 2023-06-14 RX ORDER — PREDNISONE 10 MG/1
TABLET ORAL
COMMUNITY
Start: 2023-05-12

## 2023-06-14 RX ORDER — FONDAPARINUX SODIUM 10 MG/.8ML
INJECTION SUBCUTANEOUS
COMMUNITY

## 2023-06-14 RX ORDER — MIDODRINE HYDROCHLORIDE 5 MG/1
5 TABLET ORAL
Qty: 90 TABLET | Refills: 11 | Status: SHIPPED | OUTPATIENT
Start: 2023-06-14

## 2023-06-14 NOTE — PROGRESS NOTES
Date of Office Visit: 2023  Encounter Provider: Pamela Wiley MD  Place of Service: [facility] CARDIOLOGY  Patient Name: Liliam Lorenz  :1995      Patient ID:  Liliam Lorenz is a 28 y.o. female is here for  followup for severe POTS.        History of Present Illness    She has a history of severe POTS ([facility] at [place]), GERD, irritable bowel syndrome, obesity, GRACIELA on CPAP, history of recurrent PE- failed eliquis, warfarin, lovenox and now on Arixtra ([place]), gastroparesis on IVIG and autoimmune gastritis ([facility]) - she is here for followup.     Her pulmonologist is Dr. Martínez.  She has a left Dolan and gets 1 L of IV fluid daily and IVIG every Tuesday per Dr. Escobar with GI for her gastroparesis.    she was initially seen in the office on 2018.  She was having chest discomfort intermittently for several years and was evaluated in the emergency department, and it was felt that this might represent esophageal spasm.  However, she did undergo cardiac testing with an exercise treadmill stress test and echocardiogram on 3/14/2018.  The treadmill stress test showed no EKG changes, and she was able to exercise for 9 minutes (although she did have chest discomfort).  The echocardiogram showed an ejection fraction of 62% and no significant pathology.  She later continued to have issues with near syncope and tachycardia.  A tilt table test was performed on 2019 which confirmed the diagnosis of POTS.  Typically, she met all criteria when tilted backwards (her heart rate went up significantly, and her blood pressure dropped).  She has been treated with beta-blockers and midodrine.  She also has evidence of severe dysautonomia and severe gastroparesis.  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     She was  admitted in September 2021 with shortness of breath and tachycardia and was found to have very small bilateral pulmonary emboli on CT angiogram of the chest on 9/8/2021.  She was initiated on Eliquis at that time.  She was also readmitted on 10/25/2021 with bleeding from her left IJ Dolan catheter.  This was removed, and a right IJ Dolan was placed.  She was later diagnosed with hypothyroidism and hemiplegic migraines.  She had recurrent pulmonary emboli in the right lower lobe in April 2022.  Her Eliquis was switched to Lovenox injections afterwards.  There was a concern that she was not absorbing her anticoagulants because of her gastroparesis-this is why she was changed to Lovenox.     She had COVID-19 on New Year's Delia of 2021, and she subsequently got septic.  This set off all month long of severe gastroparesis issues.  She has had severe and recurrent nausea.  She is back on fluids and IVIG, and takes frequent nausea medications.  She is still hopefully going to get her gastric stimulator after September 2022.    There is CAD on the maternal side of her family and her mom has hypertension and diabetes.  She is , has no children, is unemployed-disabled, has never smoked, uses no alcohol has 1 caffeinated beverage per day and no drugs.    Labs on 4/12/2023 show sodium 135, otherwise normal CMP, C-reactive protein 0.66, normal lactate, hemoglobin 10.9, otherwise normal CBC.  Echocardiogram done 10/15/2021 showed ejection fraction of 60%, normal diastolic function no significant valvular abnormalities.  Venous duplex of lower extremities done 3/2/2023 was normal.  CTA chest done 3/2/2023 showed recurrent left-sided subsegmental pulmonary emboli.  I did review the CTA chest images and it shows no evidence of coronary artery disease.  Echo done 2/23/2023 showed ejection fraction of 68% with normal right ventricular size and function, normal diastolic filling, no significant valvular abnormalities.  After  this echocardiogram, she had more pulmonary emboli and is now on chronic oxygen due to severe hypoxia with activity.    She has been having more near syncope even when lying down.  She says actually she lies down she is more dizzy and her blood pressure is lower than when she stands.  She is currently on midodrine 3 times daily and Mestinon 30 mg twice daily as well as metoprolol.  She does feel her heart racing at times.  She has no orthopnea or PND and does sleep on an incline.  She has someone with her most days of the week when her wife Jeaneth is working.  She has had no full syncope.  She uses a walker.  She is on chronic Phenergan for nausea and vomiting-this controls it well so that she is able to get calories.    Past Medical History:   Diagnosis Date   • Bronchitis    • Chest pain    • Chronic hypotension    • Eczema    • Phoebe-Danlos syndrome    • Gastroparesis    • GERD (gastroesophageal reflux disease)    • IBS (irritable bowel syndrome)    • Lightheadedness    • POTS (postural orthostatic tachycardia syndrome)    • Rectal fissure    • Rosacea    • Sleep apnea    • Tachycardia          Past Surgical History:   Procedure Laterality Date   • COLONOSCOPY     • MEDIPORT INSERTION, DOUBLE  09/01/2020    [facility]    • UPPER GASTROINTESTINAL ENDOSCOPY     • VENOUS ACCESS DEVICE (PORT) INSERTION Left 10/16/2020    Procedure: INSERTION VENOUS ACCESS DEVICE UNDER FLURO;  Surgeon: Pa Lane MD;  Location: [facility];  Service: General;  Laterality: Left;   • VENOUS ACCESS DEVICE (PORT) INSERTION Right 10/26/2021    Procedure: REMOVAL AND INSERTION OF VALVERDE CATHETER;  Surgeon: Pa Lane MD;  Location: [facility] OR;  Service: General;  Laterality: Right;   • VENOUS ACCESS DEVICE (PORT) INSERTION Left 1/13/2023    Procedure: INSERTION OF VALVERDE CATHETER, VENOGARM;  Surgeon: Cristhian Anne MD;  Location: [facility] OR;  Service: General;  Laterality: Left;       Current  Outpatient Medications on File Prior to Visit   Medication Sig Dispense Refill   • albuterol sulfate  (90 Base) MCG/ACT inhaler Inhale 2 puffs Every 4 (Four) Hours As Needed for Wheezing.     • cholecalciferol (Cholecalciferol) 25 MCG (1000 UT) tablet Take 1 tablet by mouth Daily.     • cyanocobalamin 1000 MCG/ML injection Inject 1 mL into the appropriate muscle as directed by prescriber Every 28 (Twenty-Eight) Days. 1 mL 0   • dronabinol (MARINOL) 5 MG capsule Take 1 capsule by mouth 3 (Three) Times a Day As Needed (nausea).     • Emgality 120 MG/ML auto-injector pen Every 30 (Thirty) Days. Takes the 20th of the month     • escitalopram (LEXAPRO) 10 MG tablet Take 2 tablets by mouth Daily.     • fondaparinux (ARIXTRA) 10 MG/0.8ML solution injection Inject  under the skin into the appropriate area as directed Daily.     • gabapentin (NEURONTIN) 100 MG capsule Take 1 capsule by mouth 3 (Three) Times a Day.     • Ginger, Zingiber officinalis, (Ginger Root) 550 MG capsule Take  by mouth Daily.     • Heparin & NaCl Lock Flush (HEPARIN COMBINATION IV) Infuse  into a venous catheter 2 (Two) Times a Day.     • hydrOXYzine pamoate (VISTARIL) 25 MG capsule Take 1-2 capsules by mouth 3 (Three) Times a Day As Needed (anxiety).     • Immune Globulin, Human,-ifas (PANZYGA IV) Infuse  into a venous catheter Take As Directed. Every Tuesday     • levothyroxine (SYNTHROID, LEVOTHROID) 50 MCG tablet Take 1 tablet by mouth Daily.     • loperamide (IMODIUM) 2 MG capsule Take 1 capsule by mouth 4 (Four) Times a Day As Needed for Diarrhea.     • meclizine (ANTIVERT) 25 MG tablet Take 1 tablet by mouth 3 (Three) Times a Day As Needed for Dizziness.     • metoprolol tartrate (LOPRESSOR) 25 MG tablet 12.5mg in am /  25mg middle of day/  12.5mg in the pm (Patient taking differently: Take 1 tablet by mouth 3 (Three) Times a Day.) 180 tablet 3   • midodrine (PROAMATINE) 10 MG tablet TAKE 1 TABLET BY MOUTH THREE TIMES DAILY 90 tablet 6  "  • montelukast (SINGULAIR) 10 MG tablet Take 1 tablet by mouth Every Night.     • Omeprazole 20 MG tablet delayed-release Take 40 mg by mouth Daily.     • Ondansetron HCl (ZOFRAN IV) Infuse 8 mg into a venous catheter Every 6 (Six) Hours.     • phenazopyridine (PYRIDIUM) 100 MG tablet Take 1 tablet by mouth 3 (Three) Times a Day As Needed.     • predniSONE (DELTASONE) 10 MG tablet      • prochlorperazine (COMPAZINE) 10 MG tablet Take 1 tablet by mouth Every 6 (Six) Hours As Needed for Nausea or Vomiting.     • promethazine (PHENERGAN) 25 MG/ML injection Inject  as directed. IV every 3 hours     • pyridostigmine (MESTINON) 60 MG tablet TAKE 1/2 TABLET BY MOUTH TWICE DAILY 90 tablet 3   • [DISCONTINUED] sulfamethoxazole-trimethoprim (BACTRIM,SEPTRA) 400-80 MG tablet Take 1 tablet by mouth 2 (Two) Times a Day. (Patient not taking: Reported on 6/14/2023)       No current facility-administered medications on file prior to visit.       Social History     Socioeconomic History   • Marital status:    • Number of children: 0   Tobacco Use   • Smoking status: Never     Passive exposure: Never   • Smokeless tobacco: Never   • Tobacco comments:     Caffeine: 1 serving daily   Vaping Use   • Vaping Use: Never used   Substance and Sexual Activity   • Alcohol use: No   • Drug use: No   • Sexual activity: Yes     Partners: Female     Birth control/protection: None           ROS    Procedures    ECG 12 Lead    Date/Time: 6/14/2023 9:03 AM  Performed by: Pamela Wiley MD  Authorized by: Pamela Wiley MD   Comparison: compared with previous ECG   Similar to previous ECG  Rhythm: sinus tachycardia  T flattening: all    Clinical impression: non-specific ECG                Objective:      Vitals:    06/14/23 0855   BP: 116/80   Pulse: 106   Weight: 112 kg (248 lb)   Height: 162.6 cm (64\")     Body mass index is 42.57 kg/m².    Vitals reviewed.   Constitutional:       General: Not in acute distress.     Appearance: " Well-developed. Obese. Not diaphoretic.   Eyes:      General: No scleral icterus.     Conjunctiva/sclera: Conjunctivae normal.   HENT:      Head: Normocephalic and atraumatic.   Neck:      Thyroid: No thyromegaly.      Vascular: No carotid bruit or JVD.      Lymphadenopathy: No cervical adenopathy.   Pulmonary:      Effort: Pulmonary effort is normal. No respiratory distress.      Breath sounds: Normal breath sounds. No wheezing. No rhonchi. No rales.   Chest:      Chest wall: Not tender to palpatation.   Cardiovascular:      Tachycardia present. Regular rhythm.      Murmurs: There is no murmur.      No gallop.   Pulses:     Intact distal pulses.   Edema:     Peripheral edema absent.   Abdominal:      General: Bowel sounds are normal. There is no distension or abdominal bruit.      Palpations: Abdomen is soft. There is no abdominal mass.      Tenderness: There is no abdominal tenderness.   Musculoskeletal:         General: No deformity.      Extremities: No clubbing present.     Cervical back: Neck supple. Skin:     General: Skin is warm and dry. There is no cyanosis.      Coloration: Skin is not pale.      Findings: No rash.   Neurological:      Mental Status: Alert and oriented to person, place, and time.      Cranial Nerves: No cranial nerve deficit.   Psychiatric:         Judgment: Judgment normal.         Lab Review:       Assessment:      Diagnosis Plan   1. Multiple subsegmental pulmonary emboli without acute cor pulmonale  Adult Transthoracic Echo Complete W/ Cont if Necessary Per Protocol      2. POTS (postural orthostatic tachycardia syndrome)  Adult Transthoracic Echo Complete W/ Cont if Necessary Per Protocol      3. Phoebe-Danlos syndrome  Adult Transthoracic Echo Complete W/ Cont if Necessary Per Protocol      4. Gastroparesis  Adult Transthoracic Echo Complete W/ Cont if Necessary Per Protocol      5. Hypoxia  Adult Transthoracic Echo Complete W/ Cont if Necessary Per Protocol        1. Postural  orthostatic tachycardia syndrome, followed by Dr. Archer at [place].  Is on midodrine, Mestinon and metoprolol.  Received 1 L of normal saline daily through her left-sided Dolan.  2. Phoebe-Danlos type III  3. Gastroparesis-severe, does not have a gastric stimulator as her not sure this can help her because of her Phoebe-Danlos.  She is on chronic Phenergan for this and gets IVIG for this as well, sees Dr. Escobar.  4. Autoimmune atrophic gastritis  5. GERD.  6. Interstitial cystitis  7. Recurrent pulmonary emboli-on Arixtra, follows with Dr. Mcdonald. has had pulmonary emboli-failure of anticoagulation-on Eliquis, warfarin and Lovenox.  8. Hemiplegic migraines  9. GRACIELA on CPAP  10. Hypothyroidism  11. Fibromyalgia  12. Obesity.   13. Hypoxia, on chronic oxygen now since her last pulmonary emboli March 2023.  Repeat echocardiogram looking for RV size and RVSP.         Plan:       See Kera in 3 months, I have advised her to try to potentially go up on midodrine to 15 mg 3 times a day which can be spaced out as 10mg and 5mg and she does not have to take all 15 times every single day.  Decrease Mestinon to 30 mg once daily as she is not sure this is helping with possibly the intent to wean off altogether.  Has never been on Florinef due to volume retention and salt retention.

## 2023-09-22 ENCOUNTER — APPOINTMENT (OUTPATIENT)
Dept: GENERAL RADIOLOGY | Facility: HOSPITAL | Age: 28
End: 2023-09-22
Payer: COMMERCIAL

## 2023-09-22 ENCOUNTER — HOSPITAL ENCOUNTER (EMERGENCY)
Facility: HOSPITAL | Age: 28
Discharge: HOME | End: 2023-09-22
Attending: EMERGENCY MEDICINE
Payer: COMMERCIAL

## 2023-09-22 VITALS
WEIGHT: 248 LBS | RESPIRATION RATE: 16 BRPM | HEIGHT: 64 IN | TEMPERATURE: 97.9 F | OXYGEN SATURATION: 95 % | HEART RATE: 100 BPM | SYSTOLIC BLOOD PRESSURE: 102 MMHG | BODY MASS INDEX: 42.34 KG/M2 | DIASTOLIC BLOOD PRESSURE: 59 MMHG

## 2023-09-22 DIAGNOSIS — Z78.9 CENTRAL VENOUS CATHETER IN PLACE: ICD-10-CM

## 2023-09-22 DIAGNOSIS — R07.89 LEFT-SIDED CHEST WALL PAIN: Primary | ICD-10-CM

## 2023-09-22 PROCEDURE — 99282 EMERGENCY DEPT VISIT SF MDM: CPT

## 2023-09-22 PROCEDURE — 71046 X-RAY EXAM CHEST 2 VIEWS: CPT

## 2023-09-22 NOTE — ED NOTES
Patient to ER via car form home for knot at central line site  Patient reports that it was changed on Tuesday and it appeared after that

## 2023-09-22 NOTE — ED PROVIDER NOTES
EMERGENCY DEPARTMENT ENCOUNTER    Room Number:  37/37  Date of encounter:  9/22/2023  PCP: Jason Borrero MD  Historian: Patient    Patient was placed in face mask during triage process. Patient was wearing facemask when I entered the room and throughout our encounter. I wore full protective equipment throughout this patient encounter including a face mask, eye protection, and gloves. Hand hygiene was performed before donning protective equipment and again following doffing of PPE after leaving the room.    HPI:  Chief Complaint: Patient  A complete HPI/ROS/PMH/PSH/SH/FH are unobtainable due to: N/A   Context: Liliam Lorenz is a 28 y.o. female who presents to the ED c/o discomfort with a knot just above insertion site of left subclavian since cleaning and changing dressing 2 days ago.  No purulent drainage or new erythema.  No fever cough or other chest pain reported.      MEDICAL HISTORY REVIEW  Additional sources:  - Discussed/ obtained information from independent historians: Spouse    - External (non-ED) record review:   PCP office note 9/15/2023 reviewed: Patient seen and evaluated for complaint of cough and congestion as well as lump in left breast.    - Chronic or social conditions impacting care: Chronic indwelling left subclavian for gastroparesis      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    Nausea and vomiting 08/22/2017    Gastroparesis 08/22/2017    Postural orthostatic tachycardia syndrome 06/12/2020    Dolan catheter dysfunction 10/25/2021    Febrile illness, acute 01/18/2022    Single subsegmental pulmonary embolism without acute cor pulmonale 04/30/2022    Cellulitis of chest wall  01/09/2023    Pulmonary embolus 03/02/2023     Resolved Ambulatory Problems     Diagnosis Date Noted    No Resolved Ambulatory Problems     Past Medical History:   Diagnosis Date    Bronchitis     Chest pain     Chronic hypotension     Eczema     GERD (gastroesophageal reflux disease)     IBS (irritable bowel syndrome)     Lightheadedness     Rectal fissure     Rosacea     Sleep apnea     Tachycardia          PAST SURGICAL HISTORY  Past Surgical History:   Procedure Laterality Date    COLONOSCOPY      MEDIPORT INSERTION, DOUBLE  09/01/2020    [facility]     UPPER GASTROINTESTINAL ENDOSCOPY      VENOUS ACCESS DEVICE (PORT) INSERTION Left 10/16/2020    Procedure: INSERTION VENOUS ACCESS DEVICE UNDER FLURO;  Surgeon: Pa Lane MD;  Location: [facility];  Service: General;  Laterality: Left;    VENOUS ACCESS DEVICE (PORT) INSERTION Right 10/26/2021    Procedure: REMOVAL AND INSERTION OF VALVERDE CATHETER;  Surgeon: Pa Lane MD;  Location: [facility];  Service: General;  Laterality: Right;    VENOUS ACCESS DEVICE (PORT) INSERTION Left 1/13/2023    Procedure: INSERTION OF VALVERDE CATHETER, VENOGARM;  Surgeon: Cristhian Anne MD;  Location: [facility];  Service: General;  Laterality: Left;         FAMILY HISTORY  Family History   Problem Relation Age of Onset    Hypertension Mother     Diabetes Mother     Hypertension Father     Diabetes Father     Coronary artery disease Maternal Uncle         Maternal uncle with MI    Hypertension Maternal Grandmother     Coronary artery disease Maternal Grandmother         MGM with 4 vessel CABG and multiple stents    Cancer Maternal Grandmother     Stroke Maternal Grandmother     Coronary artery disease Maternal Grandfather     Breast cancer Other     Malig Hyperthermia Neg Hx          SOCIAL HISTORY  Social History     Socioeconomic History    Marital status:     Number of children: 0   Tobacco Use    Smoking status: Never     Passive  exposure: Never    Smokeless tobacco: Never    Tobacco comments:     Caffeine: 1 serving daily   Vaping Use    Vaping Use: Never used   Substance and Sexual Activity    Alcohol use: No    Drug use: No    Sexual activity: Yes     Partners: Female     Birth control/protection: None         ALLERGIES  Eggs or egg-derived products, Pepcid [famotidine], and Scopolamine        REVIEW OF SYSTEMS  Review of Systems     All systems reviewed and negative except for those discussed in HPI.       PHYSICAL EXAM    I have reviewed the triage vital signs and nursing notes.    ED Triage Vitals   Temp Heart Rate Resp BP SpO2   09/22/23 1027 09/22/23 1027 09/22/23 1027 09/22/23 1031 09/22/23 1027   97.9 °F (36.6 °C) (!) 129 16 127/90 93 %      Temp src Heart Rate Source Patient Position BP Location FiO2 (%)   -- -- -- -- --              Physical Exam    Physical Exam   Constitutional: No distress.   HENT:  Head: Normocephalic and atraumatic.   Oropharynx: Mucous membranes are moist.   Eyes: No scleral icterus.   Neck: Neck supple.   Cardiovascular: Pink, warm and well-perfused throughout  Pulmonary/Chest: No respiratory distress.  No tachypnea or increased work of breathing.  Left-sided subclavian line in place.  No erythema, induration or purulent discharge appreciated.  Mild tenderness with palpation just above insertion site.  Musculoskeletal: Moves all extremities equally.   Neurological: Alert.  Speech fluent and easily intelligible  Skin: Skin is pink, warm, and dry. No pallor.   Psychiatric: Mood and affect normal.   Nursing note and vitals reviewed.    LAB RESULTS  No results found for this or any previous visit (from the past 24 hour(s)).    Ordered the above labs and independently reviewed the results.        RADIOLOGY  XR Chest 2 View    Result Date: 9/22/2023  XR CHEST 2 VW-  HISTORY: Female who is 28 years-old, left chest discomfort  TECHNIQUE: Frontal and lateral views of the chest  COMPARISON: 4/12/2023  FINDINGS:  Left-sided central venous catheter is partially retracted to the level of the confluence of the superior vena cava and left brachiocephalic vein. The heart size is borderline. Pulmonary vasculature is unremarkable. No focal pulmonary consolidation, pleural effusion, or pneumothorax. Follow-up as indications persist.       As described.    This report was finalized on 9/22/2023 11:18 AM by Dr. Chacho Christie M.D.       I ordered the above noted radiological studies. Reviewed by me and discussed with radiologist.  See dictation for official radiology interpretation.      PROCEDURES    Procedures        MEDICATIONS GIVEN IN ER    Medications - No data to display      PROGRESS, DATA ANALYSIS, CONSULTS, AND MEDICAL DECISION MAKING      All labs have been independently reviewed by me.  All radiology studies have been reviewed by me and discussed with radiologist dictating the report.   EKG's independently viewed and interpreted by me.  Discussion below represents my analysis of pertinent findings related to patient's condition, differential diagnosis, treatment plan and final disposition.      ED Course as of 09/22/23 1210   Fri Sep 22, 2023   1117 RADIOLOGY      Study: Two-view chest  Findings: No pneumothorax or focal injury.  Continue IV without subcu band or kink  I independently viewed and interpreted these images contemporaneously with treatment.    [RS]   1210 Reviewed x-ray findings with patient.  She feels reassured.  No other concerns at this time. [RS]      ED Course User Index  [RS] Nile Suarez MD       AS OF 12:10 EDT VITALS:    BP - 102/59  HR - 105  TEMP - 97.9 °F (36.6 °C)  O2 SATS - 96%        DIAGNOSIS  Final diagnoses:   Left-sided chest wall pain   Central venous catheter in place         DISPOSITION  DISCHARGE    Patient discharged in stable condition.    Reviewed implications of results, diagnosis, meds, responsibility to follow up, warning signs and symptoms of possible worsening, potential  complications and reasons to return to ER.    Patient/Family voiced understanding of above instructions.    Discussed plan for discharge, as there is no emergent indication for admission. Patient referred to primary care provider for regular health maintenance. Pt/family is agreeable and understands need for follow up and possible repeat testing.  Pt is aware that discharge does not mean that nothing is wrong but it indicates no emergency is present that requires admission and they must continue care with follow-up as given below or physician of their choice.     FOLLOW-UP  Jason Borrero MD  [address] [id number] [phone]    Schedule an appointment as soon as possible for a visit   As needed         Medication List      No changes were made to your prescriptions during this visit.           Please note that portions of this were completed with a voice recognition program.       Note Disclaimer: At [facility], we believe that sharing information builds trust and better relationships. You are receiving this note because you are receiving care at [facility] or recently visited. It is possible you will see health information before a provider has talked with you about it. This kind of information can be easy to misunderstand. To help you fully understand what it means for your health, we urge you to discuss this note with your provider.     Nile Suarez MD  09/22/23 1215

## 2023-10-17 ENCOUNTER — APPOINTMENT (OUTPATIENT)
Dept: GENERAL RADIOLOGY | Facility: HOSPITAL | Age: 28
End: 2023-10-17
Payer: COMMERCIAL

## 2023-10-17 ENCOUNTER — HOSPITAL ENCOUNTER (EMERGENCY)
Facility: HOSPITAL | Age: 28
Discharge: HOME | End: 2023-10-17
Attending: STUDENT IN AN ORGANIZED HEALTH CARE EDUCATION/TRAINING PROGRAM | Admitting: STUDENT IN AN ORGANIZED HEALTH CARE EDUCATION/TRAINING PROGRAM
Payer: COMMERCIAL

## 2023-10-17 VITALS
DIASTOLIC BLOOD PRESSURE: 86 MMHG | HEIGHT: 64 IN | BODY MASS INDEX: 42.68 KG/M2 | HEART RATE: 120 BPM | SYSTOLIC BLOOD PRESSURE: 118 MMHG | TEMPERATURE: 98.2 F | RESPIRATION RATE: 18 BRPM | WEIGHT: 250 LBS | OXYGEN SATURATION: 94 %

## 2023-10-17 DIAGNOSIS — R07.81 RIB PAIN: Primary | ICD-10-CM

## 2023-10-17 PROCEDURE — 25010000002 KETOROLAC TROMETHAMINE PER 15 MG: Performed by: STUDENT IN AN ORGANIZED HEALTH CARE EDUCATION/TRAINING PROGRAM

## 2023-10-17 PROCEDURE — 71101 X-RAY EXAM UNILAT RIBS/CHEST: CPT

## 2023-10-17 PROCEDURE — 99282 EMERGENCY DEPT VISIT SF MDM: CPT

## 2023-10-17 PROCEDURE — 96372 THER/PROPH/DIAG INJ SC/IM: CPT

## 2023-10-17 RX ORDER — KETOROLAC TROMETHAMINE 30 MG/ML
30 INJECTION, SOLUTION INTRAMUSCULAR; INTRAVENOUS EVERY 6 HOURS PRN
Status: DISCONTINUED | OUTPATIENT
Start: 2023-10-17 | End: 2023-10-17 | Stop reason: HOSPADM

## 2023-10-17 RX ADMIN — KETOROLAC TROMETHAMINE 30 MG: 30 INJECTION, SOLUTION INTRAMUSCULAR; INTRAVENOUS at 19:12

## 2023-10-17 NOTE — DISCHARGE INSTRUCTIONS
Please follow-up with your primary care physician if symptoms persist greater than 1 week    Please return to the ER with new or worsening symptoms  You may take ibuprofen and Tylenol to help with pain control at home.

## 2023-10-17 NOTE — ED PROVIDER NOTES
EMERGENCY DEPARTMENT ENCOUNTER    Room Number:  30/30  PCP: Jason Borrero MD  Historian: Patient      HPI:  Chief Complaint: Rib pain  Context: Liliam Lorenz is a 28 y.o. female who presents to the ED c/o rib pain.  Patient sustained a fall 2 days ago where she struck the right side of her ribs.  Patient tells me the only place she hurts is in her right rib cage underneath her right breast.  No other injuries identified.            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    Nausea and vomiting 08/22/2017    Gastroparesis 08/22/2017    Postural orthostatic tachycardia syndrome 06/12/2020    Dolan catheter dysfunction 10/25/2021    Febrile illness, acute 01/18/2022    Single subsegmental pulmonary embolism without acute cor pulmonale 04/30/2022    Cellulitis of chest wall 01/09/2023    Pulmonary embolus 03/02/2023     Resolved Ambulatory Problems     Diagnosis Date Noted    No Resolved Ambulatory Problems     Past Medical History:   Diagnosis Date    Bronchitis     Chest pain     Chronic hypotension     Eczema     GERD (gastroesophageal reflux disease)     IBS (irritable bowel syndrome)     Lightheadedness     Rectal fissure     Rosacea     Sleep apnea     Tachycardia          PAST SURGICAL HISTORY  Past Surgical History:   Procedure Laterality Date    COLONOSCOPY      MEDIPORT INSERTION, DOUBLE  09/01/2020    [facility]     UPPER GASTROINTESTINAL ENDOSCOPY      VENOUS ACCESS DEVICE (PORT) INSERTION Left 10/16/2020    Procedure: INSERTION VENOUS ACCESS DEVICE UNDER FLURO;  Surgeon: Pa Lane MD;  Location: [facility];  Service: General;  Laterality: Left;    VENOUS ACCESS DEVICE  (PORT) INSERTION Right 10/26/2021    Procedure: REMOVAL AND INSERTION OF VALVERDE CATHETER;  Surgeon: Pa Lane MD;  Location: [facility]IN OR;  Service: General;  Laterality: Right;    VENOUS ACCESS DEVICE (PORT) INSERTION Left 1/13/2023    Procedure: INSERTION OF VALVERDE CATHETER, VENOGARM;  Surgeon: Cristhian Anne MD;  Location: [facility]IN OR;  Service: General;  Laterality: Left;         FAMILY HISTORY  Family History   Problem Relation Age of Onset    Hypertension Mother     Diabetes Mother     Hypertension Father     Diabetes Father     Coronary artery disease Maternal Uncle         Maternal uncle with MI    Hypertension Maternal Grandmother     Coronary artery disease Maternal Grandmother         MGM with 4 vessel CABG and multiple stents    Cancer Maternal Grandmother     Stroke Maternal Grandmother     Coronary artery disease Maternal Grandfather     Breast cancer Other     Malig Hyperthermia Neg Hx          SOCIAL HISTORY  Social History     Socioeconomic History    Marital status:     Number of children: 0   Tobacco Use    Smoking status: Never     Passive exposure: Never    Smokeless tobacco: Never    Tobacco comments:     Caffeine: 1 serving daily   Vaping Use    Vaping Use: Never used   Substance and Sexual Activity    Alcohol use: No    Drug use: No    Sexual activity: Yes     Partners: Female     Birth control/protection: None         ALLERGIES  Eggs or egg-derived products, Pepcid [famotidine], and Scopolamine        REVIEW OF SYSTEMS  Review of Systems   All other systems reviewed and are negative.           PHYSICAL EXAM  ED Triage Vitals [10/17/23 1639]   Temp Heart Rate Resp BP SpO2   98.2 °F (36.8 °C) 120 18 -- 94 %      Temp src Heart Rate Source Patient Position BP Location FiO2 (%)   Tympanic Monitor -- -- --       Physical Exam      GENERAL: no acute distress  HENT: nares patent  EYES: no scleral icterus  CV: regular rhythm, normal rate  RESPIRATORY: normal  effort  ABDOMEN: soft  MUSCULOSKELETAL: no deformity.  Tenderness to palpation of the right anterior lateral rib cage underneath the right breast without appreciable deformity or crepitus  NEURO: alert, moves all extremities, follows commands  PSYCH:  calm, cooperative  SKIN: warm, dry    Vital signs and nursing notes reviewed.          LAB RESULTS  No results found for this or any previous visit (from the past 24 hour(s)).    Ordered the above labs and reviewed the results.        RADIOLOGY  XR Ribs Right With PA Chest    Result Date: 10/17/2023  Chest x-ray with rib series  History: Right chest wall pain after a fall  7 x-rays of the chest and right thoracic cage are correlated with chest x-rays from September 22, 2023.  FINDINGS: Bone detail somewhat compromised due to the patient's copious body habitus. No pneumothorax or pleural effusion. Dual-lead central line is unchanged with its tip near the confluence of the brachiocephalic vein with the SVC. Cardiac silhouette is enlarged but unchanged vascular volume is normal and lungs are clear. No rib fracture or rib lesion identified.      Chronic changes as described. No acute abnormality.  This report was finalized on 10/17/2023 6:48 PM by Dr. Tin Riley M.D on Workstation: [id number]       Ordered the above noted radiological studies. Reviewed by me in PACS.          MEDICATIONS GIVEN IN ER  Medications   ketorolac (TORADOL) injection 30 mg (30 mg Intramuscular Given 10/17/23 1912)                   MEDICAL DECISION MAKING, PROGRESS, and CONSULTS    All labs have been independently reviewed by me.  All radiology studies have been reviewed by me and I have also reviewed the radiology report.   EKG's independently viewed and interpreted by me.  Discussion below represents my analysis of pertinent findings related to patient's condition, differential diagnosis, treatment plan and final disposition.      Additional sources:    - External (non-ED) record review:  Discharge summary from 3/5/2023 reviewed and notable for admission secondary to segmental pulmonary embolism while on Lovenox.  Patient eventually able to be discharged home after hematology consult    - Chronic or social conditions impacting care: POTS syndrome, Erler's Danlos syndrome, recurrent PE    - Shared decision making: Utilizing shared decision-making techniques we discussed today's findings and plan moving forward.  Patient is agreeable with supportive care and conservative management with as needed follow-up with primary care      Orders placed during this visit:  Orders Placed This Encounter   Procedures    XR Ribs Right With PA Chest         Differential diagnosis includes but is not limited to:    Rib fracture, rib contusion, pneumothorax      Independent interpretation of labs, radiology studies, and discussions with consultants:  ED Course as of 10/17/23 1912   Tue Oct 17, 2023   1910 Rib x-ray interpreted by me and demonstrates no evidence of displaced fracture [MW]   1910 Patient offered pain medication initially declined but eventually excepted IM Toradol. [MW]   1911 Patient counseled on supportive care measures and with x-ray demonstrating no fractures patient additionally counseled to continue with deep breaths and follow-up as needed with primary care.  Patient discharged home in stable condition. [MW]      ED Course User Index  [MW] Ruperto Blair MD         DIAGNOSIS  Final diagnoses:   Rib pain         DISPOSITION  DISCHARGE    Patient discharged in stable condition.    Reviewed implications of results, diagnosis, meds, responsibility to follow up, warning signs and symptoms of possible worsening, potential complications and reasons to return to ER.    Patient/Family voiced understanding of above instructions.    Discussed plan for discharge, as there is no emergent indication for admission. Patient referred to primary care provider for BP management due to today's BP. Pt/family is  agreeable and understands need for follow up and repeat testing.  Pt is aware that discharge does not mean that nothing is wrong but it indicates no emergency is present that requires admission and they must continue care with follow-up as given below or physician of their choice.     FOLLOW-UP  Jason Borrero MD  [address] [facility] [address] [phone]      As needed         Medication List      No changes were made to your prescriptions during this visit.                   Latest Documented Vital Signs:  As of 19:12 EDT  BP- 118/86 HR- 120 Temp- 98.2 °F (36.8 °C) (Tympanic) O2 sat- 94%              --    Please note that portions of this were completed with a voice recognition program.       Note Disclaimer: At [facility], we believe that sharing information builds trust and better relationships. You are receiving this note because you are receiving care at [facility] or recently visited. It is possible you will see health information before a provider has talked with you about it. This kind of information can be easy to misunderstand. To help you fully understand what it means for your health, we urge you to discuss this note with your provider.             Ruperto Blair MD  10/17/23 9924

## 2023-10-17 NOTE — ED NOTES
Pt states that she fell last week and has had right rib pain since then. Pt states that she is on blood thinners but did not hit her head.

## 2023-10-23 RX ORDER — MIDODRINE HYDROCHLORIDE 10 MG/1
TABLET ORAL
Qty: 90 TABLET | Refills: 6 | Status: SHIPPED | OUTPATIENT
Start: 2023-10-23

## 2023-12-13 ENCOUNTER — HOSPITAL ENCOUNTER (EMERGENCY)
Facility: HOSPITAL | Age: 28
Discharge: HOME | End: 2023-12-13
Attending: EMERGENCY MEDICINE
Payer: COMMERCIAL

## 2023-12-13 VITALS
BODY MASS INDEX: 42.49 KG/M2 | DIASTOLIC BLOOD PRESSURE: 87 MMHG | RESPIRATION RATE: 20 BRPM | SYSTOLIC BLOOD PRESSURE: 128 MMHG | HEART RATE: 139 BPM | TEMPERATURE: 99.5 F | HEIGHT: 65 IN | WEIGHT: 255 LBS | OXYGEN SATURATION: 98 %

## 2023-12-13 DIAGNOSIS — T82.514A HICKMAN CATHETER DYSFUNCTION, INITIAL ENCOUNTER: ICD-10-CM

## 2023-12-13 DIAGNOSIS — G90.A POTS (POSTURAL ORTHOSTATIC TACHYCARDIA SYNDROME): Primary | ICD-10-CM

## 2023-12-13 DIAGNOSIS — I87.8 POOR VENOUS ACCESS: ICD-10-CM

## 2023-12-13 LAB
ANION GAP SERPL CALCULATED.3IONS-SCNC: 12.3 MMOL/L (ref 5–15)
BUN SERPL-MCNC: 6 MG/DL (ref 6–20)
BUN/CREAT SERPL: 7.4 (ref 7–25)
CALCIUM SPEC-SCNC: 8.7 MG/DL (ref 8.6–10.5)
CHLORIDE SERPL-SCNC: 105 MMOL/L (ref 98–107)
CO2 SERPL-SCNC: 16.7 MMOL/L (ref 22–29)
CREAT SERPL-MCNC: 0.81 MG/DL (ref 0.57–1)
DEPRECATED RDW RBC AUTO: 44.4 FL (ref 37–54)
EGFRCR SERPLBLD CKD-EPI 2021: 101.5 ML/MIN/1.73
ERYTHROCYTE [DISTWIDTH] IN BLOOD BY AUTOMATED COUNT: 16.8 % (ref 12.3–15.4)
GLUCOSE SERPL-MCNC: 99 MG/DL (ref 65–99)
HCT VFR BLD AUTO: 32.6 % (ref 34–46.6)
HGB BLD-MCNC: 9.9 G/DL (ref 12–15.9)
MCH RBC QN AUTO: 22.6 PG (ref 26.6–33)
MCHC RBC AUTO-ENTMCNC: 30.4 G/DL (ref 31.5–35.7)
MCV RBC AUTO: 74.3 FL (ref 79–97)
PLATELET # BLD AUTO: 322 10*3/MM3 (ref 140–450)
PMV BLD AUTO: 10.6 FL (ref 6–12)
POTASSIUM SERPL-SCNC: 3.8 MMOL/L (ref 3.5–5.2)
RBC # BLD AUTO: 4.39 10*6/MM3 (ref 3.77–5.28)
SODIUM SERPL-SCNC: 134 MMOL/L (ref 136–145)
WBC NRBC COR # BLD AUTO: 9.2 10*3/MM3 (ref 3.4–10.8)

## 2023-12-13 PROCEDURE — 80048 BASIC METABOLIC PNL TOTAL CA: CPT | Performed by: SURGERY

## 2023-12-13 PROCEDURE — 85027 COMPLETE CBC AUTOMATED: CPT | Performed by: SURGERY

## 2023-12-13 PROCEDURE — 36415 COLL VENOUS BLD VENIPUNCTURE: CPT

## 2023-12-13 PROCEDURE — 99283 EMERGENCY DEPT VISIT LOW MDM: CPT | Performed by: SURGERY

## 2023-12-13 PROCEDURE — 99284 EMERGENCY DEPT VISIT MOD MDM: CPT

## 2023-12-13 RX ORDER — SULFAMETHOXAZOLE AND TRIMETHOPRIM 800; 160 MG/1; MG/1
1 TABLET ORAL ONCE
Status: COMPLETED | OUTPATIENT
Start: 2023-12-13 | End: 2023-12-13

## 2023-12-13 RX ORDER — SULFAMETHOXAZOLE AND TRIMETHOPRIM 800; 160 MG/1; MG/1
1 TABLET ORAL 2 TIMES DAILY
Qty: 10 TABLET | Refills: 0 | Status: SHIPPED | OUTPATIENT
Start: 2023-12-13 | End: 2023-12-18

## 2023-12-13 RX ADMIN — SULFAMETHOXAZOLE AND TRIMETHOPRIM 1 TABLET: 800; 160 TABLET ORAL at 17:17

## 2023-12-13 NOTE — DISCHARGE INSTRUCTIONS
Go to preop tomorrow as scheduled for your catheter exchange.  Take the Bactrim as prescribed.  Return to emergency department for any worsening symptoms.

## 2023-12-13 NOTE — H&P (VIEW-ONLY)
Colorectal & General Surgery  Consultation    Patient: Liliam Lorenz  YOB: 1995  MRN: [id number]      Assessment  Liliam Lorenz is a 28 y.o. female with postural orthostatic tachycardia syndrome, Phoebe-Danlos syndrome, and gastroparesis with chronic Dolan catheter who presents with Dolan catheter dysfunction secondary to a leak from a crack in one of the lumens.  I offered to exchange the catheter over wire or possibly place an entirely new catheter from a fresh access if needed tomorrow.  We discussed the risk, benefits, alternatives to procedure and she is willing to proceed.  We will discharge her on antibiotics and collect her preoperative labs in the emergency room today.    History of Present Illness   Liliam Lorenz is a 28 y.o. female who I am seeing in consultation regarding Dolan catheter dysfunction at the request of Ibrahima Oneal MD.  She presents to the emergency department with complaints of saline shooting out of a crack in her Dolan catheter.  Otherwise she feels well.    Past Medical History   Past Medical History:   Diagnosis Date    Bronchitis     Chest pain     Chronic hypotension     Eczema     Phoebe-Danlos syndrome     Gastroparesis     GERD (gastroesophageal reflux disease)     IBS (irritable bowel syndrome)     Lightheadedness     Nausea and vomiting 08/22/2017    POTS (postural orthostatic tachycardia syndrome)     Rectal fissure     Rosacea     Sleep apnea     Tachycardia         Past Surgical History   Past Surgical History:   Procedure Laterality Date    COLONOSCOPY      MEDIPORT INSERTION, DOUBLE  09/01/2020    [facility]     UPPER GASTROINTESTINAL ENDOSCOPY      VENOUS ACCESS DEVICE (PORT) INSERTION Left 10/16/2020    Procedure: INSERTION VENOUS ACCESS DEVICE UNDER FLURO;  Surgeon: Pa Lane MD;  Location: [facility];  Service: General;  Laterality: Left;    VENOUS ACCESS DEVICE (PORT) INSERTION Right 10/26/2021    Procedure: REMOVAL AND  INSERTION OF VALVERDE CATHETER;  Surgeon: Pa Lane MD;  Location:  [facility];  Service: General;  Laterality: Right;    VENOUS ACCESS DEVICE (PORT) INSERTION Left 1/13/2023    Procedure: INSERTION OF VALVERDE CATHETER, VENOGARM;  Surgeon: Cristhian Anne MD;  Location:  [facility];  Service: General;  Laterality: Left;       Social History  Social History     Socioeconomic History    Marital status:     Number of children: 0   Tobacco Use    Smoking status: Never     Passive exposure: Never    Smokeless tobacco: Never    Tobacco comments:     Caffeine: 1 serving daily   Vaping Use    Vaping Use: Never used   Substance and Sexual Activity    Alcohol use: No    Drug use: No    Sexual activity: Yes     Partners: Female     Birth control/protection: None       Family History  Family History   Problem Relation Age of Onset    Hypertension Mother     Diabetes Mother     Hypertension Father     Diabetes Father     Coronary artery disease Maternal Uncle         Maternal uncle with MI    Hypertension Maternal Grandmother     Coronary artery disease Maternal Grandmother         MGM with 4 vessel CABG and multiple stents    Cancer Maternal Grandmother     Stroke Maternal Grandmother     Coronary artery disease Maternal Grandfather     Breast cancer Other     Malig Hyperthermia Neg Hx        Colorectal cancer family history: None    Review of Systems  Negative except as documented in the HPI.     Allergies  Allergies   Allergen Reactions    Eggs Or Egg-Derived Products GI Intolerance     Rash & vomiting    Pepcid [Famotidine] Rash and GI Intolerance    Scopolamine Rash and GI Intolerance       Medications  No current facility-administered medications for this encounter.    Current Outpatient Medications:     albuterol sulfate  (90 Base) MCG/ACT inhaler, Inhale 2 puffs Every 4 (Four) Hours As Needed for Wheezing., Disp: , Rfl:     cholecalciferol (Cholecalciferol) 25 MCG (1000 UT) tablet, Take 1  tablet by mouth Daily., Disp: , Rfl:     cyanocobalamin 1000 MCG/ML injection, Inject 1 mL into the appropriate muscle as directed by prescriber Every 28 (Twenty-Eight) Days., Disp: 1 mL, Rfl: 0    dronabinol (MARINOL) 5 MG capsule, Take 1 capsule by mouth 3 (Three) Times a Day As Needed (nausea)., Disp: , Rfl:     Emgality 120 MG/ML auto-injector pen, Every 30 (Thirty) Days. Takes the 20th of the month, Disp: , Rfl:     escitalopram (LEXAPRO) 10 MG tablet, Take 2 tablets by mouth Daily., Disp: , Rfl:     fondaparinux (ARIXTRA) 10 MG/0.8ML solution injection, Inject  under the skin into the appropriate area as directed Daily., Disp: , Rfl:     gabapentin (NEURONTIN) 100 MG capsule, Take 1 capsule by mouth 3 (Three) Times a Day., Disp: , Rfl:     Ginger, Zingiber officinalis, (Ginger Root) 550 MG capsule, Take  by mouth Daily., Disp: , Rfl:     Heparin & NaCl Lock Flush (HEPARIN COMBINATION IV), Infuse  into a venous catheter 2 (Two) Times a Day., Disp: , Rfl:     hydrOXYzine pamoate (VISTARIL) 25 MG capsule, Take 1-2 capsules by mouth 3 (Three) Times a Day As Needed (anxiety)., Disp: , Rfl:     Immune Globulin, Human,-ifas (PANZYGA IV), Infuse  into a venous catheter Take As Directed. Every Tuesday, Disp: , Rfl:     levothyroxine (SYNTHROID, LEVOTHROID) 50 MCG tablet, Take 1 tablet by mouth Daily., Disp: , Rfl:     loperamide (IMODIUM) 2 MG capsule, Take 1 capsule by mouth 4 (Four) Times a Day As Needed for Diarrhea., Disp: , Rfl:     meclizine (ANTIVERT) 25 MG tablet, Take 1 tablet by mouth 3 (Three) Times a Day As Needed for Dizziness., Disp: , Rfl:     metoprolol tartrate (LOPRESSOR) 25 MG tablet, TAKE 12.5MG IN THE AM, THEN 25MG IN THE MIDDLE OF DAY, THEN 12.5MG AT BEDTIME, Disp: 180 tablet, Rfl: 3    midodrine (PROAMATINE) 10 MG tablet, TAKE 1 TABLET BY MOUTH THREE TIMES DAILY, Disp: 90 tablet, Rfl: 6    midodrine (PROAMATINE) 5 MG tablet, Take 1 tablet by mouth 3 (Three) Times a Day Before Meals., Disp: 90  tablet, Rfl: 11    montelukast (SINGULAIR) 10 MG tablet, Take 1 tablet by mouth Every Night., Disp: , Rfl:     Omeprazole 20 MG tablet delayed-release, Take 40 mg by mouth Daily., Disp: , Rfl:     Ondansetron HCl (ZOFRAN IV), Infuse 8 mg into a venous catheter Every 6 (Six) Hours., Disp: , Rfl:     phenazopyridine (PYRIDIUM) 100 MG tablet, Take 1 tablet by mouth 3 (Three) Times a Day As Needed., Disp: , Rfl:     predniSONE (DELTASONE) 10 MG tablet, , Disp: , Rfl:     prochlorperazine (COMPAZINE) 10 MG tablet, Take 1 tablet by mouth Every 6 (Six) Hours As Needed for Nausea or Vomiting., Disp: , Rfl:     promethazine (PHENERGAN) 25 MG/ML injection, Inject  as directed. IV every 3 hours, Disp: , Rfl:     pyridostigmine (MESTINON) 60 MG tablet, TAKE 1/2 TABLET BY MOUTH TWICE DAILY, Disp: 90 tablet, Rfl: 3    Vital Signs  Vitals:    12/13/23 1550   BP: 110/71   Pulse:    Resp:    Temp:    SpO2:           Physical Exam  Constitutional: Resting comfortably, no acute distress  Neck: Supple, trachea midline  Respiratory: No increased work of breathing, Symmetric excursion  Cardiovascular: Well pefursed, no jugular venous distention evident   Abdominal:  Soft, non-tender, non-distended  Lymphatics: No cervical or suprascapular adenopathy  Skin: Warm, dry, no rash on visualized skin surfaces  Musculoskeletal: Symmetric strength, no obvious gross abnormalities  Psychiatric: Alert and oriented ×3, normal affect   Dolan catheter in place with small crack near the port.         Joe Cordova MD  Colorectal & General Surgery  [facility]    [address], [address]  [place], [place], [address]  P: [phone]  F: [phone]

## 2023-12-13 NOTE — ED TRIAGE NOTES
Patient to ED per PV w/ reports of fluid leaking out of tubing on double lumen Dolan catheter in L chest; patient was flushing lumen when fluid started to shoot out of tubing.

## 2023-12-13 NOTE — ED PROVIDER NOTES
I supervised care provided by the midlevel provider.   We have discussed this patient's history, physical exam, and treatment plan.  I have reviewed the note and personally saw and examined the patient and agree with the plan of care.   Patient came here to the emergency department because she is having problem with her Dolan central line that she has had since January of this year.  She is dependent on this to get her IVIG infusion is to get IV fluids and IV antiemetics.  She has had multiple central lines in that similar location.  She noticed that when she went to flush it this morning there was some squirting out the side of the tubing.  Denies any injury.  Denies any other complaint.  Denies any chest pain, shortness of breath, palpitations, fevers or chills.  She is completely asymptomatic.  She states that she was using her Dolan catheter yesterday had an IVIG infusion and got fluids as well as medicines in there.    GENERAL: Pleasant young female.  Patient is in no acute distress.  Not distressed  HENT: nares patent  Head/neck/ face are symmetric without gross deformity or swelling  EYES: no scleral icterus  CV: regular rhythm, regular rate with intact distal pulses.  To her left upper chest you can see where the central line and Dolan catheter is inserted.  There is no erythema, warmth, swelling.  The area that is leaking is right distal to one of the insertion ports.  It is nowhere near the skin.  There is no drainage.  The area is clean dry and intact.  RESPIRATORY: normal effort and no respiratory distress  ABDOMEN: soft and nontender  MUSCULOSKELETAL: no deformity  NEURO: alert and appropriate, moves all extremities, follows commands  SKIN: warm, dry    Vital signs and nursing notes reviewed.    Plan  IV team came unfortunately unable to repair or fix the 6-minute catheter.  This catheter has been placed by general surgery and she mentioned that it was placed during surgery in the past.  We will  consult general surgery.  Again denies any complaint.  She is pleasant smiling and in no acute distress.             Ibrahima Oneal MD  12/13/23 0752

## 2023-12-13 NOTE — ED PROVIDER NOTES
EMERGENCY DEPARTMENT ENCOUNTER    Room Number:  02/02  PCP: Jason Borrero MD        HPI:  Chief Complaint: Central line problem    Context: Liliam Lorenz is a 28 y.o. female who presents to the ED c/o central line problem.  Patient reports she was attempting to flush her Dolan central line to give herself medication.  She noticed the flush began squirting out of the tubing.  This occurred less than 1 hour prior to arrival.  Reports her current central line has been in for 8 to 9 months.  The last time she had a leakage in her tube she became septic secondary to central line infection.  She receives IVIG infusions as well as fluids and antiemetics through her central line.  Patient denies injury or trauma to the chest.  No other systemic complaints at this time.      External (non-ED) record review:   Reviewed note from office visit with PCP on 11/9/2023 where patient seen for upper respiratory infection, Phoebe-Danlos syndrome, POTS, hordeolum of right eye.  Reviewed assessment and plan.  COVID test obtained, patient prescribed Ceftin and TobraDex ophthalmic solution.  Reviewed prior laboratory studies.  Most recent CBC with hemoglobin 10.5.  Most recent CMP with creatinine 0.85.      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    Nausea and vomiting 08/22/2017    Gastroparesis 08/22/2017    Postural orthostatic tachycardia syndrome 06/12/2020    Dolan catheter dysfunction 10/25/2021    Febrile illness, acute 01/18/2022    Single subsegmental pulmonary embolism without acute cor pulmonale 04/30/2022    Cellulitis of chest wall 01/09/2023    Pulmonary embolus 03/02/2023     Resolved Ambulatory Problems      Diagnosis Date Noted    No Resolved Ambulatory Problems     Past Medical History:   Diagnosis Date    Bronchitis     Chest pain     Chronic hypotension     Eczema     GERD (gastroesophageal reflux disease)     IBS (irritable bowel syndrome)     Lightheadedness     Rectal fissure     Rosacea     Sleep apnea     Tachycardia          PAST SURGICAL HISTORY  Past Surgical History:   Procedure Laterality Date    COLONOSCOPY      MEDIPORT INSERTION, DOUBLE  09/01/2020    [facility]     UPPER GASTROINTESTINAL ENDOSCOPY      VENOUS ACCESS DEVICE (PORT) INSERTION Left 10/16/2020    Procedure: INSERTION VENOUS ACCESS DEVICE UNDER FLURO;  Surgeon: Pa Lane MD;  Location: [facility];  Service: General;  Laterality: Left;    VENOUS ACCESS DEVICE (PORT) INSERTION Right 10/26/2021    Procedure: REMOVAL AND INSERTION OF VALVERDE CATHETER;  Surgeon: Pa Lane MD;  Location: [facility] MAIN OR;  Service: General;  Laterality: Right;    VENOUS ACCESS DEVICE (PORT) INSERTION Left 1/13/2023    Procedure: INSERTION OF VALVERDE CATHETER, VENOGARM;  Surgeon: Cristhian Anne MD;  Location: [facility] OR;  Service: General;  Laterality: Left;         FAMILY HISTORY  Family History   Problem Relation Age of Onset    Hypertension Mother     Diabetes Mother     Hypertension Father     Diabetes Father     Coronary artery disease Maternal Uncle         Maternal uncle with MI    Hypertension Maternal Grandmother     Coronary artery disease Maternal Grandmother         MGM with 4 vessel CABG and multiple stents    Cancer Maternal Grandmother     Stroke Maternal Grandmother     Coronary artery disease Maternal Grandfather     Breast cancer Other     Malig Hyperthermia Neg Hx          SOCIAL HISTORY  Social History     Socioeconomic History    Marital status:     Number of children: 0   Tobacco Use    Smoking status: Never     Passive exposure: Never    Smokeless tobacco: Never    Tobacco comments:      Caffeine: 1 serving daily   Vaping Use    Vaping Use: Never used   Substance and Sexual Activity    Alcohol use: No    Drug use: No    Sexual activity: Yes     Partners: Female     Birth control/protection: None         ALLERGIES  Eggs or egg-derived products, Pepcid [famotidine], and Scopolamine        REVIEW OF SYSTEMS  Review of Systems   Constitutional:  Negative for chills and fever.   HENT:  Negative for ear pain and sore throat.    Respiratory:  Negative for cough and shortness of breath.    Cardiovascular:  Negative for chest pain and palpitations.   Gastrointestinal:  Negative for abdominal pain and vomiting.   Genitourinary:  Negative for dysuria and hematuria.   Musculoskeletal:  Negative for arthralgias and joint swelling.   Skin:  Negative for pallor and rash.   Neurological:  Negative for numbness and headaches.   Psychiatric/Behavioral:  Negative for confusion and hallucinations.             PHYSICAL EXAM  ED Triage Vitals   Temp Heart Rate Resp BP SpO2   12/13/23 1446 12/13/23 1446 12/13/23 1446 12/13/23 1451 12/13/23 1446   99.5 °F (37.5 °C) (!) 161 20 149/92 98 %      Temp src Heart Rate Source Patient Position BP Location FiO2 (%)   12/13/23 1446 12/13/23 1446 12/13/23 1451 12/13/23 1451 --   Tympanic Monitor Sitting Right arm        Physical Exam  Constitutional:       General: She is not in acute distress.     Appearance: She is well-developed.   HENT:      Head: Normocephalic and atraumatic.   Eyes:      Extraocular Movements: Extraocular movements intact.   Cardiovascular:      Rate and Rhythm: Normal rate and regular rhythm.      Heart sounds: Normal heart sounds.   Pulmonary:      Effort: Pulmonary effort is normal.      Breath sounds: Normal breath sounds.   Chest:      Comments: Dolan catheter noted exiting the left upper chest wall.  No large break in tubing noted.  No surrounding erythema or warmth of the skin around the catheter site.  Abdominal:      General: There is no distension.    Skin:     General: Skin is warm.   Neurological:      General: No focal deficit present.      Mental Status: She is alert and oriented to person, place, and time.   Psychiatric:         Mood and Affect: Mood normal.         Vital signs and nursing notes reviewed.          LAB RESULTS  Recent Results (from the past 24 hour(s))   CBC (No Diff)    Collection Time: 12/13/23  5:04 PM    Specimen: Blood   Result Value Ref Range    WBC 9.20 3.40 - 10.80 10*3/mm3    RBC 4.39 3.77 - 5.28 10*6/mm3    Hemoglobin 9.9 (L) 12.0 - 15.9 g/dL    Hematocrit 32.6 (L) 34.0 - 46.6 %    MCV 74.3 (L) 79.0 - 97.0 fL    MCH 22.6 (L) 26.6 - 33.0 pg    MCHC 30.4 (L) 31.5 - 35.7 g/dL    RDW 16.8 (H) 12.3 - 15.4 %    RDW-SD 44.4 37.0 - 54.0 fl    MPV 10.6 6.0 - 12.0 fL    Platelets 322 140 - 450 10*3/mm3   Basic Metabolic Panel    Collection Time: 12/13/23  5:04 PM    Specimen: Blood   Result Value Ref Range    Glucose 99 65 - 99 mg/dL    BUN 6 6 - 20 mg/dL    Creatinine 0.81 0.57 - 1.00 mg/dL    Sodium 134 (L) 136 - 145 mmol/L    Potassium 3.8 3.5 - 5.2 mmol/L    Chloride 105 98 - 107 mmol/L    CO2 16.7 (L) 22.0 - 29.0 mmol/L    Calcium 8.7 8.6 - 10.5 mg/dL    BUN/Creatinine Ratio 7.4 7.0 - 25.0    Anion Gap 12.3 5.0 - 15.0 mmol/L    eGFR 101.5 >60.0 mL/min/1.73       Ordered the above labs and reviewed the results.            MEDICATIONS GIVEN IN ER  Medications   sulfamethoxazole-trimethoprim (BACTRIM DS,SEPTRA DS) 800-160 MG per tablet 1 tablet (1 tablet Oral Given 12/13/23 1717)               MEDICAL DECISION MAKING, PROGRESS, and CONSULTS    All labs have been independently reviewed by me.  All radiology studies have been reviewed by me and I have also reviewed the radiology report.   EKG's independently viewed and interpreted by me.  Discussion below represents my analysis of pertinent findings related to patient's condition, differential diagnosis, treatment plan and final disposition.            Orders placed during this  visit:  Orders Placed This Encounter   Procedures    CBC (No Diff)    Basic Metabolic Panel    Vascular Surgery Consult    Surgery (on-call MD unless specified)           Differential diagnosis:  Catheter tube malfunction, central line infection, encounter for central line replacement      Independent interpretation of labs, radiology studies, and discussions with consultants:  ED Course as of 12/13/23 1929   Wed Dec 13, 2023   1543 IV therapy has evaluated the patient and states the tubing is not repairable.  Patient will require catheter replacement. [MP]   1614 Patient had catheter placed by general surgery in January 2023.  I will place consult to general surgery. [MP]   1645 Dr. Cordova has evaluated the patient at bedside.  He is scheduled patient in the OR tomorrow morning for catheter exchange.  Will draw preop labs tonight and initiate patient empirically on Bactrim to prevent catheter infection. [MP]   1928 Patient presents emergency department with leaking Dolan catheter.  General surgery consulted who will exchange catheter tomorrow in the OR.  Will start patient empirically on Bactrim.  [facility] laboratory studies in the emergency department.  Discussed ED return precautions with the patient.  She is otherwise well-appearing, hemodynamically stable, and therefore appropriate for discharge. [MP]      ED Course User Index  [MP] Maria Elena Solomon, ANGEL         Additional orders considered but not ordered:  Chest x-ray      Additional sources:    - Chronic or social conditions impacting care: Phoebe-Danlos syndrome          DIAGNOSIS  Final diagnoses:   Dolan catheter dysfunction, initial encounter           Follow Up:  PreOp      Go to preop tomorrow as scheduled for your catheter exchange    Jason Borrero MD  [address] [id number] [phone]    Schedule an appointment as soon as possible for a visit   As needed      RX:     Medication List        New Prescriptions       sulfamethoxazole-trimethoprim 800-160 MG per tablet  Commonly known as: BACTRIM DS,SEPTRA DS  Take 1 tablet by mouth 2 (Two) Times a Day for 5 days.               Where to Get Your Medications        These medications were sent to [facility] [place] [id number] [id number]      Hours: Monday to Friday 7 AM to 6 PM, Saturday & Sunday 8 AM to 4:30 PM (Closed 12 PM to 12:30 PM) Phone: [phone]   sulfamethoxazole-trimethoprim 800-160 MG per tablet         Latest Documented Vital Signs:  As of 19:28 EST  BP- 128/87 HR- (!) 139 Temp- 99.5 °F (37.5 °C) (Tympanic) O2 sat- 98%              --    Please note that portions of this were completed with a voice recognition program.       Note Disclaimer: At [facility], we believe that sharing information builds trust and better relationships. You are receiving this note because you are receiving care at [facility] or recently visited. It is possible you will see health information before a provider has talked with you about it. This kind of information can be easy to misunderstand. To help you fully understand what it means for your health, we urge you to discuss this note with your provider.             Maria Elena Solomon PA-C  12/13/23 1929

## 2023-12-13 NOTE — CONSULTS
Colorectal & General Surgery  Consultation    Patient: Liliam Lorenz  YOB: 1995  MRN: [id number]      Assessment  Liliam Lorenz is a 28 y.o. female with postural orthostatic tachycardia syndrome, Phoebe-Danlos syndrome, and gastroparesis with chronic Dolan catheter who presents with Dolan catheter dysfunction secondary to a leak from a crack in one of the lumens.  I offered to exchange the catheter over wire or possibly place an entirely new catheter from a fresh access if needed tomorrow.  We discussed the risk, benefits, alternatives to procedure and she is willing to proceed.  We will discharge her on antibiotics and collect her preoperative labs in the emergency room today.    History of Present Illness   Liliam Lorenz is a 28 y.o. female who I am seeing in consultation regarding Dolan catheter dysfunction at the request of Ibrahima Oneal MD.  She presents to the emergency department with complaints of saline shooting out of a crack in her Dolan catheter.  Otherwise she feels well.    Past Medical History   Past Medical History:   Diagnosis Date    Bronchitis     Chest pain     Chronic hypotension     Eczema     Phoebe-Danlos syndrome     Gastroparesis     GERD (gastroesophageal reflux disease)     IBS (irritable bowel syndrome)     Lightheadedness     Nausea and vomiting 08/22/2017    POTS (postural orthostatic tachycardia syndrome)     Rectal fissure     Rosacea     Sleep apnea     Tachycardia         Past Surgical History   Past Surgical History:   Procedure Laterality Date    COLONOSCOPY      MEDIPORT INSERTION, DOUBLE  09/01/2020    [facility]     UPPER GASTROINTESTINAL ENDOSCOPY      VENOUS ACCESS DEVICE (PORT) INSERTION Left 10/16/2020    Procedure: INSERTION VENOUS ACCESS DEVICE UNDER FLURO;  Surgeon: Pa Lane MD;  Location: [facility];  Service: General;  Laterality: Left;    VENOUS ACCESS DEVICE (PORT) INSERTION Right 10/26/2021    Procedure: REMOVAL AND  INSERTION OF VALVERDE CATHETER;  Surgeon: Pa Lane MD;  Location:  [facility];  Service: General;  Laterality: Right;    VENOUS ACCESS DEVICE (PORT) INSERTION Left 1/13/2023    Procedure: INSERTION OF VALVERDE CATHETER, VENOGARM;  Surgeon: Cristhian Anne MD;  Location:  [facility];  Service: General;  Laterality: Left;       Social History  Social History     Socioeconomic History    Marital status:     Number of children: 0   Tobacco Use    Smoking status: Never     Passive exposure: Never    Smokeless tobacco: Never    Tobacco comments:     Caffeine: 1 serving daily   Vaping Use    Vaping Use: Never used   Substance and Sexual Activity    Alcohol use: No    Drug use: No    Sexual activity: Yes     Partners: Female     Birth control/protection: None       Family History  Family History   Problem Relation Age of Onset    Hypertension Mother     Diabetes Mother     Hypertension Father     Diabetes Father     Coronary artery disease Maternal Uncle         Maternal uncle with MI    Hypertension Maternal Grandmother     Coronary artery disease Maternal Grandmother         MGM with 4 vessel CABG and multiple stents    Cancer Maternal Grandmother     Stroke Maternal Grandmother     Coronary artery disease Maternal Grandfather     Breast cancer Other     Malig Hyperthermia Neg Hx        Colorectal cancer family history: None    Review of Systems  Negative except as documented in the HPI.     Allergies  Allergies   Allergen Reactions    Eggs Or Egg-Derived Products GI Intolerance     Rash & vomiting    Pepcid [Famotidine] Rash and GI Intolerance    Scopolamine Rash and GI Intolerance       Medications  No current facility-administered medications for this encounter.    Current Outpatient Medications:     albuterol sulfate  (90 Base) MCG/ACT inhaler, Inhale 2 puffs Every 4 (Four) Hours As Needed for Wheezing., Disp: , Rfl:     cholecalciferol (Cholecalciferol) 25 MCG (1000 UT) tablet, Take 1  tablet by mouth Daily., Disp: , Rfl:     cyanocobalamin 1000 MCG/ML injection, Inject 1 mL into the appropriate muscle as directed by prescriber Every 28 (Twenty-Eight) Days., Disp: 1 mL, Rfl: 0    dronabinol (MARINOL) 5 MG capsule, Take 1 capsule by mouth 3 (Three) Times a Day As Needed (nausea)., Disp: , Rfl:     Emgality 120 MG/ML auto-injector pen, Every 30 (Thirty) Days. Takes the 20th of the month, Disp: , Rfl:     escitalopram (LEXAPRO) 10 MG tablet, Take 2 tablets by mouth Daily., Disp: , Rfl:     fondaparinux (ARIXTRA) 10 MG/0.8ML solution injection, Inject  under the skin into the appropriate area as directed Daily., Disp: , Rfl:     gabapentin (NEURONTIN) 100 MG capsule, Take 1 capsule by mouth 3 (Three) Times a Day., Disp: , Rfl:     Ginger, Zingiber officinalis, (Ginger Root) 550 MG capsule, Take  by mouth Daily., Disp: , Rfl:     Heparin & NaCl Lock Flush (HEPARIN COMBINATION IV), Infuse  into a venous catheter 2 (Two) Times a Day., Disp: , Rfl:     hydrOXYzine pamoate (VISTARIL) 25 MG capsule, Take 1-2 capsules by mouth 3 (Three) Times a Day As Needed (anxiety)., Disp: , Rfl:     Immune Globulin, Human,-ifas (PANZYGA IV), Infuse  into a venous catheter Take As Directed. Every Tuesday, Disp: , Rfl:     levothyroxine (SYNTHROID, LEVOTHROID) 50 MCG tablet, Take 1 tablet by mouth Daily., Disp: , Rfl:     loperamide (IMODIUM) 2 MG capsule, Take 1 capsule by mouth 4 (Four) Times a Day As Needed for Diarrhea., Disp: , Rfl:     meclizine (ANTIVERT) 25 MG tablet, Take 1 tablet by mouth 3 (Three) Times a Day As Needed for Dizziness., Disp: , Rfl:     metoprolol tartrate (LOPRESSOR) 25 MG tablet, TAKE 12.5MG IN THE AM, THEN 25MG IN THE MIDDLE OF DAY, THEN 12.5MG AT BEDTIME, Disp: 180 tablet, Rfl: 3    midodrine (PROAMATINE) 10 MG tablet, TAKE 1 TABLET BY MOUTH THREE TIMES DAILY, Disp: 90 tablet, Rfl: 6    midodrine (PROAMATINE) 5 MG tablet, Take 1 tablet by mouth 3 (Three) Times a Day Before Meals., Disp: 90  tablet, Rfl: 11    montelukast (SINGULAIR) 10 MG tablet, Take 1 tablet by mouth Every Night., Disp: , Rfl:     Omeprazole 20 MG tablet delayed-release, Take 40 mg by mouth Daily., Disp: , Rfl:     Ondansetron HCl (ZOFRAN IV), Infuse 8 mg into a venous catheter Every 6 (Six) Hours., Disp: , Rfl:     phenazopyridine (PYRIDIUM) 100 MG tablet, Take 1 tablet by mouth 3 (Three) Times a Day As Needed., Disp: , Rfl:     predniSONE (DELTASONE) 10 MG tablet, , Disp: , Rfl:     prochlorperazine (COMPAZINE) 10 MG tablet, Take 1 tablet by mouth Every 6 (Six) Hours As Needed for Nausea or Vomiting., Disp: , Rfl:     promethazine (PHENERGAN) 25 MG/ML injection, Inject  as directed. IV every 3 hours, Disp: , Rfl:     pyridostigmine (MESTINON) 60 MG tablet, TAKE 1/2 TABLET BY MOUTH TWICE DAILY, Disp: 90 tablet, Rfl: 3    Vital Signs  Vitals:    12/13/23 1550   BP: 110/71   Pulse:    Resp:    Temp:    SpO2:           Physical Exam  Constitutional: Resting comfortably, no acute distress  Neck: Supple, trachea midline  Respiratory: No increased work of breathing, Symmetric excursion  Cardiovascular: Well pefursed, no jugular venous distention evident   Abdominal:  Soft, non-tender, non-distended  Lymphatics: No cervical or suprascapular adenopathy  Skin: Warm, dry, no rash on visualized skin surfaces  Musculoskeletal: Symmetric strength, no obvious gross abnormalities  Psychiatric: Alert and oriented ×3, normal affect   Dolan catheter in place with small crack near the port.         Joe Cordova MD  Colorectal & General Surgery  [facility]    [address], [address]  [place], [place], [address]  P: [phone]  F: [phone]

## 2023-12-14 ENCOUNTER — HOSPITAL ENCOUNTER (OUTPATIENT)
Facility: HOSPITAL | Age: 28
Setting detail: HOSPITAL OUTPATIENT SURGERY
Discharge: HOME | End: 2023-12-14
Attending: SURGERY | Admitting: SURGERY
Payer: COMMERCIAL

## 2023-12-14 ENCOUNTER — ANESTHESIA (OUTPATIENT)
Dept: PERIOP | Facility: HOSPITAL | Age: 28
End: 2023-12-14
Payer: COMMERCIAL

## 2023-12-14 ENCOUNTER — ANESTHESIA EVENT (OUTPATIENT)
Dept: PERIOP | Facility: HOSPITAL | Age: 28
End: 2023-12-14
Payer: COMMERCIAL

## 2023-12-14 ENCOUNTER — APPOINTMENT (OUTPATIENT)
Dept: GENERAL RADIOLOGY | Facility: HOSPITAL | Age: 28
End: 2023-12-14
Payer: COMMERCIAL

## 2023-12-14 VITALS
DIASTOLIC BLOOD PRESSURE: 85 MMHG | RESPIRATION RATE: 16 BRPM | OXYGEN SATURATION: 95 % | SYSTOLIC BLOOD PRESSURE: 128 MMHG | HEART RATE: 102 BPM | TEMPERATURE: 97.8 F

## 2023-12-14 LAB
B-HCG UR QL: NEGATIVE
EXPIRATION DATE: NORMAL
INTERNAL NEGATIVE CONTROL: NORMAL
INTERNAL POSITIVE CONTROL: NORMAL
Lab: NORMAL

## 2023-12-14 PROCEDURE — 77001 FLUOROGUIDE FOR VEIN DEVICE: CPT | Performed by: SURGERY

## 2023-12-14 PROCEDURE — 25010000002 BUPIVACAINE (PF) 0.25 % SOLUTION 10 ML VIAL: Performed by: SURGERY

## 2023-12-14 PROCEDURE — 25010000002 CEFAZOLIN IN DEXTROSE 2-4 GM/100ML-% SOLUTION: Performed by: NURSE ANESTHETIST, CERTIFIED REGISTERED

## 2023-12-14 PROCEDURE — 25010000002 ONDANSETRON PER 1 MG: Performed by: ANESTHESIOLOGY

## 2023-12-14 PROCEDURE — 36581 REPLACE TUNNELED CV CATH: CPT | Performed by: SURGERY

## 2023-12-14 PROCEDURE — C1769 GUIDE WIRE: HCPCS | Performed by: SURGERY

## 2023-12-14 PROCEDURE — 25010000002 DEXAMETHASONE SODIUM PHOSPHATE 20 MG/5ML SOLUTION: Performed by: NURSE ANESTHETIST, CERTIFIED REGISTERED

## 2023-12-14 PROCEDURE — 36581 REPLACE TUNNELED CV CATH: CPT | Performed by: STUDENT IN AN ORGANIZED HEALTH CARE EDUCATION/TRAINING PROGRAM

## 2023-12-14 PROCEDURE — 25010000002 PROPOFOL 10 MG/ML EMULSION: Performed by: NURSE ANESTHETIST, CERTIFIED REGISTERED

## 2023-12-14 PROCEDURE — 25010000002 LIDOCAINE 1 % SOLUTION 20 ML VIAL: Performed by: SURGERY

## 2023-12-14 PROCEDURE — 81025 URINE PREGNANCY TEST: CPT | Performed by: SURGERY

## 2023-12-14 PROCEDURE — 25810000003 LACTATED RINGERS PER 1000 ML: Performed by: ANESTHESIOLOGY

## 2023-12-14 PROCEDURE — 25010000002 HEPARIN (PORCINE) PER 1000 UNITS: Performed by: SURGERY

## 2023-12-14 PROCEDURE — C1751 CATH, INF, PER/CENT/MIDLINE: HCPCS | Performed by: SURGERY

## 2023-12-14 PROCEDURE — 76000 FLUOROSCOPY <1 HR PHYS/QHP: CPT

## 2023-12-14 PROCEDURE — 25010000002 FENTANYL CITRATE (PF) 50 MCG/ML SOLUTION: Performed by: NURSE ANESTHETIST, CERTIFIED REGISTERED

## 2023-12-14 PROCEDURE — 25010000002 MIDAZOLAM PER 1 MG: Performed by: ANESTHESIOLOGY

## 2023-12-14 PROCEDURE — 25010000002 MIDAZOLAM PER 1 MG: Performed by: NURSE ANESTHETIST, CERTIFIED REGISTERED

## 2023-12-14 DEVICE — HICKMAN 9.6 FR SINGLE-LUMEN CV CATHETER, PEEL-APART INTRODUCER KIT WITH SURECUFF TISSUE INGROWTH CUFF
Type: IMPLANTABLE DEVICE | Site: SUBCLAVIAN | Status: FUNCTIONAL
Brand: HICKMAN

## 2023-12-14 RX ORDER — SODIUM CHLORIDE 0.9 % (FLUSH) 0.9 %
3 SYRINGE (ML) INJECTION EVERY 12 HOURS SCHEDULED
Status: DISCONTINUED | OUTPATIENT
Start: 2023-12-14 | End: 2023-12-14 | Stop reason: HOSPADM

## 2023-12-14 RX ORDER — HYDROMORPHONE HYDROCHLORIDE 1 MG/ML
0.5 INJECTION, SOLUTION INTRAMUSCULAR; INTRAVENOUS; SUBCUTANEOUS
Status: DISCONTINUED | OUTPATIENT
Start: 2023-12-14 | End: 2023-12-14 | Stop reason: HOSPADM

## 2023-12-14 RX ORDER — LABETALOL HYDROCHLORIDE 5 MG/ML
5 INJECTION, SOLUTION INTRAVENOUS
Status: DISCONTINUED | OUTPATIENT
Start: 2023-12-14 | End: 2023-12-14 | Stop reason: HOSPADM

## 2023-12-14 RX ORDER — ONDANSETRON 2 MG/ML
4 INJECTION INTRAMUSCULAR; INTRAVENOUS ONCE AS NEEDED
Status: COMPLETED | OUTPATIENT
Start: 2023-12-14 | End: 2023-12-14

## 2023-12-14 RX ORDER — CEFAZOLIN SODIUM 2 G/100ML
INJECTION, SOLUTION INTRAVENOUS AS NEEDED
Status: DISCONTINUED | OUTPATIENT
Start: 2023-12-14 | End: 2023-12-14 | Stop reason: SURG

## 2023-12-14 RX ORDER — PROMETHAZINE HYDROCHLORIDE 25 MG/1
25 SUPPOSITORY RECTAL ONCE AS NEEDED
Status: DISCONTINUED | OUTPATIENT
Start: 2023-12-14 | End: 2023-12-14 | Stop reason: HOSPADM

## 2023-12-14 RX ORDER — EPHEDRINE SULFATE 50 MG/ML
5 INJECTION, SOLUTION INTRAVENOUS ONCE AS NEEDED
Status: DISCONTINUED | OUTPATIENT
Start: 2023-12-14 | End: 2023-12-14 | Stop reason: HOSPADM

## 2023-12-14 RX ORDER — LIDOCAINE HYDROCHLORIDE 20 MG/ML
INJECTION, SOLUTION INFILTRATION; PERINEURAL AS NEEDED
Status: DISCONTINUED | OUTPATIENT
Start: 2023-12-14 | End: 2023-12-14 | Stop reason: SURG

## 2023-12-14 RX ORDER — MIDAZOLAM HYDROCHLORIDE 1 MG/ML
INJECTION INTRAMUSCULAR; INTRAVENOUS AS NEEDED
Status: DISCONTINUED | OUTPATIENT
Start: 2023-12-14 | End: 2023-12-14 | Stop reason: SURG

## 2023-12-14 RX ORDER — DEXAMETHASONE SODIUM PHOSPHATE 4 MG/ML
INJECTION, SOLUTION INTRA-ARTICULAR; INTRALESIONAL; INTRAMUSCULAR; INTRAVENOUS; SOFT TISSUE AS NEEDED
Status: DISCONTINUED | OUTPATIENT
Start: 2023-12-14 | End: 2023-12-14 | Stop reason: SURG

## 2023-12-14 RX ORDER — IPRATROPIUM BROMIDE AND ALBUTEROL SULFATE 2.5; .5 MG/3ML; MG/3ML
3 SOLUTION RESPIRATORY (INHALATION) ONCE AS NEEDED
Status: DISCONTINUED | OUTPATIENT
Start: 2023-12-14 | End: 2023-12-14 | Stop reason: HOSPADM

## 2023-12-14 RX ORDER — DROPERIDOL 2.5 MG/ML
0.62 INJECTION, SOLUTION INTRAMUSCULAR; INTRAVENOUS
Status: DISCONTINUED | OUTPATIENT
Start: 2023-12-14 | End: 2023-12-14 | Stop reason: HOSPADM

## 2023-12-14 RX ORDER — FENTANYL CITRATE 50 UG/ML
INJECTION, SOLUTION INTRAMUSCULAR; INTRAVENOUS AS NEEDED
Status: DISCONTINUED | OUTPATIENT
Start: 2023-12-14 | End: 2023-12-14 | Stop reason: SURG

## 2023-12-14 RX ORDER — NALOXONE HCL 0.4 MG/ML
0.2 VIAL (ML) INJECTION AS NEEDED
Status: DISCONTINUED | OUTPATIENT
Start: 2023-12-14 | End: 2023-12-14 | Stop reason: HOSPADM

## 2023-12-14 RX ORDER — PROPOFOL 10 MG/ML
VIAL (ML) INTRAVENOUS CONTINUOUS PRN
Status: DISCONTINUED | OUTPATIENT
Start: 2023-12-14 | End: 2023-12-14 | Stop reason: SURG

## 2023-12-14 RX ORDER — FENTANYL CITRATE 50 UG/ML
50 INJECTION, SOLUTION INTRAMUSCULAR; INTRAVENOUS
Status: DISCONTINUED | OUTPATIENT
Start: 2023-12-14 | End: 2023-12-14 | Stop reason: HOSPADM

## 2023-12-14 RX ORDER — SODIUM CHLORIDE 0.9 % (FLUSH) 0.9 %
3-10 SYRINGE (ML) INJECTION AS NEEDED
Status: DISCONTINUED | OUTPATIENT
Start: 2023-12-14 | End: 2023-12-14 | Stop reason: HOSPADM

## 2023-12-14 RX ORDER — LEVOCETIRIZINE DIHYDROCHLORIDE 5 MG/1
5 TABLET, FILM COATED ORAL EVERY EVENING
COMMUNITY
Start: 2023-10-27

## 2023-12-14 RX ORDER — SODIUM CHLORIDE, SODIUM LACTATE, POTASSIUM CHLORIDE, CALCIUM CHLORIDE 600; 310; 30; 20 MG/100ML; MG/100ML; MG/100ML; MG/100ML
9 INJECTION, SOLUTION INTRAVENOUS CONTINUOUS
Status: DISCONTINUED | OUTPATIENT
Start: 2023-12-14 | End: 2023-12-14 | Stop reason: HOSPADM

## 2023-12-14 RX ORDER — MIDAZOLAM HYDROCHLORIDE 1 MG/ML
1 INJECTION INTRAMUSCULAR; INTRAVENOUS
Status: COMPLETED | OUTPATIENT
Start: 2023-12-14 | End: 2023-12-14

## 2023-12-14 RX ORDER — PROMETHAZINE HYDROCHLORIDE 25 MG/1
25 TABLET ORAL ONCE AS NEEDED
Status: DISCONTINUED | OUTPATIENT
Start: 2023-12-14 | End: 2023-12-14 | Stop reason: HOSPADM

## 2023-12-14 RX ORDER — DIPHENHYDRAMINE HYDROCHLORIDE 50 MG/ML
12.5 INJECTION INTRAMUSCULAR; INTRAVENOUS
Status: DISCONTINUED | OUTPATIENT
Start: 2023-12-14 | End: 2023-12-14 | Stop reason: HOSPADM

## 2023-12-14 RX ORDER — OXYCODONE AND ACETAMINOPHEN 7.5; 325 MG/1; MG/1
1 TABLET ORAL EVERY 4 HOURS PRN
Status: DISCONTINUED | OUTPATIENT
Start: 2023-12-14 | End: 2023-12-14 | Stop reason: HOSPADM

## 2023-12-14 RX ORDER — HYDRALAZINE HYDROCHLORIDE 20 MG/ML
5 INJECTION INTRAMUSCULAR; INTRAVENOUS
Status: DISCONTINUED | OUTPATIENT
Start: 2023-12-14 | End: 2023-12-14 | Stop reason: HOSPADM

## 2023-12-14 RX ORDER — ONDANSETRON 2 MG/ML
4 INJECTION INTRAMUSCULAR; INTRAVENOUS ONCE AS NEEDED
Status: DISCONTINUED | OUTPATIENT
Start: 2023-12-14 | End: 2023-12-14 | Stop reason: HOSPADM

## 2023-12-14 RX ORDER — FLUMAZENIL 0.1 MG/ML
0.2 INJECTION INTRAVENOUS AS NEEDED
Status: DISCONTINUED | OUTPATIENT
Start: 2023-12-14 | End: 2023-12-14 | Stop reason: HOSPADM

## 2023-12-14 RX ORDER — LABETALOL HYDROCHLORIDE 5 MG/ML
INJECTION, SOLUTION INTRAVENOUS AS NEEDED
Status: DISCONTINUED | OUTPATIENT
Start: 2023-12-14 | End: 2023-12-14 | Stop reason: SURG

## 2023-12-14 RX ORDER — HYDROCODONE BITARTRATE AND ACETAMINOPHEN 5; 325 MG/1; MG/1
1 TABLET ORAL ONCE AS NEEDED
Status: DISCONTINUED | OUTPATIENT
Start: 2023-12-14 | End: 2023-12-14 | Stop reason: HOSPADM

## 2023-12-14 RX ADMIN — SODIUM CHLORIDE, POTASSIUM CHLORIDE, SODIUM LACTATE AND CALCIUM CHLORIDE 9 ML/HR: 600; 310; 30; 20 INJECTION, SOLUTION INTRAVENOUS at 08:51

## 2023-12-14 RX ADMIN — LIDOCAINE HYDROCHLORIDE 60 MG: 20 INJECTION, SOLUTION INFILTRATION; PERINEURAL at 09:56

## 2023-12-14 RX ADMIN — FENTANYL CITRATE 50 MCG: 50 INJECTION, SOLUTION INTRAMUSCULAR; INTRAVENOUS at 10:19

## 2023-12-14 RX ADMIN — LABETALOL HYDROCHLORIDE 5 MG: 5 INJECTION, SOLUTION INTRAVENOUS at 10:36

## 2023-12-14 RX ADMIN — MIDAZOLAM HYDROCHLORIDE 1 MG: 2 INJECTION, SOLUTION INTRAMUSCULAR; INTRAVENOUS at 09:14

## 2023-12-14 RX ADMIN — Medication 10 ML: at 08:56

## 2023-12-14 RX ADMIN — CEFAZOLIN SODIUM 2 G: 2 INJECTION, SOLUTION INTRAVENOUS at 10:05

## 2023-12-14 RX ADMIN — PROPOFOL 100 MCG/KG/MIN: 10 INJECTION, EMULSION INTRAVENOUS at 09:55

## 2023-12-14 RX ADMIN — DEXAMETHASONE SODIUM PHOSPHATE 4 MG: 4 INJECTION, SOLUTION INTRAMUSCULAR; INTRAVENOUS at 09:57

## 2023-12-14 RX ADMIN — ONDANSETRON 4 MG: 2 INJECTION INTRAMUSCULAR; INTRAVENOUS at 09:57

## 2023-12-14 RX ADMIN — MIDAZOLAM 2 MG: 1 INJECTION INTRAMUSCULAR; INTRAVENOUS at 09:52

## 2023-12-14 RX ADMIN — MIDAZOLAM HYDROCHLORIDE 1 MG: 2 INJECTION, SOLUTION INTRAMUSCULAR; INTRAVENOUS at 08:56

## 2023-12-14 NOTE — INTERVAL H&P NOTE
H&P reviewed. The patient was examined and there are no changes to the H&P.      I attempted to repair the Dolan catheter at the bedside in preop but it continued to crack at every point I manipulated it.  She obviously just needs a new tube.  We will proceed as planned.

## 2023-12-14 NOTE — OP NOTE
Colorectal & General Surgery  Operative Report    Patient: Liliam Lorenz  YOB: 1995  MRN: [id number]  DATE OF PROCEDURE: 12/14/23     PREOPERATIVE DIAGNOSIS:  Malfunctioning Dolan catheter    POSTOPERATIVE DIAGNOSIS:  Same    PROCEDURE:  Exchange of Dolan catheter  Intraoperative fluoroscopic interpretation, less than 30 minutes    FINDINGS:  Original Dolan catheter terminated just to the left of the spine within the brachiocephalic vein.  It was exchanged over wire and terminated in the SVC.    SURGEON:  Joe Cordova MD    ASSISTANT:  Nadia Snowden MD assisted with maintaining access to the SVC and exchanging the Dolan catheter.  Her assistance was crucial of success of this operation.    ANESTHESIA:  Monitored anesthesia care    EBL:  15 mL    SPECIMEN:  None    OPERATIVE DESCRIPTION:  The patient was brought to the operating room under the care of the nursing staff.  The patient was placed on the operating room table in the supine position where anesthesia was induced.  The patient was then prepped and positioned in the usual sterile fashion.  A standardized timeout was then performed.    A wire was placed down the previous Dolan catheter.  The catheter was then removed.  A replacement double-lumen Dolan catheter was then attempted to be placed over the wire into the SVC but could not be passed over the wire completely due to a manufacturing difference in the coupling.  A Berenstein catheter was then placed over the wire and the wire was exchanged for an 035 Glidewire.  We attempted to place a triple-lumen and a double-lumen Dolan catheter over this wire with no success both times due to the manufacturing differences in the coupling.  A single-lumen Dolan was then placed over the wire and into the SVC.  It was secured in position with a Prolene stitch and flushed and aspirated easily.  Heparinized saline was then instilled into the lumen.  Sterile dressing was placed.    All  needle, sponge, and instrument counts were correct at the end of the case.    The patient tolerated the procedure well and was transferred to the postanesthesia care unit in stable condition.    Joe Cordova M.D.  Colorectal & General Surgery  [facility]    [address] [place] [address], [address]  [place], [place], [address]  P: [phone]  F: [phone]

## 2023-12-14 NOTE — NURSING NOTE
MD tried to switch out addison catheter (non invasive) at bedside without success.  Patient to proceed to OR for replacement of catheter per MD.

## 2023-12-14 NOTE — ADDENDUM NOTE
Addendum  created 12/14/23 1642 by Juarez Marquez MD    Attestation recorded in Intraprocedure, Intraprocedure Attestations filed

## 2023-12-14 NOTE — ANESTHESIA POSTPROCEDURE EVALUATION
Patient: Liliam Lorenz    Procedure Summary       Date: 12/14/23 Room / Location: [facility] OR [address] [place] [address] MAIN OR    Anesthesia Start: 0948 Anesthesia Stop: 1136    Procedure: EXCHANGE OF VALVERDE CATHETER Diagnosis:       Poor venous access      (Poor venous access [I87.8])    Surgeons: Lorne Cordova MD Provider: Juarez Marquez MD    Anesthesia Type: MAC ASA Status: 3            Anesthesia Type: MAC    Vitals  Vitals Value Taken Time   /81 12/14/23 1215   Temp 36.6 °C (97.8 °F) 12/14/23 1132   Pulse 102 12/14/23 1225   Resp 16 12/14/23 1200   SpO2 95 % 12/14/23 1225   Vitals shown include unfiled device data.        Post Anesthesia Care and Evaluation    Patient location during evaluation: bedside  Patient participation: complete - patient participated  Level of consciousness: awake  Pain management: adequate    Airway patency: patent  Anesthetic complications: No anesthetic complications    Cardiovascular status: acceptable  Respiratory status: acceptable  Hydration status: acceptable    Comments: */80   Pulse 102   Temp 36.6 °C (97.8 °F) (Oral)   Resp 16   SpO2 96%

## 2023-12-14 NOTE — ANESTHESIA PREPROCEDURE EVALUATION
Anesthesia Evaluation     no history of anesthetic complications:   NPO Solid Status: > 8 hours  NPO Liquid Status: > 2 hours           Airway   Mallampati: II  Neck ROM: full  no difficulty expected  Dental - normal exam     Pulmonary - normal exam   (+) pulmonary embolism,home oxygen (PRN), shortness of breath, sleep apnea on CPAP  (-) COPD, asthma, not a smoker    PE comment: nonlabored  Cardiovascular - normal exam  Exercise tolerance: poor (<4 METS) (1-2 blocks on level ground, <1 flight of stairs without O2)    Rhythm: regular  Rate: normal    (+) dysrhythmias (POTS) Tachycardia, TABOR  (-) hypertension, valvular problems/murmurs, past MI, CAD, angina    ROS comment: Chronic hypotension    Neuro/Psych  (+) dizziness/light headedness  (-) seizures, TIA, CVA  GI/Hepatic/Renal/Endo    (+) morbid obesity, GERD  (-) liver disease, no renal disease, diabetes, no thyroid disorder    ROS Comment: Gastroparesis     Musculoskeletal (-) negative ROS    Abdominal    Substance History      OB/GYN          Other   autoimmune disease ,     ROS/Med Hx Other: Phoebe-Danlos syndrome              Anesthesia Plan    ASA 3     MAC       Anesthetic plan, risks, benefits, and alternatives have been provided, discussed and informed consent has been obtained with: patient.    CODE STATUS:

## 2023-12-14 NOTE — DISCHARGE INSTRUCTIONS
Okay to resume your fondaparinux on Friday.    Okay to use Dolan catheter anytime.  Call the office with any questions or concerns.

## 2024-01-11 ENCOUNTER — OFFICE VISIT (OUTPATIENT)
Dept: CARDIOLOGY | Facility: CLINIC | Age: 29
End: 2024-01-11
Payer: COMMERCIAL

## 2024-01-11 VITALS
BODY MASS INDEX: 43.71 KG/M2 | HEIGHT: 64 IN | SYSTOLIC BLOOD PRESSURE: 125 MMHG | WEIGHT: 256 LBS | DIASTOLIC BLOOD PRESSURE: 80 MMHG | HEART RATE: 107 BPM

## 2024-01-11 DIAGNOSIS — K31.84 GASTROPARESIS: ICD-10-CM

## 2024-01-11 DIAGNOSIS — G90.A POTS (POSTURAL ORTHOSTATIC TACHYCARDIA SYNDROME): Primary | ICD-10-CM

## 2024-01-11 DIAGNOSIS — R55 SYNCOPE, UNSPECIFIED SYNCOPE TYPE: ICD-10-CM

## 2024-01-11 DIAGNOSIS — I26.94 MULTIPLE SUBSEGMENTAL PULMONARY EMBOLI WITHOUT ACUTE COR PULMONALE: ICD-10-CM

## 2024-01-11 DIAGNOSIS — M35.9 AUTOIMMUNE DISEASE: ICD-10-CM

## 2024-01-11 DIAGNOSIS — Q79.60 EHLERS-DANLOS SYNDROME: ICD-10-CM

## 2024-01-11 RX ORDER — BROMPHENIRAMINE MALEATE, PSEUDOEPHEDRINE HYDROCHLORIDE, AND DEXTROMETHORPHAN HYDROBROMIDE 2; 30; 10 MG/5ML; MG/5ML; MG/5ML
SYRUP ORAL
COMMUNITY
Start: 2023-09-15

## 2024-01-11 RX ORDER — PYRIDOSTIGMINE BROMIDE 60 MG/1
30 TABLET ORAL DAILY
Qty: 45 TABLET | Refills: 3 | Status: SHIPPED | OUTPATIENT
Start: 2024-01-11

## 2024-01-11 RX ORDER — LIDOCAINE 50 MG/G
1 PATCH TOPICAL EVERY 12 HOURS
COMMUNITY
Start: 2023-09-27 | End: 2024-09-26

## 2024-01-11 RX ORDER — RABEPRAZOLE SODIUM 20 MG/1
20 TABLET, DELAYED RELEASE ORAL DAILY
COMMUNITY
Start: 2023-11-09

## 2024-01-11 RX ORDER — CLOBETASOL PROPIONATE 0.5 MG/G
AEROSOL, FOAM TOPICAL
COMMUNITY
Start: 2024-01-03

## 2024-01-11 RX ORDER — FLUDROCORTISONE ACETATE 0.1 MG/1
0.1 TABLET ORAL DAILY
COMMUNITY
Start: 2023-07-05 | End: 2024-07-05

## 2024-01-11 NOTE — PROGRESS NOTES
[facility]  [address]  [facility] [address]  [facility] [address]  Phone: [phone]  Fax: [phone]  Patient Name: Liliam Lorenz  :1995  Age: 28 y.o.  Primary Cardiologist: Pamela Wiley MD  Encounter Provider:  LALITO Trejo    History of Present Illness     Liliam Lorenz is a 28 y.o.  female whose medical history includes gastroparesis on IVIG (Dr. Escobar)/she gets IV fluids daily through left Dolan catheter, autoimmune gastritis, history of multi subsegmental pulmonary emboli without cor pulmonale (failed Eliquis, warfarin, and Lovenox but now on Arixtra per Dr. Mcdonald), GRACIELA/CPAP, obesity, GERD.  She is followed in our office by Dr. Wiley for POTS; she also follows with Dr. Archer at [facility]. I have reviewed the past medical records in preparation of today's visit.     24 Follow-up:  She is here for 6-month follow-up and I am seeing her for the first time today.  At last visit she was having more POTS symptoms and was advised to go up to as much is 15 mg midodrine 3 times daily but to space the doses out.  Her Mestinon was also reduced to 30 mg daily as she was not sure that it was helping.  She was not started on Florinef due to volume retention and salt retention.  She then had telehealth appointment with Dr. Archer at [place]; she had reported that she was not taking Mestinon, taking increased dose of midodrine but still having symptoms.  She was started on 0.1 mg fludrocortisone twice daily.    She has been having passing out episodes which are new for her.  These usually occur when she is laying down or sitting still.  They occur without warning.  She does not feel her heart racing or skipping beats.  They also occur when she rolls over in bed.  Sometimes she can hear things but is unable to respond or move.  Heart rate with movement continues to be 1 30-1 40 which is better than previous.   "These episodes occur a couple times a week.  She has had falls but nothing that has caused injury recently.  There have been no other changes to her medical history.  She is having issues getting her IVIG and IV fluid supplies covered with insurance.    Data Review     The following data was reviewed by LALITO Trejo on 01/11/24:    Vital Signs:   /80   Pulse 107   Ht 162.6 cm (64\")   Wt 116 kg (256 lb)   BMI 43.94 kg/m²       Weight:  Wt Readings from Last 3 Encounters:   01/11/24 116 kg (256 lb)   12/13/23 116 kg (255 lb)   10/17/23 113 kg (250 lb)     Body mass index is 43.94 kg/m².    Below is a summary of pertinent cardiology findings:  Family history includes CAD on her mom side of the family; her mom has hypertension and diabetes.  She is , has no children, is disabled, has never smoked, uses no alcohol or drugs, uses 1 caffeinated beverage per day.  February 2018 she was evaluated for chest discomfort that was felt to be due to esophageal spasm.  She did have an exercise treadmill stress test which showed no EKG changes and she was able to exercise for 9 minutes; she did have some chest discomfort.  Echocardiogram showed EF 62% and no significant pathology.  December 2019 she had tilt table for near syncope and tachycardia which confirmed a diagnosis of POTS.  She has been treated with beta-blockers and midodrine.  She also has evidence of severe dysautonomia, severe gastroparesis, and Erler's Danlos syndrome.  September 2021 she was admitted with shortness of breath and tachycardia and found to have very small bilateral pulmonary emboli on CT angiogram of the chest and was started on Eliquis.  October 2021 she was having bleeding from her left IJ Dolan catheter which was removed and there was one placed on the right IJ.    April 2022 she had recurrent pulmonary emboli in the right lower lobe and Eliquis was switched to Lovenox.  January 2021 she had COVID-19 followed by " sepsis; she had severe gastroparesis, severe nausea following this.  She did have gastric stimulator placed in September 2022.  October 2021 echocardiogram showed EF 60%, normal diastolic function with no significant valvular disease.  February 2023 echocardiogram showed EF 68%, normal RV size and function, normal diastolic filling, and no significant valvular abnormalities.  March 2023 lower extremity venous duplex was normal.  March 2023 CTA chest showed recurrent left-sided subsegmental pulmonary emboli; she is now on Arixtra.  Dr. Wiley reviewed these images and found no evidence of coronary artery disease.  She is also on chronic oxygen due to severe hypoxia with activity following these pulmonary emboli.  June 2023 echocardiogram showed EF 66 to 70%, normal LV diastolic function, and no significant valvular disease.    Labs:  08/30/2023:  cr 0.5, K 3.5, ALT 58, otherwise unremarkable CMP, Hgb 11.6, , TSH 3.750, free T4 1.12, HgbA1c 5.7  12/13/2023:  cr 0.8, K 3.8, , otherwise unremarkable BMP      ECG 12 Lead    Date/Time: 1/11/2024 12:15 PM  Performed by: Georgina Wilde APRN    Authorized by: Georgina Wilde APRN  Comparison: compared with previous ECG from 6/14/2023  Similar to previous ECG  Rhythm: sinus tachycardia  Rate: tachycardic  BPM: 107  T flattening: III, V1, aVF, V3, V4 and V5          Medications     Allergies as of 01/11/2024 - Reviewed 01/11/2024   Allergen Reaction Noted    Eggs or egg-derived products GI Intolerance 05/05/2021    Tape Other (See Comments) 12/14/2023    Pepcid [famotidine] Rash and GI Intolerance 05/05/2021    Scopolamine Rash and GI Intolerance 05/05/2021       Current Outpatient Medications   Medication Instructions    albuterol sulfate  (90 Base) MCG/ACT inhaler 2 puffs, Inhalation, Every 4 Hours PRN    brompheniramine-pseudoephedrine-DM 30-2-10 MG/5ML syrup TAKE 5 ML BY MOUTH FOUR TIMES DAILY AS NEEDED FOR COUGH OR  CONGESTION    cholecalciferol (VITAMIN D3) 1,000 Units, Oral, Daily    clobetasol (OLUX) 0.05 % topical foam APPLY TO SITE AND LET DRY X 10 MINUTES BEFORE CENTRAL LINE DRESSING IS REAPPLIED TO AREA WEEKLY    cyanocobalamin 1,000 mcg, Intramuscular, Every 28 Days    Diclofenac Sodium (VOLTAREN) 4 g, Topical    diphenhydrAMINE (BENADRYL) 25 mg, Intravenous, 2 Times Daily    dronabinol (MARINOL) 5 mg, Oral, 3 Times Daily PRN    Emgality 120 MG/ML auto-injector pen Every 30 Days, Takes the 20th of the month    escitalopram (LEXAPRO) 20 mg, Oral, Daily    fludrocortisone 0.1 mg, Oral, Daily    fondaparinux (ARIXTRA) 10 MG/0.8ML solution injection Subcutaneous, Every 24 Hours Scheduled    gabapentin (NEURONTIN) 100 mg, Oral, 3 Times Daily    Corry, Zingiber officinalis, (Corry Root) 550 MG capsule Oral, Daily    Heparin & NaCl Lock Flush (HEPARIN COMBINATION IV) Intravenous, 2 Times Daily    hydrOXYzine pamoate (VISTARIL) 25-50 mg, Oral, 3 Times Daily PRN    Immune Globulin, Human,-ifas (PANZYGA IV) Intravenous, Take As Directed, Every Tuesday    levocetirizine (XYZAL) 5 mg, Oral, Every Evening    levothyroxine (SYNTHROID, LEVOTHROID) 50 mcg, Oral, Daily    lidocaine (LIDODERM) 5 % 1 patch, Transdermal, Every 12 Hours    loperamide (IMODIUM) 2 mg, Oral, 4 Times Daily PRN    meclizine (ANTIVERT) 25 mg, Oral, 3 Times Daily PRN    metoprolol tartrate (LOPRESSOR) 25 MG tablet TAKE 12.5MG IN THE AM, THEN 25MG IN THE MIDDLE OF DAY, THEN 12.5MG AT BEDTIME    midodrine (PROAMATINE) 10 MG tablet TAKE 1 TABLET BY MOUTH THREE TIMES DAILY    midodrine (PROAMATINE) 5 mg, Oral, 3 Times Daily Before Meals    montelukast (SINGULAIR) 10 mg, Oral, Nightly    Omeprazole 40 mg, Oral, Daily    Ondansetron HCl (ZOFRAN IV) 8 mg, Intravenous, Every 6 Hours    phenazopyridine (PYRIDIUM) 100 mg, Oral, 3 Times Daily PRN    prochlorperazine (COMPAZINE) 10 mg, Oral, Every 6 Hours PRN    promethazine (PHENERGAN) 25 MG/ML injection Injection, IV  every 3 hours    pyridostigmine (MESTINON) 30 mg, Oral, Daily    RABEprazole (ACIPHEX) 20 mg, Oral, Daily        Past History, Review of Systems, Exam     Past Medical History:   Diagnosis Date    Bronchitis     Chest pain     Chronic hypotension     Eczema     Phoebe-Danlos syndrome     Gastroparesis     GERD (gastroesophageal reflux disease)     History of pulmonary embolus (PE)     IBS (irritable bowel syndrome)     Lightheadedness     Nausea and vomiting 08/22/2017    POTS (postural orthostatic tachycardia syndrome)     Rectal fissure     Rosacea     Sleep apnea     Tachycardia        Past Surgical History:   has a past surgical history that includes Upper gastrointestinal endoscopy; Colonoscopy; Mediport insertion, double (09/01/2020); Venous Access Device (Port) (Left, 10/16/2020); Venous Access Device (Port) (Right, 10/26/2021); Venous Access Device (Port) (Left, 1/13/2023); and Tunneled venous catheter placement (N/A, 12/14/2023).     Social History     Socioeconomic History    Marital status:     Number of children: 0   Tobacco Use    Smoking status: Never     Passive exposure: Never    Smokeless tobacco: Never    Tobacco comments:     Caffeine: 1 serving daily   Vaping Use    Vaping Use: Never used   Substance and Sexual Activity    Alcohol use: No    Drug use: No    Sexual activity: Yes     Partners: Female     Birth control/protection: None       Review of Systems   Cardiovascular:  Positive for chest pain, palpitations and syncope. Negative for claudication, cyanosis, dyspnea on exertion, irregular heartbeat, leg swelling, near-syncope, orthopnea and paroxysmal nocturnal dyspnea.   Musculoskeletal:  Positive for joint pain.   Neurological:  Positive for dizziness.       Vitals reviewed.   Constitutional:       Appearance: Not in distress.   Eyes:      Conjunctiva/sclera: Conjunctivae normal.      Pupils: Pupils are equal, round, and reactive to light.   HENT:      Head: Normocephalic.      Nose:  Nose normal.    Mouth/Throat:      Pharynx: Oropharynx is clear.   Neck:      Vascular: JVD normal.   Pulmonary:      Effort: Pulmonary effort is normal.      Breath sounds: Normal breath sounds. No wheezing. No rhonchi. No rales.   Cardiovascular:      Tachycardia present. Regular rhythm. Normal S1. Normal S2.       Murmurs: There is no murmur.   Pulses:     Intact distal pulses.   Edema:     Peripheral edema absent.   Abdominal:      General: Bowel sounds are normal. There is no distension.      Palpations: Abdomen is soft.      Tenderness: There is no abdominal tenderness.   Musculoskeletal: Normal range of motion.      Cervical back: Normal range of motion and neck supple. Skin:     General: Skin is warm and dry.   Neurological:      Mental Status: Alert and oriented to person, place and time.   Psychiatric:         Attention and Perception: Attention normal.         Mood and Affect: Mood normal.         Speech: Speech normal.         Behavior: Behavior is cooperative.          Assessment and Plan     Assessment:  1. POTS (postural orthostatic tachycardia syndrome)    2. Autoimmune disease    3. Phoebe-Danlos syndrome    4. Multiple subsegmental pulmonary emboli without acute cor pulmonale    5. Gastroparesis    6. Syncope, unspecified syncope type         POTS: She had positive tilt table study in December 2019 and she also follows with Dr. Archer at the [facility].  Her symptoms are better on metoprolol, midodrine, and fludrocortisone overall, but she is having brief episodes of syncope now which are new.  Her heart rate is overall better controlled.  Autoimmune disease: She has autoimmune gastritis with gastroparesis and receives IVIG on a regular basis.  She also gives herself 1 L IV fluids daily.  She has a Dolan catheter in place.  Erler's Danlos syndrome: She is having hyperreflexive joints and has frequent falls due to her knees not locking.  History of multiple  subsegmental pulmonary emboli: Likely due to malfunctioning Dolan catheter.  She has had PE while on Eliquis, Lovenox, and warfarin.  She is now on Arixtra per Dr. Mcdonald.   Syncope: She is having episodes of passing out without warning while seated or laying down.  This is new.  These are brief and she comes too quickly.  They happen about once or twice weekly.  They started occurring after she stopped her Mestinon.    Ms. Lorenz is a patient of Dr. Wiley's with history of severe dysautonomia and POTS in the setting of autoimmune gastritis, gastroparesis, and Erler's Danlos syndrome.  She used to have heart rates up to 200s but is now better controlled on metoprolol scattered throughout the day.  She does have low blood pressure and is also on midodrine 15 mg every morning, 15 mg q. midday, and 10 mg every evening.  She is also on 0.1 mg fludrocortisone.  She has stopped Mestinon and is now having new episodes of syncope.  I discussed this with Dr. Wiley and we will start her back on 30 mg Mestinon daily.  If symptoms continue, we may consider extended monitor though she has severe adhesive allergy.  I am going to schedule her to see me in 3 months and Dr. Wiley in 6 months.    Return for 3 months - Kera; 6 months - Dr. Wiley.  Orders Placed This Encounter   Procedures    ECG 12 Lead      New Medications Ordered This Visit   Medications    pyridostigmine (Mestinon) 60 MG tablet     Sig: Take 0.5 tablets by mouth Daily.     Dispense:  45 tablet     Refill:  3         Thank you for the opportunity to participate in this patient's care.    LALITO Land    This office note has been dictated.

## 2024-04-18 ENCOUNTER — OFFICE VISIT (OUTPATIENT)
Dept: CARDIOLOGY | Facility: CLINIC | Age: 29
End: 2024-04-18
Payer: COMMERCIAL

## 2024-04-18 VITALS
SYSTOLIC BLOOD PRESSURE: 120 MMHG | HEIGHT: 64 IN | DIASTOLIC BLOOD PRESSURE: 80 MMHG | WEIGHT: 259 LBS | HEART RATE: 110 BPM | BODY MASS INDEX: 44.22 KG/M2

## 2024-04-18 DIAGNOSIS — I26.94 MULTIPLE SUBSEGMENTAL PULMONARY EMBOLI WITHOUT ACUTE COR PULMONALE: ICD-10-CM

## 2024-04-18 DIAGNOSIS — K31.84 GASTROPARESIS: ICD-10-CM

## 2024-04-18 DIAGNOSIS — R55 SYNCOPE, UNSPECIFIED SYNCOPE TYPE: ICD-10-CM

## 2024-04-18 DIAGNOSIS — G90.A POTS (POSTURAL ORTHOSTATIC TACHYCARDIA SYNDROME): Primary | ICD-10-CM

## 2024-04-18 DIAGNOSIS — Q79.60 EHLERS-DANLOS SYNDROME: ICD-10-CM

## 2024-04-18 DIAGNOSIS — M35.9 AUTOIMMUNE DISEASE: ICD-10-CM

## 2024-04-18 PROCEDURE — 99214 OFFICE O/P EST MOD 30 MIN: CPT | Performed by: NURSE PRACTITIONER

## 2024-04-18 PROCEDURE — 93000 ELECTROCARDIOGRAM COMPLETE: CPT | Performed by: NURSE PRACTITIONER

## 2024-04-18 RX ORDER — NEEDLES, DISPOSABLE 25GX5/8"
NEEDLE, DISPOSABLE MISCELLANEOUS
COMMUNITY
Start: 2024-03-12

## 2024-04-18 RX ORDER — AMOXICILLIN 500 MG/1
500 TABLET, FILM COATED ORAL 2 TIMES DAILY
COMMUNITY
Start: 2024-01-17

## 2024-04-18 RX ORDER — PYRIDOSTIGMINE BROMIDE 60 MG/1
30 TABLET ORAL DAILY
Qty: 45 TABLET | Refills: 3 | Status: SHIPPED | OUTPATIENT
Start: 2024-04-18

## 2024-04-18 NOTE — PROGRESS NOTES
[facility] CARDIOLOGY  [address]  [facility] [address]  [facility] [address]  Phone: [phone]  Fax: [phone]  Patient Name: Liliam Lorenz  :1995  Age: 28 y.o.  Primary Cardiologist: Pamela Wiley MD  Encounter Provider:  LALITO Trejo    History of Present Illness     Liliam Lorenz is a 28 y.o.  female whose medical history includes gastroparesis on IVIG (Dr. Escobar)/she gets IV fluids daily through left Dolan catheter, autoimmune gastritis, history of multi subsegmental pulmonary emboli without cor pulmonale (failed Eliquis, warfarin, and Lovenox but now on Arixtra per Dr. Mcdonald), GRACIELA/CPAP, obesity, GERD.  She is followed in our office by Dr. Wiley for POTS; she also follows with Dr. Archer at [facility]. I have reviewed the past medical records in preparation of today's visit.     24 Follow-up:  She is here for 3-month follow-up.  Overall things are stable.  She has not had IVIG in several months as it is no longer approved by her insurance; Dr. Escobar's office is working on appeal for this.  She states that she feels the POTS symptoms are worse off the IVIG.  She continues to have daily episodes of syncope but does have enough warning so that she is able to get down; she has not had any falls or injuries.  She does try to give herself 1 L of IV fluids daily though her schedule has interfered with this a bit of late.  Her heart rates are getting up to 130s with standing but her blood pressure can be 70s/30s often during the day and often with sitting or laying down.  She has not restarted the Mestinon as the pharmacy was not able to fill it.  She does a good job of staying hydrated and adding salt to her diet.  She is trying to stay active.  She is on Singulair nightly but still has facial flushing.  She denies chest pain or dyspnea with exertion.    Data Review     The following data was reviewed by Georgina Cote  "LALITO Wilde on 04/18/24:    Vital Signs:   /80   Pulse 110   Ht 162.6 cm (64\")   Wt 117 kg (259 lb)   BMI 44.46 kg/m²       Weight:  Wt Readings from Last 3 Encounters:   04/18/24 117 kg (259 lb)   01/11/24 116 kg (256 lb)   12/13/23 116 kg (255 lb)     Body mass index is 44.46 kg/m².    Below is a summary of pertinent cardiology findings:  Family history includes CAD on her mom side of the family; her mom has hypertension and diabetes.  She is , has no children, is disabled, has never smoked, uses no alcohol or drugs, uses 1 caffeinated beverage per day.  February 2018 she was evaluated for chest discomfort that was felt to be due to esophageal spasm.  She did have an exercise treadmill stress test which showed no EKG changes and she was able to exercise for 9 minutes; she did have some chest discomfort.  Echocardiogram showed EF 62% and no significant pathology.  December 2019 she had tilt table for near syncope and tachycardia which confirmed a diagnosis of POTS.  She has been treated with beta-blockers and midodrine.  She also has evidence of severe dysautonomia, severe gastroparesis, and Erler's Danlos syndrome.  September 2021 she was admitted with shortness of breath and tachycardia and found to have very small bilateral pulmonary emboli on CT angiogram of the chest and was started on Eliquis.  October 2021 she was having bleeding from her left IJ Dolan catheter which was removed and there was one placed on the right IJ.    April 2022 she had recurrent pulmonary emboli in the right lower lobe and Eliquis was switched to Lovenox.  January 2021 she had COVID-19 followed by sepsis; she had severe gastroparesis, severe nausea following this.  She did have gastric stimulator placed in September 2022.  October 2021 echocardiogram showed EF 60%, normal diastolic function with no significant valvular disease.  February 2023 echocardiogram showed EF 68%, normal RV size and function, normal " "diastolic filling, and no significant valvular abnormalities.  March 2023 lower extremity venous duplex was normal.  March 2023 CTA chest showed recurrent left-sided subsegmental pulmonary emboli; she is now on Arixtra.  Dr. Wiley reviewed these images and found no evidence of coronary artery disease.  She is also on chronic oxygen due to severe hypoxia with activity following these pulmonary emboli.  June 2023 echocardiogram showed EF 66 to 70%, normal LV diastolic function, and no significant valvular disease.    Labs:  08/30/2023:  cr 0.5, K 3.5, ALT 58, otherwise unremarkable CMP, Hgb 11.6, , TSH 3.750, free T4 1.12, HgbA1c 5.7  12/13/2023:  cr 0.8, K 3.8, , otherwise unremarkable BMP      ECG 12 Lead    Date/Time: 4/18/2024 2:24 PM  Performed by: Georgina Wilde APRN    Authorized by: Georgina Wilde APRN  Comparison: compared with previous ECG from 1/11/2024  Similar to previous ECG  Rhythm: sinus tachycardia  T flattening: aVL and V1  Other findings: prolonged QTc interval          Medications     Allergies as of 04/18/2024 - Reviewed 04/18/2024   Allergen Reaction Noted    Egg-derived products GI Intolerance 05/05/2021    Tape Other (See Comments) 12/14/2023    Pepcid [famotidine] Rash and GI Intolerance 05/05/2021    Scopolamine Rash and GI Intolerance 05/05/2021       Current Outpatient Medications   Medication Instructions    albuterol sulfate  (90 Base) MCG/ACT inhaler 2 puffs, Inhalation, Every 4 Hours PRN    amoxicillin (AMOXIL) 500 mg, Oral, 2 Times Daily    BD PrecisionGlide Needle 23G X 1-1/2\" misc     cholecalciferol (VITAMIN D3) 1,000 Units, Oral, Daily    clobetasol (OLUX) 0.05 % topical foam APPLY TO SITE AND LET DRY X 10 MINUTES BEFORE CENTRAL LINE DRESSING IS REAPPLIED TO AREA WEEKLY    cyanocobalamin 1,000 mcg, Intramuscular, Every 28 Days    diphenhydrAMINE (BENADRYL) 25 mg, Intravenous, 2 Times Daily    dronabinol (MARINOL) 5 mg, Oral, 3 Times " Daily PRN    Emgality 120 MG/ML auto-injector pen Every 30 Days, Takes the 20th of the month    escitalopram (LEXAPRO) 20 mg, Oral, Daily    fludrocortisone 0.1 mg, Oral, Daily    fondaparinux (ARIXTRA) 10 MG/0.8ML solution injection Subcutaneous, Every 24 Hours Scheduled    gabapentin (NEURONTIN) 100 mg, Oral, 3 Times Daily    Corry, Zingiber officinalis, (Corry Root) 550 MG capsule Oral, Daily    Heparin & NaCl Lock Flush (HEPARIN COMBINATION IV) Intravenous, 2 Times Daily    hydrOXYzine pamoate (VISTARIL) 25-50 mg, Oral, 3 Times Daily PRN    levocetirizine (XYZAL) 5 mg, Oral, Every Evening    levothyroxine (SYNTHROID, LEVOTHROID) 50 mcg, Oral, Daily    meclizine (ANTIVERT) 25 mg, Oral, 3 Times Daily PRN    metoprolol tartrate (LOPRESSOR) 25 MG tablet TAKE 12.5MG IN THE AM, THEN 25MG IN THE MIDDLE OF DAY, THEN 12.5MG AT BEDTIME    midodrine (PROAMATINE) 10 MG tablet TAKE 1 TABLET BY MOUTH THREE TIMES DAILY    midodrine (PROAMATINE) 5 mg, Oral, 3 Times Daily Before Meals    montelukast (SINGULAIR) 10 mg, Oral, Nightly    Omeprazole 40 mg, Oral, Daily    Ondansetron HCl (ZOFRAN IV) 8 mg, Intravenous, Every 6 Hours    phenazopyridine (PYRIDIUM) 100 mg, Oral, 3 Times Daily PRN    prochlorperazine (COMPAZINE) 10 mg, Oral, Every 6 Hours PRN    promethazine (PHENERGAN) 25 MG/ML injection Injection, IV every 3 hours    pyridostigmine (MESTINON) 30 mg, Oral, Daily    RABEprazole (ACIPHEX) 20 mg, Oral, Daily        Past History, Review of Systems, Exam     Past Medical History:   Diagnosis Date    Bronchitis     Chest pain     Chronic hypotension     Eczema     Phoebe-Danlos syndrome     Gastroparesis     GERD (gastroesophageal reflux disease)     History of pulmonary embolus (PE)     IBS (irritable bowel syndrome)     Lightheadedness     Nausea and vomiting 08/22/2017    POTS (postural orthostatic tachycardia syndrome)     Rectal fissure     Rosacea     Sleep apnea     Tachycardia        Past Surgical History:   has a  past surgical history that includes Upper gastrointestinal endoscopy; Colonoscopy; Mediport insertion, double (09/01/2020); Venous Access Device (Port) (Left, 10/16/2020); Venous Access Device (Port) (Right, 10/26/2021); Venous Access Device (Port) (Left, 1/13/2023); and Tunneled venous catheter placement (N/A, 12/14/2023).     Social History     Socioeconomic History    Marital status:     Number of children: 0   Tobacco Use    Smoking status: Never     Passive exposure: Never    Smokeless tobacco: Never    Tobacco comments:     Caffeine: 1 serving daily   Vaping Use    Vaping status: Never Used   Substance and Sexual Activity    Alcohol use: No    Drug use: No    Sexual activity: Yes     Partners: Female     Birth control/protection: None       Review of Systems   Cardiovascular:  Positive for palpitations and syncope. Negative for chest pain, claudication, cyanosis, dyspnea on exertion, irregular heartbeat, leg swelling, near-syncope, orthopnea and paroxysmal nocturnal dyspnea.   Musculoskeletal:  Positive for joint pain.   Neurological:  Positive for dizziness.       Vitals reviewed.   Constitutional:       Appearance: Not in distress.   Eyes:      Conjunctiva/sclera: Conjunctivae normal.      Pupils: Pupils are equal, round, and reactive to light.   HENT:      Head: Normocephalic.      Nose: Nose normal.    Mouth/Throat:      Pharynx: Oropharynx is clear.   Neck:      Vascular: JVD normal.   Pulmonary:      Effort: Pulmonary effort is normal.      Breath sounds: Normal breath sounds. No wheezing. No rhonchi. No rales.   Cardiovascular:      Tachycardia present. Regular rhythm. Normal S1. Normal S2.       Murmurs: There is no murmur.   Pulses:     Intact distal pulses.   Edema:     Peripheral edema absent.   Abdominal:      General: Bowel sounds are normal. There is no distension.      Palpations: Abdomen is soft.      Tenderness: There is no abdominal tenderness.   Musculoskeletal: Normal range of  motion.      Cervical back: Normal range of motion and neck supple. Skin:     General: Skin is warm and dry.   Neurological:      Mental Status: Alert and oriented to person, place and time.   Psychiatric:         Attention and Perception: Attention normal.         Mood and Affect: Mood normal.         Speech: Speech normal.         Behavior: Behavior is cooperative.          Assessment and Plan     Assessment:  1. POTS (postural orthostatic tachycardia syndrome)    2. Autoimmune disease    3. Phoebe-Danlos syndrome    4. Multiple subsegmental pulmonary emboli without acute cor pulmonale    5. Gastroparesis    6. Syncope, unspecified syncope type           POTS: She had positive tilt table study in December 2019 and she also follows with Dr. Archer at the [facility].  Her symptoms are better on metoprolol, midodrine, and fludrocortisone overall, but she is having brief episodes of syncope now which are new.  Her heart rate is overall better controlled but she still having episodes of syncope daily.  She has not been able to restart Mestinon.  Autoimmune disease: She has autoimmune gastritis with gastroparesis and receives IVIG on a regular basis.  She also gives herself 1 L IV fluids daily when able.  She has a Dolan catheter in place.  Her insurance has denied her IVIG.  Erler's Danlos syndrome: She is having hyperreflexive joints and has frequent falls due to her knees not locking.  History of multiple subsegmental pulmonary emboli: Likely due to malfunctioning Dolan catheter.  She has had PE while on Eliquis, Lovenox, and warfarin.  She is now on Arixtra per Dr. Mcdonald.  She states she will remain on this lifelong.  Syncope: She is having episodes of passing out without warning while seated or laying down.  This is new.  These are brief and she comes too quickly.  The are happening daily.  She has not been able to restart Mestinon.    Ms. Lorenz is a patient of   [facility] with history of severe dysautonomia and POTS in the setting of autoimmune gastritis, gastroparesis, and Erler's Danlos syndrome.  She used to have heart rates up to 200s but is now better controlled on metoprolol scattered throughout the day.  She does have low blood pressure and is also on midodrine 15 mg every morning, 15 mg q. midday, and 10 mg every evening.  She is also on 0.1 mg fludrocortisone.      She has stopped Mestinon and is now having new episodes of syncope.  I I resent this sent to her pharmacy at 30 mg daily.  She is going to see Dr. Archer at the [place] [facility] and I have urged her to keep that appointment.  I have also urged her to continue with the daily IV fluid infusion.    She is concerned that her POTS symptoms were worse since she is no longer receiving IVIG.  I will send letter to Dr. Escobar's office that hopefully they can use for appeal of this medication.    I think she does a good job of managing her symptoms.  She has a plan in place for dealing with the hotter temperatures which also make her symptoms worse.  I am going to have her keep her July 2024 appointment with Dr. Wiley.    No follow-ups on file.  Orders Placed This Encounter   Procedures    ECG 12 Lead      New Medications Ordered This Visit   Medications    pyridostigmine (Mestinon) 60 MG tablet     Sig: Take 0.5 tablets by mouth Daily.     Dispense:  45 tablet     Refill:  3         Thank you for the opportunity to participate in this patient's care.    LALITO Land    This office note has been dictated.

## 2024-05-27 NOTE — PLAN OF CARE
Problem: Adult Inpatient Plan of Care  Goal: Patient-Specific Goal (Individualized)  Outcome: Ongoing, Progressing   Goal Outcome Evaluation:                  watery diarrhea

## 2024-06-11 ENCOUNTER — APPOINTMENT (OUTPATIENT)
Dept: GENERAL RADIOLOGY | Facility: HOSPITAL | Age: 29
End: 2024-06-11
Payer: COMMERCIAL

## 2024-06-11 ENCOUNTER — HOSPITAL ENCOUNTER (EMERGENCY)
Facility: HOSPITAL | Age: 29
Discharge: HOME | End: 2024-06-11
Attending: EMERGENCY MEDICINE | Admitting: EMERGENCY MEDICINE
Payer: COMMERCIAL

## 2024-06-11 VITALS
TEMPERATURE: 97 F | SYSTOLIC BLOOD PRESSURE: 181 MMHG | DIASTOLIC BLOOD PRESSURE: 91 MMHG | OXYGEN SATURATION: 95 % | RESPIRATION RATE: 16 BRPM | HEART RATE: 116 BPM

## 2024-06-11 DIAGNOSIS — R53.1 GENERAL WEAKNESS: Primary | ICD-10-CM

## 2024-06-11 DIAGNOSIS — R07.9 CHEST PAIN, UNSPECIFIED TYPE: ICD-10-CM

## 2024-06-11 LAB
ALBUMIN SERPL-MCNC: 4 G/DL (ref 3.5–5.2)
ALBUMIN/GLOB SERPL: 1.2 G/DL
ALP SERPL-CCNC: 93 U/L (ref 39–117)
ALT SERPL W P-5'-P-CCNC: 28 U/L (ref 1–33)
ANION GAP SERPL CALCULATED.3IONS-SCNC: 14.4 MMOL/L (ref 5–15)
AST SERPL-CCNC: 26 U/L (ref 1–32)
BASOPHILS # BLD AUTO: 0.06 10*3/MM3 (ref 0–0.2)
BASOPHILS NFR BLD AUTO: 0.6 % (ref 0–1.5)
BILIRUB SERPL-MCNC: 0.6 MG/DL (ref 0–1.2)
BUN SERPL-MCNC: 7 MG/DL (ref 6–20)
BUN/CREAT SERPL: 8 (ref 7–25)
CALCIUM SPEC-SCNC: 9.1 MG/DL (ref 8.6–10.5)
CHLORIDE SERPL-SCNC: 102 MMOL/L (ref 98–107)
CO2 SERPL-SCNC: 19.6 MMOL/L (ref 22–29)
CREAT SERPL-MCNC: 0.88 MG/DL (ref 0.57–1)
D DIMER PPP FEU-MCNC: <0.27 MCGFEU/ML (ref 0–0.5)
DEPRECATED RDW RBC AUTO: 43.7 FL (ref 37–54)
EGFRCR SERPLBLD CKD-EPI 2021: 91.4 ML/MIN/1.73
EOSINOPHIL # BLD AUTO: 0.05 10*3/MM3 (ref 0–0.4)
EOSINOPHIL NFR BLD AUTO: 0.5 % (ref 0.3–6.2)
ERYTHROCYTE [DISTWIDTH] IN BLOOD BY AUTOMATED COUNT: 17.6 % (ref 12.3–15.4)
GLOBULIN UR ELPH-MCNC: 3.4 GM/DL
GLUCOSE SERPL-MCNC: 93 MG/DL (ref 65–99)
HCG SERPL QL: NEGATIVE
HCT VFR BLD AUTO: 33.7 % (ref 34–46.6)
HGB BLD-MCNC: 10.2 G/DL (ref 12–15.9)
IMM GRANULOCYTES # BLD AUTO: 0.04 10*3/MM3 (ref 0–0.05)
IMM GRANULOCYTES NFR BLD AUTO: 0.4 % (ref 0–0.5)
LYMPHOCYTES # BLD AUTO: 1.7 10*3/MM3 (ref 0.7–3.1)
LYMPHOCYTES NFR BLD AUTO: 17.5 % (ref 19.6–45.3)
MAGNESIUM SERPL-MCNC: 1.9 MG/DL (ref 1.6–2.6)
MCH RBC QN AUTO: 21.4 PG (ref 26.6–33)
MCHC RBC AUTO-ENTMCNC: 30.3 G/DL (ref 31.5–35.7)
MCV RBC AUTO: 70.6 FL (ref 79–97)
MONOCYTES # BLD AUTO: 0.37 10*3/MM3 (ref 0.1–0.9)
MONOCYTES NFR BLD AUTO: 3.8 % (ref 5–12)
NEUTROPHILS NFR BLD AUTO: 7.52 10*3/MM3 (ref 1.7–7)
NEUTROPHILS NFR BLD AUTO: 77.2 % (ref 42.7–76)
PLATELET # BLD AUTO: 498 10*3/MM3 (ref 140–450)
PMV BLD AUTO: 10 FL (ref 6–12)
POTASSIUM SERPL-SCNC: 3.8 MMOL/L (ref 3.5–5.2)
PROT SERPL-MCNC: 7.4 G/DL (ref 6–8.5)
RBC # BLD AUTO: 4.77 10*6/MM3 (ref 3.77–5.28)
SODIUM SERPL-SCNC: 136 MMOL/L (ref 136–145)
TROPONIN T SERPL HS-MCNC: 16 NG/L
TROPONIN T SERPL HS-MCNC: 18 NG/L
WBC NRBC COR # BLD AUTO: 9.74 10*3/MM3 (ref 3.4–10.8)

## 2024-06-11 PROCEDURE — 36415 COLL VENOUS BLD VENIPUNCTURE: CPT

## 2024-06-11 PROCEDURE — 96375 TX/PRO/DX INJ NEW DRUG ADDON: CPT

## 2024-06-11 PROCEDURE — 85025 COMPLETE CBC W/AUTO DIFF WBC: CPT | Performed by: PHYSICIAN ASSISTANT

## 2024-06-11 PROCEDURE — 25010000002 ONDANSETRON PER 1 MG: Performed by: PHYSICIAN ASSISTANT

## 2024-06-11 PROCEDURE — 85379 FIBRIN DEGRADATION QUANT: CPT | Performed by: PHYSICIAN ASSISTANT

## 2024-06-11 PROCEDURE — 80053 COMPREHEN METABOLIC PANEL: CPT | Performed by: PHYSICIAN ASSISTANT

## 2024-06-11 PROCEDURE — 96365 THER/PROPH/DIAG IV INF INIT: CPT

## 2024-06-11 PROCEDURE — 84484 ASSAY OF TROPONIN QUANT: CPT | Performed by: PHYSICIAN ASSISTANT

## 2024-06-11 PROCEDURE — 25010000002 PROMETHAZINE PER 50 MG: Performed by: PHYSICIAN ASSISTANT

## 2024-06-11 PROCEDURE — 71045 X-RAY EXAM CHEST 1 VIEW: CPT

## 2024-06-11 PROCEDURE — 93010 ELECTROCARDIOGRAM REPORT: CPT | Performed by: INTERNAL MEDICINE

## 2024-06-11 PROCEDURE — 84703 CHORIONIC GONADOTROPIN ASSAY: CPT | Performed by: PHYSICIAN ASSISTANT

## 2024-06-11 PROCEDURE — 99284 EMERGENCY DEPT VISIT MOD MDM: CPT

## 2024-06-11 PROCEDURE — 25810000003 SODIUM CHLORIDE 0.9 % SOLUTION: Performed by: PHYSICIAN ASSISTANT

## 2024-06-11 PROCEDURE — 25010000002 PROCHLORPERAZINE 10 MG/2ML SOLUTION: Performed by: PHYSICIAN ASSISTANT

## 2024-06-11 PROCEDURE — 93005 ELECTROCARDIOGRAM TRACING: CPT | Performed by: PHYSICIAN ASSISTANT

## 2024-06-11 PROCEDURE — 83735 ASSAY OF MAGNESIUM: CPT | Performed by: PHYSICIAN ASSISTANT

## 2024-06-11 RX ORDER — ONDANSETRON 2 MG/ML
4 INJECTION INTRAMUSCULAR; INTRAVENOUS ONCE
Status: COMPLETED | OUTPATIENT
Start: 2024-06-11 | End: 2024-06-11

## 2024-06-11 RX ORDER — SODIUM CHLORIDE 0.9 % (FLUSH) 0.9 %
10 SYRINGE (ML) INJECTION AS NEEDED
Status: DISCONTINUED | OUTPATIENT
Start: 2024-06-11 | End: 2024-06-11 | Stop reason: HOSPADM

## 2024-06-11 RX ORDER — PROCHLORPERAZINE EDISYLATE 5 MG/ML
10 INJECTION INTRAMUSCULAR; INTRAVENOUS ONCE
Status: COMPLETED | OUTPATIENT
Start: 2024-06-11 | End: 2024-06-11

## 2024-06-11 RX ADMIN — PROMETHAZINE HYDROCHLORIDE 12.5 MG: 25 INJECTION INTRAMUSCULAR; INTRAVENOUS at 16:01

## 2024-06-11 RX ADMIN — SODIUM CHLORIDE 1000 ML: 9 INJECTION, SOLUTION INTRAVENOUS at 13:15

## 2024-06-11 RX ADMIN — PROCHLORPERAZINE EDISYLATE 10 MG: 5 INJECTION INTRAMUSCULAR; INTRAVENOUS at 14:27

## 2024-06-11 RX ADMIN — ONDANSETRON 4 MG: 2 INJECTION INTRAMUSCULAR; INTRAVENOUS at 13:38

## 2024-06-11 NOTE — ED NOTES
Patient to ER via ems from home for generalized weakness dizziness and chest pain x several days      Patient has addison cath in place for gonzalez and gastroparesis

## 2024-06-11 NOTE — ED PROVIDER NOTES
MD ATTESTATION NOTE    SHARED VISIT: This visit was performed by BOTH a physician and an APC. The substantive portion of the medical decision making was performed by this attesting physician who made or approved the management plan and takes responsibility for patient management. All studies documented in the APC note (if performed) were independently interpreted by me.    The APRIL and I have discussed this patient's history, physical exam, MDM, and treatment plan.  I have reviewed the documentation and personally had a face to face interaction with the patient. The attached note describes my personal findings.      Liliam Lorenz is a 29 y.o. female who presents to the ED c/o acute chest discomfort that feels like something is bubbling inside of her chest for the past 3 days.  It has been worsening which is why she is here.    On exam:  GENERAL: not distressed  HENT: nares patent  EYES: no scleral icterus  CV: regular rhythm, regular rate  RESPIRATORY: normal effort, clear to auscultation bilaterally  ABDOMEN: soft, nontender, obese abdomen  MUSCULOSKELETAL: no deformity, no lower extremity edema or tenderness  NEURO: alert, moves all extremities, follows commands  SKIN: warm, dry    Labs  Recent Results (from the past 24 hour(s))   ECG 12 Lead Syncope    Collection Time: 06/11/24 12:47 PM   Result Value Ref Range    QT Interval 367 ms    QTC Interval 481 ms   Comprehensive Metabolic Panel    Collection Time: 06/11/24  1:08 PM    Specimen: Blood   Result Value Ref Range    Glucose 93 65 - 99 mg/dL    BUN 7 6 - 20 mg/dL    Creatinine 0.88 0.57 - 1.00 mg/dL    Sodium 136 136 - 145 mmol/L    Potassium 3.8 3.5 - 5.2 mmol/L    Chloride 102 98 - 107 mmol/L    CO2 19.6 (L) 22.0 - 29.0 mmol/L    Calcium 9.1 8.6 - 10.5 mg/dL    Total Protein 7.4 6.0 - 8.5 g/dL    Albumin 4.0 3.5 - 5.2 g/dL    ALT (SGPT) 28 1 - 33 U/L    AST (SGOT) 26 1 - 32 U/L    Alkaline Phosphatase 93 39 - 117 U/L    Total Bilirubin 0.6 0.0 - 1.2 mg/dL     Globulin 3.4 gm/dL    A/G Ratio 1.2 g/dL    BUN/Creatinine Ratio 8.0 7.0 - 25.0    Anion Gap 14.4 5.0 - 15.0 mmol/L    eGFR 91.4 >60.0 mL/min/1.73   hCG, Serum, Qualitative    Collection Time: 06/11/24  1:08 PM    Specimen: Blood   Result Value Ref Range    HCG Qualitative Negative Negative   D-dimer, Quantitative    Collection Time: 06/11/24  1:08 PM    Specimen: Blood   Result Value Ref Range    D-Dimer, Quantitative <0.27 0.00 - 0.50 MCGFEU/mL   Single High Sensitivity Troponin T    Collection Time: 06/11/24  1:08 PM    Specimen: Blood   Result Value Ref Range    HS Troponin T 16 (H) <14 ng/L   Magnesium    Collection Time: 06/11/24  1:08 PM    Specimen: Blood   Result Value Ref Range    Magnesium 1.9 1.6 - 2.6 mg/dL   CBC Auto Differential    Collection Time: 06/11/24  1:08 PM    Specimen: Blood   Result Value Ref Range    WBC 9.74 3.40 - 10.80 10*3/mm3    RBC 4.77 3.77 - 5.28 10*6/mm3    Hemoglobin 10.2 (L) 12.0 - 15.9 g/dL    Hematocrit 33.7 (L) 34.0 - 46.6 %    MCV 70.6 (L) 79.0 - 97.0 fL    MCH 21.4 (L) 26.6 - 33.0 pg    MCHC 30.3 (L) 31.5 - 35.7 g/dL    RDW 17.6 (H) 12.3 - 15.4 %    RDW-SD 43.7 37.0 - 54.0 fl    MPV 10.0 6.0 - 12.0 fL    Platelets 498 (H) 140 - 450 10*3/mm3    Neutrophil % 77.2 (H) 42.7 - 76.0 %    Lymphocyte % 17.5 (L) 19.6 - 45.3 %    Monocyte % 3.8 (L) 5.0 - 12.0 %    Eosinophil % 0.5 0.3 - 6.2 %    Basophil % 0.6 0.0 - 1.5 %    Immature Grans % 0.4 0.0 - 0.5 %    Neutrophils, Absolute 7.52 (H) 1.70 - 7.00 10*3/mm3    Lymphocytes, Absolute 1.70 0.70 - 3.10 10*3/mm3    Monocytes, Absolute 0.37 0.10 - 0.90 10*3/mm3    Eosinophils, Absolute 0.05 0.00 - 0.40 10*3/mm3    Basophils, Absolute 0.06 0.00 - 0.20 10*3/mm3    Immature Grans, Absolute 0.04 0.00 - 0.05 10*3/mm3       Radiology  XR Chest 1 View    Result Date: 6/11/2024  XR CHEST 1 VW-  HISTORY: Female who is 29 years-old, chest pain  TECHNIQUE: Frontal view of the chest  COMPARISON: 12/14/2023  FINDINGS: The right IJ catheter extends  to the superior vena cava. The heart size is borderline. Pulmonary vasculature is unremarkable. No focal pulmonary consolidation, pleural effusion, or pneumothorax. No acute osseous process.      No focal pulmonary consolidation. Borderline heart size. Follow-up as clinical indications persist.  This report was finalized on 6/11/2024 1:16 PM by Dr. Chacho Christie M.D on Workstation: [id number]       Medications given in the ED:  Medications   sodium chloride 0.9 % flush 10 mL (has no administration in time range)   sodium chloride 0.9 % bolus 1,000 mL (1,000 mL Intravenous New Bag 6/11/24 1315)   ondansetron (ZOFRAN) injection 4 mg (4 mg Intravenous Given 6/11/24 1338)   prochlorperazine (COMPAZINE) injection 10 mg (10 mg Intravenous Given 6/11/24 1427)       Orders placed during this visit:  Orders Placed This Encounter   Procedures    XR Chest 1 View    Comprehensive Metabolic Panel    hCG, Serum, Qualitative    D-dimer, Quantitative    Single High Sensitivity Troponin T    Magnesium    CBC Auto Differential    Single High Sensitivity Troponin T    ECG 12 Lead Syncope    Insert Peripheral IV    CBC & Differential       Medical Decision Making:  ED Course as of 06/12/24 2241 Tue Jun 11, 2024   1300 Patient presents with a 2-day history of episodic lightheadedness, chest pain.  Differential diagnoses include but not limited to orthostatic hypotension, PE, pneumonia.  Patient does have a history of POTS.  Vitals stable at this time. [EE]   1336 Chest x-ray independently interpreted myself shows no acute abnormality. [EE]   1336 WBC: 9.74 [EE]   1336 Hemoglobin(!): 10.2 [EE]   1337 EKG independently interpreted by myself.  Time 12:47 PM.  Sinus rhythm.  Heart rate 103.  Low voltage in the precordial leads.  No acute ST abnormality. [TD]   1406 D-Dimer, Quant: <0.27 [EE]   1406 Magnesium: 1.9 [EE]   1501 Upon standing, heart rate increased from 108 to 140.  She nearly passed out upon standing while trying to go to  the bathroom.  I had asked the nurse to perform ambulatory saturations was unable to complete this due to the aforementioned symptoms. [TD]   1853 Recheck of patient.  She states that she is feeling better.  I offered her more fluids however she would rather go home and administer her own fluids if needed.  We will discharge.  She does have appropriate follow-up. [EE]      ED Course User Index  [EE] Anibal Koehler PA  [TD] Moe Jeffers II, MD         Diagnosis  Final diagnoses:   General weakness   Chest pain, unspecified type          Moe Jeffers II, MD  06/12/24 3937

## 2024-06-11 NOTE — ED PROVIDER NOTES
EMERGENCY DEPARTMENT ENCOUNTER    Room Number:  43/43  Date of encounter:  6/11/2024  PCP: Jason Borrero MD  Historian: Patient, family  Chronic or social conditions impacting care (social determinants of health): None    HPI:  Chief Complaint: Weakness, lightheadedness  A complete HPI/ROS/PMH/PSH/SH/FH are unobtainable due to: Nothing    Context: Liliam Lorenz is a 29 y.o. female with a history of POTS, Phoebe-Danlos, gastroparesis, PE.  She presents to the ED c/o acute episodic lightheadedness, palpitations, shortness of breath, chest pain.  Patient states the symptoms seem to come and go without any precipitating factors.  Patient did have her port replaced last week.  She has had nausea for denies any vomiting.  She is on Arixtra for PE prophylaxis.    Review of prior external notes (non-ED):   I reviewed primary care office visit from 6/6/2024.  Patient treated for bronchitis.    Review of prior external test results outside of this encounter:  Reviewed a CMP from 5/13/2024.  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    Nausea and vomiting 08/22/2017    Gastroparesis 08/22/2017    Postural orthostatic tachycardia syndrome 06/12/2020    Dolan catheter dysfunction 10/25/2021    Febrile illness, acute 01/18/2022    Single subsegmental pulmonary embolism without acute cor pulmonale 04/30/2022    Cellulitis of chest wall 01/09/2023    Pulmonary embolus 03/02/2023     Resolved Ambulatory Problems     Diagnosis Date Noted    No Resolved Ambulatory Problems     Past Medical History:   Diagnosis Date    Bronchitis     Chest pain     Chronic hypotension     Eczema     GERD  (gastroesophageal reflux disease)     History of pulmonary embolus (PE)     IBS (irritable bowel syndrome)     Lightheadedness     Rectal fissure     Rosacea     Sleep apnea     Tachycardia          PAST SURGICAL HISTORY  Past Surgical History:   Procedure Laterality Date    COLONOSCOPY      MEDIPORT INSERTION, DOUBLE  09/01/2020    [facility]     TUNNELED VENOUS CATHETER PLACEMENT N/A 12/14/2023    Procedure: EXCHANGE OF VALVERDE CATHETER;  Surgeon: Lorne Cordova MD;  Location: [facility] MAIN OR;  Service: General;  Laterality: N/A;    UPPER GASTROINTESTINAL ENDOSCOPY      VENOUS ACCESS DEVICE (PORT) INSERTION Left 10/16/2020    Procedure: INSERTION VENOUS ACCESS DEVICE UNDER FLURO;  Surgeon: Pa Lane MD;  Location: [facility] MAIN OR;  Service: General;  Laterality: Left;    VENOUS ACCESS DEVICE (PORT) INSERTION Right 10/26/2021    Procedure: REMOVAL AND INSERTION OF VALVERDE CATHETER;  Surgeon: Pa Lane MD;  Location: [facility] MAIN OR;  Service: General;  Laterality: Right;    VENOUS ACCESS DEVICE (PORT) INSERTION Left 1/13/2023    Procedure: INSERTION OF VALVERDE CATHETER, VENOGARM;  Surgeon: Cristhian Anne MD;  Location: [facility] MAIN OR;  Service: General;  Laterality: Left;         FAMILY HISTORY  Family History   Problem Relation Age of Onset    Hypertension Mother     Diabetes Mother     Hypertension Father     Diabetes Father     Coronary artery disease Maternal Uncle         Maternal uncle with MI    Hypertension Maternal Grandmother     Coronary artery disease Maternal Grandmother         MGM with 4 vessel CABG and multiple stents    Cancer Maternal Grandmother     Stroke Maternal Grandmother     Coronary artery disease Maternal Grandfather     Breast cancer Other     Malig Hyperthermia Neg Hx          SOCIAL HISTORY  Social History     Socioeconomic History    Marital status:     Number of children: 0   Tobacco Use    Smoking status: Never     Passive exposure: Never     Smokeless tobacco: Never    Tobacco comments:     Caffeine: 1 serving daily   Vaping Use    Vaping status: Never Used   Substance and Sexual Activity    Alcohol use: No    Drug use: No    Sexual activity: Yes     Partners: Female     Birth control/protection: None         ALLERGIES  Egg-derived products, Tape, Pepcid [famotidine], and Scopolamine        REVIEW OF SYSTEMS  All systems reviewed and negative except for those discussed in HPI.       PHYSICAL EXAM    I have reviewed the triage vital signs and nursing notes.    ED Triage Vitals [06/11/24 1227]   Temp Heart Rate Resp BP SpO2   97 °F (36.1 °C) 114 16 144/88 96 %      Temp src Heart Rate Source Patient Position BP Location FiO2 (%)   -- -- -- -- --       Physical Exam  GENERAL: Alert, oriented, nontoxic-appearing, not distressed  HENT: head atraumatic, no nuchal rigidity  EYES: no scleral icterus, EOMI  CV: regular rhythm, tachycardic rate, no murmur  RESPIRATORY: normal effort, CTA.  Port in right upper chest wall without surrounding erythema.  ABDOMEN: soft, nontender  MUSCULOSKELETAL: no deformity, FROM, no calf swelling or tenderness  NEURO: alert, moves all extremities, follows commands  SKIN: warm, dry        LAB RESULTS  Recent Results (from the past 24 hour(s))   ECG 12 Lead Syncope    Collection Time: 06/11/24 12:47 PM   Result Value Ref Range    QT Interval 367 ms    QTC Interval 481 ms   Comprehensive Metabolic Panel    Collection Time: 06/11/24  1:08 PM    Specimen: Blood   Result Value Ref Range    Glucose 93 65 - 99 mg/dL    BUN 7 6 - 20 mg/dL    Creatinine 0.88 0.57 - 1.00 mg/dL    Sodium 136 136 - 145 mmol/L    Potassium 3.8 3.5 - 5.2 mmol/L    Chloride 102 98 - 107 mmol/L    CO2 19.6 (L) 22.0 - 29.0 mmol/L    Calcium 9.1 8.6 - 10.5 mg/dL    Total Protein 7.4 6.0 - 8.5 g/dL    Albumin 4.0 3.5 - 5.2 g/dL    ALT (SGPT) 28 1 - 33 U/L    AST (SGOT) 26 1 - 32 U/L    Alkaline Phosphatase 93 39 - 117 U/L    Total Bilirubin 0.6 0.0 - 1.2 mg/dL     Globulin 3.4 gm/dL    A/G Ratio 1.2 g/dL    BUN/Creatinine Ratio 8.0 7.0 - 25.0    Anion Gap 14.4 5.0 - 15.0 mmol/L    eGFR 91.4 >60.0 mL/min/1.73   hCG, Serum, Qualitative    Collection Time: 06/11/24  1:08 PM    Specimen: Blood   Result Value Ref Range    HCG Qualitative Negative Negative   D-dimer, Quantitative    Collection Time: 06/11/24  1:08 PM    Specimen: Blood   Result Value Ref Range    D-Dimer, Quantitative <0.27 0.00 - 0.50 MCGFEU/mL   Single High Sensitivity Troponin T    Collection Time: 06/11/24  1:08 PM    Specimen: Blood   Result Value Ref Range    HS Troponin T 16 (H) <14 ng/L   Magnesium    Collection Time: 06/11/24  1:08 PM    Specimen: Blood   Result Value Ref Range    Magnesium 1.9 1.6 - 2.6 mg/dL   CBC Auto Differential    Collection Time: 06/11/24  1:08 PM    Specimen: Blood   Result Value Ref Range    WBC 9.74 3.40 - 10.80 10*3/mm3    RBC 4.77 3.77 - 5.28 10*6/mm3    Hemoglobin 10.2 (L) 12.0 - 15.9 g/dL    Hematocrit 33.7 (L) 34.0 - 46.6 %    MCV 70.6 (L) 79.0 - 97.0 fL    MCH 21.4 (L) 26.6 - 33.0 pg    MCHC 30.3 (L) 31.5 - 35.7 g/dL    RDW 17.6 (H) 12.3 - 15.4 %    RDW-SD 43.7 37.0 - 54.0 fl    MPV 10.0 6.0 - 12.0 fL    Platelets 498 (H) 140 - 450 10*3/mm3    Neutrophil % 77.2 (H) 42.7 - 76.0 %    Lymphocyte % 17.5 (L) 19.6 - 45.3 %    Monocyte % 3.8 (L) 5.0 - 12.0 %    Eosinophil % 0.5 0.3 - 6.2 %    Basophil % 0.6 0.0 - 1.5 %    Immature Grans % 0.4 0.0 - 0.5 %    Neutrophils, Absolute 7.52 (H) 1.70 - 7.00 10*3/mm3    Lymphocytes, Absolute 1.70 0.70 - 3.10 10*3/mm3    Monocytes, Absolute 0.37 0.10 - 0.90 10*3/mm3    Eosinophils, Absolute 0.05 0.00 - 0.40 10*3/mm3    Basophils, Absolute 0.06 0.00 - 0.20 10*3/mm3    Immature Grans, Absolute 0.04 0.00 - 0.05 10*3/mm3   Single High Sensitivity Troponin T    Collection Time: 06/11/24  3:22 PM    Specimen: Blood   Result Value Ref Range    HS Troponin T 18 (H) <14 ng/L       Ordered the above labs and independently reviewed the  results.        RADIOLOGY  XR Chest 1 View    Result Date: 6/11/2024  XR CHEST 1 VW-  HISTORY: Female who is 29 years-old, chest pain  TECHNIQUE: Frontal view of the chest  COMPARISON: 12/14/2023  FINDINGS: The right IJ catheter extends to the superior vena cava. The heart size is borderline. Pulmonary vasculature is unremarkable. No focal pulmonary consolidation, pleural effusion, or pneumothorax. No acute osseous process.      No focal pulmonary consolidation. Borderline heart size. Follow-up as clinical indications persist.  This report was finalized on 6/11/2024 1:16 PM by Dr. Chacho Christie M.D on Workstation: [employer]       I ordered the above noted radiological studies. Reviewed by me and discussed with radiologist.  See dictation for official radiology interpretation.      MEDICATIONS GIVEN IN ER    Medications   sodium chloride 0.9 % flush 10 mL (has no administration in time range)   sodium chloride 0.9 % bolus 1,000 mL (0 mL Intravenous Stopped 6/11/24 1813)   ondansetron (ZOFRAN) injection 4 mg (4 mg Intravenous Given 6/11/24 1338)   prochlorperazine (COMPAZINE) injection 10 mg (10 mg Intravenous Given 6/11/24 1427)   promethazine (PHENERGAN) 12.5 mg in sodium chloride 0.9 % 50 mL (0 mg Intravenous Stopped 6/11/24 1813)         ADDITIONAL ORDERS CONSIDERED BUT NOT ORDERED:  Considered admission however patient has a fairly benign workup.  No evidence of ACS or PE.      PROGRESS, DATA ANALYSIS, CONSULTS, AND MEDICAL DECISION MAKING    All labs have been independently interpreted by myself.  All radiology studies have been independently interpreted by myself and discussed with radiologist dictating the report.   EKG's independently interpreted by myself.  Discussion below represents my analysis of pertinent findings related to patient's condition, differential diagnosis, treatment plan and final disposition.    I have discussed case with Dr. Jeffers, emergency room physician.  He has performed his own  bedside examination and agrees with treatment plan.    ED Course as of 06/11/24 1913 Tue Jun 11, 2024   1300 Patient presents with a 2-day history of episodic lightheadedness, chest pain.  Differential diagnoses include but not limited to orthostatic hypotension, PE, pneumonia.  Patient does have a history of POTS.  Vitals stable at this time. [EE]   1336 Chest x-ray independently interpreted myself shows no acute abnormality. [EE]   1336 WBC: 9.74 [EE]   1336 Hemoglobin(!): 10.2 [EE]   1337 EKG independently interpreted by myself.  Time 12:47 PM.  Sinus rhythm.  Heart rate 103.  Low voltage in the precordial leads.  No acute ST abnormality. [TD]   1406 D-Dimer, Quant: <0.27 [EE]   1406 Magnesium: 1.9 [EE]   1501 Upon standing, heart rate increased from 108 to 140.  She nearly passed out upon standing while trying to go to the bathroom.  I had asked the nurse to perform ambulatory saturations was unable to complete this due to the aforementioned symptoms. [TD]   1853 Recheck of patient.  She states that she is feeling better.  I offered her more fluids however she would rather go home and administer her own fluids if needed.  We will discharge.  She does have appropriate follow-up. [EE]      ED Course User Index  [EE] Anibal Koehler PA  [TD] Moe Jeffers II, MD       AS OF 19:13 EDT VITALS:    BP - (!) 181/91  HR - 116  TEMP - 97 °F (36.1 °C)  O2 SATS - 95%        DIAGNOSIS  Final diagnoses:   General weakness   Chest pain, unspecified type         DISPOSITION  Discharged    Admission was considered but after careful review of the patient's presentation, physical examination, diagnostic results, and response to treatment the patient may be safely discharged with outpatient follow-up.         Dictated utilizing Dragon dictation     Anibal Koehler PA  06/11/24 1914

## 2024-06-12 LAB
QT INTERVAL: 367 MS
QTC INTERVAL: 481 MS

## 2024-08-15 ENCOUNTER — HOSPITAL ENCOUNTER (EMERGENCY)
Facility: HOSPITAL | Age: 29
Discharge: HOME | End: 2024-08-16
Attending: EMERGENCY MEDICINE
Payer: COMMERCIAL

## 2024-08-15 ENCOUNTER — APPOINTMENT (OUTPATIENT)
Dept: GENERAL RADIOLOGY | Facility: HOSPITAL | Age: 29
End: 2024-08-15
Payer: COMMERCIAL

## 2024-08-15 DIAGNOSIS — R07.89 ATYPICAL CHEST PAIN: Primary | ICD-10-CM

## 2024-08-15 DIAGNOSIS — F41.9 ANXIETY: ICD-10-CM

## 2024-08-15 PROCEDURE — 96374 THER/PROPH/DIAG INJ IV PUSH: CPT

## 2024-08-15 PROCEDURE — 93005 ELECTROCARDIOGRAM TRACING: CPT | Performed by: EMERGENCY MEDICINE

## 2024-08-15 PROCEDURE — 99285 EMERGENCY DEPT VISIT HI MDM: CPT

## 2024-08-15 PROCEDURE — 36415 COLL VENOUS BLD VENIPUNCTURE: CPT

## 2024-08-15 PROCEDURE — 71045 X-RAY EXAM CHEST 1 VIEW: CPT

## 2024-08-16 ENCOUNTER — APPOINTMENT (OUTPATIENT)
Dept: CT IMAGING | Facility: HOSPITAL | Age: 29
End: 2024-08-16
Payer: COMMERCIAL

## 2024-08-16 ENCOUNTER — TELEPHONE (OUTPATIENT)
Dept: FAMILY MEDICINE CLINIC | Facility: CLINIC | Age: 29
End: 2024-08-16
Payer: COMMERCIAL

## 2024-08-16 VITALS
DIASTOLIC BLOOD PRESSURE: 82 MMHG | TEMPERATURE: 98.9 F | HEART RATE: 99 BPM | RESPIRATION RATE: 18 BRPM | BODY MASS INDEX: 42 KG/M2 | HEIGHT: 64 IN | SYSTOLIC BLOOD PRESSURE: 110 MMHG | WEIGHT: 246 LBS | OXYGEN SATURATION: 94 %

## 2024-08-16 LAB
ALBUMIN SERPL-MCNC: 4.4 G/DL (ref 3.5–5.2)
ALBUMIN/GLOB SERPL: 1.4 G/DL
ALP SERPL-CCNC: 125 U/L (ref 39–117)
ALT SERPL W P-5'-P-CCNC: 77 U/L (ref 1–33)
ANION GAP SERPL CALCULATED.3IONS-SCNC: 15 MMOL/L (ref 5–15)
APTT PPP: 40.2 SECONDS (ref 22.7–35.4)
AST SERPL-CCNC: 44 U/L (ref 1–32)
BASOPHILS # BLD AUTO: 0.07 10*3/MM3 (ref 0–0.2)
BASOPHILS NFR BLD AUTO: 0.5 % (ref 0–1.5)
BILIRUB SERPL-MCNC: 0.5 MG/DL (ref 0–1.2)
BUN SERPL-MCNC: 8 MG/DL (ref 6–20)
BUN/CREAT SERPL: 10.5 (ref 7–25)
CALCIUM SPEC-SCNC: 10.1 MG/DL (ref 8.6–10.5)
CHLORIDE SERPL-SCNC: 100 MMOL/L (ref 98–107)
CO2 SERPL-SCNC: 20 MMOL/L (ref 22–29)
CREAT SERPL-MCNC: 0.76 MG/DL (ref 0.57–1)
DEPRECATED RDW RBC AUTO: 65.2 FL (ref 37–54)
EGFRCR SERPLBLD CKD-EPI 2021: 108.9 ML/MIN/1.73
EOSINOPHIL # BLD AUTO: 0.09 10*3/MM3 (ref 0–0.4)
EOSINOPHIL NFR BLD AUTO: 0.6 % (ref 0.3–6.2)
ERYTHROCYTE [DISTWIDTH] IN BLOOD BY AUTOMATED COUNT: 23 % (ref 12.3–15.4)
GEN 5 2HR TROPONIN T REFLEX: 7 NG/L
GLOBULIN UR ELPH-MCNC: 3.1 GM/DL
GLUCOSE SERPL-MCNC: 113 MG/DL (ref 65–99)
HCG SERPL QL: NEGATIVE
HCT VFR BLD AUTO: 43.7 % (ref 34–46.6)
HGB BLD-MCNC: 14.5 G/DL (ref 12–15.9)
IMM GRANULOCYTES # BLD AUTO: 0.07 10*3/MM3 (ref 0–0.05)
IMM GRANULOCYTES NFR BLD AUTO: 0.5 % (ref 0–0.5)
INR PPP: 1.1 (ref 0.9–1.1)
LYMPHOCYTES # BLD AUTO: 1.97 10*3/MM3 (ref 0.7–3.1)
LYMPHOCYTES NFR BLD AUTO: 14.1 % (ref 19.6–45.3)
MCH RBC QN AUTO: 27.3 PG (ref 26.6–33)
MCHC RBC AUTO-ENTMCNC: 33.2 G/DL (ref 31.5–35.7)
MCV RBC AUTO: 82.3 FL (ref 79–97)
MONOCYTES # BLD AUTO: 0.61 10*3/MM3 (ref 0.1–0.9)
MONOCYTES NFR BLD AUTO: 4.4 % (ref 5–12)
NEUTROPHILS NFR BLD AUTO: 11.13 10*3/MM3 (ref 1.7–7)
NEUTROPHILS NFR BLD AUTO: 79.9 % (ref 42.7–76)
NRBC BLD AUTO-RTO: 0 /100 WBC (ref 0–0.2)
PLATELET # BLD AUTO: 372 10*3/MM3 (ref 140–450)
PMV BLD AUTO: 9.9 FL (ref 6–12)
POTASSIUM SERPL-SCNC: 3.9 MMOL/L (ref 3.5–5.2)
PROCALCITONIN SERPL-MCNC: 0.04 NG/ML (ref 0–0.25)
PROT SERPL-MCNC: 7.5 G/DL (ref 6–8.5)
PROTHROMBIN TIME: 14.4 SECONDS (ref 11.7–14.2)
RBC # BLD AUTO: 5.31 10*6/MM3 (ref 3.77–5.28)
SODIUM SERPL-SCNC: 135 MMOL/L (ref 136–145)
TROPONIN T DELTA: 1 NG/L
TROPONIN T SERPL HS-MCNC: 6 NG/L
WBC NRBC COR # BLD AUTO: 13.94 10*3/MM3 (ref 3.4–10.8)

## 2024-08-16 PROCEDURE — 85730 THROMBOPLASTIN TIME PARTIAL: CPT | Performed by: EMERGENCY MEDICINE

## 2024-08-16 PROCEDURE — 71275 CT ANGIOGRAPHY CHEST: CPT

## 2024-08-16 PROCEDURE — 96374 THER/PROPH/DIAG INJ IV PUSH: CPT

## 2024-08-16 PROCEDURE — 25010000002 LORAZEPAM PER 2 MG: Performed by: EMERGENCY MEDICINE

## 2024-08-16 PROCEDURE — 84484 ASSAY OF TROPONIN QUANT: CPT | Performed by: EMERGENCY MEDICINE

## 2024-08-16 PROCEDURE — 93010 ELECTROCARDIOGRAM REPORT: CPT | Performed by: INTERNAL MEDICINE

## 2024-08-16 PROCEDURE — 84703 CHORIONIC GONADOTROPIN ASSAY: CPT | Performed by: EMERGENCY MEDICINE

## 2024-08-16 PROCEDURE — 36415 COLL VENOUS BLD VENIPUNCTURE: CPT

## 2024-08-16 PROCEDURE — 25510000001 IOPAMIDOL PER 1 ML: Performed by: EMERGENCY MEDICINE

## 2024-08-16 PROCEDURE — 85025 COMPLETE CBC W/AUTO DIFF WBC: CPT | Performed by: EMERGENCY MEDICINE

## 2024-08-16 PROCEDURE — 80053 COMPREHEN METABOLIC PANEL: CPT | Performed by: EMERGENCY MEDICINE

## 2024-08-16 PROCEDURE — 85610 PROTHROMBIN TIME: CPT | Performed by: EMERGENCY MEDICINE

## 2024-08-16 PROCEDURE — 84145 PROCALCITONIN (PCT): CPT | Performed by: EMERGENCY MEDICINE

## 2024-08-16 PROCEDURE — 93005 ELECTROCARDIOGRAM TRACING: CPT

## 2024-08-16 RX ORDER — LORAZEPAM 2 MG/ML
1 INJECTION INTRAMUSCULAR ONCE
Status: COMPLETED | OUTPATIENT
Start: 2024-08-16 | End: 2024-08-16

## 2024-08-16 RX ORDER — IOPAMIDOL 755 MG/ML
100 INJECTION, SOLUTION INTRAVASCULAR
Status: COMPLETED | OUTPATIENT
Start: 2024-08-16 | End: 2024-08-16

## 2024-08-16 RX ADMIN — IOPAMIDOL 95 ML: 755 INJECTION, SOLUTION INTRAVENOUS at 00:54

## 2024-08-16 RX ADMIN — LORAZEPAM 1 MG: 2 INJECTION INTRAMUSCULAR; INTRAVENOUS at 00:34

## 2024-08-16 NOTE — ED TRIAGE NOTES
Pt to ED from home via EMS with complaints of chest pain. Worse with inspiration. Pt states it feels like burning sensation. Chest pain started a few days ago but got worse tonight around 2000. Hx of gastroparesis and has port on right side anterior chest. Pt appears to be anxious during time of triage. States she thought it was a panic attack at first and took her anxiety meds with no relief.

## 2024-08-16 NOTE — DISCHARGE INSTRUCTIONS
Please follow-up with your PCP.    Although you are being discharged in the ED today, I encouraged her to return for worsening symptoms.  Things can, and do, change such a treatment at home with medication may not be adequate.  Specifically I recommend returning for chest pain or discomfort, difficulty breathing, persistent vomiting or difficulty holding down liquids or medications, fever > 102.0 F,  or any other worsening or alarming symptoms.       You have been evaluated in the emergency department for your presenting symptoms and deemed appropriate for discharge home.  Understand that your health care does not end here today.  It is important that your primary care physician be made aware of your visit.  I recommend calling your primary care provider in the next 48 hours to arrange follow-up care.  Your primary care provider needs to review your treatment and recovery to ensure that no further treatment is necessary.  Additional testing or procedures may be necessary as an outpatient at the discretion of your primary care provider.    I also recommend following up with your PCP for recheck of your blood pressure and treatment as needed.

## 2024-08-16 NOTE — ED PROVIDER NOTES
I supervised care provided by the midlevel provider.   We have discussed this patient's history, physical exam, and treatment plan.  I have reviewed the note and personally saw and examined the patient and agree with the plan of care.   Patient and family members.  She was sitting down watching TV at about 8:00 started getting some chest discomfort.  It is across to her chest and is described as a burning and a pressure.  The burning causes warmth in her chest and the warmth goes throughout her body.  She also has anxiety and feels like she is breathing fast.  She does have a history of panic attacks and anxiety attacks and she has a hard time discerning between whether she is having a panic attack or pulmonary embolism.  She has had multiple PEs in the past.  She is currently on Arixtra in which she takes an injection once a day.  She has not missed any of her Arixtra doses.  She has not had a pulmonary embolism in over 1 year.  She has had problems with pulmonary embolisms when she was on Coumadin as well as Xarelto and Eliquis in the past per her history.  She complains of little bit of nausea but no vomiting.  Her pain is not definitively worse with deep breathing.  She has had no fevers or chills or coughs or colds.    GENERAL: This patient looks anxious.  When I am seeing her heart rates about 1 15-1 20 and is sinus tachycardia in the monitor.  O2 sats 100% on room air.  Blood pressure is unremarkable.  She is afebrile.  HENT: nares patent  Head/neck/ face are symmetric without gross deformity or swelling  EYES: no scleral icterus  CV: She is tachycardic.  I do not hear a murmur or rub.  Pain is not reproduced with palpation.  She has a central line in her right chest.  She states that is currently not functioning.  RESPIRATORY: Hyperventilation.  O2 sats 100% on room air.  Lungs are clear to auscultation bilaterally  ABDOMEN: soft and nontender patient is morbidly obese.  MUSCULOSKELETAL: no deformity.   Intact distal pulses that are equal strong and symmetric.  NEURO: alert and appropriate, moves all extremities, follows commands.  No focal motor or sensory changes  SKIN: warm, dry    Vital signs and nursing notes reviewed.    Plan   ED Course as of 08/16/24 0009   Thu Aug 15, 2024   2348 I discussed the case with Dr. Oneal and he agrees to evaluate the patient at the bedside.    [CC]   Fri Aug 16, 2024   0004 My own independent or potation of the chest x-ray is unremarkable.  I did not see any focal pulmonary consolidation and pneumothorax.  Patient does have a chest central line in the right side of her chest. [MM]      ED Course User Index  [CC] Raquel Sims, ANGEL  [MM] Ibrahima Oneal MD         I think it is very possible this patient is probably having panic and anxiety as the cause of her symptoms.  She again states that is hard time for her to discern between either 1 of those.  She has been compliant with Arixtra and it makes me think that this is unlikely that she has a pulmonary embolism.  She really would prefer us to do a test to make sure she does not have a PE.  Will also get a give her a dose of some Ativan.  She did take a dose of hydroxyzine prior to arrival here that she takes for her anxiety.  Informed her and significant other and family in the room about the test that we will order.  She currently is under tremendous amount of stress.  She states that she has had 5 close friends and family members die within this past 1 to 2 months.  One of her very close friends is currently on a ventilator as well.  Informed her again of the test that we will order.  All questions answered.  Medical history reviewed:  I reviewed the note from Dr. Robert Ulloa who is a hematologist at [facility].  She has a history of low iron is received iron infusions in the past she does have a right chest central line.  She has a history of inflammatory dental disease, GERD, Raynaud's disease fibromyalgias hypermobile  EDS and hemiplegic migraines she was seeing hematology for unprovoked bilateral pulmonary emboli's in September 2021 and according to this note she was seen in [place] in 2022.  She was on warfarin but had a hard time controlling INR's and going to switch to a NOAC such as Xarelto or Eliquis.  I can see that she was seen here on 6/11/2024 with chest pain that been going on for 3 days prior.  Patient's dimer was negative she again was anticoagulated at this time.  She was discharged home.  I can see she was seen also in our ER on October 17, 2023 with chest pain that was going on for a couple days.  She has had other visits to our ER with chest pain with different type of etiologies in description.  She did have an echo on 6/14/2023 which showed a normal left ventricular ejection fraction left ventricular diastolic function was normal   for 3 days prior.  Patient's dimer was negative she again was anticoagulated at this time.  She was discharged home.  I can see she was seen also in our ER on October 17, 2023 with chest pain that was going on for a couple days.  She has had other visits to our ER with chest pain with different type of etiologies in description.  She did have an echo on 6/14/2023 which showed a normal left ventricular ejection fraction left ventricular diastolic function was normal     Ibrahima Oneal MD  08/21/24 1939

## 2024-08-16 NOTE — ED PROVIDER NOTES
EMERGENCY DEPARTMENT ENCOUNTER  Room Number:  03/03  PCP: Jason Borrero MD  Independent Historians: Patient      HPI:  Chief Complaint: had concerns including Chest Pain.     A complete HPI/ROS/PMH/PSH/SH/FH are unobtainable due to: None    Chronic or social conditions impacting patient care (Social Determinants of Health): None      Context: Liliam Lorenz is a 29 y.o. female with a medical history of pulmonary embolism, Phoebe-Danlos syndrome, gastroparesis, and POTS presents emergency department today with sudden onset chest pressure that began while watching TV tonight.  Patient describes the pain as a burning and pressure in her chest.  She says  when it started she felt like she was getting waves of heat throughout her body.  She says this caused her to become very anxious.  She says she is having a hard time differentiating between pain associated with her pulmonary emboli and panic attacks.  Patient has failed multiple anticoagulants in the past and has had multiple pulmonary emboli.  She is currently on Arixtra and says she and takes an injection every day and has not missed any doses.  She has not had a PE in over a year.  Patient follows with hematology oncology with [facility].  She reports nausea associated with her gastroparesis but no vomiting.  She denies pleuritic chest pain but says it just feels heavy when she breathes.  She denies cough, fever, or chills.  She denies any known sick contacts.      Review of prior external notes (non-ED) -and- Review of prior external test results outside of this encounter:   I reviewed the oncology office visit from 8/2/2024 where patient was seen in follow-up for her anticoagulant therapy.  Patient is currently on Arixtra for anticoagulation.  She was unable to use warfarin because of gastroparesis and failed Lovenox.  Her gastroparesis is managed by Dr. Escobar at [facility] with IVIG.  She has also had recurrent thrombosis on Xarelto and Eliquis.    I reviewed  labs from 8/2/2024, hemoglobin 10.2, iron 55      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    Nausea and vomiting 08/22/2017    Gastroparesis 08/22/2017    Postural orthostatic tachycardia syndrome 06/12/2020    Valverde catheter dysfunction 10/25/2021    Febrile illness, acute 01/18/2022    Single subsegmental pulmonary embolism without acute cor pulmonale 04/30/2022    Cellulitis of chest wall 01/09/2023    Pulmonary embolus 03/02/2023     Resolved Ambulatory Problems     Diagnosis Date Noted    No Resolved Ambulatory Problems     Past Medical History:   Diagnosis Date    Bronchitis     Chest pain     Chronic hypotension     Eczema     GERD (gastroesophageal reflux disease)     History of pulmonary embolus (PE)     IBS (irritable bowel syndrome)     Lightheadedness     Rectal fissure     Rosacea     Sleep apnea     Tachycardia          PAST SURGICAL HISTORY  Past Surgical History:   Procedure Laterality Date    COLONOSCOPY      MEDIPORT INSERTION, DOUBLE  09/01/2020    [facility]     TUNNELED VENOUS CATHETER PLACEMENT N/A 12/14/2023    Procedure: EXCHANGE OF VALVERDE CATHETER;  Surgeon: Lorne Cordova MD;  Location: [facility];  Service: General;  Laterality: N/A;    UPPER GASTROINTESTINAL ENDOSCOPY      VENOUS ACCESS DEVICE (PORT) INSERTION Left 10/16/2020    Procedure: INSERTION VENOUS ACCESS DEVICE UNDER FLURO;  Surgeon: Pa Lane MD;  Location: [facility] OR;  Service: General;  Laterality: Left;    VENOUS ACCESS DEVICE (PORT) INSERTION Right 10/26/2021    Procedure: REMOVAL AND INSERTION OF VALVERDE CATHETER;  Surgeon: Pa Lane MD;  Location: [facility];   Service: General;  Laterality: Right;    VENOUS ACCESS DEVICE (PORT) INSERTION Left 1/13/2023    Procedure: INSERTION OF VALVERDE CATHETER, VENOGARM;  Surgeon: Cristhian Anne MD;  Location: [facility] OR;  Service: General;  Laterality: Left;         FAMILY HISTORY  Family History   Problem Relation Age of Onset    Hypertension Mother     Diabetes Mother     Hypertension Father     Diabetes Father     Coronary artery disease Maternal Uncle         Maternal uncle with MI    Hypertension Maternal Grandmother     Coronary artery disease Maternal Grandmother         MGM with 4 vessel CABG and multiple stents    Cancer Maternal Grandmother     Stroke Maternal Grandmother     Coronary artery disease Maternal Grandfather     Breast cancer Other     Malig Hyperthermia Neg Hx          SOCIAL HISTORY  Social History     Socioeconomic History    Marital status:     Number of children: 0   Tobacco Use    Smoking status: Never     Passive exposure: Never    Smokeless tobacco: Never    Tobacco comments:     Caffeine: 1 serving daily   Vaping Use    Vaping status: Never Used   Substance and Sexual Activity    Alcohol use: No    Drug use: No    Sexual activity: Yes     Partners: Female     Birth control/protection: None         ALLERGIES  Tape, Pepcid [famotidine], and Scopolamine      REVIEW OF SYSTEMS  Included in HPI  All systems reviewed and negative except for those discussed in HPI.      PHYSICAL EXAM    I have reviewed the triage vital signs and nursing notes.    ED Triage Vitals [08/15/24 2304]   Temp Heart Rate Resp BP SpO2   98.9 °F (37.2 °C) (!) 129 18 130/80 100 %      Temp src Heart Rate Source Patient Position BP Location FiO2 (%)   Tympanic Monitor Lying -- --       Physical Exam  Constitutional:       General: She is not in acute distress.     Appearance: She is obese.      Comments: Anxious appearing   HENT:      Head: Normocephalic and atraumatic.      Nose: Nose normal.      Mouth/Throat:      Mouth:  Mucous membranes are moist.   Eyes:      Extraocular Movements: Extraocular movements intact.      Pupils: Pupils are equal, round, and reactive to light.   Cardiovascular:      Rate and Rhythm: Normal rate and regular rhythm.      Pulses: Normal pulses.      Heart sounds: Normal heart sounds.      Comments: Tunneled cath in the right chest wall  Pulmonary:      Effort: Pulmonary effort is normal. No respiratory distress.      Breath sounds: Normal breath sounds. No wheezing, rhonchi or rales.   Abdominal:      General: Abdomen is flat. There is no distension.      Palpations: Abdomen is soft.      Tenderness: There is no abdominal tenderness.   Musculoskeletal:         General: Normal range of motion.      Cervical back: Normal range of motion and neck supple.   Skin:     General: Skin is warm and dry.      Capillary Refill: Capillary refill takes less than 2 seconds.   Neurological:      General: No focal deficit present.      Mental Status: She is alert and oriented to person, place, and time.   Psychiatric:         Mood and Affect: Mood normal.         Behavior: Behavior normal.         LAB RESULTS  Recent Results (from the past 24 hour(s))   ECG 12 Lead Chest Pain    Collection Time: 08/16/24 12:11 AM   Result Value Ref Range    QT Interval 341 ms    QTC Interval 490 ms   Comprehensive Metabolic Panel    Collection Time: 08/16/24 12:25 AM    Specimen: Blood   Result Value Ref Range    Glucose 113 (H) 65 - 99 mg/dL    BUN 8 6 - 20 mg/dL    Creatinine 0.76 0.57 - 1.00 mg/dL    Sodium 135 (L) 136 - 145 mmol/L    Potassium 3.9 3.5 - 5.2 mmol/L    Chloride 100 98 - 107 mmol/L    CO2 20.0 (L) 22.0 - 29.0 mmol/L    Calcium 10.1 8.6 - 10.5 mg/dL    Total Protein 7.5 6.0 - 8.5 g/dL    Albumin 4.4 3.5 - 5.2 g/dL    ALT (SGPT) 77 (H) 1 - 33 U/L    AST (SGOT) 44 (H) 1 - 32 U/L    Alkaline Phosphatase 125 (H) 39 - 117 U/L    Total Bilirubin 0.5 0.0 - 1.2 mg/dL    Globulin 3.1 gm/dL    A/G Ratio 1.4 g/dL    BUN/Creatinine  Ratio 10.5 7.0 - 25.0    Anion Gap 15.0 5.0 - 15.0 mmol/L    eGFR 108.9 >60.0 mL/min/1.73   aPTT    Collection Time: 08/16/24 12:25 AM    Specimen: Blood   Result Value Ref Range    PTT 40.2 (H) 22.7 - 35.4 seconds   Protime-INR    Collection Time: 08/16/24 12:25 AM    Specimen: Blood   Result Value Ref Range    Protime 14.4 (H) 11.7 - 14.2 Seconds    INR 1.10 0.90 - 1.10   hCG, Serum, Qualitative    Collection Time: 08/16/24 12:25 AM    Specimen: Blood   Result Value Ref Range    HCG Qualitative Negative Negative   High Sensitivity Troponin T    Collection Time: 08/16/24 12:25 AM    Specimen: Blood   Result Value Ref Range    HS Troponin T 6 <14 ng/L   Procalcitonin    Collection Time: 08/16/24 12:25 AM    Specimen: Blood   Result Value Ref Range    Procalcitonin 0.04 0.00 - 0.25 ng/mL   CBC Auto Differential    Collection Time: 08/16/24 12:25 AM    Specimen: Blood   Result Value Ref Range    WBC 13.94 (H) 3.40 - 10.80 10*3/mm3    RBC 5.31 (H) 3.77 - 5.28 10*6/mm3    Hemoglobin 14.5 12.0 - 15.9 g/dL    Hematocrit 43.7 34.0 - 46.6 %    MCV 82.3 79.0 - 97.0 fL    MCH 27.3 26.6 - 33.0 pg    MCHC 33.2 31.5 - 35.7 g/dL    RDW 23.0 (H) 12.3 - 15.4 %    RDW-SD 65.2 (H) 37.0 - 54.0 fl    MPV 9.9 6.0 - 12.0 fL    Platelets 372 140 - 450 10*3/mm3    Neutrophil % 79.9 (H) 42.7 - 76.0 %    Lymphocyte % 14.1 (L) 19.6 - 45.3 %    Monocyte % 4.4 (L) 5.0 - 12.0 %    Eosinophil % 0.6 0.3 - 6.2 %    Basophil % 0.5 0.0 - 1.5 %    Immature Grans % 0.5 0.0 - 0.5 %    Neutrophils, Absolute 11.13 (H) 1.70 - 7.00 10*3/mm3    Lymphocytes, Absolute 1.97 0.70 - 3.10 10*3/mm3    Monocytes, Absolute 0.61 0.10 - 0.90 10*3/mm3    Eosinophils, Absolute 0.09 0.00 - 0.40 10*3/mm3    Basophils, Absolute 0.07 0.00 - 0.20 10*3/mm3    Immature Grans, Absolute 0.07 (H) 0.00 - 0.05 10*3/mm3    nRBC 0.0 0.0 - 0.2 /100 WBC   High Sensitivity Troponin T 2Hr    Collection Time: 08/16/24  2:25 AM    Specimen: Blood   Result Value Ref Range    HS Troponin T 7  <14 ng/L    Troponin T Delta 1 >=-4 - <+4 ng/L         RADIOLOGY  CT Angiogram Chest Pulmonary Embolism    Result Date: 8/16/2024  Patient: MICHELLE DA SILVA  Time Out: 02:13 Exam(s): CTA CHEST EXAM:   CT Angiography Chest With Intravenous Contrast CLINICAL HISTORY:     chest pain, tachycardia. TECHNIQUE:   Axial computed tomographic angiography images of the chest with intravenous contrast.  CTDI is 11.83 mGy and DLP is 429.7 mGy-cm.  This CT exam was performed according to the principle of ALARA (As Low As Reasonably Achievable) by using one or more of the following dose reduction techniques: automated exposure control, adjustment of the mA and or kV according to patient size, and or use of iterative reconstruction technique.   MIP reconstructed images were created and reviewed. COMPARISON:   CTA chest dated 3 2 2023 FINDINGS:   Pulmonary arteries:  No definite evidence for pulmonary embolism.   Aorta:  The thoracic aorta is normal in caliber.  No dissection or aneurysm.   Lungs:  No focal airspace consolidation identified.   Pleural space:  Unremarkable.  No significant effusion.  No pneumothorax.   Heart:  The cardiac chambers are normal.  Trace pericardial effusion.   Mediastinum:  Stable prominent collateral vasculature noted with small caliber SVC identified.   Bones joints:  No acute fracture.  No dislocation.   Soft tissues:  Unremarkable.   Lymph nodes:  Unremarkable.  No enlarged lymph nodes. IMPRESSION:     1.  No definite evidence for pulmonary embolism. 2.  Stable prominent collateral vasculature noted with small caliber SVC identified.  Findings are consistent with SVC stenosis. 3.  No focal airspace consolidation identified.  No pleural effusion or pneumothorax.     Electronically signed by Ruperto Novak MD on 08-16-24 at 0213    XR Chest 1 View    Result Date: 8/16/2024  Patient: MICHELLE DA SILVA  Time Out: 01:55 Exam(s): XR CXR 1 VIEW EXAM:   XR Chest, 1 View CLINICAL HISTORY:     chest pain.  "TECHNIQUE:   Frontal view of the chest. COMPARISON:   Chest single view dated 6 11 2024 FINDINGS:   Lungs:  Unremarkable.  No consolidation. The pulmonary vasculature demonstrates no significant radiographic abnormality.   Pleural space:  Unremarkable.  No pneumothorax. No large pleural effusion.   Heart:  Unremarkable.  No cardiomegaly.   Mediastinum:  The mediastinal contours are stable, accounting for slight obliquity.   Bones joints:  Unremarkable.  No acute fracture.   Tubes, lines and devices:  The tortuous right internal jugular approach central venous catheter is stable in appearance with the tip in the superior vena cava near the confluence of the brachiocephalic veins. IMPRESSION:       No acute cardiopulmonary process or significant alteration from the prior examination.    Electronically signed by Ruperto Novak MD on 08-16-24 at 0155       MEDICATIONS GIVEN IN ER  Medications   LORazepam (ATIVAN) injection 1 mg (1 mg Intravenous Given 8/16/24 0034)   iopamidol (ISOVUE-370) 76 % injection 100 mL (95 mL Intravenous Given 8/16/24 0054)           OUTPATIENT MEDICATION MANAGEMENT:  No current Epic-ordered facility-administered medications on file.     Current Outpatient Medications Ordered in Epic   Medication Sig Dispense Refill    albuterol sulfate  (90 Base) MCG/ACT inhaler Inhale 2 puffs Every 4 (Four) Hours As Needed for Wheezing.      amoxicillin (AMOXIL) 500 MG tablet Take 1 tablet by mouth 2 (Two) Times a Day.      BD PrecisionGlide Needle 23G X 1-1/2\" misc       cholecalciferol (Cholecalciferol) 25 MCG (1000 UT) tablet Take 1 tablet by mouth Daily.      clobetasol (OLUX) 0.05 % topical foam APPLY TO SITE AND LET DRY X 10 MINUTES BEFORE CENTRAL LINE DRESSING IS REAPPLIED TO AREA WEEKLY      cyanocobalamin 1000 MCG/ML injection Inject 1 mL into the appropriate muscle as directed by prescriber Every 28 (Twenty-Eight) Days. 1 mL 0    diphenhydrAMINE (BENADRYL) Infuse 100 mL into a venous " catheter 2 (Two) Times a Day.      dronabinol (MARINOL) 5 MG capsule Take 1 capsule by mouth 3 (Three) Times a Day As Needed (nausea).      Emgality 120 MG/ML auto-injector pen Every 30 (Thirty) Days. Takes the 20th of the month      escitalopram (LEXAPRO) 10 MG tablet Take 2 tablets by mouth Daily.      fludrocortisone 0.1 MG tablet Take 1 tablet by mouth Daily.      fondaparinux (ARIXTRA) 10 MG/0.8ML solution injection Inject  under the skin into the appropriate area as directed Daily.      gabapentin (NEURONTIN) 100 MG capsule Take 1 capsule by mouth 3 (Three) Times a Day.      Ginger, Zingiber officinalis, (Ginger Root) 550 MG capsule Take  by mouth Daily.      Heparin & NaCl Lock Flush (HEPARIN COMBINATION IV) Infuse  into a venous catheter 2 (Two) Times a Day.      hydrOXYzine pamoate (VISTARIL) 25 MG capsule Take 1-2 capsules by mouth 3 (Three) Times a Day As Needed (anxiety).      levocetirizine (XYZAL) 5 MG tablet Take 1 tablet by mouth Every Evening.      levothyroxine (SYNTHROID, LEVOTHROID) 50 MCG tablet Take 1 tablet by mouth Daily.      meclizine (ANTIVERT) 25 MG tablet Take 1 tablet by mouth 3 (Three) Times a Day As Needed for Dizziness.      metoprolol tartrate (LOPRESSOR) 25 MG tablet TAKE 12.5MG IN THE AM, THEN 25MG IN THE MIDDLE OF DAY, THEN 12.5MG AT BEDTIME 180 tablet 3    midodrine (PROAMATINE) 10 MG tablet TAKE 1 TABLET BY MOUTH THREE TIMES DAILY 90 tablet 6    midodrine (PROAMATINE) 5 MG tablet Take 1 tablet by mouth 3 (Three) Times a Day Before Meals. 90 tablet 11    montelukast (SINGULAIR) 10 MG tablet Take 1 tablet by mouth Every Night.      Omeprazole 20 MG tablet delayed-release Take 40 mg by mouth Daily.      Ondansetron HCl (ZOFRAN IV) Infuse 8 mg into a venous catheter Every 6 (Six) Hours.      phenazopyridine (PYRIDIUM) 100 MG tablet Take 1 tablet by mouth 3 (Three) Times a Day As Needed.      prochlorperazine (COMPAZINE) 10 MG tablet Take 1 tablet by mouth Every 6 (Six) Hours As  Needed for Nausea or Vomiting.      promethazine (PHENERGAN) 25 MG/ML injection Inject  as directed. IV every 3 hours      pyridostigmine (Mestinon) 60 MG tablet Take 0.5 tablets by mouth Daily. 45 tablet 3    RABEprazole (ACIPHEX) 20 MG EC tablet Take 1 tablet by mouth Daily.               PROGRESS, DATA ANALYSIS, CONSULTS, AND MEDICAL DECISION MAKING  ORDERS PLACED DURING THIS VISIT:  Orders Placed This Encounter   Procedures    XR Chest 1 View    CT Angiogram Chest Pulmonary Embolism    Comprehensive Metabolic Panel    aPTT    Protime-INR    hCG, Serum, Qualitative    High Sensitivity Troponin T    Procalcitonin    CBC Auto Differential    High Sensitivity Troponin T 2Hr    ECG 12 Lead Chest Pain    CBC & Differential       All labs have been independently interpreted by me.  All radiology studies have been reviewed by me. All EKG's have been independently viewed and interpreted by me.  Discussion below represents my analysis of pertinent findings related to patient's condition, differential diagnosis, treatment plan and final disposition.    Differential diagnosis includes but is not limited to:   My differential diagnosis for chest pain includes but is not limited to:  Muscle strain, costochondritis, myositis, pleurisy, rib fracture, intercostal neuritis, herpes zoster, tumor, myocardial infarction, coronary syndrome, unstable angina, angina, aortic dissection, mitral valve prolapse, pericarditis, palpitations, pulmonary embolus, pneumonia, pneumothorax, lung cancer, GERD, esophagitis, esophageal spasm      ED Course:  ED Course as of 08/16/24 0304   Thu Aug 15, 2024   2348 I discussed the case with Dr. Oneal and he agrees to evaluate the patient at the bedside.    [CC]   Fri Aug 16, 2024   0004 My own independent or potation of the chest x-ray is unremarkable.  I did not see any focal pulmonary consolidation and pneumothorax.  Patient does have a chest central line in the right side of her chest. [MM]    0046 WBC(!): 13.94 [CC]   0048 My own independent or potation of the EKG that was done at 12:11 AM reveals a rate of 124 it is a sinus tachycardia it is narrow complex with normal axis.  There are some nonspecific ST and T wave changes I do not appreciate any definitive acute injury pattern.  This EKG looks very similar to an EKG on 6/11/2024 other than her rate is faster on this EKG. [MM]   0107 ALT (SGPT)(!): 77 [CC]   0107 AST (SGOT)(!): 44 [CC]   0107 Alkaline Phosphatase(!): 125  Nonspecific elevation [CC]   0135 XR Chest 1 View  My independent interpretation of the chest x-ray is no acute infiltrate [CC]   0136 CT Angiogram Chest Pulmonary Embolism  My independent interpretation of the CTA is no large central pulmonary embolism. [CC]   0258 HS Troponin T: 7 [CC]   0259 I rechecked the patient.  I discussed the patient's labs, radiology findings (including all incidental findings), diagnosis, and plan for discharge.  A repeat exam reveals no new worrisome changes from my initial exam findings.  The patient understands that the fact that they are being discharged does not denote that nothing is abnormal, it indicates that no clinical emergency is present and that they must follow-up as directed in order to properly maintain their health.  Follow-up instructions (specifically listed below) and return to ER precautions were given at this time.  I specifically instructed the patient to follow-up with their PCP.  The patient understands and agrees with the plan, and is ready for discharge.  All questions answered.   [CC]   0300 Heart Rate: 99 [CC]      ED Course User Index  [CC] Raquel Sims PA-C  [MM] Ibrahima Oneal MD           AS OF 03:04 EDT VITALS:    BP - 110/82  HR - 99  TEMP - 98.9 °F (37.2 °C) (Tympanic)  O2 SATS - 94%      MDM:  Patient is a 29-year-old female with a history of recurrent pulmonary emboli who presents emergency department today complaining of sudden onset chest pain.  On arrival  here in the emergency department patient was tachycardic with vitals otherwise reassuring.  On my exam the patient is anxious appearing but in no acute distress otherwise.  She was evaluated with EKG which shows a sinus tachycardia.  She was evaluated with labs which are overall reassuring.  Troponin is negative x 2.  She did have some nonspecific elevation in her LFTs and encouraged her to follow-up with her PCP to have this rechecked.  Given her history of recurrent pulmonary emboli she was evaluated with a CTA of the chest which shows no acute pulmonary emboli today.  I suspect her symptoms are anxiety related as they much improved after a dose of Ativan.  On reevaluation patient's heart rate is now 99 she is feeling much better.  Patient is appropriate for discharge with outpatient follow-up.  Strict return precautions were given.        DIAGNOSIS  Final diagnoses:   Atypical chest pain   Anxiety         DISPOSITION  ED Disposition       ED Disposition   Discharge    Condition   Stable    Comment   --                    Please note that portions of this document were completed with a voice recognition program.    Note Disclaimer: At [facility], we believe that sharing information builds trust and better relationships. You are receiving this note because you recently visited [facility]. It is possible you will see health information before a provider has talked with you about it. This kind of information can be easy to misunderstand. To help you fully understand what it means for your health, we urge you to discuss this note with your provider.     Raquel Sims PA-C  08/16/24 [id number]

## 2024-08-17 LAB
QT INTERVAL: 341 MS
QTC INTERVAL: 490 MS

## 2024-08-19 ENCOUNTER — APPOINTMENT (OUTPATIENT)
Dept: GENERAL RADIOLOGY | Facility: HOSPITAL | Age: 29
End: 2024-08-19
Payer: COMMERCIAL

## 2024-08-19 ENCOUNTER — APPOINTMENT (OUTPATIENT)
Dept: CARDIOLOGY | Facility: HOSPITAL | Age: 29
End: 2024-08-19
Payer: COMMERCIAL

## 2024-08-19 ENCOUNTER — HOSPITAL ENCOUNTER (EMERGENCY)
Facility: HOSPITAL | Age: 29
Discharge: HOME | End: 2024-08-19
Attending: STUDENT IN AN ORGANIZED HEALTH CARE EDUCATION/TRAINING PROGRAM | Admitting: STUDENT IN AN ORGANIZED HEALTH CARE EDUCATION/TRAINING PROGRAM
Payer: COMMERCIAL

## 2024-08-19 ENCOUNTER — TELEPHONE (OUTPATIENT)
Dept: CARDIOLOGY | Facility: CLINIC | Age: 29
End: 2024-08-19
Payer: COMMERCIAL

## 2024-08-19 VITALS
DIASTOLIC BLOOD PRESSURE: 76 MMHG | RESPIRATION RATE: 16 BRPM | OXYGEN SATURATION: 94 % | TEMPERATURE: 97.8 F | SYSTOLIC BLOOD PRESSURE: 112 MMHG | BODY MASS INDEX: 44.73 KG/M2 | WEIGHT: 262 LBS | HEART RATE: 112 BPM | HEIGHT: 64 IN

## 2024-08-19 DIAGNOSIS — I87.1 SUPERIOR VENA CAVA STENOSIS: Primary | ICD-10-CM

## 2024-08-19 DIAGNOSIS — Z78.9 CENTRAL VENOUS CATHETER IN PLACE: ICD-10-CM

## 2024-08-19 DIAGNOSIS — R06.02 SHORTNESS OF BREATH: ICD-10-CM

## 2024-08-19 LAB
ALBUMIN SERPL-MCNC: 4.2 G/DL (ref 3.5–5.2)
ALBUMIN/GLOB SERPL: 1.4 G/DL
ALP SERPL-CCNC: 104 U/L (ref 39–117)
ALT SERPL W P-5'-P-CCNC: 49 U/L (ref 1–33)
ANION GAP SERPL CALCULATED.3IONS-SCNC: 12.6 MMOL/L (ref 5–15)
AST SERPL-CCNC: 27 U/L (ref 1–32)
BASOPHILS # BLD AUTO: 0.06 10*3/MM3 (ref 0–0.2)
BASOPHILS NFR BLD AUTO: 0.6 % (ref 0–1.5)
BH CV UPPER VENOUS LEFT AXILLARY AUGMENT: NORMAL
BH CV UPPER VENOUS LEFT AXILLARY COMPRESS: NORMAL
BH CV UPPER VENOUS LEFT AXILLARY PHASIC: NORMAL
BH CV UPPER VENOUS LEFT AXILLARY SPONT: NORMAL
BH CV UPPER VENOUS LEFT BASILIC FOREARM COMPRESS: NORMAL
BH CV UPPER VENOUS LEFT BASILIC UPPER COMPRESS: NORMAL
BH CV UPPER VENOUS LEFT BRACHIAL COMPRESS: NORMAL
BH CV UPPER VENOUS LEFT CEPHALIC FOREARM COMPRESS: NORMAL
BH CV UPPER VENOUS LEFT CEPHALIC UPPER COMPRESS: NORMAL
BH CV UPPER VENOUS LEFT INTERNAL JUGULAR AUGMENT: NORMAL
BH CV UPPER VENOUS LEFT INTERNAL JUGULAR COMPRESS: NORMAL
BH CV UPPER VENOUS LEFT INTERNAL JUGULAR PHASIC: NORMAL
BH CV UPPER VENOUS LEFT INTERNAL JUGULAR SPONT: NORMAL
BH CV UPPER VENOUS LEFT RADIAL COMPRESS: NORMAL
BH CV UPPER VENOUS LEFT SUBCLAVIAN AUGMENT: NORMAL
BH CV UPPER VENOUS LEFT SUBCLAVIAN COMPRESS: NORMAL
BH CV UPPER VENOUS LEFT SUBCLAVIAN PHASIC: NORMAL
BH CV UPPER VENOUS LEFT SUBCLAVIAN SPONT: NORMAL
BH CV UPPER VENOUS LEFT ULNAR COMPRESS: NORMAL
BH CV UPPER VENOUS RIGHT AXILLARY AUGMENT: NORMAL
BH CV UPPER VENOUS RIGHT AXILLARY COMPRESS: NORMAL
BH CV UPPER VENOUS RIGHT AXILLARY PHASIC: NORMAL
BH CV UPPER VENOUS RIGHT AXILLARY SPONT: NORMAL
BH CV UPPER VENOUS RIGHT BASILIC FOREARM COMPRESS: NORMAL
BH CV UPPER VENOUS RIGHT BASILIC UPPER COMPRESS: NORMAL
BH CV UPPER VENOUS RIGHT BRACHIAL COMPRESS: NORMAL
BH CV UPPER VENOUS RIGHT CEPHALIC FOREARM COMPRESS: NORMAL
BH CV UPPER VENOUS RIGHT CEPHALIC UPPER COMPRESS: NORMAL
BH CV UPPER VENOUS RIGHT INTERNAL JUGULAR AUGMENT: NORMAL
BH CV UPPER VENOUS RIGHT INTERNAL JUGULAR COMPRESS: NORMAL
BH CV UPPER VENOUS RIGHT INTERNAL JUGULAR PHASIC: NORMAL
BH CV UPPER VENOUS RIGHT INTERNAL JUGULAR SPONT: NORMAL
BH CV UPPER VENOUS RIGHT RADIAL COMPRESS: NORMAL
BH CV UPPER VENOUS RIGHT SUBCLAVIAN AUGMENT: NORMAL
BH CV UPPER VENOUS RIGHT SUBCLAVIAN COMPRESS: NORMAL
BH CV UPPER VENOUS RIGHT SUBCLAVIAN PHASIC: NORMAL
BH CV UPPER VENOUS RIGHT SUBCLAVIAN SPONT: NORMAL
BH CV UPPER VENOUS RIGHT ULNAR COMPRESS: NORMAL
BILIRUB SERPL-MCNC: 0.5 MG/DL (ref 0–1.2)
BUN SERPL-MCNC: 8 MG/DL (ref 6–20)
BUN/CREAT SERPL: 10.1 (ref 7–25)
CALCIUM SPEC-SCNC: 9.3 MG/DL (ref 8.6–10.5)
CHLORIDE SERPL-SCNC: 104 MMOL/L (ref 98–107)
CO2 SERPL-SCNC: 17.4 MMOL/L (ref 22–29)
CREAT SERPL-MCNC: 0.79 MG/DL (ref 0.57–1)
DEPRECATED RDW RBC AUTO: 65 FL (ref 37–54)
EGFRCR SERPLBLD CKD-EPI 2021: 104 ML/MIN/1.73
EOSINOPHIL # BLD AUTO: 0.16 10*3/MM3 (ref 0–0.4)
EOSINOPHIL NFR BLD AUTO: 1.6 % (ref 0.3–6.2)
ERYTHROCYTE [DISTWIDTH] IN BLOOD BY AUTOMATED COUNT: 22.5 % (ref 12.3–15.4)
GLOBULIN UR ELPH-MCNC: 3.1 GM/DL
GLUCOSE BLDC GLUCOMTR-MCNC: 127 MG/DL (ref 70–130)
GLUCOSE SERPL-MCNC: 150 MG/DL (ref 65–99)
HCT VFR BLD AUTO: 44.8 % (ref 34–46.6)
HGB BLD-MCNC: 14.5 G/DL (ref 12–15.9)
HOLD SPECIMEN: NORMAL
HOLD SPECIMEN: NORMAL
IMM GRANULOCYTES # BLD AUTO: 0.03 10*3/MM3 (ref 0–0.05)
IMM GRANULOCYTES NFR BLD AUTO: 0.3 % (ref 0–0.5)
LYMPHOCYTES # BLD AUTO: 1.99 10*3/MM3 (ref 0.7–3.1)
LYMPHOCYTES NFR BLD AUTO: 20.2 % (ref 19.6–45.3)
MCH RBC QN AUTO: 27.1 PG (ref 26.6–33)
MCHC RBC AUTO-ENTMCNC: 32.4 G/DL (ref 31.5–35.7)
MCV RBC AUTO: 83.6 FL (ref 79–97)
MONOCYTES # BLD AUTO: 0.32 10*3/MM3 (ref 0.1–0.9)
MONOCYTES NFR BLD AUTO: 3.2 % (ref 5–12)
NEUTROPHILS NFR BLD AUTO: 7.3 10*3/MM3 (ref 1.7–7)
NEUTROPHILS NFR BLD AUTO: 74.1 % (ref 42.7–76)
NRBC BLD AUTO-RTO: 0 /100 WBC (ref 0–0.2)
NT-PROBNP SERPL-MCNC: 40.4 PG/ML (ref 0–450)
PLATELET # BLD AUTO: 377 10*3/MM3 (ref 140–450)
PMV BLD AUTO: 9.7 FL (ref 6–12)
POTASSIUM SERPL-SCNC: 3.7 MMOL/L (ref 3.5–5.2)
PROT SERPL-MCNC: 7.3 G/DL (ref 6–8.5)
QT INTERVAL: 341 MS
QTC INTERVAL: 476 MS
RBC # BLD AUTO: 5.36 10*6/MM3 (ref 3.77–5.28)
SODIUM SERPL-SCNC: 134 MMOL/L (ref 136–145)
TROPONIN T SERPL HS-MCNC: 7 NG/L
WBC NRBC COR # BLD AUTO: 9.86 10*3/MM3 (ref 3.4–10.8)
WHOLE BLOOD HOLD COAG: NORMAL
WHOLE BLOOD HOLD SPECIMEN: NORMAL

## 2024-08-19 PROCEDURE — 96374 THER/PROPH/DIAG INJ IV PUSH: CPT

## 2024-08-19 PROCEDURE — 96375 TX/PRO/DX INJ NEW DRUG ADDON: CPT

## 2024-08-19 PROCEDURE — 25010000002 PROCHLORPERAZINE 10 MG/2ML SOLUTION: Performed by: STUDENT IN AN ORGANIZED HEALTH CARE EDUCATION/TRAINING PROGRAM

## 2024-08-19 PROCEDURE — 84484 ASSAY OF TROPONIN QUANT: CPT | Performed by: STUDENT IN AN ORGANIZED HEALTH CARE EDUCATION/TRAINING PROGRAM

## 2024-08-19 PROCEDURE — 71045 X-RAY EXAM CHEST 1 VIEW: CPT

## 2024-08-19 PROCEDURE — 93005 ELECTROCARDIOGRAM TRACING: CPT | Performed by: STUDENT IN AN ORGANIZED HEALTH CARE EDUCATION/TRAINING PROGRAM

## 2024-08-19 PROCEDURE — 83880 ASSAY OF NATRIURETIC PEPTIDE: CPT | Performed by: STUDENT IN AN ORGANIZED HEALTH CARE EDUCATION/TRAINING PROGRAM

## 2024-08-19 PROCEDURE — 82948 REAGENT STRIP/BLOOD GLUCOSE: CPT

## 2024-08-19 PROCEDURE — 93010 ELECTROCARDIOGRAM REPORT: CPT | Performed by: INTERNAL MEDICINE

## 2024-08-19 PROCEDURE — 36415 COLL VENOUS BLD VENIPUNCTURE: CPT

## 2024-08-19 PROCEDURE — 99284 EMERGENCY DEPT VISIT MOD MDM: CPT

## 2024-08-19 PROCEDURE — 85025 COMPLETE CBC W/AUTO DIFF WBC: CPT | Performed by: STUDENT IN AN ORGANIZED HEALTH CARE EDUCATION/TRAINING PROGRAM

## 2024-08-19 PROCEDURE — 93970 EXTREMITY STUDY: CPT | Performed by: SURGERY

## 2024-08-19 PROCEDURE — 25810000003 LACTATED RINGERS SOLUTION: Performed by: STUDENT IN AN ORGANIZED HEALTH CARE EDUCATION/TRAINING PROGRAM

## 2024-08-19 PROCEDURE — 93970 EXTREMITY STUDY: CPT

## 2024-08-19 PROCEDURE — 25010000002 DIAZEPAM PER 5 MG: Performed by: STUDENT IN AN ORGANIZED HEALTH CARE EDUCATION/TRAINING PROGRAM

## 2024-08-19 PROCEDURE — 93005 ELECTROCARDIOGRAM TRACING: CPT

## 2024-08-19 PROCEDURE — 80053 COMPREHEN METABOLIC PANEL: CPT | Performed by: STUDENT IN AN ORGANIZED HEALTH CARE EDUCATION/TRAINING PROGRAM

## 2024-08-19 RX ORDER — SODIUM CHLORIDE 0.9 % (FLUSH) 0.9 %
10 SYRINGE (ML) INJECTION AS NEEDED
Status: DISCONTINUED | OUTPATIENT
Start: 2024-08-19 | End: 2024-08-19 | Stop reason: HOSPADM

## 2024-08-19 RX ORDER — PROCHLORPERAZINE EDISYLATE 5 MG/ML
10 INJECTION INTRAMUSCULAR; INTRAVENOUS ONCE
Status: COMPLETED | OUTPATIENT
Start: 2024-08-19 | End: 2024-08-19

## 2024-08-19 RX ORDER — DIAZEPAM 10 MG/2ML
5 INJECTION, SOLUTION INTRAMUSCULAR; INTRAVENOUS ONCE
Status: COMPLETED | OUTPATIENT
Start: 2024-08-19 | End: 2024-08-19

## 2024-08-19 RX ADMIN — DIAZEPAM 5 MG: 5 INJECTION, SOLUTION INTRAMUSCULAR; INTRAVENOUS at 18:24

## 2024-08-19 RX ADMIN — SODIUM CHLORIDE, POTASSIUM CHLORIDE, SODIUM LACTATE AND CALCIUM CHLORIDE 1000 ML: 600; 310; 30; 20 INJECTION, SOLUTION INTRAVENOUS at 15:45

## 2024-08-19 RX ADMIN — PROCHLORPERAZINE EDISYLATE 10 MG: 5 INJECTION INTRAMUSCULAR; INTRAVENOUS at 17:49

## 2024-08-19 NOTE — TELEPHONE ENCOUNTER
I called and left a message regarding her CTA chest done 8/16/2024.  It does show superior vena cava stenosis.

## 2024-08-19 NOTE — TELEPHONE ENCOUNTER
Pt called back in to triage.  Is there anything I can share with pt?    Thanks so much,  Era Wyatt, RN  Triage RN  08/19/24 11:55 EDT

## 2024-08-19 NOTE — ED TRIAGE NOTES
Patient to ER via car from home reports she was here the other day and was told everything was normal but then her Dr called this morning and told her something was wrong and now her Dr wont answer  Patient states that we missed it and didn't tell her about what was wrong with her. Patient has hx of anxiety and is crying at time of triage

## 2024-08-20 NOTE — ED PROVIDER NOTES
EMERGENCY DEPARTMENT ENCOUNTER    Room Number:  15/15  PCP: Jason Borrero MD  History obtained from: Patient      HPI:  Chief Complaint: Shortness of breath  A complete HPI/ROS/PMH/PSH/SH/FH are unobtainable due to: N/A  Context: Liliam Lorenz is a 29 y.o. female who presents to the ED c/o shortness of breath, upper extremity tingling.  Also intermittent chest pain.  Has also had some purpleish discoloration of her face and arms.  No nausea or vomiting.  No other recent illness.            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    Nausea and vomiting 08/22/2017    Gastroparesis 08/22/2017    Postural orthostatic tachycardia syndrome 06/12/2020    Dolan catheter dysfunction 10/25/2021    Febrile illness, acute 01/18/2022    Single subsegmental pulmonary embolism without acute cor pulmonale 04/30/2022    Cellulitis of chest wall 01/09/2023    Pulmonary embolus 03/02/2023     Resolved Ambulatory Problems     Diagnosis Date Noted    No Resolved Ambulatory Problems     Past Medical History:   Diagnosis Date    Bronchitis     Chest pain     Chronic hypotension     Eczema     GERD (gastroesophageal reflux disease)     History of pulmonary embolus (PE)     IBS (irritable bowel syndrome)     Lightheadedness     Rectal fissure     Rosacea     Sleep apnea     Tachycardia          PAST SURGICAL HISTORY  Past Surgical History:   Procedure Laterality Date    COLONOSCOPY      MEDIPORT INSERTION, DOUBLE  09/01/2020    [facility]     TUNNELED VENOUS CATHETER PLACEMENT N/A 12/14/2023    Procedure: EXCHANGE OF DOLAN CATHETER;  Surgeon: Lorne Cordova MD;  Location: [facility] OR;  Service: General;   Laterality: N/A;    UPPER GASTROINTESTINAL ENDOSCOPY      VENOUS ACCESS DEVICE (PORT) INSERTION Left 10/16/2020    Procedure: INSERTION VENOUS ACCESS DEVICE UNDER FLURO;  Surgeon: Pa Lane MD;  Location: [facility] MAIN OR;  Service: General;  Laterality: Left;    VENOUS ACCESS DEVICE (PORT) INSERTION Right 10/26/2021    Procedure: REMOVAL AND INSERTION OF VALVERDE CATHETER;  Surgeon: Pa Lane MD;  Location: [facility] MAIN OR;  Service: General;  Laterality: Right;    VENOUS ACCESS DEVICE (PORT) INSERTION Left 1/13/2023    Procedure: INSERTION OF VALVERDE CATHETER, VENOGARM;  Surgeon: Cristhian Anne MD;  Location: [facility] MAIN OR;  Service: General;  Laterality: Left;         FAMILY HISTORY  Family History   Problem Relation Age of Onset    Hypertension Mother     Diabetes Mother     Hypertension Father     Diabetes Father     Coronary artery disease Maternal Uncle         Maternal uncle with MI    Hypertension Maternal Grandmother     Coronary artery disease Maternal Grandmother         MGM with 4 vessel CABG and multiple stents    Cancer Maternal Grandmother     Stroke Maternal Grandmother     Coronary artery disease Maternal Grandfather     Breast cancer Other     Malig Hyperthermia Neg Hx          SOCIAL HISTORY  Social History     Socioeconomic History    Marital status:     Number of children: 0   Tobacco Use    Smoking status: Never     Passive exposure: Never    Smokeless tobacco: Never    Tobacco comments:     Caffeine: 1 serving daily   Vaping Use    Vaping status: Never Used   Substance and Sexual Activity    Alcohol use: No    Drug use: No    Sexual activity: Yes     Partners: Female     Birth control/protection: None         ALLERGIES  Tape, Pepcid [famotidine], and Scopolamine        REVIEW OF SYSTEMS    As per HPI      PHYSICAL EXAM  ED Triage Vitals   Temp Heart Rate Resp BP SpO2   08/19/24 1438 08/19/24 1438 08/19/24 1438 08/19/24 1438 08/19/24 1438   97.8 °F (36.6 °C) 102  20 146/72 98 %      Temp src Heart Rate Source Patient Position BP Location FiO2 (%)   -- 08/19/24 1517 08/19/24 1517 08/19/24 1517 --    Monitor Lying Right arm        Physical Exam  Constitutional:       General: She is not in acute distress.  HENT:      Head: Normocephalic and atraumatic.   Cardiovascular:      Rate and Rhythm: Regular rhythm. Tachycardia present.   Pulmonary:      Effort: Pulmonary effort is normal. No respiratory distress.      Comments: Tunneled central line in the right anterior chest wall without erythema  Abdominal:      General: There is no distension.      Palpations: Abdomen is soft.      Tenderness: There is no abdominal tenderness.   Musculoskeletal:         General: No swelling or deformity.   Skin:     General: Skin is warm and dry.   Neurological:      Mental Status: She is alert. Mental status is at baseline.           Vital signs and nursing notes reviewed.          LAB RESULTS  Recent Results (from the past 24 hour(s))   ECG 12 Lead ED Triage Standing Order; SOA    Collection Time: 08/19/24  2:33 PM   Result Value Ref Range    QT Interval 341 ms    QTC Interval 476 ms   Comprehensive Metabolic Panel    Collection Time: 08/19/24  2:42 PM    Specimen: Arm, Right; Blood   Result Value Ref Range    Glucose 150 (H) 65 - 99 mg/dL    BUN 8 6 - 20 mg/dL    Creatinine 0.79 0.57 - 1.00 mg/dL    Sodium 134 (L) 136 - 145 mmol/L    Potassium 3.7 3.5 - 5.2 mmol/L    Chloride 104 98 - 107 mmol/L    CO2 17.4 (L) 22.0 - 29.0 mmol/L    Calcium 9.3 8.6 - 10.5 mg/dL    Total Protein 7.3 6.0 - 8.5 g/dL    Albumin 4.2 3.5 - 5.2 g/dL    ALT (SGPT) 49 (H) 1 - 33 U/L    AST (SGOT) 27 1 - 32 U/L    Alkaline Phosphatase 104 39 - 117 U/L    Total Bilirubin 0.5 0.0 - 1.2 mg/dL    Globulin 3.1 gm/dL    A/G Ratio 1.4 g/dL    BUN/Creatinine Ratio 10.1 7.0 - 25.0    Anion Gap 12.6 5.0 - 15.0 mmol/L    eGFR 104.0 >60.0 mL/min/1.73   BNP    Collection Time: 08/19/24  2:42 PM    Specimen: Arm, Right; Blood   Result  Value Ref Range    proBNP 40.4 0.0 - 450.0 pg/mL   Single High Sensitivity Troponin T    Collection Time: 08/19/24  2:42 PM    Specimen: Arm, Right; Blood   Result Value Ref Range    HS Troponin T 7 <14 ng/L   Green Top (Gel)    Collection Time: 08/19/24  2:42 PM   Result Value Ref Range    Extra Tube Hold for add-ons.    Lavender Top    Collection Time: 08/19/24  2:42 PM   Result Value Ref Range    Extra Tube hold for add-on    Gold Top - SST    Collection Time: 08/19/24  2:42 PM   Result Value Ref Range    Extra Tube Hold for add-ons.    Light Blue Top    Collection Time: 08/19/24  2:42 PM   Result Value Ref Range    Extra Tube Hold for add-ons.    CBC Auto Differential    Collection Time: 08/19/24  2:42 PM    Specimen: Arm, Right; Blood   Result Value Ref Range    WBC 9.86 3.40 - 10.80 10*3/mm3    RBC 5.36 (H) 3.77 - 5.28 10*6/mm3    Hemoglobin 14.5 12.0 - 15.9 g/dL    Hematocrit 44.8 34.0 - 46.6 %    MCV 83.6 79.0 - 97.0 fL    MCH 27.1 26.6 - 33.0 pg    MCHC 32.4 31.5 - 35.7 g/dL    RDW 22.5 (H) 12.3 - 15.4 %    RDW-SD 65.0 (H) 37.0 - 54.0 fl    MPV 9.7 6.0 - 12.0 fL    Platelets 377 140 - 450 10*3/mm3    Neutrophil % 74.1 42.7 - 76.0 %    Lymphocyte % 20.2 19.6 - 45.3 %    Monocyte % 3.2 (L) 5.0 - 12.0 %    Eosinophil % 1.6 0.3 - 6.2 %    Basophil % 0.6 0.0 - 1.5 %    Immature Grans % 0.3 0.0 - 0.5 %    Neutrophils, Absolute 7.30 (H) 1.70 - 7.00 10*3/mm3    Lymphocytes, Absolute 1.99 0.70 - 3.10 10*3/mm3    Monocytes, Absolute 0.32 0.10 - 0.90 10*3/mm3    Eosinophils, Absolute 0.16 0.00 - 0.40 10*3/mm3    Basophils, Absolute 0.06 0.00 - 0.20 10*3/mm3    Immature Grans, Absolute 0.03 0.00 - 0.05 10*3/mm3    nRBC 0.0 0.0 - 0.2 /100 WBC   POC Glucose Once    Collection Time: 08/19/24  3:00 PM    Specimen: Blood   Result Value Ref Range    Glucose 127 70 - 130 mg/dL   Duplex Venous Upper Extremity - Bilateral CAR    Collection Time: 08/19/24  7:00 PM   Result Value Ref Range    Right Internal Jugular Spont Y      Right Internal Jugular Phasic Y     Right Internal Jugular Compress C     Right Internal Jugular Augment Y     Right Subclavian Spont Y     Right Subclavian Phasic Y     Right Subclavian Compress C     Right Subclavian Augment Y     Right Axillary Spont Y     Right Axillary Phasic Y     Right Axillary Compress C     Right Axillary Augment Y     Right Brachial Compress C     Right Radial Compress C     Right Ulnar Compress C     Right Basilic Upper Compress C     Right Basilic Forearm Compress C     Right Cephalic Upper Compress C     Right Cephalic Forearm Compress C     Left Internal Jugular Spont Y     Left Internal Jugular Phasic Y     Left Internal Jugular Compress C     Left Internal Jugular Augment Y     Left Subclavian Spont Y     Left Subclavian Phasic Y     Left Subclavian Compress C     Left Subclavian Augment Y     Left Axillary Spont Y     Left Axillary Phasic Y     Left Axillary Compress C     Left Axillary Augment Y     Left Brachial Compress C     Left Radial Compress C     Left Ulnar Compress C     Left Basilic Upper Compress C     Left Basilic Forearm Compress C     Left Cephalic Upper Compress C     Left Cephalic Forearm Compress C        Ordered the above labs and reviewed the results.        RADIOLOGY  Duplex Venous Upper Extremity - Bilateral CAR    Result Date: 8/19/2024    Normal bilateral upper extremity venous duplex scan.     XR Chest 1 View    Result Date: 8/19/2024  XR CHEST 1 VW-  HISTORY: Female who is 29 years-old, short of breath  TECHNIQUE: Frontal views of the chest  COMPARISON: 8/16/2024  FINDINGS: Stable appearing right IJ catheter. Heart, mediastinum and pulmonary vasculature are unremarkable. No focal pulmonary consolidation, pleural effusion, or pneumothorax. No acute osseous process.      No evidence for acute pulmonary process. Follow-up as clinical indications persist.  This report was finalized on 8/19/2024 3:16 PM by Dr. Chacho Christie M.D on Workstation: [id number]        Ordered the above noted radiological studies. Reviewed by me in PACS.                MEDICATIONS GIVEN IN ER  Medications   sodium chloride 0.9 % flush 10 mL (has no administration in time range)   lactated ringers bolus 1,000 mL (0 mL Intravenous Stopped 8/19/24 1826)   prochlorperazine (COMPAZINE) injection 10 mg (10 mg Intravenous Given 8/19/24 1749)   diazePAM (VALIUM) injection 5 mg (5 mg Intravenous Given 8/19/24 1824)               MEDICAL DECISION MAKING, PROGRESS, and CONSULTS    MDM: The emergency department with shortness of breath, recent CT which demonstrated SVC stenosis.  Otherwise well-appearing, vitals significant for tachycardia.  Labs and imaging otherwise reassuring in the ER.  No evidence of DVT, patient is on Arixtra, does not appear to be having a treatment failure.  Plan to remove tunneled central line however will have patient follow-up with surgery for this.  Discussed case with vascular surgery, no indication for additional workup or management at this time, recommend follow-up as an outpatient if needed.  Given return precautions discharged home    All labs have been independently reviewed by me.  All radiology studies have been reviewed by me and I have also reviewed the radiology report.   EKG's independently viewed and interpreted by me.  Discussion below represents my analysis of pertinent findings related to patient's condition, differential diagnosis, treatment plan and final disposition.      Additional sources:  - Discussed/ obtained information from independent historians:      - External (non-ED) record review:     - Chronic or social conditions impacting care:     - Shared decision making:        Orders placed during this visit:  Orders Placed This Encounter   Procedures    XR Chest 1 View    [place] Draw    Comprehensive Metabolic Panel    BNP    Single High Sensitivity Troponin T    CBC Auto Differential    NPO Diet NPO Type: Strict NPO    Undress & Gown    Continuous  Pulse Oximetry    Vital Signs    Cardiology (on-call MD unless specified)    Vascular Surgery Consult    Oxygen Therapy- Nasal Cannula; Titrate 1-6 LPM Per SpO2; 90 - 95%    POC Glucose Once    ECG 12 Lead ED Triage Standing Order; SOA    Insert Peripheral IV    CBC & Differential    Green Top (Gel)    Lavender Top    Gold Top - SST    Light Blue Top         Additional orders considered but not ordered:  Considered CT PE however patient just had 1 performed recently.        Differential diagnosis includes but is not limited to:    SVC syndrome, SVC stenosis, DVT, clotted catheter      Independent interpretation of labs, radiology studies, and discussions with consultants:  ED Course as of 08/19/24 2150   Mon Aug 19, 2024   1505 EKG interpreted myself:  1433, sinus tachycardia rate of 117, no acute ST segment changes or T wave inversions.  Not significantly changed from prior [FS]   1514 CO2(!): 17.4 [FS]   1515 Anion Gap: 12.6 [FS]      ED Course User Index  [FS] Xavier Jerome MD           DIAGNOSIS  Final diagnoses:   Superior vena cava stenosis   Central venous catheter in place   Shortness of breath         DISPOSITION  Discharged home        Latest Documented Vital Signs:  As of 21:50 EDT  BP- 112/76 HR- 112 Temp- 97.8 °F (36.6 °C) O2 sat- 94%              --    Please note that portions of this were completed with a voice recognition program.       Note Disclaimer: At [facility], we believe that sharing information builds trust and better relationships. You are receiving this note because you are receiving care at [facility] or recently visited. It is possible you will see health information before a provider has talked with you about it. This kind of information can be easy to misunderstand. To help you fully understand what it means for your health, we urge you to discuss this note with your provider.             Xavier Jerome MD  08/19/24 7143

## 2024-08-20 NOTE — TELEPHONE ENCOUNTER
I spoke with her regarding her superior vena cava stenosis.  She has a central line there which is being removed.  She is already well collateralized that area.  She feels reassured about this.

## 2024-08-21 NOTE — TELEPHONE ENCOUNTER
I would be willing to see these people virtually but they need to turn in their paperwork and it needs to be given to myself so I can review it or put under media so I can review it at home, I see little to no records in the computer about what is going on with her.

## 2024-08-28 ENCOUNTER — TELEMEDICINE (OUTPATIENT)
Dept: BEHAVIORAL HEALTH | Facility: CLINIC | Age: 29
End: 2024-08-28
Payer: COMMERCIAL

## 2024-08-28 VITALS — BODY MASS INDEX: 44.73 KG/M2 | HEIGHT: 64 IN | WEIGHT: 262 LBS

## 2024-08-28 DIAGNOSIS — Z78.9 MEDICALLY COMPLEX PATIENT: ICD-10-CM

## 2024-08-28 DIAGNOSIS — F32.9 REACTIVE DEPRESSION: ICD-10-CM

## 2024-08-28 DIAGNOSIS — Z79.899 HIGH RISK MEDICATION USE: ICD-10-CM

## 2024-08-28 DIAGNOSIS — F41.0 GENERALIZED ANXIETY DISORDER WITH PANIC ATTACKS: Primary | ICD-10-CM

## 2024-08-28 DIAGNOSIS — F41.1 GENERALIZED ANXIETY DISORDER WITH PANIC ATTACKS: Primary | ICD-10-CM

## 2024-08-28 PROCEDURE — 90792 PSYCH DIAG EVAL W/MED SRVCS: CPT

## 2024-08-28 RX ORDER — MIRTAZAPINE 15 MG/1
15 TABLET, FILM COATED ORAL NIGHTLY
Qty: 30 TABLET | Refills: 0 | Status: SHIPPED | OUTPATIENT
Start: 2024-08-28

## 2024-08-28 RX ORDER — ALPRAZOLAM 0.25 MG
.25-.5 TABLET ORAL DAILY PRN
Qty: 28 TABLET | Refills: 0 | Status: SHIPPED | OUTPATIENT
Start: 2024-08-28 | End: 2024-09-11

## 2024-08-28 NOTE — PATIENT INSTRUCTIONS
Medication changes:   Lexapro 20 mg, continues previously ordered  Stop hydroxyzine due to lack of benefit  Remeron/mirtazapine 15 mg, take and be in bed within 30 minutes give yourself a full 7 to 8-hour window devoted to sleep this may help with sleep/appetite/nausea/anxiety  Xanax 0.25 mg take 1 to 2 tablets daily as needed for panic, short-term supply only educated on risk versus benefit of benzodiazepines  Please read attached handout on medications.Patient educated they must be on psychiatric medications consistently for at least 4 to 6 weeks to get maximum benefit.    Therapy recommendation:   Mindfulness-based stress reduction strategies have been shown to be effective in improving overall mental health by effectively lowering stress/anxiety levels, hand out provided for pt to review.  Continue counseling with established therapist biweekly    GENERAL NEW PATIENT INSTRUCTIONS:    -Please arrive in person or virtually 10-15 minutes prior to appointment to allow for registration/sign in/questionnaires. If you are seen virtually please review after visit summary (AVS)/patient instructions via [facility] for a summary of plan of care/changes in treatment plan. If you are having difficulties logging on or accessing [facility] please contact my office for assistance.    -The best way to get a hold of Maciej is to call the office at [phone] and ask to leave a message with his medical assistant. Maciej Robledo is a Psychiatric Mental Health Nurse Practitioner, due to his specialty patient's are not able to message him directly via [facility]. Please give my office up to 48 hours to respond to a patient call/question/refill request. Refill requests will be made during normal office hours only, Monday-Friday 8:00-5:30.      -Maciej is out of the office on Fridays and weekends. Tuesdays and Thursdays are Maciej's in-office days, Mondays and Wednesdays are his telehealth days.  The decision to be seen virtually or in person is up  to the discretion of the provider, not all behavioral health problems are appropriate for telehealth.    -Please call the office at [phone] with any worsening of symptoms or onset of intolerable side effects.  -Follow-up appointments must be maintained in order for prescribing to continue.  -Patient has been educated regarding multimodal approach with healthy nutrition, healthy sleep, regular physical activity, social activities, counseling, and medications.   -Please call 911 or go to the nearest ER if you begin to have thoughts of harming yourself or other people.    No show policy:  We understand unexpected circumstances arise; however, anytime you miss your appointment we are unable to provide you appropriate care.  In addition, each appointment missed could have been used to provide care for others.  We ask that you call at least 24 hours in advance to cancel or reschedule an appointment. We would like to take this opportunity to remind you of our policy stating patients who miss THREE or more appointments without cancelling or rescheduling 24 hours in advance of the appointment may be subject to cancellation of any further visits with our clinic and recommendation to seek in-person services/visits. Please call [phone] to reschedule your appointment. If there are reasons that make it difficult for you to keep the appointments, please call and let us know how we can help. Please understand that medication prescribing will not continue without seeing your provider.

## 2024-08-28 NOTE — PROGRESS NOTES
Patient assessed today via MyChart through EPIC pt is at home in a secure environment and verbalizes privacy during interview. ARABELLA Wing is at home in a secure environment using a secure laptop.The patient's condition being diagnosed/treated is appropriate for telemedicine.The provider identified himself as well as his credentials.The patient, and/or patient's guardian sae consent to be seen remotely, and when consent is given they understand that the consent allows for patient identifiable information to be sent to a third party as needed. They may refuse to be seen remotely at any time. The electronic data is encrypted and password protected, and the patient and/or guardian has been advised of the potential risks to privacy not withstanding such measures.    Subjective    PATIENT ID: Liliam Lorenz, :1995, MRN: [id number]    Chief Complaint   Patient presents with    Anxiety    Depression     HPI:   29 y.o. patient pt presents to [facility]24 at the request of SELF REFERRAL. Psychiatric history listed below, patient presents today with S/S of Depression and Anxiety.  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    -Psych  meds pt currently on: lexapro 20 mg  -S/E to medications: Denies  -Sleep Problems: Trouble falling and staying asleep  -PHQ: (P) 24  -GAUTAM: (P) 13    Patient Active Problem List   Diagnosis    Poor venous access    POTS (postural orthostatic tachycardia syndrome)    Sinus tachycardia    Multiple subsegmental pulmonary emboli without acute cor pulmonale    Autoimmune disease    Phoebe-Danlos syndrome    Nausea and vomiting    Gastroparesis    Postural orthostatic tachycardia syndrome    Dolan catheter dysfunction    Febrile illness, acute    Single subsegmental pulmonary embolism without acute cor pulmonale    Cellulitis of chest wall    Pulmonary embolus    Generalized anxiety disorder with panic attacks    Reactive depression    Medically complex patient     PSYCHIATRIC HX:    -Previous psych medications: Lexapro, Zoloft, hydroxyzine  -Previous diagnoses:Depression, Anxiety, Panic Attacks  -Previous therapy/treatment: Medications, One-on-one counseling  -Previous psychiatric hospitalizations: Denies  -Previous suicide attempts/self harm: Denies  -History of trauma/abuse: Traumatic Event: Medically complex, gastroparesis and IBS, central line, death of multiple pets and family members    SOCIAL/SEXUAL/SUBSTANCE HX:  -Caffeine: Soda  -THC/CBD: Denies  -Street Drugs: Denies  -History of ETOH/Substance Abuse: Denies    Social History     Socioeconomic History    Marital status:     Number of children: 0    Highest education level: Master's degree (e.g., MA, MS, Eduin, MEd, MSW, TIANNA)   Tobacco Use    Smoking status: Never     Passive exposure: Never    Smokeless tobacco: Never    Tobacco comments:     Caffeine: 1 serving daily   Vaping Use    Vaping status: Never Used   Substance and Sexual Activity    Alcohol use: No    Drug use: Never    Sexual activity: Yes     Partners: Female     Birth control/protection: None       Family History   Problem Relation Age of Onset    Hypertension Mother     Diabetes Mother      Hypertension Father     Diabetes Father     Coronary artery disease Maternal Uncle         Maternal uncle with MI    Hypertension Maternal Grandmother     Coronary artery disease Maternal Grandmother         MGM with 4 vessel CABG and multiple stents    Cancer Maternal Grandmother     Stroke Maternal Grandmother     Coronary artery disease Maternal Grandfather     Breast cancer Other     Malig Hyperthermia Neg Hx          PAST MEDICAL HISTORY:  Past Medical History:   Diagnosis Date    Nausea and vomiting 08/22/2017    Anxiety     Bronchitis     Chest pain     Chronic hypotension     Depression     Eczema     Phoebe-Danlos syndrome     Gastroparesis     GERD (gastroesophageal reflux disease)     History of pulmonary embolus (PE)     > 15 per patient    IBS (irritable bowel syndrome)     Lightheadedness     Obsessive-compulsive disorder     Panic disorder     POTS (postural orthostatic tachycardia syndrome)     Rectal fissure     Rosacea     Sleep apnea     uses CPAP    Tachycardia      Past Medical History Pertinent Negatives:   Diagnosis Date Noted    Alcohol abuse 08/28/2024    Hard to intubate 10/15/2020    Liver disease 08/28/2024    Malignant hyperthermia due to anesthesia 10/15/2020    Renal disease 08/28/2024    Seizures 08/28/2024    Spinal headache 10/15/2020    Substance abuse 08/28/2024    Suicide attempt 08/28/2024     Past Surgical History:   Procedure Laterality Date    COLONOSCOPY      MEDIPORT INSERTION, DOUBLE  09/01/2020    [facility]     TUNNELED VENOUS CATHETER PLACEMENT N/A 12/14/2023    Procedure: EXCHANGE OF VALVERDE CATHETER;  Surgeon: Lorne Cordova MD;  Location: [facility];  Service: General;  Laterality: N/A;    UPPER GASTROINTESTINAL ENDOSCOPY      VENOUS ACCESS DEVICE (PORT) INSERTION Left 10/16/2020    Procedure: INSERTION VENOUS ACCESS DEVICE UNDER FLURO;  Surgeon: Pa Lane MD;  Location: [facility]IN OR;  Service: General;  Laterality: Left;    VENOUS  "ACCESS DEVICE (PORT) INSERTION Right 10/26/2021    Procedure: REMOVAL AND INSERTION OF VALVERDE CATHETER;  Surgeon: Pa Lane MD;  Location: [facility];  Service: General;  Laterality: Right;    VENOUS ACCESS DEVICE (PORT) INSERTION Left 1/13/2023    Procedure: INSERTION OF VALVERDE CATHETER, VENOGARM;  Surgeon: Cristhian Anne MD;  Location: [facility];  Service: General;  Laterality: Left;        Review of Systems   Constitutional:  Positive for fatigue.   Eyes:  Negative for blurred vision.   Respiratory:  Positive for chest tightness.    Cardiovascular:  Positive for palpitations.   Gastrointestinal:  Positive for nausea. Negative for vomiting.   Neurological:  Positive for dizziness and headache.   Psychiatric/Behavioral:  Positive for agitation, decreased concentration, sleep disturbance, depressed mood and stress. Negative for hallucinations and suicidal ideas. The patient is nervous/anxious.            Objective   Visit Vitals  Ht 162.6 cm (64\")   Wt 119 kg (262 lb) Comment: STATED   LMP 08/03/2024 (Exact Date)   BMI 44.97 kg/m²     Wt Readings from Last 3 Encounters:   08/28/24 119 kg (262 lb)   08/19/24 119 kg (262 lb)   08/15/24 112 kg (246 lb)     BP Readings from Last 3 Encounters:   08/19/24 112/76   08/16/24 110/82   06/11/24 (!) 181/91     Pulse Readings from Last 3 Encounters:   08/19/24 112   08/16/24 99   06/11/24 116      PHYSICAL EXAM:  Constitutional:       Appearance: Normal appearance.   HENT:      Head: Normocephalic.   Pulmonary:      Effort: Pulmonary effort is normal.   Skin:     General: Skin is dry.   Neurological:      Mental Status: The patient is alert and oriented to person, place, and time.      MENTAL STATUS EXAM   General Appearance:  Cleanly groomed and dressed  Eye Contact:  Good eye contact  Attitude:  Cooperative  Speech:  Normal rate, tone, volume  Language:  Spontaneous  Mood and affect:  Anxious and depressed  Thought Process:  " Goal-directed  Associations/ Thought Content:  No delusions  Hallucinations:  None  Suicidal Ideations:  Not present  Sensorium:  Alert  Orientation:  Person, place, time and situation  Attention Span/ Concentration:  Good  Fund of Knowledge:  Appropriate for age and educational level  Intellectual Functioning:  Average range  Insight:  Good  Judgement:  Good  Reliability:  Good      PHQ-9 Depression Screening  Little interest or pleasure in doing things? (P) 3-->nearly every day   Feeling down, depressed, or hopeless? (P) 3-->nearly every day   Trouble falling or staying asleep, or sleeping too much? (P) 3-->nearly every day   Feeling tired or having little energy?     Poor appetite or overeating? (P) 3-->nearly every day   Feeling bad about yourself/you are a failure/have let yourself or your family down? (P) 3-->nearly every day   Trouble concentrating on things? (P) 3-->nearly every day   Psychomotor agitation/retardation (P) 3-->nearly every day   Thoughts about death/dying/suicide (P) 0-->not at all   PHQ-9 Total Score (P) 24   How difficult have these problems for you? (P) very difficult     GAD7 Documentation:  Feeling nervous, anxious or on edge (P) 3   Not being able to stop or control worrying (P) 2   Worrying too much about different things (P) 1   Trouble relaxing (P) 3   Being so restless that it is hard to sit still (P) 3   Becoming easily annoyed or irritable (P) 0   Feeling Afraid as if something awful might happen (P) 1   GAUTAM Total Score (P) 13   How difficult have these problems made it for you? (P) Somewhat difficult     LABS:  Lab Results   Component Value Date    GLUCOSE 150 (H) 08/19/2024    BUN 8 08/19/2024    CREATININE 0.79 08/19/2024    EGFR 104.0 08/19/2024    BCR 10.1 08/19/2024    K 3.7 08/19/2024    CO2 17.4 (L) 08/19/2024    CALCIUM 9.3 08/19/2024    ALBUMIN 4.2 08/19/2024    BILITOT 0.5 08/19/2024    AST 27 08/19/2024    ALT 49 (H) 08/19/2024     CBC          8/2/2024    15:14  "8/16/2024    00:25 8/19/2024    14:42   CBC   WBC 10.42     13.94  9.86    RBC 5.19     5.31  5.36    Hemoglobin 13.8     14.5  14.5    Hematocrit 42.9     43.7  44.8    MCV 82.7     82.3  83.6    MCH 26.6     27.3  27.1    MCHC 32.2     33.2  32.4    RDW Test Not Performed     23.0  22.5    Platelets 407     372  377       Details          This result is from an external source.                   Allergies   Allergen Reactions    Tape Other (See Comments)    Pepcid [Famotidine] Rash and GI Intolerance    Scopolamine Rash and GI Intolerance       Current Outpatient Medications   Medication Instructions    albuterol sulfate  (90 Base) MCG/ACT inhaler 2 puffs, Inhalation, Every 4 Hours PRN    ALPRAZolam (XANAX) 0.25-0.5 mg, Oral, Daily PRN    amoxicillin (AMOXIL) 500 mg, Oral, 2 Times Daily    BD PrecisionGlide Needle 23G X 1-1/2\" misc     cholecalciferol (VITAMIN D3) 1,000 Units, Oral, Daily    clobetasol (OLUX) 0.05 % topical foam APPLY TO SITE AND LET DRY X 10 MINUTES BEFORE CENTRAL LINE DRESSING IS REAPPLIED TO AREA WEEKLY    cyanocobalamin 1,000 mcg, Intramuscular, Every 28 Days    diphenhydrAMINE (BENADRYL) 25 mg, Intravenous, 2 Times Daily    dronabinol (MARINOL) 5 mg, Oral, 3 Times Daily PRN    Emgality 120 MG/ML auto-injector pen Every 30 Days, Takes the 20th of the month    escitalopram (LEXAPRO) 20 mg, Oral, Daily    fludrocortisone 0.1 mg, Oral, Daily    fondaparinux (ARIXTRA) 10 MG/0.8ML solution injection Subcutaneous, Every 24 Hours Scheduled    Corry, Zingiber officinalis, (Corry Root) 550 MG capsule Oral, Daily    Heparin & NaCl Lock Flush (HEPARIN COMBINATION IV) Intravenous, 2 Times Daily    levocetirizine (XYZAL) 5 mg, Oral, Every Evening    levothyroxine (SYNTHROID, LEVOTHROID) 50 mcg, Oral, Daily    meclizine (ANTIVERT) 25 mg, Oral, 3 Times Daily PRN    metoprolol tartrate (LOPRESSOR) 25 MG tablet TAKE 12.5MG IN THE AM, THEN 25MG IN THE MIDDLE OF DAY, THEN 12.5MG AT BEDTIME    midodrine " (PROAMATINE) 10 MG tablet TAKE 1 TABLET BY MOUTH THREE TIMES DAILY    midodrine (PROAMATINE) 5 mg, Oral, 3 Times Daily Before Meals    mirtazapine (REMERON) 15 mg, Oral, Nightly    montelukast (SINGULAIR) 10 mg, Oral, Nightly    Omeprazole 40 mg, Oral, Daily    Ondansetron HCl (ZOFRAN IV) 8 mg, Intravenous, Every 6 Hours    phenazopyridine (PYRIDIUM) 100 mg, Oral, 3 Times Daily PRN    prochlorperazine (COMPAZINE) 10 mg, Oral, Every 6 Hours PRN    pyridostigmine (MESTINON) 30 mg, Oral, Daily    RABEprazole (ACIPHEX) 20 mg, Oral, Daily     Assessment    ASSESSMENT/DIAGNOSIS/TREATMENT PLAN    (F41.1,  F41.0) Generalized anxiety disorder with panic attacks - Plan: mirtazapine (Remeron) 15 MG tablet, ALPRAZolam (Xanax) 0.25 MG tablet    (F32.9) Reactive depression - Plan: mirtazapine (Remeron) 15 MG tablet    (Z78.9) Medically complex patient    (Z79.899) High risk medication use - Plan: ALPRAZolam (Xanax) 0.25 MG tablet     FOLLOW UP: Return in about 4 weeks (around 9/25/2024) for Recheck, IN PERSON.    AVS INSTRUCTIONS:    Medication changes:   Lexapro 20 mg, continues previously ordered  Stop hydroxyzine due to lack of benefit  Remeron/mirtazapine 15 mg, take and be in bed within 30 minutes give yourself a full 7 to 8-hour window devoted to sleep this may help with sleep/appetite/nausea/anxiety  Xanax 0.25 mg take 1 to 2 tablets daily as needed for panic, short-term supply only educated on risk versus benefit of benzodiazepines  Please read attached handout on medications.Patient educated they must be on psychiatric medications consistently for at least 4 to 6 weeks to get maximum benefit.    Therapy recommendation:   Mindfulness-based stress reduction strategies have been shown to be effective in improving overall mental health by effectively lowering stress/anxiety levels, hand out provided for pt to review.  Continue counseling with established therapist biweekly    MEDICATION ISSUES:  ROSA M: Reviewed 08/28/2024       Medications Discontinued During This Encounter   Medication Reason    gabapentin (NEURONTIN) 100 MG capsule Historical Med - Therapy completed    hydrOXYzine pamoate (VISTARIL) 25 MG capsule Not Efficacious    promethazine (PHENERGAN) 25 MG/ML injection Historical Med - Therapy completed     New Medications Ordered This Visit   Medications    mirtazapine (Remeron) 15 MG tablet     Sig: Take 1 tablet by mouth Every Night.     Dispense:  30 tablet     Refill:  0    ALPRAZolam (Xanax) 0.25 MG tablet     Sig: Take 1-2 tablets by mouth Daily As Needed for Anxiety for up to 14 days.     Dispense:  28 tablet     Refill:  0      No orders of the defined types were placed in this encounter.       -Barriers: history of multiple failed medications due to lack of efficacy and/or side effects  and medically complex  -Strengths:  motivated  and pets    -Short-Term Goals: Pt will be compliant with medication management and note improvement in S/S over the next 4 to 6 weeks or at next scheduled visit.  -Long-Term Goals: Pt will be compliant with the agreed treatment plan including medication regimen & F/U appt's and deny impairment in daily functioning over the next 6 months.      -Progress toward goal: Not at goal  -Functional Status: Moderate impairment   -Prognosis: Fair with Ongoing Treatment        SUMMARY/EDUCATION/DISCUSSION:  -Pt was given appropriate time to ask questions and concerns were addressed. A thorough discussion was had that included review of disease process, need for continued monitoring and additional treatment options including use of pharmacological and non-pharmacological approaches to care, decisions were made and agreed upon by patient and provider.   -Discussed the risks, benefits, and potential side effects of the medications; patient ackowledged and verbally consented.   -Please call the office at [phone] with any worsening of symptoms or onset of intolerable side effects, please ask to leave a  message with Maciej's medical assistant.  Please give my office up to 48 hours to respond to a patient call/question/refill request.  -Patient is agreeable to call 911 or go to the nearest ER should he/she/they have any thoughts of harm to self or others.  -Patient has been educated regarding multimodal approach with healthy nutrition, healthy sleep, regular physical activity, social activities, counseling, and medications.     Part of this note may be an electronic transcription/translation of spoken language to printed text using the Dragon Dictation System. Some of the data in this electronic note has been brought forward from a previous encounter, any necessary changes have been made, it has been reviewed by this APRN, and it is accurate.    This document has been electronically signed by LALITO Ro August 28, 2024 16:57 EDT

## 2024-08-29 ENCOUNTER — OFFICE VISIT (OUTPATIENT)
Dept: SURGERY | Facility: CLINIC | Age: 29
End: 2024-08-29
Payer: COMMERCIAL

## 2024-08-29 VITALS
BODY MASS INDEX: 43.71 KG/M2 | WEIGHT: 256 LBS | HEIGHT: 64 IN | DIASTOLIC BLOOD PRESSURE: 78 MMHG | SYSTOLIC BLOOD PRESSURE: 118 MMHG

## 2024-08-29 DIAGNOSIS — I87.1 SUPERIOR VENA CAVA SYNDROME: Primary | ICD-10-CM

## 2024-08-29 NOTE — PROGRESS NOTES
Colorectal & General Surgery  Follow - Up    Patient: Liliam Lorenz  YOB: 1995  MRN: [id number]      Assessment  Liliam Lorenz is a 29 y.o. female with postural orthostatic tachycardia syndrome, Phoebe-Danlos syndrome, gastroparesis who has a chronically indwelling Dolan catheter for antiemetics and  intravenous fluid resuscitation.  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  Her cardiologist Dr. Rios is requested that we remove the Dolan catheter.  I discussed the procedure, including the risk, benefits, alternatives.  She does take fondaparinux, which we will hold 36 hours prior to the procedure.    Plan to remove the Dolan catheter in the office next Thursday.    Chief Complaint: Central venous stenosis    History of Present Illness   Liliam Lorenz is a 29 y.o. female who has developed central stenosis around her Dolan catheter.  Multiple emergency room visits recently.  She complains of tingling in her fingers which was likely more related to a panic attack.  Otherwise no significant changes to her medical history.  Dolan catheter has been functioning appropriately.    Past Medical History   Past Medical History:   Diagnosis Date    Anxiety     Bronchitis     Chest pain     Chronic hypotension     Depression     Eczema     Phoebe-Danlos syndrome     Gastroparesis     GERD (gastroesophageal reflux disease)     History of pulmonary embolus (PE)     > 15 per patient    IBS (irritable bowel syndrome)     Lightheadedness     Nausea and vomiting 08/22/2017    Obsessive-compulsive disorder     Panic disorder     POTS (postural orthostatic tachycardia syndrome)     Rectal fissure     Rosacea     Sleep apnea     uses CPAP    Tachycardia         Past Surgical History   Past Surgical History:   Procedure Laterality Date    COLONOSCOPY      MEDIPORT INSERTION, DOUBLE  09/01/2020    [facility]  VENOUS CATHETER PLACEMENT N/A 12/14/2023    Procedure: EXCHANGE OF VALVERDE CATHETER;  Surgeon: Lorne Cordova MD;  Location:  [facility];  Service: General;  Laterality: N/A;    UPPER GASTROINTESTINAL ENDOSCOPY      VENOUS ACCESS DEVICE (PORT) INSERTION Left 10/16/2020    Procedure: INSERTION VENOUS ACCESS DEVICE UNDER FLURO;  Surgeon: Pa Lane MD;  Location:  [facility];  Service: General;  Laterality: Left;    VENOUS ACCESS DEVICE (PORT) INSERTION Right 10/26/2021    Procedure: REMOVAL AND INSERTION OF VALVERED CATHETER;  Surgeon: Pa Lane MD;  Location: [facility] MA[facility] OR;  Service: General;  Laterality: Right;    VENOUS ACCESS DEVICE (PORT) INSERTION Left 1/13/2023    Procedure: INSERTION OF VALVERDE CATHETER, VENOGARM;  Surgeon: Cristhian Anne MD;  Location: [facility] MAIN OR;  Service: General;  Laterality: Left;       Social History  Social History     Socioeconomic History    Marital status:     Number of children: 0    Highest education level: Master's degree (e.g., MA, MS, Eduin, MEd, MSW, TIANNA)   Tobacco Use    Smoking status: Never     Passive exposure: Never    Smokeless tobacco: Never    Tobacco comments:     Caffeine: 1 serving daily   Vaping Use    Vaping status: Never Used   Substance and Sexual Activity    Alcohol use: No    Drug use: Never    Sexual activity: Yes     Partners: Female     Birth control/protection: None       Family History  Family History   Problem Relation Age of Onset    Hypertension Mother     Diabetes Mother     Hypertension Father     Diabetes Father     Coronary artery disease Maternal Uncle         Maternal uncle with MI    Hypertension Maternal Grandmother     Coronary artery disease Maternal Grandmother         MGM with 4 vessel CABG and multiple stents    Cancer Maternal Grandmother     Stroke Maternal Grandmother     Coronary artery disease Maternal Grandfather     Breast cancer Other     Malig Hyperthermia Neg Hx      Review of  "Systems  Negative except as documented in the HPI.     Allergies  Allergies   Allergen Reactions    Tape Other (See Comments)    Pepcid [Famotidine] Rash and GI Intolerance    Scopolamine Rash and GI Intolerance       Medications    Current Outpatient Medications:     albuterol sulfate  (90 Base) MCG/ACT inhaler, Inhale 2 puffs Every 4 (Four) Hours As Needed for Wheezing., Disp: , Rfl:     ALPRAZolam (Xanax) 0.25 MG tablet, Take 1-2 tablets by mouth Daily As Needed for Anxiety for up to 14 days., Disp: 28 tablet, Rfl: 0    BD PrecisionGlide Needle 23G X 1-1/2\" misc, , Disp: , Rfl:     cholecalciferol (Cholecalciferol) 25 MCG (1000 UT) tablet, Take 1 tablet by mouth Daily., Disp: , Rfl:     clobetasol (OLUX) 0.05 % topical foam, APPLY TO SITE AND LET DRY X 10 MINUTES BEFORE CENTRAL LINE DRESSING IS REAPPLIED TO AREA WEEKLY, Disp: , Rfl:     cyanocobalamin 1000 MCG/ML injection, Inject 1 mL into the appropriate muscle as directed by prescriber Every 28 (Twenty-Eight) Days., Disp: 1 mL, Rfl: 0    diphenhydrAMINE (BENADRYL), Infuse 100 mL into a venous catheter 2 (Two) Times a Day., Disp: , Rfl:     dronabinol (MARINOL) 5 MG capsule, Take 1 capsule by mouth 3 (Three) Times a Day As Needed (nausea)., Disp: , Rfl:     Emgality 120 MG/ML auto-injector pen, Every 30 (Thirty) Days. Takes the 20th of the month, Disp: , Rfl:     escitalopram (LEXAPRO) 10 MG tablet, Take 2 tablets by mouth Daily., Disp: , Rfl:     fondaparinux (ARIXTRA) 10 MG/0.8ML solution injection, Inject  under the skin into the appropriate area as directed Daily., Disp: , Rfl:     Ginger, Zingiber officinalis, (Ginger Root) 550 MG capsule, Take  by mouth Daily., Disp: , Rfl:     Heparin & NaCl Lock Flush (HEPARIN COMBINATION IV), Infuse  into a venous catheter 2 (Two) Times a Day., Disp: , Rfl:     levocetirizine (XYZAL) 5 MG tablet, Take 1 tablet by mouth Every Evening., Disp: , Rfl:     levothyroxine (SYNTHROID, LEVOTHROID) 50 MCG tablet, Take 1 " tablet by mouth Daily., Disp: , Rfl:     meclizine (ANTIVERT) 25 MG tablet, Take 1 tablet by mouth 3 (Three) Times a Day As Needed for Dizziness., Disp: , Rfl:     metoprolol tartrate (LOPRESSOR) 25 MG tablet, TAKE 12.5MG IN THE AM, THEN 25MG IN THE MIDDLE OF DAY, THEN 12.5MG AT BEDTIME, Disp: 180 tablet, Rfl: 3    midodrine (PROAMATINE) 10 MG tablet, TAKE 1 TABLET BY MOUTH THREE TIMES DAILY, Disp: 90 tablet, Rfl: 6    midodrine (PROAMATINE) 5 MG tablet, Take 1 tablet by mouth 3 (Three) Times a Day Before Meals., Disp: 90 tablet, Rfl: 11    mirtazapine (Remeron) 15 MG tablet, Take 1 tablet by mouth Every Night., Disp: 30 tablet, Rfl: 0    montelukast (SINGULAIR) 10 MG tablet, Take 1 tablet by mouth Every Night., Disp: , Rfl:     Omeprazole 20 MG tablet delayed-release, Take 40 mg by mouth Daily., Disp: , Rfl:     Ondansetron HCl (ZOFRAN IV), Infuse 8 mg into a venous catheter Every 6 (Six) Hours., Disp: , Rfl:     phenazopyridine (PYRIDIUM) 100 MG tablet, Take 1 tablet by mouth 3 (Three) Times a Day As Needed., Disp: , Rfl:     prochlorperazine (COMPAZINE) 10 MG tablet, Take 1 tablet by mouth Every 6 (Six) Hours As Needed for Nausea or Vomiting., Disp: , Rfl:     pyridostigmine (Mestinon) 60 MG tablet, Take 0.5 tablets by mouth Daily., Disp: 45 tablet, Rfl: 3    RABEprazole (ACIPHEX) 20 MG EC tablet, Take 1 tablet by mouth Daily., Disp: , Rfl:     fludrocortisone 0.1 MG tablet, Take 1 tablet by mouth Daily., Disp: , Rfl:     Vital Signs  Vitals:    08/29/24 1339   BP: 118/78        Physical Exam  Constitutional: Resting comfortably, no acute distress  Neck: Supple, trachea midline  Respiratory: No increased work of breathing, Symmetric excursion  Cardiovascular: Well pefursed, no jugular venous distention evident   Abdominal: Soft, non-tender, non-distended  Lymphatics: No cervical or suprascapular adenopathy  Skin: Warm, dry, no rash on visualized skin surfaces  Musculoskeletal: Symmetric strength, no obvious gross  abnormalities  Psychiatric: Alert and oriented ×3, normal affect   Dolan catheter in place    Laboratory Results  I have personally reviewed CBC with WBC 9, hemoglobin 14, platelets 377.  CMP with creatinine 0.79, bicarb 17, albumin 4.2.    Radiology  I have personally reviewed CTA which demonstrates central venous stenosis and superior vena cava syndrome with Dolan catheter in place.    Endoscopy  None pertinent         Joe Cordova MD  Colorectal & General Surgery  [facility], [place]    [address], [address]  [place], [place], [address]  P: [phone]  F: [phone]

## 2024-09-05 ENCOUNTER — PROCEDURE VISIT (OUTPATIENT)
Dept: SURGERY | Facility: CLINIC | Age: 29
End: 2024-09-05
Payer: COMMERCIAL

## 2024-09-05 DIAGNOSIS — T82.514A HICKMAN CATHETER DYSFUNCTION, INITIAL ENCOUNTER: Primary | ICD-10-CM

## 2024-09-05 NOTE — PROGRESS NOTES
PROCEDURE NOTE  Patient: Liliam Lorenz  YOB: 1995  MRN: [id number]  DATE OF PROCEDURE: 09/05/24     PREOPERATIVE DIAGNOSIS:  Central venous stenosis superior vena cava syndrome  Dolan catheter in place    POSTOPERATIVE DIAGNOSIS:  Same    PROCEDURE:  Removal of Dolan catheter from right internal jugular vein    FINDINGS:  Successful removal of Dolan catheter from right internal jugular vein    SURGEON:  Joe Cordova MD    ANESTHESIA:  Local anesthetic    EBL:  5 mL    SPECIMEN:  None    OPERATIVE DESCRIPTION:  The patient was brought to the procedure room and positioned appropriately. The patient was prepped and draped in the usual sterile fashion. Timeout was performed.     Local anesthetic infiltrated around Dolan catheter.  Dolan catheter was gently retracted and the adhesions to the felt bands were taken down sharply.  Then, catheter was then removed with the patient holding inspiration.  Direct pressure applied to the right internal jugular vein insertion site.  3-0 Vicryl suture placed at exit site.  Sterile dressing applied.  Hemostasis observed.    The patient tolerated the procedure well/

## 2024-09-23 DIAGNOSIS — F41.1 GENERALIZED ANXIETY DISORDER WITH PANIC ATTACKS: ICD-10-CM

## 2024-09-23 DIAGNOSIS — F32.9 REACTIVE DEPRESSION: ICD-10-CM

## 2024-09-23 DIAGNOSIS — F41.0 GENERALIZED ANXIETY DISORDER WITH PANIC ATTACKS: ICD-10-CM

## 2024-09-24 RX ORDER — MIRTAZAPINE 15 MG/1
15 TABLET, FILM COATED ORAL NIGHTLY
Qty: 30 TABLET | Refills: 0 | Status: SHIPPED | OUTPATIENT
Start: 2024-09-24

## 2024-10-10 ENCOUNTER — OFFICE VISIT (OUTPATIENT)
Dept: BEHAVIORAL HEALTH | Facility: CLINIC | Age: 29
End: 2024-10-10
Payer: COMMERCIAL

## 2024-10-10 VITALS
HEART RATE: 77 BPM | OXYGEN SATURATION: 97 % | WEIGHT: 258 LBS | DIASTOLIC BLOOD PRESSURE: 68 MMHG | SYSTOLIC BLOOD PRESSURE: 112 MMHG | HEIGHT: 64 IN | BODY MASS INDEX: 44.05 KG/M2

## 2024-10-10 DIAGNOSIS — F41.1 GENERALIZED ANXIETY DISORDER WITH PANIC ATTACKS: Primary | ICD-10-CM

## 2024-10-10 DIAGNOSIS — Z79.899 HIGH RISK MEDICATION USE: ICD-10-CM

## 2024-10-10 DIAGNOSIS — R11.2 NAUSEA AND VOMITING, UNSPECIFIED VOMITING TYPE: ICD-10-CM

## 2024-10-10 DIAGNOSIS — F32.9 REACTIVE DEPRESSION: ICD-10-CM

## 2024-10-10 DIAGNOSIS — Z78.9 MEDICALLY COMPLEX PATIENT: ICD-10-CM

## 2024-10-10 DIAGNOSIS — F41.0 GENERALIZED ANXIETY DISORDER WITH PANIC ATTACKS: Primary | ICD-10-CM

## 2024-10-10 DIAGNOSIS — G47.00 INSOMNIA, UNSPECIFIED TYPE: ICD-10-CM

## 2024-10-10 PROCEDURE — 99214 OFFICE O/P EST MOD 30 MIN: CPT

## 2024-10-10 PROCEDURE — 90833 PSYTX W PT W E/M 30 MIN: CPT

## 2024-10-10 RX ORDER — ESCITALOPRAM OXALATE 20 MG/1
1 TABLET ORAL DAILY
COMMUNITY
Start: 2024-09-23

## 2024-10-10 RX ORDER — OMEPRAZOLE 40 MG/1
1 CAPSULE, DELAYED RELEASE ORAL DAILY
COMMUNITY
Start: 2024-09-21

## 2024-10-11 RX ORDER — MIRTAZAPINE 30 MG/1
30 TABLET, FILM COATED ORAL NIGHTLY
Qty: 30 TABLET | Refills: 2 | Status: SHIPPED | OUTPATIENT
Start: 2024-10-11

## 2024-10-11 NOTE — PATIENT INSTRUCTIONS
-Controlled Substance Instructions:  Patient has been educated on additional monitoring that is required including regular follow up appointments, ROSA M reports, random urine drug screens, and random pill counts.    Patient has read/reviewed controlled substance agreement and understands this must be renewed yearly.  Patient educated that continuation of a controlled substance is at the discretion of the provider.     Patient educated that prescribing will not continue unless follow-up appointments are maintained.  Prescriptions can only be sent to a [place] pharmacy, 30 day supply with limited number of refills. Refill requests will only be made/reviewed during normal business hours.   Patient has been educated on the risk versus benefit of being prescribed a controlled substance including dependence/tolerance/abuse/addiction/diversion.  Patient has been educated on non-controlled substance alternatives that carry with them much less risk and require less monitoring.  If the patient is unwilling or unable to comply with the above listed monitoring the controlled substance will no longer be prescribed.      GENERAL NEW PATIENT INSTRUCTIONS:    -Please arrive in person or virtually 10-15 minutes prior to appointment to allow for registration/sign in/questionnaires. If you are seen virtually please review after visit summary (AVS)/patient instructions via [employer] for a summary of plan of care/changes in treatment plan. If you are having difficulties logging on or accessing [employer] please contact my office for assistance.    -The best way to get a hold of Maciej is to call the office at [phone] and ask to leave a message with his medical assistant. Maciej Robledo is a Psychiatric Mental Health Nurse Practitioner, due to his specialty patient's are not able to message him directly via [employer]. Please give my office up to 48 hours to respond to a patient call/question/refill request. Refill requests will be made during  normal office hours only, Monday-Friday 8:00-5:30.      -Maciej is out of the office on Fridays and weekends. Tuesdays and Thursdays are Maciej's in-office days, Mondays and Wednesdays are his telehealth days.  The decision to be seen virtually or in person is up to the discretion of the provider, not all behavioral health problems are appropriate for telehealth.    -Please call the office at [phone] with any worsening of symptoms or onset of intolerable side effects.  -Follow-up appointments must be maintained in order for prescribing to continue.  -Patient has been educated regarding multimodal approach with healthy nutrition, healthy sleep, regular physical activity, social activities, counseling, and medications.   -Please call 911 or go to the nearest ER if you begin to have thoughts of harming yourself or other people.    No show policy:  We understand unexpected circumstances arise; however, anytime you miss your appointment we are unable to provide you appropriate care.  In addition, each appointment missed could have been used to provide care for others.  We ask that you call at least 24 hours in advance to cancel or reschedule an appointment. We would like to take this opportunity to remind you of our policy stating patients who miss THREE or more appointments without cancelling or rescheduling 24 hours in advance of the appointment may be subject to cancellation of any further visits with our clinic and recommendation to seek in-person services/visits. Please call [phone] to reschedule your appointment. If there are reasons that make it difficult for you to keep the appointments, please call and let us know how we can help. Please understand that medication prescribing will not continue without seeing your provider.

## 2024-10-17 ENCOUNTER — TELEPHONE (OUTPATIENT)
Dept: FAMILY MEDICINE CLINIC | Facility: CLINIC | Age: 29
End: 2024-10-17
Payer: COMMERCIAL

## 2024-10-17 NOTE — TELEPHONE ENCOUNTER
Patient calling with concerns about dose of Remeron. She stated that she is crying all the time & feels that her depression is worse. Would like to speak with Sarbjit or Paulette.  Please advise.

## 2024-10-22 NOTE — TELEPHONE ENCOUNTER
Spoke with patient. All of her symptoms have resolved. She feels much better and thinks this was due to her body acclimating to the medication. She was informed that Sarbjit is no longer with our clinic and was given the information to contact the [facility] to transfer care.

## 2024-11-11 RX ORDER — METOPROLOL TARTRATE 25 MG/1
TABLET, FILM COATED ORAL
Qty: 180 TABLET | Refills: 3 | Status: SHIPPED | OUTPATIENT
Start: 2024-11-11

## 2024-11-11 NOTE — TELEPHONE ENCOUNTER
Do you want to fill this?  It looks like she is following with both you and Dr. Archer.  Please advise.    Nov-12/10/24-RM  LOV-04/18/24-MM    POTS: She had positive tilt table study in December 2019 and she also follows with Dr. Archer at the [facility] dysautonomia clinic.  Her symptoms are better on metoprolol, midodrine, and fludrocortisone overall, but she is having brief episodes of syncope now which are new.  Her heart rate is overall better controlled but she still having episodes of syncope daily.  She has not been able to restart Mestinon.  Autoimmune disease: She has autoimmune gastritis with gastroparesis and receives IVIG on a regular basis.  She also gives herself 1 L IV fluids daily when able.  She has a Dolan catheter in place.  Her insurance has denied her IVIG.  Erler's Danlos syndrome: She is having hyperreflexive joints and has frequent falls due to her knees not locking.  History of multiple subsegmental pulmonary emboli: Likely due to malfunctioning Dolan catheter.  She has had PE while on Eliquis, Lovenox, and warfarin.  She is now on Arixtra per Dr. Mcdonald.  She states she will remain on this lifelong.  Syncope: She is having episodes of passing out without warning while seated or laying down.  This is new.  These are brief and she comes too quickly.  The are happening daily.  She has not been able to restart Mestinon.     Ms. Lorenz is a patient of Dr. Wiley's with history of severe dysautonomia and POTS in the setting of autoimmune gastritis, gastroparesis, and Erler's Danlos syndrome.  She used to have heart rates up to 200s but is now better controlled on metoprolol scattered throughout the day.  She does have low blood pressure and is also on midodrine 15 mg every morning, 15 mg q. midday, and 10 mg every evening.  She is also on 0.1 mg fludrocortisone.       She has stopped Mestinon and is now having new episodes of syncope.  I I resent this sent to her pharmacy  at 30 mg daily.  She is going to see Dr. Archer at the [place] dysautonomia clinic and I have urged her to keep that appointment.  I have also urged her to continue with the daily IV fluid infusion.     She is concerned that her POTS symptoms were worse since she is no longer receiving IVIG.  I will send letter to Dr. Escobar's office that hopefully they can use for appeal of this medication.     I think she does a good job of managing her symptoms.  She has a plan in place for dealing with the hotter temperatures which also make her symptoms worse.  I am going to have her keep her July 2024 appointment with Dr. Wiley.

## 2024-11-25 ENCOUNTER — PATIENT ROUNDING (BHMG ONLY) (OUTPATIENT)
Age: 29
End: 2024-11-25
Payer: COMMERCIAL

## 2024-11-25 ENCOUNTER — OFFICE VISIT (OUTPATIENT)
Age: 29
End: 2024-11-25
Payer: COMMERCIAL

## 2024-11-25 VITALS
SYSTOLIC BLOOD PRESSURE: 107 MMHG | DIASTOLIC BLOOD PRESSURE: 63 MMHG | HEART RATE: 76 BPM | HEIGHT: 64 IN | BODY MASS INDEX: 44.39 KG/M2 | WEIGHT: 260 LBS | OXYGEN SATURATION: 98 %

## 2024-11-25 DIAGNOSIS — F41.0 GENERALIZED ANXIETY DISORDER WITH PANIC ATTACKS: Primary | ICD-10-CM

## 2024-11-25 DIAGNOSIS — F41.1 GENERALIZED ANXIETY DISORDER WITH PANIC ATTACKS: Primary | ICD-10-CM

## 2024-11-25 DIAGNOSIS — F42.2 MIXED OBSESSIONAL THOUGHTS AND ACTS: ICD-10-CM

## 2024-11-25 DIAGNOSIS — F33.41 MAJOR DEPRESSIVE DISORDER, RECURRENT, IN PARTIAL REMISSION: ICD-10-CM

## 2024-11-25 PROBLEM — F32.9 REACTIVE DEPRESSION: Status: RESOLVED | Noted: 2024-08-28 | Resolved: 2024-11-25

## 2024-11-25 RX ORDER — ESCITALOPRAM OXALATE 20 MG/1
20 TABLET ORAL DAILY
Qty: 30 TABLET | Refills: 1 | Status: SHIPPED | OUTPATIENT
Start: 2024-11-25 | End: 2025-01-24

## 2024-11-25 NOTE — PROGRESS NOTES
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 for management of generalized anxiety disorder with panic attacks and OCD.  -Continue alprazolam 0.25 mg p.o. twice daily as needed for acute anxiety.  Prescription filled by previous provider on 8/29/2024.  Patient reports only utilizing this medication approximately 7 times since filling this prescription.      TREATMENT PLAN - SHORT AND LONG-TERM GOALS:   -Continue supportive psychotherapy efforts and medications as indicated. Treatment and medication options discussed during today's visit.   -Patient acknowledged and verbally consented to continue with current treatment plan and was educated on the importance of compliance with treatment and follow-up appointments.    SUMMARY/EDUCATION/DISCUSSION:  -Pt was given appropriate time to ask questions and concerns were addressed. A thorough discussion was had that included review of disease process, need for continued monitoring and additional treatment options including use of pharmacological and non-pharmacological approaches to care, decisions were made and agreed upon by patient and provider.   -Discussed medication options and treatment plan of prescribed medication as well as the risks, benefits, and side effects including potential falls,  possible impaired driving and metabolic adversities among others; patient acknowledged and provided verbal consent.   -Patient has been educated regarding multimodal approach with healthy nutrition, healthy sleep, regular physical activity, social activities, counseling, and medications.  -Please call the office at [phone] within normal business hours (Monday-Friday, 8:00 AM - 4:30 PM) with any worsening of symptoms or onset of intolerable side effects. Please ask to leave a message with office staff.  Please allow up to 24-48 hours for response to a patient call/question/refill request.  -Safety plan has been established and discussed in detail with the patient, who is agreeable to contact support system and/or call 911 or go to the nearest ER should he/she/they have any thoughts of harm to self or others.    MEDS ORDERED DURING VISIT:  New Medications Ordered This Visit   Medications    escitalopram (LEXAPRO) 20 MG tablet     Sig: Take 1 tablet by mouth Daily for 60 days.     Dispense:  30 tablet     Refill:  1       FOLLOW UP:  Return in about 8 weeks (around 1/20/2025) for Next scheduled follow up.      Juan Ramon Doran DO    This document has been electronically signed by Juan Ramon Doran DO  November 25, 2024 11:07 EST    Part of this note may be an electronic transcription/translation of spoken language to printed text using the Dragon Dictation System. Some of the data in this electronic note has been brought forward from a previous encounter, any necessary changes have been made, it has been reviewed by this provider, and it is accurate.

## 2024-12-10 ENCOUNTER — OFFICE VISIT (OUTPATIENT)
Dept: CARDIOLOGY | Facility: CLINIC | Age: 29
End: 2024-12-10
Payer: COMMERCIAL

## 2024-12-10 VITALS
BODY MASS INDEX: 43.87 KG/M2 | WEIGHT: 257 LBS | SYSTOLIC BLOOD PRESSURE: 100 MMHG | DIASTOLIC BLOOD PRESSURE: 60 MMHG | HEIGHT: 64 IN | HEART RATE: 87 BPM

## 2024-12-10 DIAGNOSIS — I26.94 MULTIPLE SUBSEGMENTAL PULMONARY EMBOLI WITHOUT ACUTE COR PULMONALE: ICD-10-CM

## 2024-12-10 DIAGNOSIS — Q79.60 EHLERS-DANLOS SYNDROME: ICD-10-CM

## 2024-12-10 DIAGNOSIS — G90.A POTS (POSTURAL ORTHOSTATIC TACHYCARDIA SYNDROME): Primary | ICD-10-CM

## 2024-12-10 PROCEDURE — 93000 ELECTROCARDIOGRAM COMPLETE: CPT | Performed by: INTERNAL MEDICINE

## 2024-12-10 PROCEDURE — 99214 OFFICE O/P EST MOD 30 MIN: CPT | Performed by: INTERNAL MEDICINE

## 2024-12-10 RX ORDER — METOPROLOL TARTRATE 25 MG/1
25 TABLET, FILM COATED ORAL NIGHTLY
Qty: 90 TABLET | Refills: 3 | Status: SHIPPED | OUTPATIENT
Start: 2024-12-10

## 2024-12-10 NOTE — PROGRESS NOTES
Date of Office Visit: 12/10/2024  Encounter Provider: Pamela Wiley MD  Place of Service: [facility]  Patient Name: Liliam Lorenz  :1995      Patient ID:  Liliam Lorenz is a 29 y.o. female is here for  followup for POTS        History of Present Illness    She has a history of severe POTS ([facility] at [facility]), GERD, irritable bowel syndrome, obesity, GRACIELA on CPAP, history of recurrent PE- failed eliquis, warfarin, lovenox and now on Arixtra ([place]), gastroparesis on IVIG and autoimmune gastritis ([facility]) - she is here for followup.     Her pulmonologist is Dr. Martínez.  She has a left Dolan and gets 1 L of IV fluid daily and IVIG every Tuesday per Dr. Escobar with GI for her gastroparesis.     she was initially seen in the office on 2018.  She was having chest discomfort intermittently for several years and was evaluated in the emergency department, and it was felt that this might represent esophageal spasm.  However, she did undergo cardiac testing with an exercise treadmill stress test and echocardiogram on 3/14/2018.  The treadmill stress test showed no EKG changes, and she was able to exercise for 9 minutes (although she did have chest discomfort).  The echocardiogram showed an ejection fraction of 62% and no significant pathology.  She later continued to have issues with near syncope and tachycardia.  A tilt table test was performed on 2019 which confirmed the diagnosis of POTS.  Typically, she met all criteria when tilted backwards (her heart rate went up significantly, and her blood pressure dropped).  She has been treated with beta-blockers and midodrine.  She also has evidence of severe dysautonomia and severe gastroparesis.  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     She was  admitted in September 2021 with shortness of breath and tachycardia and was found to have very small bilateral pulmonary emboli on CT angiogram of the chest on 9/8/2021.  She was initiated on Eliquis at that time.  She was also readmitted on 10/25/2021 with bleeding from her left IJ Dolan catheter.  This was removed, and a right IJ Dolan was placed.  She was later diagnosed with hypothyroidism and hemiplegic migraines.  She had recurrent pulmonary emboli in the right lower lobe in April 2022.  Her Eliquis was switched to Lovenox injections afterwards.  There was a concern that she was not absorbing her anticoagulants because of her gastroparesis-this is why she was changed to Lovenox.     She had COVID-19 on New Year's Delia of 2021, and she subsequently got septic.  This set off all month long of severe gastroparesis issues.  She has had severe and recurrent nausea.  She is back on fluids and IVIG, and takes frequent nausea medications.  She is still hopefully going to get her gastric stimulator after September 2022.     There is CAD on the maternal side of her family and her mom has hypertension and diabetes.  She is  to her wife, has no children, is unemployed-disabled, has never smoked, uses no alcohol has 1 caffeinated beverage per day and no drugs.     Echocardiogram done 10/15/2021 showed ejection fraction of 60%, normal diastolic function no significant valvular abnormalities.  Venous duplex of lower extremities done 3/2/2023 was normal.  CTA chest done 3/2/2023 showed recurrent left-sided subsegmental pulmonary emboli.  I did review the CTA chest images and it shows no evidence of coronary artery disease.  Echo done 2/23/2023 showed ejection fraction of 68% with normal right ventricular size and function, normal diastolic filling, no significant valvular abnormalities.  After this echocardiogram, she had more pulmonary emboli and is now on chronic oxygen due to severe hypoxia with activity.      Echocardiogram done 6/21/23 was normal.  She had normal bilateral upper extremity venous duplex studies in 8/19/2024.  She was in the emergency department 8/15/2024 with chest pain.  She had sinus tachycardia there.  Her labs showed negative troponins x 2, normal proBNP, glucose 150 with otherwise normal CMP, normal CBC.    Gastroparesis is in remission.  She is not eating.  She is out of bed.  She is walking around and being active.  She is enjoying life.  She is not having syncope or tachycardia.  Her heart rate is well-controlled.  She is doing so much better.  Her port has been discontinued.    Past Medical History:   Diagnosis Date    Anxiety     Bronchitis     Chest pain     Chronic hypotension     Depression     Eczema     Phoebe-Danlos syndrome     Gastroparesis     GERD (gastroesophageal reflux disease)     History of pulmonary embolus (PE)     > 15 per patient    IBS (irritable bowel syndrome)     Lightheadedness     Nausea and vomiting 08/22/2017    Obsessive-compulsive disorder     Panic disorder     POTS (postural orthostatic tachycardia syndrome)     Rectal fissure     Rosacea     Sleep apnea     uses CPAP    Tachycardia          Past Surgical History:   Procedure Laterality Date    COLONOSCOPY      MEDIPORT INSERTION, DOUBLE  09/01/2020    [facility]     TUNNELED VENOUS CATHETER PLACEMENT N/A 12/14/2023    Procedure: EXCHANGE OF VALVERDE CATHETER;  Surgeon: Lorne Cordova MD;  Location: [facility];  Service: General;  Laterality: N/A;    UPPER GASTROINTESTINAL ENDOSCOPY      VENOUS ACCESS DEVICE (PORT) INSERTION Left 10/16/2020    Procedure: INSERTION VENOUS ACCESS DEVICE UNDER FLURO;  Surgeon: Pa Lane MD;  Location: [facility] OR;  Service: General;  Laterality: Left;    VENOUS ACCESS DEVICE (PORT) INSERTION Right 10/26/2021    Procedure: REMOVAL AND INSERTION OF VALVERDE CATHETER;  Surgeon: Pa Lane MD;  Location: [facility] OR;  Service: General;   "Laterality: Right;    VENOUS ACCESS DEVICE (PORT) INSERTION Left 1/13/2023    Procedure: INSERTION OF VALVERDE CATHETER, VENOGARM;  Surgeon: Cristhian Anne MD;  Location: [facility] OR;  Service: General;  Laterality: Left;       Current Outpatient Medications on File Prior to Visit   Medication Sig Dispense Refill    albuterol sulfate  (90 Base) MCG/ACT inhaler Inhale 2 puffs Every 4 (Four) Hours As Needed for Wheezing.      BD PrecisionGlide Needle 23G X 1-1/2\" misc       cholecalciferol (Cholecalciferol) 25 MCG (1000 UT) tablet Take 1 tablet by mouth Daily.      cyanocobalamin 1000 MCG/ML injection Inject 1 mL into the appropriate muscle as directed by prescriber Every 28 (Twenty-Eight) Days. 1 mL 0    dronabinol (MARINOL) 5 MG capsule Take 1 capsule by mouth 3 (Three) Times a Day As Needed (nausea).      Emgality 120 MG/ML auto-injector pen Every 30 (Thirty) Days. Takes the 20th of the month      escitalopram (LEXAPRO) 20 MG tablet Take 1 tablet by mouth Daily for 60 days. 30 tablet 1    fludrocortisone 0.1 MG tablet Take 1 tablet by mouth Daily.      fondaparinux (ARIXTRA) 10 MG/0.8ML solution injection Inject  under the skin into the appropriate area as directed Daily.      Ginger, Zingiber officinalis, (Ginger Root) 550 MG capsule Take  by mouth Daily.      levocetirizine (XYZAL) 5 MG tablet Take 1 tablet by mouth Every Evening.      levothyroxine (SYNTHROID, LEVOTHROID) 50 MCG tablet Take 1 tablet by mouth Daily.      meclizine (ANTIVERT) 25 MG tablet Take 1 tablet by mouth 3 (Three) Times a Day As Needed for Dizziness.      metoprolol tartrate (LOPRESSOR) 25 MG tablet TAKE 1/2 TABLET BY MOUTH IN THE MORNING, 1 TABLET IN THE MIDDLE OF THE DAY AND 1/2 TABLET AT BEDTIME (Patient taking differently: Take 1 tablet by mouth 2 (Two) Times a Day.) 180 tablet 3    midodrine (PROAMATINE) 10 MG tablet TAKE 1 TABLET BY MOUTH THREE TIMES DAILY (Patient taking differently: Take 1 tablet by mouth 2 (Two) Times " a Day.) 90 tablet 6    mirtazapine (REMERON) 30 MG tablet Take 1 tablet by mouth Every Night. 30 tablet 2    montelukast (SINGULAIR) 10 MG tablet Take 1 tablet by mouth Every Night.      omeprazole (priLOSEC) 40 MG capsule Take 1 capsule by mouth Daily.      phenazopyridine (PYRIDIUM) 100 MG tablet Take 1 tablet by mouth 3 (Three) Times a Day As Needed.      prochlorperazine (COMPAZINE) 10 MG tablet Take 1 tablet by mouth Every 6 (Six) Hours As Needed for Nausea or Vomiting.      Promethazine HCl (PHENERGAN PO) Take  by mouth.      RABEprazole (ACIPHEX) 20 MG EC tablet Take 1 tablet by mouth Daily.      [DISCONTINUED] clobetasol (OLUX) 0.05 % topical foam APPLY TO SITE AND LET DRY X 10 MINUTES BEFORE CENTRAL LINE DRESSING IS REAPPLIED TO AREA WEEKLY (Patient not taking: Reported on 12/10/2024)      [DISCONTINUED] diphenhydrAMINE (BENADRYL) Infuse 100 mL into a venous catheter 2 (Two) Times a Day. (Patient not taking: Reported on 12/10/2024)      [DISCONTINUED] Heparin & NaCl Lock Flush (HEPARIN COMBINATION IV) Infuse  into a venous catheter 2 (Two) Times a Day. (Patient not taking: Reported on 10/10/2024)      [DISCONTINUED] midodrine (PROAMATINE) 5 MG tablet Take 1 tablet by mouth 3 (Three) Times a Day Before Meals. (Patient not taking: Reported on 12/10/2024) 90 tablet 11    [DISCONTINUED] Ondansetron HCl (ZOFRAN IV) Infuse 8 mg into a venous catheter Every 6 (Six) Hours. (Patient not taking: Reported on 12/10/2024)      [DISCONTINUED] pyridostigmine (Mestinon) 60 MG tablet Take 0.5 tablets by mouth Daily. (Patient not taking: Reported on 10/10/2024) 45 tablet 3     No current facility-administered medications on file prior to visit.       Social History     Socioeconomic History    Marital status:     Number of children: 0    Highest education level: Master's degree (e.g., MA, MS, Eduin, MEd, MSW, TIANNA)   Tobacco Use    Smoking status: Never     Passive exposure: Never    Smokeless tobacco: Never    Tobacco  "comments:     Caffeine: 1 serving daily   Vaping Use    Vaping status: Never Used   Substance and Sexual Activity    Alcohol use: No    Drug use: Never    Sexual activity: Yes     Partners: Female     Birth control/protection: None             Procedures    ECG 12 Lead    Date/Time: 12/10/2024 2:50 PM  Performed by: Pamela Wiley MD    Authorized by: Pamela Wiley MD  Comparison: compared with previous ECG   Similar to previous ECG  Rhythm: sinus rhythm    Clinical impression: normal ECG              Objective:      Vitals:    12/10/24 1438   BP: 100/60   Pulse: 87   Weight: 117 kg (257 lb)   Height: 162.6 cm (64\")     Body mass index is 44.11 kg/m².    Vitals reviewed.   Constitutional:       General: Not in acute distress.     Appearance: Not diaphoretic.   Neck:      Vascular: No carotid bruit or JVD.   Pulmonary:      Effort: Pulmonary effort is normal.      Breath sounds: Normal breath sounds.   Cardiovascular:      Normal rate. Regular rhythm.      Murmurs: There is no murmur.      No gallop.  No rub.   Pulses:     Intact distal pulses.      Carotid: 2+ bilaterally.     Radial: 2+ bilaterally.     Dorsalis pedis: 2+ bilaterally.     Posterior tibial: 2+ bilaterally.  Edema:     Peripheral edema absent.   Neurological:      Cranial Nerves: No cranial nerve deficit.         Lab Review:       Assessment:      Diagnosis Plan   1. POTS (postural orthostatic tachycardia syndrome)        2. Phoebe-Danlos syndrome            POTS-  followed by Dr. Archer at [place].  Is on midodrine, Mestinon and metoprolol.  Received 1 L of normal saline daily through her left-sided Dolan.  Phoebe-Danlos type III  Gastroparesis-severe, does not have a gastric stimulator as her not sure this can help her because of her Phoebe-Danlos.  She is on chronic Phenergan for this and gets IVIG for this as well, sees Dr. Escobar.  Autoimmune atrophic gastritis  GERD.  Interstitial cystitis  Recurrent pulmonary emboli-on " Arixtra, follows with Dr. Mcdonald. has had pulmonary emboli-failure of anticoagulation-on Arixtra  Hemiplegic migraines  GRACIELA on CPAP  Hypothyroidism  Fibromyalgia  Obesity.   Hypoxia, on chronic oxygen now since her last pulmonary emboli March 2023.  Repeat echocardiogram looking for RV size and RVSP.     Plan:       Decrease metoprolol to 25 mg nightly, see Marisol in 3 months to see if she can stop the metoprolol altogether.  Remain on midodrine and Florinef at this time.  No other testing, I think she is overall doing well.

## 2025-01-16 ENCOUNTER — OFFICE VISIT (OUTPATIENT)
Age: 30
End: 2025-01-16
Payer: COMMERCIAL

## 2025-01-16 VITALS
SYSTOLIC BLOOD PRESSURE: 131 MMHG | DIASTOLIC BLOOD PRESSURE: 78 MMHG | WEIGHT: 252 LBS | OXYGEN SATURATION: 92 % | HEART RATE: 98 BPM | HEIGHT: 64 IN | BODY MASS INDEX: 43.02 KG/M2

## 2025-01-16 DIAGNOSIS — F41.0 GENERALIZED ANXIETY DISORDER WITH PANIC ATTACKS: Primary | ICD-10-CM

## 2025-01-16 DIAGNOSIS — F42.2 MIXED OBSESSIONAL THOUGHTS AND ACTS: ICD-10-CM

## 2025-01-16 DIAGNOSIS — F41.1 GENERALIZED ANXIETY DISORDER WITH PANIC ATTACKS: Primary | ICD-10-CM

## 2025-01-16 DIAGNOSIS — F33.41 MAJOR DEPRESSIVE DISORDER, RECURRENT, IN PARTIAL REMISSION: ICD-10-CM

## 2025-01-16 RX ORDER — MIRTAZAPINE 30 MG/1
30 TABLET, FILM COATED ORAL NIGHTLY
Qty: 30 TABLET | Refills: 2 | Status: SHIPPED | OUTPATIENT
Start: 2025-01-16

## 2025-01-16 RX ORDER — ESCITALOPRAM OXALATE 20 MG/1
20 TABLET ORAL DAILY
Qty: 30 TABLET | Refills: 2 | Status: SHIPPED | OUTPATIENT
Start: 2025-01-16 | End: 2025-04-16

## 2025-01-16 NOTE — PROGRESS NOTES
Subjective   Liliam Lorenz is a 29 y.o. female who presents today in follow up for management of generalized anxiety disorder with panic attacks and OCD.    Patient reports that she is doing well overall and endorses ongoing improvement in her psychiatric symptoms associated with current medication regimen.  More specifically, she reports ongoing improvement in her general levels of anxiety citing improved ability to appropriately control/stop her anxiety and worry, decreased frequency of catastrophizing/thinking of worst case scenarios, decreased feelings of restlessness, and the absence of any fears that something bad or awful is going to happen to herself or her loved ones.  She also reports appropriate management of her OCD symptoms associated with current medication regimen as well, once again stating that while she does continue to engage in counting colors when entering her room and counting/checking things in patterns of evens or odds these OCD symptoms have significantly decreased in both frequency and severity and have not negatively impacted her social or occupational functioning over the past 8 weeks.  The patient reports consistent compliance with all psychiatric medications but does report a perceived increase in daytime sedation/drowsiness associated with current dose of mirtazapine.  Patient does voice the fact that she feels as if the benefits associated with this medication do significantly outweigh these reported side effects, and as such she does voice desire to continue with current dosage.  She does endorse numerous psychosocial stressors primarily involving her comorbid medical conditions and associated complications in addition to the fact that her and her wife have recently decided to file for bankruptcy due to a large amount of credit card debt secondary to the patient's medical expenses and recent foot bills.  Patient does also report that her and her wife have discussed obtaining a  divorce so the patient can apply for and potentially obtain Social Security disability. Patient otherwise denies any auditory, visual, or tactile hallucinations and does not currently appear to be responding to internal stimuli.  Patient denies any generalized paranoia and displays no evidence of delusional thinking.    The following portions of the patient's history were reviewed and updated as appropriate: allergies, current medications, past family history, past medical history, past social history, past surgical history and problem list.       Past Medical History:  Past Medical History:   Diagnosis Date    Anxiety     Bronchitis     Chest pain     Chronic hypotension     Depression     Eczema     Phoebe-Danlos syndrome     Gastroparesis     GERD (gastroesophageal reflux disease)     History of pulmonary embolus (PE)     > 15 per patient    IBS (irritable bowel syndrome)     Lightheadedness     Nausea and vomiting 08/22/2017    Obsessive-compulsive disorder     Panic disorder     POTS (postural orthostatic tachycardia syndrome)     Rectal fissure     Rosacea     Sleep apnea     uses CPAP    Tachycardia        Social History:  Social History     Socioeconomic History    Marital status:     Number of children: 0    Highest education level: Master's degree (e.g., MA, MS, Eduin, MEd, MSW, TIANNA)   Tobacco Use    Smoking status: Never     Passive exposure: Never    Smokeless tobacco: Never    Tobacco comments:     Caffeine: 1 serving daily   Vaping Use    Vaping status: Never Used   Substance and Sexual Activity    Alcohol use: No    Drug use: Never    Sexual activity: Yes     Partners: Female     Birth control/protection: None       Family History:  Family History   Problem Relation Age of Onset    Hypertension Mother     Diabetes Mother     Hypertension Father     Diabetes Father     Coronary artery disease Maternal Uncle         Maternal uncle with MI    Hypertension Maternal Grandmother     Coronary artery  disease Maternal Grandmother         MGM with 4 vessel CABG and multiple stents    Cancer Maternal Grandmother     Stroke Maternal Grandmother     Coronary artery disease Maternal Grandfather     Breast cancer Other     Malig Hyperthermia Neg Hx        Past Surgical History:  Past Surgical History:   Procedure Laterality Date    COLONOSCOPY      MEDIPORT INSERTION, DOUBLE  09/01/2020    [employer] [place]     TUNNELED VENOUS CATHETER PLACEMENT N/A 12/14/2023    Procedure: EXCHANGE OF DOLAN CATHETER;  Surgeon: Lorne Cordova MD;  Location: [facility] MAIN OR;  Service: General;  Laterality: N/A;    UPPER GASTROINTESTINAL ENDOSCOPY      VENOUS ACCESS DEVICE (PORT) INSERTION Left 10/16/2020    Procedure: INSERTION VENOUS ACCESS DEVICE UNDER FLURO;  Surgeon: Pa Lane MD;  Location: [facility] MAIN OR;  Service: General;  Laterality: Left;    VENOUS ACCESS DEVICE (PORT) INSERTION Right 10/26/2021    Procedure: REMOVAL AND INSERTION OF DOLAN CATHETER;  Surgeon: Pa Lane MD;  Location: [facility] MAIN OR;  Service: General;  Laterality: Right;    VENOUS ACCESS DEVICE (PORT) INSERTION Left 1/13/2023    Procedure: INSERTION OF DOLAN CATHETER, VENOGARM;  Surgeon: Cristhian Anne MD;  Location: [facility] MAIN OR;  Service: General;  Laterality: Left;       Problem List:  Patient Active Problem List   Diagnosis    Poor venous access    POTS (postural orthostatic tachycardia syndrome)    Sinus tachycardia    Multiple subsegmental pulmonary emboli without acute cor pulmonale    Autoimmune disease    Phoebe-Danlos syndrome    Nausea and vomiting    Gastroparesis    Postural orthostatic tachycardia syndrome    Dolan catheter dysfunction    Febrile illness, acute    Single subsegmental pulmonary embolism without acute cor pulmonale    Cellulitis of chest wall    Pulmonary embolus    Generalized anxiety disorder with panic attacks    Medically complex patient    Mixed obsessional thoughts and acts    Major  "depressive disorder, recurrent, in partial remission       Allergy:   Allergies   Allergen Reactions    Tape Other (See Comments)    Pepcid [Famotidine] Rash and GI Intolerance    Scopolamine Rash and GI Intolerance        Current Medications:   Current Outpatient Medications   Medication Sig Dispense Refill    escitalopram (LEXAPRO) 20 MG tablet Take 1 tablet by mouth Daily for 90 days. 30 tablet 2    mirtazapine (REMERON) 30 MG tablet Take 1 tablet by mouth Every Night. 30 tablet 2    albuterol sulfate  (90 Base) MCG/ACT inhaler Inhale 2 puffs Every 4 (Four) Hours As Needed for Wheezing.      BD PrecisionGlide Needle 23G X 1-1/2\" misc       cholecalciferol (Cholecalciferol) 25 MCG (1000 UT) tablet Take 1 tablet by mouth Daily.      cyanocobalamin 1000 MCG/ML injection Inject 1 mL into the appropriate muscle as directed by prescriber Every 28 (Twenty-Eight) Days. 1 mL 0    dronabinol (MARINOL) 5 MG capsule Take 1 capsule by mouth 3 (Three) Times a Day As Needed (nausea).      Emgality 120 MG/ML auto-injector pen Every 30 (Thirty) Days. Takes the 20th of the month      fludrocortisone 0.1 MG tablet Take 1 tablet by mouth Daily.      fondaparinux (ARIXTRA) 10 MG/0.8ML solution injection Inject  under the skin into the appropriate area as directed Daily.      Ginger, Zingiber officinalis, (Ginger Root) 550 MG capsule Take  by mouth Daily.      levocetirizine (XYZAL) 5 MG tablet Take 1 tablet by mouth Every Evening.      levothyroxine (SYNTHROID, LEVOTHROID) 50 MCG tablet Take 1 tablet by mouth Daily.      meclizine (ANTIVERT) 25 MG tablet Take 1 tablet by mouth 3 (Three) Times a Day As Needed for Dizziness.      metoprolol tartrate (LOPRESSOR) 25 MG tablet Take 1 tablet by mouth Every Night. 90 tablet 3    midodrine (PROAMATINE) 10 MG tablet TAKE 1 TABLET BY MOUTH THREE TIMES DAILY (Patient taking differently: Take 1 tablet by mouth 2 (Two) Times a Day.) 90 tablet 6    montelukast (SINGULAIR) 10 MG tablet Take " "1 tablet by mouth Every Night.      omeprazole (priLOSEC) 40 MG capsule Take 1 capsule by mouth Daily.      phenazopyridine (PYRIDIUM) 100 MG tablet Take 1 tablet by mouth 3 (Three) Times a Day As Needed.      prochlorperazine (COMPAZINE) 10 MG tablet Take 1 tablet by mouth Every 6 (Six) Hours As Needed for Nausea or Vomiting.      Promethazine HCl (PHENERGAN PO) Take  by mouth.      RABEprazole (ACIPHEX) 20 MG EC tablet Take 1 tablet by mouth Daily.       No current facility-administered medications for this visit.       Review of Symptoms:    Review of Systems   Constitutional:  Positive for activity change and fatigue.   HENT:  Negative for tinnitus.    Eyes:  Negative for visual disturbance.   Respiratory:  Positive for chest tightness and shortness of breath.    Cardiovascular:  Positive for palpitations.   Gastrointestinal:  Positive for constipation.   Endocrine: Negative for cold intolerance and heat intolerance.   Neurological:  Negative for seizures, memory problem and confusion.   Psychiatric/Behavioral:  Positive for decreased concentration, sleep disturbance, depressed mood and stress. Negative for self-injury and suicidal ideas. The patient is nervous/anxious.          Physical Exam:   Blood pressure 131/78, pulse 98, height 162.6 cm (64.02\"), weight 114 kg (252 lb), SpO2 92%, not currently breastfeeding.  Appearance: Appears documented age, appropriate hygiene and grooming.  Gait, Station, Strength: Normal gait, station and strength.       Mental Status Exam:   Hygiene:   good  Cooperation:  Cooperative  Eye Contact:  Good  Psychomotor Behavior:  Appropriate  Affect:  Full range and Appropriate  Mood: normal and euthymic  Hopelessness: Denies  Speech:  Normal  Thought Process:  Goal directed and Linear  Thought Content:  Normal  Suicidal:  None  Homicidal:  None  Hallucinations:  None  Delusion:  None  Memory:  Intact  Orientation:  Person, Place, Time, and Situation  Reliability:  good  Insight:  " Good  Judgement:  Good  Impulse Control:  Good      Lab Results:   No visits with results within 1 Month(s) from this visit.   Latest known visit with results is:   Admission on 08/19/2024, Discharged on 08/19/2024   Component Date Value Ref Range Status    QT Interval 08/19/2024 341  ms Final    QTC Interval 08/19/2024 476  ms Final    Glucose 08/19/2024 150 (H)  65 - 99 mg/dL Final    BUN 08/19/2024 8  6 - 20 mg/dL Final    Creatinine 08/19/2024 0.79  0.57 - 1.00 mg/dL Final    Sodium 08/19/2024 134 (L)  136 - 145 mmol/L Final    Potassium 08/19/2024 3.7  3.5 - 5.2 mmol/L Final    Chloride 08/19/2024 104  98 - 107 mmol/L Final    CO2 08/19/2024 17.4 (L)  22.0 - 29.0 mmol/L Final    Calcium 08/19/2024 9.3  8.6 - 10.5 mg/dL Final    Total Protein 08/19/2024 7.3  6.0 - 8.5 g/dL Final    Albumin 08/19/2024 4.2  3.5 - 5.2 g/dL Final    ALT (SGPT) 08/19/2024 49 (H)  1 - 33 U/L Final    AST (SGOT) 08/19/2024 27  1 - 32 U/L Final    Alkaline Phosphatase 08/19/2024 104  39 - 117 U/L Final    Total Bilirubin 08/19/2024 0.5  0.0 - 1.2 mg/dL Final    Globulin 08/19/2024 3.1  gm/dL Final    A/G Ratio 08/19/2024 1.4  g/dL Final    BUN/Creatinine Ratio 08/19/2024 10.1  7.0 - 25.0 Final    Anion Gap 08/19/2024 12.6  5.0 - 15.0 mmol/L Final    eGFR 08/19/2024 104.0  >60.0 mL/min/1.73 Final    proBNP 08/19/2024 40.4  0.0 - 450.0 pg/mL Final    HS Troponin T 08/19/2024 7  <14 ng/L Final    Extra Tube 08/19/2024 Hold for add-ons.   Final    Auto resulted.    Extra Tube 08/19/2024 hold for add-on   Final    Auto resulted    Extra Tube 08/19/2024 Hold for add-ons.   Final    Auto resulted.    Extra Tube 08/19/2024 Hold for add-ons.   Final    Auto resulted    WBC 08/19/2024 9.86  3.40 - 10.80 10*3/mm3 Final    RBC 08/19/2024 5.36 (H)  3.77 - 5.28 10*6/mm3 Final    Hemoglobin 08/19/2024 14.5  12.0 - 15.9 g/dL Final    Hematocrit 08/19/2024 44.8  34.0 - 46.6 % Final    MCV 08/19/2024 83.6  79.0 - 97.0 fL Final    MCH 08/19/2024 27.1   26.6 - 33.0 pg Final    MCHC 08/19/2024 32.4  31.5 - 35.7 g/dL Final    RDW 08/19/2024 22.5 (H)  12.3 - 15.4 % Final    RDW-SD 08/19/2024 65.0 (H)  37.0 - 54.0 fl Final    MPV 08/19/2024 9.7  6.0 - 12.0 fL Final    Platelets 08/19/2024 377  140 - 450 10*3/mm3 Final    Neutrophil % 08/19/2024 74.1  42.7 - 76.0 % Final    Lymphocyte % 08/19/2024 20.2  19.6 - 45.3 % Final    Monocyte % 08/19/2024 3.2 (L)  5.0 - 12.0 % Final    Eosinophil % 08/19/2024 1.6  0.3 - 6.2 % Final    Basophil % 08/19/2024 0.6  0.0 - 1.5 % Final    Immature Grans % 08/19/2024 0.3  0.0 - 0.5 % Final    Neutrophils, Absolute 08/19/2024 7.30 (H)  1.70 - 7.00 10*3/mm3 Final    Lymphocytes, Absolute 08/19/2024 1.99  0.70 - 3.10 10*3/mm3 Final    Monocytes, Absolute 08/19/2024 0.32  0.10 - 0.90 10*3/mm3 Final    Eosinophils, Absolute 08/19/2024 0.16  0.00 - 0.40 10*3/mm3 Final    Basophils, Absolute 08/19/2024 0.06  0.00 - 0.20 10*3/mm3 Final    Immature Grans, Absolute 08/19/2024 0.03  0.00 - 0.05 10*3/mm3 Final    nRBC 08/19/2024 0.0  0.0 - 0.2 /100 WBC Final    Glucose 08/19/2024 127  70 - 130 mg/dL Final    Right Internal Jugular Spont 08/19/2024 Y   Final    Right Internal Jugular Phasic 08/19/2024 Y   Final    Right Internal Jugular Compress 08/19/2024 C   Final    Right Internal Jugular Augment 08/19/2024 Y   Final    Right Subclavian Spont 08/19/2024 Y   Final    Right Subclavian Phasic 08/19/2024 Y   Final    Right Subclavian Compress 08/19/2024 C   Final    Right Subclavian Augment 08/19/2024 Y   Final    Right Axillary Spont 08/19/2024 Y   Final    Right Axillary Phasic 08/19/2024 Y   Final    Right Axillary Compress 08/19/2024 C   Final    Right Axillary Augment 08/19/2024 Y   Final    Right Brachial Compress 08/19/2024 C   Final    Right Radial Compress 08/19/2024 C   Final    Right Ulnar Compress 08/19/2024 C   Final    Right Basilic Upper Compress 08/19/2024 C   Final    Right Basilic Forearm Compress 08/19/2024 C   Final    Right  Cephalic Upper Compress 08/19/2024 C   Final    Right Cephalic Forearm Compress 08/19/2024 C   Final    Left Internal Jugular Spont 08/19/2024 Y   Final    Left Internal Jugular Phasic 08/19/2024 Y   Final    Left Internal Jugular Compress 08/19/2024 C   Final    Left Internal Jugular Augment 08/19/2024 Y   Final    Left Subclavian Spont 08/19/2024 Y   Final    Left Subclavian Phasic 08/19/2024 Y   Final    Left Subclavian Compress 08/19/2024 C   Final    Left Subclavian Augment 08/19/2024 Y   Final    Left Axillary Spont 08/19/2024 Y   Final    Left Axillary Phasic 08/19/2024 Y   Final    Left Axillary Compress 08/19/2024 C   Final    Left Axillary Augment 08/19/2024 Y   Final    Left Brachial Compress 08/19/2024 C   Final    Left Radial Compress 08/19/2024 C   Final    Left Ulnar Compress 08/19/2024 C   Final    Left Basilic Upper Compress 08/19/2024 C   Final    Left Basilic Forearm Compress 08/19/2024 C   Final    Left Cephalic Upper Compress 08/19/2024 C   Final    Left Cephalic Forearm Compress 08/19/2024 C   Final       PHQ-9 Total Score: 6   GAUTAM-7 Total Score: 2     Assessment & Plan    Diagnoses and all orders for this visit:    1. Generalized anxiety disorder with panic attacks (Primary)  -     escitalopram (LEXAPRO) 20 MG tablet; Take 1 tablet by mouth Daily for 90 days.  Dispense: 30 tablet; Refill: 2  -     mirtazapine (REMERON) 30 MG tablet; Take 1 tablet by mouth Every Night.  Dispense: 30 tablet; Refill: 2    2. Mixed obsessional thoughts and acts  -     escitalopram (LEXAPRO) 20 MG tablet; Take 1 tablet by mouth Daily for 90 days.  Dispense: 30 tablet; Refill: 2    3. Major depressive disorder, recurrent, in partial remission  -     escitalopram (LEXAPRO) 20 MG tablet; Take 1 tablet by mouth Daily for 90 days.  Dispense: 30 tablet; Refill: 2  -     mirtazapine (REMERON) 30 MG tablet; Take 1 tablet by mouth Every Night.  Dispense: 30 tablet; Refill: 2         Liliam Lorenz is a 29 y.o. female who  presents today in follow up for management of generalized anxiety disorder with panic attacks and OCD.    As detailed above patient reports doing very well since last visit approximately 8 weeks ago and continues to endorse appropriate management of psychiatric symptoms associated with current medication regimen.  She reports ongoing improvement in anxiety and denies a depressed mood or any other significant depressive symptoms at this time as evidenced by current PHQ-9 and GAUTAM-7 scores.  While she does continue to experience some OCD symptoms she does report a significant decrease in their severity/frequency and feels as if her current medication regimen is adequately addressing the symptoms.  It is of note the patient does report a perceived increase in daytime sedation/drowsiness associated with current dose of mirtazapine.  It is unclear whether or not patient's mirtazapine is the primary factor contributing to the patient's excessive daytime sedation/sleepiness, but it was explained to the patient at today's visit that this medication can very well contribute to or exacerbate these potential side effects.  Patient voices understanding of this and does state that she feels as if the significant benefit associated with this medication does greatly outweigh this potential side effect, and as such she does request to continue on current dose at this time.  We will continue to monitor for this and other potential side effects of the future and may discuss potential dosage adjustments if necessary.  Otherwise, given ongoing improvement in psychiatric symptoms associated with current medication regimen I recommend making no medication adjustments at today's visit.  Patient was encouraged to remain compliant with all psychiatric medications and will be seen again in approximately 8 weeks for reassessment.  Patient voices understanding of this and is agreeable to today's plan.    Medications:  -Continue mirtazapine 30  mg p.o. nightly for management of generalized anxiety disorder with panic attacks and major depressive disorder.  -Continue escitalopram 20 mg p.o. once daily for management of generalized anxiety disorder with panic attacks, major depressive disorder and OCD.  -Continue alprazolam 0.25 mg p.o. twice daily as needed for acute anxiety.  Prescription filled by previous provider on 8/29/2024.  Patient reports very rarely utilizing this medication since last visit.    TREATMENT PLAN - SHORT AND LONG-TERM GOALS:   -Continue supportive psychotherapy efforts and medications as indicated. Treatment and medication options discussed during today's visit.   -Patient acknowledged and verbally consented to continue with current treatment plan and was educated on the importance of compliance with treatment and follow-up appointments.    SUMMARY/EDUCATION/DISCUSSION:  -Pt was given appropriate time to ask questions and concerns were addressed. A thorough discussion was had that included review of disease process, need for continued monitoring and additional treatment options including use of pharmacological and non-pharmacological approaches to care, decisions were made and agreed upon by patient and provider.   -Discussed medication options and treatment plan of prescribed medication as well as the risks, benefits, and side effects including potential falls, possible impaired driving and metabolic adversities among others; patient acknowledged and provided verbal consent.   -Patient has been educated regarding multimodal approach with healthy nutrition, healthy sleep, regular physical activity, social activities, counseling, and medications.  -Please call the office at [phone] within normal business hours (Monday-Friday, 8:00 AM - 4:30 PM) with any worsening of symptoms or onset of intolerable side effects. Please ask to leave a message with office staff.  Please allow up to 24-48 hours for response to a patient  call/question/refill request.  -Safety plan has been established and discussed in detail with the patient, who is agreeable to contact support system and/or call 911 or go to the nearest ER should he/she/they have any thoughts of harm to self or others.    MEDS ORDERED DURING VISIT:  New Medications Ordered This Visit   Medications    escitalopram (LEXAPRO) 20 MG tablet     Sig: Take 1 tablet by mouth Daily for 90 days.     Dispense:  30 tablet     Refill:  2    mirtazapine (REMERON) 30 MG tablet     Sig: Take 1 tablet by mouth Every Night.     Dispense:  30 tablet     Refill:  2       FOLLOW UP:  Return in about 8 weeks (around 3/13/2025) for Next scheduled follow up.      Juan Ramon Doran DO    This document has been electronically signed by Juan Ramon Doran DO  January 16, 2025 16:51 EST    Part of this note may be an electronic transcription/translation of spoken language to printed text using the Dragon Dictation System. Some of the data in this electronic note has been brought forward from a previous encounter, any necessary changes have been made, it has been reviewed by this provider, and it is accurate.

## 2025-03-12 ENCOUNTER — OFFICE VISIT (OUTPATIENT)
Dept: CARDIOLOGY | Facility: CLINIC | Age: 30
End: 2025-03-12
Payer: COMMERCIAL

## 2025-03-12 VITALS
WEIGHT: 248.2 LBS | SYSTOLIC BLOOD PRESSURE: 110 MMHG | DIASTOLIC BLOOD PRESSURE: 62 MMHG | HEART RATE: 68 BPM | HEIGHT: 64 IN | BODY MASS INDEX: 42.37 KG/M2

## 2025-03-12 DIAGNOSIS — G90.A POTS (POSTURAL ORTHOSTATIC TACHYCARDIA SYNDROME): Primary | ICD-10-CM

## 2025-03-12 DIAGNOSIS — R00.0 SINUS TACHYCARDIA: ICD-10-CM

## 2025-03-12 PROCEDURE — 93000 ELECTROCARDIOGRAM COMPLETE: CPT | Performed by: FAMILY MEDICINE

## 2025-03-12 PROCEDURE — 99214 OFFICE O/P EST MOD 30 MIN: CPT | Performed by: FAMILY MEDICINE

## 2025-03-12 NOTE — PROGRESS NOTES
Date of Office Visit: 2025  Encounter Provider: LALITO Valverde  Place of Service: [facility] CARDIOLOGY  Established cardiologist: Pamela Wiley MD  Patient Name: Liliam Lorenz  :1995      Patient ID:  Liliam Lorenz is a 29 y.o. female is here for  followup    With a pertinent medical history of POTS, GERD, IBS, GRACIELA on CPAP, gastroparesis on IVIG with autoimmune gastritis.  Recurrent PE and failure on apixaban, warfarin, enoxaparin now on Arixtra.     There is CAD on the maternal side of her family and her mom has hypertension and diabetes.  She is  to her wife, has no children, is unemployed-disabled, has never smoked, uses no alcohol has 1 caffeinated beverage per day and no drugs.     History of Present Illness  History of severe POTS she has been evaluated by [facility].    She was seen here in 2018 with chest discomfort for several years.  She was evaluated in the emergency department and it was felt this may be esophageal spasm.  However she did undergo cardiac testing and an exercise treadmill study showed no EKG changes she exercised for 9 minutes.  Echocardiogram at this time with EF 62% and no significant pathology.     she continued to have episodes of near syncope and tachycardia and had a tilt table test which confirmed diagnosis of POTS.  She was treated with beta-blockers and midodrine.  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    2021 she was short of breath and tachycardic and found to have very small bilateral PE on CTA of chest.  She was initiated on apixaban and readmitted in 2021 with bleeding from left IJ.  That was removed and a right IJ was placed.  She was later diagnosed with hypothyroidism and hemiplegic migraines.      She had recurrent PE in  and  apixaban was switched to enoxaparin injection.  There was a concern that in the setting of gastroparesis she was not absorbing her anticoagulants.    She had COVID-19 in 2021 and subsequently got septic.  She had severe and recurrent nausea and back on fluids and IVIG and receiving IV fluids daily to weekly.    Echocardiogram in October 2021 with an ejection fraction of 60%, normal diastolic function, no significant valvular abnormalities.     Echocardiogram February 2023 ejection fraction 68% normal RV size and function normal diastolic filling and no significant valvular abnormalities.  After this echo she had more PE and was then on chronic oxygen for severe hypoxia with activity.     Venous duplex of the lower extremities in March 2023 was normal.  CTA chest 3/2/2023 showed a recurrent left-sided subsegmental PE.  Dr. Wiley did review the images which showed no evidence of CAD.    August 2024 she was in the ED with chest pain and was tachycardic.  Her troponins and proBNP were normal.    She saw Dr. Wiley 12/10/2024.  Her gastroparesis was in remission.  She was not having syncope or tachycardia and her heart rate was well-controlled her port was discontinued.  Her metoprolol was decreased to 25 mg nightly and she was remaining on midodrine and Florinef.    Today Liliam is here with her mom. She continues to feel well which is great. She has had a couple days where she forgot to take metoprolol 25 mg and she did notice worsened symptoms so for now she would prefer to stay on this medicine.  She has been out walking quite a bit and doing very well with this.  Her gastroparesis symptoms have still remained at bay without any daily nausea which is what she had previously.  She enjoys reading and has just in general been able to go out and do the activities she wants which is great.  She will go to [place] again for her yearly visit this summer.  She remains on Arixtra and thus far there has been no  "recurrence of PE.  There is no chest pain or pressure.  No TABOR, PND, presyncope/syncope, or edema.   On CTA chest 8/16/2024 there was noted small caliber SVC consistent with SVC stenosis.  She has noticed over the last couple months an enlarged vein that runs across the right abdomen up to the left breast. It is not painful, it does not pulsate, it has not enlarged over this time, there is no warmth or bruising, no rash. She is wondering if this is related to her SVC stenosis. Her second IJ was pulled because of that.       Current Outpatient Medications on File Prior to Visit   Medication Sig Dispense Refill    albuterol sulfate  (90 Base) MCG/ACT inhaler Inhale 2 puffs Every 4 (Four) Hours As Needed for Wheezing.      BD PrecisionGlide Needle 23G X 1-1/2\" misc       cholecalciferol (Cholecalciferol) 25 MCG (1000 UT) tablet Take 1 tablet by mouth Daily.      cyanocobalamin 1000 MCG/ML injection Inject 1 mL into the appropriate muscle as directed by prescriber Every 28 (Twenty-Eight) Days. 1 mL 0    dronabinol (MARINOL) 5 MG capsule Take 1 capsule by mouth 3 (Three) Times a Day As Needed (nausea).      Emgality 120 MG/ML auto-injector pen Every 30 (Thirty) Days. Takes the 20th of the month      escitalopram (LEXAPRO) 20 MG tablet Take 1 tablet by mouth Daily for 90 days. 30 tablet 2    fondaparinux (ARIXTRA) 10 MG/0.8ML solution injection Inject  under the skin into the appropriate area as directed Daily.      Ginger, Zingiber officinalis, (Ginger Root) 550 MG capsule Take  by mouth Daily.      levocetirizine (XYZAL) 5 MG tablet Take 1 tablet by mouth Every Evening.      meclizine (ANTIVERT) 25 MG tablet Take 1 tablet by mouth 3 (Three) Times a Day As Needed for Dizziness.      metoprolol tartrate (LOPRESSOR) 25 MG tablet Take 1 tablet by mouth Every Night. 90 tablet 3    midodrine (PROAMATINE) 10 MG tablet TAKE 1 TABLET BY MOUTH THREE TIMES DAILY (Patient taking differently: Take 1 tablet by mouth 2 (Two) " "Times a Day.) 90 tablet 6    mirtazapine (REMERON) 30 MG tablet Take 1 tablet by mouth Every Night. 30 tablet 2    montelukast (SINGULAIR) 10 MG tablet Take 1 tablet by mouth Every Night.      omeprazole (priLOSEC) 40 MG capsule Take 1 capsule by mouth Daily.      phenazopyridine (PYRIDIUM) 100 MG tablet Take 1 tablet by mouth 3 (Three) Times a Day As Needed.      prochlorperazine (COMPAZINE) 10 MG tablet Take 1 tablet by mouth Every 6 (Six) Hours As Needed for Nausea or Vomiting.      Promethazine HCl (PHENERGAN PO) Take  by mouth.      fludrocortisone 0.1 MG tablet Take 1 tablet by mouth Daily.      levothyroxine (SYNTHROID, LEVOTHROID) 50 MCG tablet Take 1 tablet by mouth Daily.      RABEprazole (ACIPHEX) 20 MG EC tablet Take 1 tablet by mouth Daily.       No current facility-administered medications on file prior to visit.         Procedures    ECG 12 Lead    Date/Time: 3/12/2025 2:17 PM  Performed by: Marisol Sanders APRN    Authorized by: Marisol Sanders APRN  Comparison: compared with previous ECG from 12/10/2024  Rhythm: sinus rhythm  BPM: 68              Objective:      Vitals:    03/12/25 1330   BP: 110/62   Pulse: 68   Weight: 113 kg (248 lb 3.2 oz)   Height: 162.6 cm (64\")     Body mass index is 42.6 kg/m².  Wt Readings from Last 3 Encounters:   03/12/25 113 kg (248 lb 3.2 oz)   01/16/25 114 kg (252 lb)   12/10/24 117 kg (257 lb)         Constitutional:       Comments: 29-year-old  female who is obese, otherwise well-appearing, in no acute distress today.    Eyes:      Pupils: Pupils are equal, round, and reactive to light.   Neck:      Comments: Increased neck circumference  Pulmonary:      Effort: Pulmonary effort is normal.      Breath sounds: Normal breath sounds.   Cardiovascular:      Normal rate. Regular rhythm.      Murmurs: There is no murmur.   Pulses:     Intact distal pulses.   Edema:     Peripheral edema absent.   Abdominal:      General: Bowel sounds are normal.      " Palpations: Abdomen is soft.   Musculoskeletal:      Cervical back: Normal range of motion. Skin:     General: Skin is warm and dry.   Neurological:      Mental Status: Alert, oriented to person, place, and time and oriented to person, place and time.   Psychiatric:         Speech: Speech normal.         Lab Review:   CMP with [facility] 2/6/2025 AST 40 ALT 56.  CBC RBC 5.47, hematocrit 49.2.  TSH 2.21.    Assessment:     1. POTS (postural orthostatic tachycardia syndrome)    2. Sinus tachycardia      POTS-  followed by Dr. Archer at [place].  Is on midodrine, Mestinon and metoprolol.  Received 1 L of normal saline daily through her left-sided [place].  Phoebe-Danlos type III  Gastroparesis-severe, does not have a gastric stimulator as her not sure this can help her because of her Phoebe-Danlos.  She is on chronic Phenergan for this and gets IVIG for this as well, sees Dr. Escobar.  Autoimmune atrophic gastritis  GERD.  Interstitial cystitis  Recurrent pulmonary emboli-on Arixtra, follows with Dr. Mcdonald. has had pulmonary emboli-failure of anticoagulation-on Arixtra  Hemiplegic migraines  GRACIELA on CPAP  Hypothyroidism  Fibromyalgia  Obesity.   Hypoxia, requiring chronic oxygen pulmonary emboli March 2023.  Now on room air.  repeat echocardiogram looking for RV size and RVSP.    Plan:   No medication changes were made  She is doing well, would like to remain on metoprolol 25 mg.  She will see [facility] for annual this summer.  Can see Dr. Wiley in 6 months.  She will call us with any worsened POTS symptoms to be seen sooner          Thank you for allowing me to participate in this patient's care. Please call with any questions or concerns.          Dragon dictation device was utilized in this note.

## 2025-03-13 ENCOUNTER — OFFICE VISIT (OUTPATIENT)
Age: 30
End: 2025-03-13
Payer: COMMERCIAL

## 2025-03-13 VITALS
DIASTOLIC BLOOD PRESSURE: 80 MMHG | WEIGHT: 248 LBS | SYSTOLIC BLOOD PRESSURE: 118 MMHG | BODY MASS INDEX: 42.34 KG/M2 | OXYGEN SATURATION: 98 % | HEIGHT: 64 IN | HEART RATE: 89 BPM

## 2025-03-13 DIAGNOSIS — F42.2 MIXED OBSESSIONAL THOUGHTS AND ACTS: ICD-10-CM

## 2025-03-13 DIAGNOSIS — F41.0 GENERALIZED ANXIETY DISORDER WITH PANIC ATTACKS: Primary | ICD-10-CM

## 2025-03-13 DIAGNOSIS — F41.1 GENERALIZED ANXIETY DISORDER WITH PANIC ATTACKS: Primary | ICD-10-CM

## 2025-04-11 RX ORDER — MIDODRINE HYDROCHLORIDE 10 MG/1
10 TABLET ORAL 3 TIMES DAILY
Qty: 90 TABLET | Refills: 3 | Status: SHIPPED | OUTPATIENT
Start: 2025-04-11

## 2025-04-11 NOTE — TELEPHONE ENCOUNTER
No protocol    NOV-09/18/25-RM  LOV-03/12/25-EE    Plan:   No medication changes were made  She is doing well, would like to remain on metoprolol 25 mg.  She will see [facility] for annual this summer.  Can see Dr. Wiley in 6 months.  She will call us with any worsened POTS symptoms to be seen sooner

## 2025-04-13 DIAGNOSIS — F41.1 GENERALIZED ANXIETY DISORDER WITH PANIC ATTACKS: ICD-10-CM

## 2025-04-13 DIAGNOSIS — F33.41 MAJOR DEPRESSIVE DISORDER, RECURRENT, IN PARTIAL REMISSION: ICD-10-CM

## 2025-04-13 DIAGNOSIS — F41.0 GENERALIZED ANXIETY DISORDER WITH PANIC ATTACKS: ICD-10-CM

## 2025-04-15 RX ORDER — MIRTAZAPINE 30 MG/1
30 TABLET, FILM COATED ORAL NIGHTLY
Qty: 30 TABLET | Refills: 2 | Status: SHIPPED | OUTPATIENT
Start: 2025-04-15

## 2025-05-08 ENCOUNTER — OFFICE VISIT (OUTPATIENT)
Age: 30
End: 2025-05-08
Payer: COMMERCIAL

## 2025-05-08 VITALS
BODY MASS INDEX: 40.97 KG/M2 | WEIGHT: 240 LBS | OXYGEN SATURATION: 98 % | SYSTOLIC BLOOD PRESSURE: 114 MMHG | DIASTOLIC BLOOD PRESSURE: 83 MMHG | HEIGHT: 64 IN | HEART RATE: 90 BPM

## 2025-05-08 DIAGNOSIS — F42.2 MIXED OBSESSIONAL THOUGHTS AND ACTS: ICD-10-CM

## 2025-05-08 DIAGNOSIS — F41.1 GENERALIZED ANXIETY DISORDER WITH PANIC ATTACKS: Primary | ICD-10-CM

## 2025-05-08 DIAGNOSIS — F33.41 MAJOR DEPRESSIVE DISORDER, RECURRENT, IN PARTIAL REMISSION: ICD-10-CM

## 2025-05-08 DIAGNOSIS — F41.0 GENERALIZED ANXIETY DISORDER WITH PANIC ATTACKS: Primary | ICD-10-CM

## 2025-05-08 RX ORDER — ESCITALOPRAM OXALATE 20 MG/1
20 TABLET ORAL DAILY
Qty: 30 TABLET | Refills: 2 | Status: SHIPPED | OUTPATIENT
Start: 2025-05-08 | End: 2025-08-06

## 2025-05-08 NOTE — PROGRESS NOTES
Subjective   Liliam Lorenz is a 30 y.o. female who presents today in follow up for management of generalized anxiety disorder, major depressive disorder and OCD.    Patient reports that she continues to do well overall and endorses sustained improvement in psychiatric symptoms when compared to clinical status at last visit.  More specifically, patient reports significantly decreased levels of anxiety overall associated with improved ability to control/manage her anxiety and worry.  She also reports decreased feelings of restlessness/feeling on edge associated with decreased irritability overall and decreased catastrophizing/thinking of worst case scenarios.  Patient also reports experiencing only 2 episodes of acute anxiety/panic attacks since last visit which did resolve after taking approximately 0.125 mg of as needed alprazolam.  Patient also reports a significantly improved mood associated with resolution of anhedonia, improved levels of energy, improved sleep overall, improved self worth, resolution of psychomotor retardation, and improved ability to direct/sustain her attention and concentration.  She denies experiencing any active suicidal ideation, intent or plan.  Patient does once again report adequate management of OCD symptoms associated with current medication regimen.  It is of note the patient does report a rather significant improvement in her chronic gastroparesis/associated medical conditions resulting in improved mobility/functionality overall.  She states that she has been walking on a regular basis and has lost a total of 28 pounds over the past 1 to 2 months.  Patient otherwise denies any auditory, visual, or tactile hallucinations and does not currently appear to be responding to internal stimuli.  Patient denies any generalized paranoia and displays no evidence of delusional thinking.    The following portions of the patient's history were reviewed and updated as appropriate: allergies,  current medications, past family history, past medical history, past social history, past surgical history and problem list.  History of Present Illness               Past Medical History:  Past Medical History:   Diagnosis Date    Anxiety     Bronchitis     Chest pain     Chronic hypotension     Depression     Eczema     Phoebe-Danlos syndrome     Gastroparesis     GERD (gastroesophageal reflux disease)     History of pulmonary embolus (PE)     > 15 per patient    IBS (irritable bowel syndrome)     Lightheadedness     Nausea and vomiting 08/22/2017    Obsessive-compulsive disorder     Panic disorder     POTS (postural orthostatic tachycardia syndrome)     Rectal fissure     Rosacea     Sleep apnea     uses CPAP    Tachycardia        Social History:  Social History     Socioeconomic History    Marital status:     Number of children: 0    Highest education level: Master's degree (e.g., MA, MS, Eduin, MEd, MSW, TIANNA)   Tobacco Use    Smoking status: Never     Passive exposure: Never    Smokeless tobacco: Never    Tobacco comments:     Caffeine: 1 serving daily   Vaping Use    Vaping status: Never Used   Substance and Sexual Activity    Alcohol use: No    Drug use: Never    Sexual activity: Yes     Partners: Female     Birth control/protection: None       Family History:  Family History   Problem Relation Age of Onset    Hypertension Mother     Diabetes Mother     Hypertension Father     Diabetes Father     Coronary artery disease Maternal Uncle         Maternal uncle with MI    Hypertension Maternal Grandmother     Coronary artery disease Maternal Grandmother         MGM with 4 vessel CABG and multiple stents    Cancer Maternal Grandmother     Stroke Maternal Grandmother     Coronary artery disease Maternal Grandfather     Breast cancer Other     Malig Hyperthermia Neg Hx        Past Surgical History:  Past Surgical History:   Procedure Laterality Date    COLONOSCOPY      MEDIPORT INSERTION, DOUBLE  09/01/2020     [facility]     TUNNELED VENOUS CATHETER PLACEMENT N/A 12/14/2023    Procedure: EXCHANGE OF DOLAN CATHETER;  Surgeon: Lorne Cordova MD;  Location:  [facility];  Service: General;  Laterality: N/A;    UPPER GASTROINTESTINAL ENDOSCOPY      VENOUS ACCESS DEVICE (PORT) INSERTION Left 10/16/2020    Procedure: INSERTION VENOUS ACCESS DEVICE UNDER FLURO;  Surgeon: Pa Lane MD;  Location: [facility];  Service: General;  Laterality: Left;    VENOUS ACCESS DEVICE (PORT) INSERTION Right 10/26/2021    Procedure: REMOVAL AND INSERTION OF DOLAN CATHETER;  Surgeon: Pa Lane MD;  Location[facility];  Service: General;  Laterality: Right;    VENOUS ACCESS DEVICE (PORT) INSERTION Left 1/13/2023    Procedure: INSERTION OF DOLAN CATHETER, VENOGARM;  Surgeon: Cristhian Anne MD;  Location: [facility] MAIN OR;  Service: General;  Laterality: Left;       Problem List:  Patient Active Problem List   Diagnosis    Poor venous access    POTS (postural orthostatic tachycardia syndrome)    Sinus tachycardia    Multiple subsegmental pulmonary emboli without acute cor pulmonale    Autoimmune disease    Phoebe-Danlos syndrome    Nausea and vomiting    Gastroparesis    Postural orthostatic tachycardia syndrome    Dolan catheter dysfunction    Febrile illness, acute    Single subsegmental pulmonary embolism without acute cor pulmonale    Cellulitis of chest wall    Pulmonary embolus    Generalized anxiety disorder with panic attacks    Medically complex patient    Mixed obsessional thoughts and acts    Major depressive disorder, recurrent, in partial remission       Allergy:   Allergies   Allergen Reactions    Tape Other (See Comments)    Pepcid [Famotidine] Rash and GI Intolerance    Scopolamine Rash and GI Intolerance        Current Medications:   Current Outpatient Medications   Medication Sig Dispense Refill    escitalopram (LEXAPRO) 20 MG tablet Take 1 tablet by mouth Daily for 90 days.  "30 tablet 2    albuterol sulfate  (90 Base) MCG/ACT inhaler Inhale 2 puffs Every 4 (Four) Hours As Needed for Wheezing.      BD PrecisionGlide Needle 23G X 1-1/2\" misc       cholecalciferol (Cholecalciferol) 25 MCG (1000 UT) tablet Take 1 tablet by mouth Daily.      cyanocobalamin 1000 MCG/ML injection Inject 1 mL into the appropriate muscle as directed by prescriber Every 28 (Twenty-Eight) Days. 1 mL 0    dronabinol (MARINOL) 5 MG capsule Take 1 capsule by mouth 3 (Three) Times a Day As Needed (nausea).      Emgality 120 MG/ML auto-injector pen Every 30 (Thirty) Days. Takes the 20th of the month      fludrocortisone 0.1 MG tablet Take 1 tablet by mouth Daily.      fondaparinux (ARIXTRA) 10 MG/0.8ML solution injection Inject  under the skin into the appropriate area as directed Daily.      Ginger, Zingiber officinalis, (Ginger Root) 550 MG capsule Take  by mouth Daily.      levocetirizine (XYZAL) 5 MG tablet Take 1 tablet by mouth Every Evening.      meclizine (ANTIVERT) 25 MG tablet Take 1 tablet by mouth 3 (Three) Times a Day As Needed for Dizziness.      metoprolol tartrate (LOPRESSOR) 25 MG tablet Take 1 tablet by mouth Every Night. 90 tablet 3    midodrine (PROAMATINE) 10 MG tablet TAKE 1 TABLET BY MOUTH THREE TIMES DAILY 90 tablet 3    mirtazapine (REMERON) 30 MG tablet TAKE 1 TABLET BY MOUTH EVERY NIGHT 30 tablet 2    montelukast (SINGULAIR) 10 MG tablet Take 1 tablet by mouth Every Night.      omeprazole (priLOSEC) 40 MG capsule Take 1 capsule by mouth Daily.      phenazopyridine (PYRIDIUM) 100 MG tablet Take 1 tablet by mouth 3 (Three) Times a Day As Needed.      prochlorperazine (COMPAZINE) 10 MG tablet Take 1 tablet by mouth Every 6 (Six) Hours As Needed for Nausea or Vomiting.      Promethazine HCl (PHENERGAN PO) Take  by mouth.      RABEprazole (ACIPHEX) 20 MG EC tablet Take 1 tablet by mouth Daily.       No current facility-administered medications for this visit.       Review of Symptoms:  " "  Review of Systems   Constitutional:  Positive for fatigue. Negative for activity change.   HENT:  Negative for tinnitus.    Eyes:  Negative for visual disturbance.   Cardiovascular:  Negative for chest pain and palpitations.   Gastrointestinal:  Positive for nausea.   Endocrine: Negative for cold intolerance and heat intolerance.   Skin:  Negative for rash.   Neurological:  Negative for seizures and confusion.   Psychiatric/Behavioral:  Positive for sleep disturbance and depressed mood. Negative for decreased concentration, hallucinations, self-injury and suicidal ideas. The patient is nervous/anxious.          Physical Exam:   Blood pressure 114/83, pulse 90, height 162.6 cm (64.02\"), weight 109 kg (240 lb), SpO2 98%, not currently breastfeeding.  Appearance: Appears documented age, appropriate hygiene and grooming.  Gait, Station, Strength: Normal gait, station and strength.  Physical Exam               Mental Status Exam:   Hygiene:   good  Cooperation:  Cooperative  Eye Contact:  Good  Psychomotor Behavior:  Appropriate  Affect:  Full range and Appropriate  Mood: normal and euthymic  Hopelessness: Denies  Speech:  Normal  Thought Process:  Goal directed and Linear  Thought Content:  Normal  Suicidal:  None  Homicidal:  None  Hallucinations:  None  Delusion:  None  Memory:  Intact  Orientation:  Person, Place, Time, and Situation  Reliability:  good  Insight:  Good  Judgement:  Good  Impulse Control:  Good      Lab Results:   No visits with results within 1 Month(s) from this visit.   Latest known visit with results is:   Admission on 08/19/2024, Discharged on 08/19/2024   Component Date Value Ref Range Status    QT Interval 08/19/2024 341  ms Final    QTC Interval 08/19/2024 476  ms Final    Glucose 08/19/2024 150 (H)  65 - 99 mg/dL Final    BUN 08/19/2024 8  6 - 20 mg/dL Final    Creatinine 08/19/2024 0.79  0.57 - 1.00 mg/dL Final    Sodium 08/19/2024 134 (L)  136 - 145 mmol/L Final    Potassium 08/19/2024 " 3.7  3.5 - 5.2 mmol/L Final    Chloride 08/19/2024 104  98 - 107 mmol/L Final    CO2 08/19/2024 17.4 (L)  22.0 - 29.0 mmol/L Final    Calcium 08/19/2024 9.3  8.6 - 10.5 mg/dL Final    Total Protein 08/19/2024 7.3  6.0 - 8.5 g/dL Final    Albumin 08/19/2024 4.2  3.5 - 5.2 g/dL Final    ALT (SGPT) 08/19/2024 49 (H)  1 - 33 U/L Final    AST (SGOT) 08/19/2024 27  1 - 32 U/L Final    Alkaline Phosphatase 08/19/2024 104  39 - 117 U/L Final    Total Bilirubin 08/19/2024 0.5  0.0 - 1.2 mg/dL Final    Globulin 08/19/2024 3.1  gm/dL Final    A/G Ratio 08/19/2024 1.4  g/dL Final    BUN/Creatinine Ratio 08/19/2024 10.1  7.0 - 25.0 Final    Anion Gap 08/19/2024 12.6  5.0 - 15.0 mmol/L Final    eGFR 08/19/2024 104.0  >60.0 mL/min/1.73 Final    proBNP 08/19/2024 40.4  0.0 - 450.0 pg/mL Final    HS Troponin T 08/19/2024 7  <14 ng/L Final    Extra Tube 08/19/2024 Hold for add-ons.   Final    Auto resulted.    Extra Tube 08/19/2024 hold for add-on   Final    Auto resulted    Extra Tube 08/19/2024 Hold for add-ons.   Final    Auto resulted.    Extra Tube 08/19/2024 Hold for add-ons.   Final    Auto resulted    WBC 08/19/2024 9.86  3.40 - 10.80 10*3/mm3 Final    RBC 08/19/2024 5.36 (H)  3.77 - 5.28 10*6/mm3 Final    Hemoglobin 08/19/2024 14.5  12.0 - 15.9 g/dL Final    Hematocrit 08/19/2024 44.8  34.0 - 46.6 % Final    MCV 08/19/2024 83.6  79.0 - 97.0 fL Final    MCH 08/19/2024 27.1  26.6 - 33.0 pg Final    MCHC 08/19/2024 32.4  31.5 - 35.7 g/dL Final    RDW 08/19/2024 22.5 (H)  12.3 - 15.4 % Final    RDW-SD 08/19/2024 65.0 (H)  37.0 - 54.0 fl Final    MPV 08/19/2024 9.7  6.0 - 12.0 fL Final    Platelets 08/19/2024 377  140 - 450 10*3/mm3 Final    Neutrophil % 08/19/2024 74.1  42.7 - 76.0 % Final    Lymphocyte % 08/19/2024 20.2  19.6 - 45.3 % Final    Monocyte % 08/19/2024 3.2 (L)  5.0 - 12.0 % Final    Eosinophil % 08/19/2024 1.6  0.3 - 6.2 % Final    Basophil % 08/19/2024 0.6  0.0 - 1.5 % Final    Immature Grans % 08/19/2024 0.3  0.0  - 0.5 % Final    Neutrophils, Absolute 08/19/2024 7.30 (H)  1.70 - 7.00 10*3/mm3 Final    Lymphocytes, Absolute 08/19/2024 1.99  0.70 - 3.10 10*3/mm3 Final    Monocytes, Absolute 08/19/2024 0.32  0.10 - 0.90 10*3/mm3 Final    Eosinophils, Absolute 08/19/2024 0.16  0.00 - 0.40 10*3/mm3 Final    Basophils, Absolute 08/19/2024 0.06  0.00 - 0.20 10*3/mm3 Final    Immature Grans, Absolute 08/19/2024 0.03  0.00 - 0.05 10*3/mm3 Final    nRBC 08/19/2024 0.0  0.0 - 0.2 /100 WBC Final    Glucose 08/19/2024 127  70 - 130 mg/dL Final    Right Internal Jugular Spont 08/19/2024 Y   Final    Right Internal Jugular Phasic 08/19/2024 Y   Final    Right Internal Jugular Compress 08/19/2024 C   Final    Right Internal Jugular Augment 08/19/2024 Y   Final    Right Subclavian Spont 08/19/2024 Y   Final    Right Subclavian Phasic 08/19/2024 Y   Final    Right Subclavian Compress 08/19/2024 C   Final    Right Subclavian Augment 08/19/2024 Y   Final    Right Axillary Spont 08/19/2024 Y   Final    Right Axillary Phasic 08/19/2024 Y   Final    Right Axillary Compress 08/19/2024 C   Final    Right Axillary Augment 08/19/2024 Y   Final    Right Brachial Compress 08/19/2024 C   Final    Right Radial Compress 08/19/2024 C   Final    Right Ulnar Compress 08/19/2024 C   Final    Right Basilic Upper Compress 08/19/2024 C   Final    Right Basilic Forearm Compress 08/19/2024 C   Final    Right Cephalic Upper Compress 08/19/2024 C   Final    Right Cephalic Forearm Compress 08/19/2024 C   Final    Left Internal Jugular Spont 08/19/2024 Y   Final    Left Internal Jugular Phasic 08/19/2024 Y   Final    Left Internal Jugular Compress 08/19/2024 C   Final    Left Internal Jugular Augment 08/19/2024 Y   Final    Left Subclavian Spont 08/19/2024 Y   Final    Left Subclavian Phasic 08/19/2024 Y   Final    Left Subclavian Compress 08/19/2024 C   Final    Left Subclavian Augment 08/19/2024 Y   Final    Left Axillary Spont 08/19/2024 Y   Final    Left Axillary  Phasic 08/19/2024 Y   Final    Left Axillary Compress 08/19/2024 C   Final    Left Axillary Augment 08/19/2024 Y   Final    Left Brachial Compress 08/19/2024 C   Final    Left Radial Compress 08/19/2024 C   Final    Left Ulnar Compress 08/19/2024 C   Final    Left Basilic Upper Compress 08/19/2024 C   Final    Left Basilic Forearm Compress 08/19/2024 C   Final    Left Cephalic Upper Compress 08/19/2024 C   Final    Left Cephalic Forearm Compress 08/19/2024 C   Final     Results            PHQ-9 Total Score: 5    GAUTAM-7 Total Score: 5     Assessment & Plan    Diagnoses and all orders for this visit:    1. Generalized anxiety disorder with panic attacks (Primary)  -     escitalopram (LEXAPRO) 20 MG tablet; Take 1 tablet by mouth Daily for 90 days.  Dispense: 30 tablet; Refill: 2    2. Mixed obsessional thoughts and acts  -     escitalopram (LEXAPRO) 20 MG tablet; Take 1 tablet by mouth Daily for 90 days.  Dispense: 30 tablet; Refill: 2    3. Major depressive disorder, recurrent, in partial remission  -     escitalopram (LEXAPRO) 20 MG tablet; Take 1 tablet by mouth Daily for 90 days.  Dispense: 30 tablet; Refill: 2         Liliam Lorenz is a 30 y.o. female who presents today in follow up for management of generalized anxiety disorder, major depressive disorder and OCD.    As detailed above patient appears to be doing very well overall and endorses sustained improvement in psychiatric symptoms when compared to clinical status at last visit.  She reports consistent compliance with all psychiatric medications, denies any associated side effects and appears to be tolerating these medications well.  She reports adequate management of OCD symptoms at this time and denies experiencing any significant depressive or anxious symptoms as evidenced by current PHQ-9 and GAUTAM-7 scores.  Patient does report experiencing 2 panic attacks since last visit, both of which did resolve after taking half of her as needed alprazolam 0.25 mg  tablet.  It is of note that the patient does report a rather significant improvement in her chronic medical conditions resulting in improved mobility and overall functioning.  She has been walking on a routine basis and has lost approximately 28 pounds over the past 2 months.  Patient was encouraged to continue implementing these lifestyle modifications/behavioral changes and was praised for her commitment to bettering her overall health.  Overall, I do recommend making no medication adjustments at this time given positive therapeutic benefit associated with current medication regimen in the absence of any significant side effects.  She will be seen again here in the office in approximately 12 weeks for reassessment.  Patient voices understanding of this and is agreeable to today's plan.    Medications:  -Continue mirtazapine 30 mg p.o. nightly for management of generalized anxiety disorder with panic attacks and major depressive disorder.  -Continue escitalopram 20 mg p.o. once daily for management of generalized anxiety disorder with panic attacks, major depressive disorder and OCD.  -Continue alprazolam 0.25 mg p.o. twice daily as needed for acute anxiety.  Prescription filled by previous provider on 8/29/2024.        TREATMENT PLAN - SHORT AND LONG-TERM GOALS:   -Continue supportive psychotherapy efforts and medications as indicated. Treatment and medication options discussed during today's visit.   -Patient acknowledged and verbally consented to continue with current treatment plan and was educated on the importance of compliance with treatment and follow-up appointments.    SUMMARY/EDUCATION/DISCUSSION:  -Pt was given appropriate time to ask questions and concerns were addressed. A thorough discussion was had that included review of disease process, need for continued monitoring and additional treatment options including use of pharmacological and non-pharmacological approaches to care, decisions were made and  agreed upon by patient and provider.   -Discussed medication options and treatment plan of prescribed medication as well as the risks, benefits, and side effects including potential falls, possible impaired driving and metabolic adversities among others; patient acknowledged and provided verbal consent.   -Patient has been educated regarding multimodal approach with healthy nutrition, healthy sleep, regular physical activity, social activities, counseling, and medications.  -Please call the office at [phone] within normal business hours (Monday-Friday, 8:00 AM - 4:30 PM) with any worsening of symptoms or onset of intolerable side effects. Please ask to leave a message with office staff.  Please allow up to 24-48 hours for response to a patient call/question/refill request.  -Safety plan has been established and discussed in detail with the patient, who is agreeable to contact support system and/or call 911 or go to the nearest ER should he/she/they have any thoughts of harm to self or others.    MEDS ORDERED DURING VISIT:  New Medications Ordered This Visit   Medications    escitalopram (LEXAPRO) 20 MG tablet     Sig: Take 1 tablet by mouth Daily for 90 days.     Dispense:  30 tablet     Refill:  2       FOLLOW UP:  Return in about 12 weeks (around 7/31/2025) for Next scheduled follow up.      Juan Ramon Doran DO    This document has been electronically signed by Juan Ramon Doran DO  May 8, 2025 16:01 EDT    Part of this note may be an electronic transcription/translation of spoken language to printed text using the Dragon Dictation System. Some of the data in this electronic note has been brought forward from a previous encounter, any necessary changes have been made, it has been reviewed by this provider, and it is accurate.

## 2025-05-09 DIAGNOSIS — F33.41 MAJOR DEPRESSIVE DISORDER, RECURRENT, IN PARTIAL REMISSION: ICD-10-CM

## 2025-05-09 DIAGNOSIS — F41.0 GENERALIZED ANXIETY DISORDER WITH PANIC ATTACKS: ICD-10-CM

## 2025-05-09 DIAGNOSIS — F41.1 GENERALIZED ANXIETY DISORDER WITH PANIC ATTACKS: ICD-10-CM

## 2025-05-09 DIAGNOSIS — F42.2 MIXED OBSESSIONAL THOUGHTS AND ACTS: ICD-10-CM

## 2025-05-09 RX ORDER — ESCITALOPRAM OXALATE 20 MG/1
20 TABLET ORAL DAILY
Qty: 30 TABLET | Refills: 2 | OUTPATIENT
Start: 2025-05-09

## 2025-06-12 NOTE — PLAN OF CARE
Goal Outcome Evaluation:   VSS. A&OX4. No c/o pain or discomfort. No c/o nausea. Up ad michael. Remains on heparin gtt. PTT still not therapeutic. Recollect at 0930. Plans for discharge once INR within range. Currently resting in bed, call light in reach.    This patient has shock. The type of shock is cardiogenic due to ischemic. The patient has the following evidence of shock: persistent hypotension and BRETT. The patient will be admitted to an intensive care unit, they will be treated with levophed; now de-escalated.  - cardiogenic shock has now resolved

## 2025-07-28 DIAGNOSIS — F33.41 MAJOR DEPRESSIVE DISORDER, RECURRENT, IN PARTIAL REMISSION: ICD-10-CM

## 2025-07-28 DIAGNOSIS — F41.1 GENERALIZED ANXIETY DISORDER WITH PANIC ATTACKS: ICD-10-CM

## 2025-07-28 DIAGNOSIS — F41.0 GENERALIZED ANXIETY DISORDER WITH PANIC ATTACKS: ICD-10-CM

## 2025-07-28 RX ORDER — MIRTAZAPINE 30 MG/1
30 TABLET, FILM COATED ORAL NIGHTLY
Qty: 30 TABLET | Refills: 2 | Status: SHIPPED | OUTPATIENT
Start: 2025-07-28

## 2025-07-31 ENCOUNTER — OFFICE VISIT (OUTPATIENT)
Age: 30
End: 2025-07-31
Payer: COMMERCIAL

## 2025-07-31 VITALS
WEIGHT: 240 LBS | SYSTOLIC BLOOD PRESSURE: 128 MMHG | HEIGHT: 64 IN | OXYGEN SATURATION: 98 % | HEART RATE: 86 BPM | BODY MASS INDEX: 40.97 KG/M2 | DIASTOLIC BLOOD PRESSURE: 80 MMHG

## 2025-07-31 DIAGNOSIS — F42.2 MIXED OBSESSIONAL THOUGHTS AND ACTS: ICD-10-CM

## 2025-07-31 DIAGNOSIS — F41.1 GENERALIZED ANXIETY DISORDER WITH PANIC ATTACKS: Primary | ICD-10-CM

## 2025-07-31 DIAGNOSIS — F41.0 GENERALIZED ANXIETY DISORDER WITH PANIC ATTACKS: Primary | ICD-10-CM

## 2025-07-31 DIAGNOSIS — F33.41 MAJOR DEPRESSIVE DISORDER, RECURRENT, IN PARTIAL REMISSION: ICD-10-CM

## 2025-07-31 RX ORDER — ESCITALOPRAM OXALATE 20 MG/1
20 TABLET ORAL DAILY
Qty: 90 TABLET | Refills: 0 | Status: SHIPPED | OUTPATIENT
Start: 2025-07-31 | End: 2025-10-29

## 2025-07-31 NOTE — PROGRESS NOTES
Subjective   Liliam Lorenz is a 30 y.o. female who presents today in follow up for management of  generalized anxiety disorder, major depressive disorder and OCD.     Once again reports that she is doing well overall and endorses sustained improvement in psychiatric symptoms when compared to clinical status at last visit approximately 12 weeks ago.  More specifically, patient currently denies a depressed mood as well as any other significant depressive symptoms including any anhedonia, feelings of hopelessness, low self worth, excessive feelings of guilt, or any active suicidal ideation, intent or plan.  Patient also reports decreased levels of anxiety overall associated with improved ability to control/manage her anxiety and worry.  She also reports an overall decrease in feelings of restlessness/feeling on edge associated with decreased periods of irritability.  She does however continue to endorse significant fatigue experienced on most days but does primarily associate her low levels of energy with comorbid medical conditions and associated complications.  Patient also reports adequate management of OCD symptoms associated with current medication regimen.  It is of note that the patient does report experiencing a single panic attack in the middle of the night approximately 2 weeks ago, but denies utilizing as needed alprazolam and states that she was appropriately able to manage this attack with the help of coping mechanisms.  Patient further explains that she did unexpectedly see 2 separate individuals that the patient has had no contact with for roughly 7 years which very likely has contributed the patient's recent exacerbation of acute anxiety/panic attack.  Patient reports consistent compliance with prescribed mirtazapine and escitalopram, denies any associated side effects and appears to be tolerating his medications well. Patient otherwise denies any auditory, visual, or tactile hallucinations and does  not currently appear to be responding to internal stimuli.  Patient denies any generalized paranoia and displays no evidence of delusional thinking.    The following portions of the patient's history were reviewed and updated as appropriate: allergies, current medications, past family history, past medical history, past social history, past surgical history and problem list.  History of Present Illness         Past Medical History:  Past Medical History:   Diagnosis Date    Anxiety     Bronchitis     Chest pain     Chronic hypotension     Depression     Eczema     Phoebe-Danlos syndrome     Gastroparesis     GERD (gastroesophageal reflux disease)     History of pulmonary embolus (PE)     > 15 per patient    IBS (irritable bowel syndrome)     Lightheadedness     Nausea and vomiting 08/22/2017    Obsessive-compulsive disorder     Panic disorder     POTS (postural orthostatic tachycardia syndrome)     Rectal fissure     Rosacea     Sleep apnea     uses CPAP    Tachycardia        Social History:  Social History     Socioeconomic History    Marital status:     Number of children: 0    Highest education level: Master's degree (e.g., MA, MS, Eduin, MEd, MSW, TIANNA)   Tobacco Use    Smoking status: Never     Passive exposure: Never    Smokeless tobacco: Never    Tobacco comments:     Caffeine: 1 serving daily   Vaping Use    Vaping status: Never Used   Substance and Sexual Activity    Alcohol use: No    Drug use: Never    Sexual activity: Yes     Partners: Female     Birth control/protection: None       Family History:  Family History   Problem Relation Age of Onset    Hypertension Mother     Diabetes Mother     Hypertension Father     Diabetes Father     Coronary artery disease Maternal Uncle         Maternal uncle with MI    Hypertension Maternal Grandmother     Coronary artery disease Maternal Grandmother         MGM with 4 vessel CABG and multiple stents    Cancer Maternal Grandmother     Stroke Maternal Grandmother      Coronary artery disease Maternal Grandfather     Breast cancer Other     Malig Hyperthermia Neg Hx        Past Surgical History:  Past Surgical History:   Procedure Laterality Date    COLONOSCOPY      MEDIPORT INSERTION, DOUBLE  09/01/2020    [facility] [place]     TUNNELED VENOUS CATHETER PLACEMENT N/A 12/14/2023    Procedure: EXCHANGE OF DOLAN CATHETER;  Surgeon: Lorne Cordova MD;  Location: [facility] MAIN OR;  Service: General;  Laterality: N/A;    UPPER GASTROINTESTINAL ENDOSCOPY      VENOUS ACCESS DEVICE (PORT) INSERTION Left 10/16/2020    Procedure: INSERTION VENOUS ACCESS DEVICE UNDER FLURO;  Surgeon: Pa Lane MD;  Location: [facility] MAIN OR;  Service: General;  Laterality: Left;    VENOUS ACCESS DEVICE (PORT) INSERTION Right 10/26/2021    Procedure: REMOVAL AND INSERTION OF DOLAN CATHETER;  Surgeon: Pa Lane MD;  Location: [facility] MAIN OR;  Service: General;  Laterality: Right;    VENOUS ACCESS DEVICE (PORT) INSERTION Left 1/13/2023    Procedure: INSERTION OF DOLAN CATHETER, VENOGARM;  Surgeon: Cristhian Anne MD;  Location: [facility] OR;  Service: General;  Laterality: Left;       Problem List:  Patient Active Problem List   Diagnosis    Poor venous access    POTS (postural orthostatic tachycardia syndrome)    Sinus tachycardia    Multiple subsegmental pulmonary emboli without acute cor pulmonale    Autoimmune disease    Phoebe-Danlos syndrome    Nausea and vomiting    Gastroparesis    Postural orthostatic tachycardia syndrome    Dolan catheter dysfunction    Febrile illness, acute    Single subsegmental pulmonary embolism without acute cor pulmonale    Cellulitis of chest wall    Pulmonary embolus    Generalized anxiety disorder with panic attacks    Medically complex patient    Mixed obsessional thoughts and acts    Major depressive disorder, recurrent, in partial remission       Allergy:   Allergies   Allergen Reactions    Tape Other (See Comments)    Pepcid  "[Famotidine] Rash and GI Intolerance    Scopolamine Rash and GI Intolerance        Current Medications:   Current Outpatient Medications   Medication Sig Dispense Refill    escitalopram (LEXAPRO) 20 MG tablet Take 1 tablet by mouth Daily for 90 days. 90 tablet 0    albuterol sulfate  (90 Base) MCG/ACT inhaler Inhale 2 puffs Every 4 (Four) Hours As Needed for Wheezing.      BD PrecisionGlide Needle 23G X 1-1/2\" misc       cholecalciferol (Cholecalciferol) 25 MCG (1000 UT) tablet Take 1 tablet by mouth Daily.      cyanocobalamin 1000 MCG/ML injection Inject 1 mL into the appropriate muscle as directed by prescriber Every 28 (Twenty-Eight) Days. 1 mL 0    dronabinol (MARINOL) 5 MG capsule Take 1 capsule by mouth 3 (Three) Times a Day As Needed (nausea).      Emgality 120 MG/ML auto-injector pen Every 30 (Thirty) Days. Takes the 20th of the month      fludrocortisone 0.1 MG tablet Take 1 tablet by mouth Daily.      fondaparinux (ARIXTRA) 10 MG/0.8ML solution injection Inject  under the skin into the appropriate area as directed Daily.      Ginger, Zingiber officinalis, (Ginger Root) 550 MG capsule Take  by mouth Daily.      levocetirizine (XYZAL) 5 MG tablet Take 1 tablet by mouth Every Evening.      meclizine (ANTIVERT) 25 MG tablet Take 1 tablet by mouth 3 (Three) Times a Day As Needed for Dizziness.      metoprolol tartrate (LOPRESSOR) 25 MG tablet Take 1 tablet by mouth Every Night. 90 tablet 3    midodrine (PROAMATINE) 10 MG tablet TAKE 1 TABLET BY MOUTH THREE TIMES DAILY 90 tablet 3    mirtazapine (REMERON) 30 MG tablet TAKE 1 TABLET BY MOUTH EVERY NIGHT 30 tablet 2    montelukast (SINGULAIR) 10 MG tablet Take 1 tablet by mouth Every Night.      omeprazole (priLOSEC) 40 MG capsule Take 1 capsule by mouth Daily.      phenazopyridine (PYRIDIUM) 100 MG tablet Take 1 tablet by mouth 3 (Three) Times a Day As Needed.      prochlorperazine (COMPAZINE) 10 MG tablet Take 1 tablet by mouth Every 6 (Six) Hours As " "Needed for Nausea or Vomiting.      Promethazine HCl (PHENERGAN PO) Take  by mouth.      RABEprazole (ACIPHEX) 20 MG EC tablet Take 1 tablet by mouth Daily.       No current facility-administered medications for this visit.       Review of Symptoms:    Review of Systems   Constitutional:  Positive for fatigue. Negative for activity change.   HENT:  Negative for tinnitus.    Eyes:  Negative for visual disturbance.   Respiratory:  Positive for shortness of breath.    Endocrine: Negative for cold intolerance and heat intolerance.   Skin:  Negative for rash.   Neurological:  Negative for seizures and confusion.   Psychiatric/Behavioral:  Positive for stress. Negative for decreased concentration, hallucinations, self-injury, sleep disturbance, suicidal ideas and depressed mood. The patient is nervous/anxious.          Physical Exam:   Blood pressure 128/80, pulse 86, height 162.6 cm (64.02\"), weight 109 kg (240 lb), SpO2 98%, not currently breastfeeding.  Appearance: Appears documented age, appropriate hygiene and grooming.  Gait, Station, Strength: Normal gait, station and strength.  Physical Exam         Mental Status Exam:   Hygiene:   good  Cooperation:  Cooperative  Eye Contact:  Good  Psychomotor Behavior:  Appropriate  Affect:  Full range and Appropriate  Mood: normal and euthymic  Hopelessness: Denies  Speech:  Normal  Thought Process:  Goal directed and Linear  Thought Content:  Normal  Suicidal:  None  Homicidal:  None  Hallucinations:  None  Delusion:  None  Memory:  Intact  Orientation:  Person, Place, Time, and Situation  Reliability:  good  Insight:  Good  Judgement:  Good  Impulse Control:  Good      Lab Results:   No visits with results within 1 Month(s) from this visit.   Latest known visit with results is:   Admission on 08/19/2024, Discharged on 08/19/2024   Component Date Value Ref Range Status    QT Interval 08/19/2024 341  ms Final    QTC Interval 08/19/2024 476  ms Final    Glucose 08/19/2024 150 " (H)  65 - 99 mg/dL Final    BUN 08/19/2024 8  6 - 20 mg/dL Final    Creatinine 08/19/2024 0.79  0.57 - 1.00 mg/dL Final    Sodium 08/19/2024 134 (L)  136 - 145 mmol/L Final    Potassium 08/19/2024 3.7  3.5 - 5.2 mmol/L Final    Chloride 08/19/2024 104  98 - 107 mmol/L Final    CO2 08/19/2024 17.4 (L)  22.0 - 29.0 mmol/L Final    Calcium 08/19/2024 9.3  8.6 - 10.5 mg/dL Final    Total Protein 08/19/2024 7.3  6.0 - 8.5 g/dL Final    Albumin 08/19/2024 4.2  3.5 - 5.2 g/dL Final    ALT (SGPT) 08/19/2024 49 (H)  1 - 33 U/L Final    AST (SGOT) 08/19/2024 27  1 - 32 U/L Final    Alkaline Phosphatase 08/19/2024 104  39 - 117 U/L Final    Total Bilirubin 08/19/2024 0.5  0.0 - 1.2 mg/dL Final    Globulin 08/19/2024 3.1  gm/dL Final    A/G Ratio 08/19/2024 1.4  g/dL Final    BUN/Creatinine Ratio 08/19/2024 10.1  7.0 - 25.0 Final    Anion Gap 08/19/2024 12.6  5.0 - 15.0 mmol/L Final    eGFR 08/19/2024 104.0  >60.0 mL/min/1.73 Final    proBNP 08/19/2024 40.4  0.0 - 450.0 pg/mL Final    HS Troponin T 08/19/2024 7  <14 ng/L Final    Extra Tube 08/19/2024 Hold for add-ons.   Final    Auto resulted.    Extra Tube 08/19/2024 hold for add-on   Final    Auto resulted    Extra Tube 08/19/2024 Hold for add-ons.   Final    Auto resulted.    Extra Tube 08/19/2024 Hold for add-ons.   Final    Auto resulted    WBC 08/19/2024 9.86  3.40 - 10.80 10*3/mm3 Final    RBC 08/19/2024 5.36 (H)  3.77 - 5.28 10*6/mm3 Final    Hemoglobin 08/19/2024 14.5  12.0 - 15.9 g/dL Final    Hematocrit 08/19/2024 44.8  34.0 - 46.6 % Final    MCV 08/19/2024 83.6  79.0 - 97.0 fL Final    MCH 08/19/2024 27.1  26.6 - 33.0 pg Final    MCHC 08/19/2024 32.4  31.5 - 35.7 g/dL Final    RDW 08/19/2024 22.5 (H)  12.3 - 15.4 % Final    RDW-SD 08/19/2024 65.0 (H)  37.0 - 54.0 fl Final    MPV 08/19/2024 9.7  6.0 - 12.0 fL Final    Platelets 08/19/2024 377  140 - 450 10*3/mm3 Final    Neutrophil % 08/19/2024 74.1  42.7 - 76.0 % Final    Lymphocyte % 08/19/2024 20.2  19.6 - 45.3 %  Final    Monocyte % 08/19/2024 3.2 (L)  5.0 - 12.0 % Final    Eosinophil % 08/19/2024 1.6  0.3 - 6.2 % Final    Basophil % 08/19/2024 0.6  0.0 - 1.5 % Final    Immature Grans % 08/19/2024 0.3  0.0 - 0.5 % Final    Neutrophils, Absolute 08/19/2024 7.30 (H)  1.70 - 7.00 10*3/mm3 Final    Lymphocytes, Absolute 08/19/2024 1.99  0.70 - 3.10 10*3/mm3 Final    Monocytes, Absolute 08/19/2024 0.32  0.10 - 0.90 10*3/mm3 Final    Eosinophils, Absolute 08/19/2024 0.16  0.00 - 0.40 10*3/mm3 Final    Basophils, Absolute 08/19/2024 0.06  0.00 - 0.20 10*3/mm3 Final    Immature Grans, Absolute 08/19/2024 0.03  0.00 - 0.05 10*3/mm3 Final    nRBC 08/19/2024 0.0  0.0 - 0.2 /100 WBC Final    Glucose 08/19/2024 127  70 - 130 mg/dL Final    Right Internal Jugular Spont 08/19/2024 Y   Final    Right Internal Jugular Phasic 08/19/2024 Y   Final    Right Internal Jugular Compress 08/19/2024 C   Final    Right Internal Jugular Augment 08/19/2024 Y   Final    Right Subclavian Spont 08/19/2024 Y   Final    Right Subclavian Phasic 08/19/2024 Y   Final    Right Subclavian Compress 08/19/2024 C   Final    Right Subclavian Augment 08/19/2024 Y   Final    Right Axillary Spont 08/19/2024 Y   Final    Right Axillary Phasic 08/19/2024 Y   Final    Right Axillary Compress 08/19/2024 C   Final    Right Axillary Augment 08/19/2024 Y   Final    Right Brachial Compress 08/19/2024 C   Final    Right Radial Compress 08/19/2024 C   Final    Right Ulnar Compress 08/19/2024 C   Final    Right Basilic Upper Compress 08/19/2024 C   Final    Right Basilic Forearm Compress 08/19/2024 C   Final    Right Cephalic Upper Compress 08/19/2024 C   Final    Right Cephalic Forearm Compress 08/19/2024 C   Final    Left Internal Jugular Spont 08/19/2024 Y   Final    Left Internal Jugular Phasic 08/19/2024 Y   Final    Left Internal Jugular Compress 08/19/2024 C   Final    Left Internal Jugular Augment 08/19/2024 Y   Final    Left Subclavian Spont 08/19/2024 Y   Final    Left  Subclavian Phasic 08/19/2024 Y   Final    Left Subclavian Compress 08/19/2024 C   Final    Left Subclavian Augment 08/19/2024 Y   Final    Left Axillary Spont 08/19/2024 Y   Final    Left Axillary Phasic 08/19/2024 Y   Final    Left Axillary Compress 08/19/2024 C   Final    Left Axillary Augment 08/19/2024 Y   Final    Left Brachial Compress 08/19/2024 C   Final    Left Radial Compress 08/19/2024 C   Final    Left Ulnar Compress 08/19/2024 C   Final    Left Basilic Upper Compress 08/19/2024 C   Final    Left Basilic Forearm Compress 08/19/2024 C   Final    Left Cephalic Upper Compress 08/19/2024 C   Final    Left Cephalic Forearm Compress 08/19/2024 C   Final     Results    PHQ-9 Total Score: 2    GAUTAM-7 Total Score: 4     Assessment & Plan    Diagnoses and all orders for this visit:    1. Generalized anxiety disorder with panic attacks (Primary)  -     escitalopram (LEXAPRO) 20 MG tablet; Take 1 tablet by mouth Daily for 90 days.  Dispense: 90 tablet; Refill: 0    2. Mixed obsessional thoughts and acts  -     escitalopram (LEXAPRO) 20 MG tablet; Take 1 tablet by mouth Daily for 90 days.  Dispense: 90 tablet; Refill: 0    3. Major depressive disorder, recurrent, in partial remission  -     escitalopram (LEXAPRO) 20 MG tablet; Take 1 tablet by mouth Daily for 90 days.  Dispense: 90 tablet; Refill: 0         Liliam Lorenz is a 30 y.o. female who presents today in follow up for management of  generalized anxiety disorder, major depressive disorder and OCD.     As detailed above patient appears to be doing very well overall and once again endorses sustained improvement in psychiatric symptoms when compared to clinical status at last visit.  She reports consistent compliance with all prescribed medications, denies any associated side effects and appears to be tolerating these medications well.  Patient does report experiencing a single panic attack approximately 2 weeks ago as detailed above, but denies utilizing any as  needed alprazolam as she was able to appropriately manage these symptoms via various coping mechanisms/behavioral interventions.  Given recent psychosocial stressors as detailed above patient was highly encouraged to remain consistent with routine psychotherapy with established therapist that she has been seeing on a monthly basis for roughly 4 to 5 years.  Overall, patient does currently feel as if her medication regimen is adequately managing all psychiatric symptoms as evidenced by current PHQ-9 and GAUTAM-7 scores.  Given positive therapeutic benefit associated with current medication regimen and the absence of any significant side effects I recommend making no medication changes at this time.  A 90-day supply of patient's escitalopram will be sent to the pharmacy at today's visit, and patient will be seen again here in the office in approximately 12 weeks for reassessment.  Patient voices understanding of this and is agreeable to today's plan.    Medications:  -Continue mirtazapine 30 mg p.o. nightly for management of generalized anxiety disorder with panic attacks and major depressive disorder.  -Continue escitalopram 20 mg p.o. once daily for management of generalized anxiety disorder with panic attacks, major depressive disorder and OCD.  -Continue alprazolam 0.25 mg p.o. twice daily as needed for acute anxiety.  Prescription filled by previous provider on 8/29/2024.        TREATMENT PLAN - SHORT AND LONG-TERM GOALS:   -Continue supportive psychotherapy efforts and medications as indicated. Treatment and medication options discussed during today's visit.   -Patient acknowledged and verbally consented to continue with current treatment plan and was educated on the importance of compliance with treatment and follow-up appointments.    SUMMARY/EDUCATION/DISCUSSION:  -Pt was given appropriate time to ask questions and concerns were addressed. A thorough discussion was had that included review of disease process,  need for continued monitoring and additional treatment options including use of pharmacological and non-pharmacological approaches to care, decisions were made and agreed upon by patient and provider.   -Discussed medication options and treatment plan of prescribed medication as well as the risks, benefits, and side effects including potential falls, possible impaired driving and metabolic adversities among others; patient acknowledged and provided verbal consent.   -Patient has been educated regarding multimodal approach with healthy nutrition, healthy sleep, regular physical activity, social activities, counseling, and medications.  -Please call the office at [phone] within normal business hours (Monday-Friday, 8:00 AM - 4:30 PM) with any worsening of symptoms or onset of intolerable side effects. Please ask to leave a message with office staff.  Please allow up to 24-48 hours for response to a patient call/question/refill request.  -Safety plan has been established and discussed in detail with the patient, who is agreeable to contact support system and/or call 911 or go to the nearest ER should he/she/they have any thoughts of harm to self or others.    MEDS ORDERED DURING VISIT:  New Medications Ordered This Visit   Medications    escitalopram (LEXAPRO) 20 MG tablet     Sig: Take 1 tablet by mouth Daily for 90 days.     Dispense:  90 tablet     Refill:  0       FOLLOW UP:  Return in about 12 weeks (around 10/23/2025) for Next scheduled follow up.      Juan Ramon Doran DO    This document has been electronically signed by Juan Ramon Doran DO  July 31, 2025 14:25 EDT    Part of this note may be an electronic transcription/translation of spoken language to printed text using the Dragon Dictation System. Some of the data in this electronic note has been brought forward from a previous encounter, any necessary changes have been made, it has been reviewed by this provider, and it is accurate.

## 2025-08-04 ENCOUNTER — TELEPHONE (OUTPATIENT)
Age: 30
End: 2025-08-04
Payer: COMMERCIAL

## 2025-08-08 DIAGNOSIS — F41.0 GENERALIZED ANXIETY DISORDER WITH PANIC ATTACKS: ICD-10-CM

## 2025-08-08 DIAGNOSIS — F33.41 MAJOR DEPRESSIVE DISORDER, RECURRENT, IN PARTIAL REMISSION: ICD-10-CM

## 2025-08-08 DIAGNOSIS — F41.1 GENERALIZED ANXIETY DISORDER WITH PANIC ATTACKS: ICD-10-CM

## 2025-08-08 RX ORDER — MIRTAZAPINE 30 MG/1
30 TABLET, FILM COATED ORAL NIGHTLY
Qty: 30 TABLET | Refills: 2 | OUTPATIENT
Start: 2025-08-08

## (undated) DEVICE — Device

## (undated) DEVICE — DECANTER BAG 9": Brand: MEDLINE INDUSTRIES, INC.

## (undated) DEVICE — ANTIBACTERIAL UNDYED BRAIDED (POLYGLACTIN 910), SYNTHETIC ABSORBABLE SUTURE: Brand: COATED VICRYL

## (undated) DEVICE — DRSNG SURESITE WNDW 4X4.5

## (undated) DEVICE — SUT ETHLN 2/0 PS 18IN 585H

## (undated) DEVICE — SUT VIC 3/0 SH 27IN J416H

## (undated) DEVICE — DRP C/ARM 41X74IN

## (undated) DEVICE — TOWEL,OR,DSP,ST,NATURAL,DLX,4/PK,20PK/CS: Brand: MEDLINE

## (undated) DEVICE — TRAP FLD MINIVAC MEGADYNE 100ML

## (undated) DEVICE — NDL HYPO PRECISIONGLIDE REG 25G 1 1/2

## (undated) DEVICE — SUT MNCRYL PLS ANTIB UD 4/0 PS2 18IN

## (undated) DEVICE — SUT SILK 2/0 TIES 18IN A185H

## (undated) DEVICE — GLV SURG BIOGEL LTX PF 8

## (undated) DEVICE — SUT PROLN 3/0 SH D/A 36IN 8522H

## (undated) DEVICE — SYR LL TP 10ML STRL

## (undated) DEVICE — KT ACC VASC MAK MINI W/BOWTIE/GW 4F 10CM

## (undated) DEVICE — ADHS SKIN SURG TISS VISC PREMIERPRO EXOFIN HI/VISC FAST/DRY

## (undated) DEVICE — LOU MINOR PROCEDURE: Brand: MEDLINE INDUSTRIES, INC.

## (undated) DEVICE — CVR PROB 96IN LF STRL

## (undated) DEVICE — CATH ANGIO TRCN NB BCN .038 5F 40CM KMP

## (undated) DEVICE — BIOPATCH™ ANTIMICROBIAL DRESSING WITH CHLORHEXIDINE GLUCONATE IS A HYDROPHILLIC POLYURETHANE ABSORPTIVE FOAM WITH CHLORHEXIDINE GLUCONATE (CHG) WHICH INHIBITS BACTERIAL GROWTH UNDER THE DRESSING. THE DRESSING IS INTENDED TO BE USED TO ABSORB EXUDATE, COVER A WOUND CAUSED BY VASCULAR AND NONVASCULAR PERCUTANEOUS MEDICAL DEVICES DURING SURGERY, AS WELL AS REDUCE LOCAL INFECTION AND COLONIZATION OF MICROORGANISMS.: Brand: BIOPATCH

## (undated) DEVICE — INTENDED FOR TISSUE SEPARATION, AND OTHER PROCEDURES THAT REQUIRE A SHARP SURGICAL BLADE TO PUNCTURE OR CUT.: Brand: BARD-PARKER ® CARBON RIB-BACK BLADES

## (undated) DEVICE — DECANT BG O JET

## (undated) DEVICE — 3M™ IOBAN™ 2 ANTIMICROBIAL INCISE DRAPE 6640EZ: Brand: IOBAN™ 2

## (undated) DEVICE — ST. SORBAVIEW ULTIMATE IJ SYSTEM A,C: Brand: CENTURION

## (undated) DEVICE — RADIFOCUS GLIDEWIRE ADVANTAGE GUIDEWIRE: Brand: GLIDEWIRE ADVANTAGE

## (undated) DEVICE — POWERPORT M.R.I. IMPLANTABLE PORT WITH PRE-ATTACHED 9.6F  OPEN-ENDED SINGLE-LUMEN VENOUS CATHETER. INTERMEDIATE KIT (WITH SUTURE PLUGS)
Type: IMPLANTABLE DEVICE | Site: CHEST | Status: NON-FUNCTIONAL
Brand: POWERPORT M.R.I.

## (undated) DEVICE — UNDERGLV SURG BIOGEL INDICAT PI SZ8.5 BLU

## (undated) DEVICE — PATIENT RETURN ELECTRODE, SINGLE-USE, CONTACT QUALITY MONITORING, ADULT, WITH 9FT CORD, FOR PATIENTS WEIGING OVER 33LBS. (15KG): Brand: MEGADYNE

## (undated) DEVICE — SOL NACL 0.9PCT 100ML SGL

## (undated) DEVICE — RADIFOCUS GLIDEWIRE: Brand: GLIDEWIRE

## (undated) DEVICE — PENCL E/S ULTRAVAC TELESCP NOSE HOLSTR 10FT

## (undated) DEVICE — GLV SURG BIOGEL LTX PF 8 1/2

## (undated) DEVICE — SOL NS 500ML

## (undated) DEVICE — TBG PENCL TELESCP MEGADYNE SMOKE EVAC 10FT

## (undated) DEVICE — CATH ANGIO BEACON TP/KMP 5F 65CM .038IN

## (undated) DEVICE — GOWN,REINF,POLY,SIRUS,BRTH SLV,XLNG/XXL: Brand: MEDLINE

## (undated) DEVICE — SYR LUERLOK 20CC BX/50

## (undated) DEVICE — 3M™ STERI-STRIP™ ANTIMICROBIAL SKIN CLOSURES 1/2 INCH X 4 INCHES 50/CARTON 4 CARTONS/CASE A1847: Brand: 3M™ STERI-STRIP™

## (undated) DEVICE — ADHS SKIN DERMABOND TOP ADVANCED

## (undated) DEVICE — SUT ETHIB 2/0 SH SH 36IN X523H

## (undated) DEVICE — DRSNG WND BORDR/ADHS NONADHR/GZ LF 2X2IN STRL

## (undated) DEVICE — 11F X 90CM DOUBLE LUMEN LT11F X CVC BASIC KITCVC BASIC KIT
Type: IMPLANTABLE DEVICE | Status: NON-FUNCTIONAL
Brand: LONG TERM CVCLONG TERM CVC